# Patient Record
Sex: MALE | Race: WHITE | NOT HISPANIC OR LATINO
[De-identification: names, ages, dates, MRNs, and addresses within clinical notes are randomized per-mention and may not be internally consistent; named-entity substitution may affect disease eponyms.]

---

## 2017-01-05 ENCOUNTER — APPOINTMENT (OUTPATIENT)
Dept: FAMILY MEDICINE | Facility: CLINIC | Age: 62
End: 2017-01-05

## 2017-01-05 VITALS
RESPIRATION RATE: 20 BRPM | SYSTOLIC BLOOD PRESSURE: 115 MMHG | HEIGHT: 73 IN | WEIGHT: 167 LBS | HEART RATE: 68 BPM | BODY MASS INDEX: 22.13 KG/M2 | DIASTOLIC BLOOD PRESSURE: 75 MMHG

## 2017-01-05 DIAGNOSIS — K62.89 OTHER SPECIFIED DISEASES OF ANUS AND RECTUM: ICD-10-CM

## 2017-02-21 ENCOUNTER — OTHER (OUTPATIENT)
Age: 62
End: 2017-02-21

## 2017-03-27 ENCOUNTER — RX RENEWAL (OUTPATIENT)
Age: 62
End: 2017-03-27

## 2017-04-10 ENCOUNTER — APPOINTMENT (OUTPATIENT)
Dept: FAMILY MEDICINE | Facility: CLINIC | Age: 62
End: 2017-04-10

## 2017-04-10 VITALS
BODY MASS INDEX: 22.8 KG/M2 | DIASTOLIC BLOOD PRESSURE: 78 MMHG | SYSTOLIC BLOOD PRESSURE: 128 MMHG | RESPIRATION RATE: 20 BRPM | HEART RATE: 76 BPM | HEIGHT: 73 IN | WEIGHT: 172 LBS

## 2017-04-15 LAB
ALBUMIN SERPL ELPH-MCNC: 4.4 G/DL
ALP BLD-CCNC: 57 U/L
ALT SERPL-CCNC: 12 U/L
ANION GAP SERPL CALC-SCNC: 13 MMOL/L
AST SERPL-CCNC: 17 U/L
BASOPHILS # BLD AUTO: 0.03 K/UL
BASOPHILS NFR BLD AUTO: 0.4 %
BILIRUB SERPL-MCNC: 0.5 MG/DL
BUN SERPL-MCNC: 21 MG/DL
CALCIUM SERPL-MCNC: 9.6 MG/DL
CHLORIDE SERPL-SCNC: 107 MMOL/L
CHOLEST SERPL-MCNC: 178 MG/DL
CHOLEST/HDLC SERPL: 2.5 RATIO
CO2 SERPL-SCNC: 22 MMOL/L
CREAT SERPL-MCNC: 0.98 MG/DL
EOSINOPHIL # BLD AUTO: 0.04 K/UL
EOSINOPHIL NFR BLD AUTO: 0.5 %
GLUCOSE SERPL-MCNC: 97 MG/DL
HBA1C MFR BLD HPLC: 5.9 %
HCT VFR BLD CALC: 41.2 %
HDLC SERPL-MCNC: 70 MG/DL
HGB BLD-MCNC: 13.4 G/DL
IMM GRANULOCYTES NFR BLD AUTO: 0 %
LDLC SERPL CALC-MCNC: 87 MG/DL
LYMPHOCYTES # BLD AUTO: 1.4 K/UL
LYMPHOCYTES NFR BLD AUTO: 19 %
MAN DIFF?: NORMAL
MCHC RBC-ENTMCNC: 30.5 PG
MCHC RBC-ENTMCNC: 32.5 GM/DL
MCV RBC AUTO: 93.8 FL
MONOCYTES # BLD AUTO: 0.72 K/UL
MONOCYTES NFR BLD AUTO: 9.8 %
NEUTROPHILS # BLD AUTO: 5.16 K/UL
NEUTROPHILS NFR BLD AUTO: 70.3 %
PLATELET # BLD AUTO: 254 K/UL
POTASSIUM SERPL-SCNC: 4.3 MMOL/L
PROT SERPL-MCNC: 6.7 G/DL
RBC # BLD: 4.39 M/UL
RBC # FLD: 13.8 %
SODIUM SERPL-SCNC: 142 MMOL/L
TRIGL SERPL-MCNC: 103 MG/DL
WBC # FLD AUTO: 7.35 K/UL

## 2017-06-30 ENCOUNTER — RX RENEWAL (OUTPATIENT)
Age: 62
End: 2017-06-30

## 2017-09-08 ENCOUNTER — RX RENEWAL (OUTPATIENT)
Age: 62
End: 2017-09-08

## 2017-09-11 ENCOUNTER — TRANSCRIPTION ENCOUNTER (OUTPATIENT)
Age: 62
End: 2017-09-11

## 2017-09-22 ENCOUNTER — TRANSCRIPTION ENCOUNTER (OUTPATIENT)
Age: 62
End: 2017-09-22

## 2017-11-13 ENCOUNTER — RX RENEWAL (OUTPATIENT)
Age: 62
End: 2017-11-13

## 2017-12-04 ENCOUNTER — RX RENEWAL (OUTPATIENT)
Age: 62
End: 2017-12-04

## 2017-12-04 ENCOUNTER — APPOINTMENT (OUTPATIENT)
Dept: FAMILY MEDICINE | Facility: CLINIC | Age: 62
End: 2017-12-04
Payer: COMMERCIAL

## 2017-12-04 VITALS
WEIGHT: 164 LBS | HEIGHT: 73 IN | RESPIRATION RATE: 20 BRPM | SYSTOLIC BLOOD PRESSURE: 122 MMHG | BODY MASS INDEX: 21.74 KG/M2 | HEART RATE: 76 BPM | DIASTOLIC BLOOD PRESSURE: 70 MMHG

## 2017-12-04 DIAGNOSIS — Z23 ENCOUNTER FOR IMMUNIZATION: ICD-10-CM

## 2017-12-04 DIAGNOSIS — J98.8 OTHER SPECIFIED RESPIRATORY DISORDERS: ICD-10-CM

## 2017-12-04 PROCEDURE — 99214 OFFICE O/P EST MOD 30 MIN: CPT | Mod: 25

## 2017-12-04 PROCEDURE — 90688 IIV4 VACCINE SPLT 0.5 ML IM: CPT

## 2017-12-04 PROCEDURE — G0008: CPT

## 2017-12-04 PROCEDURE — 36415 COLL VENOUS BLD VENIPUNCTURE: CPT

## 2017-12-11 ENCOUNTER — APPOINTMENT (OUTPATIENT)
Dept: ORTHOPEDIC SURGERY | Facility: CLINIC | Age: 62
End: 2017-12-11
Payer: COMMERCIAL

## 2017-12-11 VITALS
HEART RATE: 50 BPM | WEIGHT: 164 LBS | BODY MASS INDEX: 21.74 KG/M2 | DIASTOLIC BLOOD PRESSURE: 81 MMHG | SYSTOLIC BLOOD PRESSURE: 146 MMHG | HEIGHT: 73 IN

## 2017-12-11 PROCEDURE — 99213 OFFICE O/P EST LOW 20 MIN: CPT

## 2017-12-11 PROCEDURE — 73562 X-RAY EXAM OF KNEE 3: CPT | Mod: RT

## 2017-12-19 LAB
ALBUMIN SERPL ELPH-MCNC: 4.2 G/DL
ALP BLD-CCNC: 56 U/L
ALT SERPL-CCNC: 10 U/L
ANION GAP SERPL CALC-SCNC: 14 MMOL/L
AST SERPL-CCNC: 16 U/L
BASOPHILS # BLD AUTO: 0.03 K/UL
BASOPHILS NFR BLD AUTO: 0.5 %
BILIRUB SERPL-MCNC: 0.4 MG/DL
BUN SERPL-MCNC: 20 MG/DL
CALCIUM SERPL-MCNC: 9.5 MG/DL
CHLORIDE SERPL-SCNC: 107 MMOL/L
CHOLEST SERPL-MCNC: 218 MG/DL
CHOLEST/HDLC SERPL: 2.9 RATIO
CO2 SERPL-SCNC: 24 MMOL/L
CREAT SERPL-MCNC: 0.9 MG/DL
EOSINOPHIL # BLD AUTO: 0.1 K/UL
EOSINOPHIL NFR BLD AUTO: 1.7 %
GLUCOSE SERPL-MCNC: 110 MG/DL
HBA1C MFR BLD HPLC: 5.6 %
HCT VFR BLD CALC: 43.6 %
HCV AB SER QL: NONREACTIVE
HCV S/CO RATIO: 0.09 S/CO
HDLC SERPL-MCNC: 76 MG/DL
HGB BLD-MCNC: 14.1 G/DL
IMM GRANULOCYTES NFR BLD AUTO: 0.2 %
LDLC SERPL CALC-MCNC: 120 MG/DL
LYMPHOCYTES # BLD AUTO: 1.47 K/UL
LYMPHOCYTES NFR BLD AUTO: 24.6 %
MAN DIFF?: NORMAL
MCHC RBC-ENTMCNC: 30.5 PG
MCHC RBC-ENTMCNC: 32.3 GM/DL
MCV RBC AUTO: 94.2 FL
MONOCYTES # BLD AUTO: 0.54 K/UL
MONOCYTES NFR BLD AUTO: 9 %
NEUTROPHILS # BLD AUTO: 3.83 K/UL
NEUTROPHILS NFR BLD AUTO: 64 %
PLATELET # BLD AUTO: 247 K/UL
POTASSIUM SERPL-SCNC: 4.4 MMOL/L
PROT SERPL-MCNC: 6.8 G/DL
PSA SERPL-MCNC: 1.68 NG/ML
RBC # BLD: 4.63 M/UL
RBC # FLD: 14.4 %
SODIUM SERPL-SCNC: 145 MMOL/L
TRIGL SERPL-MCNC: 111 MG/DL
WBC # FLD AUTO: 5.98 K/UL

## 2018-01-04 ENCOUNTER — CLINICAL ADVICE (OUTPATIENT)
Age: 63
End: 2018-01-04

## 2018-02-01 ENCOUNTER — APPOINTMENT (OUTPATIENT)
Dept: FAMILY MEDICINE | Facility: CLINIC | Age: 63
End: 2018-02-01
Payer: COMMERCIAL

## 2018-02-01 VITALS
HEART RATE: 68 BPM | HEIGHT: 73 IN | BODY MASS INDEX: 21.74 KG/M2 | SYSTOLIC BLOOD PRESSURE: 125 MMHG | RESPIRATION RATE: 20 BRPM | DIASTOLIC BLOOD PRESSURE: 76 MMHG | WEIGHT: 164 LBS

## 2018-02-01 DIAGNOSIS — H91.90 UNSPECIFIED HEARING LOSS, UNSPECIFIED EAR: ICD-10-CM

## 2018-02-01 PROCEDURE — 99213 OFFICE O/P EST LOW 20 MIN: CPT

## 2018-03-22 ENCOUNTER — RX RENEWAL (OUTPATIENT)
Age: 63
End: 2018-03-22

## 2018-04-06 ENCOUNTER — TRANSCRIPTION ENCOUNTER (OUTPATIENT)
Age: 63
End: 2018-04-06

## 2018-06-07 ENCOUNTER — RX RENEWAL (OUTPATIENT)
Age: 63
End: 2018-06-07

## 2018-11-05 ENCOUNTER — RX RENEWAL (OUTPATIENT)
Age: 63
End: 2018-11-05

## 2018-11-26 ENCOUNTER — EMERGENCY (EMERGENCY)
Facility: HOSPITAL | Age: 63
LOS: 1 days | Discharge: HOME | End: 2018-11-26
Attending: EMERGENCY MEDICINE | Admitting: EMERGENCY MEDICINE
Payer: MEDICARE

## 2018-11-26 ENCOUNTER — APPOINTMENT (OUTPATIENT)
Dept: FAMILY MEDICINE | Facility: CLINIC | Age: 63
End: 2018-11-26
Payer: MEDICARE

## 2018-11-26 VITALS
TEMPERATURE: 98 F | WEIGHT: 160 LBS | BODY MASS INDEX: 21.2 KG/M2 | SYSTOLIC BLOOD PRESSURE: 100 MMHG | HEART RATE: 51 BPM | HEIGHT: 73 IN | OXYGEN SATURATION: 94 % | DIASTOLIC BLOOD PRESSURE: 62 MMHG

## 2018-11-26 VITALS
TEMPERATURE: 98 F | DIASTOLIC BLOOD PRESSURE: 71 MMHG | HEART RATE: 61 BPM | SYSTOLIC BLOOD PRESSURE: 126 MMHG | OXYGEN SATURATION: 97 % | RESPIRATION RATE: 18 BRPM

## 2018-11-26 VITALS
OXYGEN SATURATION: 98 % | HEIGHT: 72 IN | DIASTOLIC BLOOD PRESSURE: 74 MMHG | HEART RATE: 53 BPM | SYSTOLIC BLOOD PRESSURE: 122 MMHG | RESPIRATION RATE: 18 BRPM | TEMPERATURE: 98 F | WEIGHT: 160.06 LBS

## 2018-11-26 LAB
ALBUMIN SERPL ELPH-MCNC: 3.6 G/DL — SIGNIFICANT CHANGE UP (ref 3.3–5)
ALP SERPL-CCNC: 57 U/L — SIGNIFICANT CHANGE UP (ref 40–120)
ALT FLD-CCNC: 28 U/L DA — SIGNIFICANT CHANGE UP (ref 10–45)
ANION GAP SERPL CALC-SCNC: 6 MMOL/L — SIGNIFICANT CHANGE UP (ref 5–17)
APPEARANCE UR: CLEAR — SIGNIFICANT CHANGE UP
AST SERPL-CCNC: 18 U/L — SIGNIFICANT CHANGE UP (ref 10–40)
BACTERIA # UR AUTO: ABNORMAL /HPF
BASOPHILS # BLD AUTO: 0.1 K/UL — SIGNIFICANT CHANGE UP (ref 0–0.2)
BASOPHILS NFR BLD AUTO: 1.5 % — SIGNIFICANT CHANGE UP (ref 0–2)
BILIRUB SERPL-MCNC: 0.3 MG/DL — SIGNIFICANT CHANGE UP (ref 0.2–1.2)
BILIRUB UR-MCNC: ABNORMAL
BUN SERPL-MCNC: 28 MG/DL — HIGH (ref 7–23)
CALCIUM SERPL-MCNC: 8.9 MG/DL — SIGNIFICANT CHANGE UP (ref 8.4–10.5)
CHLORIDE SERPL-SCNC: 105 MMOL/L — SIGNIFICANT CHANGE UP (ref 96–108)
CO2 SERPL-SCNC: 32 MMOL/L — HIGH (ref 22–31)
COLOR SPEC: YELLOW — SIGNIFICANT CHANGE UP
CREAT SERPL-MCNC: 1.14 MG/DL — SIGNIFICANT CHANGE UP (ref 0.5–1.3)
DIFF PNL FLD: ABNORMAL
EOSINOPHIL # BLD AUTO: 0.2 K/UL — SIGNIFICANT CHANGE UP (ref 0–0.5)
EOSINOPHIL NFR BLD AUTO: 2.5 % — SIGNIFICANT CHANGE UP (ref 0–6)
EPI CELLS # UR: SIGNIFICANT CHANGE UP
GLUCOSE SERPL-MCNC: 102 MG/DL — HIGH (ref 70–99)
GLUCOSE UR QL: NEGATIVE — SIGNIFICANT CHANGE UP
HCT VFR BLD CALC: 42.8 % — SIGNIFICANT CHANGE UP (ref 39–50)
HGB BLD-MCNC: 14 G/DL — SIGNIFICANT CHANGE UP (ref 13–17)
KETONES UR-MCNC: NEGATIVE — SIGNIFICANT CHANGE UP
LEUKOCYTE ESTERASE UR-ACNC: ABNORMAL
LYMPHOCYTES # BLD AUTO: 1.6 K/UL — SIGNIFICANT CHANGE UP (ref 1–3.3)
LYMPHOCYTES # BLD AUTO: 24.7 % — SIGNIFICANT CHANGE UP (ref 13–44)
MCHC RBC-ENTMCNC: 30.9 PG — SIGNIFICANT CHANGE UP (ref 27–34)
MCHC RBC-ENTMCNC: 32.7 GM/DL — SIGNIFICANT CHANGE UP (ref 32–36)
MCV RBC AUTO: 94.6 FL — SIGNIFICANT CHANGE UP (ref 80–100)
MONOCYTES # BLD AUTO: 0.6 K/UL — SIGNIFICANT CHANGE UP (ref 0–0.9)
MONOCYTES NFR BLD AUTO: 9.8 % — HIGH (ref 1–9)
NEUTROPHILS # BLD AUTO: 3.9 K/UL — SIGNIFICANT CHANGE UP (ref 1.8–7.4)
NEUTROPHILS NFR BLD AUTO: 61.6 % — SIGNIFICANT CHANGE UP (ref 43–77)
NITRITE UR-MCNC: NEGATIVE — SIGNIFICANT CHANGE UP
PH UR: 6 — SIGNIFICANT CHANGE UP (ref 5–8)
PLATELET # BLD AUTO: 220 K/UL — SIGNIFICANT CHANGE UP (ref 150–400)
POTASSIUM SERPL-MCNC: 4.2 MMOL/L — SIGNIFICANT CHANGE UP (ref 3.5–5.3)
POTASSIUM SERPL-SCNC: 4.2 MMOL/L — SIGNIFICANT CHANGE UP (ref 3.5–5.3)
PROT SERPL-MCNC: 6.7 G/DL — SIGNIFICANT CHANGE UP (ref 6–8.3)
PROT UR-MCNC: NEGATIVE — SIGNIFICANT CHANGE UP
RBC # BLD: 4.52 M/UL — SIGNIFICANT CHANGE UP (ref 4.2–5.8)
RBC # FLD: 12.6 % — SIGNIFICANT CHANGE UP (ref 10.3–14.5)
RBC CASTS # UR COMP ASSIST: SIGNIFICANT CHANGE UP /HPF (ref 0–4)
SODIUM SERPL-SCNC: 143 MMOL/L — SIGNIFICANT CHANGE UP (ref 135–145)
SP GR SPEC: 1.02 — SIGNIFICANT CHANGE UP (ref 1.01–1.02)
UROBILINOGEN FLD QL: NEGATIVE — SIGNIFICANT CHANGE UP
WBC # BLD: 6.3 K/UL — SIGNIFICANT CHANGE UP (ref 3.8–10.5)
WBC # FLD AUTO: 6.3 K/UL — SIGNIFICANT CHANGE UP (ref 3.8–10.5)
WBC UR QL: SIGNIFICANT CHANGE UP /HPF (ref 0–5)

## 2018-11-26 PROCEDURE — 74177 CT ABD & PELVIS W/CONTRAST: CPT

## 2018-11-26 PROCEDURE — 81001 URINALYSIS AUTO W/SCOPE: CPT

## 2018-11-26 PROCEDURE — 99284 EMERGENCY DEPT VISIT MOD MDM: CPT | Mod: 25

## 2018-11-26 PROCEDURE — 99284 EMERGENCY DEPT VISIT MOD MDM: CPT

## 2018-11-26 PROCEDURE — 99214 OFFICE O/P EST MOD 30 MIN: CPT

## 2018-11-26 PROCEDURE — 74177 CT ABD & PELVIS W/CONTRAST: CPT | Mod: 26

## 2018-11-26 PROCEDURE — 85027 COMPLETE CBC AUTOMATED: CPT

## 2018-11-26 PROCEDURE — 80053 COMPREHEN METABOLIC PANEL: CPT

## 2018-11-26 RX ORDER — SODIUM CHLORIDE 9 MG/ML
1000 INJECTION INTRAMUSCULAR; INTRAVENOUS; SUBCUTANEOUS ONCE
Qty: 0 | Refills: 0 | Status: COMPLETED | OUTPATIENT
Start: 2018-11-26 | End: 2018-11-26

## 2018-11-26 RX ORDER — OXYCODONE HYDROCHLORIDE 5 MG/1
10 TABLET ORAL ONCE
Qty: 0 | Refills: 0 | Status: DISCONTINUED | OUTPATIENT
Start: 2018-11-26 | End: 2018-11-26

## 2018-11-26 RX ADMIN — SODIUM CHLORIDE 1000 MILLILITER(S): 9 INJECTION INTRAMUSCULAR; INTRAVENOUS; SUBCUTANEOUS at 16:24

## 2018-11-26 RX ADMIN — OXYCODONE HYDROCHLORIDE 10 MILLIGRAM(S): 5 TABLET ORAL at 20:58

## 2018-11-26 NOTE — HISTORY OF PRESENT ILLNESS
[FreeTextEntry8] : Patient came c/o left side pain and back for the past 5 days, sharp, 9/10,  no fever, no chills, on and off, recently more often, no N,no V,+ Diarrhea. Patient took pain meds - Diclofenac Oxycodone - no improvement. Patient denies CP, heart palpitation. Marcos injury.

## 2018-11-26 NOTE — ED PROVIDER NOTE - NSFOLLOWUPCLINICS_GEN_ALL_ED_FT
[facility] - [facility]  Ortho/Spine  [address] [place] [address]  Phone: [phone]  Fax:   Follow Up Time:

## 2018-11-26 NOTE — ED PROVIDER NOTE - ATTENDING CONTRIBUTION TO CARE
Eval with MAGUI Esqueda. Patient with Darcy with MAGUI Esqueda. Patient with left flank pain and lower abd pain. He did not complain of lower abd pain until he was examined and exhibited LLQ abd tenderness with diarrhea. R/O colitis. R/O stone. CT abd and labs. I performed a face to face bedside interview with patient regarding history of present illness, review of symptoms and past medical history. I completed an independent physical exam.  I have discussed the patient's plan of care with Physician Assistant (PA). I agree with note as stated above, having amended the EMR as needed to reflect my findings.   This includes History of Present Illness, HIV, Past Medical/Surgical/Family/Social History, Allergies and Home Medications, Review of Systems, Physical Exam, and any Progress Notes during the time I functioned as the attending physician for this patient.

## 2018-11-26 NOTE — ED PROVIDER NOTE - CARE PROVIDER_API CALL
Coy Ibanez), Gastroenterology; Internal Medicine  [address] [place] [address]  Phone: [phone]  Fax: [phone]

## 2018-11-26 NOTE — ED PROVIDER NOTE - OBJECTIVE STATEMENT
pt 64 yo m hx arthrc/o left sided back pain x5 days. no raditaion.  went to UC today and was pt 62 yo m hx arthrc/o left sided back pain x5 days. no radiation  went to UC today and was sent to ED for CT abd because he had LLQ TTP and 5 days of non bloody, non mucous diarrhea   denies fever, chills, sob, CP, abd pain pt 62 yo m hx arthroscopic c/o left sided back pain x5 days. no radiation  went to UC today and was sent to ED for CT abd because he had LLQ TTP and 5 days of non bloody, non mucous diarrhea   denies fever, chills, sob, CP, abd pain

## 2018-11-26 NOTE — ED PROVIDER NOTE - NSFOLLOWUPINSTRUCTIONS_ED_ALL_ED_FT
You needs to follow up with the gastroenterologist and the spine specialist.  Liver cysts and wall thickening of colon. Pt needs to follow up with GI and needs a colonoscopy as soon as possible. Pt follows with Dr. Mcneil who has been telling him he needs a colonoscopy.  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  Any worsening of symptoms or new concerning symptoms return to the ED  Take all of your medications as prescribed

## 2018-11-26 NOTE — ED PROVIDER NOTE - MEDICAL DECISION MAKING DETAILS
ct abd to eval for colitis, diverticulitis   pt took pain meds at home and does not want any medications at this time

## 2018-11-26 NOTE — ED ADULT TRIAGE NOTE - CHIEF COMPLAINT QUOTE
Pt c/o left flank pain since Wednesday that he describes as a spasm. Pt c/o left flank pain since Wednesday that he describes as a spasm. Pt denies previous trauma, injury or urinary s/s.

## 2018-11-26 NOTE — PHYSICAL EXAM
[No Acute Distress] : no acute distress [No Respiratory Distress] : no respiratory distress  [Clear to Auscultation] : lungs were clear to auscultation bilaterally [No Accessory Muscle Use] : no accessory muscle use [Normal Rate] : normal rate  [Normal S1, S2] : normal S1 and S2 [No Edema] : there was no peripheral edema [Soft] : abdomen soft [Non-distended] : non-distended [No HSM] : no HSM [Normal Supraclavicular Nodes] : no supraclavicular lymphadenopathy [Normal Axillary Nodes] : no axillary lymphadenopathy [Normal Posterior Cervical Nodes] : no posterior cervical lymphadenopathy [Normal Anterior Cervical Nodes] : no anterior cervical lymphadenopathy [No Joint Swelling] : no joint swelling [No Rash] : no rash [Alert and Oriented x3] : oriented to person, place, and time [de-identified] : + LLQ tenderness, + guarding

## 2018-11-26 NOTE — PHYSICAL EXAM
[No Acute Distress] : no acute distress [No Respiratory Distress] : no respiratory distress  [Clear to Auscultation] : lungs were clear to auscultation bilaterally [No Accessory Muscle Use] : no accessory muscle use [Normal Rate] : normal rate  [Normal S1, S2] : normal S1 and S2 [No Edema] : there was no peripheral edema [Soft] : abdomen soft [Non-distended] : non-distended [No HSM] : no HSM [Normal Supraclavicular Nodes] : no supraclavicular lymphadenopathy [Normal Axillary Nodes] : no axillary lymphadenopathy [Normal Posterior Cervical Nodes] : no posterior cervical lymphadenopathy [Normal Anterior Cervical Nodes] : no anterior cervical lymphadenopathy [No Joint Swelling] : no joint swelling [No Rash] : no rash [Alert and Oriented x3] : oriented to person, place, and time [de-identified] : + LLQ tenderness, + guarding

## 2018-11-26 NOTE — ED PROVIDER NOTE - PHYSICAL EXAMINATION
General:     NAD, well-nourished, well-appearing  Eyes: PERRL  Head:     NC/AT, EOMI, oral mucosa moist  Neck:     trachea midline  Lungs:     CTA b/l  CVS:     RRR  Abd:     +BS, LLQ TTP, no CVA TTP. unable to elicit pain in left lower back/flank. no spinal TTP or deformity   Ext:   no deformities   Neuro: AAOx3, no sensory/motor deficits

## 2018-11-26 NOTE — ED PROVIDER NOTE - PROGRESS NOTE DETAILS
Nohemy: Discussed at length CT results:  Liver cysts and wall thickening of colon. Pt needs to follow up with GI and needs a colonoscopy as soon as possible. Pt follows with Dr. Mcneil who has been telling him he needs a colonoscopy.  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

## 2018-11-26 NOTE — REVIEW OF SYSTEMS
[Abdominal Pain] : no abdominal pain [Nausea] : no nausea [Constipation] : no constipation [Diarrhea] : diarrhea [Vomiting] : no vomiting [Heartburn] : no heartburn [Back Pain] : back pain [Negative] : Genitourinary

## 2018-11-27 DIAGNOSIS — T14.8XXA OTHER INJURY OF UNSPECIFIED BODY REGION, INITIAL ENCOUNTER: ICD-10-CM

## 2018-11-29 PROBLEM — E78.00 PURE HYPERCHOLESTEROLEMIA, UNSPECIFIED: Chronic | Status: ACTIVE | Noted: 2018-11-26

## 2018-11-30 ENCOUNTER — APPOINTMENT (OUTPATIENT)
Dept: FAMILY MEDICINE | Facility: CLINIC | Age: 63
End: 2018-11-30
Payer: MEDICARE

## 2018-11-30 ENCOUNTER — EMERGENCY (EMERGENCY)
Facility: HOSPITAL | Age: 63
LOS: 1 days | Discharge: HOME | End: 2018-11-30
Attending: EMERGENCY MEDICINE | Admitting: EMERGENCY MEDICINE
Payer: MEDICARE

## 2018-11-30 VITALS — RESPIRATION RATE: 20 BRPM | HEART RATE: 68 BPM | DIASTOLIC BLOOD PRESSURE: 80 MMHG | SYSTOLIC BLOOD PRESSURE: 125 MMHG

## 2018-11-30 VITALS
TEMPERATURE: 98 F | DIASTOLIC BLOOD PRESSURE: 64 MMHG | OXYGEN SATURATION: 98 % | RESPIRATION RATE: 15 BRPM | WEIGHT: 160.06 LBS | HEART RATE: 84 BPM | SYSTOLIC BLOOD PRESSURE: 103 MMHG | HEIGHT: 72 IN

## 2018-11-30 DIAGNOSIS — R10.32 LEFT LOWER QUADRANT PAIN: ICD-10-CM

## 2018-11-30 LAB
ALBUMIN SERPL ELPH-MCNC: 3.5 G/DL — SIGNIFICANT CHANGE UP (ref 3.3–5)
ALP SERPL-CCNC: 52 U/L — SIGNIFICANT CHANGE UP (ref 40–120)
ALT FLD-CCNC: 31 U/L DA — SIGNIFICANT CHANGE UP (ref 10–45)
ANION GAP SERPL CALC-SCNC: 9 MMOL/L — SIGNIFICANT CHANGE UP (ref 5–17)
AST SERPL-CCNC: 15 U/L — SIGNIFICANT CHANGE UP (ref 10–40)
BASOPHILS # BLD AUTO: 0.1 K/UL — SIGNIFICANT CHANGE UP (ref 0–0.2)
BASOPHILS NFR BLD AUTO: 1.4 % — SIGNIFICANT CHANGE UP (ref 0–2)
BILIRUB SERPL-MCNC: 0.4 MG/DL — SIGNIFICANT CHANGE UP (ref 0.2–1.2)
BUN SERPL-MCNC: 24 MG/DL — HIGH (ref 7–23)
CALCIUM SERPL-MCNC: 8.9 MG/DL — SIGNIFICANT CHANGE UP (ref 8.4–10.5)
CHLORIDE SERPL-SCNC: 109 MMOL/L — HIGH (ref 96–108)
CO2 SERPL-SCNC: 27 MMOL/L — SIGNIFICANT CHANGE UP (ref 22–31)
CREAT SERPL-MCNC: 0.98 MG/DL — SIGNIFICANT CHANGE UP (ref 0.5–1.3)
EOSINOPHIL # BLD AUTO: 0.1 K/UL — SIGNIFICANT CHANGE UP (ref 0–0.5)
EOSINOPHIL NFR BLD AUTO: 2.1 % — SIGNIFICANT CHANGE UP (ref 0–6)
GLUCOSE SERPL-MCNC: 102 MG/DL — HIGH (ref 70–99)
HCT VFR BLD CALC: 42.2 % — SIGNIFICANT CHANGE UP (ref 39–50)
HGB BLD-MCNC: 14 G/DL — SIGNIFICANT CHANGE UP (ref 13–17)
LYMPHOCYTES # BLD AUTO: 1.2 K/UL — SIGNIFICANT CHANGE UP (ref 1–3.3)
LYMPHOCYTES # BLD AUTO: 20.1 % — SIGNIFICANT CHANGE UP (ref 13–44)
MCHC RBC-ENTMCNC: 30.9 PG — SIGNIFICANT CHANGE UP (ref 27–34)
MCHC RBC-ENTMCNC: 33.2 GM/DL — SIGNIFICANT CHANGE UP (ref 32–36)
MCV RBC AUTO: 93 FL — SIGNIFICANT CHANGE UP (ref 80–100)
MONOCYTES # BLD AUTO: 0.5 K/UL — SIGNIFICANT CHANGE UP (ref 0–0.9)
MONOCYTES NFR BLD AUTO: 9 % — SIGNIFICANT CHANGE UP (ref 1–9)
NEUTROPHILS # BLD AUTO: 3.9 K/UL — SIGNIFICANT CHANGE UP (ref 1.8–7.4)
NEUTROPHILS NFR BLD AUTO: 67.4 % — SIGNIFICANT CHANGE UP (ref 43–77)
PLATELET # BLD AUTO: 222 K/UL — SIGNIFICANT CHANGE UP (ref 150–400)
POTASSIUM SERPL-MCNC: 4.7 MMOL/L — SIGNIFICANT CHANGE UP (ref 3.5–5.3)
POTASSIUM SERPL-SCNC: 4.7 MMOL/L — SIGNIFICANT CHANGE UP (ref 3.5–5.3)
PROT SERPL-MCNC: 6.7 G/DL — SIGNIFICANT CHANGE UP (ref 6–8.3)
RBC # BLD: 4.54 M/UL — SIGNIFICANT CHANGE UP (ref 4.2–5.8)
RBC # FLD: 12.4 % — SIGNIFICANT CHANGE UP (ref 10.3–14.5)
SODIUM SERPL-SCNC: 145 MMOL/L — SIGNIFICANT CHANGE UP (ref 135–145)
WBC # BLD: 5.8 K/UL — SIGNIFICANT CHANGE UP (ref 3.8–10.5)
WBC # FLD AUTO: 5.8 K/UL — SIGNIFICANT CHANGE UP (ref 3.8–10.5)

## 2018-11-30 PROCEDURE — 74176 CT ABD & PELVIS W/O CONTRAST: CPT

## 2018-11-30 PROCEDURE — 85027 COMPLETE CBC AUTOMATED: CPT

## 2018-11-30 PROCEDURE — 99284 EMERGENCY DEPT VISIT MOD MDM: CPT | Mod: 25

## 2018-11-30 PROCEDURE — 96361 HYDRATE IV INFUSION ADD-ON: CPT

## 2018-11-30 PROCEDURE — 96374 THER/PROPH/DIAG INJ IV PUSH: CPT

## 2018-11-30 PROCEDURE — 74176 CT ABD & PELVIS W/O CONTRAST: CPT | Mod: 26

## 2018-11-30 PROCEDURE — 80053 COMPREHEN METABOLIC PANEL: CPT

## 2018-11-30 PROCEDURE — 96375 TX/PRO/DX INJ NEW DRUG ADDON: CPT

## 2018-11-30 PROCEDURE — 93010 ELECTROCARDIOGRAM REPORT: CPT

## 2018-11-30 PROCEDURE — 99284 EMERGENCY DEPT VISIT MOD MDM: CPT

## 2018-11-30 PROCEDURE — 93005 ELECTROCARDIOGRAM TRACING: CPT

## 2018-11-30 PROCEDURE — 99213 OFFICE O/P EST LOW 20 MIN: CPT

## 2018-11-30 RX ORDER — OXYCODONE HYDROCHLORIDE 5 MG/1
1 TABLET ORAL
Qty: 6 | Refills: 0 | OUTPATIENT
Start: 2018-11-30

## 2018-11-30 RX ORDER — SODIUM CHLORIDE 9 MG/ML
3 INJECTION INTRAMUSCULAR; INTRAVENOUS; SUBCUTANEOUS ONCE
Qty: 0 | Refills: 0 | Status: COMPLETED | OUTPATIENT
Start: 2018-11-30 | End: 2018-11-30

## 2018-11-30 RX ORDER — OXYCODONE HYDROCHLORIDE 5 MG/1
1 TABLET ORAL
Qty: 12 | Refills: 0 | OUTPATIENT
Start: 2018-11-30 | End: 2018-12-02

## 2018-11-30 RX ORDER — HYDROMORPHONE HYDROCHLORIDE 2 MG/ML
1 INJECTION INTRAMUSCULAR; INTRAVENOUS; SUBCUTANEOUS ONCE
Qty: 0 | Refills: 0 | Status: DISCONTINUED | OUTPATIENT
Start: 2018-11-30 | End: 2018-11-30

## 2018-11-30 RX ORDER — ONDANSETRON 8 MG/1
4 TABLET, FILM COATED ORAL ONCE
Qty: 0 | Refills: 0 | Status: COMPLETED | OUTPATIENT
Start: 2018-11-30 | End: 2018-11-30

## 2018-11-30 RX ORDER — MORPHINE SULFATE 50 MG/1
4 CAPSULE, EXTENDED RELEASE ORAL ONCE
Qty: 0 | Refills: 0 | Status: DISCONTINUED | OUTPATIENT
Start: 2018-11-30 | End: 2018-11-30

## 2018-11-30 RX ORDER — DIAZEPAM 5 MG
1 TABLET ORAL
Qty: 12 | Refills: 0 | OUTPATIENT
Start: 2018-11-30 | End: 2018-12-02

## 2018-11-30 RX ORDER — SODIUM CHLORIDE 9 MG/ML
1000 INJECTION INTRAMUSCULAR; INTRAVENOUS; SUBCUTANEOUS ONCE
Qty: 0 | Refills: 0 | Status: COMPLETED | OUTPATIENT
Start: 2018-11-30 | End: 2018-11-30

## 2018-11-30 RX ADMIN — SODIUM CHLORIDE 1000 MILLILITER(S): 9 INJECTION INTRAMUSCULAR; INTRAVENOUS; SUBCUTANEOUS at 18:50

## 2018-11-30 RX ADMIN — SODIUM CHLORIDE 1000 MILLILITER(S): 9 INJECTION INTRAMUSCULAR; INTRAVENOUS; SUBCUTANEOUS at 15:26

## 2018-11-30 RX ADMIN — HYDROMORPHONE HYDROCHLORIDE 1 MILLIGRAM(S): 2 INJECTION INTRAMUSCULAR; INTRAVENOUS; SUBCUTANEOUS at 17:53

## 2018-11-30 RX ADMIN — ONDANSETRON 4 MILLIGRAM(S): 8 TABLET, FILM COATED ORAL at 15:26

## 2018-11-30 RX ADMIN — MORPHINE SULFATE 4 MILLIGRAM(S): 50 CAPSULE, EXTENDED RELEASE ORAL at 15:27

## 2018-11-30 RX ADMIN — SODIUM CHLORIDE 3 MILLILITER(S): 9 INJECTION INTRAMUSCULAR; INTRAVENOUS; SUBCUTANEOUS at 15:38

## 2018-11-30 RX ADMIN — HYDROMORPHONE HYDROCHLORIDE 1 MILLIGRAM(S): 2 INJECTION INTRAMUSCULAR; INTRAVENOUS; SUBCUTANEOUS at 18:23

## 2018-11-30 RX ADMIN — MORPHINE SULFATE 4 MILLIGRAM(S): 50 CAPSULE, EXTENDED RELEASE ORAL at 15:57

## 2018-11-30 NOTE — ED PROVIDER NOTE - OBJECTIVE STATEMENT
64 yo male sent in by Dr. Wharton for left lower qudrant pain with left flank pain. pt was seen for the same llq 3 days ago.

## 2018-11-30 NOTE — ED ADULT NURSE NOTE - NSIMPLEMENTINTERV_GEN_ALL_ED
Implemented All Universal Safety Interventions:  [place] to call system. Call bell, personal items and telephone within reach. Instruct patient to call for assistance. Room bathroom lighting operational. Non-slip footwear when patient is off stretcher. Physically safe environment: no spills, clutter or unnecessary equipment. Stretcher in lowest position, wheels locked, appropriate side rails in place.

## 2018-11-30 NOTE — PHYSICAL EXAM
[Uncomfortable] : uncomfortable [Supple] : supple [No Respiratory Distress] : no respiratory distress  [Clear to Auscultation] : lungs were clear to auscultation bilaterally [No Accessory Muscle Use] : no accessory muscle use [Normal Rate] : normal rate  [Regular Rhythm] : with a regular rhythm [Normal S1, S2] : normal S1 and S2 [No Murmur] : no murmur heard [Soft] : abdomen soft [Non-distended] : non-distended [No HSM] : no HSM [Normal Bowel Sounds] : normal bowel sounds [de-identified] : in marked distress, sitting with hand pressing on L lower abdomen, rocking on the table [de-identified] : very tender LLQ with guarding noted

## 2018-11-30 NOTE — ED PROVIDER NOTE - PROGRESS NOTE DETAILS
pt feeling better will d/c tohome on oxycodone and valium pt has appointment with neurologist on tuesday in 4 days [facility] called: unable to fill oxycodone. pt picked up rx oxycodone 15mg BID for a 1 month supply Nov 5th

## 2018-11-30 NOTE — ED ADULT NURSE NOTE - OBJECTIVE STATEMENT
Pt c/o left flank pain since last Wednesday without urinary complications. Pt states hx of back pain but states that this feels like a pocket of air. Pt denies vomiting, nausea, or diarrhea.

## 2018-11-30 NOTE — HISTORY OF PRESENT ILLNESS
[FreeTextEntry8] : Presents on acute basis as follow-up to last visit and ER - all ER documentation reviewed - states has persistent and increasing L lower abd pain; states "I had to take a pain pill to make it here."

## 2018-12-04 ENCOUNTER — APPOINTMENT (OUTPATIENT)
Dept: MRI IMAGING | Facility: HOSPITAL | Age: 63
End: 2018-12-04

## 2018-12-04 ENCOUNTER — OUTPATIENT (OUTPATIENT)
Dept: OUTPATIENT SERVICES | Facility: HOSPITAL | Age: 63
LOS: 1 days | End: 2018-12-04
Payer: MEDICARE

## 2018-12-04 ENCOUNTER — APPOINTMENT (OUTPATIENT)
Dept: SPINE | Facility: CLINIC | Age: 63
End: 2018-12-04
Payer: MEDICARE

## 2018-12-04 VITALS
WEIGHT: 160 LBS | SYSTOLIC BLOOD PRESSURE: 159 MMHG | BODY MASS INDEX: 21.67 KG/M2 | DIASTOLIC BLOOD PRESSURE: 77 MMHG | HEART RATE: 53 BPM | HEIGHT: 72 IN

## 2018-12-04 DIAGNOSIS — G54.3 THORACIC ROOT DISORDERS, NOT ELSEWHERE CLASSIFIED: ICD-10-CM

## 2018-12-04 DIAGNOSIS — Z00.8 ENCOUNTER FOR OTHER GENERAL EXAMINATION: ICD-10-CM

## 2018-12-04 PROCEDURE — 72157 MRI CHEST SPINE W/O & W/DYE: CPT

## 2018-12-04 PROCEDURE — A9579: CPT

## 2018-12-04 PROCEDURE — 99203 OFFICE O/P NEW LOW 30 MIN: CPT

## 2018-12-04 PROCEDURE — 72157 MRI CHEST SPINE W/O & W/DYE: CPT | Mod: 26

## 2018-12-04 RX ORDER — HYDROCORTISONE ACETATE 25 MG/1
25 SUPPOSITORY RECTAL 3 TIMES DAILY
Qty: 30 | Refills: 1 | Status: COMPLETED | COMMUNITY
Start: 2017-01-05 | End: 2018-12-04

## 2018-12-04 RX ORDER — CHLORZOXAZONE 500 MG/1
500 TABLET ORAL
Qty: 40 | Refills: 3 | Status: COMPLETED | COMMUNITY
Start: 2018-03-22 | End: 2018-12-04

## 2018-12-05 ENCOUNTER — APPOINTMENT (OUTPATIENT)
Dept: SURGICAL ONCOLOGY | Facility: CLINIC | Age: 63
End: 2018-12-05

## 2018-12-05 ENCOUNTER — APPOINTMENT (OUTPATIENT)
Dept: SURGICAL ONCOLOGY | Facility: CLINIC | Age: 63
End: 2018-12-05
Payer: MEDICARE

## 2018-12-05 VITALS
SYSTOLIC BLOOD PRESSURE: 124 MMHG | DIASTOLIC BLOOD PRESSURE: 75 MMHG | HEIGHT: 72 IN | WEIGHT: 160 LBS | BODY MASS INDEX: 21.67 KG/M2 | OXYGEN SATURATION: 96 % | HEART RATE: 56 BPM | TEMPERATURE: 98.1 F

## 2018-12-05 DIAGNOSIS — Z12.11 ENCOUNTER FOR SCREENING FOR MALIGNANT NEOPLASM OF COLON: ICD-10-CM

## 2018-12-05 PROCEDURE — 99205 OFFICE O/P NEW HI 60 MIN: CPT

## 2018-12-07 ENCOUNTER — RX RENEWAL (OUTPATIENT)
Age: 63
End: 2018-12-07

## 2018-12-11 ENCOUNTER — APPOINTMENT (OUTPATIENT)
Dept: SPINE | Facility: CLINIC | Age: 63
End: 2018-12-11

## 2018-12-12 ENCOUNTER — OUTPATIENT (OUTPATIENT)
Dept: OUTPATIENT SERVICES | Facility: HOSPITAL | Age: 63
LOS: 1 days | End: 2018-12-12
Payer: MEDICARE

## 2018-12-12 VITALS
WEIGHT: 158.07 LBS | SYSTOLIC BLOOD PRESSURE: 128 MMHG | TEMPERATURE: 98 F | HEART RATE: 61 BPM | DIASTOLIC BLOOD PRESSURE: 70 MMHG | HEIGHT: 70 IN | RESPIRATION RATE: 16 BRPM

## 2018-12-12 DIAGNOSIS — Z12.9 ENCOUNTER FOR SCREENING FOR MALIGNANT NEOPLASM, SITE UNSPECIFIED: ICD-10-CM

## 2018-12-12 DIAGNOSIS — Z98.890 OTHER SPECIFIED POSTPROCEDURAL STATES: Chronic | ICD-10-CM

## 2018-12-12 DIAGNOSIS — Z12.11 ENCOUNTER FOR SCREENING FOR MALIGNANT NEOPLASM OF COLON: ICD-10-CM

## 2018-12-12 DIAGNOSIS — Z96.651 PRESENCE OF RIGHT ARTIFICIAL KNEE JOINT: Chronic | ICD-10-CM

## 2018-12-12 LAB
BUN SERPL-MCNC: 25 MG/DL — HIGH (ref 7–23)
CALCIUM SERPL-MCNC: 9.6 MG/DL — SIGNIFICANT CHANGE UP (ref 8.4–10.5)
CHLORIDE SERPL-SCNC: 104 MMOL/L — SIGNIFICANT CHANGE UP (ref 98–107)
CO2 SERPL-SCNC: 22 MMOL/L — SIGNIFICANT CHANGE UP (ref 22–31)
CREAT SERPL-MCNC: 1.09 MG/DL — SIGNIFICANT CHANGE UP (ref 0.5–1.3)
GLUCOSE SERPL-MCNC: 90 MG/DL — SIGNIFICANT CHANGE UP (ref 70–99)
HCT VFR BLD CALC: 40.2 % — SIGNIFICANT CHANGE UP (ref 39–50)
HGB BLD-MCNC: 13.3 G/DL — SIGNIFICANT CHANGE UP (ref 13–17)
MCHC RBC-ENTMCNC: 30.1 PG — SIGNIFICANT CHANGE UP (ref 27–34)
MCHC RBC-ENTMCNC: 33.1 % — SIGNIFICANT CHANGE UP (ref 32–36)
MCV RBC AUTO: 91 FL — SIGNIFICANT CHANGE UP (ref 80–100)
NRBC # FLD: 0 — SIGNIFICANT CHANGE UP
PLATELET # BLD AUTO: 267 K/UL — SIGNIFICANT CHANGE UP (ref 150–400)
PMV BLD: 10.6 FL — SIGNIFICANT CHANGE UP (ref 7–13)
POTASSIUM SERPL-MCNC: 4.5 MMOL/L — SIGNIFICANT CHANGE UP (ref 3.5–5.3)
POTASSIUM SERPL-SCNC: 4.5 MMOL/L — SIGNIFICANT CHANGE UP (ref 3.5–5.3)
RBC # BLD: 4.42 M/UL — SIGNIFICANT CHANGE UP (ref 4.2–5.8)
RBC # FLD: 13.3 % — SIGNIFICANT CHANGE UP (ref 10.3–14.5)
SODIUM SERPL-SCNC: 143 MMOL/L — SIGNIFICANT CHANGE UP (ref 135–145)
WBC # BLD: 6.9 K/UL — SIGNIFICANT CHANGE UP (ref 3.8–10.5)
WBC # FLD AUTO: 6.9 K/UL — SIGNIFICANT CHANGE UP (ref 3.8–10.5)

## 2018-12-12 PROCEDURE — 93010 ELECTROCARDIOGRAM REPORT: CPT

## 2018-12-12 NOTE — H&P PST ADULT - NSANTHOSAYNRD_GEN_A_CORE
No. JULIO CESAR screening performed.  STOP BANG Legend: 0-2 = LOW Risk; 3-4 = INTERMEDIATE Risk; 5-8 = HIGH Risk

## 2018-12-12 NOTE — H&P PST ADULT - PSH
H/O hernia repair  b/l inguinal & abdominal  History of arthroplasty of right knee  5yrs  History of arthroscopy of left shoulder  age 15 & right shoulder 3 yrs

## 2018-12-12 NOTE — H&P PST ADULT - HISTORY OF PRESENT ILLNESS
64y/o male presents for preop eval for scheduled colonoscopy on 12/18/18.  Pt with c/o left flank pain for past 2-3 weeks resulting in two ER visits and [facility].  Per pt  Ct- scan and MRI done 64y/o male presents for preop eval for scheduled colonoscopy on 12/18/18.  Pt with c/o left flank pain for past 2-3 weeks resulting in two ER visits and [facility].  Per pt  Ct- scan and MRI done.  preop dx screening for malignant neoplasm of colon.

## 2018-12-14 ENCOUNTER — RX RENEWAL (OUTPATIENT)
Age: 63
End: 2018-12-14

## 2018-12-17 PROBLEM — G47.00 INSOMNIA, UNSPECIFIED: Chronic | Status: ACTIVE | Noted: 2018-12-12

## 2018-12-18 ENCOUNTER — APPOINTMENT (OUTPATIENT)
Dept: SURGICAL ONCOLOGY | Facility: HOSPITAL | Age: 63
End: 2018-12-18

## 2018-12-18 ENCOUNTER — OUTPATIENT (OUTPATIENT)
Dept: OUTPATIENT SERVICES | Facility: HOSPITAL | Age: 63
LOS: 1 days | Discharge: HOME | End: 2018-12-18

## 2018-12-18 DIAGNOSIS — Z12.11 ENCOUNTER FOR SCREENING FOR MALIGNANT NEOPLASM OF COLON: ICD-10-CM

## 2018-12-18 DIAGNOSIS — Z98.890 OTHER SPECIFIED POSTPROCEDURAL STATES: Chronic | ICD-10-CM

## 2018-12-18 DIAGNOSIS — Z96.651 PRESENCE OF RIGHT ARTIFICIAL KNEE JOINT: Chronic | ICD-10-CM

## 2019-01-09 ENCOUNTER — OUTPATIENT (OUTPATIENT)
Dept: OUTPATIENT SERVICES | Facility: HOSPITAL | Age: 64
LOS: 1 days | End: 2019-01-09

## 2019-01-09 VITALS
TEMPERATURE: 98 F | HEIGHT: 70.25 IN | OXYGEN SATURATION: 98 % | WEIGHT: 156.09 LBS | RESPIRATION RATE: 14 BRPM | DIASTOLIC BLOOD PRESSURE: 70 MMHG | SYSTOLIC BLOOD PRESSURE: 112 MMHG | HEART RATE: 70 BPM

## 2019-01-09 DIAGNOSIS — Z12.11 ENCOUNTER FOR SCREENING FOR MALIGNANT NEOPLASM OF COLON: ICD-10-CM

## 2019-01-09 DIAGNOSIS — Z98.890 OTHER SPECIFIED POSTPROCEDURAL STATES: Chronic | ICD-10-CM

## 2019-01-09 DIAGNOSIS — Z96.651 PRESENCE OF RIGHT ARTIFICIAL KNEE JOINT: Chronic | ICD-10-CM

## 2019-01-09 DIAGNOSIS — G47.33 OBSTRUCTIVE SLEEP APNEA (ADULT) (PEDIATRIC): ICD-10-CM

## 2019-01-09 DIAGNOSIS — T78.40XS ALLERGY, UNSPECIFIED, SEQUELA: ICD-10-CM

## 2019-01-09 LAB
ANION GAP SERPL CALC-SCNC: 12 MEQ/L — SIGNIFICANT CHANGE UP (ref 7–14)
BUN SERPL-MCNC: 24 MG/DL — HIGH (ref 7–23)
CALCIUM SERPL-MCNC: 9.7 MG/DL — SIGNIFICANT CHANGE UP (ref 8.4–10.5)
CHLORIDE SERPL-SCNC: 107 MMOL/L — SIGNIFICANT CHANGE UP (ref 98–107)
CO2 SERPL-SCNC: 25 MMOL/L — SIGNIFICANT CHANGE UP (ref 22–31)
CREAT SERPL-MCNC: 0.92 MG/DL — SIGNIFICANT CHANGE UP (ref 0.5–1.3)
GLUCOSE SERPL-MCNC: 76 MG/DL — SIGNIFICANT CHANGE UP (ref 70–99)
HCT VFR BLD CALC: 45.8 % — SIGNIFICANT CHANGE UP (ref 39–50)
HGB BLD-MCNC: 14.6 G/DL — SIGNIFICANT CHANGE UP (ref 13–17)
MCHC RBC-ENTMCNC: 30.1 PG — SIGNIFICANT CHANGE UP (ref 27–34)
MCHC RBC-ENTMCNC: 31.9 % — LOW (ref 32–36)
MCV RBC AUTO: 94.4 FL — SIGNIFICANT CHANGE UP (ref 80–100)
NRBC # FLD: 0 K/UL — LOW (ref 25–125)
PLATELET # BLD AUTO: 265 K/UL — SIGNIFICANT CHANGE UP (ref 150–400)
PMV BLD: 10.4 FL — SIGNIFICANT CHANGE UP (ref 7–13)
POTASSIUM SERPL-MCNC: 4.5 MMOL/L — SIGNIFICANT CHANGE UP (ref 3.5–5.3)
POTASSIUM SERPL-SCNC: 4.5 MMOL/L — SIGNIFICANT CHANGE UP (ref 3.5–5.3)
RBC # BLD: 4.85 M/UL — SIGNIFICANT CHANGE UP (ref 4.2–5.8)
RBC # FLD: 13.3 % — SIGNIFICANT CHANGE UP (ref 10.3–14.5)
SODIUM SERPL-SCNC: 144 MMOL/L — SIGNIFICANT CHANGE UP (ref 135–145)
WBC # BLD: 6.01 K/UL — SIGNIFICANT CHANGE UP (ref 3.8–10.5)
WBC # FLD AUTO: 6.01 K/UL — SIGNIFICANT CHANGE UP (ref 3.8–10.5)

## 2019-01-09 RX ORDER — CHLORZOXAZONE 250 MG
0 TABLET ORAL
Qty: 0 | Refills: 0 | COMMUNITY

## 2019-01-09 RX ORDER — OXYCODONE HYDROCHLORIDE 5 MG/1
1 TABLET ORAL
Qty: 0 | Refills: 0 | COMMUNITY

## 2019-01-09 RX ORDER — SIMVASTATIN 20 MG/1
0 TABLET, FILM COATED ORAL
Qty: 0 | Refills: 0 | COMMUNITY

## 2019-01-09 RX ORDER — OXYCODONE HYDROCHLORIDE 5 MG/1
0 TABLET ORAL
Qty: 0 | Refills: 0 | COMMUNITY

## 2019-01-09 RX ORDER — CLOTRIMAZOLE AND BETAMETHASONE DIPROPIONATE 10; .5 MG/G; MG/G
1 CREAM TOPICAL
Qty: 0 | Refills: 0 | COMMUNITY

## 2019-01-09 RX ORDER — DIAZEPAM 5 MG
0 TABLET ORAL
Qty: 0 | Refills: 0 | COMMUNITY

## 2019-01-09 NOTE — H&P PST ADULT - GASTROINTESTINAL
night shift - pt. continues to report 7/10 HA, medicated further as ordered.  Pt. awaiting CT results and dispo. details… detailed exam

## 2019-01-09 NOTE — H&P PST ADULT - HISTORY OF PRESENT ILLNESS
This is a 63 y.o. male for encounter for screening for malignant neoplasm of colon . Pt evaluated by Dr Mccoy. Pt offers no complaints in pst .

## 2019-01-09 NOTE — H&P PST ADULT - TEACHING/LEARNING LEARNING PREFERENCES
video/written material/pictorial/verbal instruction/individual instruction/group instruction/skill demonstration/audio/computer/internet

## 2019-01-09 NOTE — H&P PST ADULT - LYMPHATIC
anterior cervical L/posterior cervical R/supraclavicular L/anterior cervical R/supraclavicular R/posterior cervical L

## 2019-01-09 NOTE — H&P PST ADULT - RS GEN PE MLT RESP DETAILS PC
respirations non-labored/good air movement/clear to auscultation bilaterally/breath sounds equal/no rales/no wheezes/no rhonchi

## 2019-01-11 RX ORDER — POTASSIUM CHLORIDE 1500 MG/1
20 TABLET, FILM COATED, EXTENDED RELEASE ORAL AS DIRECTED
Qty: 4 | Refills: 0 | Status: DISCONTINUED | COMMUNITY
Start: 2019-01-11 | End: 2019-01-11

## 2019-01-15 ENCOUNTER — APPOINTMENT (OUTPATIENT)
Dept: SURGICAL ONCOLOGY | Facility: HOSPITAL | Age: 64
End: 2019-01-15

## 2019-01-15 ENCOUNTER — OUTPATIENT (OUTPATIENT)
Dept: OUTPATIENT SERVICES | Facility: HOSPITAL | Age: 64
LOS: 1 days | Discharge: HOME | End: 2019-01-15
Payer: MEDICARE

## 2019-01-15 DIAGNOSIS — Z98.890 OTHER SPECIFIED POSTPROCEDURAL STATES: Chronic | ICD-10-CM

## 2019-01-15 DIAGNOSIS — Z96.651 PRESENCE OF RIGHT ARTIFICIAL KNEE JOINT: Chronic | ICD-10-CM

## 2019-01-15 DIAGNOSIS — Z12.11 ENCOUNTER FOR SCREENING FOR MALIGNANT NEOPLASM OF COLON: ICD-10-CM

## 2019-01-15 PROCEDURE — 45385 COLONOSCOPY W/LESION REMOVAL: CPT

## 2019-01-16 ENCOUNTER — RESULT REVIEW (OUTPATIENT)
Age: 64
End: 2019-01-16

## 2019-01-16 PROCEDURE — 88305 TISSUE EXAM BY PATHOLOGIST: CPT | Mod: 26

## 2019-01-17 ENCOUNTER — RX RENEWAL (OUTPATIENT)
Age: 64
End: 2019-01-17

## 2019-01-17 LAB — SURGICAL PATHOLOGY STUDY: SIGNIFICANT CHANGE UP

## 2019-01-24 ENCOUNTER — APPOINTMENT (OUTPATIENT)
Dept: FAMILY MEDICINE | Facility: CLINIC | Age: 64
End: 2019-01-24
Payer: MEDICARE

## 2019-01-24 PROCEDURE — 90674 CCIIV4 VAC NO PRSV 0.5 ML IM: CPT

## 2019-01-24 PROCEDURE — 90670 PCV13 VACCINE IM: CPT

## 2019-01-24 PROCEDURE — G0009: CPT

## 2019-01-24 PROCEDURE — G0008: CPT

## 2019-01-24 RX ORDER — DIAZEPAM 5 MG/1
TABLET ORAL
Refills: 0 | Status: DISCONTINUED | COMMUNITY
End: 2019-01-24

## 2019-01-24 NOTE — ASSESSMENT
[FreeTextEntry1] : 126/70\par Flu vaccine given L deltoid\par Prevnar 13 given R deltoid\par \par Also note sent Valium 10mg at bedtime as needed as muscle relaxant for ongoing issues.

## 2019-01-29 ENCOUNTER — OUTPATIENT (OUTPATIENT)
Dept: OUTPATIENT SERVICES | Facility: HOSPITAL | Age: 64
LOS: 1 days | End: 2019-01-29
Payer: MEDICARE

## 2019-01-29 ENCOUNTER — APPOINTMENT (OUTPATIENT)
Dept: SPINE | Facility: CLINIC | Age: 64
End: 2019-01-29
Payer: MEDICARE

## 2019-01-29 ENCOUNTER — APPOINTMENT (OUTPATIENT)
Dept: CT IMAGING | Facility: HOSPITAL | Age: 64
End: 2019-01-29
Payer: MEDICARE

## 2019-01-29 VITALS
SYSTOLIC BLOOD PRESSURE: 143 MMHG | RESPIRATION RATE: 17 BRPM | BODY MASS INDEX: 21.47 KG/M2 | OXYGEN SATURATION: 97 % | HEART RATE: 58 BPM | HEIGHT: 70.25 IN | WEIGHT: 150 LBS | DIASTOLIC BLOOD PRESSURE: 86 MMHG

## 2019-01-29 DIAGNOSIS — Z96.651 PRESENCE OF RIGHT ARTIFICIAL KNEE JOINT: Chronic | ICD-10-CM

## 2019-01-29 DIAGNOSIS — Z98.890 OTHER SPECIFIED POSTPROCEDURAL STATES: Chronic | ICD-10-CM

## 2019-01-29 DIAGNOSIS — G54.3 THORACIC ROOT DISORDERS, NOT ELSEWHERE CLASSIFIED: ICD-10-CM

## 2019-01-29 LAB
BUN SERPL-MCNC: 19 MG/DL
CREAT SERPL-MCNC: 0.83 MG/DL

## 2019-01-29 PROCEDURE — 71260 CT THORAX DX C+: CPT | Mod: 26

## 2019-01-29 PROCEDURE — 71260 CT THORAX DX C+: CPT

## 2019-01-29 PROCEDURE — 99213 OFFICE O/P EST LOW 20 MIN: CPT

## 2019-01-29 RX ORDER — POTASSIUM CHLORIDE 1500 MG/1
20 TABLET, FILM COATED, EXTENDED RELEASE ORAL
Qty: 4 | Refills: 0 | Status: DISCONTINUED | COMMUNITY
Start: 2018-12-14 | End: 2019-01-29

## 2019-01-29 RX ORDER — POTASSIUM CHLORIDE 1500 MG/1
20 TABLET, FILM COATED, EXTENDED RELEASE ORAL AS DIRECTED
Qty: 4 | Refills: 0 | Status: DISCONTINUED | COMMUNITY
Start: 2019-01-11 | End: 2019-01-29

## 2019-01-30 NOTE — ASSESSMENT
[FreeTextEntry1] : Assess:\par T 8 thoracic lesion\par Colonoscopy shows no tumor or malignancy\par Patient  was a former smoker\par \par PLAN:\par Chest CT to rule out lesion\par Return after chest CT for review\par Plan for surgical intervention to remove T 8 lesion \par Discussed surgery and plan of care for thoracic fusion

## 2019-01-30 NOTE — REASON FOR VISIT
[Follow-Up: _____] : a [unfilled] follow-up visit [Spouse] : spouse [FreeTextEntry1] : 63 year old white male who presents with a history of back pain which is severe and sharp in nature .  He had abnormality referencing abdominal disease and a colonoscopy was completed over the last few weeks.  he is here for follow up and Dr Mccoy who performed a colonoscopy was spoken to and his findings were not indicative of any malignancy and there was rectum wall thickening.  He is here to discuss the T 8 lesion and neurosurgical treatment options.

## 2019-01-30 NOTE — SOCIAL HISTORY
[Yes - full time] : Yes - full time [FreeTextEntry1] : , smokes marijuana, works as   , former nicotine smoker

## 2019-02-04 ENCOUNTER — APPOINTMENT (OUTPATIENT)
Dept: SPINE | Facility: CLINIC | Age: 64
End: 2019-02-04

## 2019-02-05 ENCOUNTER — APPOINTMENT (OUTPATIENT)
Dept: SPINE | Facility: CLINIC | Age: 64
End: 2019-02-05

## 2019-03-12 ENCOUNTER — CLINICAL ADVICE (OUTPATIENT)
Age: 64
End: 2019-03-12

## 2019-03-14 ENCOUNTER — APPOINTMENT (OUTPATIENT)
Dept: ORTHOPEDIC SURGERY | Facility: CLINIC | Age: 64
End: 2019-03-14
Payer: MEDICARE

## 2019-03-14 VITALS — DIASTOLIC BLOOD PRESSURE: 80 MMHG | SYSTOLIC BLOOD PRESSURE: 150 MMHG | HEART RATE: 62 BPM

## 2019-03-14 VITALS — WEIGHT: 165 LBS | BODY MASS INDEX: 23.51 KG/M2

## 2019-03-14 PROCEDURE — 99214 OFFICE O/P EST MOD 30 MIN: CPT

## 2019-03-14 NOTE — PHYSICAL EXAM
[Crutches] : ambulates with crutches [Poor Appearance] : well-appearing [Acute Distress] : not in acute distress [Obese] : not obese [Antalgic] : not antalgic [FreeTextEntry2] : Examination of the lumbar spine reveals no midline tenderness palpation, step-offs, or skin lesions. Decreased range of motion with respect to flexion, extension, lateral bending, and rotation. No tenderness to palpation of the sciatic notch. No tenderness palpation of the bilateral greater trochanters. No pain with passive internal/external rotation of the hips. Negative RENATO. Negative straight leg raise bilaterally. No bowstring. Negative femoral stretch. 5 out of 5 iliopsoas, hip abductors, hips adductors, quadriceps, hamstrings, gastrocsoleus, tibialis anterior, extensor hallucis longus, peroneals. Grossly intact sensation to light touch bilateral lower extremities. 1+ patellar and Achilles reflexes. Downgoing Babinski. No clonus. Intact proprioception. Palpable pulses. No skin lesion and no edema on the right and left lower extremities. [de-identified] : Review of his pelvic MRI demonstrates disc degeneration at L4-5 and L5-S1 levels with trace spinal listhesis. No significant stenosis. Nondisplaced right superior and inferior pubic ramus fractures.

## 2019-03-14 NOTE — DISCUSSION/SUMMARY
[de-identified] : Given his worsening symptoms the lumbar spine MRI will be obtained. He is also under the care of [facility] for repeat lesion which is been biopsied and is being followed there. He will followup with me after his MRI.

## 2019-03-14 NOTE — HISTORY OF PRESENT ILLNESS
[de-identified] : Mr. SHARI VALLEJO  is a 64 year old male who presents with ls chronic history of back pain.. At times it can down either leg.   Normal bowel and bladder control.   Denies any recent fevers, chills, sweats, weight loss, or infection. He had a biopsy done of his midthoracic spine which was benign.\par

## 2019-04-12 ENCOUNTER — RX RENEWAL (OUTPATIENT)
Age: 64
End: 2019-04-12

## 2019-06-27 ENCOUNTER — RX RENEWAL (OUTPATIENT)
Age: 64
End: 2019-06-27

## 2019-07-02 ENCOUNTER — RX CHANGE (OUTPATIENT)
Age: 64
End: 2019-07-02

## 2019-08-02 ENCOUNTER — RX RENEWAL (OUTPATIENT)
Age: 64
End: 2019-08-02

## 2019-08-05 PROBLEM — R10.32 LEFT LOWER QUADRANT PAIN: Status: ACTIVE | Noted: 2018-11-26

## 2019-09-19 ENCOUNTER — FORM ENCOUNTER (OUTPATIENT)
Age: 64
End: 2019-09-19

## 2019-09-20 ENCOUNTER — OUTPATIENT (OUTPATIENT)
Dept: OUTPATIENT SERVICES | Facility: HOSPITAL | Age: 64
LOS: 1 days | End: 2019-09-20
Payer: MEDICARE

## 2019-09-20 ENCOUNTER — APPOINTMENT (OUTPATIENT)
Dept: RADIOLOGY | Facility: HOSPITAL | Age: 64
End: 2019-09-20
Payer: MEDICARE

## 2019-09-20 ENCOUNTER — OTHER (OUTPATIENT)
Age: 64
End: 2019-09-20

## 2019-09-20 DIAGNOSIS — Z98.890 OTHER SPECIFIED POSTPROCEDURAL STATES: Chronic | ICD-10-CM

## 2019-09-20 DIAGNOSIS — M79.674 PAIN IN RIGHT TOE(S): ICD-10-CM

## 2019-09-20 DIAGNOSIS — Z96.651 PRESENCE OF RIGHT ARTIFICIAL KNEE JOINT: Chronic | ICD-10-CM

## 2019-09-20 PROCEDURE — 73660 X-RAY EXAM OF TOE(S): CPT | Mod: 26,RT

## 2019-09-20 PROCEDURE — 73660 X-RAY EXAM OF TOE(S): CPT

## 2019-09-27 ENCOUNTER — RX RENEWAL (OUTPATIENT)
Age: 64
End: 2019-09-27

## 2019-10-08 ENCOUNTER — LABORATORY RESULT (OUTPATIENT)
Age: 64
End: 2019-10-08

## 2019-10-08 ENCOUNTER — APPOINTMENT (OUTPATIENT)
Dept: FAMILY MEDICINE | Facility: CLINIC | Age: 64
End: 2019-10-08
Payer: MEDICARE

## 2019-10-08 VITALS
BODY MASS INDEX: 22.33 KG/M2 | HEIGHT: 70 IN | WEIGHT: 156 LBS | HEART RATE: 64 BPM | DIASTOLIC BLOOD PRESSURE: 70 MMHG | SYSTOLIC BLOOD PRESSURE: 124 MMHG | RESPIRATION RATE: 20 BRPM

## 2019-10-08 PROCEDURE — G0008: CPT

## 2019-10-08 PROCEDURE — 93000 ELECTROCARDIOGRAM COMPLETE: CPT

## 2019-10-08 PROCEDURE — G0439: CPT

## 2019-10-08 PROCEDURE — 36415 COLL VENOUS BLD VENIPUNCTURE: CPT

## 2019-10-08 PROCEDURE — 90674 CCIIV4 VAC NO PRSV 0.5 ML IM: CPT

## 2019-10-08 NOTE — COUNSELING
[de-identified] : healthy eating and activities [None] : None [Good understanding] : Patient has a good understanding of lifestyle changes and steps needed to achieve self management goal

## 2019-10-08 NOTE — ASSESSMENT
[FreeTextEntry1] : Comprehensive exam reveals patient to be hemodynamically stable with acceptable BP\par Findings consistent with history - continue proper use of all medication\par Lab profiles sent\par Flu vaccine given L deltoid

## 2019-10-08 NOTE — HISTORY OF PRESENT ILLNESS
[FreeTextEntry1] : Requests comprehensive exam [de-identified] : Presents for comprehensive exam.  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  Does have ongoing arthritic issues - using pain mgt very judiciously to maintain activities with no obvious adverse effects.  Due for flu vaccine - pt is in agreement.

## 2019-10-08 NOTE — HEALTH RISK ASSESSMENT
[Yes] : Yes [2 - 3 times a week (3 pts)] : 2 - 3  times a week (3 points) [1 or 2 (0 pts)] : 1 or 2 (0 points) [Never (0 pts)] : Never (0 points) [No] : In the past 12 months have you used drugs other than those required for medical reasons? No [No falls in past year] : Patient reported no falls in the past year [Fully functional (bathing, dressing, toileting, transferring, walking, feeding)] : Fully functional (bathing, dressing, toileting, transferring, walking, feeding) [Fully functional (using the telephone, shopping, preparing meals, housekeeping, doing laundry, using] : Fully functional and needs no help or supervision to perform IADLs (using the telephone, shopping, preparing meals, housekeeping, doing laundry, using transportation, managing medications and managing finances) [Audit-CScore] : 3 [] : No

## 2019-10-08 NOTE — PHYSICAL EXAM
[Normal Posterior Cervical Nodes] : no posterior cervical lymphadenopathy [Normal Anterior Cervical Nodes] : no anterior cervical lymphadenopathy [Normal Femoral Nodes] : no femoral lymphadenopathy [Normal Inguinal Nodes] : no inguinal lymphadenopathy [Grossly Normal Strength/Tone] : grossly normal strength/tone [Coordination Grossly Intact] : coordination grossly intact [Normal] : no rash [No Focal Deficits] : no focal deficits [Normal Gait] : normal gait [Speech Grossly Normal] : speech grossly normal [Normal Affect] : the affect was normal [Memory Grossly Normal] : memory grossly normal [Alert and Oriented x3] : oriented to person, place, and time [Normal Mood] : the mood was normal [Normal Insight/Judgement] : insight and judgment were intact [de-identified] : multiple joint deformities consistent with DJD

## 2019-10-09 LAB
ALBUMIN SERPL ELPH-MCNC: 4.4 G/DL
ALP BLD-CCNC: 58 U/L
ALT SERPL-CCNC: 13 U/L
ANION GAP SERPL CALC-SCNC: 10 MMOL/L
AST SERPL-CCNC: 22 U/L
B BURGDOR IGG+IGM SER QL IB: NORMAL
BASOPHILS # BLD AUTO: 0.05 K/UL
BASOPHILS NFR BLD AUTO: 0.7 %
BILIRUB SERPL-MCNC: 0.5 MG/DL
BUN SERPL-MCNC: 23 MG/DL
CALCIUM SERPL-MCNC: 9.1 MG/DL
CHLORIDE SERPL-SCNC: 105 MMOL/L
CHOLEST SERPL-MCNC: 175 MG/DL
CHOLEST/HDLC SERPL: 2.6 RATIO
CO2 SERPL-SCNC: 26 MMOL/L
CREAT SERPL-MCNC: 0.94 MG/DL
EOSINOPHIL # BLD AUTO: 0.1 K/UL
EOSINOPHIL NFR BLD AUTO: 1.4 %
ESTIMATED AVERAGE GLUCOSE: 114 MG/DL
FOLATE SERPL-MCNC: 17.3 NG/ML
GLUCOSE SERPL-MCNC: 147 MG/DL
HBA1C MFR BLD HPLC: 5.6 %
HCT VFR BLD CALC: 40.4 %
HDLC SERPL-MCNC: 68 MG/DL
HGB BLD-MCNC: 12.9 G/DL
IMM GRANULOCYTES NFR BLD AUTO: 0.3 %
LDLC SERPL CALC-MCNC: 91 MG/DL
LYMPHOCYTES # BLD AUTO: 1.24 K/UL
LYMPHOCYTES NFR BLD AUTO: 17.1 %
MAN DIFF?: NORMAL
MCHC RBC-ENTMCNC: 30.5 PG
MCHC RBC-ENTMCNC: 31.9 GM/DL
MCV RBC AUTO: 95.5 FL
MONOCYTES # BLD AUTO: 0.61 K/UL
MONOCYTES NFR BLD AUTO: 8.4 %
NEUTROPHILS # BLD AUTO: 5.24 K/UL
NEUTROPHILS NFR BLD AUTO: 72.1 %
PLATELET # BLD AUTO: 242 K/UL
POTASSIUM SERPL-SCNC: 4.1 MMOL/L
PROT SERPL-MCNC: 6.3 G/DL
PSA SERPL-MCNC: 1.84 NG/ML
RBC # BLD: 4.23 M/UL
RBC # FLD: 13.9 %
SODIUM SERPL-SCNC: 141 MMOL/L
T4 FREE SERPL-MCNC: 1.3 NG/DL
TRIGL SERPL-MCNC: 80 MG/DL
TSH SERPL-ACNC: 1.58 UIU/ML
VIT B12 SERPL-MCNC: >2000 PG/ML
WBC # FLD AUTO: 7.26 K/UL

## 2019-12-06 ENCOUNTER — RX RENEWAL (OUTPATIENT)
Age: 64
End: 2019-12-06

## 2019-12-13 ENCOUNTER — APPOINTMENT (OUTPATIENT)
Dept: ORTHOPEDIC SURGERY | Facility: CLINIC | Age: 64
End: 2019-12-13
Payer: MEDICARE

## 2019-12-13 DIAGNOSIS — M43.16 SPONDYLOLISTHESIS, LUMBAR REGION: ICD-10-CM

## 2019-12-13 DIAGNOSIS — M48.07 SPINAL STENOSIS, LUMBOSACRAL REGION: ICD-10-CM

## 2019-12-13 PROCEDURE — 99214 OFFICE O/P EST MOD 30 MIN: CPT

## 2019-12-13 NOTE — PHYSICAL EXAM
[Crutches] : ambulates with crutches [Acute Distress] : not in acute distress [Poor Appearance] : well-appearing [FreeTextEntry2] : Examination of the lumbar spine reveals no midline tenderness palpation, step-offs, or skin lesions. Decreased range of motion with respect to flexion, extension, lateral bending, and rotation. No tenderness to palpation of the sciatic notch. No tenderness palpation of the bilateral greater trochanters. No pain with passive internal/external rotation of the hips. Negative RENATO. Negative straight leg raise bilaterally. No bowstring. Negative femoral stretch. 5 out of 5 iliopsoas, hip abductors, hips adductors, quadriceps, hamstrings, gastrocsoleus, tibialis anterior, extensor hallucis longus, peroneals. Grossly intact sensation to light touch bilateral lower extremities. 1+ patellar and Achilles reflexes. Downgoing Babinski. No clonus. Intact proprioception. Palpable pulses. No skin lesion and no edema on the right and left lower extremities. [Antalgic] : not antalgic [Obese] : not obese [de-identified] : Lumbar spine MRI reveals lumbar stenosis with L4-5 spondylolisthesis

## 2019-12-13 NOTE — HISTORY OF PRESENT ILLNESS
[de-identified] : Mr. SHARI VALLEJO  is a 64 year old male who presents with ls chronic history of back pain.. At times it can down either leg.   Normal bowel and bladder control.   Denies any recent fevers, chills, sweats, weight loss, or infection.

## 2019-12-13 NOTE — DISCUSSION/SUMMARY
[de-identified] : For his T8 lesion he will continue with followup at [facility]. With the lumbar spine we will try some physical therapy. He will also contemplate epidural injections. He is not interested in surgical intervention for his lumbar disorder at this time

## 2019-12-23 ENCOUNTER — EMERGENCY (EMERGENCY)
Facility: HOSPITAL | Age: 64
LOS: 1 days | Discharge: HOME | End: 2019-12-23
Attending: EMERGENCY MEDICINE | Admitting: EMERGENCY MEDICINE
Payer: MEDICARE

## 2019-12-23 VITALS
RESPIRATION RATE: 18 BRPM | SYSTOLIC BLOOD PRESSURE: 136 MMHG | DIASTOLIC BLOOD PRESSURE: 87 MMHG | OXYGEN SATURATION: 95 % | HEART RATE: 65 BPM

## 2019-12-23 VITALS
OXYGEN SATURATION: 92 % | WEIGHT: 160.06 LBS | HEIGHT: 71 IN | TEMPERATURE: 98 F | HEART RATE: 65 BPM | SYSTOLIC BLOOD PRESSURE: 144 MMHG | DIASTOLIC BLOOD PRESSURE: 86 MMHG | RESPIRATION RATE: 18 BRPM

## 2019-12-23 DIAGNOSIS — Z96.651 PRESENCE OF RIGHT ARTIFICIAL KNEE JOINT: Chronic | ICD-10-CM

## 2019-12-23 DIAGNOSIS — Z98.890 OTHER SPECIFIED POSTPROCEDURAL STATES: Chronic | ICD-10-CM

## 2019-12-23 PROCEDURE — 73562 X-RAY EXAM OF KNEE 3: CPT | Mod: 26,RT

## 2019-12-23 PROCEDURE — 99283 EMERGENCY DEPT VISIT LOW MDM: CPT

## 2019-12-23 PROCEDURE — 73562 X-RAY EXAM OF KNEE 3: CPT

## 2019-12-23 NOTE — ED PROVIDER NOTE - PATIENT PORTAL LINK FT
You can access the FollowMyHealth Patient Portal offered by [facility] by registering at the following website: [email or web address]. By joining [facility]’s FollowMyHealth portal, you will also be able to view your health information using other applications (apps) compatible with our system.

## 2019-12-23 NOTE — ED PROCEDURE NOTE - NS ED PERI NEURO NEG
Post-application: Motor, sensory, and vascular responses intact in the injured extremity./Pre-application: Motor, sensory, and vascular responses intact in the injured extremity. Pre-application: Motor, sensory, and vascular responses intact in the injured extremity./Post-application: Motor, sensory, and vascular responses intact in the injured extremity./The patient/caregiver verbalized understanding of how to care for the injured extremity with splint

## 2019-12-23 NOTE — ED ADULT TRIAGE NOTE - CHIEF COMPLAINT QUOTE
Pt states 1 week ago "my knee gave out". His knee buckled several times since then.  Pt had TKR in the past. In addition, pt states he has hip and back pain that he is treated with oxycodone.

## 2019-12-23 NOTE — ED PROVIDER NOTE - CLINICAL SUMMARY MEDICAL DECISION MAKING FREE TEXT BOX
65 yo male, hx high cholesterol , chronic back pain, TKR right knee >5 years ago comes to the ED co right knee discomfort. States over the past 2 days his right knee has been giving out more than usual. Denies any recent trauma.   Denies any pain currently, only when the knee gives out at times.  Right knee non tender, no warmth or erythema, old scar from tkr,  Will obtain xray make sure hardware ok and no fx. Declines any pain meds, and re-eval    xray no fx, hardware in place, will dc home with fu with dr espinoza and knee immobilizer as thompson Davalos

## 2019-12-23 NOTE — ED PROVIDER NOTE - ATTENDING CONTRIBUTION TO CARE
Darcy with MAGUI Marquez. 65 yo male, hx high cholesterol , chronic back pain, TKR right knee >5 years ago comes to the ED co right knee discomfort. States over the past 2 days his right knee has been giving out more than usual. Denies any recent trauma.   Denies any pain currently, only when the knee gives out at times.  Right knee non tender, no warmth or erythema, old scar from tkr,  Will obtain xray make sure hardware ok and no fx. Declines any pain meds, and re-eval    xray no fx, hardware in place, will dc home with fu with dr espinoza and knee immobilizer    I performed a face to face bedside interview with patient regarding history of present illness, review of symptoms and past medical history. I completed an independent physical exam.  I have discussed the patient's plan of care with Physician Assistant (PA). I agree with note as stated above, having amended the EMR as needed to reflect my findings.   This includes History of Present Illness, HIV, Past Medical/Surgical/Family/Social History, Allergies and Home Medications, Review of Systems, Physical Exam, and any Progress Notes during the time I functioned as the attending physician for this patient.

## 2019-12-23 NOTE — ED PROVIDER NOTE - MUSCULOSKELETAL, MLM
Spine appears normal, range of motion is not limited, no muscle or joint tenderness Spine appears normal, range of motion is not limited, no muscle or joint tenderness, old scar from right tkr

## 2019-12-23 NOTE — ED PROVIDER NOTE - OBJECTIVE STATEMENT
63 yo male, hx high cholesterol , chronic back pain, TKR right knee >5 years ago comes to the ED co right knee discomfort. States over the past 2 days his right knee has been giving out more than usual. Denies any recent trauma.   Denies any pain currently, only when the knee gives out at times.

## 2019-12-23 NOTE — ED ADULT NURSE NOTE - OBJECTIVE STATEMENT
Patient reports left knee has been "giving out" x 1 week, has worsen today. Reports hx of left knee surgery. Denies any injuries, numbness or tingle.

## 2019-12-23 NOTE — ED PROVIDER NOTE - NSFOLLOWUPINSTRUCTIONS_ED_ALL_ED_FT
Follow up with your primary physician as well as Dr Elias as planned, taking all results from the ER to be reviewed.   Keep the knee immobilizer on while ambulating as discs sued. If any worsening, concerning or new signs or symptoms return to the ER. Follow up with your primary physician as well as Dr Elias as planned, taking all results from the ER to be reviewed.   Keep the knee immobilizer on while ambulating as discussed.   If any worsening, concerning or new signs or symptoms return to the ER.

## 2019-12-23 NOTE — ED PROCEDURE NOTE - CPROC ED POST PROC CARE GUIDE1
Verbal/written post procedure instructions were given to patient/caregiver. Verbal/written post procedure instructions were given to patient/caregiver./Instructed patient/caregiver to follow-up with primary care physician./Elevate the injured extremity as instructed.

## 2019-12-28 DIAGNOSIS — M25.569 PAIN IN UNSPECIFIED KNEE: ICD-10-CM

## 2019-12-30 ENCOUNTER — APPOINTMENT (OUTPATIENT)
Dept: ORTHOPEDIC SURGERY | Facility: CLINIC | Age: 64
End: 2019-12-30
Payer: MEDICARE

## 2019-12-30 VITALS
BODY MASS INDEX: 22.9 KG/M2 | SYSTOLIC BLOOD PRESSURE: 115 MMHG | HEIGHT: 70 IN | HEART RATE: 60 BPM | WEIGHT: 160 LBS | DIASTOLIC BLOOD PRESSURE: 66 MMHG

## 2019-12-30 DIAGNOSIS — M25.561 PAIN IN RIGHT KNEE: ICD-10-CM

## 2019-12-30 PROCEDURE — 73502 X-RAY EXAM HIP UNI 2-3 VIEWS: CPT | Mod: LT

## 2019-12-30 PROCEDURE — 99213 OFFICE O/P EST LOW 20 MIN: CPT

## 2019-12-30 PROCEDURE — 73562 X-RAY EXAM OF KNEE 3: CPT | Mod: 50

## 2019-12-30 RX ORDER — METHOCARBAMOL 750 MG/1
750 TABLET, FILM COATED ORAL AT BEDTIME
Qty: 30 | Refills: 3 | Status: DISCONTINUED | COMMUNITY
Start: 2017-04-10 | End: 2019-12-30

## 2019-12-30 RX ORDER — SODIUM PICOSULFATE, MAGNESIUM OXIDE, AND ANHYDROUS CITRIC ACID 10; 3.5; 12 MG/160ML; G/160ML; G/160ML
10-3.5-12 MG-GM LIQUID ORAL
Qty: 1 | Refills: 0 | Status: DISCONTINUED | COMMUNITY
Start: 2018-12-14 | End: 2019-12-30

## 2019-12-30 RX ORDER — DIAZEPAM 10 MG/1
10 TABLET ORAL
Qty: 30 | Refills: 0 | Status: DISCONTINUED | COMMUNITY
Start: 2019-01-24 | End: 2019-12-30

## 2019-12-30 RX ORDER — POLYETHYLENE GLYCOL 3350 AND ELECTROLYTES WITH LEMON FLAVOR 236; 22.74; 6.74; 5.86; 2.97 G/4L; G/4L; G/4L; G/4L; G/4L
236 POWDER, FOR SOLUTION ORAL
Qty: 1 | Refills: 0 | Status: DISCONTINUED | COMMUNITY
Start: 2018-12-14 | End: 2019-12-30

## 2019-12-30 RX ORDER — POLYETHYLENE GLYCOL 3350 AND ELECTROLYTES WITH LEMON FLAVOR 236; 22.74; 6.74; 5.86; 2.97 G/4L; G/4L; G/4L; G/4L; G/4L
236 POWDER, FOR SOLUTION ORAL
Qty: 1 | Refills: 0 | Status: DISCONTINUED | COMMUNITY
Start: 2019-01-11 | End: 2019-12-30

## 2020-01-11 ENCOUNTER — APPOINTMENT (OUTPATIENT)
Dept: ORTHOPEDIC SURGERY | Facility: CLINIC | Age: 65
End: 2020-01-11
Payer: MEDICARE

## 2020-01-11 VITALS
HEIGHT: 70 IN | SYSTOLIC BLOOD PRESSURE: 144 MMHG | WEIGHT: 160 LBS | DIASTOLIC BLOOD PRESSURE: 78 MMHG | HEART RATE: 54 BPM | BODY MASS INDEX: 22.9 KG/M2

## 2020-01-11 DIAGNOSIS — Z96.651 PRESENCE OF RIGHT ARTIFICIAL KNEE JOINT: ICD-10-CM

## 2020-01-11 DIAGNOSIS — S89.91XS UNSPECIFIED INJURY OF RIGHT LOWER LEG, SEQUELA: ICD-10-CM

## 2020-01-11 PROCEDURE — 99214 OFFICE O/P EST MOD 30 MIN: CPT

## 2020-01-13 ENCOUNTER — APPOINTMENT (OUTPATIENT)
Dept: MRI IMAGING | Facility: CLINIC | Age: 65
End: 2020-01-13

## 2020-01-15 ENCOUNTER — APPOINTMENT (OUTPATIENT)
Dept: MRI IMAGING | Facility: CLINIC | Age: 65
End: 2020-01-15

## 2020-01-17 ENCOUNTER — APPOINTMENT (OUTPATIENT)
Dept: MRI IMAGING | Facility: CLINIC | Age: 65
End: 2020-01-17

## 2020-01-20 ENCOUNTER — FORM ENCOUNTER (OUTPATIENT)
Age: 65
End: 2020-01-20

## 2020-01-21 ENCOUNTER — OUTPATIENT (OUTPATIENT)
Dept: OUTPATIENT SERVICES | Facility: HOSPITAL | Age: 65
LOS: 1 days | End: 2020-01-21
Payer: MEDICARE

## 2020-01-21 ENCOUNTER — APPOINTMENT (OUTPATIENT)
Dept: MRI IMAGING | Facility: CLINIC | Age: 65
End: 2020-01-21
Payer: MEDICARE

## 2020-01-21 DIAGNOSIS — Z96.651 PRESENCE OF RIGHT ARTIFICIAL KNEE JOINT: ICD-10-CM

## 2020-01-21 DIAGNOSIS — S89.91XS UNSPECIFIED INJURY OF RIGHT LOWER LEG, SEQUELA: ICD-10-CM

## 2020-01-21 DIAGNOSIS — Z98.890 OTHER SPECIFIED POSTPROCEDURAL STATES: Chronic | ICD-10-CM

## 2020-01-21 DIAGNOSIS — Z96.651 PRESENCE OF RIGHT ARTIFICIAL KNEE JOINT: Chronic | ICD-10-CM

## 2020-01-21 PROCEDURE — 73721 MRI JNT OF LWR EXTRE W/O DYE: CPT | Mod: 26,RT

## 2020-01-21 PROCEDURE — 73721 MRI JNT OF LWR EXTRE W/O DYE: CPT

## 2020-07-13 ENCOUNTER — APPOINTMENT (OUTPATIENT)
Dept: FAMILY MEDICINE | Facility: CLINIC | Age: 65
End: 2020-07-13

## 2020-07-24 ENCOUNTER — APPOINTMENT (OUTPATIENT)
Dept: FAMILY MEDICINE | Facility: CLINIC | Age: 65
End: 2020-07-24

## 2020-07-28 ENCOUNTER — APPOINTMENT (OUTPATIENT)
Dept: FAMILY MEDICINE | Facility: CLINIC | Age: 65
End: 2020-07-28
Payer: MEDICARE

## 2020-07-28 ENCOUNTER — RX RENEWAL (OUTPATIENT)
Age: 65
End: 2020-07-28

## 2020-07-28 ENCOUNTER — APPOINTMENT (OUTPATIENT)
Age: 65
End: 2020-07-28
Payer: MEDICARE

## 2020-07-28 ENCOUNTER — OUTPATIENT (OUTPATIENT)
Dept: OUTPATIENT SERVICES | Facility: HOSPITAL | Age: 65
LOS: 1 days | End: 2020-07-28
Payer: MEDICARE

## 2020-07-28 VITALS
SYSTOLIC BLOOD PRESSURE: 130 MMHG | HEART RATE: 61 BPM | TEMPERATURE: 97.8 F | WEIGHT: 153 LBS | BODY MASS INDEX: 21.9 KG/M2 | OXYGEN SATURATION: 97 % | DIASTOLIC BLOOD PRESSURE: 72 MMHG | HEIGHT: 70 IN

## 2020-07-28 DIAGNOSIS — Z98.890 OTHER SPECIFIED POSTPROCEDURAL STATES: Chronic | ICD-10-CM

## 2020-07-28 DIAGNOSIS — B35.6 TINEA CRURIS: ICD-10-CM

## 2020-07-28 DIAGNOSIS — Z96.651 PRESENCE OF RIGHT ARTIFICIAL KNEE JOINT: Chronic | ICD-10-CM

## 2020-07-28 DIAGNOSIS — Z00.8 ENCOUNTER FOR OTHER GENERAL EXAMINATION: ICD-10-CM

## 2020-07-28 PROCEDURE — G0009: CPT

## 2020-07-28 PROCEDURE — 73080 X-RAY EXAM OF ELBOW: CPT | Mod: 26,RT

## 2020-07-28 PROCEDURE — 73060 X-RAY EXAM OF HUMERUS: CPT | Mod: 26,RT

## 2020-07-28 PROCEDURE — 73060 X-RAY EXAM OF HUMERUS: CPT

## 2020-07-28 PROCEDURE — 90732 PPSV23 VACC 2 YRS+ SUBQ/IM: CPT

## 2020-07-28 PROCEDURE — 99214 OFFICE O/P EST MOD 30 MIN: CPT | Mod: 25

## 2020-07-28 PROCEDURE — 73080 X-RAY EXAM OF ELBOW: CPT

## 2020-07-28 NOTE — PLAN
[FreeTextEntry1] : Pneumovax given at office. Patient needs to fu with pcp for labs and lung cancer screening.

## 2020-07-28 NOTE — PHYSICAL EXAM
[Well Nourished] : well nourished [No Acute Distress] : no acute distress [Well-Appearing] : well-appearing [Well Developed] : well developed [PERRL] : pupils equal round and reactive to light [Normal Sclera/Conjunctiva] : normal sclera/conjunctiva [EOMI] : extraocular movements intact [Normal Outer Ear/Nose] : the outer ears and nose were normal in appearance [Normal Oropharynx] : the oropharynx was normal [No JVD] : no jugular venous distention [No Lymphadenopathy] : no lymphadenopathy [Supple] : supple [No Respiratory Distress] : no respiratory distress  [Thyroid Normal, No Nodules] : the thyroid was normal and there were no nodules present [No Accessory Muscle Use] : no accessory muscle use [Regular Rhythm] : with a regular rhythm [Normal Rate] : normal rate  [Normal S1, S2] : normal S1 and S2 [No Murmur] : no murmur heard [No Carotid Bruits] : no carotid bruits [No Abdominal Bruit] : a ~M bruit was not heard ~T in the abdomen [Pedal Pulses Present] : the pedal pulses are present [No Varicosities] : no varicosities [No Edema] : there was no peripheral edema [No Extremity Clubbing/Cyanosis] : no extremity clubbing/cyanosis [No Palpable Aorta] : no palpable aorta [Soft] : abdomen soft [Non-distended] : non-distended [Non Tender] : non-tender [No Masses] : no abdominal mass palpated [Normal Bowel Sounds] : normal bowel sounds [No HSM] : no HSM [Normal Anterior Cervical Nodes] : no anterior cervical lymphadenopathy [Normal Posterior Cervical Nodes] : no posterior cervical lymphadenopathy [No CVA Tenderness] : no CVA  tenderness [No Spinal Tenderness] : no spinal tenderness [No Joint Swelling] : no joint swelling [Grossly Normal Strength/Tone] : grossly normal strength/tone [Coordination Grossly Intact] : coordination grossly intact [No Rash] : no rash [Normal Gait] : normal gait [No Focal Deficits] : no focal deficits [Deep Tendon Reflexes (DTR)] : deep tendon reflexes were 2+ and symmetric [Normal Affect] : the affect was normal [Normal Insight/Judgement] : insight and judgment were intact [de-identified] : fine dry crackles at bases [de-identified] : Noted edema , and hematoma located on medial aspect of right elbow. Minimal tenderness noted as well. Full range of motion and strength noted of right arm

## 2020-07-28 NOTE — HEALTH RISK ASSESSMENT
[] : Yes [Yes] : Yes [1 or 2 (0 pts)] : 1 or 2 (0 points) [2 - 3 times a week (3 pts)] : 2 - 3  times a week (3 points) [No falls in past year] : Patient reported no falls in the past year [Never (0 pts)] : Never (0 points) [0] : 2) Feeling down, depressed, or hopeless: Not at all (0) [[id number]] : 0

## 2020-07-28 NOTE — HISTORY OF PRESENT ILLNESS
[FreeTextEntry8] : Patient reports "banging" his right elbow while working yesterday. He noted mild discoloration and swelling at the medial aspect of his right elbow. He notes minimal pain with movement. He has full range of motion of right elbow. Last night he did take nsaid and iced elbow.\par \par He reports chronic tinea cruris. He works a labor intensive job outside and notices extreme itchiness and burning in his perineal area. He continues to apply antifungal cream with mild relief of symptom, but notes that rash always comes back. He has been evaluated by dermatology.

## 2020-07-31 ENCOUNTER — APPOINTMENT (OUTPATIENT)
Dept: ORTHOPEDIC SURGERY | Facility: CLINIC | Age: 65
End: 2020-07-31
Payer: MEDICARE

## 2020-07-31 DIAGNOSIS — S50.01XA CONTUSION OF RIGHT ELBOW, INITIAL ENCOUNTER: ICD-10-CM

## 2020-07-31 PROCEDURE — 99214 OFFICE O/P EST MOD 30 MIN: CPT

## 2020-07-31 NOTE — DISCUSSION/SUMMARY
[de-identified] : The underlying pathophysiology was reviewed in great detail with the patient as well as the various treatment options, including ice, analgesics, NSAIDs, Physical therapy, steroid injections.\par \par Discussed utilizing warm compresses to help break up and soften hematoma. \par \par Activity modifications and restrictions were discussed. I recommend that he avoid heavy gripping/squeezing. \par \par FU 6 weeks or prn \par \par All questions were answered, all alternatives discussed and the patient is in complete agreement with that plan. Follow-up appointment as instructed. Any issues and the patient will call or come in sooner.

## 2020-07-31 NOTE — PHYSICAL EXAM
[de-identified] : Right Upper Extremity\par o Elbow :\par ¦ Inspection/Palpation : diffuse tenderness to palpation, marked tenderness medial condyle  moderate swelling and ecchymosis medial arm and forearm, no deformities\par ¦ Range of Motion :  ACTIVE EXTENSION: Measured at 20 degrees, ACTIVE FLEXION: Measured at 130 degrees,  ACTIVE SUPINATION: Measured at 90 degrees, ACTIVE PRONATION Measured at 90 degrees, no crepitus all pain free \par ¦ Strength : flexion and extension 5/5, no pain on resisted supination or pronation. \par ¦ Stability : no joint instability on provocative testing\par o Muscle Bulk : no atrophy\par o Sensation : sensation intact to light touch\par o Skin : no skin lesions, no discoloration\par o Vascular Exam : no edema, no cyanosis, radial and ulnar pulses normal \par \par Left Upper Extremity\par o Elbow :\par ¦ Inspection/Palpation : no tenderness, no swelling, no deformities\par ¦ Range of Motion : full and painless in all planes, no crepitus\par ¦ Strength : flexion and extension 5/5\par ¦ Stability : no joint instability on provocative testing\par o Muscle Bulk : no atrophy\par o Sensation : sensation intact to light touch\par o Skin : no skin lesions, no discoloration\par o Vascular Exam : no edema, no cyanosis, radial and ulnar pulses normal \par \par \par \par   [de-identified] : o Right elbow and humerus Xray taken at [facility] on 07/28/2020: Impression\par ¦  3 mm well- corticated calcific density is seen overlying the right elbow joint region seen on the lateral projection which may represent an old avulsion fracture versus a loose body. \par ¦ If clinically indicated, further evaluation with either CT scan imaging or MRI examination of the right elbow can be obtained. \par ¦ Upon further review: mild ulnohumeral joint osteoarthritis. \par \par \par \par \par

## 2020-07-31 NOTE — CONSULT LETTER
[Dear  ___] : Dear ~MANE, [Courtesy Letter:] : I had the pleasure of seeing your patient, [unfilled], in my office today.

## 2020-07-31 NOTE — HISTORY OF PRESENT ILLNESS
[de-identified] : SHARI VALLEJO  is a 65 year male  presenting to the office complaining of right elbow pain. Patient reports pain began on 07/27/2020 after banding his elbow on a steel pipe at work.  He took Ibuprofen 600mg right after the injury. He noticed mild discoloration and swelling.   The patient describes the pain as a dull aching, and occasionally sharp pain localized to the  medial aspect of his right elbow that is intermittent in nature. His  symptoms are exacerbated with any movement of the elbow, and gripping of the hand. Patient reports the pain is waking him  up at night.  Patient reports associated weakness. Denies numbness and tingling in the upper extremity. Patient is taking Oxycodone for pain relief with moderate relief in symptoms. Of note patient is on chronic Oxycodone due to bilateral shoulder pain and back pain prescribed by his PCP.  Patient denies any other complaints at this time.\par \par Patient reports "banging" his right elbow while working yesterday. He noted mild discoloration and swelling at the medial aspect of his right elbow. He notes minimal pain with movement. He has full range of motion of right elbow. Last night he did take nsaid and iced elbow.\par

## 2020-08-11 ENCOUNTER — APPOINTMENT (OUTPATIENT)
Dept: FAMILY MEDICINE | Facility: CLINIC | Age: 65
End: 2020-08-11
Payer: MEDICARE

## 2020-08-11 VITALS
RESPIRATION RATE: 20 BRPM | WEIGHT: 147 LBS | HEIGHT: 70 IN | BODY MASS INDEX: 21.05 KG/M2 | SYSTOLIC BLOOD PRESSURE: 124 MMHG | HEART RATE: 76 BPM | DIASTOLIC BLOOD PRESSURE: 70 MMHG

## 2020-08-11 PROCEDURE — 99214 OFFICE O/P EST MOD 30 MIN: CPT | Mod: 25

## 2020-08-11 PROCEDURE — 36415 COLL VENOUS BLD VENIPUNCTURE: CPT

## 2020-08-11 NOTE — HISTORY OF PRESENT ILLNESS
[de-identified] : Presents for BP check, labs, and general follow-up.  States has been feeling increasingly fatigued - able to perform at work (note does extremely physical work), however is exhausted when at home.  Also has multiple joint pain - shoulders, low back, legs with cramping at night.  States trying to watch diet and maintain hydration.

## 2020-08-11 NOTE — REVIEW OF SYSTEMS
[Fatigue] : fatigue [Joint Pain] : joint pain [Muscle Pain] : muscle pain [Back Pain] : back pain [Negative] : Neurological

## 2020-08-11 NOTE — PHYSICAL EXAM
[No Acute Distress] : no acute distress [No Edema] : there was no peripheral edema [Normal] : soft, non-tender, non-distended, no masses palpated, no HSM and normal bowel sounds [Normal Posterior Cervical Nodes] : no posterior cervical lymphadenopathy [Normal Anterior Cervical Nodes] : no anterior cervical lymphadenopathy [Grossly Normal Strength/Tone] : grossly normal strength/tone [Coordination Grossly Intact] : coordination grossly intact [No Focal Deficits] : no focal deficits [Alert and Oriented x3] : oriented to person, place, and time [Normal Gait] : normal gait [de-identified] : multiple joint deformities consistent with DJD

## 2020-08-11 NOTE — ASSESSMENT
[FreeTextEntry1] : Hemodynamically stable with acceptable BP\par Arthropathy with findings consistent with DJD\par Lab profiles drawn in office and sent

## 2020-08-13 LAB
ALBUMIN SERPL ELPH-MCNC: 4.7 G/DL
ALP BLD-CCNC: 82 U/L
ALT SERPL-CCNC: 21 U/L
ANION GAP SERPL CALC-SCNC: 12 MMOL/L
AST SERPL-CCNC: 22 U/L
BASOPHILS # BLD AUTO: 0.09 K/UL
BASOPHILS NFR BLD AUTO: 1.3 %
BILIRUB SERPL-MCNC: 0.4 MG/DL
BUN SERPL-MCNC: 23 MG/DL
CALCIUM SERPL-MCNC: 10 MG/DL
CHLORIDE SERPL-SCNC: 103 MMOL/L
CHOLEST SERPL-MCNC: 202 MG/DL
CHOLEST/HDLC SERPL: 3 RATIO
CO2 SERPL-SCNC: 25 MMOL/L
CREAT SERPL-MCNC: 0.99 MG/DL
CRP SERPL-MCNC: 0.18 MG/DL
EOSINOPHIL # BLD AUTO: 0.09 K/UL
EOSINOPHIL NFR BLD AUTO: 1.3 %
ERYTHROCYTE [SEDIMENTATION RATE] IN BLOOD BY WESTERGREN METHOD: 12 MM/HR
FOLATE SERPL-MCNC: 10.2 NG/ML
GLUCOSE SERPL-MCNC: 104 MG/DL
HCT VFR BLD CALC: 44.6 %
HDLC SERPL-MCNC: 68 MG/DL
HGB BLD-MCNC: 13.8 G/DL
IMM GRANULOCYTES NFR BLD AUTO: 0.3 %
LDLC SERPL CALC-MCNC: 105 MG/DL
LYMPHOCYTES # BLD AUTO: 1.92 K/UL
LYMPHOCYTES NFR BLD AUTO: 27.2 %
MAN DIFF?: NORMAL
MCHC RBC-ENTMCNC: 30 PG
MCHC RBC-ENTMCNC: 30.9 GM/DL
MCV RBC AUTO: 97 FL
MONOCYTES # BLD AUTO: 0.68 K/UL
MONOCYTES NFR BLD AUTO: 9.6 %
NEUTROPHILS # BLD AUTO: 4.25 K/UL
NEUTROPHILS NFR BLD AUTO: 60.3 %
PLATELET # BLD AUTO: 288 K/UL
POTASSIUM SERPL-SCNC: 4.5 MMOL/L
PROT SERPL-MCNC: 6.8 G/DL
RBC # BLD: 4.6 M/UL
RBC # FLD: 14.2 %
SODIUM SERPL-SCNC: 140 MMOL/L
T4 FREE SERPL-MCNC: 1.4 NG/DL
TRIGL SERPL-MCNC: 143 MG/DL
TSH SERPL-ACNC: 1.5 UIU/ML
VIT B12 SERPL-MCNC: 723 PG/ML
WBC # FLD AUTO: 7.05 K/UL

## 2020-09-01 ENCOUNTER — APPOINTMENT (OUTPATIENT)
Dept: ORTHOPEDIC SURGERY | Facility: CLINIC | Age: 65
End: 2020-09-01
Payer: MEDICARE

## 2020-09-01 DIAGNOSIS — M19.212 SECONDARY OSTEOARTHRITIS, LEFT SHOULDER: ICD-10-CM

## 2020-09-01 DIAGNOSIS — M19.019 PRIMARY OSTEOARTHRITIS, UNSPECIFIED SHOULDER: ICD-10-CM

## 2020-09-01 DIAGNOSIS — Z98.890 OTHER SPECIFIED POSTPROCEDURAL STATES: ICD-10-CM

## 2020-09-01 DIAGNOSIS — M75.121 COMPLETE ROTATOR CUFF TEAR OR RUPTURE OF RIGHT SHOULDER, NOT SPECIFIED AS TRAUMATIC: ICD-10-CM

## 2020-09-01 PROCEDURE — 99214 OFFICE O/P EST MOD 30 MIN: CPT | Mod: 25

## 2020-09-01 PROCEDURE — 20610 DRAIN/INJ JOINT/BURSA W/O US: CPT | Mod: RT

## 2020-09-01 NOTE — DISCUSSION/SUMMARY
[de-identified] : The underlying pathophysiology was reviewed in great detail with the patient as well as the various treatment options, including ice, analgesics, NSAIDs, Physical therapy, steroid injections, hyaluronic gel injections, surgical intervention. right superior capsule reconstruction versus reverse total shoulder replacement, \par \par Patient received a corticosteroid injection of the right shoulder today.\par \par Activity modifications and restrictions were discussed. I advised avoiding overhead lifting. I advised the patient to work on good posture.\par \par Surgical intervention would involve a reverse total shoulder replacement versus a superior capsule stabilization procedure. Nonoperative management would likely progress to osteoarthritis with increased dysfunction over time. The degree to which he will decompensate is unknown. At this time, he has good compensation for loss of rotator cuff function.\par \par As for the left shoulder he has good function without instability despite the x-ray findings. I have advised conservative management and activities as tolerated.\par \par A prescription for Physical Therapy was provided.\par \par A home exercise sheet was given and discussed with the patient to follow.\par \par FU prn \par \par All questions were answered, all alternatives discussed and the patient is in complete agreement with that plan. Follow-up appointment as instructed. Any issues and the patient will call or come in sooner.

## 2020-09-01 NOTE — PHYSICAL EXAM
[de-identified] : Right Upper Extremity\par o Shoulder :\par ¦ Inspection/Palpation : tenderness to palpation greater tuberosity,  no swelling, no deformities\par ¦ Range of Motion : ACTIVE FORWARD ELEVATION: Measured at 155 degrees, ACTIVE EXTERNAL ROTATION: Measured at 50 degrees, ACTIVE INTERNAL ROTATION: Measured at T10, crepitus throughout ROM \par ¦ Strength : external rotation 3/5, internal rotation 5/5, supraspinatus 3/5\par ¦ Stability : no joint instability on provocative testing\par ¦ Tests/Signs : Neer (-), Pickens (-)\par o Upper Arm : no tenderness, no swelling, no deformities\par o Muscle Bulk : no atrophy\par o Sensation : sensation intact to light touch\par o Skin : no skin rash or discoloration\par o Vascular Exam : no edema, no cyanosis, radial and ulnar pulses normal\par \par Left Upper Extremity\par o Shoulder :\par ¦ Inspection/Palpation : no tenderness, no swelling, no deformities\par ¦ Range of Motion : ACTIVE FORWARD ELEVATION: Measured at 155 degrees, ACTIVE EXTERNAL ROTATION: Measured at 45 degrees, ACTIVE INTERNAL ROTATION: Measured at T10, with crepitus \par ¦ Strength : external rotation 5/5, internal rotation 5/5, supraspinatus 5/5\par ¦ Stability : no joint instability on provocative testing\par ¦ Tests/Signs : Neer (-), Pickens (+) mild subacromial crepitus \par o Upper Arm : no tenderness, no swelling, no deformities\par o Muscle Bulk : no atrophy\par o Sensation : sensation intact to light touch\par o Skin : no skin rash or discoloration\par o Vascular Exam : no edema, no cyanosis, radial and ulnar pulses normal [de-identified] : o Right Shoulder : Grashey, Axillary and Outlet views were obtained, there are no soft tissue abnormalities, no fractures, alignment is normal, normal appearing joint spaces, normal bone density, no bony lesions, superior humeral migration, degenerative changes greater tuberosity, moderate AC joint osteoarthritis, \par \par o Left Shoulder : Grashey, Axillary and Outlet views were obtained, there are no soft tissue abnormalities, no fractures, alignment is normal, mild glenohumeral joint osteoarthritis, normal bone density, no bony lesions, status post Alcon procedure with anterior displacement of stabilization screw, erosion of anterior inferior aspect of glenoid fossa by tip of screw.\par \par  \par

## 2020-09-01 NOTE — PROCEDURE
[de-identified] : At this point I recommended a therapeutic injection and under sterile precautions an injection of 4 cc 1% lidocaine with 0.5 cc of Kenalog and 0.5 cc of Dexamethasone- was placed into the subacromial space of the right shoulder without complication, and after several minutes, the patient felt significant relief.\par \par \par

## 2020-09-01 NOTE — HISTORY OF PRESENT ILLNESS
[de-identified] : SHARI VALLEJO  is a 65 year male  presenting to the office complaining of bilateral shoulder pain. Patient reports having a failed rotator cuff surgery on the right shoulder in 2010 and a Bristo procedure in 1970. He saw Dr. 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 They did not go forward surgical intervention at this time. The left shoulder revealed a failed stabilization procedure. He recommended conservative treatment at this time due to lack of instability type symptoms.  Patient reports intermittent pain for over 15 years, but reports worsening pain over the past 6 years. Patient denies new injury or trauma to the area. Currently the The patient describes the pain as a dull aching, and occasionally sharp pain localized to the anterior aspect of his bilateral shoulder that is intermittent in nature. His  symptoms are exacerbated  with any movement of the shoulder. Patient reports the pain is waking him up at night.  Patient reports associated weakness. Denies numbness and tingling in the upper extremity. Of note patient is on chronic Oxycodone due to bilateral shoulder pain and back pain prescribed by his PCP.  Patient denies any other complaints at this time.

## 2020-09-22 ENCOUNTER — APPOINTMENT (OUTPATIENT)
Dept: MRI IMAGING | Facility: HOSPITAL | Age: 65
End: 2020-09-22
Payer: MEDICARE

## 2020-09-22 ENCOUNTER — OUTPATIENT (OUTPATIENT)
Dept: OUTPATIENT SERVICES | Facility: HOSPITAL | Age: 65
LOS: 1 days | End: 2020-09-22
Payer: MEDICARE

## 2020-09-22 DIAGNOSIS — Z00.8 ENCOUNTER FOR OTHER GENERAL EXAMINATION: ICD-10-CM

## 2020-09-22 DIAGNOSIS — Z96.651 PRESENCE OF RIGHT ARTIFICIAL KNEE JOINT: Chronic | ICD-10-CM

## 2020-09-22 DIAGNOSIS — Z98.890 OTHER SPECIFIED POSTPROCEDURAL STATES: Chronic | ICD-10-CM

## 2020-09-22 PROCEDURE — 72148 MRI LUMBAR SPINE W/O DYE: CPT | Mod: 26

## 2020-09-22 PROCEDURE — 72157 MRI CHEST SPINE W/O & W/DYE: CPT | Mod: 26

## 2020-09-22 PROCEDURE — A9579: CPT

## 2020-09-22 PROCEDURE — 72157 MRI CHEST SPINE W/O & W/DYE: CPT

## 2020-09-22 PROCEDURE — 72148 MRI LUMBAR SPINE W/O DYE: CPT

## 2020-10-17 ENCOUNTER — OUTPATIENT (OUTPATIENT)
Dept: OUTPATIENT SERVICES | Facility: HOSPITAL | Age: 65
LOS: 1 days | End: 2020-10-17
Payer: MEDICARE

## 2020-10-17 DIAGNOSIS — Z96.651 PRESENCE OF RIGHT ARTIFICIAL KNEE JOINT: Chronic | ICD-10-CM

## 2020-10-17 DIAGNOSIS — Z98.890 OTHER SPECIFIED POSTPROCEDURAL STATES: Chronic | ICD-10-CM

## 2020-10-17 DIAGNOSIS — Z11.59 ENCOUNTER FOR SCREENING FOR OTHER VIRAL DISEASES: ICD-10-CM

## 2020-10-17 LAB — SARS-COV-2 RNA SPEC QL NAA+PROBE: SIGNIFICANT CHANGE UP

## 2020-10-17 PROCEDURE — U0003: CPT

## 2020-10-19 ENCOUNTER — OUTPATIENT (OUTPATIENT)
Dept: OUTPATIENT SERVICES | Facility: HOSPITAL | Age: 65
LOS: 1 days | Discharge: HOME | End: 2020-10-19
Payer: MEDICARE

## 2020-10-19 DIAGNOSIS — Z98.890 OTHER SPECIFIED POSTPROCEDURAL STATES: Chronic | ICD-10-CM

## 2020-10-19 DIAGNOSIS — M47.817 SPONDYLOSIS WITHOUT MYELOPATHY OR RADICULOPATHY, LUMBOSACRAL REGION: ICD-10-CM

## 2020-10-19 DIAGNOSIS — Z96.651 PRESENCE OF RIGHT ARTIFICIAL KNEE JOINT: Chronic | ICD-10-CM

## 2020-10-19 PROCEDURE — 77003 FLUOROGUIDE FOR SPINE INJECT: CPT

## 2020-10-19 PROCEDURE — 64495 INJ PARAVERT F JNT L/S 3 LEV: CPT | Mod: LT

## 2020-10-19 PROCEDURE — 64493 INJ PARAVERT F JNT L/S 1 LEV: CPT | Mod: LT

## 2020-10-19 PROCEDURE — 64494 INJ PARAVERT F JNT L/S 2 LEV: CPT | Mod: LT

## 2020-12-03 ENCOUNTER — APPOINTMENT (OUTPATIENT)
Dept: FAMILY MEDICINE | Facility: CLINIC | Age: 65
End: 2020-12-03

## 2020-12-10 DIAGNOSIS — Z20.828 CONTACT WITH AND (SUSPECTED) EXPOSURE TO OTHER VIRAL COMMUNICABLE DISEASES: ICD-10-CM

## 2020-12-12 LAB — SARS-COV-2 N GENE NPH QL NAA+PROBE: NOT DETECTED

## 2020-12-16 PROBLEM — Z12.11 ENCOUNTER FOR SCREENING COLONOSCOPY: Status: RESOLVED | Noted: 2018-12-05 | Resolved: 2020-12-16

## 2020-12-30 ENCOUNTER — APPOINTMENT (OUTPATIENT)
Dept: FAMILY MEDICINE | Facility: CLINIC | Age: 65
End: 2020-12-30
Payer: MEDICARE

## 2020-12-30 VITALS — HEART RATE: 76 BPM | SYSTOLIC BLOOD PRESSURE: 125 MMHG | RESPIRATION RATE: 20 BRPM | DIASTOLIC BLOOD PRESSURE: 70 MMHG

## 2020-12-30 DIAGNOSIS — Z87.39 PERSONAL HISTORY OF OTHER DISEASES OF THE MUSCULOSKELETAL SYSTEM AND CONNECTIVE TISSUE: ICD-10-CM

## 2020-12-30 PROCEDURE — 36415 COLL VENOUS BLD VENIPUNCTURE: CPT

## 2020-12-30 PROCEDURE — 90686 IIV4 VACC NO PRSV 0.5 ML IM: CPT

## 2020-12-30 PROCEDURE — G0008: CPT

## 2020-12-30 PROCEDURE — 99214 OFFICE O/P EST MOD 30 MIN: CPT | Mod: 25

## 2020-12-30 NOTE — PHYSICAL EXAM
[No Acute Distress] : no acute distress [Normal] : normal rate, regular rhythm, normal S1 and S2 and no murmur heard [No Edema] : there was no peripheral edema [Soft] : abdomen soft [Non Tender] : non-tender [Muscle Spasms, Bilateral] : bilateral muscle spasms [Motor Strength Upper Extremities Bilaterally] : there was weakness in both upper extremities [Coordination Grossly Intact] : coordination grossly intact [No Focal Deficits] : no focal deficits [Normal Gait] : normal gait [Alert and Oriented x3] : oriented to person, place, and time [de-identified] : decreased ROM both shoulders

## 2020-12-30 NOTE — ASSESSMENT
[FreeTextEntry1] : Hemodynamically stable with acceptable BP\par Findings otherwise consistent with history\par Lab profiles drawn in office and sent\par Flu vaccine given L deltoid

## 2020-12-30 NOTE — HISTORY OF PRESENT ILLNESS
[de-identified] : Presents for BP check, labs, and general follow-up.  Reviewed medication - using pain mgt very appropriately to maintain functioning - still working - also note had MONY per Dr. Johnson - states did help.  Due for current flu vaccine - pt in agreement.  Trying to watch diet.

## 2020-12-31 LAB
ALBUMIN SERPL ELPH-MCNC: 4.6 G/DL
ALP BLD-CCNC: 65 U/L
ALT SERPL-CCNC: 19 U/L
ANION GAP SERPL CALC-SCNC: 12 MMOL/L
AST SERPL-CCNC: 21 U/L
BASOPHILS # BLD AUTO: 0.08 K/UL
BASOPHILS NFR BLD AUTO: 1.1 %
BILIRUB SERPL-MCNC: 0.6 MG/DL
BUN SERPL-MCNC: 27 MG/DL
CALCIUM SERPL-MCNC: 9.6 MG/DL
CHLORIDE SERPL-SCNC: 102 MMOL/L
CHOLEST SERPL-MCNC: 191 MG/DL
CO2 SERPL-SCNC: 24 MMOL/L
CREAT SERPL-MCNC: 1.09 MG/DL
EOSINOPHIL # BLD AUTO: 0.07 K/UL
EOSINOPHIL NFR BLD AUTO: 1 %
FOLATE SERPL-MCNC: 13.6 NG/ML
GLUCOSE SERPL-MCNC: 113 MG/DL
HCT VFR BLD CALC: 42.7 %
HDLC SERPL-MCNC: 77 MG/DL
HGB BLD-MCNC: 13.8 G/DL
IMM GRANULOCYTES NFR BLD AUTO: 0.4 %
LDLC SERPL CALC-MCNC: 102 MG/DL
LYMPHOCYTES # BLD AUTO: 1.31 K/UL
LYMPHOCYTES NFR BLD AUTO: 18.7 %
MAN DIFF?: NORMAL
MCHC RBC-ENTMCNC: 30.6 PG
MCHC RBC-ENTMCNC: 32.3 GM/DL
MCV RBC AUTO: 94.7 FL
MONOCYTES # BLD AUTO: 0.71 K/UL
MONOCYTES NFR BLD AUTO: 10.1 %
NEUTROPHILS # BLD AUTO: 4.8 K/UL
NEUTROPHILS NFR BLD AUTO: 68.7 %
NONHDLC SERPL-MCNC: 114 MG/DL
PLATELET # BLD AUTO: 277 K/UL
POTASSIUM SERPL-SCNC: 4.3 MMOL/L
PROT SERPL-MCNC: 6.7 G/DL
PSA SERPL-MCNC: 1.94 NG/ML
RBC # BLD: 4.51 M/UL
RBC # FLD: 14 %
SODIUM SERPL-SCNC: 138 MMOL/L
TRIGL SERPL-MCNC: 58 MG/DL
VIT B12 SERPL-MCNC: 607 PG/ML
WBC # FLD AUTO: 7 K/UL

## 2021-01-04 NOTE — H&P PST ADULT - BP NONINVASIVE MEAN (MM HG)
Final Anesthesia Post-op Assessment    Patient: Melanie Stack  Procedure(s) Performed: TONSILLECTOMY AND ADENOIDECTOMY  Anesthesia type: General    Vitals Value Taken Time   Temp 36.5 °C (97.7 °F) 01/04/21 1230   Pulse 131 01/04/21 1230   Resp 18 01/04/21 1230   SpO2 99 % 01/04/21 1230   BP 89/51 01/04/21 1230         Patient Location: Phase II  Post-op Vital Signs:stable  Level of Consciousness: participates in exam, awake, alert and oriented  Respiratory Status: spontaneous ventilation  Cardiovascular blood pressure returned to baseline  Hydration: euvolemic  Pain Management: well controlled  Vomiting: none   Nausea: None  Airway Patency:patent  Post-op Assessment: awake, alert, appropriately conversant, or baseline, no complications, patient tolerated procedure well with no complications, evidence of recall, dentition within defined limits, moving all extremities and No Corneal Abrasion  Comments: Discussed with mother regarding discharge instructions. She verbalized understanding and comfortable with heading home with daughter. Reassesed HR prior to discharge, back to 110s.      No complications documented.   
84

## 2021-02-27 ENCOUNTER — OUTPATIENT (OUTPATIENT)
Dept: OUTPATIENT SERVICES | Facility: HOSPITAL | Age: 66
LOS: 1 days | End: 2021-02-27
Payer: MEDICARE

## 2021-02-27 DIAGNOSIS — Z98.890 OTHER SPECIFIED POSTPROCEDURAL STATES: Chronic | ICD-10-CM

## 2021-02-27 DIAGNOSIS — Z96.651 PRESENCE OF RIGHT ARTIFICIAL KNEE JOINT: Chronic | ICD-10-CM

## 2021-02-27 DIAGNOSIS — Z20.828 CONTACT WITH AND (SUSPECTED) EXPOSURE TO OTHER VIRAL COMMUNICABLE DISEASES: ICD-10-CM

## 2021-02-27 LAB — SARS-COV-2 RNA SPEC QL NAA+PROBE: SIGNIFICANT CHANGE UP

## 2021-02-27 PROCEDURE — U0003: CPT

## 2021-02-27 PROCEDURE — U0005: CPT

## 2021-03-01 ENCOUNTER — OUTPATIENT (OUTPATIENT)
Dept: OUTPATIENT SERVICES | Facility: HOSPITAL | Age: 66
LOS: 1 days | Discharge: HOME | End: 2021-03-01
Payer: MEDICARE

## 2021-03-01 DIAGNOSIS — M54.16 RADICULOPATHY, LUMBAR REGION: ICD-10-CM

## 2021-03-01 DIAGNOSIS — Z98.890 OTHER SPECIFIED POSTPROCEDURAL STATES: Chronic | ICD-10-CM

## 2021-03-01 DIAGNOSIS — Z96.651 PRESENCE OF RIGHT ARTIFICIAL KNEE JOINT: Chronic | ICD-10-CM

## 2021-03-01 PROCEDURE — 62323 NJX INTERLAMINAR LMBR/SAC: CPT

## 2021-03-16 ENCOUNTER — FORM ENCOUNTER (OUTPATIENT)
Age: 66
End: 2021-03-16

## 2021-03-18 ENCOUNTER — APPOINTMENT (OUTPATIENT)
Dept: SURGERY | Facility: CLINIC | Age: 66
End: 2021-03-18
Payer: MEDICARE

## 2021-03-18 VITALS
HEIGHT: 71 IN | SYSTOLIC BLOOD PRESSURE: 123 MMHG | TEMPERATURE: 95.8 F | HEART RATE: 63 BPM | WEIGHT: 150 LBS | DIASTOLIC BLOOD PRESSURE: 67 MMHG | RESPIRATION RATE: 15 BRPM | BODY MASS INDEX: 21 KG/M2 | OXYGEN SATURATION: 97 %

## 2021-03-18 DIAGNOSIS — K64.2 THIRD DEGREE HEMORRHOIDS: ICD-10-CM

## 2021-03-18 PROCEDURE — 45300 PROCTOSIGMOIDOSCOPY DX: CPT

## 2021-03-18 PROCEDURE — 99204 OFFICE O/P NEW MOD 45 MIN: CPT

## 2021-03-18 RX ORDER — CLOTRIMAZOLE AND BETAMETHASONE DIPROPIONATE 10; .5 MG/G; MG/G
1-0.05 CREAM TOPICAL TWICE DAILY
Qty: 3 | Refills: 3 | Status: DISCONTINUED | COMMUNITY
Start: 2020-07-28 | End: 2021-03-18

## 2021-03-18 RX ORDER — FLUCONAZOLE 150 MG/1
150 TABLET ORAL
Qty: 2 | Refills: 0 | Status: DISCONTINUED | COMMUNITY
Start: 2020-07-28 | End: 2021-03-18

## 2021-03-18 NOTE — PHYSICAL EXAM
[Normal Heart Sounds] : normal heart sounds [Normal Rate and Rhythm] : normal rate and rhythm [No Rash or Lesion] : No rash or lesion [Alert] : alert [Oriented to Person] : oriented to person [Oriented to Place] : oriented to place [Oriented to Time] : oriented to time [Calm] : calm [JVD] : no jugular venous distention  [de-identified] : Well nourished male, in no apparent distress [de-identified] : WNL [de-identified] : Full ROM [FreeTextEntry1] : Perianal inspection during Valsalva on the commode demonstrated internal hemorrhoid prolapse with circumferential mucosal prolapse.  The patient reduced the prolapsing tissue and was reexamined on the examining table.  External tags noted.  Digital exam and anoscopy confirmed large internal hemorrhoids.  Sphincter tone was normal.  There was no evidence of rectal prolapse during Valsalva with a rigid sigmoidoscope in place.

## 2021-03-18 NOTE — HISTORY OF PRESENT ILLNESS
[FreeTextEntry1] : Bolivar is a 67 y/o male here for consultation visit. Colonoscopy from 1/15/19 demonstrated one 8 mm, non-bleeding polyp in the ascending colon. Resected and retrieved. Pathology: tubular adenoma. \par \par Patient reports history of prolapsing tissue with every BM and when passing gas. Has inconsistent BMs. Typically has 3-4 small BM in the morning. Rarely has rectal bleeding.  Patient was seen by me in 2011 with hemorrhoid prolapse and circumferential rectal mucosal prolapse.  Patient reports incontinence of flatus and occasional incontinence of stool.

## 2021-03-18 NOTE — ASSESSMENT
[FreeTextEntry1] : I have seen and evaluated patient and I have corroborated all nursing input into this note.  Patient with hemorrhoid prolapse and circumferential rectal mucosal prolapse.  There was no evidence of full-thickness rectal prolapse on today's exam.  There was no evidence of full-thickness rectal prolapse when I saw the patient in 2011 either.  The patient stated today that sometimes the prolapsing tissue is larger than he was able to produce today.  I suggested that he take a picture of the prolapsing tissue and forward it to my office.  Assuming there is no evidence of full-thickness rectal prolapse on the photograph, then a hemorrhoidectomy with mucosal proctoplasty (possible Delorme procedure) can be used to treat the patient's problem.  Indications, risk, benefits, alternatives reviewed including but not limited to bleeding, infection, recurrence, pain, tags, stenosis, incontinence, fissure, and fistula.  All questions were answered.  The patient will make his decision about surgery and he will notify my office.

## 2021-03-18 NOTE — CONSULT LETTER
[Dear  ___] : Dear ~MANE, [Courtesy Letter:] : I had the pleasure of seeing your patient, [unfilled], in my office today. [Please see my note below.] : Please see my note below. [Consult Closing:] : Thank you very much for allowing me to participate in the care of this patient.  If you have any questions, please do not hesitate to contact me. [Sincerely,] : Sincerely, [FreeTextEntry2] : Dr. Jaylon Jang [DrPaige  ___] : Dr. DOOLEY

## 2021-03-23 ENCOUNTER — OUTPATIENT (OUTPATIENT)
Dept: OUTPATIENT SERVICES | Facility: HOSPITAL | Age: 66
LOS: 1 days | End: 2021-03-23
Payer: MEDICARE

## 2021-03-23 DIAGNOSIS — Z20.828 CONTACT WITH AND (SUSPECTED) EXPOSURE TO OTHER VIRAL COMMUNICABLE DISEASES: ICD-10-CM

## 2021-03-23 DIAGNOSIS — Z96.651 PRESENCE OF RIGHT ARTIFICIAL KNEE JOINT: Chronic | ICD-10-CM

## 2021-03-23 DIAGNOSIS — Z98.890 OTHER SPECIFIED POSTPROCEDURAL STATES: Chronic | ICD-10-CM

## 2021-03-23 LAB — SARS-COV-2 RNA SPEC QL NAA+PROBE: SIGNIFICANT CHANGE UP

## 2021-03-23 PROCEDURE — U0005: CPT

## 2021-03-23 PROCEDURE — U0003: CPT

## 2021-03-25 ENCOUNTER — OUTPATIENT (OUTPATIENT)
Dept: OUTPATIENT SERVICES | Facility: HOSPITAL | Age: 66
LOS: 1 days | End: 2021-03-25
Payer: MEDICARE

## 2021-03-25 DIAGNOSIS — Z98.890 OTHER SPECIFIED POSTPROCEDURAL STATES: Chronic | ICD-10-CM

## 2021-03-25 DIAGNOSIS — Z96.651 PRESENCE OF RIGHT ARTIFICIAL KNEE JOINT: Chronic | ICD-10-CM

## 2021-03-25 DIAGNOSIS — M54.16 RADICULOPATHY, LUMBAR REGION: ICD-10-CM

## 2021-03-25 PROCEDURE — 62323 NJX INTERLAMINAR LMBR/SAC: CPT

## 2021-05-24 ENCOUNTER — APPOINTMENT (OUTPATIENT)
Dept: FAMILY MEDICINE | Facility: CLINIC | Age: 66
End: 2021-05-24
Payer: MEDICARE

## 2021-05-24 VITALS — SYSTOLIC BLOOD PRESSURE: 125 MMHG | HEART RATE: 76 BPM | DIASTOLIC BLOOD PRESSURE: 70 MMHG | RESPIRATION RATE: 20 BRPM

## 2021-05-24 DIAGNOSIS — L30.9 DERMATITIS, UNSPECIFIED: ICD-10-CM

## 2021-05-24 PROCEDURE — 99213 OFFICE O/P EST LOW 20 MIN: CPT

## 2021-05-24 NOTE — PHYSICAL EXAM
[No Acute Distress] : no acute distress [Supple] : supple [Normal] : normal rate, regular rhythm, normal S1 and S2 and no murmur heard [Soft] : abdomen soft [Non Tender] : non-tender [Skin Lesions 1] : Skin lesion: [No Focal Deficits] : no focal deficits [Alert and Oriented x3] : oriented to person, place, and time [de-identified] : circular mildly erythematous, slight scaly area R wrist

## 2021-05-24 NOTE — ASSESSMENT
[FreeTextEntry1] : Probably fungal, but feel prudent to cover for bacterial infection - advised patient to begin using the antifungal cream and will also order Bactroban; observe

## 2021-05-24 NOTE — HISTORY OF PRESENT ILLNESS
[FreeTextEntry8] : Presents on acute basis - has noted a red, itchy area R wrist; note  has fungal areas over other parts of his body - using antifungal.

## 2021-06-28 ENCOUNTER — APPOINTMENT (OUTPATIENT)
Dept: FAMILY MEDICINE | Facility: CLINIC | Age: 66
End: 2021-06-28
Payer: MEDICARE

## 2021-06-28 VITALS
RESPIRATION RATE: 20 BRPM | HEART RATE: 76 BPM | BODY MASS INDEX: 7.28 KG/M2 | WEIGHT: 52 LBS | SYSTOLIC BLOOD PRESSURE: 118 MMHG | HEIGHT: 71 IN | DIASTOLIC BLOOD PRESSURE: 70 MMHG

## 2021-06-28 DIAGNOSIS — R53.83 OTHER FATIGUE: ICD-10-CM

## 2021-06-28 PROCEDURE — 99214 OFFICE O/P EST MOD 30 MIN: CPT | Mod: 25

## 2021-06-28 PROCEDURE — 36415 COLL VENOUS BLD VENIPUNCTURE: CPT

## 2021-06-28 NOTE — REVIEW OF SYSTEMS
[Fatigue] : fatigue [Joint Pain] : joint pain [Back Pain] : back pain [Negative] : Genitourinary [FreeTextEntry9] : consistent with history

## 2021-06-28 NOTE — HISTORY OF PRESENT ILLNESS
[de-identified] : Presents for BP check, labs, and general follow-up.  Does complain of intermittent fatigue and "weakness" - but did do very heavy physical work the whole day today.  States trying to maintain hydration.  Reviewed medication - using pain mgt very appropriately to maintain daily functioning at work with no adverse effects.

## 2021-06-28 NOTE — PHYSICAL EXAM
[No Acute Distress] : no acute distress [Normal] : normal rate, regular rhythm, normal S1 and S2 and no murmur heard [No Edema] : there was no peripheral edema [Soft] : abdomen soft [Non Tender] : non-tender [Normal Posterior Cervical Nodes] : no posterior cervical lymphadenopathy [Normal Anterior Cervical Nodes] : no anterior cervical lymphadenopathy [Coordination Grossly Intact] : coordination grossly intact [No Focal Deficits] : no focal deficits [Normal Gait] : normal gait [Speech Grossly Normal] : speech grossly normal [Memory Grossly Normal] : memory grossly normal [Normal Affect] : the affect was normal [Alert and Oriented x3] : oriented to person, place, and time [Normal Mood] : the mood was normal [Normal Insight/Judgement] : insight and judgment were intact

## 2021-06-29 LAB
ALBUMIN SERPL ELPH-MCNC: 4.4 G/DL
ALP BLD-CCNC: 74 U/L
ALT SERPL-CCNC: 17 U/L
ANION GAP SERPL CALC-SCNC: 17 MMOL/L
AST SERPL-CCNC: 18 U/L
BASOPHILS # BLD AUTO: 0.05 K/UL
BASOPHILS NFR BLD AUTO: 0.6 %
BILIRUB SERPL-MCNC: 0.3 MG/DL
BUN SERPL-MCNC: 27 MG/DL
CALCIUM SERPL-MCNC: 9.6 MG/DL
CHLORIDE SERPL-SCNC: 105 MMOL/L
CHOLEST SERPL-MCNC: 188 MG/DL
CO2 SERPL-SCNC: 21 MMOL/L
CREAT SERPL-MCNC: 1.52 MG/DL
EOSINOPHIL # BLD AUTO: 0.03 K/UL
EOSINOPHIL NFR BLD AUTO: 0.4 %
ESTIMATED AVERAGE GLUCOSE: 117 MG/DL
FOLATE SERPL-MCNC: 11.5 NG/ML
GLUCOSE SERPL-MCNC: 129 MG/DL
HBA1C MFR BLD HPLC: 5.7 %
HCT VFR BLD CALC: 42.4 %
HDLC SERPL-MCNC: 67 MG/DL
HGB BLD-MCNC: 13.5 G/DL
IMM GRANULOCYTES NFR BLD AUTO: 0.3 %
LDLC SERPL CALC-MCNC: 103 MG/DL
LYMPHOCYTES # BLD AUTO: 1 K/UL
LYMPHOCYTES NFR BLD AUTO: 12.7 %
MAN DIFF?: NORMAL
MCHC RBC-ENTMCNC: 30.4 PG
MCHC RBC-ENTMCNC: 31.8 GM/DL
MCV RBC AUTO: 95.5 FL
MONOCYTES # BLD AUTO: 0.74 K/UL
MONOCYTES NFR BLD AUTO: 9.4 %
NEUTROPHILS # BLD AUTO: 6.01 K/UL
NEUTROPHILS NFR BLD AUTO: 76.6 %
NONHDLC SERPL-MCNC: 121 MG/DL
PLATELET # BLD AUTO: 283 K/UL
POTASSIUM SERPL-SCNC: 3.9 MMOL/L
PROT SERPL-MCNC: 6.5 G/DL
RBC # BLD: 4.44 M/UL
RBC # FLD: 14.2 %
SODIUM SERPL-SCNC: 143 MMOL/L
T4 FREE SERPL-MCNC: 1.2 NG/DL
TRIGL SERPL-MCNC: 89 MG/DL
TSH SERPL-ACNC: 1.48 UIU/ML
VIT B12 SERPL-MCNC: 510 PG/ML
WBC # FLD AUTO: 7.85 K/UL

## 2021-06-30 ENCOUNTER — NON-APPOINTMENT (OUTPATIENT)
Age: 66
End: 2021-06-30

## 2021-07-23 ENCOUNTER — APPOINTMENT (OUTPATIENT)
Dept: FAMILY MEDICINE | Facility: CLINIC | Age: 66
End: 2021-07-23
Payer: MEDICARE

## 2021-07-23 VITALS — DIASTOLIC BLOOD PRESSURE: 70 MMHG | SYSTOLIC BLOOD PRESSURE: 125 MMHG | HEART RATE: 76 BPM | RESPIRATION RATE: 20 BRPM

## 2021-07-23 DIAGNOSIS — R79.89 OTHER SPECIFIED ABNORMAL FINDINGS OF BLOOD CHEMISTRY: ICD-10-CM

## 2021-07-23 PROCEDURE — 99214 OFFICE O/P EST MOD 30 MIN: CPT | Mod: 25

## 2021-07-23 PROCEDURE — 36415 COLL VENOUS BLD VENIPUNCTURE: CPT

## 2021-07-23 NOTE — ASSESSMENT
[FreeTextEntry1] : Rib contusion without clinical evidence of fracture - conservative mgt at this time\par BMP drawn in office and sent

## 2021-07-23 NOTE — HISTORY OF PRESENT ILLNESS
[de-identified] : Presents for BP check and labs with attention to renal.  Discussed the importance of hydration (pt still does not appear to be drinking enough fluids).  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

## 2021-07-24 LAB
ANION GAP SERPL CALC-SCNC: 11 MMOL/L
BUN SERPL-MCNC: 21 MG/DL
CALCIUM SERPL-MCNC: 9.5 MG/DL
CHLORIDE SERPL-SCNC: 104 MMOL/L
CO2 SERPL-SCNC: 25 MMOL/L
CREAT SERPL-MCNC: 0.88 MG/DL
GLUCOSE SERPL-MCNC: 119 MG/DL
POTASSIUM SERPL-SCNC: 4.4 MMOL/L
SODIUM SERPL-SCNC: 140 MMOL/L

## 2021-07-26 ENCOUNTER — NON-APPOINTMENT (OUTPATIENT)
Age: 66
End: 2021-07-26

## 2021-08-17 ENCOUNTER — OUTPATIENT (OUTPATIENT)
Dept: OUTPATIENT SERVICES | Facility: HOSPITAL | Age: 66
LOS: 1 days | End: 2021-08-17
Payer: MEDICARE

## 2021-08-17 DIAGNOSIS — Z98.890 OTHER SPECIFIED POSTPROCEDURAL STATES: Chronic | ICD-10-CM

## 2021-08-17 DIAGNOSIS — Z96.651 PRESENCE OF RIGHT ARTIFICIAL KNEE JOINT: Chronic | ICD-10-CM

## 2021-08-17 DIAGNOSIS — Z20.828 CONTACT WITH AND (SUSPECTED) EXPOSURE TO OTHER VIRAL COMMUNICABLE DISEASES: ICD-10-CM

## 2021-08-17 LAB — SARS-COV-2 RNA SPEC QL NAA+PROBE: SIGNIFICANT CHANGE UP

## 2021-08-17 PROCEDURE — U0003: CPT

## 2021-08-17 PROCEDURE — U0005: CPT

## 2021-08-19 ENCOUNTER — OUTPATIENT (OUTPATIENT)
Dept: OUTPATIENT SERVICES | Facility: HOSPITAL | Age: 66
LOS: 1 days | End: 2021-08-19
Payer: MEDICARE

## 2021-08-19 DIAGNOSIS — Z98.890 OTHER SPECIFIED POSTPROCEDURAL STATES: Chronic | ICD-10-CM

## 2021-08-19 DIAGNOSIS — Z96.651 PRESENCE OF RIGHT ARTIFICIAL KNEE JOINT: Chronic | ICD-10-CM

## 2021-08-19 DIAGNOSIS — M54.16 RADICULOPATHY, LUMBAR REGION: ICD-10-CM

## 2021-08-19 PROCEDURE — 62323 NJX INTERLAMINAR LMBR/SAC: CPT

## 2021-09-14 ENCOUNTER — APPOINTMENT (OUTPATIENT)
Dept: ALLERGY | Facility: CLINIC | Age: 66
End: 2021-09-14
Payer: MEDICARE

## 2021-09-14 ENCOUNTER — NON-APPOINTMENT (OUTPATIENT)
Age: 66
End: 2021-09-14

## 2021-09-14 VITALS
BODY MASS INDEX: 20.3 KG/M2 | SYSTOLIC BLOOD PRESSURE: 160 MMHG | DIASTOLIC BLOOD PRESSURE: 80 MMHG | HEIGHT: 71 IN | HEART RATE: 72 BPM | WEIGHT: 145 LBS

## 2021-09-14 PROCEDURE — 99204 OFFICE O/P NEW MOD 45 MIN: CPT | Mod: 25

## 2021-09-14 PROCEDURE — 95004 PERQ TESTS W/ALRGNC XTRCS: CPT

## 2021-09-14 PROCEDURE — 95018 ALL TSTG PERQ&IQ DRUGS/BIOL: CPT

## 2021-09-14 NOTE — SOCIAL HISTORY
[Spouse/Partner] : spouse/partner [House] : [unfilled] lives in a house  [Radiator/Baseboard] : heating provided by radiator(s)/baseboard(s) [Bedroom] :  in bedroom [Dog] : dog [] :  [de-identified] : daughter  [FreeTextEntry2] :   [Living Area] : not in the living area [Smokers in Household] : there are no smokers in the home [de-identified] : slit system

## 2021-09-14 NOTE — PHYSICAL EXAM
[Alert] : alert [Well Nourished] : well nourished [Healthy Appearance] : healthy appearance [No Acute Distress] : no acute distress [Well Developed] : well developed [Normal Voice/Communication] : normal voice communication [Normal Nasal Mucosa] : the nasal mucosa was normal [Normal Lips/Tongue] : the lips and tongue were normal [No Nasal Discharge] : no nasal discharge [Normal Tonsils] : normal tonsils [No Neck Mass] : no neck mass was observed [No LAD] : no lymphadenopathy [No Thyroid Mass] : no thyroid mass [Supple] : the neck was supple [Normal Rate and Effort] : normal respiratory rhythm and effort [No Crackles] : no crackles [No Retractions] : no retractions [Wheezing] : no wheezing was heard [Normal Rate] : heart rate was normal  [Normal S1, S2] : normal S1 and S2 [No murmur] : no murmur [Regular Rhythm] : with a regular rhythm [Normal Cervical Lymph Nodes] : cervical [Skin Intact] : skin intact  [No Rash] : no rash [No Skin Lesions] : no skin lesions [No clubbing] : no clubbing [No Cyanosis] : no cyanosis [Normal Mood] : mood was normal [Normal Affect] : affect was normal [Judgment and Insight Age Appropriate] : judgement and insight is age appropriate [Alert, Awake, Oriented as Age-Appropriate] : alert, awake, oriented as age appropriate

## 2021-09-14 NOTE — ASSESSMENT
[FreeTextEntry1] : Perennial allergic rhinitis:\par \par Flonase 2 puffs each nostril QD\par Hypertonic saline irrigation TID \par \par Possible GERD:\par \par I have advised that he consult with Dr. Patel for endoscopy \par \par RV prn symptoms \par \par He was advised not to avoid any foods based upon his clinical history

## 2021-09-14 NOTE — HISTORY OF PRESENT ILLNESS
[Asthma] : asthma [Eczematous rashes] : eczematous rashes [Food Allergies] : food allergies [de-identified] : Chronic throat clearing with nasal congestion - worse in the AM and PM - he was treated with nasal spray with slight improvement in his symptoms.   He saw ENT MD - told he might have GERD - no treatment given to patient - he says he has heartburn every few weeks - he is unsure why he was not treated.   His PCP - ordered CAP RAST for food allergies - he had multiple positive food testing.   He has not seen a GI MD. \par \par He eats whatever foods he desires except for fried foods.   \par \par Landscape  - he has had seasonal allergies in the past.

## 2021-09-22 ENCOUNTER — RX RENEWAL (OUTPATIENT)
Age: 66
End: 2021-09-22

## 2021-10-06 NOTE — H&P PST ADULT - OPHTHALMOLOGIC
[FreeTextEntry1] : \par - PCR\par - Strep and flu negative in office  \par - C/w Tylenol up to 4,000 mg a day, c/w nasal spray, and take Vitamin C. \par 
details…

## 2021-11-08 ENCOUNTER — APPOINTMENT (OUTPATIENT)
Dept: RADIOLOGY | Facility: HOSPITAL | Age: 66
End: 2021-11-08
Payer: MEDICARE

## 2021-11-08 ENCOUNTER — OUTPATIENT (OUTPATIENT)
Dept: OUTPATIENT SERVICES | Facility: HOSPITAL | Age: 66
LOS: 1 days | End: 2021-11-08
Payer: MEDICARE

## 2021-11-08 ENCOUNTER — RESULT REVIEW (OUTPATIENT)
Age: 66
End: 2021-11-08

## 2021-11-08 ENCOUNTER — APPOINTMENT (OUTPATIENT)
Dept: FAMILY MEDICINE | Facility: CLINIC | Age: 66
End: 2021-11-08
Payer: MEDICARE

## 2021-11-08 VITALS
HEIGHT: 71 IN | WEIGHT: 154 LBS | OXYGEN SATURATION: 96 % | SYSTOLIC BLOOD PRESSURE: 148 MMHG | BODY MASS INDEX: 21.56 KG/M2 | HEART RATE: 68 BPM | TEMPERATURE: 97.8 F | DIASTOLIC BLOOD PRESSURE: 80 MMHG | RESPIRATION RATE: 15 BRPM

## 2021-11-08 DIAGNOSIS — Z98.890 OTHER SPECIFIED POSTPROCEDURAL STATES: Chronic | ICD-10-CM

## 2021-11-08 DIAGNOSIS — M25.512 PAIN IN LEFT SHOULDER: ICD-10-CM

## 2021-11-08 DIAGNOSIS — Z96.651 PRESENCE OF RIGHT ARTIFICIAL KNEE JOINT: Chronic | ICD-10-CM

## 2021-11-08 PROCEDURE — 73030 X-RAY EXAM OF SHOULDER: CPT

## 2021-11-08 PROCEDURE — 73030 X-RAY EXAM OF SHOULDER: CPT | Mod: 26,LT

## 2021-11-08 PROCEDURE — 99214 OFFICE O/P EST MOD 30 MIN: CPT

## 2021-11-09 NOTE — HISTORY OF PRESENT ILLNESS
[FreeTextEntry8] : Acute left shoulder pain and weakness that started yesterday when he woke up. Sharp pain is mostly localized to his left acromioclavicular joint . Pain is worse with any type of movement. Denies any trauma to shoulder, but patient performs physical labor daily as a . Previous history left shoulder replacement and rotator cuff surgery.  He has taken hydromorphone with mild relief of pain. \par

## 2021-11-09 NOTE — PHYSICAL EXAM
[Normal] : normal rate, regular rhythm, normal S1 and S2 and no murmur heard [de-identified] : MOderate tenderness over left acromioclavicular joint. Unable to left arm to 90 degrees. MOderate weakness of left ue - can not resist any type of pressure - Positive empty can.

## 2021-11-09 NOTE — PLAN
[FreeTextEntry1] : Requesting MRI. Will perform xrays first and have patient to fu with ortho ASAP. \par Discussed rest, head and to continue current pain regimen. \par

## 2021-11-22 ENCOUNTER — APPOINTMENT (OUTPATIENT)
Dept: FAMILY MEDICINE | Facility: CLINIC | Age: 66
End: 2021-11-22
Payer: MEDICARE

## 2021-11-22 VITALS
WEIGHT: 155 LBS | HEART RATE: 76 BPM | HEIGHT: 71 IN | DIASTOLIC BLOOD PRESSURE: 70 MMHG | SYSTOLIC BLOOD PRESSURE: 125 MMHG | RESPIRATION RATE: 20 BRPM | BODY MASS INDEX: 21.7 KG/M2

## 2021-11-22 DIAGNOSIS — Z86.39 PERSONAL HISTORY OF OTHER ENDOCRINE, NUTRITIONAL AND METABOLIC DISEASE: ICD-10-CM

## 2021-11-22 DIAGNOSIS — M51.36 OTHER INTERVERTEBRAL DISC DEGENERATION, LUMBAR REGION: ICD-10-CM

## 2021-11-22 DIAGNOSIS — K90.49 MALABSORPTION DUE TO INTOLERANCE, NOT ELSEWHERE CLASSIFIED: ICD-10-CM

## 2021-11-22 PROCEDURE — 36415 COLL VENOUS BLD VENIPUNCTURE: CPT

## 2021-11-22 PROCEDURE — 90686 IIV4 VACC NO PRSV 0.5 ML IM: CPT

## 2021-11-22 PROCEDURE — G0008: CPT

## 2021-11-22 PROCEDURE — 99214 OFFICE O/P EST MOD 30 MIN: CPT | Mod: 25

## 2021-11-22 NOTE — ASSESSMENT
[FreeTextEntry1] : Hemodynamically stable with acceptable BP\par Musculoskeletal findings consistent with history\par Lab profiles drawn in office and sent\par Flu vaccine given L deltoid

## 2021-11-22 NOTE — HISTORY OF PRESENT ILLNESS
[de-identified] : Presents for BP check, labs, and general follow-up; also requests flu vaccine.  States feeling generally well; trying to watch diet.  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

## 2021-11-22 NOTE — PHYSICAL EXAM
[No Acute Distress] : no acute distress [Normal] : normal rate, regular rhythm, normal S1 and S2 and no murmur heard [No Edema] : there was no peripheral edema [Soft] : abdomen soft [Non Tender] : non-tender [Normal Posterior Cervical Nodes] : no posterior cervical lymphadenopathy [Normal Anterior Cervical Nodes] : no anterior cervical lymphadenopathy [Muscle Spasms, Bilateral] : bilateral muscle spasms [Coordination Grossly Intact] : coordination grossly intact [No Focal Deficits] : no focal deficits [Normal Gait] : normal gait [Alert and Oriented x3] : oriented to person, place, and time

## 2021-11-23 LAB
ALBUMIN SERPL ELPH-MCNC: 4.6 G/DL
ALP BLD-CCNC: 68 U/L
ALT SERPL-CCNC: 20 U/L
ANION GAP SERPL CALC-SCNC: 13 MMOL/L
AST SERPL-CCNC: 20 U/L
BASOPHILS # BLD AUTO: 0.06 K/UL
BASOPHILS NFR BLD AUTO: 1.2 %
BILIRUB SERPL-MCNC: 0.4 MG/DL
BUN SERPL-MCNC: 18 MG/DL
CALCIUM SERPL-MCNC: 9.6 MG/DL
CHLORIDE SERPL-SCNC: 105 MMOL/L
CHOLEST SERPL-MCNC: 206 MG/DL
CO2 SERPL-SCNC: 21 MMOL/L
CREAT SERPL-MCNC: 0.8 MG/DL
EOSINOPHIL # BLD AUTO: 0.09 K/UL
EOSINOPHIL NFR BLD AUTO: 1.7 %
ESTIMATED AVERAGE GLUCOSE: 120 MG/DL
GLUCOSE SERPL-MCNC: 116 MG/DL
HBA1C MFR BLD HPLC: 5.8 %
HCT VFR BLD CALC: 45.2 %
HDLC SERPL-MCNC: 77 MG/DL
HGB BLD-MCNC: 14.7 G/DL
IMM GRANULOCYTES NFR BLD AUTO: 0.2 %
LDLC SERPL CALC-MCNC: 114 MG/DL
LYMPHOCYTES # BLD AUTO: 1.04 K/UL
LYMPHOCYTES NFR BLD AUTO: 20.1 %
MAN DIFF?: NORMAL
MCHC RBC-ENTMCNC: 30.8 PG
MCHC RBC-ENTMCNC: 32.5 GM/DL
MCV RBC AUTO: 94.6 FL
MONOCYTES # BLD AUTO: 0.47 K/UL
MONOCYTES NFR BLD AUTO: 9.1 %
NEUTROPHILS # BLD AUTO: 3.51 K/UL
NEUTROPHILS NFR BLD AUTO: 67.7 %
NONHDLC SERPL-MCNC: 129 MG/DL
PLATELET # BLD AUTO: 294 K/UL
POTASSIUM SERPL-SCNC: 4.4 MMOL/L
PROT SERPL-MCNC: 6.8 G/DL
RBC # BLD: 4.78 M/UL
RBC # FLD: 13.4 %
SODIUM SERPL-SCNC: 139 MMOL/L
TRIGL SERPL-MCNC: 77 MG/DL
WBC # FLD AUTO: 5.18 K/UL

## 2021-12-13 ENCOUNTER — APPOINTMENT (OUTPATIENT)
Dept: FAMILY MEDICINE | Facility: CLINIC | Age: 66
End: 2021-12-13
Payer: MEDICARE

## 2021-12-13 VITALS
BODY MASS INDEX: 22.26 KG/M2 | HEART RATE: 77 BPM | OXYGEN SATURATION: 96 % | TEMPERATURE: 97.7 F | WEIGHT: 159 LBS | RESPIRATION RATE: 15 BRPM | SYSTOLIC BLOOD PRESSURE: 122 MMHG | HEIGHT: 71 IN | DIASTOLIC BLOOD PRESSURE: 70 MMHG

## 2021-12-13 DIAGNOSIS — M25.531 PAIN IN RIGHT WRIST: ICD-10-CM

## 2021-12-13 DIAGNOSIS — S70.01XA CONTUSION OF RIGHT HIP, INITIAL ENCOUNTER: ICD-10-CM

## 2021-12-13 PROCEDURE — 99214 OFFICE O/P EST MOD 30 MIN: CPT

## 2021-12-13 NOTE — PHYSICAL EXAM
[No Acute Distress] : no acute distress [Well Nourished] : well nourished [Well Developed] : well developed [Well-Appearing] : well-appearing [Normal Sclera/Conjunctiva] : normal sclera/conjunctiva [PERRL] : pupils equal round and reactive to light [EOMI] : extraocular movements intact [Normal Outer Ear/Nose] : the outer ears and nose were normal in appearance [Normal Oropharynx] : the oropharynx was normal [No JVD] : no jugular venous distention [No Lymphadenopathy] : no lymphadenopathy [Supple] : supple [Thyroid Normal, No Nodules] : the thyroid was normal and there were no nodules present [No Respiratory Distress] : no respiratory distress  [No Accessory Muscle Use] : no accessory muscle use [Clear to Auscultation] : lungs were clear to auscultation bilaterally [Normal Rate] : normal rate  [Regular Rhythm] : with a regular rhythm [Normal S1, S2] : normal S1 and S2 [No Murmur] : no murmur heard [No Carotid Bruits] : no carotid bruits [No Abdominal Bruit] : a ~M bruit was not heard ~T in the abdomen [No Varicosities] : no varicosities [Pedal Pulses Present] : the pedal pulses are present [No Edema] : there was no peripheral edema [No Palpable Aorta] : no palpable aorta [No Extremity Clubbing/Cyanosis] : no extremity clubbing/cyanosis [Soft] : abdomen soft [Non Tender] : non-tender [Non-distended] : non-distended [No Masses] : no abdominal mass palpated [No HSM] : no HSM [Normal Bowel Sounds] : normal bowel sounds [Normal Posterior Cervical Nodes] : no posterior cervical lymphadenopathy [Normal Anterior Cervical Nodes] : no anterior cervical lymphadenopathy [No CVA Tenderness] : no CVA  tenderness [No Spinal Tenderness] : no spinal tenderness [No Joint Swelling] : no joint swelling [Grossly Normal Strength/Tone] : grossly normal strength/tone [No Rash] : no rash [Coordination Grossly Intact] : coordination grossly intact [No Focal Deficits] : no focal deficits [Normal Gait] : normal gait [Deep Tendon Reflexes (DTR)] : deep tendon reflexes were 2+ and symmetric [Normal Affect] : the affect was normal [Normal Insight/Judgement] : insight and judgment were intact [de-identified] : NOted hematoma over lateral aspect of right hip - no tenderness or limitation in rom of right hip. MIld generalized  edema over right wrist. Fulll strength in right hand. NO point tenderness or ecchymosis noted over wrist or elbow.

## 2021-12-13 NOTE — PLAN
[FreeTextEntry1] : Sustained contusions of right wrist and hip\par Continue to apply ice, rest and elevate\par NSaids prn for pain.\par Xrays of wrist and hip \par

## 2021-12-13 NOTE — HISTORY OF PRESENT ILLNESS
[FreeTextEntry8] : Fell from height  yesterday morning while cleaning his gutters. Fell on right side and braced fall with right arm, and right hip. NO head strike, or alteration in mental status. NOticed pain and swelling in hip and wrist a couple hours after he fell. Overall pain, swelling and pain are improving from yesterday. NO limitation in ROM of hip or wrist. Has applied ice and taken Motrin with moderate relief of symptoms. \par \par

## 2021-12-14 ENCOUNTER — RESULT REVIEW (OUTPATIENT)
Age: 66
End: 2021-12-14

## 2021-12-14 ENCOUNTER — APPOINTMENT (OUTPATIENT)
Dept: RADIOLOGY | Facility: HOSPITAL | Age: 66
End: 2021-12-14
Payer: MEDICARE

## 2021-12-14 ENCOUNTER — OUTPATIENT (OUTPATIENT)
Dept: OUTPATIENT SERVICES | Facility: HOSPITAL | Age: 66
LOS: 1 days | End: 2021-12-14
Payer: MEDICARE

## 2021-12-14 DIAGNOSIS — Z96.651 PRESENCE OF RIGHT ARTIFICIAL KNEE JOINT: Chronic | ICD-10-CM

## 2021-12-14 DIAGNOSIS — Z98.890 OTHER SPECIFIED POSTPROCEDURAL STATES: Chronic | ICD-10-CM

## 2021-12-14 DIAGNOSIS — S70.01XA CONTUSION OF RIGHT HIP, INITIAL ENCOUNTER: ICD-10-CM

## 2021-12-14 DIAGNOSIS — M25.531 PAIN IN RIGHT WRIST: ICD-10-CM

## 2021-12-14 PROCEDURE — 73090 X-RAY EXAM OF FOREARM: CPT | Mod: 26,RT

## 2021-12-14 PROCEDURE — 73502 X-RAY EXAM HIP UNI 2-3 VIEWS: CPT | Mod: 26,RT

## 2021-12-14 PROCEDURE — 73110 X-RAY EXAM OF WRIST: CPT | Mod: 26,RT

## 2021-12-14 PROCEDURE — 73502 X-RAY EXAM HIP UNI 2-3 VIEWS: CPT

## 2021-12-14 PROCEDURE — 73090 X-RAY EXAM OF FOREARM: CPT

## 2021-12-14 PROCEDURE — 73110 X-RAY EXAM OF WRIST: CPT

## 2021-12-16 ENCOUNTER — APPOINTMENT (OUTPATIENT)
Dept: GASTROENTEROLOGY | Facility: CLINIC | Age: 66
End: 2021-12-16
Payer: MEDICARE

## 2021-12-16 VITALS
HEART RATE: 60 BPM | DIASTOLIC BLOOD PRESSURE: 83 MMHG | BODY MASS INDEX: 21.56 KG/M2 | HEIGHT: 71 IN | SYSTOLIC BLOOD PRESSURE: 140 MMHG | WEIGHT: 154 LBS

## 2021-12-16 DIAGNOSIS — K21.9 GASTRO-ESOPHAGEAL REFLUX DISEASE W/OUT ESOPHAGITIS: ICD-10-CM

## 2021-12-16 DIAGNOSIS — R73.03 PREDIABETES.: ICD-10-CM

## 2021-12-16 PROCEDURE — 99204 OFFICE O/P NEW MOD 45 MIN: CPT

## 2021-12-16 PROCEDURE — 82274 ASSAY TEST FOR BLOOD FECAL: CPT | Mod: QW

## 2021-12-16 RX ORDER — TRIAMCINOLONE ACETONIDE 55 UG/1
55 SOLUTION/ DROPS OPHTHALMIC
Qty: 1 | Refills: 5 | Status: DISCONTINUED | COMMUNITY
Start: 2021-09-23 | End: 2021-12-16

## 2021-12-16 RX ORDER — FLUTICASONE PROPIONATE 50 UG/1
50 SPRAY, METERED NASAL DAILY
Qty: 48 | Refills: 0 | Status: DISCONTINUED | COMMUNITY
Start: 2021-09-22 | End: 2021-12-16

## 2021-12-16 NOTE — PHYSICAL EXAM
[General Appearance - Alert] : alert [General Appearance - In No Acute Distress] : in no acute distress [General Appearance - Well Developed] : well developed [Sclera] : the sclera and conjunctiva were normal [Neck Appearance] : the appearance of the neck was normal [Neck Cervical Mass (___cm)] : no neck mass was observed [Jugular Venous Distention Increased] : there was no jugular-venous distention [Thyroid Diffuse Enlargement] : the thyroid was not enlarged [Thyroid Nodule] : there were no palpable thyroid nodules [Auscultation Breath Sounds / Voice Sounds] : lungs were clear to auscultation bilaterally [Heart Rate And Rhythm] : heart rate was normal and rhythm regular [Heart Sounds] : normal S1 and S2 [Heart Sounds Gallop] : no gallops [Murmurs] : no murmurs [Heart Sounds Pericardial Friction Rub] : no pericardial rub [Full Pulse] : the pedal pulses are present [Edema] : there was no peripheral edema [Bowel Sounds] : normal bowel sounds [Abdomen Soft] : soft [Abdomen Tenderness] : non-tender [Abdomen Mass (___ Cm)] : no abdominal mass palpated [Normal Sphincter Tone] : normal sphincter tone [No Rectal Mass] : no rectal mass [Internal Hemorrhoid] : internal hemorrhoids [External Hemorrhoid] : external hemorrhoids [Prostate Size___ (Scale 0-4)] : prostate size was [unfilled] on a scale of 0-4 [Cervical Lymph Nodes Enlarged Posterior Bilaterally] : posterior cervical [Cervical Lymph Nodes Enlarged Anterior Bilaterally] : anterior cervical [Supraclavicular Lymph Nodes Enlarged Bilaterally] : supraclavicular [Inguinal Lymph Nodes Enlarged Bilaterally] : inguinal [Nail Clubbing] : no clubbing  or cyanosis of the fingernails [Musculoskeletal - Swelling] : no joint swelling seen [Skin Color & Pigmentation] : normal skin color and pigmentation [Skin Turgor] : normal skin turgor [] : no rash [Oriented To Time, Place, And Person] : oriented to person, place, and time [Impaired Insight] : insight and judgment were intact [Affect] : the affect was normal [Occult Blood Positive] : stool was negative for occult blood [Prostate Tenderness] : was not tender [FreeTextEntry1] : right KR

## 2021-12-16 NOTE — CONSULT LETTER
[Dear  ___] : Dear  [unfilled], [Consult Letter:] : I had the pleasure of evaluating your patient, [unfilled]. [Please see my note below.] : Please see my note below. [Consult Closing:] : Thank you very much for allowing me to participate in the care of this patient.  If you have any questions, please do not hesitate to contact me. [Sincerely,] : Sincerely, [DrPaige  ___] : Dr. DOOLEY [FreeTextEntry3] : Renzo Patel M.D.\par

## 2021-12-16 NOTE — HISTORY OF PRESENT ILLNESS
[FreeTextEntry1] : For at least the past 3 years, Bolivar has noted frequent throat clearing, with mucus in the throat, typically on awakening, either lasting several hours or even the entire day.  He has tried nasal sprays without improvement.  He was evaluated by ENT (Dr. Clark) in August 2020, with FEESST and transnasal esophagoscopy revealing mild LPRD; he has not yet tried acid suppressive therapy.  He reports heartburn perhaps weekly, especially after ingesting tomato sauce, for which he would just drink sips of soda, with relief.  He has also had episodic choking on food.  Colonoscopy 1/15/2019 (Dr. Eron Mccoy)  revealed an 8-mm ascending colon tubular adenoma.  More recently, he was evaluated by [facility] for hemorrhoids and prolapse.

## 2021-12-16 NOTE — REVIEW OF SYSTEMS
[Leg Claudication] : intermittent leg claudication [Heartburn] : heartburn [Hesitancy] : urinary hesitancy [Erectile Dysfunction] : erectile dysfunction [Negative] : Heme/Lymph [As Noted in HPI] : as noted in HPI [FreeTextEntry9] : Joint pain

## 2021-12-16 NOTE — ASSESSMENT
[FreeTextEntry1] : 1.  Frequent throat clearing, excessive phlegm, with probable LPRD, not responding to various treatments--rule out erosive and/or eosinophilic esophagitis, James's.\par 2.  Tubular adenoma at colonoscopy January 2019.  Stool guaiac negative with negative fecal immunochemical test on today's exam.  Hemorrhoids/rectal prolapse.\par 3.  Ex-smoker.\par 4.  Prediabetes.\par 5.  Hyperlipidemia.\par 6.  Osteoarthritis, DJD of the spine; status post right knee replacement and shoulder/knee arthroscopies.\par 7.  Status post ventral/inguinal hernia repairs. \par 8.  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  Extensive medical records reviewed.\par 2.  Schedule EGD--Procedure, rationale, alternatives, material risks, and anesthesia plan were reviewed and brochure given.\par 3.  Regardless of EGD findings, however, around-the-clock acid suppressive therapy (such as PPI on awakening and famotidine at bedtime) will be advised for at least 2-3 months.\par 4.  Repeat colonoscopy in 2 years.\par 5.  Other recommendations to follow.

## 2021-12-20 DIAGNOSIS — Z01.818 ENCOUNTER FOR OTHER PREPROCEDURAL EXAMINATION: ICD-10-CM

## 2021-12-23 LAB — SARS-COV-2 N GENE NPH QL NAA+PROBE: NOT DETECTED

## 2021-12-27 ENCOUNTER — LABORATORY RESULT (OUTPATIENT)
Age: 66
End: 2021-12-27

## 2021-12-27 ENCOUNTER — APPOINTMENT (OUTPATIENT)
Dept: GASTROENTEROLOGY | Facility: CLINIC | Age: 66
End: 2021-12-27
Payer: MEDICARE

## 2021-12-27 PROCEDURE — 43239 EGD BIOPSY SINGLE/MULTIPLE: CPT

## 2022-01-11 ENCOUNTER — NON-APPOINTMENT (OUTPATIENT)
Age: 67
End: 2022-01-11

## 2022-01-11 ENCOUNTER — APPOINTMENT (OUTPATIENT)
Dept: FAMILY MEDICINE | Facility: CLINIC | Age: 67
End: 2022-01-11
Payer: MEDICARE

## 2022-01-11 VITALS — SYSTOLIC BLOOD PRESSURE: 125 MMHG | HEART RATE: 76 BPM | RESPIRATION RATE: 20 BRPM | DIASTOLIC BLOOD PRESSURE: 70 MMHG

## 2022-01-11 DIAGNOSIS — T14.8XXA OTHER INJURY OF UNSPECIFIED BODY REGION, INITIAL ENCOUNTER: ICD-10-CM

## 2022-01-11 PROCEDURE — 99213 OFFICE O/P EST LOW 20 MIN: CPT

## 2022-01-11 NOTE — PHYSICAL EXAM
[No Acute Distress] : no acute distress [Supple] : supple [No Edema] : there was no peripheral edema [Soft] : abdomen soft [Non Tender] : non-tender [Muscle Spasms, Right] : right-sided muscle spasms [Normal] : no joint swelling and grossly normal strength and tone [No Focal Deficits] : no focal deficits [Alert and Oriented x3] : oriented to person, place, and time [de-identified] : no bruising; tender R lower back

## 2022-01-11 NOTE — PLAN
[FreeTextEntry1] : Warm compresses to area\par Also discussed reducing heaving lifting/bending/pushing with potential injury based on age

## 2022-01-11 NOTE — HISTORY OF PRESENT ILLNESS
[FreeTextEntry8] : Presents on acute basis - had lifted a heavy object several days ago and immediately noted pain R lower back - "I couldn't stand up" - states somewhat better but still has pain depending on position.

## 2022-01-25 ENCOUNTER — APPOINTMENT (OUTPATIENT)
Dept: FAMILY MEDICINE | Facility: CLINIC | Age: 67
End: 2022-01-25
Payer: MEDICARE

## 2022-01-25 VITALS
DIASTOLIC BLOOD PRESSURE: 80 MMHG | OXYGEN SATURATION: 97 % | SYSTOLIC BLOOD PRESSURE: 130 MMHG | TEMPERATURE: 97.6 F | HEART RATE: 64 BPM | HEIGHT: 71 IN | RESPIRATION RATE: 15 BRPM | BODY MASS INDEX: 22.54 KG/M2 | WEIGHT: 161 LBS

## 2022-01-25 DIAGNOSIS — M54.2 CERVICALGIA: ICD-10-CM

## 2022-01-25 DIAGNOSIS — M79.672 PAIN IN LEFT FOOT: ICD-10-CM

## 2022-01-25 PROCEDURE — 99213 OFFICE O/P EST LOW 20 MIN: CPT

## 2022-01-26 ENCOUNTER — APPOINTMENT (OUTPATIENT)
Dept: RADIOLOGY | Facility: HOSPITAL | Age: 67
End: 2022-01-26
Payer: MEDICARE

## 2022-01-26 ENCOUNTER — OUTPATIENT (OUTPATIENT)
Dept: OUTPATIENT SERVICES | Facility: HOSPITAL | Age: 67
LOS: 1 days | End: 2022-01-26
Payer: MEDICARE

## 2022-01-26 ENCOUNTER — RESULT REVIEW (OUTPATIENT)
Age: 67
End: 2022-01-26

## 2022-01-26 DIAGNOSIS — Z98.890 OTHER SPECIFIED POSTPROCEDURAL STATES: Chronic | ICD-10-CM

## 2022-01-26 DIAGNOSIS — Z96.651 PRESENCE OF RIGHT ARTIFICIAL KNEE JOINT: Chronic | ICD-10-CM

## 2022-01-26 DIAGNOSIS — M79.672 PAIN IN LEFT FOOT: ICD-10-CM

## 2022-01-26 PROBLEM — M54.2 NECK PAIN ON LEFT SIDE: Status: ACTIVE | Noted: 2022-01-25

## 2022-01-26 PROCEDURE — 73630 X-RAY EXAM OF FOOT: CPT | Mod: 26,LT

## 2022-01-26 PROCEDURE — 73630 X-RAY EXAM OF FOOT: CPT

## 2022-01-26 NOTE — HISTORY OF PRESENT ILLNESS
[FreeTextEntry8] : \par Dropped piece of  heavy wood on left foot this past Saturday.\par Pain has been improving and has been able to ambulate. Walked several miles yesterday. Reports swelling and bruising of left foot. \par \par Continues to have localized right upper back pain that has been unrelieved with his hydrocodone. Has been persistent for 5-6 weeks. Described as moderate and worse with movement. \par \par \par \par

## 2022-01-26 NOTE — PHYSICAL EXAM
[No Acute Distress] : no acute distress [Well Nourished] : well nourished [Well Developed] : well developed [Well-Appearing] : well-appearing [Normal Sclera/Conjunctiva] : normal sclera/conjunctiva [PERRL] : pupils equal round and reactive to light [EOMI] : extraocular movements intact [Normal Outer Ear/Nose] : the outer ears and nose were normal in appearance [Normal Oropharynx] : the oropharynx was normal [No JVD] : no jugular venous distention [No Lymphadenopathy] : no lymphadenopathy [Supple] : supple [Thyroid Normal, No Nodules] : the thyroid was normal and there were no nodules present [No Respiratory Distress] : no respiratory distress  [No Accessory Muscle Use] : no accessory muscle use [Clear to Auscultation] : lungs were clear to auscultation bilaterally [Normal Rate] : normal rate  [Regular Rhythm] : with a regular rhythm [Normal S1, S2] : normal S1 and S2 [No Murmur] : no murmur heard [No Carotid Bruits] : no carotid bruits [No Abdominal Bruit] : a ~M bruit was not heard ~T in the abdomen [No Varicosities] : no varicosities [Pedal Pulses Present] : the pedal pulses are present [No Edema] : there was no peripheral edema [No Palpable Aorta] : no palpable aorta [No Extremity Clubbing/Cyanosis] : no extremity clubbing/cyanosis [Soft] : abdomen soft [Non Tender] : non-tender [Non-distended] : non-distended [No Masses] : no abdominal mass palpated [No HSM] : no HSM [Normal Bowel Sounds] : normal bowel sounds [Normal Posterior Cervical Nodes] : no posterior cervical lymphadenopathy [Normal Anterior Cervical Nodes] : no anterior cervical lymphadenopathy [No CVA Tenderness] : no CVA  tenderness [No Spinal Tenderness] : no spinal tenderness [No Joint Swelling] : no joint swelling [Grossly Normal Strength/Tone] : grossly normal strength/tone [No Rash] : no rash [Coordination Grossly Intact] : coordination grossly intact [No Focal Deficits] : no focal deficits [Normal Gait] : normal gait [Deep Tendon Reflexes (DTR)] : deep tendon reflexes were 2+ and symmetric [Normal Affect] : the affect was normal [Normal Insight/Judgement] : insight and judgment were intact [de-identified] : noted echymosis and mild edema over distal dorsal aspect of left foot. MOderate tenderness noted

## 2022-01-31 ENCOUNTER — NON-APPOINTMENT (OUTPATIENT)
Age: 67
End: 2022-01-31

## 2022-02-08 ENCOUNTER — APPOINTMENT (OUTPATIENT)
Dept: MRI IMAGING | Facility: HOSPITAL | Age: 67
End: 2022-02-08
Payer: MEDICARE

## 2022-02-08 ENCOUNTER — OUTPATIENT (OUTPATIENT)
Dept: OUTPATIENT SERVICES | Facility: HOSPITAL | Age: 67
LOS: 1 days | End: 2022-02-08
Payer: MEDICARE

## 2022-02-08 DIAGNOSIS — T14.8XXA OTHER INJURY OF UNSPECIFIED BODY REGION, INITIAL ENCOUNTER: ICD-10-CM

## 2022-02-08 DIAGNOSIS — Z98.890 OTHER SPECIFIED POSTPROCEDURAL STATES: Chronic | ICD-10-CM

## 2022-02-08 DIAGNOSIS — Z96.651 PRESENCE OF RIGHT ARTIFICIAL KNEE JOINT: Chronic | ICD-10-CM

## 2022-02-08 PROCEDURE — 73718 MRI LOWER EXTREMITY W/O DYE: CPT | Mod: 26,LT,ME

## 2022-02-08 PROCEDURE — G1004: CPT

## 2022-02-08 PROCEDURE — 73718 MRI LOWER EXTREMITY W/O DYE: CPT | Mod: ME

## 2022-02-11 ENCOUNTER — OUTPATIENT (OUTPATIENT)
Dept: OUTPATIENT SERVICES | Facility: HOSPITAL | Age: 67
LOS: 1 days | End: 2022-02-11
Payer: MEDICARE

## 2022-02-11 DIAGNOSIS — Z96.651 PRESENCE OF RIGHT ARTIFICIAL KNEE JOINT: Chronic | ICD-10-CM

## 2022-02-11 DIAGNOSIS — Z20.828 CONTACT WITH AND (SUSPECTED) EXPOSURE TO OTHER VIRAL COMMUNICABLE DISEASES: ICD-10-CM

## 2022-02-11 DIAGNOSIS — Z98.890 OTHER SPECIFIED POSTPROCEDURAL STATES: Chronic | ICD-10-CM

## 2022-02-11 LAB — SARS-COV-2 RNA SPEC QL NAA+PROBE: SIGNIFICANT CHANGE UP

## 2022-02-11 PROCEDURE — U0005: CPT

## 2022-02-11 PROCEDURE — U0003: CPT

## 2022-02-14 ENCOUNTER — OUTPATIENT (OUTPATIENT)
Dept: OUTPATIENT SERVICES | Facility: HOSPITAL | Age: 67
LOS: 1 days | End: 2022-02-14
Payer: MEDICARE

## 2022-02-14 DIAGNOSIS — Z96.651 PRESENCE OF RIGHT ARTIFICIAL KNEE JOINT: Chronic | ICD-10-CM

## 2022-02-14 DIAGNOSIS — Z98.890 OTHER SPECIFIED POSTPROCEDURAL STATES: Chronic | ICD-10-CM

## 2022-02-14 DIAGNOSIS — M54.16 RADICULOPATHY, LUMBAR REGION: ICD-10-CM

## 2022-02-14 PROCEDURE — 62323 NJX INTERLAMINAR LMBR/SAC: CPT

## 2022-03-03 ENCOUNTER — OUTPATIENT (OUTPATIENT)
Dept: OUTPATIENT SERVICES | Facility: HOSPITAL | Age: 67
LOS: 1 days | End: 2022-03-03
Payer: MEDICARE

## 2022-03-03 ENCOUNTER — APPOINTMENT (OUTPATIENT)
Dept: RADIOLOGY | Facility: HOSPITAL | Age: 67
End: 2022-03-03
Payer: MEDICARE

## 2022-03-03 DIAGNOSIS — Z98.890 OTHER SPECIFIED POSTPROCEDURAL STATES: Chronic | ICD-10-CM

## 2022-03-03 DIAGNOSIS — Z00.8 ENCOUNTER FOR OTHER GENERAL EXAMINATION: ICD-10-CM

## 2022-03-03 DIAGNOSIS — Z96.651 PRESENCE OF RIGHT ARTIFICIAL KNEE JOINT: Chronic | ICD-10-CM

## 2022-03-03 PROCEDURE — 73630 X-RAY EXAM OF FOOT: CPT

## 2022-03-03 PROCEDURE — 73630 X-RAY EXAM OF FOOT: CPT | Mod: 26,LT

## 2022-03-10 ENCOUNTER — OUTPATIENT (OUTPATIENT)
Dept: OUTPATIENT SERVICES | Facility: HOSPITAL | Age: 67
LOS: 1 days | End: 2022-03-10
Payer: MEDICARE

## 2022-03-10 ENCOUNTER — RESULT REVIEW (OUTPATIENT)
Age: 67
End: 2022-03-10

## 2022-03-10 ENCOUNTER — APPOINTMENT (OUTPATIENT)
Dept: RADIOLOGY | Facility: HOSPITAL | Age: 67
End: 2022-03-10
Payer: MEDICARE

## 2022-03-10 DIAGNOSIS — Z00.8 ENCOUNTER FOR OTHER GENERAL EXAMINATION: ICD-10-CM

## 2022-03-10 DIAGNOSIS — Z98.890 OTHER SPECIFIED POSTPROCEDURAL STATES: Chronic | ICD-10-CM

## 2022-03-10 DIAGNOSIS — Z96.651 PRESENCE OF RIGHT ARTIFICIAL KNEE JOINT: Chronic | ICD-10-CM

## 2022-03-10 PROCEDURE — 73630 X-RAY EXAM OF FOOT: CPT | Mod: 26,LT

## 2022-03-10 PROCEDURE — 73630 X-RAY EXAM OF FOOT: CPT

## 2022-03-23 ENCOUNTER — APPOINTMENT (OUTPATIENT)
Dept: GASTROENTEROLOGY | Facility: CLINIC | Age: 67
End: 2022-03-23
Payer: MEDICARE

## 2022-03-23 VITALS
HEART RATE: 66 BPM | SYSTOLIC BLOOD PRESSURE: 115 MMHG | DIASTOLIC BLOOD PRESSURE: 79 MMHG | WEIGHT: 156 LBS | HEIGHT: 71 IN | BODY MASS INDEX: 21.84 KG/M2

## 2022-03-23 DIAGNOSIS — R68.89 OTHER GENERAL SYMPTOMS AND SIGNS: ICD-10-CM

## 2022-03-23 DIAGNOSIS — K21.00 GASTRO-ESOPHAGEAL REFLUX DISEASE WITH ESOPHAGITIS, WITHOUT BLEEDING: ICD-10-CM

## 2022-03-23 DIAGNOSIS — Z86.010 PERSONAL HISTORY OF COLONIC POLYPS: ICD-10-CM

## 2022-03-23 DIAGNOSIS — K62.3 RECTAL PROLAPSE: ICD-10-CM

## 2022-03-23 DIAGNOSIS — K25.9 GASTRIC ULCER, UNSPECIFIED AS ACUTE OR CHRONIC, W/OUT HEMORRHAGE OR PERFORATION: ICD-10-CM

## 2022-03-23 PROCEDURE — 99214 OFFICE O/P EST MOD 30 MIN: CPT

## 2022-03-23 RX ORDER — IBUPROFEN 600 MG/1
600 TABLET, FILM COATED ORAL 3 TIMES DAILY
Qty: 21 | Refills: 0 | Status: DISCONTINUED | COMMUNITY
Start: 2021-12-13 | End: 2022-03-23

## 2022-03-23 NOTE — REVIEW OF SYSTEMS
[Constipation] : constipation [Negative] : Heme/Lymph [As Noted in HPI] : as noted in HPI [FreeTextEntry7] : Occasional constipation, anal prolapse

## 2022-03-23 NOTE — REASON FOR VISIT
[Follow-Up: _____] : a [unfilled] follow-up visit [Spouse] : spouse [FreeTextEntry1] : Follow up for endoscopy 12/2021

## 2022-03-23 NOTE — CONSULT LETTER
[Dear  ___] : Dear  [unfilled], [Courtesy Letter:] : I had the pleasure of seeing your patient, [unfilled], in my office today. [Please see my note below.] : Please see my note below. [Consult Closing:] : Thank you very much for allowing me to participate in the care of this patient.  If you have any questions, please do not hesitate to contact me. [Sincerely,] : Sincerely, [DrPaige  ___] : Dr. DOOLEY [FreeTextEntry3] : Renzo Patel M.D.\par

## 2022-03-23 NOTE — ASSESSMENT
[FreeTextEntry1] : 1.  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  Tubular adenoma at colonoscopy January 2019.  Stool guaiac negative with negative fecal immunochemical test on today's exam.  Hemorrhoids/rectal prolapse.\par 3.  Ex-smoker.  Marijuana use.\par 4.  Prediabetes.\par 5.  Hyperlipidemia.\par 6.  Osteoarthritis, DJD of the spine; status post right knee replacement and shoulder/knee arthroscopies.\par 7.  Status post ventral/inguinal hernia repairs. \par 8.  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  Interim records reviewed.\par 2.  Add nonsedating antihistamine every morning upon awakening.\par 3.  Dietary lifestyle modifications reviewed.  [facility] brochure on "GERD" has been given and discussed.\par 4.  Continue omeprazole on awakening and famotidine at bedtime for now.\par 5.  If symptoms remain problematic despite above measures, he will contact me in 2-3 months.  May ultimately refer for further testing, such as motility and Bravo.\par 6.  Repeat colonoscopy in approximately 2 years.

## 2022-03-23 NOTE — HISTORY OF PRESENT ILLNESS
[FreeTextEntry1] : EGD 12/27/2021 revealed multiple gastric erosions, nonerosive reflux, and glycogenic acanthosis of the esophagus.  Despite taking omeprazole 40 mg on awakening and famotidine 40 mg at bedtime, he continues to report frequent throat clearing (which he has been experiencing for the past few years).  Reflux has been occurring only monthly.  He has been off of ASA/NSAIDs for quite some time, now taking hydrocodone for pain issues.  He reports previously using nasal sprays, without improvement, but does not recall taking antihistamine for any length of time.  He works as a .  He admits to marijuana use.  He recently fractured left foot, wearing a boot.

## 2022-04-20 ENCOUNTER — OUTPATIENT (OUTPATIENT)
Dept: OUTPATIENT SERVICES | Facility: HOSPITAL | Age: 67
LOS: 1 days | End: 2022-04-20
Payer: MEDICARE

## 2022-04-20 ENCOUNTER — APPOINTMENT (OUTPATIENT)
Dept: RADIOLOGY | Facility: HOSPITAL | Age: 67
End: 2022-04-20
Payer: MEDICARE

## 2022-04-20 DIAGNOSIS — Z96.651 PRESENCE OF RIGHT ARTIFICIAL KNEE JOINT: Chronic | ICD-10-CM

## 2022-04-20 DIAGNOSIS — Z98.890 OTHER SPECIFIED POSTPROCEDURAL STATES: Chronic | ICD-10-CM

## 2022-04-20 DIAGNOSIS — T14.8XXA OTHER INJURY OF UNSPECIFIED BODY REGION, INITIAL ENCOUNTER: ICD-10-CM

## 2022-04-20 PROCEDURE — 71101 X-RAY EXAM UNILAT RIBS/CHEST: CPT

## 2022-04-20 PROCEDURE — 71101 X-RAY EXAM UNILAT RIBS/CHEST: CPT | Mod: 26,LT

## 2022-04-22 ENCOUNTER — APPOINTMENT (OUTPATIENT)
Dept: FAMILY MEDICINE | Facility: CLINIC | Age: 67
End: 2022-04-22
Payer: MEDICARE

## 2022-04-22 VITALS — RESPIRATION RATE: 20 BRPM | HEART RATE: 76 BPM | SYSTOLIC BLOOD PRESSURE: 128 MMHG | DIASTOLIC BLOOD PRESSURE: 75 MMHG

## 2022-04-22 DIAGNOSIS — S20.219A CONTUSION OF UNSPECIFIED FRONT WALL OF THORAX, INITIAL ENCOUNTER: ICD-10-CM

## 2022-04-22 PROCEDURE — 99213 OFFICE O/P EST LOW 20 MIN: CPT

## 2022-04-22 NOTE — PLAN
[FreeTextEntry1] : Symptomatic treatment of pain\par Encourage deep breathing and cough and explained rationale\par Long discussion with pt with wife present about injury avoidance and age-appropriate physical activity

## 2022-04-22 NOTE — HISTORY OF PRESENT ILLNESS
[de-identified] : Presents with wife for F/U of incident of several days ago - foot fell through rotted wood of deck of trailer causing bruising of L rib area; reviewed x-ray with pt and wife - no acute fracture; no apparent lung injury.  Pt does have significant pain.

## 2022-04-22 NOTE — PHYSICAL EXAM
[Uncomfortable] : uncomfortable [Supple] : supple [No Respiratory Distress] : no respiratory distress  [No Accessory Muscle Use] : no accessory muscle use [No Edema] : there was no peripheral edema [Normal] : soft, non-tender, non-distended, no masses palpated, no HSM and normal bowel sounds [No Focal Deficits] : no focal deficits [Alert and Oriented x3] : oriented to person, place, and time [de-identified] : few creps noted L lower lung field [de-identified] : exquisitely tender over L lateral rib area; no crepitance appreciated

## 2022-04-27 ENCOUNTER — OUTPATIENT (OUTPATIENT)
Dept: OUTPATIENT SERVICES | Facility: HOSPITAL | Age: 67
LOS: 1 days | End: 2022-04-27
Payer: MEDICARE

## 2022-04-27 ENCOUNTER — APPOINTMENT (OUTPATIENT)
Dept: MRI IMAGING | Facility: HOSPITAL | Age: 67
End: 2022-04-27
Payer: MEDICARE

## 2022-04-27 DIAGNOSIS — Z98.890 OTHER SPECIFIED POSTPROCEDURAL STATES: Chronic | ICD-10-CM

## 2022-04-27 DIAGNOSIS — Z96.651 PRESENCE OF RIGHT ARTIFICIAL KNEE JOINT: Chronic | ICD-10-CM

## 2022-04-27 DIAGNOSIS — Z00.8 ENCOUNTER FOR OTHER GENERAL EXAMINATION: ICD-10-CM

## 2022-04-27 PROCEDURE — 72148 MRI LUMBAR SPINE W/O DYE: CPT | Mod: 26,MH

## 2022-04-27 PROCEDURE — 72148 MRI LUMBAR SPINE W/O DYE: CPT | Mod: MH

## 2022-04-27 PROCEDURE — 73221 MRI JOINT UPR EXTREM W/O DYE: CPT | Mod: 26,RT,MH

## 2022-04-27 PROCEDURE — 73221 MRI JOINT UPR EXTREM W/O DYE: CPT | Mod: MH

## 2022-06-23 ENCOUNTER — APPOINTMENT (OUTPATIENT)
Dept: GASTROENTEROLOGY | Facility: CLINIC | Age: 67
End: 2022-06-23

## 2022-08-29 ENCOUNTER — APPOINTMENT (OUTPATIENT)
Dept: RADIOLOGY | Facility: HOSPITAL | Age: 67
End: 2022-08-29

## 2022-08-29 ENCOUNTER — APPOINTMENT (OUTPATIENT)
Dept: FAMILY MEDICINE | Facility: CLINIC | Age: 67
End: 2022-08-29

## 2022-08-29 ENCOUNTER — OUTPATIENT (OUTPATIENT)
Dept: OUTPATIENT SERVICES | Facility: HOSPITAL | Age: 67
LOS: 1 days | End: 2022-08-29
Payer: MEDICARE

## 2022-08-29 VITALS
RESPIRATION RATE: 20 BRPM | HEART RATE: 76 BPM | OXYGEN SATURATION: 98 % | DIASTOLIC BLOOD PRESSURE: 70 MMHG | SYSTOLIC BLOOD PRESSURE: 122 MMHG

## 2022-08-29 DIAGNOSIS — Z96.651 PRESENCE OF RIGHT ARTIFICIAL KNEE JOINT: Chronic | ICD-10-CM

## 2022-08-29 DIAGNOSIS — R06.00 DYSPNEA, UNSPECIFIED: ICD-10-CM

## 2022-08-29 DIAGNOSIS — Z98.890 OTHER SPECIFIED POSTPROCEDURAL STATES: Chronic | ICD-10-CM

## 2022-08-29 PROCEDURE — 71046 X-RAY EXAM CHEST 2 VIEWS: CPT | Mod: 26

## 2022-08-29 PROCEDURE — 71046 X-RAY EXAM CHEST 2 VIEWS: CPT

## 2022-08-29 PROCEDURE — 99213 OFFICE O/P EST LOW 20 MIN: CPT

## 2022-08-29 NOTE — HISTORY OF PRESENT ILLNESS
[FreeTextEntry8] : Presents on acute basis - has been experiencing a feeling of being "winded" intermittently recently; noted by wife; denies actual CP.  Pt does have a smoking history in the past.

## 2022-08-29 NOTE — ASSESSMENT
[FreeTextEntry1] : No evidence of respiratory compromise at this encounter, however feel prudent to obtain CXR and have pt see Pulmonology - gave Dr. Yip's information

## 2022-10-18 ENCOUNTER — APPOINTMENT (OUTPATIENT)
Dept: FAMILY MEDICINE | Facility: CLINIC | Age: 67
End: 2022-10-18

## 2022-10-18 VITALS
BODY MASS INDEX: 20.86 KG/M2 | DIASTOLIC BLOOD PRESSURE: 70 MMHG | SYSTOLIC BLOOD PRESSURE: 124 MMHG | HEIGHT: 71 IN | RESPIRATION RATE: 20 BRPM | WEIGHT: 149 LBS | HEART RATE: 68 BPM

## 2022-10-18 DIAGNOSIS — Z23 ENCOUNTER FOR IMMUNIZATION: ICD-10-CM

## 2022-10-18 DIAGNOSIS — Z87.891 PERSONAL HISTORY OF NICOTINE DEPENDENCE: ICD-10-CM

## 2022-10-18 DIAGNOSIS — Z00.00 ENCOUNTER FOR GENERAL ADULT MEDICAL EXAMINATION W/OUT ABNORMAL FINDINGS: ICD-10-CM

## 2022-10-18 PROCEDURE — G0438: CPT

## 2022-10-18 PROCEDURE — G0008: CPT

## 2022-10-18 PROCEDURE — 90662 IIV NO PRSV INCREASED AG IM: CPT

## 2022-10-18 PROCEDURE — 36415 COLL VENOUS BLD VENIPUNCTURE: CPT

## 2022-10-18 PROCEDURE — 93000 ELECTROCARDIOGRAM COMPLETE: CPT | Mod: 59

## 2022-10-18 RX ORDER — IBUPROFEN 800 MG/1
800 TABLET, FILM COATED ORAL
Qty: 90 | Refills: 0 | Status: COMPLETED | COMMUNITY
Start: 2022-04-26

## 2022-10-18 NOTE — HISTORY OF PRESENT ILLNESS
[FreeTextEntry1] : Requests comprehensive exam [de-identified] : Presents for comprehensive exam.  States feeling generally well; trying to eat healthy; remains very active - working.  Following with Urology (Dr. Garner).  Reviewed screening and immunizations - due for current flu vaccine - pt in agreement; also note pt is a former smoker; had recent chest CT per Cardiology and lung cancer screening is therefore unnecessary.

## 2022-10-18 NOTE — PHYSICAL EXAM
[Normal Posterior Cervical Nodes] : no posterior cervical lymphadenopathy [Normal Anterior Cervical Nodes] : no anterior cervical lymphadenopathy [Normal] : no CVA or spinal tenderness [Coordination Grossly Intact] : coordination grossly intact [No Focal Deficits] : no focal deficits [Normal Gait] : normal gait [Speech Grossly Normal] : speech grossly normal [Memory Grossly Normal] : memory grossly normal [Normal Affect] : the affect was normal [Alert and Oriented x3] : oriented to person, place, and time [Normal Mood] : the mood was normal [Normal Insight/Judgement] : insight and judgment were intact [de-identified] : joint deformities consistent with DJD [de-identified] : dryness with no lesions

## 2022-10-18 NOTE — HEALTH RISK ASSESSMENT
[Former] : Former [Yes] : Yes [2 - 3 times a week (3 pts)] : 2 - 3  times a week (3 points) [1 or 2 (0 pts)] : 1 or 2 (0 points) [Never (0 pts)] : Never (0 points) [No] : In the past 12 months have you used drugs other than those required for medical reasons? No [No falls in past year] : Patient reported no falls in the past year [0] : 2) Feeling down, depressed, or hopeless: Not at all (0) [Patient declined Low Dose CT Scan] : Patient declined Low Dose CT Scan [Fully functional (bathing, dressing, toileting, transferring, walking, feeding)] : Fully functional (bathing, dressing, toileting, transferring, walking, feeding) [Fully functional (using the telephone, shopping, preparing meals, housekeeping, doing laundry, using] : Fully functional and needs no help or supervision to perform IADLs (using the telephone, shopping, preparing meals, housekeeping, doing laundry, using transportation, managing medications and managing finances) [With Patient/Caregiver] : , with patient/caregiver [Reviewed no changes] : Reviewed, no changes [Audit-CScore] : 3 [AdvancecareDate] : 10/22

## 2022-10-19 LAB
ALBUMIN SERPL ELPH-MCNC: 4.7 G/DL
ALP BLD-CCNC: 63 U/L
ALT SERPL-CCNC: 18 U/L
ANION GAP SERPL CALC-SCNC: 12 MMOL/L
AST SERPL-CCNC: 16 U/L
BASOPHILS # BLD AUTO: 0.06 K/UL
BASOPHILS NFR BLD AUTO: 1.2 %
BILIRUB SERPL-MCNC: 0.4 MG/DL
BUN SERPL-MCNC: 21 MG/DL
CALCIUM SERPL-MCNC: 9.7 MG/DL
CHLORIDE SERPL-SCNC: 104 MMOL/L
CHOLEST SERPL-MCNC: 197 MG/DL
CO2 SERPL-SCNC: 23 MMOL/L
CREAT SERPL-MCNC: 0.9 MG/DL
EGFR: 92 ML/MIN/1.73M2
EOSINOPHIL # BLD AUTO: 0.09 K/UL
EOSINOPHIL NFR BLD AUTO: 1.7 %
ESTIMATED AVERAGE GLUCOSE: 123 MG/DL
FOLATE SERPL-MCNC: 12.1 NG/ML
GLUCOSE SERPL-MCNC: 116 MG/DL
HBA1C MFR BLD HPLC: 5.9 %
HCT VFR BLD CALC: 42.9 %
HDLC SERPL-MCNC: 76 MG/DL
HGB BLD-MCNC: 14.8 G/DL
IMM GRANULOCYTES NFR BLD AUTO: 0.2 %
LDLC SERPL CALC-MCNC: 110 MG/DL
LYMPHOCYTES # BLD AUTO: 0.99 K/UL
LYMPHOCYTES NFR BLD AUTO: 19.1 %
MAN DIFF?: NORMAL
MCHC RBC-ENTMCNC: 31.4 PG
MCHC RBC-ENTMCNC: 34.5 GM/DL
MCV RBC AUTO: 90.9 FL
MONOCYTES # BLD AUTO: 0.52 K/UL
MONOCYTES NFR BLD AUTO: 10 %
NEUTROPHILS # BLD AUTO: 3.51 K/UL
NEUTROPHILS NFR BLD AUTO: 67.8 %
NONHDLC SERPL-MCNC: 121 MG/DL
PLATELET # BLD AUTO: 230 K/UL
POTASSIUM SERPL-SCNC: 4.4 MMOL/L
PROT SERPL-MCNC: 6.8 G/DL
RBC # BLD: 4.72 M/UL
RBC # FLD: 13.8 %
SODIUM SERPL-SCNC: 139 MMOL/L
T4 FREE SERPL-MCNC: 1.2 NG/DL
TRIGL SERPL-MCNC: 57 MG/DL
TSH SERPL-ACNC: 1.4 UIU/ML
VIT B12 SERPL-MCNC: 532 PG/ML
WBC # FLD AUTO: 5.18 K/UL

## 2023-02-01 ENCOUNTER — OUTPATIENT (OUTPATIENT)
Dept: OUTPATIENT SERVICES | Facility: HOSPITAL | Age: 68
LOS: 1 days | End: 2023-02-01
Payer: MEDICARE

## 2023-02-01 ENCOUNTER — RESULT REVIEW (OUTPATIENT)
Age: 68
End: 2023-02-01

## 2023-02-01 ENCOUNTER — APPOINTMENT (OUTPATIENT)
Dept: RADIOLOGY | Facility: HOSPITAL | Age: 68
End: 2023-02-01
Payer: MEDICARE

## 2023-02-01 DIAGNOSIS — Z96.651 PRESENCE OF RIGHT ARTIFICIAL KNEE JOINT: Chronic | ICD-10-CM

## 2023-02-01 DIAGNOSIS — Z98.890 OTHER SPECIFIED POSTPROCEDURAL STATES: Chronic | ICD-10-CM

## 2023-02-01 DIAGNOSIS — T14.8XXA OTHER INJURY OF UNSPECIFIED BODY REGION, INITIAL ENCOUNTER: ICD-10-CM

## 2023-02-01 PROCEDURE — 73630 X-RAY EXAM OF FOOT: CPT | Mod: 26,RT

## 2023-02-01 PROCEDURE — 73630 X-RAY EXAM OF FOOT: CPT

## 2023-02-16 ENCOUNTER — INPATIENT (INPATIENT)
Facility: HOSPITAL | Age: 68
LOS: 0 days | Discharge: HOME | DRG: 310 | End: 2023-02-17
Attending: INTERNAL MEDICINE | Admitting: INTERNAL MEDICINE
Payer: COMMERCIAL

## 2023-02-16 ENCOUNTER — APPOINTMENT (OUTPATIENT)
Dept: FAMILY MEDICINE | Facility: CLINIC | Age: 68
End: 2023-02-16
Payer: MEDICARE

## 2023-02-16 VITALS
TEMPERATURE: 97.2 F | OXYGEN SATURATION: 96 % | HEIGHT: 71 IN | HEART RATE: 102 BPM | DIASTOLIC BLOOD PRESSURE: 87 MMHG | WEIGHT: 157 LBS | SYSTOLIC BLOOD PRESSURE: 134 MMHG | BODY MASS INDEX: 21.98 KG/M2 | RESPIRATION RATE: 16 BRPM

## 2023-02-16 VITALS
WEIGHT: 149.91 LBS | SYSTOLIC BLOOD PRESSURE: 122 MMHG | HEART RATE: 100 BPM | RESPIRATION RATE: 19 BRPM | DIASTOLIC BLOOD PRESSURE: 90 MMHG | HEIGHT: 72 IN | OXYGEN SATURATION: 96 % | TEMPERATURE: 99 F

## 2023-02-16 DIAGNOSIS — R06.02 SHORTNESS OF BREATH: ICD-10-CM

## 2023-02-16 DIAGNOSIS — F12.90 CANNABIS USE, UNSPECIFIED, UNCOMPLICATED: ICD-10-CM

## 2023-02-16 DIAGNOSIS — Z98.890 OTHER SPECIFIED POSTPROCEDURAL STATES: Chronic | ICD-10-CM

## 2023-02-16 DIAGNOSIS — R00.0 TACHYCARDIA, UNSPECIFIED: ICD-10-CM

## 2023-02-16 DIAGNOSIS — Z29.9 ENCOUNTER FOR PROPHYLACTIC MEASURES, UNSPECIFIED: ICD-10-CM

## 2023-02-16 DIAGNOSIS — M12.9 ARTHROPATHY, UNSPECIFIED: ICD-10-CM

## 2023-02-16 DIAGNOSIS — Z96.651 PRESENCE OF RIGHT ARTIFICIAL KNEE JOINT: Chronic | ICD-10-CM

## 2023-02-16 DIAGNOSIS — R12 HEARTBURN: ICD-10-CM

## 2023-02-16 DIAGNOSIS — J44.9 CHRONIC OBSTRUCTIVE PULMONARY DISEASE, UNSPECIFIED: ICD-10-CM

## 2023-02-16 DIAGNOSIS — E78.5 HYPERLIPIDEMIA, UNSPECIFIED: ICD-10-CM

## 2023-02-16 DIAGNOSIS — R03.0 ELEVATED BLOOD-PRESSURE READING, W/OUT DIAGNOSIS OF HYPERTENSION: ICD-10-CM

## 2023-02-16 LAB
ALBUMIN SERPL ELPH-MCNC: 4.4 G/DL — SIGNIFICANT CHANGE UP (ref 3.3–5)
ALP SERPL-CCNC: 68 U/L — SIGNIFICANT CHANGE UP (ref 40–120)
ALT FLD-CCNC: 19 U/L — SIGNIFICANT CHANGE UP (ref 10–45)
ANION GAP SERPL CALC-SCNC: 11 MMOL/L — SIGNIFICANT CHANGE UP (ref 5–17)
AST SERPL-CCNC: 21 U/L — SIGNIFICANT CHANGE UP (ref 10–40)
BASE EXCESS BLDV CALC-SCNC: 2.6 MMOL/L — SIGNIFICANT CHANGE UP (ref -2–3)
BASOPHILS # BLD AUTO: 0.05 K/UL — SIGNIFICANT CHANGE UP (ref 0–0.2)
BASOPHILS NFR BLD AUTO: 0.7 % — SIGNIFICANT CHANGE UP (ref 0–2)
BILIRUB SERPL-MCNC: 0.4 MG/DL — SIGNIFICANT CHANGE UP (ref 0.2–1.2)
BUN SERPL-MCNC: 20 MG/DL — SIGNIFICANT CHANGE UP (ref 7–23)
CA-I SERPL-SCNC: 1.25 MMOL/L — SIGNIFICANT CHANGE UP (ref 1.15–1.33)
CALCIUM SERPL-MCNC: 9.6 MG/DL — SIGNIFICANT CHANGE UP (ref 8.4–10.5)
CHLORIDE BLDV-SCNC: 105 MMOL/L — SIGNIFICANT CHANGE UP (ref 96–108)
CHLORIDE SERPL-SCNC: 107 MMOL/L — SIGNIFICANT CHANGE UP (ref 96–108)
CO2 BLDV-SCNC: 32 MMOL/L — HIGH (ref 22–26)
CO2 SERPL-SCNC: 26 MMOL/L — SIGNIFICANT CHANGE UP (ref 22–31)
CREAT SERPL-MCNC: 0.92 MG/DL — SIGNIFICANT CHANGE UP (ref 0.5–1.3)
EGFR: 90 ML/MIN/1.73M2 — SIGNIFICANT CHANGE UP
EOSINOPHIL # BLD AUTO: 0.03 K/UL — SIGNIFICANT CHANGE UP (ref 0–0.5)
EOSINOPHIL NFR BLD AUTO: 0.4 % — SIGNIFICANT CHANGE UP (ref 0–6)
FLUAV AG NPH QL: SIGNIFICANT CHANGE UP
FLUBV AG NPH QL: SIGNIFICANT CHANGE UP
GAS PNL BLDV: 138 MMOL/L — SIGNIFICANT CHANGE UP (ref 136–145)
GAS PNL BLDV: SIGNIFICANT CHANGE UP
GAS PNL BLDV: SIGNIFICANT CHANGE UP
GLUCOSE BLDV-MCNC: 105 MG/DL — HIGH (ref 70–99)
GLUCOSE SERPL-MCNC: 114 MG/DL — HIGH (ref 70–99)
HCO3 BLDV-SCNC: 30 MMOL/L — HIGH (ref 22–29)
HCT VFR BLD CALC: 44.5 % — SIGNIFICANT CHANGE UP (ref 39–50)
HCT VFR BLDA CALC: 45 % — SIGNIFICANT CHANGE UP (ref 39–51)
HGB BLD CALC-MCNC: 14.9 G/DL — SIGNIFICANT CHANGE UP (ref 12.6–17.4)
HGB BLD-MCNC: 14.5 G/DL — SIGNIFICANT CHANGE UP (ref 13–17)
IMM GRANULOCYTES NFR BLD AUTO: 0.3 % — SIGNIFICANT CHANGE UP (ref 0–0.9)
LACTATE BLDV-MCNC: 1.3 MMOL/L — SIGNIFICANT CHANGE UP (ref 0.5–2)
LYMPHOCYTES # BLD AUTO: 1.13 K/UL — SIGNIFICANT CHANGE UP (ref 1–3.3)
LYMPHOCYTES # BLD AUTO: 15.3 % — SIGNIFICANT CHANGE UP (ref 13–44)
MCHC RBC-ENTMCNC: 30.1 PG — SIGNIFICANT CHANGE UP (ref 27–34)
MCHC RBC-ENTMCNC: 32.6 GM/DL — SIGNIFICANT CHANGE UP (ref 32–36)
MCV RBC AUTO: 92.3 FL — SIGNIFICANT CHANGE UP (ref 80–100)
MONOCYTES # BLD AUTO: 0.65 K/UL — SIGNIFICANT CHANGE UP (ref 0–0.9)
MONOCYTES NFR BLD AUTO: 8.8 % — SIGNIFICANT CHANGE UP (ref 2–14)
NEUTROPHILS # BLD AUTO: 5.51 K/UL — SIGNIFICANT CHANGE UP (ref 1.8–7.4)
NEUTROPHILS NFR BLD AUTO: 74.5 % — SIGNIFICANT CHANGE UP (ref 43–77)
NRBC # BLD: 0 /100 WBCS — SIGNIFICANT CHANGE UP (ref 0–0)
NT-PROBNP SERPL-SCNC: 74 PG/ML — SIGNIFICANT CHANGE UP (ref 0–300)
PCO2 BLDV: 57 MMHG — HIGH (ref 42–55)
PH BLDV: 7.33 — SIGNIFICANT CHANGE UP (ref 7.32–7.43)
PLATELET # BLD AUTO: 255 K/UL — SIGNIFICANT CHANGE UP (ref 150–400)
PO2 BLDV: 25 MMHG — SIGNIFICANT CHANGE UP (ref 25–45)
POTASSIUM BLDV-SCNC: 4 MMOL/L — SIGNIFICANT CHANGE UP (ref 3.5–5.1)
POTASSIUM SERPL-MCNC: 4.3 MMOL/L — SIGNIFICANT CHANGE UP (ref 3.5–5.3)
POTASSIUM SERPL-SCNC: 4.3 MMOL/L — SIGNIFICANT CHANGE UP (ref 3.5–5.3)
PROT SERPL-MCNC: 7.4 G/DL — SIGNIFICANT CHANGE UP (ref 6–8.3)
RBC # BLD: 4.82 M/UL — SIGNIFICANT CHANGE UP (ref 4.2–5.8)
RBC # FLD: 13.7 % — SIGNIFICANT CHANGE UP (ref 10.3–14.5)
RSV RNA NPH QL NAA+NON-PROBE: SIGNIFICANT CHANGE UP
SAO2 % BLDV: 35.1 % — LOW (ref 67–88)
SARS-COV-2 RNA SPEC QL NAA+PROBE: SIGNIFICANT CHANGE UP
SODIUM SERPL-SCNC: 144 MMOL/L — SIGNIFICANT CHANGE UP (ref 135–145)
TROPONIN T, HIGH SENSITIVITY RESULT: 12 NG/L — SIGNIFICANT CHANGE UP (ref 0–51)
TROPONIN T, HIGH SENSITIVITY RESULT: 17 NG/L — SIGNIFICANT CHANGE UP (ref 0–51)
WBC # BLD: 7.39 K/UL — SIGNIFICANT CHANGE UP (ref 3.8–10.5)
WBC # FLD AUTO: 7.39 K/UL — SIGNIFICANT CHANGE UP (ref 3.8–10.5)

## 2023-02-16 PROCEDURE — 99222 1ST HOSP IP/OBS MODERATE 55: CPT

## 2023-02-16 PROCEDURE — 71275 CT ANGIOGRAPHY CHEST: CPT | Mod: 26,MA

## 2023-02-16 PROCEDURE — 99214 OFFICE O/P EST MOD 30 MIN: CPT

## 2023-02-16 PROCEDURE — 99285 EMERGENCY DEPT VISIT HI MDM: CPT | Mod: CS,GC

## 2023-02-16 RX ORDER — OXYCODONE HYDROCHLORIDE 5 MG/1
1 TABLET ORAL
Qty: 0 | Refills: 0 | DISCHARGE

## 2023-02-16 RX ORDER — HYDROMORPHONE HYDROCHLORIDE 2 MG/ML
4 INJECTION INTRAMUSCULAR; INTRAVENOUS; SUBCUTANEOUS THREE TIMES A DAY
Refills: 0 | Status: DISCONTINUED | OUTPATIENT
Start: 2023-02-16 | End: 2023-02-17

## 2023-02-16 RX ORDER — SIMVASTATIN 20 MG/1
40 TABLET, FILM COATED ORAL AT BEDTIME
Refills: 0 | Status: DISCONTINUED | OUTPATIENT
Start: 2023-02-16 | End: 2023-02-17

## 2023-02-16 RX ORDER — LEVALBUTEROL 1.25 MG/.5ML
0.63 SOLUTION, CONCENTRATE RESPIRATORY (INHALATION) EVERY 6 HOURS
Refills: 0 | Status: DISCONTINUED | OUTPATIENT
Start: 2023-02-16 | End: 2023-02-17

## 2023-02-16 RX ORDER — ACETAMINOPHEN 500 MG
650 TABLET ORAL EVERY 6 HOURS
Refills: 0 | Status: DISCONTINUED | OUTPATIENT
Start: 2023-02-16 | End: 2023-02-17

## 2023-02-16 RX ORDER — ENOXAPARIN SODIUM 100 MG/ML
40 INJECTION SUBCUTANEOUS EVERY 24 HOURS
Refills: 0 | Status: DISCONTINUED | OUTPATIENT
Start: 2023-02-16 | End: 2023-02-17

## 2023-02-16 RX ORDER — BUDESONIDE AND FORMOTEROL FUMARATE DIHYDRATE 160; 4.5 UG/1; UG/1
2 AEROSOL RESPIRATORY (INHALATION)
Refills: 0 | Status: DISCONTINUED | OUTPATIENT
Start: 2023-02-16 | End: 2023-02-17

## 2023-02-16 NOTE — H&P ADULT - NSHPREVIEWOFSYSTEMS_GEN_ALL_CORE
NO HA, no focal weakness.  No chest pain/pressure.  NO palpitations.  NO cough, no wheezing.  NO abdominal pain, no red blood per rectum or melena.  No back pain, no tearing back pain.    NO SI/HI.  NO dysuria, no hematuria.  NO N/V.  No thyroid symptoms.  NO weight loss or anorexia.    Patient reports COVID-19 vaccine x 2 (refused booster jabs to date).

## 2023-02-16 NOTE — H&P ADULT - NSHPADDITIONALINFOADULT_GEN_ALL_CORE
NIGHT HOSPITALIST:   Patient/spouse aware of course and agree with plan/care as above.   Emotional support provided to patient/ family.   Care reviewed with covering NP/PA for endorsement to Dr. Zeng.    Chuy Jolly MD  Available on [employer] Teams. NIGHT HOSPITALIST:   Patient/spouse aware of course and agree with plan/care as above.   Emotional support provided to patient/ family.   Care reviewed with covering NPSudarshan for endorsement to Dr. Zeng.    Chuy Jolly MD  Available on [employer] Teams.

## 2023-02-16 NOTE — CONSULT NOTE ADULT - ASSESSMENT
A/P  67 M with hx of tobacco use, COPD, recently started on breo for copd sent today for increased dyspnea, WOB       #dyspnea, COPD  -recent increased dyspnea with normal lung exam, no evidence of volume overload/HF  -pulmonary doc dr antoine recommended he come to ER for PE r/o with CTA  -recent CTA cors with no obs CAD, no obv primary lung cancer  -sx correlate with new evidence of arrhythmia  -if CTA negative will focus on jxnl tachy/pat as etiology for new onset dyspnea  -recent echo with nl lv fxn, valve fxn    #paroxysmal jxnl tachycardia  -high burden noted on holter/mct rates 's  -patient presented in office symptomatic in atrial arrhythmia   -cont tele .  -if cta neg recommend cdu/tele for 24 hours vs admit for further w/u of likely symptomatic arrhythmia   -eps called and will see pt likely mainor am     d/w pt  d/w er       dvt ppx    60 minutes spent on total encounter; more than 50% of the visit was spent counseling and/or coordinating care by the attending physician.      Jaylon Zeng M.D. [facility]  Cardiology/Interventional Cardiology      office [phone]  cell [phone]

## 2023-02-16 NOTE — ED ADULT NURSE REASSESSMENT NOTE - NS ED NURSE REASSESS COMMENT FT1
Received patient from Phyllis RN, patient at [facility] mental status, able to make needs known, NAD, VSS, patient agreeable to plan of care, pending CTr. Pt currently denying SOB, stated relief of symptoms at this time. Pt resting in stretcher.

## 2023-02-16 NOTE — ED PROVIDER NOTE - OBJECTIVE STATEMENT
67M with worsening sob for apprx 1 month, especially worse in the last 5 days. Pt with pmh emphysema/copd (former smoker, 5 yrs ago). Per patient was seen by cardiologist (Karri) 1 month ago and was put on a holter monitor, advised to temporarily stop his LABA/Steroid inhaler, restarted 1 week ago. Per Karri, holter revealed a junctional tachyarrhythmia. Today- was experiencing worsening sob, called his PCP and cardiologist who recommended he come in to rule out pulmonary embolism. Pt denies LE swelling/pain, hemoptysis.

## 2023-02-16 NOTE — ED PROVIDER NOTE - CLINICAL SUMMARY MEDICAL DECISION MAKING FREE TEXT BOX
67M sent in by cardiologist for sob worsening for apprx 1 month, cardiologist wants to r/o PE. Star Zeng notes pt being found to have junctional tachyarrhythmia on holter monitor one week prior. PMH only of asthma. PEx with clear lungs, unremarkable otherwise. Plan to cbc cmp ekg trop gas ctpe. Likely will require admission even in absense of pe for EP w/u

## 2023-02-16 NOTE — ED PROVIDER NOTE - ATTENDING CONTRIBUTION TO CARE
This is a 67-year-old male who is coming in with shortness of breath.  He was seen by his pulmonologist and his cardiologist.  His pulmonologist discussed the case with me and the patient does not have any signs or symptoms of COPD/emphysema given no wheezing and good air movement.  His cardiologist noted abnormality on his cardiac monitor and wants him to be admitted to the hospital for EP evaluation.

## 2023-02-16 NOTE — ASSESSMENT
[FreeTextEntry1] : Pt informed to go to ER for CTA r/o PE/blood clot\par spoke with cardiologist\par advised pt to go [facility]\par Called and informed ER of pt's arrival. \par F/u with cardiologist/pulmonary accordingly\par f/u with PMD accordingly.

## 2023-02-16 NOTE — H&P ADULT - HISTORY OF PRESENT ILLNESS
NIGHT HOSPITALIST:   Patient UNKNOWN to me previously, assigned to me at this point via the ER and by Dr. Zeng to admit this 68 y/o M--patient seen with patient's spouse and adult daughter in attendance--patient with a history of COPD with still marijuana smoking use (quit 1 pack /day cigarettes 5 years ago onset smoking at age 13),  hyperlipidemia maintained on Zocor, with patient with a Holter (with junctional tachyarrhythmia with patient advised to stop her Breo Ellipta temporarily but was restarted one week ago (patient admits noncompliance with use), with patient noting worsening dyspnoea for the past 3 days with dyspnoea with changing his clothes.  Patient was directed to the ER by his physicians.  Dyspnoea has been since 3 months.   NO chest pain/pressure.  NO cough.   NO fever, no chills, no rigors.  No N/V.  No abdominal pain, no red blood per rectum or melena.  No diaphoresis.

## 2023-02-16 NOTE — HISTORY OF PRESENT ILLNESS
[FreeTextEntry8] : Mr Bolivar Trinidad is a 68 yo male presents today for concerns of elevated bp, fast heart rate, feeling unusually more short of breath and dyspnea on exertion. Pt does report hx of COPD, Emphysema, a few days ago, mild URI symptoms. Pt reports has made a call out ot his cardiologist and pulmonary regarding his symptoms as well. \par NOTE: Pt's cardiologist join via telephone, reports noting change in ECG, with frequent junctional rhythm in Holter monitoring over last day. Pt advised to go to ER for prompt evaluation, and CTA to r/o PE, blood clots. Pt was advised to go to [facility]. Pt's wife wishes to drive pt immediately to ER. Called and informed [facility] ER triage, about pt arrival.

## 2023-02-16 NOTE — H&P ADULT - NSHPLABSRESULTS_GEN_ALL_CORE
EKG tracing interpreted by me with NSR at 70.    WBC 7.3  normal differential    Hgb 14.5    Platelets of 255K    Random glucose of 114    Cr 0.9    K+ 4.3    HS troponin 12    BNP 74    CTT angiogram with NO PE>    RVP and COVID-19 PCR>>negative.

## 2023-02-16 NOTE — PHYSICAL EXAM
[No Acute Distress] : no acute distress [EOMI] : extraocular movements intact [Supple] : supple [Clear to Auscultation] : lungs were clear to auscultation bilaterally [Normal S1, S2] : normal S1 and S2 [No Edema] : there was no peripheral edema [Non Tender] : non-tender [No Rash] : no rash [Normal Gait] : normal gait [Normal Affect] : the affect was normal [de-identified] : concerned male [de-identified] : tachycardic

## 2023-02-16 NOTE — ED PROVIDER NOTE - NSICDXPASTSURGICALHX_GEN_ALL_CORE_FT
PAST SURGICAL HISTORY:  H/O hernia repair b/l inguinal & abdominal    History of arthroplasty of right knee 5yrs    History of arthroscopy of left shoulder age 15 & right shoulder 1 yrs

## 2023-02-16 NOTE — H&P ADULT - PROBLEM SELECTOR PLAN 1
See above.   Will provide patient Symbicort for now.    Would consider Xopenex for symptoms.    Would consider formal ID evaluation in the AM. See above.   Will provide patient Symbicort for now.    Would consider Xopenex for symptoms.    Would consider formal pulmonary evaluation in the AM.

## 2023-02-16 NOTE — H&P ADULT - NSHPOUTPATIENTPROVIDERS_GEN_ALL_CORE
Mark Christine MD (PCP) [phone]  Jaylon Brennan MD (pulmonary) [phone]  Jaylon Zeng MD (cardiology)

## 2023-02-16 NOTE — CONSULT NOTE ADULT - SUBJECTIVE AND OBJECTIVE BOX
CARDIOLOGY CONSULT NOTE - DR. BEYER    HPI:    67M with hx of tobacco use, recently started on breo for copd sent today for increased dyspnea, WOB with recent dx of pasroxysmal jxnl tachycardia.  REcent mct/holter revealed high burden of sinus rhythm competing with jxnl tachycardia/atrial tachy 's. Arrhythmia persistent off off breo for a few days     today at pcp ekg jxnl tachy 110, here in [place] sr 70's  feels better  no active cp, sob   no fever cough, recent travel, calf pain      PAST MEDICAL & SURGICAL HISTORY:  High cholesterol      Insomnia      History of arthroscopy of left shoulder  age 15 &amp; right shoulder 3 yrs      H/O hernia repair  b/l inguinal &amp; abdominal      History of arthroplasty of right knee  5yrs            PREVIOUS DIAGNOSTIC TESTING:    [ ] Echocardiogram:  [ ]  Catheterization:  [ ] Stress Test:  	    MEDICATIONS:    Home Medications:  oxyCODONE 15 mg oral tablet: 1 tab(s) orally 2 times a day, As Needed (09 Jan 2019 10:38)  simvastatin 40 mg oral tablet: 1 tab(s) orally once a day (at bedtime) (12 Dec 2018 13:44)      MEDICATIONS  (STANDING):      FAMILY HISTORY:      SOCIAL HISTORY:    [ ] Non-smoker  [x ] Smoker  [ ] Alcohol    Allergies    Nuts (Unknown)  Originally Entered as [Unknown] reaction to [PUMPKIN SEEDS] (Unknown)  Vioxx (Unknown)    Intolerances    	    REVIEW OF SYSTEMS:  CONSTITUTIONAL: No fever, weight loss, or fatigue  EYES: No eye pain, visual disturbances, or discharge  ENMT:  No difficulty hearing, tinnitus, vertigo; No sinus or throat pain  NECK: No pain or stiffness  RESPIRATORY: No cough, wheezing, chills or hemoptysis; No Shortness of Breath  CARDIOVASCULAR: as HPI  GASTROINTESTINAL: No abdominal or epigastric pain. No nausea, vomiting, or hematemesis; No diarrhea or constipation. No melena or hematochezia.  GENITOURINARY: No dysuria, frequency, hematuria, or incontinence  NEUROLOGICAL: No headaches, memory loss, loss of strength, numbness, or tremors  SKIN: No itching, burning, rashes, or lesions   	  [ ] All others negative	  [ ] Unable to obtain    PHYSICAL EXAM:    T(C): 36.7 (02-16-23 @ 19:11), Max: 37 (02-16-23 @ 14:37)  HR: 89 (02-16-23 @ 19:11) (89 - 100)  BP: 134/88 (02-16-23 @ 19:11) (122/90 - 134/88)  RR: 17 (02-16-23 @ 19:11) (17 - 19)  SpO2: 98% (02-16-23 @ 19:11) (96% - 98%)  Wt(kg): --  I&O's Summary    Daily Height in cm: 182.88 (16 Feb 2023 14:37)    Daily     Appearance: Normal	  Psychiatry: A & O x 3, Mood & affect appropriate  HEENT:   Normal oral mucosa, PERRL, EOMI	  Lymphatic: No lymphadenopathy  Cardiovascular: Normal S1 S2,RRR, No JVD, No murmurs  Respiratory: Lungs clear to auscultation	  Gastrointestinal:  Soft, Non-tender, + BS	  Skin: No rashes, No ecchymoses, No cyanosis	  Neurologic: Non-focal  Extremities: Normal range of motion, No clubbing, cyanosis or edema  Vascular: Peripheral pulses palpable 2+ bilaterally    TELEMETRY: 	    ECG:  	sr, no acute ischemic abnl   RADIOLOGY:  OTHER: 	  	  LABS:	 	    CARDIAC MARKERS:        proBNP: Serum Pro-Brain Natriuretic Peptide: 74 pg/mL (02-16 @ 17:05)      Lipid Profile:   HgA1c:   TSH:                           14.5   7.39  )-----------( 255      ( 16 Feb 2023 17:05 )             44.5     02-16    144  |  107  |  20  ----------------------------<  114<H>  4.3   |  26  |  0.92    Ca    9.6      16 Feb 2023 17:05    TPro  7.4  /  Alb  4.4  /  TBili  0.4  /  DBili  x   /  AST  21  /  ALT  19  /  AlkPhos  68  02-16        Creatinine, Serum: 0.92 mg/dL (02-16-23 @ 17:05)        ASSESSMENT/PLAN:

## 2023-02-16 NOTE — REVIEW OF SYSTEMS
[Chest Pain] : no chest pain [Palpitations] : palpitations [Claudication] : no  leg claudication [Lower Ext Edema] : no lower extremity edema [Orthopena] : orthopnea [Shortness Of Breath] : shortness of breath [Wheezing] : no wheezing [Cough] : no cough [Dyspnea on Exertion] : dyspnea on exertion [Negative] : Heme/Lymph

## 2023-02-16 NOTE — ED ADULT TRIAGE NOTE - CHIEF COMPLAINT QUOTE
sent in by Dr. Oconnor to r/o PE  sob x days  had halter monitor that showed junctional rhythm 1 week ago

## 2023-02-16 NOTE — H&P ADULT - NSHPSOURCEINFOTX_GEN_ALL_CORE
Reviewed Medex with patient with office HIE note from today, spouse and adult daughter in attendance with patient's permission. Reviewed Medex with patient with office HIE note from today, spouse and adult daughter in attendance with patient's permission.   [facility] I STOP # [id number] in the chart.

## 2023-02-16 NOTE — H&P ADULT - ASSESSMENT
NIGHT HOSPITALIST:   Admitted for persistent dyspnoea as above for the past 3 days but apparently since 3 months in the setting of patient with active marijuana smoking use and past heavy tobacco use.   CTT angiogram of lung with NO PE.     Sent in by his cardiologist as above.   Echo ordered.    Patient advised to stop marijuana use, with one joint is equivalent to a pack of cigarettes.   Suspect related to patient's COPD but no active wheezing at present to warrant systemic steroids.   Will provide patient's Breo Ellipta equivalent as Symbicort.     Will HOLD off on albuterol with patient's concerns for tachycardia, although may consider Xopenex for symptoms.    Would consider formal pulmonary evaluation in the AM.    Will continue with statin.    Patient agrees to pharmacologic DVT prophylaxis.

## 2023-02-16 NOTE — ED ADULT NURSE NOTE - OBJECTIVE STATEMENT
1736 pt 67ym aox4 sent by dr zuniga to eval/ct, c/o sob for months, saw pmd today pt vss no distress noted pending eval

## 2023-02-16 NOTE — ED PROVIDER NOTE - RAPID ASSESSMENT
Dr. Drummond ED Attending- pmhx emphysema shortness of breath for past few days, sent in by Dr. Zeng to r/o PE, had halter monitor that showed junctional rhythm 1 week ago, denies leg swelling, denies pe or dvt hx, former smoker 5 years ago. Denies recent trauma, fevers, chills, headache, dizziness, nausea, vomiting, dysuria, freq, hematuria, diarrhea, constipation, chest pain, cough.    Patient was rapidly assessed in the waiting room; a limited history and physical exam was performed. The patient will be seen and further worked up in the main emergency department and their care will be completed by the main emergency department team. Receiving team will follow up on labs, analgesia, any clinical imaging, and perform reassessment and disposition of the patient as clinically indicated.  All decisions regarding the progression of care will be made at their discretion.

## 2023-02-17 ENCOUNTER — TRANSCRIPTION ENCOUNTER (OUTPATIENT)
Age: 68
End: 2023-02-17

## 2023-02-17 VITALS
RESPIRATION RATE: 18 BRPM | OXYGEN SATURATION: 97 % | TEMPERATURE: 98 F | SYSTOLIC BLOOD PRESSURE: 144 MMHG | DIASTOLIC BLOOD PRESSURE: 92 MMHG | HEART RATE: 60 BPM

## 2023-02-17 LAB
ANION GAP SERPL CALC-SCNC: 15 MMOL/L — SIGNIFICANT CHANGE UP (ref 5–17)
BASOPHILS # BLD AUTO: 0.05 K/UL — SIGNIFICANT CHANGE UP (ref 0–0.2)
BASOPHILS NFR BLD AUTO: 0.8 % — SIGNIFICANT CHANGE UP (ref 0–2)
BUN SERPL-MCNC: 20 MG/DL — SIGNIFICANT CHANGE UP (ref 7–23)
CALCIUM SERPL-MCNC: 9.2 MG/DL — SIGNIFICANT CHANGE UP (ref 8.4–10.5)
CHLORIDE SERPL-SCNC: 105 MMOL/L — SIGNIFICANT CHANGE UP (ref 96–108)
CO2 SERPL-SCNC: 21 MMOL/L — LOW (ref 22–31)
CREAT SERPL-MCNC: 0.86 MG/DL — SIGNIFICANT CHANGE UP (ref 0.5–1.3)
EGFR: 94 ML/MIN/1.73M2 — SIGNIFICANT CHANGE UP
EOSINOPHIL # BLD AUTO: 0.2 K/UL — SIGNIFICANT CHANGE UP (ref 0–0.5)
EOSINOPHIL NFR BLD AUTO: 3.2 % — SIGNIFICANT CHANGE UP (ref 0–6)
GLUCOSE SERPL-MCNC: 115 MG/DL — HIGH (ref 70–99)
HCT VFR BLD CALC: 42.4 % — SIGNIFICANT CHANGE UP (ref 39–50)
HCV AB S/CO SERPL IA: 0.06 S/CO — SIGNIFICANT CHANGE UP (ref 0–0.99)
HCV AB SERPL-IMP: SIGNIFICANT CHANGE UP
HGB BLD-MCNC: 13.9 G/DL — SIGNIFICANT CHANGE UP (ref 13–17)
IMM GRANULOCYTES NFR BLD AUTO: 0.2 % — SIGNIFICANT CHANGE UP (ref 0–0.9)
LYMPHOCYTES # BLD AUTO: 1.54 K/UL — SIGNIFICANT CHANGE UP (ref 1–3.3)
LYMPHOCYTES # BLD AUTO: 24.4 % — SIGNIFICANT CHANGE UP (ref 13–44)
MCHC RBC-ENTMCNC: 30 PG — SIGNIFICANT CHANGE UP (ref 27–34)
MCHC RBC-ENTMCNC: 32.8 GM/DL — SIGNIFICANT CHANGE UP (ref 32–36)
MCV RBC AUTO: 91.6 FL — SIGNIFICANT CHANGE UP (ref 80–100)
MONOCYTES # BLD AUTO: 0.61 K/UL — SIGNIFICANT CHANGE UP (ref 0–0.9)
MONOCYTES NFR BLD AUTO: 9.7 % — SIGNIFICANT CHANGE UP (ref 2–14)
NEUTROPHILS # BLD AUTO: 3.91 K/UL — SIGNIFICANT CHANGE UP (ref 1.8–7.4)
NEUTROPHILS NFR BLD AUTO: 61.7 % — SIGNIFICANT CHANGE UP (ref 43–77)
NRBC # BLD: 0 /100 WBCS — SIGNIFICANT CHANGE UP (ref 0–0)
PLATELET # BLD AUTO: 254 K/UL — SIGNIFICANT CHANGE UP (ref 150–400)
POTASSIUM SERPL-MCNC: 3.7 MMOL/L — SIGNIFICANT CHANGE UP (ref 3.5–5.3)
POTASSIUM SERPL-SCNC: 3.7 MMOL/L — SIGNIFICANT CHANGE UP (ref 3.5–5.3)
RBC # BLD: 4.63 M/UL — SIGNIFICANT CHANGE UP (ref 4.2–5.8)
RBC # FLD: 13.8 % — SIGNIFICANT CHANGE UP (ref 10.3–14.5)
SODIUM SERPL-SCNC: 141 MMOL/L — SIGNIFICANT CHANGE UP (ref 135–145)
WBC # BLD: 6.32 K/UL — SIGNIFICANT CHANGE UP (ref 3.8–10.5)
WBC # FLD AUTO: 6.32 K/UL — SIGNIFICANT CHANGE UP (ref 3.8–10.5)

## 2023-02-17 PROCEDURE — 99223 1ST HOSP IP/OBS HIGH 75: CPT | Mod: GC

## 2023-02-17 PROCEDURE — 99285 EMERGENCY DEPT VISIT HI MDM: CPT

## 2023-02-17 PROCEDURE — 99222 1ST HOSP IP/OBS MODERATE 55: CPT | Mod: FS

## 2023-02-17 PROCEDURE — 83605 ASSAY OF LACTIC ACID: CPT

## 2023-02-17 PROCEDURE — 94640 AIRWAY INHALATION TREATMENT: CPT

## 2023-02-17 PROCEDURE — 36415 COLL VENOUS BLD VENIPUNCTURE: CPT

## 2023-02-17 PROCEDURE — 82330 ASSAY OF CALCIUM: CPT

## 2023-02-17 PROCEDURE — 82435 ASSAY OF BLOOD CHLORIDE: CPT

## 2023-02-17 PROCEDURE — 87637 SARSCOV2&INF A&B&RSV AMP PRB: CPT

## 2023-02-17 PROCEDURE — 93306 TTE W/DOPPLER COMPLETE: CPT | Mod: 26

## 2023-02-17 PROCEDURE — 85014 HEMATOCRIT: CPT

## 2023-02-17 PROCEDURE — 83880 ASSAY OF NATRIURETIC PEPTIDE: CPT

## 2023-02-17 PROCEDURE — 80053 COMPREHEN METABOLIC PANEL: CPT

## 2023-02-17 PROCEDURE — 71275 CT ANGIOGRAPHY CHEST: CPT | Mod: MA

## 2023-02-17 PROCEDURE — 85025 COMPLETE CBC W/AUTO DIFF WBC: CPT

## 2023-02-17 PROCEDURE — 82947 ASSAY GLUCOSE BLOOD QUANT: CPT

## 2023-02-17 PROCEDURE — 85018 HEMOGLOBIN: CPT

## 2023-02-17 PROCEDURE — 80048 BASIC METABOLIC PNL TOTAL CA: CPT

## 2023-02-17 PROCEDURE — 84295 ASSAY OF SERUM SODIUM: CPT

## 2023-02-17 PROCEDURE — 82803 BLOOD GASES ANY COMBINATION: CPT

## 2023-02-17 PROCEDURE — 93306 TTE W/DOPPLER COMPLETE: CPT

## 2023-02-17 PROCEDURE — 84132 ASSAY OF SERUM POTASSIUM: CPT

## 2023-02-17 PROCEDURE — 84484 ASSAY OF TROPONIN QUANT: CPT

## 2023-02-17 PROCEDURE — 86803 HEPATITIS C AB TEST: CPT

## 2023-02-17 RX ORDER — METOPROLOL TARTRATE 50 MG
1 TABLET ORAL
Qty: 30 | Refills: 0
Start: 2023-02-17 | End: 2023-03-18

## 2023-02-17 RX ORDER — FLUTICASONE FUROATE AND VILANTEROL TRIFENATATE 100; 25 UG/1; UG/1
1 POWDER RESPIRATORY (INHALATION)
Qty: 0 | Refills: 0 | DISCHARGE

## 2023-02-17 RX ORDER — POTASSIUM CHLORIDE 20 MEQ
40 PACKET (EA) ORAL ONCE
Refills: 0 | Status: COMPLETED | OUTPATIENT
Start: 2023-02-17 | End: 2023-02-17

## 2023-02-17 RX ORDER — METOPROLOL TARTRATE 50 MG
12.5 TABLET ORAL
Refills: 0 | Status: DISCONTINUED | OUTPATIENT
Start: 2023-02-17 | End: 2023-02-17

## 2023-02-17 RX ORDER — BUDESONIDE AND FORMOTEROL FUMARATE DIHYDRATE 160; 4.5 UG/1; UG/1
1 AEROSOL RESPIRATORY (INHALATION)
Qty: 1 | Refills: 0
Start: 2023-02-17 | End: 2023-03-18

## 2023-02-17 RX ADMIN — Medication 40 MILLIEQUIVALENT(S): at 15:52

## 2023-02-17 RX ADMIN — HYDROMORPHONE HYDROCHLORIDE 4 MILLIGRAM(S): 2 INJECTION INTRAMUSCULAR; INTRAVENOUS; SUBCUTANEOUS at 10:00

## 2023-02-17 RX ADMIN — HYDROMORPHONE HYDROCHLORIDE 4 MILLIGRAM(S): 2 INJECTION INTRAMUSCULAR; INTRAVENOUS; SUBCUTANEOUS at 08:15

## 2023-02-17 RX ADMIN — BUDESONIDE AND FORMOTEROL FUMARATE DIHYDRATE 2 PUFF(S): 160; 4.5 AEROSOL RESPIRATORY (INHALATION) at 06:46

## 2023-02-17 RX ADMIN — HYDROMORPHONE HYDROCHLORIDE 4 MILLIGRAM(S): 2 INJECTION INTRAMUSCULAR; INTRAVENOUS; SUBCUTANEOUS at 18:27

## 2023-02-17 RX ADMIN — Medication 12.5 MILLIGRAM(S): at 18:24

## 2023-02-17 NOTE — CONSULT NOTE ADULT - SUBJECTIVE AND OBJECTIVE BOX
HISTORY OF PRESENT ILLNESS: 66 y/o man with a history of COPD with still marijuana smoking use (quit 1 pack /day cigarettes 5 years ago onset smoking at age 13), hyperlipidemia maintained on Zocor who presents with worsening MELENDREZ. He reports ongoing SOB/MELENDREZ for the past few months that has worsened over the past few days. He recently wore a Holter for ?dizziness/SOB that revealed  ?junctional tachyarrhythmia/AT that patient advised to stop his Breo Ellipta temporarily but was restarted one week ago (patient admits noncompliance with use), He reports daily marijuana use. He reports occasional palpitations. He denies any CP, LH, presyncope or syncope.      Allergies  Nuts (Unknown)  Originally Entered as [Unknown] reaction to [PUMPKIN SEEDS] (Unknown)  Vioxx (Unknown)      	    MEDICATIONS:  enoxaparin Injectable 40 milliGRAM(s) SubCutaneous every 24 hours  budesonide  80 MICROgram(s)/formoterol 4.5 MICROgram(s) Inhaler 2 Puff(s) Inhalation two times a day  levalbuterol Inhalation 0.63 milliGRAM(s) Inhalation every 6 hours PRN  acetaminophen     Tablet .. 650 milliGRAM(s) Oral every 6 hours PRN  HYDROmorphone   Tablet 4 milliGRAM(s) Oral three times a day PRN  simvastatin 40 milliGRAM(s) Oral at bedtime        PAST MEDICAL & SURGICAL HISTORY:  High cholesterol      Insomnia      History of arthroscopy of left shoulder  age 15 &amp; right shoulder 3 yrs      H/O hernia repair  b/l inguinal &amp; abdominal      History of arthroplasty of right knee  5yrs          FAMILY HISTORY:  Father - MI        SOCIAL HISTORY:    Former cigarette smoker - quit 5 yrs ago, 50 pk/yr history  Current daily marijuana smoker  Rare ETOH use      REVIEW OF SYSTEMS:  See HPI. Otherwise, 12 point ROS done and otherwise negative.    PHYSICAL EXAM:  T(C): 36.7 (02-17-23 @ 04:40), Max: 37 (02-16-23 @ 14:37)  HR: 62 (02-17-23 @ 04:40) (62 - 100)  BP: 122/74 (02-17-23 @ 04:40) (122/74 - 149/93)  RR: 18 (02-17-23 @ 04:40) (17 - 19)  SpO2: 97% (02-17-23 @ 04:40) (96% - 98%)  Wt(kg): --  I&O's Summary      Appearance: Normal	  HEENT:  PERRL, EOMI	  Cardiovascular: Normal S1 S2, No JVD, No murmurs, No edema  Respiratory: Lungs clear to auscultation	  Psychiatry: A & O x 3, Mood & affect appropriate  Gastrointestinal:  Soft, Non-tender, + BS	  Skin: No rashes, No ecchymoses, No cyanosis	  Neurologic: Non-focal  Extremities: No clubbing, cyanosis or edema  Vascular: Peripheral pulses palpable 2+ bilaterally        LABS:	 	    CBC Full  -  ( 17 Feb 2023 06:15 )  WBC Count : 6.32 K/uL  Hemoglobin : 13.9 g/dL  Hematocrit : 42.4 %  Platelet Count - Automated : 254 K/uL  Mean Cell Volume : 91.6 fl  Mean Cell Hemoglobin : 30.0 pg  Mean Cell Hemoglobin Concentration : 32.8 gm/dL  Auto Neutrophil # : 3.91 K/uL  Auto Lymphocyte # : 1.54 K/uL  Auto Monocyte # : 0.61 K/uL  Auto Eosinophil # : 0.20 K/uL  Auto Basophil # : 0.05 K/uL  Auto Neutrophil % : 61.7 %  Auto Lymphocyte % : 24.4 %  Auto Monocyte % : 9.7 %  Auto Eosinophil % : 3.2 %  Auto Basophil % : 0.8 %    02-17    141  |  105  |  20  ----------------------------<  115<H>  3.7   |  21<L>  |  0.86  02-16    144  |  107  |  20  ----------------------------<  114<H>  4.3   |  26  |  0.92    Ca    9.2      17 Feb 2023 06:15  Ca    9.6      16 Feb 2023 17:05    TPro  7.4  /  Alb  4.4  /  TBili  0.4  /  DBili  x   /  AST  21  /  ALT  19  /  AlkPhos  68  02-16      proBNP: Serum Pro-Brain Natriuretic Peptide: 74 pg/mL (02-16 @ 17:05)    TELEMETRY: SR 70-80s, pAT up to 110s	      < from: Transthoracic Echocardiogram (02.17.23 @ 08:08) >  PROCEDURE: Transthoracic echocardiogram with 2-D, M-Mode  and complete spectral and color flow Doppler.  INDICATION: Dyspnea, unspecified (R06.00)  ------------------------------------------------------------------------  Dimensions:    Normal Values:  LA:     3.6    2.0 - 4.0 cm  Ao:     4.3    2.0 - 3.8 cm  SEPTUM: 1.0    0.6 - 1.2 cm  PWT:    0.9    0.6 -1.1 cm  LVIDd:  4.4    3.0 - 5.6 cm  LVIDs:  3.0    1.8 - 4.0 cm  Derived variables:  LVMI: 73 g/m2  RWT: 0.40  Fractional short: 32 %  EF (Roblero Rule): 66 %Doppler Peak Velocity (m/sec):  AoV=1.0  ------------------------------------------------------------------------  Observations:  Mitral Valve: Normal mitral valve. Mild mitral  regurgitation.  Aortic Valve/Aorta: Mildly calcified trileaflet aortic  valve with normal opening. Peak transaortic valve gradient  equals 4 mm Hg. No aortic valve regurgitation seen. Peak  left ventricular outflow tract gradient equals 3 mm Hg,  mean gradient is equal to 2 mm Hg, LVOT velocity time  integral equals 18 cm.  Mild aortic root and ascending aorta dilatation  (Ao: 4.3  cm at the sinuses of Valsalva). Ascending aorta diameter: 4  cm.  Left Atrium: Normal left atrium.  LA volume index = 19  cc/m2. Atrial septal aneurysm seen.  Left Ventricle: Normal left ventricular systolic function.  No segmental wall motion abnormalities. Normal left  ventricular internal dimensions and wall thicknesses.  Normal diastolic function.  Right Heart: Normal right atrium. Normal right ventricular  size and function. Normal tricuspid valve. Minimal  tricuspid regurgitation. Pulmonic valve not well  visualized, probably normal. No pulmonic regurgitation.  Pericardium/Pleura: No pericardial effusion.  Hemodynamic: Estimated right atrial pressure equals 3 mmHg.  Estimated right ventricular systolic pressure equals 13 mm  Hg, assuming right atrial pressure equals 3 mm Hg,  consistent with normal pulmonary pressures. Incomplete  tricuspid regurgitation Doppler envelopes in this study may  underestimate RVSP.  ------------------------------------------------------------------------  Conclusions:  1. Normal mitral valve. Mild mitral regurgitation.  2. Mildly calcified trileaflet aortic valve with normal  opening. No aortic valve regurgitation seen.  3. Mild aortic root and ascending aorta dilatation  (Ao:  4.3 cm at the sinuses of Valsalva). Ascending aorta  diameter: 4 cm.  4. Normal left ventricular systolic function. No segmental  wall motion abnormalities.  5. Normal right ventricular size and function.  *** No previous Echo exam.    < end of copied text >  	     HISTORY OF PRESENT ILLNESS: 68 y/o man with a history of COPD with still marijuana smoking use (quit 1 pack /day cigarettes 5 years ago onset smoking at age 13), hyperlipidemia maintained on Zocor who presents with worsening MELENDREZ. He reports ongoing SOB/MELENDREZ for the past few months that has worsened over the past few days. He recently wore a Holter for ?dizziness/SOB that revealed  ?junctional tachyarrhythmia/AT that patient advised to stop his Breo Ellipta temporarily but was restarted one week ago (patient admits noncompliance with use), He reports daily marijuana use. He reports occasional palpitations. He denies any CP, LH, presyncope or syncope.    Allergies  Nuts (Unknown)  Originally Entered as [Unknown] reaction to [PUMPKIN SEEDS] (Unknown)  Vioxx (Unknown)    MEDICATIONS:  enoxaparin Injectable 40 milliGRAM(s) SubCutaneous every 24 hours  budesonide  80 MICROgram(s)/formoterol 4.5 MICROgram(s) Inhaler 2 Puff(s) Inhalation two times a day  levalbuterol Inhalation 0.63 milliGRAM(s) Inhalation every 6 hours PRN  acetaminophen     Tablet .. 650 milliGRAM(s) Oral every 6 hours PRN  HYDROmorphone   Tablet 4 milliGRAM(s) Oral three times a day PRN  simvastatin 40 milliGRAM(s) Oral at bedtime    PAST MEDICAL & SURGICAL HISTORY:  High cholesterol  Insomnia  History of arthroscopy of left shoulder  age 15 &amp; right shoulder 3 yrs  H/O hernia repair  b/l inguinal &amp; abdominal  History of arthroplasty of right knee  5yrs    FAMILY HISTORY:  Father - MI    SOCIAL HISTORY:    Former cigarette smoker - quit 5 yrs ago, 50 pk/yr history  Current daily marijuana smoker  Rare ETOH use    REVIEW OF SYSTEMS:  See HPI. Otherwise, 12 point ROS done and otherwise negative.    PHYSICAL EXAM:  T(C): 36.7 (02-17-23 @ 04:40), Max: 37 (02-16-23 @ 14:37)  HR: 62 (02-17-23 @ 04:40) (62 - 100)  BP: 122/74 (02-17-23 @ 04:40) (122/74 - 149/93)  RR: 18 (02-17-23 @ 04:40) (17 - 19)  SpO2: 97% (02-17-23 @ 04:40) (96% - 98%)    Appearance: Normal	  HEENT:  PERRL, EOMI	  Cardiovascular: Normal S1 S2, No JVD, No murmurs, No edema  Respiratory: Lungs clear to auscultation	  Psychiatry: A & O x 3, Mood & affect appropriate  Gastrointestinal:  Soft, Non-tender, + BS	  Skin: No rashes, No ecchymoses, No cyanosis	  Neurologic: Non-focal  Extremities: No clubbing, cyanosis or edema  Vascular: Peripheral pulses palpable 2+ bilaterally    LABS:	 	    CBC Full  -  ( 17 Feb 2023 06:15 )  WBC Count : 6.32 K/uL  Hemoglobin : 13.9 g/dL  Hematocrit : 42.4 %  Platelet Count - Automated : 254 K/uL  Mean Cell Volume : 91.6 fl  Mean Cell Hemoglobin : 30.0 pg  Mean Cell Hemoglobin Concentration : 32.8 gm/dL  Auto Neutrophil # : 3.91 K/uL  Auto Lymphocyte # : 1.54 K/uL  Auto Monocyte # : 0.61 K/uL  Auto Eosinophil # : 0.20 K/uL  Auto Basophil # : 0.05 K/uL  Auto Neutrophil % : 61.7 %  Auto Lymphocyte % : 24.4 %  Auto Monocyte % : 9.7 %  Auto Eosinophil % : 3.2 %  Auto Basophil % : 0.8 %    02-17    141  |  105  |  20  ----------------------------<  115<H>  3.7   |  21<L>  |  0.86  02-16    144  |  107  |  20  ----------------------------<  114<H>  4.3   |  26  |  0.92    Ca    9.2      17 Feb 2023 06:15  Ca    9.6      16 Feb 2023 17:05    TPro  7.4  /  Alb  4.4  /  TBili  0.4  /  DBili  x   /  AST  21  /  ALT  19  /  AlkPhos  68  02-16    proBNP: Serum Pro-Brain Natriuretic Peptide: 74 pg/mL (02-16 @ 17:05)    TELEMETRY: SR 70-80s, pAT up to 110s	      < from: Transthoracic Echocardiogram (02.17.23 @ 08:08) >  PROCEDURE: Transthoracic echocardiogram with 2-D, M-Mode  and complete spectral and color flow Doppler.  INDICATION: Dyspnea, unspecified (R06.00)  ------------------------------------------------------------------------  Dimensions:    Normal Values:  LA:     3.6    2.0 - 4.0 cm  Ao:     4.3    2.0 - 3.8 cm  SEPTUM: 1.0    0.6 - 1.2 cm  PWT:    0.9    0.6 -1.1 cm  LVIDd:  4.4    3.0 - 5.6 cm  LVIDs:  3.0    1.8 - 4.0 cm  Derived variables:  LVMI: 73 g/m2  RWT: 0.40  Fractional short: 32 %  EF (Roblero Rule): 66 %Doppler Peak Velocity (m/sec):  AoV=1.0  ------------------------------------------------------------------------  Observations:  Mitral Valve: Normal mitral valve. Mild mitral  regurgitation.  Aortic Valve/Aorta: Mildly calcified trileaflet aortic  valve with normal opening. Peak transaortic valve gradient  equals 4 mm Hg. No aortic valve regurgitation seen. Peak  left ventricular outflow tract gradient equals 3 mm Hg,  mean gradient is equal to 2 mm Hg, LVOT velocity time  integral equals 18 cm.  Mild aortic root and ascending aorta dilatation  (Ao: 4.3  cm at the sinuses of Valsalva). Ascending aorta diameter: 4  cm.  Left Atrium: Normal left atrium.  LA volume index = 19  cc/m2. Atrial septal aneurysm seen.  Left Ventricle: Normal left ventricular systolic function.  No segmental wall motion abnormalities. Normal left  ventricular internal dimensions and wall thicknesses.  Normal diastolic function.  Right Heart: Normal right atrium. Normal right ventricular  size and function. Normal tricuspid valve. Minimal  tricuspid regurgitation. Pulmonic valve not well  visualized, probably normal. No pulmonic regurgitation.  Pericardium/Pleura: No pericardial effusion.  Hemodynamic: Estimated right atrial pressure equals 3 mmHg.  Estimated right ventricular systolic pressure equals 13 mm  Hg, assuming right atrial pressure equals 3 mm Hg,  consistent with normal pulmonary pressures. Incomplete  tricuspid regurgitation Doppler envelopes in this study may  underestimate RVSP.  ------------------------------------------------------------------------  Conclusions:  1. Normal mitral valve. Mild mitral regurgitation.  2. Mildly calcified trileaflet aortic valve with normal  opening. No aortic valve regurgitation seen.  3. Mild aortic root and ascending aorta dilatation  (Ao:  4.3 cm at the sinuses of Valsalva). Ascending aorta  diameter: 4 cm.  4. Normal left ventricular systolic function. No segmental  wall motion abnormalities.  5. Normal right ventricular size and function.  *** No previous Echo exam.    < end of copied text >

## 2023-02-17 NOTE — CONSULT NOTE ADULT - ATTENDING COMMENTS
Pt is a 67M with PMHx COPD, active marijuana use, and HLD initially presenting to [facility] on 2/16/23 with acute on subacute dyspnea in the setting of pAT with normal cardiac fxn. Pulmonary consulted to evaluate for possible pulmonary contribution to pt's dyspnea. Pt is clinically well appearing and in no respiratory distress, is breathing comfortably on RA without wheezing. O/P records from pt's primary pulmonologist reviewed, pt with moderate obstructive airway disease without known bronchodilator response with decreased DLCO and normal lung volumes. CT imaging unremarkable. Pt does not appear to currently be in uncontrolled COPD exacerbation and would not benefit from any steroids or changes to current COPD management. Would c/w current inhaler therapy as above in fellow's note. No pulmonary c/i to hospital discharge, pt will f/u with his O/P pulmonologist (Dr. Sandhu) in 1-2 weeks. Discussed with pt and primary team.

## 2023-02-17 NOTE — CONSULT NOTE ADULT - SUBJECTIVE AND OBJECTIVE BOX
HPI:  67 y patient with a history of COPD with still marijuana smoking use (quit 1 pack /day cigarettes 5 years ago onset smoking at age 13),  hyperlipidemia maintained on Zocor, with patient with a Holter (with junctional tachyarrhythmia with patient advised to stop her Breo Ellipta temporarily but was restarted one week ago (patient admits noncompliance with use), with patient noting worsening dyspnoea for the past 3 days with dyspnoea with changing his clothes.  Patient was directed to the ER by his physicians.  Dyspnoea has been since 3 months.   NO chest pain/pressure.  NO cough.   NO fever, no chills, no rigors.  No N/V.  No abdominal pain, no red blood per rectum or melena.  No diaphoresis.       PAST MEDICAL & SURGICAL HISTORY:   High cholesterol      Insomnia      History of arthroscopy of left shoulder  age 15 &amp; right shoulder 3 yrs      H/O hernia repair  b/l inguinal &amp; abdominal      History of arthroplasty of right knee  5yrs          Review of Systems:   CONSTITUTIONAL: No fever, weight loss, or fatigue  EYES: No eye pain, visual disturbances, or discharge  ENMT:  No difficulty hearing, tinnitus, vertigo; No sinus or throat pain  NECK: No pain or stiffness  BREASTS: No pain, masses, or nipple discharge  RESPIRATORY: No cough, wheezing, chills or hemoptysis; + shortness of breath  CARDIOVASCULAR: No chest pain, palpitations, dizziness, or leg swelling  GASTROINTESTINAL: No abdominal or epigastric pain. No nausea, vomiting, or hematemesis; No diarrhea or constipation. No melena or hematochezia.  GENITOURINARY: No dysuria, frequency, hematuria, or incontinence  NEUROLOGICAL: No headaches, memory loss, loss of strength, numbness, or tremors  SKIN: No itching, burning, rashes, or lesions   LYMPH NODES: No enlarged glands  ENDOCRINE: No heat or cold intolerance; No hair loss  MUSCULOSKELETAL: No joint pain or swelling; No muscle, back, or extremity pain  PSYCHIATRIC: No depression, anxiety, mood swings, or difficulty sleeping  HEME/LYMPH: No easy bruising, or bleeding gums  ALLERY AND IMMUNOLOGIC: No hives or eczema    Allergies    Nuts (Unknown)  Originally Entered as [Unknown] reaction to [PUMPKIN SEEDS] (Unknown)  Vioxx (Unknown)    Intolerances        Social History:     FAMILY HISTORY:  No pertinent family history in first degree relatives        MEDICATIONS  (STANDING):  budesonide  80 MICROgram(s)/formoterol 4.5 MICROgram(s) Inhaler 2 Puff(s) Inhalation two times a day  enoxaparin Injectable 40 milliGRAM(s) SubCutaneous every 24 hours  metoprolol tartrate 12.5 milliGRAM(s) Oral two times a day  potassium chloride    Tablet ER 40 milliEquivalent(s) Oral once  simvastatin 40 milliGRAM(s) Oral at bedtime    MEDICATIONS  (PRN):  acetaminophen     Tablet .. 650 milliGRAM(s) Oral every 6 hours PRN Temp greater or equal to 38C (100.4F), Mild Pain (1 - 3)  HYDROmorphone   Tablet 4 milliGRAM(s) Oral three times a day PRN Moderate Pain (4 - 6)  levalbuterol Inhalation 0.63 milliGRAM(s) Inhalation every 6 hours PRN SOB/Wheezing        CAPILLARY BLOOD GLUCOSE        I&O's Summary      PHYSICAL EXAM:  GENERAL: NAD  HEAD:  Atraumatic, Normocephalic  EYES: EOMI, PERRLA, conjunctiva and sclera clear  NECK: Supple, No JVD  CHEST/LUNG: Clear to auscultation bilaterally; No wheeze  HEART: Regular rate and rhythm; No murmurs, rubs, or gallops  ABDOMEN: Soft, Nontender, Nondistended; Bowel sounds present  EXTREMITIES:  2+ Peripheral Pulses, No clubbing, cyanosis, or edema  PSYCH: AAOx3  NEUROLOGY: non-focal  SKIN: No rashes or lesions    LABS:                        13.9   6.32  )-----------( 254      ( 17 Feb 2023 06:15 )             42.4     02-17    141  |  105  |  20  ----------------------------<  115<H>  3.7   |  21<L>  |  0.86    Ca    9.2      17 Feb 2023 06:15    TPro  7.4  /  Alb  4.4  /  TBili  0.4  /  DBili  x   /  AST  21  /  ALT  19  /  AlkPhos  68  02-16        EKG SR NSST/T      RADIOLOGY & ADDITIONAL TESTS:    Imaging Personally Reviewed:  < from: CT Angio Chest PE Protocol w/ IV Cont (02.16.23 @ 19:49) >  IMPRESSION:    No pulmonary thromboembolism with caveat that motion artifact in the left   lower lobe precludes evaluation of some segmental and subsegmental   vessels.    < end of copied text >    Consultant(s) Notes Reviewed:      Care Discussed with Consultants/Other Providers:

## 2023-02-17 NOTE — DISCHARGE NOTE PROVIDER - HOSPITAL COURSE
67 M with hx of tobacco use, COPD, recently started on breo for copd sent today for increased dyspnea, WOB       #dyspnea, COPD  -recent increased dyspnea with normal lung exam, no evidence of volume overload/HF  -pulmonary doc dr antoine recommended he come to ER for PE r/o with CTA  -recent CTA cors with no obs CAD, no obv primary lung cancer  -sx correlate with new evidence of arrhythmia  -recent echo with nl lv fxn, valve fxn    #paroxysmal jxnl tachycardia  -high burden noted on holter/mct rates 's  -patient presented in office symptomatic in atrial arrhythmia   -Has episodes of PATs to 120s  -CTA w/o evidence of PE  -EP following  -Started on metoprolol 12.5mg bid  -Per EP, consider AAD vs ablation if symptoms/pAT persists on Metoprolol as outpatient    cleared for dc home by Dr Zeng and f/u with him in 1 week

## 2023-02-17 NOTE — CONSULT NOTE ADULT - REASON FOR ADMISSION
Three days of persistent dyspnoea worsening but from the last 3 months.

## 2023-02-17 NOTE — DISCHARGE NOTE NURSING/CASE MANAGEMENT/SOCIAL WORK - NSDCPEFALRISK_GEN_ALL_CORE
For information on Fall & Injury Prevention, visit: [email or web address] OR  [email or web address] OR  https://www.cdc.gov/steadi/patient.html

## 2023-02-17 NOTE — CONSULT NOTE ADULT - SUBJECTIVE AND OBJECTIVE BOX
CHIEF COMPLAINT:    HPI:  HPI:  NIGHT HOSPITALIST:   Patient UNKNOWN to me previously, assigned to me at this point via the ER and by Dr. Zeng to admit this 68 y/o M--patient seen with patient's spouse and adult daughter in attendance--patient with a history of COPD with still marijuana smoking use (quit 1 pack /day cigarettes 5 years ago onset smoking at age 13),  hyperlipidemia maintained on Zocor, with patient with a Holter (with junctional tachyarrhythmia with patient advised to stop her Breo Ellipta temporarily but was restarted one week ago (patient admits noncompliance with use), with patient noting worsening dyspnoea for the past 3 days with dyspnoea with changing his clothes.  Patient was directed to the ER by his physicians.  Dyspnoea has been since 3 months.   NO chest pain/pressure.  NO cough.   NO fever, no chills, no rigors.  No N/V.  No abdominal pain, no red blood per rectum or melena.  No diaphoresis. (16 Feb 2023 22:17)    former smoker, now only smokes joints of marijuana no vaping, has empysema/COPD on breo as an outpatient although intermittently not taking coming in for exertional shortness of breath that is chronic although acutely worsened due to possible arrthymia. Patient denies leg swelling, orthopnea, PND, denies cough, fevers, sick contacts, recent travel, productive mucus, hemopytsis. Denies wheezing has not been using rescue inhaler or nebulization. has nasal congestion,   has heart burn    PAST MEDICAL & SURGICAL HISTORY:  High cholesterol      Insomnia      History of arthroscopy of left shoulder  age 15 &amp; right shoulder 3 yrs      H/O hernia repair  b/l inguinal &amp; abdominal      History of arthroplasty of right knee  5yrs          FAMILY HISTORY:  No pertinent family history in first degree relatives        SOCIAL HISTORY:  former smoker now only marijuana, works outside in [place], reports minimal allergies     Allergies    Nuts (Unknown)  Originally Entered as [Unknown] reaction to [PUMPKIN SEEDS] (Unknown)  Vioxx (Unknown)    Intolerances        HOME MEDICATIONS:  Home Medications:  Breo Ellipta 100 mcg-25 mcg/inh inhalation powder: 1 puff(s) inhaled once a day (16 Feb 2023 22:20)  HYDROmorphone 4 mg oral tablet: orally 3 times a day, As Needed (16 Feb 2023 22:20)  simvastatin 40 mg oral tablet: 1 tab(s) orally once a day (at bedtime) (16 Feb 2023 22:20)      REVIEW OF SYSTEMS:  Patient denies fevers, chills, chest pain,  nausea, abdominal pain, diarrhea, constipation, dysuria, leg swelling, headache, light headedness    OBJECTIVE:  ICU Vital Signs Last 24 Hrs  T(C): 37.1 (17 Feb 2023 12:59), Max: 37.1 (17 Feb 2023 12:59)  T(F): 98.8 (17 Feb 2023 12:59), Max: 98.8 (17 Feb 2023 12:59)  HR: 65 (17 Feb 2023 12:59) (62 - 92)  BP: 132/69 (17 Feb 2023 12:59) (122/74 - 149/93)  BP(mean): --  ABP: --  ABP(mean): --  RR: 19 (17 Feb 2023 12:59) (17 - 19)  SpO2: 97% (17 Feb 2023 12:59) (96% - 98%)    O2 Parameters below as of 17 Feb 2023 12:59  Patient On (Oxygen Delivery Method): room air              CAPILLARY BLOOD GLUCOSE          PHYSICAL EXAM:  General: awake and alert, nontoxic appearing lying in bed  HEENT: NC/AT, EOMI b/l, conjunctiva normal, MMM  Lymph Nodes: no cervical LAD  Neck: supple. full range of motion  Respiratory: CTA b/l, no w/r/c, appears comfortable on RA, no conversational dyspnea or accessory muscle use  Cardiovascular: S1 S2 present, RRR, no m/r/g  Abdomen: soft, NT/ND, +BS  Extremities: no c/c/e  Skin: no rashes or lesions noted  Neurological: AAOx3, no focal deficits  Psychiatry: calm, cooperative    LINES:     HOSPITAL MEDICATIONS:  Standing Meds:  budesonide  80 MICROgram(s)/formoterol 4.5 MICROgram(s) Inhaler 2 Puff(s) Inhalation two times a day  enoxaparin Injectable 40 milliGRAM(s) SubCutaneous every 24 hours  metoprolol tartrate 12.5 milliGRAM(s) Oral two times a day  simvastatin 40 milliGRAM(s) Oral at bedtime      PRN Meds:  acetaminophen     Tablet .. 650 milliGRAM(s) Oral every 6 hours PRN  HYDROmorphone   Tablet 4 milliGRAM(s) Oral three times a day PRN  levalbuterol Inhalation 0.63 milliGRAM(s) Inhalation every 6 hours PRN      LABS:                        13.9   6.32  )-----------( 254      ( 17 Feb 2023 06:15 )             42.4     Hgb Trend: 13.9<--, 14.5<--  02-17    141  |  105  |  20  ----------------------------<  115<H>  3.7   |  21<L>  |  0.86    Ca    9.2      17 Feb 2023 06:15    TPro  7.4  /  Alb  4.4  /  TBili  0.4  /  DBili  x   /  AST  21  /  ALT  19  /  AlkPhos  68  02-16    Creatinine Trend: 0.86<--, 0.92<--        Venous Blood Gas:  02-16 @ 16:48  7.33/57/25/30/35.1  VBG Lactate: 1.3      MICROBIOLOGY:       RADIOLOGY:  [ ] Reviewed and interpreted by me    < from: CT Angio Chest PE Protocol w/ IV Cont (02.16.23 @ 19:49) >  Pulmonary angiogram: The contrast bolus is satisfactory however motion   artifact precludes evaluation of some segmental and subsegmental vessels   in the left lower lobe. There is otherwise no filling defect in the   pulmonary artery or its branches.    Lymph nodes: No lymphadenopathy.    Heart/vasculature: Heart is normal in size. There is mild coronary artery   calcification. No pericardial effusion. The mid ascending aorta measures   4 cm.    Airways/lungs/pleura: Mild retained secretions in the trachea. Expiratory   acquisition. No pleural effusion.    Upper abdomen: Numerous subcentimeter hepatic hypodensities gerardo right   hepatic lobe cyst, unchanged since 2019. Punctate nonobstructing left   renal stone.    Bones/soft tissues: There is a lucent T8 osseous lesion that is stable   since 1/29/2019 comparison. There are posttraumatic changes in the left   scapula with screw in the left coracoid process.    IMPRESSION:    No pulmonary thromboembolism with caveat that motion artifact in the left   lower lobe precludes evaluation of some segmental and subsegmental   vessels.      < end of copied text >      PULMONARY FUNCTION TESTS:    EKG:

## 2023-02-17 NOTE — PROGRESS NOTE ADULT - SUBJECTIVE AND OBJECTIVE BOX
CARDIOLOGY FOLLOW UP - Dr. Zeng  DATE OF SERVICE: 2/17/23    CC  Endorses occasional palpitations. Denies CP    REVIEW OF SYSTEMS:  CONSTITUTIONAL: No fever, weight loss, or fatigue  RESPIRATORY: No cough, wheezing, chills or hemoptysis; No Shortness of Breath  CARDIOVASCULAR: No chest pain, palpitations, passing out, dizziness, or leg swelling  GASTROINTESTINAL: No abdominal or epigastric pain. No nausea, vomiting, or hematemesis; No diarrhea or constipation. No melena or hematochezia.  VASCULAR: No edema     PHYSICAL EXAM:  T(C): 37.1 (02-17-23 @ 12:59), Max: 37.1 (02-17-23 @ 12:59)  HR: 65 (02-17-23 @ 12:59) (62 - 92)  BP: 132/69 (02-17-23 @ 12:59) (122/74 - 149/93)  RR: 19 (02-17-23 @ 12:59) (17 - 19)  SpO2: 97% (02-17-23 @ 12:59) (96% - 98%)  Wt(kg): --  I&O's Summary      Appearance: Normal	  Cardiovascular: Normal S1 S2,RRR, No JVD, No murmurs  Respiratory: Lungs clear to auscultation b/l  Gastrointestinal:  Soft, Non-tender, + BS	  Extremities: Normal range of motion, No clubbing, cyanosis or edema      Home Medications:  Breo Ellipta 100 mcg-25 mcg/inh inhalation powder: 1 puff(s) inhaled once a day (16 Feb 2023 22:20)  HYDROmorphone 4 mg oral tablet: orally 3 times a day, As Needed (16 Feb 2023 22:20)  simvastatin 40 mg oral tablet: 1 tab(s) orally once a day (at bedtime) (16 Feb 2023 22:20)      MEDICATIONS  (STANDING):  budesonide  80 MICROgram(s)/formoterol 4.5 MICROgram(s) Inhaler 2 Puff(s) Inhalation two times a day  enoxaparin Injectable 40 milliGRAM(s) SubCutaneous every 24 hours  metoprolol tartrate 12.5 milliGRAM(s) Oral two times a day  potassium chloride    Tablet ER 40 milliEquivalent(s) Oral once  simvastatin 40 milliGRAM(s) Oral at bedtime      TELEMETRY: 	    ECG:  	  RADIOLOGY:   < from: CT Angio Chest PE Protocol w/ IV Cont (02.16.23 @ 19:49) >  IMPRESSION:    No pulmonary thromboembolism with caveat that motion artifact in the left   lower lobe precludes evaluation of some segmental and subsegmental   vessels.    --- End of Report ---    < end of copied text >    DIAGNOSTIC TESTING:  [ ] Echocardiogram:  [ ]  Catheterization:  [ ] Stress Test:    OTHER: 	    LABS:	 	    Troponin T, High Sensitivity Result: 17 ng/L [0 - 51] (02-16 @ 23:05)  Troponin T, High Sensitivity Result: 12 ng/L [0 - 51] (02-16 @ 17:05)                          13.9   6.32  )-----------( 254      ( 17 Feb 2023 06:15 )             42.4     02-17    141  |  105  |  20  ----------------------------<  115<H>  3.7   |  21<L>  |  0.86    Ca    9.2      17 Feb 2023 06:15    TPro  7.4  /  Alb  4.4  /  TBili  0.4  /  DBili  x   /  AST  21  /  ALT  19  /  AlkPhos  68  02-16

## 2023-02-17 NOTE — CONSULT NOTE ADULT - NS ATTEND AMEND GEN_ALL_CORE FT
Patient seen at bedside in the ED. I discussed this case with Dr. Zeng who provided me with outpatient records. Non-sustained pAT. Preseved LVSF. I spoke with the patient about the various treatment options including B-blockers, AAD and catheter ablation. His preference was a trial of a B-blocker. Side effects outlined. I suggested that he follow-up with me in the office if his symptoms persist despite BB.    JONATHAN Meza

## 2023-02-17 NOTE — CONSULT NOTE ADULT - ASSESSMENT
66 yo former cigarette smoker, with history of COPD not on home O2, GERD, chronic back pain, post nasal drip, HLD, allergic rhinitis sent in to the ED for further workup after holter monitor picked up junctional arrthymia with increased MELENDREZ    #COPD  #MELENDREZ  #GERD  #PND  #Allergic rhinitis    PFTs 9/2/2022 read as moderate obstructive airways disease without bronchodilator response with normal lung volumes and mildly decreased DLCO concerning for empysema/COPD    CTA: no PE, mild retained secretions in the trachea, otherwise parenchyma normal    ECHO: LV normal sys and diast, RV normal, no PHTN    recommendations  patient in no respiratory distress on RA with good air movement and no wheezing  has minimal cough likely secondary to allergies/post nasal drip  not in COPD exacerbation  obtain ambulatory saturation on RA  give flonase for post nasal drip and allergic rhinitis  PPI for GERD  continue symbicort and PRN albuterol  can be discharged back on breo and PRN albuterol  should follow up with his outpatient Pulmonologist    no further pulmonary interventions or recommendations planned

## 2023-02-17 NOTE — DISCHARGE NOTE PROVIDER - NSDCMRMEDTOKEN_GEN_ALL_CORE_FT
WDL HYDROmorphone 4 mg oral tablet: orally 3 times a day, As Needed  Metoprolol Succinate ER 25 mg oral tablet, extended release: 1 tab(s) orally once a day   simvastatin 40 mg oral tablet: 1 tab(s) orally once a day (at bedtime)  Symbicort 80 mcg-4.5 mcg/inh inhalation aerosol: 1 puff(s) inhaled 2 times a day

## 2023-02-17 NOTE — DISCHARGE NOTE NURSING/CASE MANAGEMENT/SOCIAL WORK - PATIENT PORTAL LINK FT
You can access the FollowMyHealth Patient Portal offered by [facility] by registering at the following website: [email or web address]. By joining [facility] [employer] [facility]’s FollowMyHealth portal, you will also be able to view your health information using other applications (apps) compatible with our system.

## 2023-02-17 NOTE — PROGRESS NOTE ADULT - TIME BILLING
Patient seen and examined.  Agree with above PA note.  cv stable  likely symptomatic PAT  cta negative for pe  eps eval appreciated  start toprol xl 25 qd  d/c home now  outpt f/u  d/w acp

## 2023-02-17 NOTE — DISCHARGE NOTE PROVIDER - CARE PROVIDER_API CALL
Jaylon Zeng (MD)  Cardiovascular Disease; Interventional Cardiology; Nuclear Cardiology  [address], [address]  [place] [address]  Phone: [phone]  Fax: [phone]  Follow Up Time: 1 week

## 2023-02-17 NOTE — DISCHARGE NOTE PROVIDER - NSDCCPCAREPLAN_GEN_ALL_CORE_FT
PRINCIPAL DISCHARGE DIAGNOSIS  Diagnosis: Shortness of breath  Assessment and Plan of Treatment: resolved. Meds changed to Symbicort, dc Breo  follow up with pcp in a few days return if worsen      SECONDARY DISCHARGE DIAGNOSES  Diagnosis: Chronic obstructive pulmonary disease  Assessment and Plan of Treatment: plan as above    Diagnosis: Junctional tachycardia  Assessment and Plan of Treatment: Take meds as prescribed, plan as above

## 2023-02-17 NOTE — PROGRESS NOTE ADULT - ASSESSMENT
A/P  67 M with hx of tobacco use, COPD, recently started on breo for copd sent today for increased dyspnea, WOB       #dyspnea, COPD  -recent increased dyspnea with normal lung exam, no evidence of volume overload/HF  -pulmonary doc dr antoine recommended he come to ER for PE r/o with CTA  -recent CTA cors with no obs CAD, no obv primary lung cancer  -sx correlate with new evidence of arrhythmia  -recent echo with nl lv fxn, valve fxn    #paroxysmal jxnl tachycardia  -high burden noted on holter/mct rates 's  -patient presented in office symptomatic in atrial arrhythmia   -Has episodes of PATs to 120s  -CTA w/o evidence of PE  -EP following  -Started on metoprolol 12.5mg bid  -Per EP, consider AAD vs ablation if symptoms/pAT persists on Metoprolol as outpatient      Please call/text me with any questions/concerns between 8am-4pm  [phone]

## 2023-02-17 NOTE — CONSULT NOTE ADULT - ASSESSMENT
66 y/o man with a history of COPD with still marijuana smoking use (quit 1 pack /day cigarettes 5 years ago onset smoking at age 13), hyperlipidemia maintained on Zocor who presents with worsening MELENDREZ. He recently wore a Holter for ?dizziness/SOB that revealed ?junctional tachyarrhythmia/AT as well as pAT on telemetry.    1. pAT  2. COPD    - SR 70-80s with periods of pAT 110-120s  - Unclear if brief pAT is contributing to his MELENDREZ  - Echocardiogram with preserved LV function (EF 66%), mild MR  - Would start low dose Metoprolol  - Close telemetry monitoring  - Maintain K>4.0 and Mg>2.0   66 y/o man with a history of COPD with still marijuana smoking use (quit 1 pack /day cigarettes 5 years ago onset smoking at age 13), hyperlipidemia maintained on Zocor who presents with worsening MELENDREZ. He recently wore a Holter for ?dizziness/SOB that revealed ?junctional tachyarrhythmia/AT as well as pAT on telemetry.    1. pAT  2. COPD    - SR 70-80s with periods of pAT 110-120s  - Echocardiogram with preserved LV function (EF 66%), mild MR  - Would start Metoprolol Succinate 25 mg daily  - Consider AAD vs ablation if symptoms/pAT persists on Metoprolol as outpatient  - Close telemetry monitoring  - Maintain K>4.0 and Mg>2.0   66 y/o man with a history of COPD with still marijuana smoking use (quit 1 pack /day cigarettes 5 years ago onset smoking at age 13), hyperlipidemia maintained on Zocor who presents with worsening MELENDREZ. He recently wore a Holter for ?dizziness/SOB that revealed ?junctional tachyarrhythmia/AT as well as pAT on telemetry.    1. pAT  2. COPD    - SR 70-80s with periods of pAT 110-120s  - Echocardiogram with preserved LV function (EF 66%), mild MR  - Would start Metoprolol  - Consider AAD vs ablation if symptoms/pAT persists on Metoprolol as outpatient  - Close telemetry monitoring  - Maintain K>4.0 and Mg>2.0   68 y/o man with a history of COPD with still marijuana smoking use (quit 1 pack /day cigarettes 5 years ago onset smoking at age 13), hyperlipidemia maintained on Zocor who presents with worsening MELENDREZ. He recently wore a Holter for ?dizziness/SOB that revealed pAT.    1. pAT  2. COPD    - SR 70-80s with periods of pAT 110-120s  - Echocardiogram with preserved LV function (EF 66%), mild MR  - Would start Metoprolol  - Consider AAD vs ablation if symptoms/pAT persists on Metoprolol as outpatient  - Close telemetry monitoring  - Maintain K>4.0 and Mg>2.0

## 2023-02-17 NOTE — CONSULT NOTE ADULT - ASSESSMENT
67 m with dyspnoea as above for the past 3 days but apparently since 3 months in the setting of patient with active marijuana smoking use and past heavy tobacco use.   CTT angiogram of lung with NO PE.     Sent in by his cardiologist as above.   Echo ordered.    Chronic obstructive pulmonary disease.   - Symbicort   - Xopenex   - pulmonary evaluation  PAT  - telemetry  - BB    Marijuana use.   - advised to discontinue marijuana smoking.    Hyperlipidemia.   - statin.    Pharmacologic DVT prophylaxis.    Further action as per clinical course     Daniele Gutiérrez MD phone [phone]

## 2023-02-21 ENCOUNTER — NON-APPOINTMENT (OUTPATIENT)
Age: 68
End: 2023-02-21

## 2023-02-21 DIAGNOSIS — N52.9 MALE ERECTILE DYSFUNCTION, UNSPECIFIED: ICD-10-CM

## 2023-02-21 DIAGNOSIS — N40.0 BENIGN PROSTATIC HYPERPLASIA WITHOUT LOWER URINARY TRACT SYMPMS: ICD-10-CM

## 2023-02-21 RX ORDER — METHOCARBAMOL 750 MG/1
750 TABLET, FILM COATED ORAL EVERY 8 HOURS
Qty: 30 | Refills: 1 | Status: COMPLETED | COMMUNITY
Start: 2022-01-25 | End: 2023-02-21

## 2023-02-21 RX ORDER — DIAZEPAM 5 MG/1
5 TABLET ORAL TWICE DAILY
Qty: 10 | Refills: 0 | Status: COMPLETED | COMMUNITY
Start: 2022-09-01 | End: 2023-02-21

## 2023-02-21 RX ORDER — OMEPRAZOLE 40 MG/1
40 CAPSULE, DELAYED RELEASE ORAL
Qty: 90 | Refills: 3 | Status: COMPLETED | COMMUNITY
Start: 2021-12-27 | End: 2023-02-21

## 2023-02-21 RX ORDER — FLUTICASONE FUROATE AND VILANTEROL TRIFENATATE 100; 25 UG/1; UG/1
100-25 POWDER RESPIRATORY (INHALATION)
Qty: 60 | Refills: 0 | Status: COMPLETED | COMMUNITY
Start: 2022-10-03 | End: 2023-02-21

## 2023-02-21 RX ORDER — MUPIROCIN 2 G/100G
2 CREAM TOPICAL TWICE DAILY
Qty: 1 | Refills: 0 | Status: COMPLETED | COMMUNITY
Start: 2021-05-24 | End: 2023-02-21

## 2023-02-21 RX ORDER — FAMOTIDINE 40 MG/1
40 TABLET, FILM COATED ORAL
Qty: 90 | Refills: 3 | Status: COMPLETED | COMMUNITY
Start: 2021-12-27 | End: 2023-02-21

## 2023-02-24 ENCOUNTER — OUTPATIENT (OUTPATIENT)
Dept: OUTPATIENT SERVICES | Facility: HOSPITAL | Age: 68
LOS: 1 days | Discharge: HOME | End: 2023-02-24
Payer: MEDICARE

## 2023-02-24 DIAGNOSIS — R07.9 CHEST PAIN, UNSPECIFIED: ICD-10-CM

## 2023-02-24 DIAGNOSIS — Z98.890 OTHER SPECIFIED POSTPROCEDURAL STATES: Chronic | ICD-10-CM

## 2023-02-24 DIAGNOSIS — Z96.651 PRESENCE OF RIGHT ARTIFICIAL KNEE JOINT: Chronic | ICD-10-CM

## 2023-02-24 LAB
ANION GAP SERPL CALC-SCNC: 9 MMOL/L — SIGNIFICANT CHANGE UP (ref 7–14)
BUN SERPL-MCNC: 25 MG/DL — HIGH (ref 7–23)
CALCIUM SERPL-MCNC: 9.3 MG/DL — SIGNIFICANT CHANGE UP (ref 8.4–10.5)
CHLORIDE SERPL-SCNC: 104 MMOL/L — SIGNIFICANT CHANGE UP (ref 98–107)
CO2 SERPL-SCNC: 25 MMOL/L — SIGNIFICANT CHANGE UP (ref 22–31)
CREAT SERPL-MCNC: 0.92 MG/DL — SIGNIFICANT CHANGE UP (ref 0.5–1.3)
EGFR: 90 ML/MIN/1.73M2 — SIGNIFICANT CHANGE UP
GLUCOSE SERPL-MCNC: 103 MG/DL — HIGH (ref 70–99)
HCT VFR BLD CALC: 42.8 % — SIGNIFICANT CHANGE UP (ref 39–50)
HGB BLD-MCNC: 14 G/DL — SIGNIFICANT CHANGE UP (ref 13–17)
MAGNESIUM SERPL-MCNC: 2 MG/DL — SIGNIFICANT CHANGE UP (ref 1.6–2.6)
MCHC RBC-ENTMCNC: 29.3 PG — SIGNIFICANT CHANGE UP (ref 27–34)
MCHC RBC-ENTMCNC: 32.7 GM/DL — SIGNIFICANT CHANGE UP (ref 32–36)
MCV RBC AUTO: 89.5 FL — SIGNIFICANT CHANGE UP (ref 80–100)
NRBC # BLD: 0 /100 WBCS — SIGNIFICANT CHANGE UP (ref 0–0)
NRBC # FLD: 0 K/UL — SIGNIFICANT CHANGE UP (ref 0–0)
PLATELET # BLD AUTO: 266 K/UL — SIGNIFICANT CHANGE UP (ref 150–400)
POTASSIUM SERPL-MCNC: 4.2 MMOL/L — SIGNIFICANT CHANGE UP (ref 3.5–5.3)
POTASSIUM SERPL-SCNC: 4.2 MMOL/L — SIGNIFICANT CHANGE UP (ref 3.5–5.3)
RBC # BLD: 4.78 M/UL — SIGNIFICANT CHANGE UP (ref 4.2–5.8)
RBC # FLD: 13.3 % — SIGNIFICANT CHANGE UP (ref 10.3–14.5)
SODIUM SERPL-SCNC: 138 MMOL/L — SIGNIFICANT CHANGE UP (ref 135–145)
WBC # BLD: 5.26 K/UL — SIGNIFICANT CHANGE UP (ref 3.8–10.5)
WBC # FLD AUTO: 5.26 K/UL — SIGNIFICANT CHANGE UP (ref 3.8–10.5)

## 2023-02-24 PROCEDURE — 93010 ELECTROCARDIOGRAM REPORT: CPT

## 2023-02-24 RX ORDER — SODIUM CHLORIDE 9 MG/ML
250 INJECTION INTRAMUSCULAR; INTRAVENOUS; SUBCUTANEOUS ONCE
Refills: 0 | Status: COMPLETED | OUTPATIENT
Start: 2023-02-24 | End: 2023-02-24

## 2023-02-24 RX ORDER — SODIUM CHLORIDE 9 MG/ML
3 INJECTION INTRAMUSCULAR; INTRAVENOUS; SUBCUTANEOUS EVERY 8 HOURS
Refills: 0 | Status: DISCONTINUED | OUTPATIENT
Start: 2023-02-24 | End: 2023-03-10

## 2023-02-24 RX ORDER — SODIUM CHLORIDE 9 MG/ML
1000 INJECTION INTRAMUSCULAR; INTRAVENOUS; SUBCUTANEOUS
Refills: 0 | Status: DISCONTINUED | OUTPATIENT
Start: 2023-02-24 | End: 2023-03-10

## 2023-02-24 RX ADMIN — SODIUM CHLORIDE 3 MILLILITER(S): 9 INJECTION INTRAMUSCULAR; INTRAVENOUS; SUBCUTANEOUS at 15:02

## 2023-02-24 RX ADMIN — SODIUM CHLORIDE 500 MILLILITER(S): 9 INJECTION INTRAMUSCULAR; INTRAVENOUS; SUBCUTANEOUS at 12:19

## 2023-02-24 RX ADMIN — SODIUM CHLORIDE 75 MILLILITER(S): 9 INJECTION INTRAMUSCULAR; INTRAVENOUS; SUBCUTANEOUS at 12:19

## 2023-02-24 NOTE — H&P CARDIOLOGY - NSICDXFAMILYHX_GEN_ALL_CORE_FT
FAMILY HISTORY:  Sibling  Still living? Yes, Estimated age: Age Unknown  Family history of early CAD, Age at diagnosis: Age Unknown

## 2023-02-24 NOTE — H&P CARDIOLOGY - HISTORY OF PRESENT ILLNESS
68 y/o M with PMH of HLD, chronic back pain, Asthma? presented to [facility] for LHC for chronic SOB even if TTE and CTA negative for any acute pathology. Patient stated that he develops MELENDREZ when he is climbing uphill and when he is walking many blocks. Patient stated that these symptoms occurred few months ago and endorsed it to his cardiologist. Patient stated that after he got discharged from the hospital he developed 2 episodes of midsternal chest pain when he was walking uphill. Patient stated that the first episode last about 4-5 hours and then self resolved. Patient then again developed similar midsternal chest pain the next day but that lasted 1 hour. Patient otherwise denied any radiation of the chest pain and the pain self resolved. Patient otherwise denied any fevers, chills, N/V/D/C, abdominal pain, dysuria, melena, hematochezia, recent travel, sick contact, cough, body aches, pleuritic or positional chest pain.     COVID PCR not detected on 02/22/23

## 2023-02-27 ENCOUNTER — APPOINTMENT (OUTPATIENT)
Dept: FAMILY MEDICINE | Facility: CLINIC | Age: 68
End: 2023-02-27
Payer: MEDICARE

## 2023-02-27 VITALS
WEIGHT: 157 LBS | OXYGEN SATURATION: 97 % | RESPIRATION RATE: 20 BRPM | HEIGHT: 71 IN | SYSTOLIC BLOOD PRESSURE: 114 MMHG | DIASTOLIC BLOOD PRESSURE: 75 MMHG | HEART RATE: 64 BPM | BODY MASS INDEX: 21.98 KG/M2

## 2023-02-27 DIAGNOSIS — R06.00 DYSPNEA, UNSPECIFIED: ICD-10-CM

## 2023-02-27 DIAGNOSIS — I47.1 SUPRAVENTRICULAR TACHYCARDIA: ICD-10-CM

## 2023-02-27 PROCEDURE — 99495 TRANSJ CARE MGMT MOD F2F 14D: CPT

## 2023-02-27 NOTE — PHYSICAL EXAM
[No Acute Distress] : no acute distress [No Respiratory Distress] : no respiratory distress  [No Accessory Muscle Use] : no accessory muscle use [Normal] : normal rate, regular rhythm, normal S1 and S2 and no murmur heard [No Edema] : there was no peripheral edema [Soft] : abdomen soft [Non Tender] : non-tender [Normal Posterior Cervical Nodes] : no posterior cervical lymphadenopathy [Normal Anterior Cervical Nodes] : no anterior cervical lymphadenopathy [No Focal Deficits] : no focal deficits [Alert and Oriented x3] : oriented to person, place, and time [de-identified] : few rare dry creps noted

## 2023-02-27 NOTE — HISTORY OF PRESENT ILLNESS
[Post-hospitalization from ___ Hospital] : Post-hospitalization from [unfilled] Hospital [Admitted on: ___] : The patient was admitted on [unfilled] [Discharged on ___] : discharged on [unfilled] [Discharge Summary] : discharge summary [Pertinent Labs] : pertinent labs [Radiology Findings] : radiology findings [Discharge Med List] : discharge medication list [Other: ____] : [unfilled] [Med Reconciliation] : medication reconciliation has been completed [Patient Contacted By: ____] : and contacted by [unfilled] [FreeTextEntry2] : Sent to the ER due to persistent SOB.  Workup revealed evidence of COPD but no acute pulmonary process; PE ruled out; noted to have episodes of PAT; controlled with B-blocker with thought of ablation in the future if recurrent issue.  Pt currently does have some "windedness" but denies distress.

## 2023-03-01 ENCOUNTER — EMERGENCY (EMERGENCY)
Facility: HOSPITAL | Age: 68
LOS: 1 days | Discharge: HOME | End: 2023-03-01
Attending: STUDENT IN AN ORGANIZED HEALTH CARE EDUCATION/TRAINING PROGRAM | Admitting: STUDENT IN AN ORGANIZED HEALTH CARE EDUCATION/TRAINING PROGRAM
Payer: MEDICARE

## 2023-03-01 VITALS
HEIGHT: 72 IN | DIASTOLIC BLOOD PRESSURE: 64 MMHG | TEMPERATURE: 97 F | OXYGEN SATURATION: 99 % | RESPIRATION RATE: 19 BRPM | WEIGHT: 160.06 LBS | HEART RATE: 70 BPM | SYSTOLIC BLOOD PRESSURE: 151 MMHG

## 2023-03-01 DIAGNOSIS — Z96.651 PRESENCE OF RIGHT ARTIFICIAL KNEE JOINT: Chronic | ICD-10-CM

## 2023-03-01 DIAGNOSIS — Z98.890 OTHER SPECIFIED POSTPROCEDURAL STATES: Chronic | ICD-10-CM

## 2023-03-01 PROBLEM — Z87.39 PERSONAL HISTORY OF OTHER DISEASES OF THE MUSCULOSKELETAL SYSTEM AND CONNECTIVE TISSUE: Chronic | Status: ACTIVE | Noted: 2023-02-24

## 2023-03-01 PROCEDURE — 99284 EMERGENCY DEPT VISIT MOD MDM: CPT

## 2023-03-01 PROCEDURE — 99283 EMERGENCY DEPT VISIT LOW MDM: CPT

## 2023-03-01 RX ADMIN — Medication 1 DROP(S): at 01:42

## 2023-03-01 NOTE — ED PROVIDER NOTE - CLINICAL SUMMARY MEDICAL DECISION MAKING FREE TEXT BOX
Left eye irritation. No FB seen. No corneal abrasion. Possible chalazion. DC with ophtho fu Pt present with FB sensation L eye x1 day. No hx of FB entering eye. No change in vision, eye redness. Pt does not wear corrective lenses. Exam as described.  No FB seen. No corneal abrasion. Possible chalazion. Doubt vision threatening pathology. Instructed to use warm compresses.  DC with ophtho fu

## 2023-03-01 NOTE — ED PROVIDER NOTE - PATIENT PORTAL LINK FT
You can access the FollowMyHealth Patient Portal offered by [facility] by registering at the following website: [email or web address]. By joining [employer]’s FollowMyHealth portal, you will also be able to view your health information using other applications (apps) compatible with our system.

## 2023-03-01 NOTE — ED PROVIDER NOTE - NSFOLLOWUPCLINICS_GEN_ALL_ED_FT
[facility]  Ophthalmology  [address] [place] [address] [address]  [place] [address]  Phone: [phone]  Fax:   Follow Up Time: 4-6 Days

## 2023-03-01 NOTE — ED PROVIDER NOTE - NSFOLLOWUPINSTRUCTIONS_ED_ALL_ED_FT
Your symptoms are possibly due to atrial Aceon.  Please apply warm compresses throughout the day.  Follow-up with ophthalmology.  Return to the ED for ocular swelling, change in vision, fever, any other concerning symptoms.    Chalazion    A normal eye next to an eye that has a chalazion on the lower eyelid.   A chalazion is a swelling or lump on the eyelid. It can affect the upper eyelid or the lower eyelid.      What are the causes?    This condition may be caused by:  •Long-lasting (chronic) inflammation of the eyelid glands.      •A blocked oil gland in the eyelid.        What are the signs or symptoms?    Symptoms of this condition include:•Swelling of the eyelid that:  •May spread to areas around the eye.      •May be painful.        •A hard lump on the eyelid.      •Blurry vision. The lump may make it hard to see out of the eye.        How is this diagnosed?    This condition is diagnosed with an examination of the eye.      How is this treated?    This condition is treated by applying a warm, moist cloth (warm compress) to the eyelid. If the condition does not improve, it may be treated with:  •Medicine that is applied to the eye.      •Oral medicines.      •Medicine that is injected into the chalazion.      •Surgery.        Follow these instructions at home:    Managing pain and swelling     •Apply a warm compress to the eyelid for 10–15 minutes, 4 to 6 times a day. This will help to open any blocked glands and to reduce redness and swelling.      •Take and apply over-the-counter and prescription medicines only as told by your health care provider.      General instructions     • Do not touch the chalazion.      • Do not try to remove the pus. Do not squeeze the chalazion or stick it with a pin or needle.      • Do not rub your eyes.      •Wash your hands often with soap and water for at least 20 seconds. Dry your hands with a clean towel.      •Keep your face, scalp, and eyebrows clean.      •Avoid wearing eye makeup.      •Keep all follow-up visits. This is important.        Contact a health care provider if:    •Your eyelid is getting worse.      •You have a fever.      •The chalazion does not break open (rupture) or go away on its own and your eyelid has not improved for 4 weeks.        Get help right away if:    •You have pain in your eye.      •Your vision worsens.      •The chalazion becomes painful or red.      •The chalazion gets bigger.        Summary    •A chalazion is a swelling or lump on the upper or lower eyelid.      •It may be caused by chronic inflammation or a blocked oil gland.      •Apply a warm compress to the eyelid for 10–15 minutes, 4 to 6 times a day.      •Keep your face, scalp, and eyebrows clean.      This information is not intended to replace advice given to you by your health care provider. Make sure you discuss any questions you have with your health care provider.      Document Revised: 02/23/2022 Document Reviewed: 02/23/2022    Elsevier Patient Education © 2023 [employer]

## 2023-03-01 NOTE — ED ADULT TRIAGE NOTE - CHIEF COMPLAINT QUOTE
Patient states "I feel like there is something in my eye". Patient denies visual changes, dizziness and blurry vision. Patient endorses washing out the eye prior to ED arrival, with some relief.

## 2023-03-01 NOTE — ED ADULT TRIAGE NOTE - HEIGHT IN FEET
Detail Level: Simple
Has The Patient Been Infected With Covid-19 In The Past?: No
Previous Infection Text: The patient has recovered from a previous COVID-19 infection.
6

## 2023-03-01 NOTE — ED ADULT NURSE NOTE - CINV DISCH TEACH PARTICIP
December 22, 2021      Donna Larose  [address] [facility]   [place] [address]        Dear Employer    Donna Larose is a patient of mine here at [facility].  She has severe persistent asthma and severe allergies, which can be exacerbated by going in and out of the cold air.  She has been taking her medications and using her inhaler, which are helping, but limiting exposure to cold air is important.  Please provide appropriate accommodations for her, which may include work from home options, limited to no time spent outside, and breaks as needed when she is feeling short of breath.  Our goal is to keep her breathing under good control and keep her out of the hospital.  Please call the clinic at [phone] if you have any questions.        Sincerely,    Cammie Bond MD  
RETURN TO WORK/SCHOOL FORM    12/22/2021    Re: Donna Larose  1994      To Whom It May Concern:     Donna Larose was seen in clinic today..  She may return to work with restrictions on 12/23/21          Restrictions:  See additional letter for recommended work restrictions.    Cammie Bond MD  12/22/2021 9:19 AM   
Patient/Spouse

## 2023-03-01 NOTE — ED PROVIDER NOTE - EYES, MLM
Clear bilaterally, pupils equal, round and reactive to light. EOMI. No orbital swelling. Lids everted, no FB seen. SMall area of swelling near L lateral canthus, which may represent chalazion. Conjunctiva wnl. Flourescein stain with no corneal abrasion. VA 20/40 L, 20/70R

## 2023-03-01 NOTE — ED ADULT NURSE NOTE - OBJECTIVE STATEMENT
Patient reports "I feel like there's something in my eye, I tried to wash it out". Patient denies chest pain, SOB, headache, dizziness and blurry vision.

## 2023-03-04 NOTE — CHART NOTE - NSCHARTNOTEFT_GEN_A_CORE
SW called pt to discuss and assist with follow up care.  Pt is a 68 y/o male presented to ED for eye foreign body.  Pt did not respond to the phone call, left message with call back # if further assistance is needed.

## 2023-04-15 NOTE — COUNSELING
[de-identified] : Healthy eating and activities [None] : None [Good understanding] : Patient has a good understanding of lifestyle changes and steps needed to achieve self management goal Coagulation

## 2023-05-06 ENCOUNTER — TRANSCRIPTION ENCOUNTER (OUTPATIENT)
Age: 68
End: 2023-05-06

## 2023-08-24 ENCOUNTER — NON-APPOINTMENT (OUTPATIENT)
Age: 68
End: 2023-08-24

## 2023-10-12 ENCOUNTER — NON-APPOINTMENT (OUTPATIENT)
Age: 68
End: 2023-10-12

## 2023-10-13 ENCOUNTER — INPATIENT (INPATIENT)
Facility: HOSPITAL | Age: 68
LOS: 6 days | Discharge: NURSING, REHAB OR LONG-TERM CARE | DRG: 957 | End: 2023-10-20
Attending: NEUROLOGICAL SURGERY | Admitting: SURGERY
Payer: MEDICARE

## 2023-10-13 ENCOUNTER — TRANSCRIPTION ENCOUNTER (OUTPATIENT)
Age: 68
End: 2023-10-13

## 2023-10-13 VITALS
DIASTOLIC BLOOD PRESSURE: 96 MMHG | SYSTOLIC BLOOD PRESSURE: 172 MMHG | RESPIRATION RATE: 26 BRPM | HEART RATE: 107 BPM | TEMPERATURE: 99 F | OXYGEN SATURATION: 95 %

## 2023-10-13 DIAGNOSIS — Z96.651 PRESENCE OF RIGHT ARTIFICIAL KNEE JOINT: Chronic | ICD-10-CM

## 2023-10-13 DIAGNOSIS — Z98.890 OTHER SPECIFIED POSTPROCEDURAL STATES: Chronic | ICD-10-CM

## 2023-10-13 DIAGNOSIS — S22.41XA MULTIPLE FRACTURES OF RIBS, RIGHT SIDE, INITIAL ENCOUNTER FOR CLOSED FRACTURE: ICD-10-CM

## 2023-10-13 LAB
ALBUMIN SERPL ELPH-MCNC: 4.2 G/DL — SIGNIFICANT CHANGE UP (ref 3.3–5)
ALP SERPL-CCNC: 69 U/L — SIGNIFICANT CHANGE UP (ref 40–120)
ALT FLD-CCNC: 50 U/L — HIGH (ref 10–45)
ANION GAP SERPL CALC-SCNC: 13 MMOL/L — SIGNIFICANT CHANGE UP (ref 5–17)
AST SERPL-CCNC: 62 U/L — HIGH (ref 10–40)
BASE EXCESS BLDV CALC-SCNC: 3.4 MMOL/L — HIGH (ref -2–3)
BASOPHILS # BLD AUTO: 0.04 K/UL — SIGNIFICANT CHANGE UP (ref 0–0.2)
BASOPHILS NFR BLD AUTO: 0.4 % — SIGNIFICANT CHANGE UP (ref 0–2)
BILIRUB SERPL-MCNC: 0.5 MG/DL — SIGNIFICANT CHANGE UP (ref 0.2–1.2)
BUN SERPL-MCNC: 22 MG/DL — SIGNIFICANT CHANGE UP (ref 7–23)
CA-I SERPL-SCNC: 1.22 MMOL/L — SIGNIFICANT CHANGE UP (ref 1.15–1.33)
CALCIUM SERPL-MCNC: 9.6 MG/DL — SIGNIFICANT CHANGE UP (ref 8.4–10.5)
CHLORIDE BLDV-SCNC: 104 MMOL/L — SIGNIFICANT CHANGE UP (ref 96–108)
CHLORIDE SERPL-SCNC: 104 MMOL/L — SIGNIFICANT CHANGE UP (ref 96–108)
CO2 BLDV-SCNC: 31 MMOL/L — HIGH (ref 22–26)
CO2 SERPL-SCNC: 25 MMOL/L — SIGNIFICANT CHANGE UP (ref 22–31)
CREAT SERPL-MCNC: 1.01 MG/DL — SIGNIFICANT CHANGE UP (ref 0.5–1.3)
EGFR: 80 ML/MIN/1.73M2 — SIGNIFICANT CHANGE UP
EOSINOPHIL # BLD AUTO: 0.05 K/UL — SIGNIFICANT CHANGE UP (ref 0–0.5)
EOSINOPHIL NFR BLD AUTO: 0.5 % — SIGNIFICANT CHANGE UP (ref 0–6)
ETHANOL SERPL-MCNC: <10 MG/DL — SIGNIFICANT CHANGE UP (ref 0–10)
GAS PNL BLDV: 139 MMOL/L — SIGNIFICANT CHANGE UP (ref 136–145)
GAS PNL BLDV: SIGNIFICANT CHANGE UP
GAS PNL BLDV: SIGNIFICANT CHANGE UP
GLUCOSE BLDV-MCNC: 214 MG/DL — HIGH (ref 70–99)
GLUCOSE SERPL-MCNC: 198 MG/DL — HIGH (ref 70–99)
HCO3 BLDV-SCNC: 30 MMOL/L — HIGH (ref 22–29)
HCT VFR BLD CALC: 41.2 % — SIGNIFICANT CHANGE UP (ref 39–50)
HCT VFR BLDA CALC: 42 % — SIGNIFICANT CHANGE UP (ref 39–51)
HGB BLD CALC-MCNC: 14 G/DL — SIGNIFICANT CHANGE UP (ref 12.6–17.4)
HGB BLD-MCNC: 13.7 G/DL — SIGNIFICANT CHANGE UP (ref 13–17)
IMM GRANULOCYTES NFR BLD AUTO: 1.1 % — HIGH (ref 0–0.9)
LACTATE BLDV-MCNC: 1.7 MMOL/L — SIGNIFICANT CHANGE UP (ref 0.5–2)
LIDOCAIN IGE QN: 28 U/L — SIGNIFICANT CHANGE UP (ref 7–60)
LYMPHOCYTES # BLD AUTO: 1.81 K/UL — SIGNIFICANT CHANGE UP (ref 1–3.3)
LYMPHOCYTES # BLD AUTO: 18 % — SIGNIFICANT CHANGE UP (ref 13–44)
MCHC RBC-ENTMCNC: 30.5 PG — SIGNIFICANT CHANGE UP (ref 27–34)
MCHC RBC-ENTMCNC: 33.3 GM/DL — SIGNIFICANT CHANGE UP (ref 32–36)
MCV RBC AUTO: 91.8 FL — SIGNIFICANT CHANGE UP (ref 80–100)
MONOCYTES # BLD AUTO: 0.62 K/UL — SIGNIFICANT CHANGE UP (ref 0–0.9)
MONOCYTES NFR BLD AUTO: 6.2 % — SIGNIFICANT CHANGE UP (ref 2–14)
NEUTROPHILS # BLD AUTO: 7.45 K/UL — HIGH (ref 1.8–7.4)
NEUTROPHILS NFR BLD AUTO: 73.8 % — SIGNIFICANT CHANGE UP (ref 43–77)
NRBC # BLD: 0 /100 WBCS — SIGNIFICANT CHANGE UP (ref 0–0)
PCO2 BLDV: 50 MMHG — SIGNIFICANT CHANGE UP (ref 42–55)
PH BLDV: 7.38 — SIGNIFICANT CHANGE UP (ref 7.32–7.43)
PLATELET # BLD AUTO: 281 K/UL — SIGNIFICANT CHANGE UP (ref 150–400)
PO2 BLDV: 30 MMHG — SIGNIFICANT CHANGE UP (ref 25–45)
POTASSIUM BLDV-SCNC: 4.2 MMOL/L — SIGNIFICANT CHANGE UP (ref 3.5–5.1)
POTASSIUM SERPL-MCNC: 4.2 MMOL/L — SIGNIFICANT CHANGE UP (ref 3.5–5.3)
POTASSIUM SERPL-SCNC: 4.2 MMOL/L — SIGNIFICANT CHANGE UP (ref 3.5–5.3)
PROT SERPL-MCNC: 6.9 G/DL — SIGNIFICANT CHANGE UP (ref 6–8.3)
RBC # BLD: 4.49 M/UL — SIGNIFICANT CHANGE UP (ref 4.2–5.8)
RBC # FLD: 13.4 % — SIGNIFICANT CHANGE UP (ref 10.3–14.5)
SAO2 % BLDV: 48.4 % — LOW (ref 67–88)
SODIUM SERPL-SCNC: 142 MMOL/L — SIGNIFICANT CHANGE UP (ref 135–145)
WBC # BLD: 10.08 K/UL — SIGNIFICANT CHANGE UP (ref 3.8–10.5)
WBC # FLD AUTO: 10.08 K/UL — SIGNIFICANT CHANGE UP (ref 3.8–10.5)

## 2023-10-13 PROCEDURE — 74177 CT ABD & PELVIS W/CONTRAST: CPT | Mod: 26,MA

## 2023-10-13 PROCEDURE — 71260 CT THORAX DX C+: CPT | Mod: 26,MA

## 2023-10-13 PROCEDURE — 70450 CT HEAD/BRAIN W/O DYE: CPT | Mod: 26,XU,MA

## 2023-10-13 PROCEDURE — 70498 CT ANGIOGRAPHY NECK: CPT | Mod: 26,MA

## 2023-10-13 PROCEDURE — 72129 CT CHEST SPINE W/DYE: CPT | Mod: 26,MA

## 2023-10-13 PROCEDURE — 99222 1ST HOSP IP/OBS MODERATE 55: CPT

## 2023-10-13 PROCEDURE — 70496 CT ANGIOGRAPHY HEAD: CPT | Mod: 26,MA

## 2023-10-13 PROCEDURE — 72132 CT LUMBAR SPINE W/DYE: CPT | Mod: 26,MA

## 2023-10-13 PROCEDURE — 99291 CRITICAL CARE FIRST HOUR: CPT | Mod: GC

## 2023-10-13 PROCEDURE — 72141 MRI NECK SPINE W/O DYE: CPT | Mod: 26

## 2023-10-13 PROCEDURE — 99291 CRITICAL CARE FIRST HOUR: CPT

## 2023-10-13 PROCEDURE — 72125 CT NECK SPINE W/O DYE: CPT | Mod: 26,MA

## 2023-10-13 PROCEDURE — 71045 X-RAY EXAM CHEST 1 VIEW: CPT | Mod: 26

## 2023-10-13 RX ORDER — FENTANYL CITRATE 50 UG/ML
50 INJECTION INTRAVENOUS ONCE
Refills: 0 | Status: DISCONTINUED | OUTPATIENT
Start: 2023-10-13 | End: 2023-10-13

## 2023-10-13 RX ORDER — LIDOCAINE 4 G/100G
1 CREAM TOPICAL EVERY 24 HOURS
Refills: 0 | Status: DISCONTINUED | OUTPATIENT
Start: 2023-10-13 | End: 2023-10-14

## 2023-10-13 RX ORDER — ACETAMINOPHEN 500 MG
1000 TABLET ORAL EVERY 6 HOURS
Refills: 0 | Status: DISCONTINUED | OUTPATIENT
Start: 2023-10-13 | End: 2023-10-14

## 2023-10-13 RX ORDER — HYDROMORPHONE HYDROCHLORIDE 2 MG/ML
0.5 INJECTION INTRAMUSCULAR; INTRAVENOUS; SUBCUTANEOUS
Refills: 0 | Status: DISCONTINUED | OUTPATIENT
Start: 2023-10-13 | End: 2023-10-14

## 2023-10-13 RX ORDER — HYDROMORPHONE HYDROCHLORIDE 2 MG/ML
1 INJECTION INTRAMUSCULAR; INTRAVENOUS; SUBCUTANEOUS ONCE
Refills: 0 | Status: DISCONTINUED | OUTPATIENT
Start: 2023-10-13 | End: 2023-10-13

## 2023-10-13 RX ORDER — METHOCARBAMOL 500 MG/1
500 TABLET, FILM COATED ORAL EVERY 8 HOURS
Refills: 0 | Status: DISCONTINUED | OUTPATIENT
Start: 2023-10-13 | End: 2023-10-14

## 2023-10-13 RX ORDER — CHLORHEXIDINE GLUCONATE 213 G/1000ML
1 SOLUTION TOPICAL
Refills: 0 | Status: DISCONTINUED | OUTPATIENT
Start: 2023-10-13 | End: 2023-10-14

## 2023-10-13 RX ORDER — HYDROMORPHONE HYDROCHLORIDE 2 MG/ML
0.5 INJECTION INTRAMUSCULAR; INTRAVENOUS; SUBCUTANEOUS ONCE
Refills: 0 | Status: DISCONTINUED | OUTPATIENT
Start: 2023-10-13 | End: 2023-10-13

## 2023-10-13 RX ORDER — ONDANSETRON 8 MG/1
4 TABLET, FILM COATED ORAL ONCE
Refills: 0 | Status: DISCONTINUED | OUTPATIENT
Start: 2023-10-13 | End: 2023-10-14

## 2023-10-13 RX ORDER — ACETAMINOPHEN 500 MG
1000 TABLET ORAL ONCE
Refills: 0 | Status: COMPLETED | OUTPATIENT
Start: 2023-10-13 | End: 2023-10-13

## 2023-10-13 RX ORDER — SODIUM CHLORIDE 9 MG/ML
250 INJECTION INTRAMUSCULAR; INTRAVENOUS; SUBCUTANEOUS ONCE
Refills: 0 | Status: COMPLETED | OUTPATIENT
Start: 2023-10-13 | End: 2023-10-13

## 2023-10-13 RX ORDER — SIMVASTATIN 20 MG/1
1 TABLET, FILM COATED ORAL
Qty: 0 | Refills: 0 | DISCHARGE

## 2023-10-13 RX ADMIN — Medication 1000 MILLIGRAM(S): at 23:04

## 2023-10-13 RX ADMIN — METHOCARBAMOL 500 MILLIGRAM(S): 500 TABLET, FILM COATED ORAL at 20:57

## 2023-10-13 RX ADMIN — HYDROMORPHONE HYDROCHLORIDE 0.5 MILLIGRAM(S): 2 INJECTION INTRAMUSCULAR; INTRAVENOUS; SUBCUTANEOUS at 23:47

## 2023-10-13 RX ADMIN — HYDROMORPHONE HYDROCHLORIDE 0.5 MILLIGRAM(S): 2 INJECTION INTRAMUSCULAR; INTRAVENOUS; SUBCUTANEOUS at 23:17

## 2023-10-13 RX ADMIN — FENTANYL CITRATE 50 MICROGRAM(S): 50 INJECTION INTRAVENOUS at 18:43

## 2023-10-13 RX ADMIN — HYDROMORPHONE HYDROCHLORIDE 0.5 MILLIGRAM(S): 2 INJECTION INTRAMUSCULAR; INTRAVENOUS; SUBCUTANEOUS at 23:13

## 2023-10-13 RX ADMIN — HYDROMORPHONE HYDROCHLORIDE 0.5 MILLIGRAM(S): 2 INJECTION INTRAMUSCULAR; INTRAVENOUS; SUBCUTANEOUS at 22:42

## 2023-10-13 RX ADMIN — HYDROMORPHONE HYDROCHLORIDE 0.5 MILLIGRAM(S): 2 INJECTION INTRAMUSCULAR; INTRAVENOUS; SUBCUTANEOUS at 20:54

## 2023-10-13 RX ADMIN — FENTANYL CITRATE 50 MICROGRAM(S): 50 INJECTION INTRAVENOUS at 19:37

## 2023-10-13 RX ADMIN — FENTANYL CITRATE 50 MICROGRAM(S): 50 INJECTION INTRAVENOUS at 19:39

## 2023-10-13 RX ADMIN — SODIUM CHLORIDE 250 MILLILITER(S): 9 INJECTION INTRAMUSCULAR; INTRAVENOUS; SUBCUTANEOUS at 18:37

## 2023-10-13 RX ADMIN — FENTANYL CITRATE 50 MICROGRAM(S): 50 INJECTION INTRAVENOUS at 18:26

## 2023-10-13 RX ADMIN — HYDROMORPHONE HYDROCHLORIDE 0.5 MILLIGRAM(S): 2 INJECTION INTRAMUSCULAR; INTRAVENOUS; SUBCUTANEOUS at 20:24

## 2023-10-13 RX ADMIN — HYDROMORPHONE HYDROCHLORIDE 1 MILLIGRAM(S): 2 INJECTION INTRAMUSCULAR; INTRAVENOUS; SUBCUTANEOUS at 19:43

## 2023-10-13 RX ADMIN — Medication 400 MILLIGRAM(S): at 22:34

## 2023-10-13 RX ADMIN — Medication 400 MILLIGRAM(S): at 18:27

## 2023-10-13 RX ADMIN — Medication 1000 MILLIGRAM(S): at 18:27

## 2023-10-13 RX ADMIN — FENTANYL CITRATE 50 MICROGRAM(S): 50 INJECTION INTRAVENOUS at 18:27

## 2023-10-13 RX ADMIN — Medication 1000 MILLIGRAM(S): at 19:37

## 2023-10-13 NOTE — H&P ADULT - NSHPPHYSICALEXAM_GEN_ALL_CORE
Vital Signs Last 24 Hrs  T(C): 36.5 (13 Oct 2023 20:20), Max: 37 (13 Oct 2023 16:39)  T(F): 97.7 (13 Oct 2023 20:20), Max: 98.6 (13 Oct 2023 16:39)  HR: 111 (13 Oct 2023 20:20) (107 - 111)  BP: 173/102 (13 Oct 2023 20:20) (141/80 - 173/102)  BP(mean): 131 (13 Oct 2023 20:20) (98 - 131)  RR: 35 (13 Oct 2023 20:20) (22 - 35)  SpO2: 95% (13 Oct 2023 20:20) (95% - 96%)    Parameters below as of 13 Oct 2023 20:20  Patient On (Oxygen Delivery Method): nasal cannula  O2 Flow (L/min): 2    Daily     Daily     GENERAL: NAD, well-groomed, well-developed  HEAD:  Atraumatic, Normocephalic,  EYES: EOMI, conjunctiva and sclera clear  ENMT: No tonsillar erythema, exudates, or enlargement; Moist mucous membranes, in C-collar  NECK: Supple, trachea midline  HEART: Palpable radial pulse, Regular rate  RESPIRATORY: no increased work of breathing, on room air  ABDOMEN: Soft, nondistended, no TTP, no rebound, no guarding  NEUROLOGY: A&Ox3, nonfocal, moving all extremities  EXTREMITIES:  2+ Peripheral Pulses, No clubbing, cyanosis, or edema  SKIN: warm, dry, normal color, no rash or abnormal lesions

## 2023-10-13 NOTE — ED PROVIDER NOTE - PHYSICAL EXAMINATION
GENERAL: Awake, alert, appears in pain   HEENT: NC/AT, PERRL, EOMI no facial tenderness  no blood in the nares no blood in the oropharynx   LUNGS: CTAB, no wheezes or crackles loose rib along the right lower side   CARDIAC: RRR   ABDOMEN: Soft, non tender, non distended, no rebound, no guarding  BACK: No midline spinal tenderness,   EXT: No edema, no calf tenderness, 2+ DP pulses bilaterally, no deformities. no signs of a trauma to the lower legs   NEURO: A&Ox3. Moving all extremities. a  SKIN: Warm and dry. No rash.  PSYCH: Normal affect.

## 2023-10-13 NOTE — PATIENT PROFILE ADULT - NSPROGENOTHERPROVIDER_GEN_A_NUR
This note was copied from the mother's chart.  Breastfeeding discharge education done via breastfeeding guide. Pt discharged on breastfeeding plan due to the baby being 36 weeks. Pt aware to breastfeed the baby first, offer supplement and then pump both breast bilaterally for 20-30 min. Pt given breastfeeding resources to contact after discharge education.    06/27/22 0915   Maternal Assessment   Breast Shape Bilateral:;round   Breast Density Bilateral:;filling   Areola Bilateral:;elastic   Nipples Bilateral:;everted   Maternal Infant Feeding   Maternal Emotional State assist needed   Infant Positioning cross-cradle   Latch Assistance yes  (minimal)   Equipment Type   Breast Pump Type double electric, hospital grade   Breast Pump Flange Type hard   Breast Pump Flange Size 24 mm   Breast Pumping   Breast Pumping Interventions post-feed pumping encouraged   Breast Pumping double electric breast pump utilized   Lactation Referrals   Lactation Referrals outpatient lactation program;support group      none

## 2023-10-13 NOTE — ED PROVIDER NOTE - CLINICAL SUMMARY MEDICAL DECISION MAKING FREE TEXT BOX
Given history and physical patient meets no criteria for level at this time given mechanism of injury and parameters regarding a level.  We will work-up for rib fracture patient had clear lungs bilaterally no signs of of tension pneumo at this time pending CT scan results will need surgical consult Given history and physical patient meets no criteria for level at this time given mechanism of injury and parameters regarding a level.  We will work-up for rib fractures, cervical spine injury. patient had clear lungs bilaterally no signs of tension pneumo or ptx on ultrasound at this time pending CT scan results will need surgical consult, possible spine consult, neurologically intact. GCS 15. Required 150 mcg of fentanyl and IV tylenol for pain control in ED, received 100 mcg fentanyl from EMS.        Dr. Madison (Attending Physician)

## 2023-10-13 NOTE — CONSULT NOTE ADULT - ASSESSMENT
68M no AC/AP s/p fall down ladder 10ft. CT C spine w/ C1 ring fx w/ lat displacement of lat masses, C2-5 SP fx, C5-6 perched facet, C2-3 hyperdensity c/f spinal EDH. CTA w/ poss L vert dissection vs. occlusion vs artifact at C1. He c/o moderate neck pain but no radicular sx. Exam: Intact, KABA 5/5, SILT, no Hollingsworth, no clonus.   - Strict spine precautions, C collar at all times   - MR C spine pending   - Admitted SICU polytrauma  - NO ASA81 at this time for vert dissection  - Preop for OR tomorrow, posterior cervical decompression and fusion   - Can reduce perched facets in OR tomorrow with neuromonitoring/anesthesia

## 2023-10-13 NOTE — ED ADULT NURSE NOTE - NSFALLRISKINTERV_ED_ALL_ED

## 2023-10-13 NOTE — H&P ADULT - ATTENDING COMMENTS
Pt is a 68 year old male with a medical history significant for chronic back pain (on opiates) who presents to [facility]  s/p fall from ladder. On presentation, pt is A&O x3, reporting severe back pain.     Primary survey: Intact. GCS 15  Secondary survey: multi-level pack pain. right chest wall tenderness    WBC = 10    Imaging is positive for the following injuries:.    -C1 anterior and posterior arch fractures with lateral displacement of the lateral masses. Focal traumatic occlusion/dissection of the left vertebral artery at C1 level.  -C2-C3 hyperdensity within the posterior spinal canal, suggesting epidural hematoma.  -C2-C5 Spinous process fractures  -C5 on C6 Anterior subluxation with bilateral perched facets and likely posterior ligamentous injury.  -C6 Left transverse process/transverse foramen fractures.  -T4 compression fracture, subacute  -T8 vertebral body lytic lesion, age-indeterminate  -Right lateral 8th to 10th rib mildly displaced rib fractures.   -Trace right pneumothorax and pneumomediastinum. Small right pleural effusion.    A/p  S/p fall from ladder  Multiple orthopedic injuries  Traumatic pain control  Continue C-collar  Continue log roll precautions  Spine consult appreciated  MRI spine to r/o epidural hematoma  Hold DVT proph  Case d/w Dr. Ventura

## 2023-10-13 NOTE — ED PROVIDER NOTE - SECONDARY DIAGNOSIS.
Traumatic epidural hematoma Hemopneumothorax, right Vertebral artery dissection Multiple fractures of cervical spine

## 2023-10-13 NOTE — ED ADULT NURSE NOTE - OBJECTIVE STATEMENT
Patient is a 67 yo male A&OX4 PMH HLD presenting to the ED by EMS from a friends house for a witnessed fall. Patient states he was approx. 10-14 feet above ground level on a ladder, denies LOC, denies head strike. Patient states he lost his balance prior to the fall, the witness to the fall reported to EMS the patient fell and landed on his right side on concrete. Patient denies chest pain or dizziness prior to the fall. Patient complains of right sided mid back pain, crepitus to the right side of the chest. Patient given 100mcg of fentanyl by EMS en route to [facility]. Patient denies fever/chills, SOB, chest pain, abd pain, n/v/diarrhea, urinary symptoms.

## 2023-10-13 NOTE — H&P ADULT - ASSESSMENT
68M with history of chronic back pain on opiates presents 10/13/23 s/p 12 foot fall from ladder with multiple traumatic injuries listed below. Patient otherwise stable. GCS 15.    INJURIES  -C1 anterior and posterior arch fractures with lateral displacement of the lateral masses. Focal traumatic occlusion/dissection of the left vertebral artery at C1 level.  -C2-C3 hyperdensity within the posterior spinal canal, likely representing epidural hematoma.  -C2-C5 Spinous process fractures  -C5 on C6 Anterior subluxation with bilateral perched facets and likely posterior ligamentous injury.  -C6 Left transverse process/transverse foramen fractures.  -T4 compression fracture, mild  -T8 vertebral body lytic lesion, age-indeterminate  -Right lateral 8th to 10th rib mildly displaced rib fractures.   -Trace right pneumothorax and pneumomediastinum. Small right pleural effusion.      PLAN:  - Admit to SICU for polytrauma pending Neurosurgery recs  - Pain Control, consider pain consult. Patient reports he is prescribed 4mg hydromorphone TID and he takes "more or less that much" daily.   - Follow-up neurosurgery recs  - Home metoprolol per SICU   - DVTp per trauma protocol     Patient seen and discussed with Attending Surgeon Dr. Cowan    ACS/Trauma Surgery  [id number]

## 2023-10-13 NOTE — H&P ADULT - HISTORY OF PRESENT ILLNESS
SURGERY CONSULT NOTE    Patient is a 68y old  Male who presents with a chief complaint of neck/chest pain s/p 12 foot fall    HPI:  68 year old male with history of chronic back pain on hydromorphone 4mg TID presents s/p 12 foot while on ladder.  LOC+, likely HS+ as he does not recall the event.  Patient hemodynamically stable on arrival to ED; no trauma activation. Trauma Surgery team consulted due to concern for Acute displaced right lateral 8-10th rib fractures. CT findings listed below. Neurosurgery consulted.       CXR: Right lateral 8th to 10th rib mildly displaced rib fractures.   CT head: No acute intracranial findings.    Abd/Pelvis:   Acute displaced right lateral 8-10th rib fractures. Trace right pneumothorax and pneumomediastinum. Small right pleural effusion.    CT angiogram neck:   Focal traumatic occlusion/dissection of the left vertebral artery at C1 level. Reconstitution of flow is noted in the left intracranial vertebral artery, likely retrograde.    CT cervical spine:  -C1 anterior and posterior arch fractures with lateral displacement of the lateral masses.  -Spinous process fractures from C2 through C5.  -Anterior subluxation of C5 on C6 with bilateral perched facets and likely posterior ligamentous injury.  -Left C6 transverse process/transverse foramen fractures.  -Hyperdensity within the posterior spinal canal at C2-C3 levels, likely representing epidural hematoma.    Thoracic spine:   Age-indeterminate mild T4 compression fracture, stable benign-appearing lytic lesion T8 vertebral body.    Lumbar spine:  No acute fracture or traumatic malalignment. Degenerative changes.            PAST MEDICAL & SURGICAL HISTORY:  High cholesterol      Insomnia      History of chronic back pain      History of arthroscopy of left shoulder  age 15 & right shoulder 3 yrs      H/O hernia repair  b/l inguinal & abdominal      History of arthroplasty of right knee  5yrs        [  ] No significant past history as reviewed with the patient and family    FAMILY HISTORY:  Family history of early CAD (Sibling)      [  ] Family history not pertinent as reviewed with the patient and family    SOCIAL HISTORY:    MEDICATIONS  (STANDING):  acetaminophen   IVPB .. 1000 milliGRAM(s) IV Intermittent every 6 hours  chlorhexidine 2% Cloths 1 Application(s) Topical <User Schedule>  lidocaine   4% Patch 1 Patch Transdermal every 24 hours    MEDICATIONS  (PRN):  HYDROmorphone  Injectable 0.5 milliGRAM(s) IV Push every 3 hours PRN Breakthrough  methocarbamol 500 milliGRAM(s) Oral every 8 hours PRN Muscle Spasm    Allergies    Nuts (Unknown)  Originally Entered as [Unknown] reaction to [PUMPKIN SEEDS] (Unknown)  Vioxx (Unknown)

## 2023-10-13 NOTE — ED PROVIDER NOTE - CARE PLAN
1 Principal Discharge DX:	Multiple fractures of ribs of right side  Secondary Diagnosis:	Hemopneumothorax, right  Secondary Diagnosis:	Multiple fractures of cervical spine  Secondary Diagnosis:	Traumatic epidural hematoma  Secondary Diagnosis:	Vertebral artery dissection

## 2023-10-13 NOTE — PATIENT PROFILE ADULT - FALL HARM RISK - FALL HARM RISK
"For the past two days there has been a feeling of a \"lump, pebble in my throat\". Able to eat and drink without difficulty and talking in complete sentences.  " Bone Condition/Other

## 2023-10-13 NOTE — ED PROVIDER NOTE - PROGRESS NOTE DETAILS
multiple unstable cervical spine fractures, multiple right sided displaced rib fractures with hemoptx. Will admit to ICU. Neurosurg consulted.

## 2023-10-13 NOTE — H&P ADULT - NSHPLABSRESULTS_GEN_ALL_CORE
13.7   10.08 )-----------( 281      ( 13 Oct 2023 16:53 )             41.2     10-13    142  |  104  |  22  ----------------------------<  198<H>  4.2   |  25  |  1.01    Ca    9.6      13 Oct 2023 16:53    TPro  6.9  /  Alb  4.2  /  TBili  0.5  /  DBili  x   /  AST  62<H>  /  ALT  50<H>  /  AlkPhos  69  10-13      Urinalysis Basic - ( 13 Oct 2023 16:53 )    Color: x / Appearance: x / SG: x / pH: x  Gluc: 198 mg/dL / Ketone: x  / Bili: x / Urobili: x   Blood: x / Protein: x / Nitrite: x   Leuk Esterase: x / RBC: x / WBC x   Sq Epi: x / Non Sq Epi: x / Bacteria: x        IMAGING STUDIES:  < from: Xray Chest 1 View AP/PA (10.13.23 @ 18:13) >    INTERPRETATION:  Trace right pleural effusion and bibasilar atelectasis.   Right lateral 8th to 10th rib mildly displaced rib fractures. Trace   subcutaneous emphysema. No pneumothorax.    < end of copied text >    < from: CT Angio Neck w/ IV Cont (10.13.23 @ 17:36) >      IMPRESSION:  CT head:  -No acute intracranial findings.    CT cervical spine:  -C1 anterior and posterior arch fractures with lateral displacement of   the lateral masses.  -Spinous process fractures from C2 through C5.  -Anterior subluxation of C5 on C6 with bilateral perched facets and   likely posterior ligamentous injury.  -Left C6 transverse process/transverse foramen fractures.  -Hyperdensity within theposterior spinal canal at C2-C3 levels, likely   representing epidural hematoma.    Recommend MRI cervical spine for further evaluation.    CT angiogram head:  -No vaso-occlusive disease.    CT angiogram neck:  -Focal traumatic occlusion/dissection of the left vertebral artery at C1   level. Reconstitution of flow is noted in the left intracranial vertebral   artery, likely retrograde.    < end of copied text >    < from: CT Lumbar Spine Reform w/ IV Cont (10.13.23 @ 17:36) >    IMPRESSION:  Thoracic spine: Age-indeterminate mild T4 compression fracture. No   significant retropulsion of bone into the epidural space.  Stable benign-appearing lytic lesion T8 vertebral body.    Lumbar spine: Stable exam. No acute fracture or traumatic malalignment.   Degenerative changes.    < end of copied text >    < from: CT Abdomen and Pelvis w/ IV Cont (10.13.23 @ 17:35) >    IMPRESSION:  Acute displaced right lateral 8-10th rib fractures.    Trace right pneumothorax and pneumomediastinum. Small right pleural   effusion.    < end of copied text >

## 2023-10-13 NOTE — ED PROVIDER NOTE - OBJECTIVE STATEMENT
68-year-old male on morphine for chronic back pain status post fall from approximately 10 feet.  Patient presenting with neck pain in conjunction with right sided chest wall pain.  Patient is not on any blood thinners patient arrived via EMS after receiving 100 of fentanyl.

## 2023-10-14 ENCOUNTER — RESULT REVIEW (OUTPATIENT)
Age: 68
End: 2023-10-14

## 2023-10-14 ENCOUNTER — TRANSCRIPTION ENCOUNTER (OUTPATIENT)
Age: 68
End: 2023-10-14

## 2023-10-14 ENCOUNTER — APPOINTMENT (OUTPATIENT)
Dept: SPINE | Facility: HOSPITAL | Age: 68
End: 2023-10-14
Payer: COMMERCIAL

## 2023-10-14 LAB
ANION GAP SERPL CALC-SCNC: 10 MMOL/L — SIGNIFICANT CHANGE UP (ref 5–17)
ANION GAP SERPL CALC-SCNC: 13 MMOL/L — SIGNIFICANT CHANGE UP (ref 5–17)
ANION GAP SERPL CALC-SCNC: 9 MMOL/L — SIGNIFICANT CHANGE UP (ref 5–17)
APTT BLD: 25.6 SEC — SIGNIFICANT CHANGE UP (ref 24.5–35.6)
APTT BLD: 26.6 SEC — SIGNIFICANT CHANGE UP (ref 24.5–35.6)
APTT BLD: 27.1 SEC — SIGNIFICANT CHANGE UP (ref 24.5–35.6)
BUN SERPL-MCNC: 14 MG/DL — SIGNIFICANT CHANGE UP (ref 7–23)
BUN SERPL-MCNC: 16 MG/DL — SIGNIFICANT CHANGE UP (ref 7–23)
BUN SERPL-MCNC: 19 MG/DL — SIGNIFICANT CHANGE UP (ref 7–23)
CALCIUM SERPL-MCNC: 9.1 MG/DL — SIGNIFICANT CHANGE UP (ref 8.4–10.5)
CALCIUM SERPL-MCNC: 9.4 MG/DL — SIGNIFICANT CHANGE UP (ref 8.4–10.5)
CALCIUM SERPL-MCNC: 9.7 MG/DL — SIGNIFICANT CHANGE UP (ref 8.4–10.5)
CHLORIDE SERPL-SCNC: 102 MMOL/L — SIGNIFICANT CHANGE UP (ref 96–108)
CHLORIDE SERPL-SCNC: 102 MMOL/L — SIGNIFICANT CHANGE UP (ref 96–108)
CHLORIDE SERPL-SCNC: 104 MMOL/L — SIGNIFICANT CHANGE UP (ref 96–108)
CO2 SERPL-SCNC: 23 MMOL/L — SIGNIFICANT CHANGE UP (ref 22–31)
CO2 SERPL-SCNC: 25 MMOL/L — SIGNIFICANT CHANGE UP (ref 22–31)
CO2 SERPL-SCNC: 26 MMOL/L — SIGNIFICANT CHANGE UP (ref 22–31)
CREAT SERPL-MCNC: 0.79 MG/DL — SIGNIFICANT CHANGE UP (ref 0.5–1.3)
CREAT SERPL-MCNC: 0.85 MG/DL — SIGNIFICANT CHANGE UP (ref 0.5–1.3)
CREAT SERPL-MCNC: 0.86 MG/DL — SIGNIFICANT CHANGE UP (ref 0.5–1.3)
EGFR: 93 ML/MIN/1.73M2 — SIGNIFICANT CHANGE UP
EGFR: 93 ML/MIN/1.73M2 — SIGNIFICANT CHANGE UP
EGFR: 96 ML/MIN/1.73M2 — SIGNIFICANT CHANGE UP
GAS PNL BLDA: SIGNIFICANT CHANGE UP
GAS PNL BLDA: SIGNIFICANT CHANGE UP
GLUCOSE SERPL-MCNC: 134 MG/DL — HIGH (ref 70–99)
GLUCOSE SERPL-MCNC: 138 MG/DL — HIGH (ref 70–99)
GLUCOSE SERPL-MCNC: 149 MG/DL — HIGH (ref 70–99)
HCT VFR BLD CALC: 34.5 % — LOW (ref 39–50)
HCT VFR BLD CALC: 39.9 % — SIGNIFICANT CHANGE UP (ref 39–50)
HGB BLD-MCNC: 11.5 G/DL — LOW (ref 13–17)
HGB BLD-MCNC: 13.1 G/DL — SIGNIFICANT CHANGE UP (ref 13–17)
INR BLD: 1.02 RATIO — SIGNIFICANT CHANGE UP (ref 0.85–1.18)
INR BLD: 1.1 RATIO — SIGNIFICANT CHANGE UP (ref 0.85–1.18)
INR BLD: 1.14 RATIO — SIGNIFICANT CHANGE UP (ref 0.85–1.18)
MAGNESIUM SERPL-MCNC: 1.8 MG/DL — SIGNIFICANT CHANGE UP (ref 1.6–2.6)
MAGNESIUM SERPL-MCNC: 2 MG/DL — SIGNIFICANT CHANGE UP (ref 1.6–2.6)
MAGNESIUM SERPL-MCNC: 2 MG/DL — SIGNIFICANT CHANGE UP (ref 1.6–2.6)
MCHC RBC-ENTMCNC: 30.2 PG — SIGNIFICANT CHANGE UP (ref 27–34)
MCHC RBC-ENTMCNC: 30.6 PG — SIGNIFICANT CHANGE UP (ref 27–34)
MCHC RBC-ENTMCNC: 32.8 GM/DL — SIGNIFICANT CHANGE UP (ref 32–36)
MCHC RBC-ENTMCNC: 33.3 GM/DL — SIGNIFICANT CHANGE UP (ref 32–36)
MCV RBC AUTO: 91.8 FL — SIGNIFICANT CHANGE UP (ref 80–100)
MCV RBC AUTO: 91.9 FL — SIGNIFICANT CHANGE UP (ref 80–100)
NRBC # BLD: 0 /100 WBCS — SIGNIFICANT CHANGE UP (ref 0–0)
NRBC # BLD: 0 /100 WBCS — SIGNIFICANT CHANGE UP (ref 0–0)
PHOSPHATE SERPL-MCNC: 4 MG/DL — SIGNIFICANT CHANGE UP (ref 2.5–4.5)
PLATELET # BLD AUTO: 207 K/UL — SIGNIFICANT CHANGE UP (ref 150–400)
PLATELET # BLD AUTO: 227 K/UL — SIGNIFICANT CHANGE UP (ref 150–400)
POTASSIUM SERPL-MCNC: 4.2 MMOL/L — SIGNIFICANT CHANGE UP (ref 3.5–5.3)
POTASSIUM SERPL-MCNC: 4.3 MMOL/L — SIGNIFICANT CHANGE UP (ref 3.5–5.3)
POTASSIUM SERPL-MCNC: 4.4 MMOL/L — SIGNIFICANT CHANGE UP (ref 3.5–5.3)
POTASSIUM SERPL-SCNC: 4.2 MMOL/L — SIGNIFICANT CHANGE UP (ref 3.5–5.3)
POTASSIUM SERPL-SCNC: 4.3 MMOL/L — SIGNIFICANT CHANGE UP (ref 3.5–5.3)
POTASSIUM SERPL-SCNC: 4.4 MMOL/L — SIGNIFICANT CHANGE UP (ref 3.5–5.3)
PROTHROM AB SERPL-ACNC: 11.2 SEC — SIGNIFICANT CHANGE UP (ref 9.5–13)
PROTHROM AB SERPL-ACNC: 11.5 SEC — SIGNIFICANT CHANGE UP (ref 9.5–13)
PROTHROM AB SERPL-ACNC: 11.9 SEC — SIGNIFICANT CHANGE UP (ref 9.5–13)
RBC # BLD: 3.76 M/UL — LOW (ref 4.2–5.8)
RBC # BLD: 4.34 M/UL — SIGNIFICANT CHANGE UP (ref 4.2–5.8)
RBC # FLD: 13.4 % — SIGNIFICANT CHANGE UP (ref 10.3–14.5)
RBC # FLD: 13.6 % — SIGNIFICANT CHANGE UP (ref 10.3–14.5)
SODIUM SERPL-SCNC: 137 MMOL/L — SIGNIFICANT CHANGE UP (ref 135–145)
SODIUM SERPL-SCNC: 137 MMOL/L — SIGNIFICANT CHANGE UP (ref 135–145)
SODIUM SERPL-SCNC: 140 MMOL/L — SIGNIFICANT CHANGE UP (ref 135–145)
WBC # BLD: 10.47 K/UL — SIGNIFICANT CHANGE UP (ref 3.8–10.5)
WBC # BLD: 9.64 K/UL — SIGNIFICANT CHANGE UP (ref 3.8–10.5)
WBC # FLD AUTO: 10.47 K/UL — SIGNIFICANT CHANGE UP (ref 3.8–10.5)
WBC # FLD AUTO: 9.64 K/UL — SIGNIFICANT CHANGE UP (ref 3.8–10.5)

## 2023-10-14 PROCEDURE — 63265 EXCISE INTRASPINL LESION CRV: CPT

## 2023-10-14 PROCEDURE — 22843 INSERT SPINE FIXATION DEVICE: CPT

## 2023-10-14 PROCEDURE — 88304 TISSUE EXAM BY PATHOLOGIST: CPT | Mod: 26

## 2023-10-14 PROCEDURE — 63045 LAM FACETEC & FORAMOT CRV: CPT | Mod: 59

## 2023-10-14 PROCEDURE — 63048 LAM FACETEC &FORAMOT EA ADDL: CPT

## 2023-10-14 PROCEDURE — 22326 TREAT NECK SPINE FRACTURE: CPT

## 2023-10-14 PROCEDURE — 22590 ARTHRD PST TQ CRANIOCERVICAL: CPT

## 2023-10-14 PROCEDURE — 22614 ARTHRD PST TQ 1NTRSPC EA ADD: CPT

## 2023-10-14 PROCEDURE — 22600 ARTHRD PST TQ 1NTRSPC CRV: CPT

## 2023-10-14 PROCEDURE — 61783 SCAN PROC SPINAL: CPT | Mod: 59

## 2023-10-14 DEVICE — SCREW POLYAXIAL 3.5X16MM: Type: IMPLANTABLE DEVICE | Status: FUNCTIONAL

## 2023-10-14 DEVICE — SCREW POLYAXIAL 3.5X12MM: Type: IMPLANTABLE DEVICE | Status: FUNCTIONAL

## 2023-10-14 DEVICE — IMPLANTABLE DEVICE: Type: IMPLANTABLE DEVICE | Status: FUNCTIONAL

## 2023-10-14 DEVICE — SURGICEL FIBRILLAR 2 X 4": Type: IMPLANTABLE DEVICE | Status: FUNCTIONAL

## 2023-10-14 DEVICE — SCREW SET YUKON: Type: IMPLANTABLE DEVICE | Status: FUNCTIONAL

## 2023-10-14 DEVICE — SURGIFOAM PAD 8CM X 12.5CM X 10MM (100): Type: IMPLANTABLE DEVICE | Status: FUNCTIONAL

## 2023-10-14 DEVICE — SURGICEL NU-KNIT 6 X 9": Type: IMPLANTABLE DEVICE | Status: FUNCTIONAL

## 2023-10-14 DEVICE — SURGIFLO MATRIX WITH THROMBIN KIT: Type: IMPLANTABLE DEVICE | Status: FUNCTIONAL

## 2023-10-14 RX ORDER — MAGNESIUM SULFATE 500 MG/ML
2 VIAL (ML) INJECTION ONCE
Refills: 0 | Status: COMPLETED | OUTPATIENT
Start: 2023-10-14 | End: 2023-10-15

## 2023-10-14 RX ORDER — POLYETHYLENE GLYCOL 3350 17 G/17G
17 POWDER, FOR SOLUTION ORAL DAILY
Refills: 0 | Status: DISCONTINUED | OUTPATIENT
Start: 2023-10-14 | End: 2023-10-18

## 2023-10-14 RX ORDER — HYDROMORPHONE HYDROCHLORIDE 2 MG/ML
0.5 INJECTION INTRAMUSCULAR; INTRAVENOUS; SUBCUTANEOUS ONCE
Refills: 0 | Status: DISCONTINUED | OUTPATIENT
Start: 2023-10-14 | End: 2023-10-13

## 2023-10-14 RX ORDER — CALCIUM GLUCONATE 100 MG/ML
2 VIAL (ML) INTRAVENOUS ONCE
Refills: 0 | Status: COMPLETED | OUTPATIENT
Start: 2023-10-14 | End: 2023-10-14

## 2023-10-14 RX ORDER — LIDOCAINE 4 G/100G
1 CREAM TOPICAL EVERY 24 HOURS
Refills: 0 | Status: DISCONTINUED | OUTPATIENT
Start: 2023-10-14 | End: 2023-10-20

## 2023-10-14 RX ORDER — HYDROMORPHONE HYDROCHLORIDE 2 MG/ML
1 INJECTION INTRAMUSCULAR; INTRAVENOUS; SUBCUTANEOUS ONCE
Refills: 0 | Status: DISCONTINUED | OUTPATIENT
Start: 2023-10-14 | End: 2023-10-14

## 2023-10-14 RX ORDER — CHLORHEXIDINE GLUCONATE 213 G/1000ML
1 SOLUTION TOPICAL
Refills: 0 | Status: DISCONTINUED | OUTPATIENT
Start: 2023-10-14 | End: 2023-10-20

## 2023-10-14 RX ORDER — HYDROMORPHONE HYDROCHLORIDE 2 MG/ML
0.5 INJECTION INTRAMUSCULAR; INTRAVENOUS; SUBCUTANEOUS ONCE
Refills: 0 | Status: DISCONTINUED | OUTPATIENT
Start: 2023-10-14 | End: 2023-10-14

## 2023-10-14 RX ORDER — SENNA PLUS 8.6 MG/1
2 TABLET ORAL AT BEDTIME
Refills: 0 | Status: DISCONTINUED | OUTPATIENT
Start: 2023-10-14 | End: 2023-10-20

## 2023-10-14 RX ORDER — METOPROLOL TARTRATE 50 MG
25 TABLET ORAL EVERY 12 HOURS
Refills: 0 | Status: DISCONTINUED | OUTPATIENT
Start: 2023-10-14 | End: 2023-10-17

## 2023-10-14 RX ORDER — ACETAMINOPHEN 500 MG
1000 TABLET ORAL EVERY 6 HOURS
Refills: 0 | Status: COMPLETED | OUTPATIENT
Start: 2023-10-15 | End: 2023-10-15

## 2023-10-14 RX ORDER — METHOCARBAMOL 500 MG/1
1000 TABLET, FILM COATED ORAL EVERY 4 HOURS
Refills: 0 | Status: DISCONTINUED | OUTPATIENT
Start: 2023-10-14 | End: 2023-10-14

## 2023-10-14 RX ORDER — SENNA PLUS 8.6 MG/1
2 TABLET ORAL AT BEDTIME
Refills: 0 | Status: DISCONTINUED | OUTPATIENT
Start: 2023-10-14 | End: 2023-10-14

## 2023-10-14 RX ORDER — OXYCODONE HYDROCHLORIDE 5 MG/1
5 TABLET ORAL EVERY 4 HOURS
Refills: 0 | Status: DISCONTINUED | OUTPATIENT
Start: 2023-10-14 | End: 2023-10-15

## 2023-10-14 RX ORDER — OXYCODONE HYDROCHLORIDE 5 MG/1
10 TABLET ORAL EVERY 6 HOURS
Refills: 0 | Status: DISCONTINUED | OUTPATIENT
Start: 2023-10-14 | End: 2023-10-15

## 2023-10-14 RX ORDER — ACETAMINOPHEN 500 MG
1000 TABLET ORAL ONCE
Refills: 0 | Status: COMPLETED | OUTPATIENT
Start: 2023-10-14 | End: 2023-10-14

## 2023-10-14 RX ORDER — HYDROMORPHONE HYDROCHLORIDE 2 MG/ML
0.5 INJECTION INTRAMUSCULAR; INTRAVENOUS; SUBCUTANEOUS
Refills: 0 | Status: DISCONTINUED | OUTPATIENT
Start: 2023-10-14 | End: 2023-10-18

## 2023-10-14 RX ORDER — POLYETHYLENE GLYCOL 3350 17 G/17G
17 POWDER, FOR SOLUTION ORAL EVERY 12 HOURS
Refills: 0 | Status: DISCONTINUED | OUTPATIENT
Start: 2023-10-14 | End: 2023-10-14

## 2023-10-14 RX ORDER — CEFAZOLIN SODIUM 1 G
2000 VIAL (EA) INJECTION EVERY 8 HOURS
Refills: 0 | Status: COMPLETED | OUTPATIENT
Start: 2023-10-14 | End: 2023-10-15

## 2023-10-14 RX ADMIN — CHLORHEXIDINE GLUCONATE 1 APPLICATION(S): 213 SOLUTION TOPICAL at 05:10

## 2023-10-14 RX ADMIN — HYDROMORPHONE HYDROCHLORIDE 0.5 MILLIGRAM(S): 2 INJECTION INTRAMUSCULAR; INTRAVENOUS; SUBCUTANEOUS at 07:35

## 2023-10-14 RX ADMIN — SENNA PLUS 2 TABLET(S): 8.6 TABLET ORAL at 21:20

## 2023-10-14 RX ADMIN — HYDROMORPHONE HYDROCHLORIDE 0.5 MILLIGRAM(S): 2 INJECTION INTRAMUSCULAR; INTRAVENOUS; SUBCUTANEOUS at 05:52

## 2023-10-14 RX ADMIN — OXYCODONE HYDROCHLORIDE 10 MILLIGRAM(S): 5 TABLET ORAL at 21:49

## 2023-10-14 RX ADMIN — Medication 1000 MILLIGRAM(S): at 04:48

## 2023-10-14 RX ADMIN — HYDROMORPHONE HYDROCHLORIDE 0.5 MILLIGRAM(S): 2 INJECTION INTRAMUSCULAR; INTRAVENOUS; SUBCUTANEOUS at 03:30

## 2023-10-14 RX ADMIN — Medication 100 MILLIGRAM(S): at 21:20

## 2023-10-14 RX ADMIN — Medication 25 MILLIGRAM(S): at 17:40

## 2023-10-14 RX ADMIN — HYDROMORPHONE HYDROCHLORIDE 0.5 MILLIGRAM(S): 2 INJECTION INTRAMUSCULAR; INTRAVENOUS; SUBCUTANEOUS at 22:33

## 2023-10-14 RX ADMIN — HYDROMORPHONE HYDROCHLORIDE 0.5 MILLIGRAM(S): 2 INJECTION INTRAMUSCULAR; INTRAVENOUS; SUBCUTANEOUS at 22:18

## 2023-10-14 RX ADMIN — Medication 400 MILLIGRAM(S): at 04:18

## 2023-10-14 RX ADMIN — HYDROMORPHONE HYDROCHLORIDE 0.5 MILLIGRAM(S): 2 INJECTION INTRAMUSCULAR; INTRAVENOUS; SUBCUTANEOUS at 03:00

## 2023-10-14 RX ADMIN — Medication 400 MILLIGRAM(S): at 23:33

## 2023-10-14 RX ADMIN — METHOCARBAMOL 1000 MILLIGRAM(S): 500 TABLET, FILM COATED ORAL at 07:31

## 2023-10-14 RX ADMIN — HYDROMORPHONE HYDROCHLORIDE 1 MILLIGRAM(S): 2 INJECTION INTRAMUSCULAR; INTRAVENOUS; SUBCUTANEOUS at 19:04

## 2023-10-14 RX ADMIN — Medication 1000 MILLIGRAM(S): at 23:48

## 2023-10-14 RX ADMIN — HYDROMORPHONE HYDROCHLORIDE 1 MILLIGRAM(S): 2 INJECTION INTRAMUSCULAR; INTRAVENOUS; SUBCUTANEOUS at 20:00

## 2023-10-14 RX ADMIN — Medication 200 GRAM(S): at 23:33

## 2023-10-14 RX ADMIN — LIDOCAINE 1 PATCH: 4 CREAM TOPICAL at 23:33

## 2023-10-14 RX ADMIN — LIDOCAINE 1 PATCH: 4 CREAM TOPICAL at 07:35

## 2023-10-14 RX ADMIN — HYDROMORPHONE HYDROCHLORIDE 0.5 MILLIGRAM(S): 2 INJECTION INTRAMUSCULAR; INTRAVENOUS; SUBCUTANEOUS at 01:52

## 2023-10-14 RX ADMIN — OXYCODONE HYDROCHLORIDE 10 MILLIGRAM(S): 5 TABLET ORAL at 21:19

## 2023-10-14 RX ADMIN — Medication 1000 MILLIGRAM(S): at 19:23

## 2023-10-14 RX ADMIN — HYDROMORPHONE HYDROCHLORIDE 0.5 MILLIGRAM(S): 2 INJECTION INTRAMUSCULAR; INTRAVENOUS; SUBCUTANEOUS at 01:22

## 2023-10-14 RX ADMIN — Medication 200 GRAM(S): at 21:21

## 2023-10-14 RX ADMIN — Medication 400 MILLIGRAM(S): at 18:23

## 2023-10-14 RX ADMIN — HYDROMORPHONE HYDROCHLORIDE 0.5 MILLIGRAM(S): 2 INJECTION INTRAMUSCULAR; INTRAVENOUS; SUBCUTANEOUS at 06:12

## 2023-10-14 RX ADMIN — METHOCARBAMOL 1000 MILLIGRAM(S): 500 TABLET, FILM COATED ORAL at 03:20

## 2023-10-14 NOTE — CONSULT NOTE ADULT - ASSESSMENT
ASSESSMENT: 68 year old male with history of chronic back pain on hydromorphone 4mg TID presents s/p mechanical fall from 12 feet while on ladder. +Head strike, LOC. Imaging significant for multiple c-spine fractures and R 8-10 rib fracture. SICU consulted for frequent neurochecks.     PLAN:   Neurologic:  - AOx4, strength and sensation intact x all four extremities  - History of chronic back pain on Dilaudid, multimodal pain control with PRN Dilaudid, Tylenol, lidocaine patch, Robaxin  - Low threshold to start PCA  - C spine w/ C1 ring fx w/ lat displacement of lat masses, C2-5 SP fx, C5-6 perched facet, C2-3 hyperdensity c/f spinal EDH. CTA w/ poss L vert dissection vs. occlusion vs artifact at C1  - Plan for OR today 10/14    Respiratory:  - Saturation stable on RA  - Incentive spirometry  - AM CXR    Cardiovascular:  - Hemodynamically stable  - Vertebral artery dissection on CTA - ASA post-operatively, holding for now in anticipation of OR as discussed with neurosurgery     Gastrointestinal/Nutrition:  - NPO except medications in anticipation of the OR  - Bowel regimen with Senna, miralax    Genitourinary/Renal:  - Monitor UO, supplement electrolytes PRN    Hematologic:  - Chemical VTE prophylaxis post-operatively  - Mechanical VTE prophylaxis with SCDs    Infectious Disease:  - Not on antibiotics    Endocrine:  - Euglycemic, no history of DM    Disposition: SICU    No overt SICU contraindication to planned neurosurgical procedure.

## 2023-10-14 NOTE — BRIEF OPERATIVE NOTE - OPERATION/FINDINGS
Posterior C1-C7 Decompression Fusion for unstable spine fx, evacuation of epidural hematoma  Decompression of L C5 Nerve Root

## 2023-10-14 NOTE — BRIEF OPERATIVE NOTE - NSICDXBRIEFPROCEDURE_GEN_ALL_CORE_FT
PROCEDURES:  Instrumentation, spine, cervical, 3-6 levels, posterior approach 14-Oct-2023 15:56:51  Volodymyr Bañuelos

## 2023-10-14 NOTE — PRE-ANESTHESIA EVALUATION ADULT - MALLAMPATI CLASS
limited mouth opening due to collar/Class III - visualization of the soft palate and the base of the uvula

## 2023-10-14 NOTE — PRE-ANESTHESIA EVALUATION ADULT - NSRADCARDRESULTSFT_GEN_ALL_CORE
< from: Transthoracic Echocardiogram (02.17.23 @ 08:08) >    Conclusions:  1. Normal mitral valve. Mild mitral regurgitation.  2. Mildly calcified trileaflet aortic valve with normal  opening. No aortic valve regurgitation seen.  3. Mild aortic root and ascending aorta dilatation  (Ao:  4.3 cm at the sinuses of Valsalva). Ascending aorta  diameter: 4 cm.  4. Normal left ventricular systolic function. No segmental  wall motion abnormalities.  5. Normal right ventricular size and function.  *** No previous Echo exam.  ------------------------------------    < end of copied text >

## 2023-10-14 NOTE — CONSULT NOTE ADULT - ASSESSMENT
68 year old male with history of chronic back pain on hydromorphone 4mg TID presents s/p mechanical fall from 12 feet while on ladder. +Head strike, LOC. Imaging significant for multiple c-spine fractures and R 8-10 rib fracture. Admitted to SICU under trauma for polytrauma w multiple C spine and rib Fxs. Awaiting NSX    A/P  - AOx4, strength and sensation intact x all four extremities  - History of chronic back pain on Dilaudid, multimodal pain control with PRN Dilaudid, Tylenol, lidocaine patch, Robaxin  - C spine w/ C1 ring fx w/ lat displacement of lat masses, C2-5 SP fx, C5-6 perched facet, C2-3 hyperdensity c/f spinal EDH. CTA w/ poss L vert dissection vs. occlusion vs artifact at C1  - Plan for OR today today  - Saturation stable on RA  - Incentive spirometry  - AM CXR  - Hemodynamically stable  - Vertebral artery dissection on CTA - ASA post-operatively, holding for now in anticipation of OR as discussed with neurosurgery   - NPO except medications in anticipation of the OR  - Monitor UO, supplement electrolytes PRN  - consider starting GI ppx w PPI  - Mechanical VTE prophylaxis with SCDs pre-op  - pain control, low threshold for PCA

## 2023-10-14 NOTE — CONSULT NOTE ADULT - CRITICAL CARE ATTENDING COMMENT
s/p fall with rib fx, T and C spine fx and concern for epidural hematoma admitted for close monitoring    aaox4, neuro intact from motor sens exam  reports taking hydrocodon at home for chronic back pain. will do multi modal pain and supplement with PRN opioids  plan for OR tomorrow for spine stabilization  c collar in place, spinal precautions  hold off betablockers  slightly hypertensive  NPO for procedure except meds  bowel regimen  voiding freely  VTE PPX on hold given concern for epidural hematoma. same with asa for vert art dissection. discussed with NSG and agreed to hold for now given this concern.

## 2023-10-14 NOTE — PROGRESS NOTE ADULT - ASSESSMENT
POC: doing well. Pt is in good spirits, family very appreciative. Exam: Drowsy. Ox3, in c-collar. RUE 3/5 in Deltoid and bicep, 4-/5 in triceps, 5/5 in HG. LUE 5/5, BLE 5/5. SILT. No hoffmans/ no clonus. Incision c/d/i, HMV draining well, bloody output.    -Matamoros in, ok to TOV.    -MAP >65    -Pt somewhat tachy to 120s, hypertensive to 160s/100s. Improved s/p home metoprolol to 140s/90s, but still tachy. Will rpt CBC now and recc transfusing 1-2u pRBCs if >2.0 Hgb drop, and rpt CBC in AM.    -If stable tmrw, overnight/AM labs unconcerning, ok to xfer floor under our service. POC: doing well. Pt is in good spirits, family very appreciative. Exam: Drowsy. Ox3, in c-collar. RUE 3/5 in Deltoid and bicep, 4-/5 in triceps, 5/5 in HG. LUE 5/5, BLE 5/5. SILT. No hoffmans/ no clonus. Incision c/d/i, HMV draining well, bloody output.    -Matamoros in, ok to TOV.    -MAP >65    -cont to hold ASA    -Pt somewhat tachy to 120s, hypertensive to 160s/100s. Improved s/p home metoprolol to 140s/90s, but still tachy. Will rpt CBC now and recc transfusing 1-2u pRBCs if >2.0 Hgb drop, and rpt CBC in AM.    -If stable tmrw, overnight/AM labs unconcerning, ok to xfer floor under our service. POC: doing well. Pt is in good spirits, family very appreciative. Exam: Drowsy. Ox3, in c-collar. RUE 3/5 in Deltoid and bicep, 4-/5 in triceps, 5/5 in HG. LUE 5/5, BLE 5/5. SILT. No hoffmans/ no clonus. Incision c/d/i, HMV draining well, bloody output.    -Matamoros in, ok to TOV.    -MAP >65    -cont to hold ASA until at least POD5    -Pt somewhat tachy to 120s, hypertensive to 160s/100s. Improved s/p home metoprolol to 140s/90s, but still tachy. Will rpt CBC now and recc transfusing 1-2u pRBCs if >2.0 Hgb drop, and rpt CBC in AM.    -If stable tmrw, overnight/AM labs unconcerning, ok to xfer floor under our service.

## 2023-10-14 NOTE — PROGRESS NOTE ADULT - ASSESSMENT
68M with history of chronic back pain on opiates presents 10/13/23 s/p 12 foot fall from ladder with multiple traumatic injuries listed below. Patient otherwise stable. GCS 15.    INJURIES:   -C1 anterior and posterior arch fractures with lateral displacement of the lateral masses. Focal traumatic occlusion/dissection of the left vertebral artery at C1 level.  -C2-C3 hyperdensity within the posterior spinal canal, likely representing epidural hematoma.  -C2-C5 Spinous process fractures  -C5 on C6 Anterior subluxation with bilateral perched facets and likely posterior ligamentous injury.  -C6 Left transverse process/transverse foramen fractures.  -T4 compression fracture, mild  -T8 vertebral body lytic lesion, age-indeterminate  -Right lateral 8th to 10th rib mildly displaced rib fractures.   -Trace right pneumothorax and pneumomediastinum. Small right pleural effusion.    PLAN:  - Admit to SICU for polytrauma pending Neurosurgery recs  - Pain Control, consider pain consult. Patient reports he is prescribed 4mg hydromorphone TID and he takes "more or less that much" daily.   - Follow-up neurosurgery recs  - Home metoprolol per SICU   - DVTp per trauma protocol     ACS/Trauma Surgery  [id number]

## 2023-10-15 PROCEDURE — 72040 X-RAY EXAM NECK SPINE 2-3 VW: CPT | Mod: 26

## 2023-10-15 PROCEDURE — 72125 CT NECK SPINE W/O DYE: CPT | Mod: 26

## 2023-10-15 PROCEDURE — 71045 X-RAY EXAM CHEST 1 VIEW: CPT | Mod: 26

## 2023-10-15 RX ORDER — NALBUPHINE HYDROCHLORIDE 10 MG/ML
2.5 INJECTION, SOLUTION INTRAMUSCULAR; INTRAVENOUS; SUBCUTANEOUS EVERY 6 HOURS
Refills: 0 | Status: DISCONTINUED | OUTPATIENT
Start: 2023-10-15 | End: 2023-10-18

## 2023-10-15 RX ORDER — HYDRALAZINE HCL 50 MG
10 TABLET ORAL ONCE
Refills: 0 | Status: COMPLETED | OUTPATIENT
Start: 2023-10-15 | End: 2023-10-15

## 2023-10-15 RX ORDER — SODIUM CHLORIDE 9 MG/ML
1000 INJECTION, SOLUTION INTRAVENOUS
Refills: 0 | Status: DISCONTINUED | OUTPATIENT
Start: 2023-10-15 | End: 2023-10-18

## 2023-10-15 RX ORDER — PHYTONADIONE (VIT K1) 5 MG
10 TABLET ORAL DAILY
Refills: 0 | Status: COMPLETED | OUTPATIENT
Start: 2023-10-16 | End: 2023-10-17

## 2023-10-15 RX ORDER — METHOCARBAMOL 500 MG/1
750 TABLET, FILM COATED ORAL THREE TIMES A DAY
Refills: 0 | Status: DISCONTINUED | OUTPATIENT
Start: 2023-10-15 | End: 2023-10-20

## 2023-10-15 RX ORDER — ONDANSETRON 8 MG/1
4 TABLET, FILM COATED ORAL EVERY 6 HOURS
Refills: 0 | Status: DISCONTINUED | OUTPATIENT
Start: 2023-10-15 | End: 2023-10-18

## 2023-10-15 RX ORDER — HYDROMORPHONE HYDROCHLORIDE 2 MG/ML
0.5 INJECTION INTRAMUSCULAR; INTRAVENOUS; SUBCUTANEOUS ONCE
Refills: 0 | Status: DISCONTINUED | OUTPATIENT
Start: 2023-10-15 | End: 2023-10-15

## 2023-10-15 RX ORDER — HYDROMORPHONE HYDROCHLORIDE 2 MG/ML
0.5 INJECTION INTRAMUSCULAR; INTRAVENOUS; SUBCUTANEOUS
Refills: 0 | Status: DISCONTINUED | OUTPATIENT
Start: 2023-10-15 | End: 2023-10-18

## 2023-10-15 RX ORDER — NALOXONE HYDROCHLORIDE 4 MG/.1ML
0.1 SPRAY NASAL
Refills: 0 | Status: DISCONTINUED | OUTPATIENT
Start: 2023-10-15 | End: 2023-10-18

## 2023-10-15 RX ORDER — HYDROMORPHONE HYDROCHLORIDE 2 MG/ML
30 INJECTION INTRAMUSCULAR; INTRAVENOUS; SUBCUTANEOUS
Refills: 0 | Status: DISCONTINUED | OUTPATIENT
Start: 2023-10-15 | End: 2023-10-18

## 2023-10-15 RX ADMIN — HYDROMORPHONE HYDROCHLORIDE 0.5 MILLIGRAM(S): 2 INJECTION INTRAMUSCULAR; INTRAVENOUS; SUBCUTANEOUS at 02:00

## 2023-10-15 RX ADMIN — HYDROMORPHONE HYDROCHLORIDE 0.5 MILLIGRAM(S): 2 INJECTION INTRAMUSCULAR; INTRAVENOUS; SUBCUTANEOUS at 03:47

## 2023-10-15 RX ADMIN — Medication 10 MILLIGRAM(S): at 01:01

## 2023-10-15 RX ADMIN — HYDROMORPHONE HYDROCHLORIDE 0.5 MILLIGRAM(S): 2 INJECTION INTRAMUSCULAR; INTRAVENOUS; SUBCUTANEOUS at 05:07

## 2023-10-15 RX ADMIN — HYDROMORPHONE HYDROCHLORIDE 0.5 MILLIGRAM(S): 2 INJECTION INTRAMUSCULAR; INTRAVENOUS; SUBCUTANEOUS at 01:45

## 2023-10-15 RX ADMIN — LIDOCAINE 1 PATCH: 4 CREAM TOPICAL at 08:40

## 2023-10-15 RX ADMIN — HYDROMORPHONE HYDROCHLORIDE 30 MILLILITER(S): 2 INJECTION INTRAMUSCULAR; INTRAVENOUS; SUBCUTANEOUS at 10:25

## 2023-10-15 RX ADMIN — Medication 25 GRAM(S): at 00:14

## 2023-10-15 RX ADMIN — HYDROMORPHONE HYDROCHLORIDE 0.5 MILLIGRAM(S): 2 INJECTION INTRAMUSCULAR; INTRAVENOUS; SUBCUTANEOUS at 04:02

## 2023-10-15 RX ADMIN — Medication 400 MILLIGRAM(S): at 05:07

## 2023-10-15 RX ADMIN — Medication 1000 MILLIGRAM(S): at 17:41

## 2023-10-15 RX ADMIN — OXYCODONE HYDROCHLORIDE 10 MILLIGRAM(S): 5 TABLET ORAL at 04:43

## 2023-10-15 RX ADMIN — SENNA PLUS 2 TABLET(S): 8.6 TABLET ORAL at 22:45

## 2023-10-15 RX ADMIN — OXYCODONE HYDROCHLORIDE 10 MILLIGRAM(S): 5 TABLET ORAL at 05:13

## 2023-10-15 RX ADMIN — Medication 25 MILLIGRAM(S): at 17:25

## 2023-10-15 RX ADMIN — Medication 1000 MILLIGRAM(S): at 05:22

## 2023-10-15 RX ADMIN — Medication 400 MILLIGRAM(S): at 11:43

## 2023-10-15 RX ADMIN — CHLORHEXIDINE GLUCONATE 1 APPLICATION(S): 213 SOLUTION TOPICAL at 07:32

## 2023-10-15 RX ADMIN — ONDANSETRON 4 MILLIGRAM(S): 8 TABLET, FILM COATED ORAL at 11:42

## 2023-10-15 RX ADMIN — Medication 1000 MILLIGRAM(S): at 11:58

## 2023-10-15 RX ADMIN — Medication 400 MILLIGRAM(S): at 17:26

## 2023-10-15 RX ADMIN — HYDROMORPHONE HYDROCHLORIDE 30 MILLILITER(S): 2 INJECTION INTRAMUSCULAR; INTRAVENOUS; SUBCUTANEOUS at 19:10

## 2023-10-15 RX ADMIN — LIDOCAINE 1 PATCH: 4 CREAM TOPICAL at 12:03

## 2023-10-15 RX ADMIN — Medication 25 MILLIGRAM(S): at 05:06

## 2023-10-15 RX ADMIN — METHOCARBAMOL 750 MILLIGRAM(S): 500 TABLET, FILM COATED ORAL at 13:16

## 2023-10-15 RX ADMIN — HYDROMORPHONE HYDROCHLORIDE 0.5 MILLIGRAM(S): 2 INJECTION INTRAMUSCULAR; INTRAVENOUS; SUBCUTANEOUS at 19:40

## 2023-10-15 RX ADMIN — HYDROMORPHONE HYDROCHLORIDE 0.5 MILLIGRAM(S): 2 INJECTION INTRAMUSCULAR; INTRAVENOUS; SUBCUTANEOUS at 05:22

## 2023-10-15 RX ADMIN — Medication 100 MILLIGRAM(S): at 05:07

## 2023-10-15 RX ADMIN — POLYETHYLENE GLYCOL 3350 17 GRAM(S): 17 POWDER, FOR SOLUTION ORAL at 11:43

## 2023-10-15 NOTE — CONSULT NOTE ADULT - ASSESSMENT
outpt  ECHO: 09/05/202 : Grade I diastolic dysfunction. mild mitral regurgitation. The aortic root is mildly dilated.  The ascending aorta is mildly dilated.  Aortic Root diameter is (2D-mode) 4.02 cm. Ascending Aortic Diameter: 3.98 cm.    a/p    68 year old male with hx of  Hyperlipidemia, Emphysema, Arthritis, Anemia, Dilated Aortic Root, COPD  presenting sp Fall s/p  12 foot  fall while on ladder    #sp fall   -pt unclear of events leading to fall   -pt is now POSTOP for Posterior C1-C6 decompression fusion, evacuation of epidural hematoma  -mangement per SICU, neuro sx   -post op - tele sinus tach noted - elevated BP noted  -ecg w no ischemic changes   -no chf on exam   -currently on lopressor 25 mg BID **off note pt has complain of INCREASE FATIGUE with higher BB dosing-- was placed on toprol 25 mg PO daily as outpt     # hx  Aortic Root Dilatation  -stable on recent outpt echo from 9/5/23:  The ascending aorta is mildly dilated.  Aortic Root diameter is (2D-mode) 4.02 cm. Ascending Aortic Diameter: 3.98 cm.  -echo q 12 months    # Atrial arrhythmia  -ectopic atrial rhythm/PAT noted on mct and tele during previous  admit  -currently on lopressor 25 mg BID   -if more sx, will consider eps/ablation    dvt ppx

## 2023-10-15 NOTE — OCCUPATIONAL THERAPY INITIAL EVALUATION ADULT - LEVEL OF INDEPENDENCE: STAND/SIT, REHAB EVAL
Detail Level: Generalized minimum assist (75% patients effort) H/O hyperlipidemia    H/O: hypertension    History of right breast cancer    Tooth infection  treated with amoxicillin x10 day

## 2023-10-15 NOTE — CHART NOTE - NSCHARTNOTEFT_GEN_A_CORE
Patient was fit and delivered a cervical collar occipital mandibular support with additional soft sleeve protection. The collar will stabilize and control the cervical spine, reduce ROM and safely protect the patient. Care use and function were explained to patient. Contact info was given. All went without incident.   [facility]  [phone]

## 2023-10-15 NOTE — PROGRESS NOTE ADULT - ASSESSMENT
PLAN:   Plan:  Neurologic:  - AOx4, strength and sensation intact x all four extremities  - History of chronic back pain on Dilaudid, multimodal pain control with PRN Dilaudid, Tylenol, lidocaine patch, Robaxin  - Low threshold to start PCA  - C spine w/ C1 ring fx w/ lat displacement of lat masses, C2-5 SP fx, C5-6 perched facet, C2-3 hyperdensity c/f spinal EDH. CTA w/ poss L vert dissection vs. occlusion vs artifact at C1  - 10/14 Posterior C1-C6 decompression fusion, evacuation of epidural hematoma    Respiratory:  - Saturation stable on RA  - Incentive spirometry  - AM CXR    Cardiovascular:  - Hemodynamically stable  - Vertebral artery dissection on CTA - continue to hold ASA per NSx    Gastrointestinal/Nutrition:  - Diet; reg  - Bowel regimen with Senna, miralax    Genitourinary/Renal:  - Monitor UO, supplement electrolytes PRN    Hematologic:  - Chemical VTE prophylaxis post-operatively  - Mechanical VTE prophylaxis with SCDs    Infectious Disease:  - Continue perioperative ancef    Endocrine:  - Euglycemic, no history of DM

## 2023-10-15 NOTE — OCCUPATIONAL THERAPY INITIAL EVALUATION ADULT - DIAGNOSIS, OT EVAL
Pt p/w impaired balance, strength, endurance, AROM, sensation impacting independence with ADLs and functional mobility.

## 2023-10-15 NOTE — PROGRESS NOTE ADULT - ASSESSMENT
68 year old male with history of chronic back pain on hydromorphone 4mg TID presents s/p mechanical fall from 12 feet while on ladder. +Head strike, LOC. Imaging significant for multiple c-spine fractures and R 8-10 rib fracture. Admitted to SICU under trauma for polytrauma w multiple C spine and rib Fxs. Awaiting NSX    A/P  - AOx4, strength and sensation intact x all four extremities  - History of chronic back pain on Dilaudid, multimodal pain control with PRN Dilaudid, Tylenol, lidocaine patch, Robaxin  - in the ED on imaging: C spine w/ C1 ring fx w/ lat displacement of lat masses, C2-5 SP fx, C5-6 perched facet, C2-3 hyperdensity c/f spinal EDH. CTA w/ poss L vert dissection vs. occlusion vs artifact at C1    -POD1 post C1-C6 post decompression and epidural hematoma evacuation, off ASA and chemical DVT ppx 2/2 Vertebral artery dissection, cleared by NSX to transfer to the floor. ptn is in chair, pain free, on PCA pump, collar on, wife at bedside. ptn is tachy, BP slightly high. denies palpitations, on Metoprolol 25 mg q12H since 10/14, could use a higher dose. consider raising to 50 mg q12H.    - Incentive spirometry    - consider starting GI ppx w PPI  - Mechanical VTE prophylaxis with SCDs pre-op

## 2023-10-15 NOTE — PHYSICAL THERAPY INITIAL EVALUATION ADULT - NSPTDISCHREC_GEN_A_CORE
If home, pt would require assistance w/ all ADL's & mobility. Recommend home PT if d/c'd home./Acute Inpatient Rehab

## 2023-10-15 NOTE — OCCUPATIONAL THERAPY INITIAL EVALUATION ADULT - NSOTDISCHREC_GEN_A_CORE
TBD at rehab. should pt d/c home, recommend HOT and assistance with all ADLs and functional mobility/Acute Inpatient Rehab

## 2023-10-15 NOTE — OCCUPATIONAL THERAPY INITIAL EVALUATION ADULT - PHYSICAL ASSIST/NONPHYSICAL ASSIST: BED TO CHAIR, REHAB EVAL
- LDL goal <70, noted to be 84  - Continue with atorvastatin 20 mg qhs  - Continue with dietary changes   - Monitor lipid profile routinely verbal cues/nonverbal cues (demo/gestures)/1 person assist -f/u final cx results

## 2023-10-15 NOTE — OCCUPATIONAL THERAPY INITIAL EVALUATION ADULT - PERTINENT HX OF CURRENT PROBLEM, REHAB EVAL
68M with history of chronic back pain on hydromorphone 4mg TID presents s/p 12 foot while on ladder.  LOC+, likely HS+ as he does not recall the event.  Patient hemodynamically stable on arrival to ED; no trauma activation. Trauma Surgery team consulted due to concern for Acute displaced right lateral 8-10th rib fractures. IMAGING: CXR: Right lateral 8th to 10th rib mildly displaced rib fractures. CT head: No acute intracranial findings. Abd/Pelvis: Acute displaced right lateral 8-10th rib fractures. Trace right pneumothorax and pneumomediastinum. Small right pleural effusion. CT angiogram neck: Focal traumatic occlusion/dissection of the left vertebral artery at C1 level. Reconstitution of flow is noted in the left intracranial vertebral artery, likely retrograde. CT cervical spine: C1 anterior and posterior arch fractures with lateral displacement of the lateral masses. Spinous process fractures from C2 through C5. Anterior subluxation of C5 on C6 with bilateral perched facets and likely posterior ligamentous injury. Left C6 transverse process/transverse foramen fractures. Hyperdensity within the posterior spinal canal at C2-C3 levels, likely representing epidural hematoma. Thoracic spine: Age-indeterminate mild T4 compression fracture, stable benign-appearing lytic lesion T8 vertebral body. Lumbar spine: No acute fracture or traumatic malalignment. Degenerative changes. Neurosurgery consulted. s/p Posterior C1-C7 Decompression Fusion for unstable spine fx, evacuation of epidural hematoma, decompression of L C5 Nerve Root on 10/14.

## 2023-10-15 NOTE — CONSULT NOTE ADULT - TIME BILLING
agree with the above assessment and plan by VIANEY Mesa.  68 year old male with hx of  Hyperlipidemia, Emphysema, Arthritis, Anemia, Dilated Aortic Root, COPD  presenting sp Fall s/p  12 foot  fall while on ladder    #sp fall   -pt unclear of events leading to fall   -pt is now POSTOP for Posterior C1-C6 decompression fusion, evacuation of epidural hematoma  -mangement per SICU, neuro sx   -post op - tele sinus tach noted - elevated BP noted  -ecg w no ischemic changes   -no chf on exam   -currently on lopressor 25 mg BID **off note pt has complain of INCREASE FATIGUE with higher BB dosing-- was placed on toprol 25 mg PO daily as outpt     # hx  Aortic Root Dilatation  -stable on recent outpt echo from 9/5/23:  The ascending aorta is mildly dilated.  Aortic Root diameter is (2D-mode) 4.02 cm. Ascending Aortic Diameter: 3.98 cm.  -echo q 12 months    # Atrial arrhythmia  -ectopic atrial rhythm/PAT noted on mct and tele during previous  admit  -currently on lopressor 25 mg BID   -if more sx, will consider eps/ablation    dvt ppx

## 2023-10-15 NOTE — OCCUPATIONAL THERAPY INITIAL EVALUATION ADULT - LIVES WITH, PROFILE
Pt lives in  [address] [facility] and 1 flight to bedroom. Pt reports independence with all aspects of self care and functional mobility without AD PTA.

## 2023-10-15 NOTE — PROGRESS NOTE ADULT - ASSESSMENT
- ADM SICU, q1h neuro checks. Xfer to floor 7T under Dr. Dawkins   - Obtain CT C spine   - [place] dced, voiding  - pain ctrl, added muscle relaxants, adding PCA   - pain mgmt consult   - Hold ASA for at least 5 days for non dominant vert dissection   - HMVx1  - Collar when OOB. Please obtain better fitting collar, can call orthotics for fitted collar ([phone])  - Xfer to floor 7T under GERMAN Vasquez for q4h neuro checks   - Obtain CT C spine   - [place] dced, voiding  - pain ctrl, added muscle relaxants, adding PCA   - chronic pain mgmt consult   - Hold ASA for at least 5 days for non dominant vert dissection   - HMVx1  - Collar when OOB. Please obtain better fitting collar, can call orthotics for fitted collar ([phone])  - Xfer to floor 7T under Dr. Dawkins, [place] for q4h neuro checks   - DVT PPX ok 48h post op   - Obtain CT C spine   - [place] dced, voiding  - pain ctrl, added muscle relaxants, adding PCA   - chronic pain mgmt consult   - Hold ASA for at least 5 days for non dominant vert dissection   - HMVx1  - Collar when OOB. Please obtain better fitting collar, can call orthotics for fitted collar ([phone])

## 2023-10-15 NOTE — PHYSICAL THERAPY INITIAL EVALUATION ADULT - ADDITIONAL COMMENTS
Pt resides in a pvt home w/ spouse, 1 step to enter, 1 flight to negotiate inside. PTA pt was independent w/ all functional mobility & ADL's. Did not use an AD for ambulation.

## 2023-10-15 NOTE — PHYSICAL THERAPY INITIAL EVALUATION ADULT - IMPAIRMENTS FOUND, PT EVAL
aerobic capacity/endurance/gait, locomotion, and balance/muscle strength
no fever/no diarrhea/no chills

## 2023-10-16 LAB
ALBUMIN SERPL ELPH-MCNC: 3.7 G/DL — SIGNIFICANT CHANGE UP (ref 3.3–5)
ALP SERPL-CCNC: 66 U/L — SIGNIFICANT CHANGE UP (ref 40–120)
ALT FLD-CCNC: 35 U/L — SIGNIFICANT CHANGE UP (ref 10–45)
ANION GAP SERPL CALC-SCNC: 11 MMOL/L — SIGNIFICANT CHANGE UP (ref 5–17)
AST SERPL-CCNC: 44 U/L — HIGH (ref 10–40)
BILIRUB SERPL-MCNC: 0.6 MG/DL — SIGNIFICANT CHANGE UP (ref 0.2–1.2)
BUN SERPL-MCNC: 14 MG/DL — SIGNIFICANT CHANGE UP (ref 7–23)
CALCIUM SERPL-MCNC: 9 MG/DL — SIGNIFICANT CHANGE UP (ref 8.4–10.5)
CHLORIDE SERPL-SCNC: 95 MMOL/L — LOW (ref 96–108)
CO2 SERPL-SCNC: 26 MMOL/L — SIGNIFICANT CHANGE UP (ref 22–31)
CREAT SERPL-MCNC: 0.75 MG/DL — SIGNIFICANT CHANGE UP (ref 0.5–1.3)
EGFR: 97 ML/MIN/1.73M2 — SIGNIFICANT CHANGE UP
GLUCOSE SERPL-MCNC: 136 MG/DL — HIGH (ref 70–99)
HCT VFR BLD CALC: 36.5 % — LOW (ref 39–50)
HGB BLD-MCNC: 12 G/DL — LOW (ref 13–17)
MCHC RBC-ENTMCNC: 29.9 PG — SIGNIFICANT CHANGE UP (ref 27–34)
MCHC RBC-ENTMCNC: 32.9 GM/DL — SIGNIFICANT CHANGE UP (ref 32–36)
MCV RBC AUTO: 90.8 FL — SIGNIFICANT CHANGE UP (ref 80–100)
NRBC # BLD: 0 /100 WBCS — SIGNIFICANT CHANGE UP (ref 0–0)
PLATELET # BLD AUTO: 211 K/UL — SIGNIFICANT CHANGE UP (ref 150–400)
POTASSIUM SERPL-MCNC: 4.1 MMOL/L — SIGNIFICANT CHANGE UP (ref 3.5–5.3)
POTASSIUM SERPL-SCNC: 4.1 MMOL/L — SIGNIFICANT CHANGE UP (ref 3.5–5.3)
PROT SERPL-MCNC: 6.5 G/DL — SIGNIFICANT CHANGE UP (ref 6–8.3)
RBC # BLD: 4.02 M/UL — LOW (ref 4.2–5.8)
RBC # FLD: 13.3 % — SIGNIFICANT CHANGE UP (ref 10.3–14.5)
SODIUM SERPL-SCNC: 132 MMOL/L — LOW (ref 135–145)
WBC # BLD: 11.29 K/UL — HIGH (ref 3.8–10.5)
WBC # FLD AUTO: 11.29 K/UL — HIGH (ref 3.8–10.5)

## 2023-10-16 PROCEDURE — 99233 SBSQ HOSP IP/OBS HIGH 50: CPT

## 2023-10-16 PROCEDURE — 99223 1ST HOSP IP/OBS HIGH 75: CPT

## 2023-10-16 RX ADMIN — HYDROMORPHONE HYDROCHLORIDE 30 MILLILITER(S): 2 INJECTION INTRAMUSCULAR; INTRAVENOUS; SUBCUTANEOUS at 07:15

## 2023-10-16 RX ADMIN — HYDROMORPHONE HYDROCHLORIDE 30 MILLILITER(S): 2 INJECTION INTRAMUSCULAR; INTRAVENOUS; SUBCUTANEOUS at 13:50

## 2023-10-16 RX ADMIN — SODIUM CHLORIDE 20 MILLILITER(S): 9 INJECTION, SOLUTION INTRAVENOUS at 07:17

## 2023-10-16 RX ADMIN — Medication 25 MILLIGRAM(S): at 06:13

## 2023-10-16 RX ADMIN — HYDROMORPHONE HYDROCHLORIDE 30 MILLILITER(S): 2 INJECTION INTRAMUSCULAR; INTRAVENOUS; SUBCUTANEOUS at 18:52

## 2023-10-16 RX ADMIN — CHLORHEXIDINE GLUCONATE 1 APPLICATION(S): 213 SOLUTION TOPICAL at 06:16

## 2023-10-16 RX ADMIN — SENNA PLUS 2 TABLET(S): 8.6 TABLET ORAL at 21:36

## 2023-10-16 RX ADMIN — METHOCARBAMOL 750 MILLIGRAM(S): 500 TABLET, FILM COATED ORAL at 16:49

## 2023-10-16 RX ADMIN — Medication 102 MILLIGRAM(S): at 12:46

## 2023-10-16 RX ADMIN — METHOCARBAMOL 750 MILLIGRAM(S): 500 TABLET, FILM COATED ORAL at 07:46

## 2023-10-16 RX ADMIN — LIDOCAINE 1 PATCH: 4 CREAM TOPICAL at 21:36

## 2023-10-16 RX ADMIN — Medication 25 MILLIGRAM(S): at 16:49

## 2023-10-16 RX ADMIN — METHOCARBAMOL 750 MILLIGRAM(S): 500 TABLET, FILM COATED ORAL at 21:41

## 2023-10-16 NOTE — CHART NOTE - NSCHARTNOTEFT_GEN_A_CORE
Tertiary Trauma Survey (TTS)    Date of TTS:                              Time:   Admit Date:                              Trauma Activation:  Admit GCS: E-     V-     M-     HPI:  SURGERY CONSULT NOTE    Patient is a 68y old  Male who presents with a chief complaint of neck/chest pain s/p 12 foot fall    HPI:  68 year old male with history of chronic back pain on hydromorphone 4mg TID presents s/p 12 foot while on ladder.  LOC+, likely HS+ as he does not recall the event.  Patient hemodynamically stable on arrival to ED; no trauma activation. Trauma Surgery team consulted due to concern for Acute displaced right lateral 8-10th rib fractures. CT findings listed below. Neurosurgery consulted.       CXR: Right lateral 8th to 10th rib mildly displaced rib fractures.   CT head: No acute intracranial findings.    Abd/Pelvis:   Acute displaced right lateral 8-10th rib fractures. Trace right pneumothorax and pneumomediastinum. Small right pleural effusion.    CT angiogram neck:   Focal traumatic occlusion/dissection of the left vertebral artery at C1 level. Reconstitution of flow is noted in the left intracranial vertebral artery, likely retrograde.    CT cervical spine:  -C1 anterior and posterior arch fractures with lateral displacement of the lateral masses.  -Spinous process fractures from C2 through C5.  -Anterior subluxation of C5 on C6 with bilateral perched facets and likely posterior ligamentous injury.  -Left C6 transverse process/transverse foramen fractures.  -Hyperdensity within the posterior spinal canal at C2-C3 levels, likely representing epidural hematoma.    Thoracic spine:   Age-indeterminate mild T4 compression fracture, stable benign-appearing lytic lesion T8 vertebral body.    Lumbar spine:  No acute fracture or traumatic malalignment. Degenerative changes.            PAST MEDICAL & SURGICAL HISTORY:  High cholesterol      Insomnia      History of chronic back pain      History of arthroscopy of left shoulder  age 15 & right shoulder 3 yrs      H/O hernia repair  b/l inguinal & abdominal      History of arthroplasty of right knee  5yrs        [  ] No significant past history as reviewed with the patient and family    FAMILY HISTORY:  Family history of early CAD (Sibling)      [  ] Family history not pertinent as reviewed with the patient and family    SOCIAL HISTORY:    MEDICATIONS  (STANDING):  acetaminophen   IVPB .. 1000 milliGRAM(s) IV Intermittent every 6 hours  chlorhexidine 2% Cloths 1 Application(s) Topical <User Schedule>  lidocaine   4% Patch 1 Patch Transdermal every 24 hours    MEDICATIONS  (PRN):  HYDROmorphone  Injectable 0.5 milliGRAM(s) IV Push every 3 hours PRN Breakthrough  methocarbamol 500 milliGRAM(s) Oral every 8 hours PRN Muscle Spasm    Allergies    Nuts (Unknown)  Originally Entered as [Unknown] reaction to [PUMPKIN SEEDS] (Unknown)  Vioxx (Unknown)     (13 Oct 2023 18:52)      PAST MEDICAL & SURGICAL HISTORY:  High cholesterol      Insomnia      History of chronic back pain      History of arthroscopy of left shoulder  age 15 & right shoulder 3 yrs      H/O hernia repair  b/l inguinal & abdominal      History of arthroplasty of right knee  5yrs        [  ] No significant past history as reviewed with the patient and family    FAMILY HISTORY:  Family history of early CAD (Sibling)      [  ] Family history not pertinent as reviewed with the patient and family    SOCIAL HISTORY:    Medications (inpatient): chlorhexidine 2% Cloths 1 Application(s) Topical <User Schedule>  HYDROmorphone PCA (1 mG/mL) 30 milliLiter(s) PCA Continuous PCA Continuous  lactated ringers. 1000 milliLiter(s) IV Continuous <Continuous>  lidocaine   4% Patch 1 Patch Transdermal every 24 hours  methocarbamol 750 milliGRAM(s) Oral three times a day  metoprolol tartrate 25 milliGRAM(s) Oral every 12 hours  phytonadione  IVPB 10 milliGRAM(s) IV Intermittent daily  polyethylene glycol 3350 17 Gram(s) Oral daily  senna 2 Tablet(s) Oral at bedtime    Medications (PRN):HYDROmorphone  Injectable 0.5 milliGRAM(s) IV Push every 3 hours PRN  HYDROmorphone PCA (1 mG/mL) Rescue Clinician Bolus 0.5 milliGRAM(s) IV Push every 15 minutes PRN  nalbuphine Injectable 2.5 milliGRAM(s) IV Push every 6 hours PRN  naloxone Injectable 0.1 milliGRAM(s) IV Push every 3 minutes PRN  ondansetron Injectable 4 milliGRAM(s) IV Push every 6 hours PRN    Allergies: Nuts (Unknown)  Originally Entered as [Unknown] reaction to [PUMPKIN SEEDS] (Unknown)  Vioxx (Unknown)  (Intolerances: )    Vital Signs Last 24 Hrs  T(C): 36.7 (16 Oct 2023 16:38), Max: 36.9 (16 Oct 2023 03:00)  T(F): 98 (16 Oct 2023 16:38), Max: 98.4 (16 Oct 2023 03:00)  HR: 119 (16 Oct 2023 16:38) (104 - 125)  BP: 153/93 (16 Oct 2023 16:38) (144/86 - 175/93)  BP(mean): 108 (16 Oct 2023 12:00) (108 - 131)  RR: 18 (16 Oct 2023 16:38) (10 - 37)  SpO2: 95% (16 Oct 2023 16:38) (89% - 100%)    Parameters below as of 16 Oct 2023 16:38  Patient On (Oxygen Delivery Method): nasal cannula  O2 Flow (L/min): 2    Drug Dosing Weight  Height (cm): 182.9 (01 Mar 2023 00:31)  Weight (kg): 67.9 (14 Oct 2023 05:46)  BMI (kg/m2): 20.3 (14 Oct 2023 05:46)  BSA (m2): 1.88 (14 Oct 2023 05:46)    PHYSICAL EXAM:  GEN: resting comfortably in bed, in NAD   HEAD: normocephalic, nontender to palpation   NECK: no JVD, nontender to palpation   CHEST: nontender to palpation across clavicles and b/l anterior ribs   BACK: nontender to palpation along midline and b/l posterior ribs   ABD: soft, nontender, nondistended   EXTREM: b/l UE non-tender to palpation                   b/l LE non-tender to palpation                    Moving all extremities spontaneously; warm, well-perfused, palpable distal pulses   NEURO: AOx3, no focal neuro deficits                           12.0   11.29 )-----------( 211      ( 16 Oct 2023 05:36 )             36.5     10-16    132<L>  |  95<L>  |  14  ----------------------------<  136<H>  4.1   |  26  |  0.75    Ca    9.0      16 Oct 2023 05:36  Phos  4.0     10-14  Mg     1.8     10-14    TPro  6.5  /  Alb  3.7  /  TBili  0.6  /  DBili  x   /  AST  44<H>  /  ALT  35  /  AlkPhos  66  10-16    PT/INR - ( 14 Oct 2023 22:43 )   PT: 11.9 sec;   INR: 1.14 ratio         PTT - ( 14 Oct 2023 22:43 )  PTT:25.6 sec  Urinalysis Basic - ( 16 Oct 2023 05:36 )    Color: x / Appearance: x / SG: x / pH: x  Gluc: 136 mg/dL / Ketone: x  / Bili: x / Urobili: x   Blood: x / Protein: x / Nitrite: x   Leuk Esterase: x / RBC: x / WBC x   Sq Epi: x / Non Sq Epi: x / Bacteria: x        List Injuries Identified to Date:    List Operative and Interventional Radiological Procedures:     Consults (Date):  [  ] Neurosurgery   [  ] Orthopedics  [  ] Plastics  [  ] Urology  [  ] PM&R  [  ] Social Work    RADIOLOGICAL FINDINGS REVIEW:  CXR:    Pelvis Films:     C-Spine Films:    T/L/S Spine Films:    Extremity Films:    Head CT:    C-Spine CT:    Neck CT:    Chest CT:    ABD/Pelvis CT:    Other:    INTERPRETATION:     PLAN: Tertiary Trauma Survey (TTS)    Date of TTS: 10/16/23                             Time: 1800  Admit Date:  10/13/23                            Trauma Activation: 10/16/23    HPI:  SURGERY CONSULT NOTE    Patient is a 68y old  Male who presents with a chief complaint of neck/chest pain s/p 12 foot fall    HPI:  68 year old male with history of chronic back pain on hydromorphone 4mg TID presents s/p 12 foot while on ladder.  LOC+, likely HS+ as he does not recall the event.  Patient hemodynamically stable on arrival to ED; no trauma activation. Trauma Surgery team consulted due to concern for Acute displaced right lateral 8-10th rib fractures. CT findings listed below. Neurosurgery consulted.       PAST MEDICAL & SURGICAL HISTORY:  High cholesterol      Insomnia      History of chronic back pain      History of arthroscopy of left shoulder  age 15 & right shoulder 3 yrs      H/O hernia repair  b/l inguinal & abdominal    History of arthroplasty of right knee  5yrs    [  ] No significant past history as reviewed with the patient and family    FAMILY HISTORY:  Family history of early CAD (Sibling)    [  ] Family history not pertinent as reviewed with the patient and family    SOCIAL HISTORY:    MEDICATIONS  (STANDING):  acetaminophen   IVPB .. 1000 milliGRAM(s) IV Intermittent every 6 hours  chlorhexidine 2% Cloths 1 Application(s) Topical <User Schedule>  lidocaine   4% Patch 1 Patch Transdermal every 24 hours    MEDICATIONS  (PRN):  HYDROmorphone  Injectable 0.5 milliGRAM(s) IV Push every 3 hours PRN Breakthrough  methocarbamol 500 milliGRAM(s) Oral every 8 hours PRN Muscle Spasm    Allergies    Nuts (Unknown)  Originally Entered as [Unknown] reaction to [PUMPKIN SEEDS] (Unknown)  Vioxx (Unknown)     (13 Oct 2023 18:52)      PAST MEDICAL & SURGICAL HISTORY:  High cholesterol      Insomnia      History of chronic back pain      History of arthroscopy of left shoulder  age 15 & right shoulder 3 yrs      H/O hernia repair  b/l inguinal & abdominal      History of arthroplasty of right knee  5yrs        [  ] No significant past history as reviewed with the patient and family    FAMILY HISTORY:  Family history of early CAD (Sibling)      [  ] Family history not pertinent as reviewed with the patient and family    SOCIAL HISTORY:    Medications (inpatient): chlorhexidine 2% Cloths 1 Application(s) Topical <User Schedule>  HYDROmorphone PCA (1 mG/mL) 30 milliLiter(s) PCA Continuous PCA Continuous  lactated ringers. 1000 milliLiter(s) IV Continuous <Continuous>  lidocaine   4% Patch 1 Patch Transdermal every 24 hours  methocarbamol 750 milliGRAM(s) Oral three times a day  metoprolol tartrate 25 milliGRAM(s) Oral every 12 hours  phytonadione  IVPB 10 milliGRAM(s) IV Intermittent daily  polyethylene glycol 3350 17 Gram(s) Oral daily  senna 2 Tablet(s) Oral at bedtime    Medications (PRN):HYDROmorphone  Injectable 0.5 milliGRAM(s) IV Push every 3 hours PRN  HYDROmorphone PCA (1 mG/mL) Rescue Clinician Bolus 0.5 milliGRAM(s) IV Push every 15 minutes PRN  nalbuphine Injectable 2.5 milliGRAM(s) IV Push every 6 hours PRN  naloxone Injectable 0.1 milliGRAM(s) IV Push every 3 minutes PRN  ondansetron Injectable 4 milliGRAM(s) IV Push every 6 hours PRN    Allergies: Nuts (Unknown)  Originally Entered as [Unknown] reaction to [PUMPKIN SEEDS] (Unknown)  Vioxx (Unknown)  (Intolerances: )    Vital Signs Last 24 Hrs  T(C): 36.7 (16 Oct 2023 16:38), Max: 36.9 (16 Oct 2023 03:00)  T(F): 98 (16 Oct 2023 16:38), Max: 98.4 (16 Oct 2023 03:00)  HR: 119 (16 Oct 2023 16:38) (104 - 125)  BP: 153/93 (16 Oct 2023 16:38) (144/86 - 175/93)  BP(mean): 108 (16 Oct 2023 12:00) (108 - 131)  RR: 18 (16 Oct 2023 16:38) (10 - 37)  SpO2: 95% (16 Oct 2023 16:38) (89% - 100%)    Parameters below as of 16 Oct 2023 16:38  Patient On (Oxygen Delivery Method): nasal cannula  O2 Flow (L/min): 2    Drug Dosing Weight  Height (cm): 182.9 (01 Mar 2023 00:31)  Weight (kg): 67.9 (14 Oct 2023 05:46)  BMI (kg/m2): 20.3 (14 Oct 2023 05:46)  BSA (m2): 1.88 (14 Oct 2023 05:46)    PHYSICAL EXAM:  GEN: resting comfortably in bed, in NAD   HEAD: normocephalic, nontender to palpation   NECK: no JVD, nontender to palpation   CHEST: nontender to palpation across clavicles and b/l anterior ribs   BACK: nontender to palpation along midline and b/l posterior ribs   ABD: soft, nontender, nondistended   EXTREM: b/l UE non-tender to palpation                   b/l LE non-tender to palpation                    Moving all extremities spontaneously; warm, well-perfused, palpable distal pulses   NEURO: AOx3, no focal neuro deficits                           12.0   11.29 )-----------( 211      ( 16 Oct 2023 05:36 )             36.5     10-16    132<L>  |  95<L>  |  14  ----------------------------<  136<H>  4.1   |  26  |  0.75    Ca    9.0      16 Oct 2023 05:36  Phos  4.0     10-14  Mg     1.8     10-14    TPro  6.5  /  Alb  3.7  /  TBili  0.6  /  DBili  x   /  AST  44<H>  /  ALT  35  /  AlkPhos  66  10-16    PT/INR - ( 14 Oct 2023 22:43 )   PT: 11.9 sec;   INR: 1.14 ratio         PTT - ( 14 Oct 2023 22:43 )  PTT:25.6 sec  Urinalysis Basic - ( 16 Oct 2023 05:36 )    Color: x / Appearance: x / SG: x / pH: x  Gluc: 136 mg/dL / Ketone: x  / Bili: x / Urobili: x   Blood: x / Protein: x / Nitrite: x   Leuk Esterase: x / RBC: x / WBC x   Sq Epi: x / Non Sq Epi: x / Bacteria: x        List Injuries Identified to Date:  -C1 anterior and posterior arch fractures with lateral displacement of the lateral masses. Focal traumatic occlusion/dissection of the left vertebral artery at C1 level.  -C2-C3 hyperdensity within the posterior spinal canal, likely representing epidural hematoma.  -C2-C5 Spinous process fractures  -C5 on C6 Anterior subluxation with bilateral perched facets and likely posterior ligamentous injury.  -C6 Left transverse process/transverse foramen fractures.  -T4 compression fracture, mild  -T8 vertebral body lytic lesion, age-indeterminate  -Right lateral 8th to 10th rib mildly displaced rib fractures.   -Trace right pneumothorax and pneumomediastinum. Small right pleural effusion.    List Operative and Interventional Radiological Procedures: Posterior C1-C7 Decompression Fusion for unstable spine fx, evacuation of epidural hematoma  Decompression of L C5 Nerve Root    Consults (Date):  [ x ] Neurosurgery   [  ] Orthopedics  [  ] Plastics  [  ] Urology  [  ] PM&R  [  ] Social Work    RADIOLOGICAL FINDINGS REVIEW:    Thoracic spine:  There is maintenance of the thoracic kyphosis. Alignment is similar to   the prior exam. There is new very mild vertebral body height loss at T4   with wedging of the superior endplate and subtle sclerosis in the upper   vertebral body with slight step off of the posterior cortex superiorly.   The remaining thoracic vertebral body heights are maintained. No acute   fracture or traumatic malalignment. Stable benign-appearing lytic lesion   in the T8 vertebral body with thin zone of transition and lobular   contour. No suspicious osteoblastic or lytic lesion.    There is no significant paraspinal soft tissue swelling or evidence for   epidural hematoma.    There are mild multilevel degenerative changes similar to the prior exam   which do not contribute to significant canal or foraminal stenosis.    Small right pleural effusion. Please see separate dictation of the CT   chest for findings regarding the intrathoracic compartment.    The paraspinal musculature is unremarkable.    Lumbar spine:  Mildly exaggerated lumbar lordosis with grade 1 anterolisthesis of L4 and   L5, unchanged. The vertebral body heights are maintained. There are no   acutely displaced fractures or evidence for traumatic malalignment. Facet   alignment is preserved. The visualized sacrum and SI joints appear   unremarkable. There is no suspicious osteoblastic or lytic lesion.    There is no evidence of paraspinal soft tissue swelling or for epidural   hematoma.    Multilevel degenerative changes including intervertebral disc space   narrowing, disc herniations with osteophytic ridging and facet   arthropathy similar to the recent MRI and again contribute to multilevel   canal and foraminal stenosis, at least severe at L2/L3 and L3/L4.   Suboptimal assessment of the intraspinal canal contents on this modality.    Please see separate dictation of the CT abdomen/pelvis for findings   regarding the abdomen and pelvis.    The paraspinal musculature is of unremarkable bulk and morphology for   patient's age.    IMPRESSION:  Thoracic spine: Age-indeterminate mild T4 compression fracture. No   significant retropulsion of bone into the epidural space.  Stable benign-appearing lytic lesion T8 vertebral body.    Lumbar spine: Stable exam. No acute fracture or traumatic malalignment.   Degenerative changes.      Status post C2-C6 posterior decompression and C1-C7 posterior spinal   fusion. Spinal fusion hardware appears intact.    Interval reduction of the anterior subluxation seen at C5-C6 and perched   facets at this level. There is nonspecific stranding of the cervical   lordosis.    Redemonstrated fractures of the anterior and posterior arches of the   atlas. Redemonstrated fracture of the left C6 transverse process   extending into the transverse foramen.    Mild height loss is noted at the anterior aspect of C6 vertebral body   which may represent an additional nondisplaced fracture.    Evaluation of the spinal canal is limited due to streak artifact.    Surgical staples and drain overlying the laminectomy site project in the   posterior neck. Extensive soft tissue swelling is noted in the surgical   bed. Mild prevertebral soft tissue swelling is also noted.    Trace right apical pneumothorax is present. A trace right pneumothorax   was also present on 10/13/2023    IMPRESSION:    Redemonstrated cervical spine fractures with interval spinal   decompression and fusion as above.    CHEST:  LUNGS AND LARGE AIRWAYS: Patent central airways. Mild dependent   atelectasis.  PLEURA: Small right pleural effusion. Trace right pneumothorax.  VESSELS: Atherosclerotic changes of the aorta and coronary arteries.  HEART: Heart size is normal. No pericardial effusion. Small focus of air   in the anterior mediastinum.  MEDIASTINUM AND ALYCIA: No lymphadenopathy.  CHEST WALL AND LOWER NECK: Acute displaced right 8th lateral rib fracture   with overriding fragments and displaced 9th and 10th lateral rib   fractures. There is associated right chest wall subcutaneous emphysema   and soft tissue swelling.    ABDOMEN AND PELVIS:  LIVER: Numerous cysts and subcentimeter hypodense foci too small to   characterize.  BILE DUCTS: Normal caliber.  GALLBLADDER: Within normal limits.  SPLEEN: Within normal limits.  PANCREAS: Within normal limits.  ADRENALS: Within normal limits.  KIDNEYS/URETERS: No hydronephrosis. Right renal cysts and additional   subcentimeter hypodense foci too small to characterize.    BLADDER: Within normal limits.  REPRODUCTIVE ORGANS: Prostate within normal limits.    BOWEL: No bowel obstruction.  PERITONEUM: No ascites or free air.  VESSELS: Atherosclerotic changes.  RETROPERITONEUM/LYMPH NODES: No lymphadenopathy.  ABDOMINAL WALL: Within normal limits.  BONES: Right-sided rib fractures as above. Chronic appearing fracture   deformity of the right inferior pubic ramus. Degenerative changes of the   spine. Stable nonaggressive appearing lytic lesion in the T8 vertebral   body.    IMPRESSION:  Acute displaced right lateral 8-10th rib fractures.    Trace right pneumothorax and pneumomediastinum. Small right pleural   effusion.    CT HEAD:  No acute transcortical infarction or acute intracranial hemorrhage.  White matter hypoattenuating foci are noted, compatible with small vessel   changes.  No hydrocephalus. No extra-axial fluid collections.  The visualized intraorbital contents are normal. The imaged portions of   the paranasal sinuses are clear. The mastoid air cells are clear.  Right parietal scalp swelling. No fracture identified.    CT CERVICAL SPINE:  Acute fractures of the anterior and posterior C1 arches. Lateral   displacement of the bilateral lateral masses, measuring up to 5 mm on the   right and 2 mm on the left (see key image).    Acute fractures of the C2, C3, C4, and C5 spinous processes with   displacement most prominent at C5 level. There is anterior subluxation of   C5 on C6 with associated perching of the bilateral C5-C6 facet joints.   There is widening at the C5-C6 interspinous space, concerning for   ligamentous injury (see image #1).    There are fractures of the left C6 transverse process violating the   transverse foramen.    Subtle hyperdensity along the posterior spinal canal most prominent at   C2/C3 levels is suspicious for epidural hematoma (605-25).    CTA NECK:    The visualized portion of the aortic arch is unremarkable in appearance.   The origins of the great vessels and the vertebral arteries are normal   without evidence of stenosis.    The common carotid arteries are patent bilaterally without evidence of   stenosis. Atherosclerotic plaque at the bilateral carotid bifurcations,   without hemodynamically significant stenosis. There is no narrowing of   the cervical internal carotid arteries bilaterally.    Left C6 transverse process fracture with mild mass effect upon the left   vertebral artery without significant narrowing. Left vertebral artery is   not visualized at the left C1 level likely secondary to traumatic   occlusion/dissection. See below regarding intracranial left vertebral   artery.    Right vertebral artery is patent throughout its course.    CTA HEAD:    Atherosclerotic plaque of the bilateral intracranial ICAs, without   stenosis.    The proximal anterior, middle and posterior cerebral arteries are patent   bilaterally.    Flow-related enhancement in the left vertebral artery is reconstituted   once it pierces the dura, likely related to retrograde flow. The right   vertebral artery and basilar artery are patent.    No evidence of vascular stenosis, occlusion, aneurysm or vascular   malformation.    OTHER:  Partially visualized right pleural effusion.    IMPRESSION:  CT head:  -No acute intracranial findings.    CT cervical spine:  -C1 anterior and posterior arch fractures with lateral displacement of   the lateral masses.  -Spinous process fractures from C2 through C5.  -Anterior subluxation of C5 on C6 with bilateral perched facets and   likely posterior ligamentous injury.  -Left C6 transverse process/transverse foramen fractures.  -Hyperdensity within the posterior spinal canal at C2-C3 levels, likely   representing epidural hematoma.    Recommend MRI cervical spine for further evaluation.    CT angiogram head:  -No vaso-occlusive disease.    CT angiogram neck:  -Focal traumatic occlusion/dissection of the left vertebral artery at C1   level. Reconstitution of flow is noted in the left intracranial vertebral   artery, likely retrograde.      Other:    INTERPRETATION:     PLAN: Tertiary Trauma Survey (TTS)    Date of TTS: 10/16/23                             Time: 1800  Admit Date:  10/13/23                            Trauma Activation: 10/16/23    HPI:  SURGERY CONSULT NOTE    Patient is a 68y old  Male who presents with a chief complaint of neck/chest pain s/p 12 foot fall    HPI:  68 year old male with history of chronic back pain on hydromorphone 4mg TID presents s/p 12 foot while on ladder.  LOC+, likely HS+ as he does not recall the event.  Patient hemodynamically stable on arrival to ED; no trauma activation. Trauma Surgery team consulted due to concern for Acute displaced right lateral 8-10th rib fractures. CT findings listed below. Neurosurgery consulted.       PAST MEDICAL & SURGICAL HISTORY:  High cholesterol      Insomnia      History of chronic back pain      History of arthroscopy of left shoulder  age 15 & right shoulder 3 yrs      H/O hernia repair  b/l inguinal & abdominal    History of arthroplasty of right knee  5yrs    [  ] No significant past history as reviewed with the patient and family    FAMILY HISTORY:  Family history of early CAD (Sibling)    [  ] Family history not pertinent as reviewed with the patient and family    SOCIAL HISTORY:    MEDICATIONS  (STANDING):  acetaminophen   IVPB .. 1000 milliGRAM(s) IV Intermittent every 6 hours  chlorhexidine 2% Cloths 1 Application(s) Topical <User Schedule>  lidocaine   4% Patch 1 Patch Transdermal every 24 hours    MEDICATIONS  (PRN):  HYDROmorphone  Injectable 0.5 milliGRAM(s) IV Push every 3 hours PRN Breakthrough  methocarbamol 500 milliGRAM(s) Oral every 8 hours PRN Muscle Spasm    Allergies    Nuts (Unknown)  Originally Entered as [Unknown] reaction to [PUMPKIN SEEDS] (Unknown)  Vioxx (Unknown)     (13 Oct 2023 18:52)      PAST MEDICAL & SURGICAL HISTORY:  High cholesterol      Insomnia      History of chronic back pain      History of arthroscopy of left shoulder  age 15 & right shoulder 3 yrs      H/O hernia repair  b/l inguinal & abdominal      History of arthroplasty of right knee  5yrs        [  ] No significant past history as reviewed with the patient and family    FAMILY HISTORY:  Family history of early CAD (Sibling)      [  ] Family history not pertinent as reviewed with the patient and family    SOCIAL HISTORY:    Medications (inpatient): chlorhexidine 2% Cloths 1 Application(s) Topical <User Schedule>  HYDROmorphone PCA (1 mG/mL) 30 milliLiter(s) PCA Continuous PCA Continuous  lactated ringers. 1000 milliLiter(s) IV Continuous <Continuous>  lidocaine   4% Patch 1 Patch Transdermal every 24 hours  methocarbamol 750 milliGRAM(s) Oral three times a day  metoprolol tartrate 25 milliGRAM(s) Oral every 12 hours  phytonadione  IVPB 10 milliGRAM(s) IV Intermittent daily  polyethylene glycol 3350 17 Gram(s) Oral daily  senna 2 Tablet(s) Oral at bedtime    Medications (PRN):HYDROmorphone  Injectable 0.5 milliGRAM(s) IV Push every 3 hours PRN  HYDROmorphone PCA (1 mG/mL) Rescue Clinician Bolus 0.5 milliGRAM(s) IV Push every 15 minutes PRN  nalbuphine Injectable 2.5 milliGRAM(s) IV Push every 6 hours PRN  naloxone Injectable 0.1 milliGRAM(s) IV Push every 3 minutes PRN  ondansetron Injectable 4 milliGRAM(s) IV Push every 6 hours PRN    Allergies: Nuts (Unknown)  Originally Entered as [Unknown] reaction to [PUMPKIN SEEDS] (Unknown)  Vioxx (Unknown)  (Intolerances: )    Vital Signs Last 24 Hrs  T(C): 36.7 (16 Oct 2023 16:38), Max: 36.9 (16 Oct 2023 03:00)  T(F): 98 (16 Oct 2023 16:38), Max: 98.4 (16 Oct 2023 03:00)  HR: 119 (16 Oct 2023 16:38) (104 - 125)  BP: 153/93 (16 Oct 2023 16:38) (144/86 - 175/93)  BP(mean): 108 (16 Oct 2023 12:00) (108 - 131)  RR: 18 (16 Oct 2023 16:38) (10 - 37)  SpO2: 95% (16 Oct 2023 16:38) (89% - 100%)    Parameters below as of 16 Oct 2023 16:38  Patient On (Oxygen Delivery Method): nasal cannula  O2 Flow (L/min): 2    Drug Dosing Weight  Height (cm): 182.9 (01 Mar 2023 00:31)  Weight (kg): 67.9 (14 Oct 2023 05:46)  BMI (kg/m2): 20.3 (14 Oct 2023 05:46)  BSA (m2): 1.88 (14 Oct 2023 05:46)    PHYSICAL EXAM:  GEN: resting comfortably in bed, in NAD   HEAD: normocephalic, nontender to palpation   NECK: no JVD, nontender to palpation   CHEST: nontender to palpation across clavicles, TTP across R ribs  BACK: nontender to palpation along midline and b/l posterior ribs   ABD: soft, nontender, nondistended   EXTREM: b/l UE non-tender to palpation                   b/l LE non-tender to palpation                    Moving all extremities spontaneously; warm, well-perfused, palpable distal pulses   NEURO: AOx3, strength 3/5 UE, 4/5 LE                          12.0   11.29 )-----------( 211      ( 16 Oct 2023 05:36 )             36.5     10-16    132<L>  |  95<L>  |  14  ----------------------------<  136<H>  4.1   |  26  |  0.75    Ca    9.0      16 Oct 2023 05:36  Phos  4.0     10-14  Mg     1.8     10-14    TPro  6.5  /  Alb  3.7  /  TBili  0.6  /  DBili  x   /  AST  44<H>  /  ALT  35  /  AlkPhos  66  10-16    PT/INR - ( 14 Oct 2023 22:43 )   PT: 11.9 sec;   INR: 1.14 ratio         PTT - ( 14 Oct 2023 22:43 )  PTT:25.6 sec  Urinalysis Basic - ( 16 Oct 2023 05:36 )    Color: x / Appearance: x / SG: x / pH: x  Gluc: 136 mg/dL / Ketone: x  / Bili: x / Urobili: x   Blood: x / Protein: x / Nitrite: x   Leuk Esterase: x / RBC: x / WBC x   Sq Epi: x / Non Sq Epi: x / Bacteria: x        List Injuries Identified to Date:  -C1 anterior and posterior arch fractures with lateral displacement of the lateral masses. Focal traumatic occlusion/dissection of the left vertebral artery at C1 level.  -C2-C3 hyperdensity within the posterior spinal canal, likely representing epidural hematoma.  -C2-C5 Spinous process fractures  -C5 on C6 Anterior subluxation with bilateral perched facets and likely posterior ligamentous injury.  -C6 Left transverse process/transverse foramen fractures.  -T4 compression fracture, mild  -T8 vertebral body lytic lesion, age-indeterminate  -Right lateral 8th to 10th rib mildly displaced rib fractures.   -Trace right pneumothorax and pneumomediastinum. Small right pleural effusion.    List Operative and Interventional Radiological Procedures: Posterior C1-C7 Decompression Fusion for unstable spine fx, evacuation of epidural hematoma  Decompression of L C5 Nerve Root    Consults (Date):  [ x ] Neurosurgery   [  ] Orthopedics  [  ] Plastics  [  ] Urology  [  ] PM&R  [  ] Social Work    RADIOLOGICAL FINDINGS REVIEW:    Thoracic spine:  There is maintenance of the thoracic kyphosis. Alignment is similar to   the prior exam. There is new very mild vertebral body height loss at T4   with wedging of the superior endplate and subtle sclerosis in the upper   vertebral body with slight step off of the posterior cortex superiorly.   The remaining thoracic vertebral body heights are maintained. No acute   fracture or traumatic malalignment. Stable benign-appearing lytic lesion   in the T8 vertebral body with thin zone of transition and lobular   contour. No suspicious osteoblastic or lytic lesion.    There is no significant paraspinal soft tissue swelling or evidence for   epidural hematoma.    There are mild multilevel degenerative changes similar to the prior exam   which do not contribute to significant canal or foraminal stenosis.    Small right pleural effusion. Please see separate dictation of the CT   chest for findings regarding the intrathoracic compartment.    The paraspinal musculature is unremarkable.    Lumbar spine:  Mildly exaggerated lumbar lordosis with grade 1 anterolisthesis of L4 and   L5, unchanged. The vertebral body heights are maintained. There are no   acutely displaced fractures or evidence for traumatic malalignment. Facet   alignment is preserved. The visualized sacrum and SI joints appear   unremarkable. There is no suspicious osteoblastic or lytic lesion.    There is no evidence of paraspinal soft tissue swelling or for epidural   hematoma.    Multilevel degenerative changes including intervertebral disc space   narrowing, disc herniations with osteophytic ridging and facet   arthropathy similar to the recent MRI and again contribute to multilevel   canal and foraminal stenosis, at least severe at L2/L3 and L3/L4.   Suboptimal assessment of the intraspinal canal contents on this modality.    Please see separate dictation of the CT abdomen/pelvis for findings   regarding the abdomen and pelvis.    The paraspinal musculature is of unremarkable bulk and morphology for   patient's age.    IMPRESSION:  Thoracic spine: Age-indeterminate mild T4 compression fracture. No   significant retropulsion of bone into the epidural space.  Stable benign-appearing lytic lesion T8 vertebral body.    Lumbar spine: Stable exam. No acute fracture or traumatic malalignment.   Degenerative changes.      Status post C2-C6 posterior decompression and C1-C7 posterior spinal   fusion. Spinal fusion hardware appears intact.    Interval reduction of the anterior subluxation seen at C5-C6 and perched   facets at this level. There is nonspecific stranding of the cervical   lordosis.    Redemonstrated fractures of the anterior and posterior arches of the   atlas. Redemonstrated fracture of the left C6 transverse process   extending into the transverse foramen.    Mild height loss is noted at the anterior aspect of C6 vertebral body   which may represent an additional nondisplaced fracture.    Evaluation of the spinal canal is limited due to streak artifact.    Surgical staples and drain overlying the laminectomy site project in the   posterior neck. Extensive soft tissue swelling is noted in the surgical   bed. Mild prevertebral soft tissue swelling is also noted.    Trace right apical pneumothorax is present. A trace right pneumothorax   was also present on 10/13/2023    IMPRESSION:    Redemonstrated cervical spine fractures with interval spinal   decompression and fusion as above.    CHEST:  LUNGS AND LARGE AIRWAYS: Patent central airways. Mild dependent   atelectasis.  PLEURA: Small right pleural effusion. Trace right pneumothorax.  VESSELS: Atherosclerotic changes of the aorta and coronary arteries.  HEART: Heart size is normal. No pericardial effusion. Small focus of air   in the anterior mediastinum.  MEDIASTINUM AND ALYCIA: No lymphadenopathy.  CHEST WALL AND LOWER NECK: Acute displaced right 8th lateral rib fracture   with overriding fragments and displaced 9th and 10th lateral rib   fractures. There is associated right chest wall subcutaneous emphysema   and soft tissue swelling.    ABDOMEN AND PELVIS:  LIVER: Numerous cysts and subcentimeter hypodense foci too small to   characterize.  BILE DUCTS: Normal caliber.  GALLBLADDER: Within normal limits.  SPLEEN: Within normal limits.  PANCREAS: Within normal limits.  ADRENALS: Within normal limits.  KIDNEYS/URETERS: No hydronephrosis. Right renal cysts and additional   subcentimeter hypodense foci too small to characterize.    BLADDER: Within normal limits.  REPRODUCTIVE ORGANS: Prostate within normal limits.    BOWEL: No bowel obstruction.  PERITONEUM: No ascites or free air.  VESSELS: Atherosclerotic changes.  RETROPERITONEUM/LYMPH NODES: No lymphadenopathy.  ABDOMINAL WALL: Within normal limits.  BONES: Right-sided rib fractures as above. Chronic appearing fracture   deformity of the right inferior pubic ramus. Degenerative changes of the   spine. Stable nonaggressive appearing lytic lesion in the T8 vertebral   body.    IMPRESSION:  Acute displaced right lateral 8-10th rib fractures.    Trace right pneumothorax and pneumomediastinum. Small right pleural   effusion.    CT HEAD:  No acute transcortical infarction or acute intracranial hemorrhage.  White matter hypoattenuating foci are noted, compatible with small vessel   changes.  No hydrocephalus. No extra-axial fluid collections.  The visualized intraorbital contents are normal. The imaged portions of   the paranasal sinuses are clear. The mastoid air cells are clear.  Right parietal scalp swelling. No fracture identified.    CT CERVICAL SPINE:  Acute fractures of the anterior and posterior C1 arches. Lateral   displacement of the bilateral lateral masses, measuring up to 5 mm on the   right and 2 mm on the left (see key image).    Acute fractures of the C2, C3, C4, and C5 spinous processes with   displacement most prominent at C5 level. There is anterior subluxation of   C5 on C6 with associated perching of the bilateral C5-C6 facet joints.   There is widening at the C5-C6 interspinous space, concerning for   ligamentous injury (see image #1).    There are fractures of the left C6 transverse process violating the   transverse foramen.    Subtle hyperdensity along the posterior spinal canal most prominent at   C2/C3 levels is suspicious for epidural hematoma (605-25).    CTA NECK:    The visualized portion of the aortic arch is unremarkable in appearance.   The origins of the great vessels and the vertebral arteries are normal   without evidence of stenosis.    The common carotid arteries are patent bilaterally without evidence of   stenosis. Atherosclerotic plaque at the bilateral carotid bifurcations,   without hemodynamically significant stenosis. There is no narrowing of   the cervical internal carotid arteries bilaterally.    Left C6 transverse process fracture with mild mass effect upon the left   vertebral artery without significant narrowing. Left vertebral artery is   not visualized at the left C1 level likely secondary to traumatic   occlusion/dissection. See below regarding intracranial left vertebral   artery.    Right vertebral artery is patent throughout its course.    CTA HEAD:    Atherosclerotic plaque of the bilateral intracranial ICAs, without   stenosis.    The proximal anterior, middle and posterior cerebral arteries are patent   bilaterally.    Flow-related enhancement in the left vertebral artery is reconstituted   once it pierces the dura, likely related to retrograde flow. The right   vertebral artery and basilar artery are patent.    No evidence of vascular stenosis, occlusion, aneurysm or vascular   malformation.    OTHER:  Partially visualized right pleural effusion.    IMPRESSION:  CT head:  -No acute intracranial findings.    CT cervical spine:  -C1 anterior and posterior arch fractures with lateral displacement of   the lateral masses.  -Spinous process fractures from C2 through C5.  -Anterior subluxation of C5 on C6 with bilateral perched facets and   likely posterior ligamentous injury.  -Left C6 transverse process/transverse foramen fractures.  -Hyperdensity within the posterior spinal canal at C2-C3 levels, likely   representing epidural hematoma.    Recommend MRI cervical spine for further evaluation.    CT angiogram head:  -No vaso-occlusive disease.    CT angiogram neck:  -Focal traumatic occlusion/dissection of the left vertebral artery at C1   level. Reconstitution of flow is noted in the left intracranial vertebral   artery, likely retrograde.      Other:    INTERPRETATION:     PLAN: Tertiary Trauma Survey (TTS)    Date of TTS: 10/16/23                             Time: 1800  Admit Date:  10/13/23                            Trauma Activation: 10/16/23    HPI:  SURGERY CONSULT NOTE    Patient is a 68y old  Male who presents with a chief complaint of neck/chest pain s/p 12 foot fall    HPI:  68 year old male with history of chronic back pain on hydromorphone 4mg TID presents s/p 12 foot while on ladder.  LOC+, likely HS+ as he does not recall the event.  Patient hemodynamically stable on arrival to ED; no trauma activation. Trauma Surgery team consulted due to concern for Acute displaced right lateral 8-10th rib fractures. CT findings listed below. Neurosurgery consulted.       PAST MEDICAL & SURGICAL HISTORY:  High cholesterol      Insomnia      History of chronic back pain      History of arthroscopy of left shoulder  age 15 & right shoulder 3 yrs      H/O hernia repair  b/l inguinal & abdominal    History of arthroplasty of right knee  5yrs    [  ] No significant past history as reviewed with the patient and family    FAMILY HISTORY:  Family history of early CAD (Sibling)    [  ] Family history not pertinent as reviewed with the patient and family    SOCIAL HISTORY:    MEDICATIONS  (STANDING):  acetaminophen   IVPB .. 1000 milliGRAM(s) IV Intermittent every 6 hours  chlorhexidine 2% Cloths 1 Application(s) Topical <User Schedule>  lidocaine   4% Patch 1 Patch Transdermal every 24 hours    MEDICATIONS  (PRN):  HYDROmorphone  Injectable 0.5 milliGRAM(s) IV Push every 3 hours PRN Breakthrough  methocarbamol 500 milliGRAM(s) Oral every 8 hours PRN Muscle Spasm    Allergies    Nuts (Unknown)  Originally Entered as [Unknown] reaction to [PUMPKIN SEEDS] (Unknown)  Vioxx (Unknown)     (13 Oct 2023 18:52)      PAST MEDICAL & SURGICAL HISTORY:  High cholesterol      Insomnia      History of chronic back pain      History of arthroscopy of left shoulder  age 15 & right shoulder 3 yrs      H/O hernia repair  b/l inguinal & abdominal      History of arthroplasty of right knee  5yrs        [  ] No significant past history as reviewed with the patient and family    FAMILY HISTORY:  Family history of early CAD (Sibling)      [  ] Family history not pertinent as reviewed with the patient and family    SOCIAL HISTORY:    Medications (inpatient): chlorhexidine 2% Cloths 1 Application(s) Topical <User Schedule>  HYDROmorphone PCA (1 mG/mL) 30 milliLiter(s) PCA Continuous PCA Continuous  lactated ringers. 1000 milliLiter(s) IV Continuous <Continuous>  lidocaine   4% Patch 1 Patch Transdermal every 24 hours  methocarbamol 750 milliGRAM(s) Oral three times a day  metoprolol tartrate 25 milliGRAM(s) Oral every 12 hours  phytonadione  IVPB 10 milliGRAM(s) IV Intermittent daily  polyethylene glycol 3350 17 Gram(s) Oral daily  senna 2 Tablet(s) Oral at bedtime    Medications (PRN):HYDROmorphone  Injectable 0.5 milliGRAM(s) IV Push every 3 hours PRN  HYDROmorphone PCA (1 mG/mL) Rescue Clinician Bolus 0.5 milliGRAM(s) IV Push every 15 minutes PRN  nalbuphine Injectable 2.5 milliGRAM(s) IV Push every 6 hours PRN  naloxone Injectable 0.1 milliGRAM(s) IV Push every 3 minutes PRN  ondansetron Injectable 4 milliGRAM(s) IV Push every 6 hours PRN    Allergies: Nuts (Unknown)  Originally Entered as [Unknown] reaction to [PUMPKIN SEEDS] (Unknown)  Vioxx (Unknown)  (Intolerances: )    Vital Signs Last 24 Hrs  T(C): 36.7 (16 Oct 2023 16:38), Max: 36.9 (16 Oct 2023 03:00)  T(F): 98 (16 Oct 2023 16:38), Max: 98.4 (16 Oct 2023 03:00)  HR: 119 (16 Oct 2023 16:38) (104 - 125)  BP: 153/93 (16 Oct 2023 16:38) (144/86 - 175/93)  BP(mean): 108 (16 Oct 2023 12:00) (108 - 131)  RR: 18 (16 Oct 2023 16:38) (10 - 37)  SpO2: 95% (16 Oct 2023 16:38) (89% - 100%)    Parameters below as of 16 Oct 2023 16:38  Patient On (Oxygen Delivery Method): nasal cannula  O2 Flow (L/min): 2    Drug Dosing Weight  Height (cm): 182.9 (01 Mar 2023 00:31)  Weight (kg): 67.9 (14 Oct 2023 05:46)  BMI (kg/m2): 20.3 (14 Oct 2023 05:46)  BSA (m2): 1.88 (14 Oct 2023 05:46)    PHYSICAL EXAM:  GEN: resting comfortably in bed, in NAD   HEAD: normocephalic, nontender to palpation   NECK: no JVD, C-collar in place  CHEST: nontender to palpation across clavicles, TTP across R ribs  BACK: nontender to palpation along midline and b/l posterior ribs   ABD: soft, nontender, nondistended   EXTREM: b/l UE non-tender to palpation                   b/l LE non-tender to palpation                    Moving all extremities spontaneously; warm, well-perfused, palpable distal pulses   NEURO: AOx3, strength 3/5 UE, 4/5 LE                          12.0   11.29 )-----------( 211      ( 16 Oct 2023 05:36 )             36.5     10-16    132<L>  |  95<L>  |  14  ----------------------------<  136<H>  4.1   |  26  |  0.75    Ca    9.0      16 Oct 2023 05:36  Phos  4.0     10-14  Mg     1.8     10-14    TPro  6.5  /  Alb  3.7  /  TBili  0.6  /  DBili  x   /  AST  44<H>  /  ALT  35  /  AlkPhos  66  10-16    PT/INR - ( 14 Oct 2023 22:43 )   PT: 11.9 sec;   INR: 1.14 ratio         PTT - ( 14 Oct 2023 22:43 )  PTT:25.6 sec  Urinalysis Basic - ( 16 Oct 2023 05:36 )    Color: x / Appearance: x / SG: x / pH: x  Gluc: 136 mg/dL / Ketone: x  / Bili: x / Urobili: x   Blood: x / Protein: x / Nitrite: x   Leuk Esterase: x / RBC: x / WBC x   Sq Epi: x / Non Sq Epi: x / Bacteria: x        List Injuries Identified to Date:  -C1 anterior and posterior arch fractures with lateral displacement of the lateral masses. Focal traumatic occlusion/dissection of the left vertebral artery at C1 level.  -C2-C3 hyperdensity within the posterior spinal canal, likely representing epidural hematoma.  -C2-C5 Spinous process fractures  -C5 on C6 Anterior subluxation with bilateral perched facets and likely posterior ligamentous injury.  -C6 Left transverse process/transverse foramen fractures.  -T4 compression fracture, mild  -T8 vertebral body lytic lesion, age-indeterminate  -Right lateral 8th to 10th rib mildly displaced rib fractures.   -Trace right pneumothorax and pneumomediastinum. Small right pleural effusion.    List Operative and Interventional Radiological Procedures: Posterior C1-C7 Decompression Fusion for unstable spine fx, evacuation of epidural hematoma  Decompression of L C5 Nerve Root    Consults (Date):  [ x ] Neurosurgery   [  ] Orthopedics  [  ] Plastics  [  ] Urology  [  ] PM&R  [  ] Social Work    RADIOLOGICAL FINDINGS REVIEW:    Thoracic spine:  There is maintenance of the thoracic kyphosis. Alignment is similar to   the prior exam. There is new very mild vertebral body height loss at T4   with wedging of the superior endplate and subtle sclerosis in the upper   vertebral body with slight step off of the posterior cortex superiorly.   The remaining thoracic vertebral body heights are maintained. No acute   fracture or traumatic malalignment. Stable benign-appearing lytic lesion   in the T8 vertebral body with thin zone of transition and lobular   contour. No suspicious osteoblastic or lytic lesion.    There is no significant paraspinal soft tissue swelling or evidence for   epidural hematoma.    There are mild multilevel degenerative changes similar to the prior exam   which do not contribute to significant canal or foraminal stenosis.    Small right pleural effusion. Please see separate dictation of the CT   chest for findings regarding the intrathoracic compartment.    The paraspinal musculature is unremarkable.    Lumbar spine:  Mildly exaggerated lumbar lordosis with grade 1 anterolisthesis of L4 and   L5, unchanged. The vertebral body heights are maintained. There are no   acutely displaced fractures or evidence for traumatic malalignment. Facet   alignment is preserved. The visualized sacrum and SI joints appear   unremarkable. There is no suspicious osteoblastic or lytic lesion.    There is no evidence of paraspinal soft tissue swelling or for epidural   hematoma.    Multilevel degenerative changes including intervertebral disc space   narrowing, disc herniations with osteophytic ridging and facet   arthropathy similar to the recent MRI and again contribute to multilevel   canal and foraminal stenosis, at least severe at L2/L3 and L3/L4.   Suboptimal assessment of the intraspinal canal contents on this modality.    Please see separate dictation of the CT abdomen/pelvis for findings   regarding the abdomen and pelvis.    The paraspinal musculature is of unremarkable bulk and morphology for   patient's age.    IMPRESSION:  Thoracic spine: Age-indeterminate mild T4 compression fracture. No   significant retropulsion of bone into the epidural space.  Stable benign-appearing lytic lesion T8 vertebral body.    Lumbar spine: Stable exam. No acute fracture or traumatic malalignment.   Degenerative changes.      Status post C2-C6 posterior decompression and C1-C7 posterior spinal   fusion. Spinal fusion hardware appears intact.    Interval reduction of the anterior subluxation seen at C5-C6 and perched   facets at this level. There is nonspecific stranding of the cervical   lordosis.    Redemonstrated fractures of the anterior and posterior arches of the   atlas. Redemonstrated fracture of the left C6 transverse process   extending into the transverse foramen.    Mild height loss is noted at the anterior aspect of C6 vertebral body   which may represent an additional nondisplaced fracture.    Evaluation of the spinal canal is limited due to streak artifact.    Surgical staples and drain overlying the laminectomy site project in the   posterior neck. Extensive soft tissue swelling is noted in the surgical   bed. Mild prevertebral soft tissue swelling is also noted.    Trace right apical pneumothorax is present. A trace right pneumothorax   was also present on 10/13/2023    IMPRESSION:    Redemonstrated cervical spine fractures with interval spinal   decompression and fusion as above.    CHEST:  LUNGS AND LARGE AIRWAYS: Patent central airways. Mild dependent   atelectasis.  PLEURA: Small right pleural effusion. Trace right pneumothorax.  VESSELS: Atherosclerotic changes of the aorta and coronary arteries.  HEART: Heart size is normal. No pericardial effusion. Small focus of air   in the anterior mediastinum.  MEDIASTINUM AND ALYCIA: No lymphadenopathy.  CHEST WALL AND LOWER NECK: Acute displaced right 8th lateral rib fracture   with overriding fragments and displaced 9th and 10th lateral rib   fractures. There is associated right chest wall subcutaneous emphysema   and soft tissue swelling.    ABDOMEN AND PELVIS:  LIVER: Numerous cysts and subcentimeter hypodense foci too small to   characterize.  BILE DUCTS: Normal caliber.  GALLBLADDER: Within normal limits.  SPLEEN: Within normal limits.  PANCREAS: Within normal limits.  ADRENALS: Within normal limits.  KIDNEYS/URETERS: No hydronephrosis. Right renal cysts and additional   subcentimeter hypodense foci too small to characterize.    BLADDER: Within normal limits.  REPRODUCTIVE ORGANS: Prostate within normal limits.    BOWEL: No bowel obstruction.  PERITONEUM: No ascites or free air.  VESSELS: Atherosclerotic changes.  RETROPERITONEUM/LYMPH NODES: No lymphadenopathy.  ABDOMINAL WALL: Within normal limits.  BONES: Right-sided rib fractures as above. Chronic appearing fracture   deformity of the right inferior pubic ramus. Degenerative changes of the   spine. Stable nonaggressive appearing lytic lesion in the T8 vertebral   body.    IMPRESSION:  Acute displaced right lateral 8-10th rib fractures.    Trace right pneumothorax and pneumomediastinum. Small right pleural   effusion.    CT HEAD:  No acute transcortical infarction or acute intracranial hemorrhage.  White matter hypoattenuating foci are noted, compatible with small vessel   changes.  No hydrocephalus. No extra-axial fluid collections.  The visualized intraorbital contents are normal. The imaged portions of   the paranasal sinuses are clear. The mastoid air cells are clear.  Right parietal scalp swelling. No fracture identified.    CT CERVICAL SPINE:  Acute fractures of the anterior and posterior C1 arches. Lateral   displacement of the bilateral lateral masses, measuring up to 5 mm on the   right and 2 mm on the left (see key image).    Acute fractures of the C2, C3, C4, and C5 spinous processes with   displacement most prominent at C5 level. There is anterior subluxation of   C5 on C6 with associated perching of the bilateral C5-C6 facet joints.   There is widening at the C5-C6 interspinous space, concerning for   ligamentous injury (see image #1).    There are fractures of the left C6 transverse process violating the   transverse foramen.    Subtle hyperdensity along the posterior spinal canal most prominent at   C2/C3 levels is suspicious for epidural hematoma (605-25).    CTA NECK:    The visualized portion of the aortic arch is unremarkable in appearance.   The origins of the great vessels and the vertebral arteries are normal   without evidence of stenosis.    The common carotid arteries are patent bilaterally without evidence of   stenosis. Atherosclerotic plaque at the bilateral carotid bifurcations,   without hemodynamically significant stenosis. There is no narrowing of   the cervical internal carotid arteries bilaterally.    Left C6 transverse process fracture with mild mass effect upon the left   vertebral artery without significant narrowing. Left vertebral artery is   not visualized at the left C1 level likely secondary to traumatic   occlusion/dissection. See below regarding intracranial left vertebral   artery.    Right vertebral artery is patent throughout its course.    CTA HEAD:    Atherosclerotic plaque of the bilateral intracranial ICAs, without   stenosis.    The proximal anterior, middle and posterior cerebral arteries are patent   bilaterally.    Flow-related enhancement in the left vertebral artery is reconstituted   once it pierces the dura, likely related to retrograde flow. The right   vertebral artery and basilar artery are patent.    No evidence of vascular stenosis, occlusion, aneurysm or vascular   malformation.    OTHER:  Partially visualized right pleural effusion.    IMPRESSION:  CT head:  -No acute intracranial findings.    CT cervical spine:  -C1 anterior and posterior arch fractures with lateral displacement of   the lateral masses.  -Spinous process fractures from C2 through C5.  -Anterior subluxation of C5 on C6 with bilateral perched facets and   likely posterior ligamentous injury.  -Left C6 transverse process/transverse foramen fractures.  -Hyperdensity within the posterior spinal canal at C2-C3 levels, likely   representing epidural hematoma.    Recommend MRI cervical spine for further evaluation.    CT angiogram head:  -No vaso-occlusive disease.    CT angiogram neck:  -Focal traumatic occlusion/dissection of the left vertebral artery at C1   level. Reconstitution of flow is noted in the left intracranial vertebral   artery, likely retrograde.      Other:    INTERPRETATION:     PLAN:

## 2023-10-16 NOTE — PROGRESS NOTE ADULT - ASSESSMENT
68M with history of chronic back pain on opiates presents 10/13/23 s/p 12 foot fall from ladder with multiple traumatic injuries listed below. Patient otherwise stable. GCS 15.    INJURIES:   -C1 anterior and posterior arch fractures with lateral displacement of the lateral masses. Focal traumatic occlusion/dissection of the left vertebral artery at C1 level.  -C2-C3 hyperdensity within the posterior spinal canal, likely representing epidural hematoma.  -C2-C5 Spinous process fractures  -C5 on C6 Anterior subluxation with bilateral perched facets and likely posterior ligamentous injury.  -C6 Left transverse process/transverse foramen fractures.  -T4 compression fracture, mild  -T8 vertebral body lytic lesion, age-indeterminate  -Right lateral 8th to 10th rib mildly displaced rib fractures.   -Trace right pneumothorax and pneumomediastinum. Small right pleural effusion.    PLAN:  - No acute surgical intervention at this time.  - Received custom fitted c-collar.   - ASA held per neurosurgery 2/2 vertebral artery dissection.  - Care per neurosurgery.    ACS/Trauma Surgery  [id number]

## 2023-10-16 NOTE — PROGRESS NOTE ADULT - ASSESSMENT
outpt  ECHO: 09/05/202 : Grade I diastolic dysfunction. mild mitral regurgitation. The aortic root is mildly dilated.  The ascending aorta is mildly dilated.  Aortic Root diameter is (2D-mode) 4.02 cm. Ascending Aortic Diameter: 3.98 cm.    a/p    68 year old male with hx of  Hyperlipidemia, Emphysema, Arthritis, Anemia, Dilated Aortic Root, COPD  presenting sp Fall s/p  12 foot  fall while on ladder    #sp fall   -pt unclear of events leading to fall   -pt is now POSTOP for Posterior C1-C6 decompression fusion, evacuation of epidural hematoma  -S/p SICU course   -post op - tele sinus tach noted - elevated BP noted  -ecg w no ischemic changes   -no chf on exam   -BP improved but remains somewhat tachy  -Continue lopressor 25 mg BID for now - will consider increasing if rates remain elevated **off note pt has complain of INCREASE FATIGUE with higher BB dosing-- was placed on toprol 25 mg PO daily as outpt     # hx  Aortic Root Dilatation  -stable on recent outpt echo from 9/5/23:  The ascending aorta is mildly dilated.  Aortic Root diameter is (2D-mode) 4.02 cm. Ascending Aortic Diameter: 3.98 cm.  -echo q 12 months    # Atrial arrhythmia  -ectopic atrial rhythm/PAT noted on mct and tele during previous admit  -currently on lopressor 25 mg BID   -if more sx, will consider eps/ablation    dvt ppx

## 2023-10-16 NOTE — PROGRESS NOTE ADULT - ATTENDING COMMENTS
68y M hx of chronic back pain s/p fall with rib fx and c spine injury s/p fixation    ON: no major events    this AM developed shoulder weakness which is bilateral, neuro exam is changed, NSG aware and at bedside, likely further imaging  c spine CT 10/15 no acute finding  on RA, is working with IS during rounds  no AM CXR  stable hemodynamics  on diet, on bowel regimen, passed gas  voiding freely, neg balance 1 liter  slight hyponatremia, hypochloremia will start salt tabs  hold VTE PPX for now  stable hb  off antibiotics, afebrile   good glycemic control  c collar when out of bed, PT OT working with him    PIV

## 2023-10-16 NOTE — PROGRESS NOTE ADULT - ASSESSMENT
68 year old male with history of chronic back pain on hydromorphone 4mg TID presents s/p mechanical fall from 12 feet while on ladder. +Head strike, LOC. Imaging significant for multiple c-spine fractures and R 8-10 rib fracture. Admitted to SICU under trauma for polytrauma w multiple C spine and rib Fxs. Awaiting NSX    A/P  - AOx4, strength and sensation intact x all four extremities  - History of chronic back pain on Dilaudid, multimodal pain control with PRN Dilaudid, Tylenol, lidocaine patch, Robaxin  - in the ED on imaging: C spine w/ C1 ring fx w/ lat displacement of lat masses, C2-5 SP fx, C5-6 perched facet, C2-3 hyperdensity c/f spinal EDH. CTA w/ poss L vert dissection vs. occlusion vs artifact at C1    -POD2 post C1-C6 post decompression and epidural hematoma evacuation, off ASA and chemical DVT ppx 2/2 Vertebral artery dissection, cleared by NSX to transfer to the floor.  - ptn is comfortable, collar on, pain controlled, on Dilaudid PCA   - BP slightly high. denies palpitations, on Metoprolol 25 mg q12H since 10/14, could use a higher dose. consider raising to 50 mg q12H.    - Incentive spirometry    - consider starting GI ppx w PPI  - Mechanical VTE prophylaxis with SCDs pre-op

## 2023-10-16 NOTE — CHART NOTE - NSCHARTNOTEFT_GEN_A_CORE
Incidental findings are findings on history, physical examination, lab work, and imaging that are not directly related to the patient's chief complaint and cause for hospital admission.     1. The incidental findings for this patient are (please list):  - Chronic appearing fracture   deformity of the right inferior pubic ramus. Degenerative changes of the   spine. Stable nonaggressive appearing lytic lesion in the T8 vertebral   body.  - Right renal cysts and additional   subcentimeter hypodense foci too small to characterize.  - Numerous cysts and subcentimeter hypodense foci too small to   characterize.    2. Were these findings explained to the patient and/or their family (in the event that either the patient requests communication with their family or the patient is unable to understand or remember the findings and plan)?  YES    3. Was a copy of the lab work/ imaging report given to the patient and/or their family?  YES    4. Was the patient asked whether they can follow up with their PMD regarding this finding? If the patient says no, or does not have a PMD, you must identify a provider (i.e. Medicine Clinic or specialist) with whom the patient will follow up.  YES    5. Was the finding documented on the discharge summary?  NO

## 2023-10-17 LAB
ANION GAP SERPL CALC-SCNC: 10 MMOL/L — SIGNIFICANT CHANGE UP (ref 5–17)
ANION GAP SERPL CALC-SCNC: 10 MMOL/L — SIGNIFICANT CHANGE UP (ref 5–17)
ANION GAP SERPL CALC-SCNC: 11 MMOL/L — SIGNIFICANT CHANGE UP (ref 5–17)
ANION GAP SERPL CALC-SCNC: 11 MMOL/L — SIGNIFICANT CHANGE UP (ref 5–17)
BUN SERPL-MCNC: 16 MG/DL — SIGNIFICANT CHANGE UP (ref 7–23)
BUN SERPL-MCNC: 16 MG/DL — SIGNIFICANT CHANGE UP (ref 7–23)
BUN SERPL-MCNC: 19 MG/DL — SIGNIFICANT CHANGE UP (ref 7–23)
BUN SERPL-MCNC: 19 MG/DL — SIGNIFICANT CHANGE UP (ref 7–23)
CALCIUM SERPL-MCNC: 8.8 MG/DL — SIGNIFICANT CHANGE UP (ref 8.4–10.5)
CALCIUM SERPL-MCNC: 8.8 MG/DL — SIGNIFICANT CHANGE UP (ref 8.4–10.5)
CALCIUM SERPL-MCNC: 8.9 MG/DL — SIGNIFICANT CHANGE UP (ref 8.4–10.5)
CALCIUM SERPL-MCNC: 8.9 MG/DL — SIGNIFICANT CHANGE UP (ref 8.4–10.5)
CHLORIDE SERPL-SCNC: 97 MMOL/L — SIGNIFICANT CHANGE UP (ref 96–108)
CO2 SERPL-SCNC: 24 MMOL/L — SIGNIFICANT CHANGE UP (ref 22–31)
CO2 SERPL-SCNC: 24 MMOL/L — SIGNIFICANT CHANGE UP (ref 22–31)
CO2 SERPL-SCNC: 26 MMOL/L — SIGNIFICANT CHANGE UP (ref 22–31)
CO2 SERPL-SCNC: 26 MMOL/L — SIGNIFICANT CHANGE UP (ref 22–31)
CREAT SERPL-MCNC: 0.62 MG/DL — SIGNIFICANT CHANGE UP (ref 0.5–1.3)
CREAT SERPL-MCNC: 0.62 MG/DL — SIGNIFICANT CHANGE UP (ref 0.5–1.3)
CREAT SERPL-MCNC: 0.76 MG/DL — SIGNIFICANT CHANGE UP (ref 0.5–1.3)
CREAT SERPL-MCNC: 0.76 MG/DL — SIGNIFICANT CHANGE UP (ref 0.5–1.3)
EGFR: 103 ML/MIN/1.73M2 — SIGNIFICANT CHANGE UP
EGFR: 103 ML/MIN/1.73M2 — SIGNIFICANT CHANGE UP
EGFR: 97 ML/MIN/1.73M2 — SIGNIFICANT CHANGE UP
EGFR: 97 ML/MIN/1.73M2 — SIGNIFICANT CHANGE UP
GLUCOSE SERPL-MCNC: 134 MG/DL — HIGH (ref 70–99)
HCT VFR BLD CALC: 33.8 % — LOW (ref 39–50)
HCT VFR BLD CALC: 33.8 % — LOW (ref 39–50)
HGB BLD-MCNC: 11.4 G/DL — LOW (ref 13–17)
HGB BLD-MCNC: 11.4 G/DL — LOW (ref 13–17)
MCHC RBC-ENTMCNC: 30.6 PG — SIGNIFICANT CHANGE UP (ref 27–34)
MCHC RBC-ENTMCNC: 30.6 PG — SIGNIFICANT CHANGE UP (ref 27–34)
MCHC RBC-ENTMCNC: 33.7 GM/DL — SIGNIFICANT CHANGE UP (ref 32–36)
MCHC RBC-ENTMCNC: 33.7 GM/DL — SIGNIFICANT CHANGE UP (ref 32–36)
MCV RBC AUTO: 90.6 FL — SIGNIFICANT CHANGE UP (ref 80–100)
MCV RBC AUTO: 90.6 FL — SIGNIFICANT CHANGE UP (ref 80–100)
NRBC # BLD: 0 /100 WBCS — SIGNIFICANT CHANGE UP (ref 0–0)
NRBC # BLD: 0 /100 WBCS — SIGNIFICANT CHANGE UP (ref 0–0)
PLATELET # BLD AUTO: 222 K/UL — SIGNIFICANT CHANGE UP (ref 150–400)
PLATELET # BLD AUTO: 222 K/UL — SIGNIFICANT CHANGE UP (ref 150–400)
POTASSIUM SERPL-MCNC: 4.2 MMOL/L — SIGNIFICANT CHANGE UP (ref 3.5–5.3)
POTASSIUM SERPL-MCNC: 4.2 MMOL/L — SIGNIFICANT CHANGE UP (ref 3.5–5.3)
POTASSIUM SERPL-MCNC: 4.4 MMOL/L — SIGNIFICANT CHANGE UP (ref 3.5–5.3)
POTASSIUM SERPL-MCNC: 4.4 MMOL/L — SIGNIFICANT CHANGE UP (ref 3.5–5.3)
POTASSIUM SERPL-SCNC: 4.2 MMOL/L — SIGNIFICANT CHANGE UP (ref 3.5–5.3)
POTASSIUM SERPL-SCNC: 4.2 MMOL/L — SIGNIFICANT CHANGE UP (ref 3.5–5.3)
POTASSIUM SERPL-SCNC: 4.4 MMOL/L — SIGNIFICANT CHANGE UP (ref 3.5–5.3)
POTASSIUM SERPL-SCNC: 4.4 MMOL/L — SIGNIFICANT CHANGE UP (ref 3.5–5.3)
RBC # BLD: 3.73 M/UL — LOW (ref 4.2–5.8)
RBC # BLD: 3.73 M/UL — LOW (ref 4.2–5.8)
RBC # FLD: 13.2 % — SIGNIFICANT CHANGE UP (ref 10.3–14.5)
RBC # FLD: 13.2 % — SIGNIFICANT CHANGE UP (ref 10.3–14.5)
SODIUM SERPL-SCNC: 131 MMOL/L — LOW (ref 135–145)
SODIUM SERPL-SCNC: 131 MMOL/L — LOW (ref 135–145)
SODIUM SERPL-SCNC: 134 MMOL/L — LOW (ref 135–145)
SODIUM SERPL-SCNC: 134 MMOL/L — LOW (ref 135–145)
WBC # BLD: 10.31 K/UL — SIGNIFICANT CHANGE UP (ref 3.8–10.5)
WBC # BLD: 10.31 K/UL — SIGNIFICANT CHANGE UP (ref 3.8–10.5)
WBC # FLD AUTO: 10.31 K/UL — SIGNIFICANT CHANGE UP (ref 3.8–10.5)
WBC # FLD AUTO: 10.31 K/UL — SIGNIFICANT CHANGE UP (ref 3.8–10.5)

## 2023-10-17 PROCEDURE — 99232 SBSQ HOSP IP/OBS MODERATE 35: CPT

## 2023-10-17 PROCEDURE — 72141 MRI NECK SPINE W/O DYE: CPT | Mod: 26

## 2023-10-17 PROCEDURE — 71045 X-RAY EXAM CHEST 1 VIEW: CPT | Mod: 26

## 2023-10-17 RX ORDER — SODIUM CHLORIDE 9 MG/ML
1000 INJECTION INTRAMUSCULAR; INTRAVENOUS; SUBCUTANEOUS
Refills: 0 | Status: DISCONTINUED | OUTPATIENT
Start: 2023-10-17 | End: 2023-10-18

## 2023-10-17 RX ORDER — SODIUM CHLORIDE 9 MG/ML
2 INJECTION INTRAMUSCULAR; INTRAVENOUS; SUBCUTANEOUS EVERY 8 HOURS
Refills: 0 | Status: DISCONTINUED | OUTPATIENT
Start: 2023-10-17 | End: 2023-10-17

## 2023-10-17 RX ORDER — METOPROLOL TARTRATE 50 MG
25 TABLET ORAL
Refills: 0 | Status: DISCONTINUED | OUTPATIENT
Start: 2023-10-17 | End: 2023-10-20

## 2023-10-17 RX ORDER — ACETAMINOPHEN 500 MG
650 TABLET ORAL EVERY 6 HOURS
Refills: 0 | Status: DISCONTINUED | OUTPATIENT
Start: 2023-10-17 | End: 2023-10-20

## 2023-10-17 RX ORDER — METOPROLOL TARTRATE 50 MG
50 TABLET ORAL ONCE
Refills: 0 | Status: COMPLETED | OUTPATIENT
Start: 2023-10-17 | End: 2023-10-17

## 2023-10-17 RX ORDER — SODIUM CHLORIDE 9 MG/ML
2 INJECTION INTRAMUSCULAR; INTRAVENOUS; SUBCUTANEOUS EVERY 8 HOURS
Refills: 0 | Status: DISCONTINUED | OUTPATIENT
Start: 2023-10-17 | End: 2023-10-20

## 2023-10-17 RX ADMIN — Medication 650 MILLIGRAM(S): at 16:45

## 2023-10-17 RX ADMIN — LIDOCAINE 1 PATCH: 4 CREAM TOPICAL at 07:38

## 2023-10-17 RX ADMIN — METHOCARBAMOL 750 MILLIGRAM(S): 500 TABLET, FILM COATED ORAL at 04:50

## 2023-10-17 RX ADMIN — HYDROMORPHONE HYDROCHLORIDE 0.5 MILLIGRAM(S): 2 INJECTION INTRAMUSCULAR; INTRAVENOUS; SUBCUTANEOUS at 04:12

## 2023-10-17 RX ADMIN — SODIUM CHLORIDE 80 MILLILITER(S): 9 INJECTION INTRAMUSCULAR; INTRAVENOUS; SUBCUTANEOUS at 13:00

## 2023-10-17 RX ADMIN — HYDROMORPHONE HYDROCHLORIDE 30 MILLILITER(S): 2 INJECTION INTRAMUSCULAR; INTRAVENOUS; SUBCUTANEOUS at 19:13

## 2023-10-17 RX ADMIN — METHOCARBAMOL 750 MILLIGRAM(S): 500 TABLET, FILM COATED ORAL at 22:57

## 2023-10-17 RX ADMIN — HYDROMORPHONE HYDROCHLORIDE 0.5 MILLIGRAM(S): 2 INJECTION INTRAMUSCULAR; INTRAVENOUS; SUBCUTANEOUS at 05:05

## 2023-10-17 RX ADMIN — POLYETHYLENE GLYCOL 3350 17 GRAM(S): 17 POWDER, FOR SOLUTION ORAL at 11:17

## 2023-10-17 RX ADMIN — SODIUM CHLORIDE 2 GRAM(S): 9 INJECTION INTRAMUSCULAR; INTRAVENOUS; SUBCUTANEOUS at 22:57

## 2023-10-17 RX ADMIN — HYDROMORPHONE HYDROCHLORIDE 0.5 MILLIGRAM(S): 2 INJECTION INTRAMUSCULAR; INTRAVENOUS; SUBCUTANEOUS at 18:24

## 2023-10-17 RX ADMIN — Medication 50 MILLIGRAM(S): at 11:16

## 2023-10-17 RX ADMIN — SENNA PLUS 2 TABLET(S): 8.6 TABLET ORAL at 22:57

## 2023-10-17 RX ADMIN — Medication 25 MILLIGRAM(S): at 04:49

## 2023-10-17 RX ADMIN — LIDOCAINE 1 PATCH: 4 CREAM TOPICAL at 09:14

## 2023-10-17 RX ADMIN — CHLORHEXIDINE GLUCONATE 1 APPLICATION(S): 213 SOLUTION TOPICAL at 04:47

## 2023-10-17 RX ADMIN — METHOCARBAMOL 750 MILLIGRAM(S): 500 TABLET, FILM COATED ORAL at 13:33

## 2023-10-17 RX ADMIN — Medication 650 MILLIGRAM(S): at 15:45

## 2023-10-17 RX ADMIN — LIDOCAINE 1 PATCH: 4 CREAM TOPICAL at 23:00

## 2023-10-17 RX ADMIN — SODIUM CHLORIDE 2 GRAM(S): 9 INJECTION INTRAMUSCULAR; INTRAVENOUS; SUBCUTANEOUS at 13:33

## 2023-10-17 RX ADMIN — HYDROMORPHONE HYDROCHLORIDE 30 MILLILITER(S): 2 INJECTION INTRAMUSCULAR; INTRAVENOUS; SUBCUTANEOUS at 07:07

## 2023-10-17 RX ADMIN — Medication 102 MILLIGRAM(S): at 12:12

## 2023-10-17 RX ADMIN — HYDROMORPHONE HYDROCHLORIDE 0.5 MILLIGRAM(S): 2 INJECTION INTRAMUSCULAR; INTRAVENOUS; SUBCUTANEOUS at 19:10

## 2023-10-17 NOTE — PROGRESS NOTE ADULT - ASSESSMENT
outpt  ECHO: 09/05/202 : Grade I diastolic dysfunction. mild mitral regurgitation. The aortic root is mildly dilated.  The ascending aorta is mildly dilated.  Aortic Root diameter is (2D-mode) 4.02 cm. Ascending Aortic Diameter: 3.98 cm.    a/p    68 year old male with hx of  Hyperlipidemia, Emphysema, Arthritis, Anemia, Dilated Aortic Root, COPD  presenting sp Fall s/p  12 foot  fall while on ladder    #sp fall   -pt unclear of events leading to fall   -pt is now POSTOP for Posterior C1-C6 decompression fusion, evacuation of epidural hematoma  -S/p SICU course   -post op - tele sinus tach noted - elevated BP noted  -ecg w no ischemic changes   -no chf on exam   -BP improved but remains tachycardic on tele   -Increase lopressor to 50mg bid   -Continue lopressor 25 mg BID for now - **of note pt has complain of INCREASE FATIGUE with higher BB dosing-- was placed on toprol 25 mg PO daily as outpt but given higher HR, will increase bb for now.**    # hx  Aortic Root Dilatation  -stable on recent outpt echo from 9/5/23:  The ascending aorta is mildly dilated.  Aortic Root diameter is (2D-mode) 4.02 cm. Ascending Aortic Diameter: 3.98 cm.  -echo q 12 months    # Atrial arrhythmia  -ectopic atrial rhythm/PAT noted on mct and tele during previous admit  -No ectopy noted thus far on tele   -Increase bb as above   -if more sx, will consider eps/ablation      dvt ppx

## 2023-10-17 NOTE — PROGRESS NOTE ADULT - ASSESSMENT
HPI:  68M with history of chronic back pain on opiates presents 10/13/23 s/p 12 foot fall from ladder with multiple traumatic injuries listed below. Patient otherwise stable. GCS 15.    INJURIES:   -C1 anterior and posterior arch fractures with lateral displacement of the lateral masses. Focal traumatic occlusion/dissection of the left vertebral artery at C1 level.  -C2-C3 hyperdensity within the posterior spinal canal, likely representing epidural hematoma.  -C2-C5 Spinous process fractures  -C5 on C6 Anterior subluxation with bilateral perched facets and likely posterior ligamentous injury.  -C6 Left transverse process/transverse foramen fractures.  -T4 compression fracture, mild  -T8 vertebral body lytic lesion, age-indeterminate  -Right lateral 8th to 10th rib mildly displaced rib fractures.   -Trace right pneumothorax and pneumomediastinum. Small right pleural effusion.      PROCEDURE: s/p C1-C7 posterior cervical spine decompression and fusion for fracture on 10/14        POD#  3   PLAN:  1 MR c spine :p   2 dc hemovac today   3 continue pca   4 chronic pain to see   5 continue on robaxin   6 on 2l nc   7 incentive spirometry   8 tachy - will monitor-likely pain related   9 continue on metoprolol per cardio   10 regular diet   11 senna and miralax -last bm 10/13   12 voiding   13 hyponatremia - likely siadh - on free water restriction and salt tab 2g tid , bmp at 6 pm   14 afebrile   15 dvt ppx scds, chemical ppx if mri stable , doppler :p   16 doppler : p  17 c collar at all times except when sleeping     -will discuss with Dr. Dawkins   -[place] [id number]

## 2023-10-17 NOTE — PROGRESS NOTE ADULT - ASSESSMENT
68 year old male with history of chronic back pain on hydromorphone 4mg TID presents s/p mechanical fall from 12 feet while on ladder. +Head strike, LOC. Imaging significant for multiple c-spine fractures and R 8-10 rib fracture. Admitted to SICU under trauma for polytrauma w multiple C spine and rib Fxs. Awaiting NSX    A/P  - AOx4, strength and sensation intact x all four extremities  - History of chronic back pain on Dilaudid, multimodal pain control with PRN Dilaudid, Tylenol, lidocaine patch, Robaxin  - in the ED on imaging: C spine w/ C1 ring fx w/ lat displacement of lat masses, C2-5 SP fx, C5-6 perched facet, C2-3 hyperdensity c/f spinal EDH. CTA w/ poss L vert dissection vs. occlusion vs artifact at C1    - post C1-C6 post decompression and epidural hematoma evacuation, off ASA and chemical DVT ppx 2/2 Vertebral artery dissection, cleared by NSX to transfer to the floor.  - ptn is comfortable, collar on, pain controlled, on Dilaudid PCA   - BP slightly high. denies palpitations, on Metoprolol 25 mg q12H since 10/14, could use a higher dose. consider raising to 50 mg q12H.    - Incentive spirometry    - consider starting GI ppx w PPI  - Mechanical VTE prophylaxis with SCDs pre-op

## 2023-10-18 LAB
ANION GAP SERPL CALC-SCNC: 10 MMOL/L — SIGNIFICANT CHANGE UP (ref 5–17)
ANION GAP SERPL CALC-SCNC: 10 MMOL/L — SIGNIFICANT CHANGE UP (ref 5–17)
BLD GP AB SCN SERPL QL: NEGATIVE — SIGNIFICANT CHANGE UP
BLD GP AB SCN SERPL QL: NEGATIVE — SIGNIFICANT CHANGE UP
BUN SERPL-MCNC: 18 MG/DL — SIGNIFICANT CHANGE UP (ref 7–23)
BUN SERPL-MCNC: 18 MG/DL — SIGNIFICANT CHANGE UP (ref 7–23)
CALCIUM SERPL-MCNC: 9.1 MG/DL — SIGNIFICANT CHANGE UP (ref 8.4–10.5)
CALCIUM SERPL-MCNC: 9.1 MG/DL — SIGNIFICANT CHANGE UP (ref 8.4–10.5)
CHLORIDE SERPL-SCNC: 98 MMOL/L — SIGNIFICANT CHANGE UP (ref 96–108)
CHLORIDE SERPL-SCNC: 98 MMOL/L — SIGNIFICANT CHANGE UP (ref 96–108)
CO2 SERPL-SCNC: 25 MMOL/L — SIGNIFICANT CHANGE UP (ref 22–31)
CO2 SERPL-SCNC: 25 MMOL/L — SIGNIFICANT CHANGE UP (ref 22–31)
CREAT SERPL-MCNC: 0.69 MG/DL — SIGNIFICANT CHANGE UP (ref 0.5–1.3)
CREAT SERPL-MCNC: 0.69 MG/DL — SIGNIFICANT CHANGE UP (ref 0.5–1.3)
EGFR: 100 ML/MIN/1.73M2 — SIGNIFICANT CHANGE UP
EGFR: 100 ML/MIN/1.73M2 — SIGNIFICANT CHANGE UP
GLUCOSE SERPL-MCNC: 130 MG/DL — HIGH (ref 70–99)
GLUCOSE SERPL-MCNC: 130 MG/DL — HIGH (ref 70–99)
HCT VFR BLD CALC: 33.8 % — LOW (ref 39–50)
HCT VFR BLD CALC: 33.8 % — LOW (ref 39–50)
HGB BLD-MCNC: 11.4 G/DL — LOW (ref 13–17)
HGB BLD-MCNC: 11.4 G/DL — LOW (ref 13–17)
INR BLD: 0.99 RATIO — SIGNIFICANT CHANGE UP (ref 0.85–1.18)
INR BLD: 0.99 RATIO — SIGNIFICANT CHANGE UP (ref 0.85–1.18)
MAGNESIUM SERPL-MCNC: 2.1 MG/DL — SIGNIFICANT CHANGE UP (ref 1.6–2.6)
MAGNESIUM SERPL-MCNC: 2.1 MG/DL — SIGNIFICANT CHANGE UP (ref 1.6–2.6)
MCHC RBC-ENTMCNC: 30.7 PG — SIGNIFICANT CHANGE UP (ref 27–34)
MCHC RBC-ENTMCNC: 30.7 PG — SIGNIFICANT CHANGE UP (ref 27–34)
MCHC RBC-ENTMCNC: 33.7 GM/DL — SIGNIFICANT CHANGE UP (ref 32–36)
MCHC RBC-ENTMCNC: 33.7 GM/DL — SIGNIFICANT CHANGE UP (ref 32–36)
MCV RBC AUTO: 91.1 FL — SIGNIFICANT CHANGE UP (ref 80–100)
MCV RBC AUTO: 91.1 FL — SIGNIFICANT CHANGE UP (ref 80–100)
NRBC # BLD: 0 /100 WBCS — SIGNIFICANT CHANGE UP (ref 0–0)
NRBC # BLD: 0 /100 WBCS — SIGNIFICANT CHANGE UP (ref 0–0)
PHOSPHATE SERPL-MCNC: 2.3 MG/DL — LOW (ref 2.5–4.5)
PHOSPHATE SERPL-MCNC: 2.3 MG/DL — LOW (ref 2.5–4.5)
PLATELET # BLD AUTO: 262 K/UL — SIGNIFICANT CHANGE UP (ref 150–400)
PLATELET # BLD AUTO: 262 K/UL — SIGNIFICANT CHANGE UP (ref 150–400)
POTASSIUM SERPL-MCNC: 4.2 MMOL/L — SIGNIFICANT CHANGE UP (ref 3.5–5.3)
POTASSIUM SERPL-MCNC: 4.2 MMOL/L — SIGNIFICANT CHANGE UP (ref 3.5–5.3)
POTASSIUM SERPL-SCNC: 4.2 MMOL/L — SIGNIFICANT CHANGE UP (ref 3.5–5.3)
POTASSIUM SERPL-SCNC: 4.2 MMOL/L — SIGNIFICANT CHANGE UP (ref 3.5–5.3)
PROTHROM AB SERPL-ACNC: 10.9 SEC — SIGNIFICANT CHANGE UP (ref 9.5–13)
PROTHROM AB SERPL-ACNC: 10.9 SEC — SIGNIFICANT CHANGE UP (ref 9.5–13)
RBC # BLD: 3.71 M/UL — LOW (ref 4.2–5.8)
RBC # BLD: 3.71 M/UL — LOW (ref 4.2–5.8)
RBC # FLD: 13.2 % — SIGNIFICANT CHANGE UP (ref 10.3–14.5)
RBC # FLD: 13.2 % — SIGNIFICANT CHANGE UP (ref 10.3–14.5)
RH IG SCN BLD-IMP: POSITIVE — SIGNIFICANT CHANGE UP
RH IG SCN BLD-IMP: POSITIVE — SIGNIFICANT CHANGE UP
SODIUM SERPL-SCNC: 133 MMOL/L — LOW (ref 135–145)
SODIUM SERPL-SCNC: 133 MMOL/L — LOW (ref 135–145)
WBC # BLD: 10.04 K/UL — SIGNIFICANT CHANGE UP (ref 3.8–10.5)
WBC # BLD: 10.04 K/UL — SIGNIFICANT CHANGE UP (ref 3.8–10.5)
WBC # FLD AUTO: 10.04 K/UL — SIGNIFICANT CHANGE UP (ref 3.8–10.5)
WBC # FLD AUTO: 10.04 K/UL — SIGNIFICANT CHANGE UP (ref 3.8–10.5)

## 2023-10-18 PROCEDURE — 99221 1ST HOSP IP/OBS SF/LOW 40: CPT

## 2023-10-18 PROCEDURE — 93970 EXTREMITY STUDY: CPT | Mod: 26

## 2023-10-18 RX ORDER — HYDROMORPHONE HYDROCHLORIDE 2 MG/ML
4 INJECTION INTRAMUSCULAR; INTRAVENOUS; SUBCUTANEOUS EVERY 4 HOURS
Refills: 0 | Status: DISCONTINUED | OUTPATIENT
Start: 2023-10-18 | End: 2023-10-18

## 2023-10-18 RX ORDER — SODIUM,POTASSIUM PHOSPHATES 278-250MG
1 POWDER IN PACKET (EA) ORAL
Refills: 0 | Status: COMPLETED | OUTPATIENT
Start: 2023-10-18 | End: 2023-10-18

## 2023-10-18 RX ORDER — POLYETHYLENE GLYCOL 3350 17 G/17G
17 POWDER, FOR SOLUTION ORAL
Refills: 0 | Status: DISCONTINUED | OUTPATIENT
Start: 2023-10-18 | End: 2023-10-20

## 2023-10-18 RX ORDER — HYDROMORPHONE HYDROCHLORIDE 2 MG/ML
4 INJECTION INTRAMUSCULAR; INTRAVENOUS; SUBCUTANEOUS EVERY 4 HOURS
Refills: 0 | Status: DISCONTINUED | OUTPATIENT
Start: 2023-10-18 | End: 2023-10-20

## 2023-10-18 RX ORDER — HYDROMORPHONE HYDROCHLORIDE 2 MG/ML
6 INJECTION INTRAMUSCULAR; INTRAVENOUS; SUBCUTANEOUS EVERY 6 HOURS
Refills: 0 | Status: DISCONTINUED | OUTPATIENT
Start: 2023-10-18 | End: 2023-10-20

## 2023-10-18 RX ORDER — MULTIVIT WITH MIN/MFOLATE/K2 340-15/3 G
296 POWDER (GRAM) ORAL ONCE
Refills: 0 | Status: COMPLETED | OUTPATIENT
Start: 2023-10-18 | End: 2023-10-18

## 2023-10-18 RX ORDER — TAMSULOSIN HYDROCHLORIDE 0.4 MG/1
0.4 CAPSULE ORAL DAILY
Refills: 0 | Status: DISCONTINUED | OUTPATIENT
Start: 2023-10-18 | End: 2023-10-20

## 2023-10-18 RX ORDER — HYDROMORPHONE HYDROCHLORIDE 2 MG/ML
2 INJECTION INTRAMUSCULAR; INTRAVENOUS; SUBCUTANEOUS EVERY 4 HOURS
Refills: 0 | Status: DISCONTINUED | OUTPATIENT
Start: 2023-10-18 | End: 2023-10-18

## 2023-10-18 RX ORDER — ENOXAPARIN SODIUM 100 MG/ML
40 INJECTION SUBCUTANEOUS
Refills: 0 | Status: DISCONTINUED | OUTPATIENT
Start: 2023-10-18 | End: 2023-10-20

## 2023-10-18 RX ADMIN — Medication 1 PACKET(S): at 09:45

## 2023-10-18 RX ADMIN — ENOXAPARIN SODIUM 40 MILLIGRAM(S): 100 INJECTION SUBCUTANEOUS at 09:47

## 2023-10-18 RX ADMIN — SENNA PLUS 2 TABLET(S): 8.6 TABLET ORAL at 22:09

## 2023-10-18 RX ADMIN — LIDOCAINE 1 PATCH: 4 CREAM TOPICAL at 22:19

## 2023-10-18 RX ADMIN — HYDROMORPHONE HYDROCHLORIDE 30 MILLILITER(S): 2 INJECTION INTRAMUSCULAR; INTRAVENOUS; SUBCUTANEOUS at 07:11

## 2023-10-18 RX ADMIN — HYDROMORPHONE HYDROCHLORIDE 6 MILLIGRAM(S): 2 INJECTION INTRAMUSCULAR; INTRAVENOUS; SUBCUTANEOUS at 18:00

## 2023-10-18 RX ADMIN — METHOCARBAMOL 750 MILLIGRAM(S): 500 TABLET, FILM COATED ORAL at 13:34

## 2023-10-18 RX ADMIN — METHOCARBAMOL 750 MILLIGRAM(S): 500 TABLET, FILM COATED ORAL at 05:30

## 2023-10-18 RX ADMIN — SODIUM CHLORIDE 2 GRAM(S): 9 INJECTION INTRAMUSCULAR; INTRAVENOUS; SUBCUTANEOUS at 05:30

## 2023-10-18 RX ADMIN — HYDROMORPHONE HYDROCHLORIDE 4 MILLIGRAM(S): 2 INJECTION INTRAMUSCULAR; INTRAVENOUS; SUBCUTANEOUS at 12:32

## 2023-10-18 RX ADMIN — Medication 296 MILLILITER(S): at 13:42

## 2023-10-18 RX ADMIN — METHOCARBAMOL 750 MILLIGRAM(S): 500 TABLET, FILM COATED ORAL at 22:08

## 2023-10-18 RX ADMIN — HYDROMORPHONE HYDROCHLORIDE 4 MILLIGRAM(S): 2 INJECTION INTRAMUSCULAR; INTRAVENOUS; SUBCUTANEOUS at 12:02

## 2023-10-18 RX ADMIN — POLYETHYLENE GLYCOL 3350 17 GRAM(S): 17 POWDER, FOR SOLUTION ORAL at 09:51

## 2023-10-18 RX ADMIN — Medication 1 PACKET(S): at 17:35

## 2023-10-18 RX ADMIN — SODIUM CHLORIDE 2 GRAM(S): 9 INJECTION INTRAMUSCULAR; INTRAVENOUS; SUBCUTANEOUS at 13:33

## 2023-10-18 RX ADMIN — HYDROMORPHONE HYDROCHLORIDE 4 MILLIGRAM(S): 2 INJECTION INTRAMUSCULAR; INTRAVENOUS; SUBCUTANEOUS at 22:38

## 2023-10-18 RX ADMIN — Medication 25 MILLIGRAM(S): at 05:31

## 2023-10-18 RX ADMIN — HYDROMORPHONE HYDROCHLORIDE 4 MILLIGRAM(S): 2 INJECTION INTRAMUSCULAR; INTRAVENOUS; SUBCUTANEOUS at 23:08

## 2023-10-18 RX ADMIN — HYDROMORPHONE HYDROCHLORIDE 2 MILLIGRAM(S): 2 INJECTION INTRAMUSCULAR; INTRAVENOUS; SUBCUTANEOUS at 15:30

## 2023-10-18 RX ADMIN — SODIUM CHLORIDE 2 GRAM(S): 9 INJECTION INTRAMUSCULAR; INTRAVENOUS; SUBCUTANEOUS at 22:09

## 2023-10-18 RX ADMIN — HYDROMORPHONE HYDROCHLORIDE 2 MILLIGRAM(S): 2 INJECTION INTRAMUSCULAR; INTRAVENOUS; SUBCUTANEOUS at 16:00

## 2023-10-18 RX ADMIN — TAMSULOSIN HYDROCHLORIDE 0.4 MILLIGRAM(S): 0.4 CAPSULE ORAL at 13:33

## 2023-10-18 RX ADMIN — Medication 25 MILLIGRAM(S): at 17:35

## 2023-10-18 NOTE — CHART NOTE - NSCHARTNOTEFT_GEN_A_CORE
Current collar was not fitting properly and Bolivar's chin kept falling into orthosis. I replaced the current collar with an alternative [place] J collar which fits the patient better and felt more comfortable. Care use and function were explained again to patient. All went without incident.   [facility]  [phone]

## 2023-10-18 NOTE — CONSULT NOTE ADULT - ASSESSMENT
68 year old male with history of chronic back pain on hydromorphone 4mg TID presents s/p fall, 12 foot, while on ladder.  LOC+, likely HS+ as he does not recall the event.  Patient hemodynamically stable on arrival to ED; no trauma activation. Trauma Surgery team consulted due to concern for Acute displaced right lateral 8-10th rib fractures. CT findings listed below. Neurosurgery consulted.   CXR: Right lateral 8th to 10th rib mildly displaced rib fractures.   CT head: No acute intracranial findings.  Abd/Pelvis: Acute displaced right lateral 8-10th rib fractures. Trace right pneumothorax and pneumomediastinum. Small right pleural effusion.  CT angiogram neck: Focal traumatic occlusion/dissection of the left vertebral artery at C1 level. Reconstitution of flow is noted in the left intracranial vertebral artery, likely retrograde.  CT cervical spine:   -C1 anterior and posterior arch fractures with lateral displacement of the lateral masses.  -Spinous process fractures from C2 through C5.  -Anterior subluxation of C5 on C6 with bilateral perched facets and likely posterior ligamentous injury.  -Left C6 transverse process/transverse foramen fractures.  -Hyperdensity within the posterior spinal canal at C2-C3 levels, likely representing epidural hematoma.  Thoracic spine:  Age-indeterminate mild T4 compression fracture, stable benign-appearing lytic lesion T8 vertebral body.  Lumbar spine: No acute fracture or traumatic malalignment. Degenerative changes.    Pt admitted 5 days ago for above, now s/p posterior C1-C7 decompression fusion, evacuation of epidural hematoma, decompression of left C5 nerve root    Current out- patient pain regimen: Dilaudid 4 mg TID, Robaxin  Out Patient Pain Management provider: None, Rxs from PCP Dr. Wharton    Pt somnolent, PCA Dilaudid being discontinued. Pt reports post-op neck pain, pain is controlled, including spasm.     Plan discussed with primary team:  Dilaudid 2 mg every 4 hrs PRN moderate pain and 4 mg every 4 hrs PRN severe pain  Robaxin 750 mg every 8 hrs  Consider Tylenol around the clock for fractured rib pain  Continue Lidoderm to Fx ribs daily    Warm/cool packs for comfort  Bowel Regimen  Incentive Spirometer  PT per primary team  Monitor for sedation, respiratory depression  Out-patient pain practice list can provided for pain management after discharge  Narcan Rescue Kit on discharge (Naloxone 4 mg/0.1 ml nasal spray - 1 spray q 2-3 minutes alternating between nostrils)    Time spent on encounter:   45     Minutes    Chronic Pain Service  [phone] 68 year old male with history of chronic back pain on hydromorphone 4mg TID presents s/p fall, 12 foot, while on ladder.  LOC+, likely HS+ as he does not recall the event.  Patient hemodynamically stable on arrival to ED; no trauma activation. Trauma Surgery team consulted due to concern for Acute displaced right lateral 8-10th rib fractures. CT findings listed below. Neurosurgery consulted.   CXR: Right lateral 8th to 10th rib mildly displaced rib fractures.   CT head: No acute intracranial findings.  Abd/Pelvis: Acute displaced right lateral 8-10th rib fractures. Trace right pneumothorax and pneumomediastinum. Small right pleural effusion.  CT angiogram neck: Focal traumatic occlusion/dissection of the left vertebral artery at C1 level. Reconstitution of flow is noted in the left intracranial vertebral artery, likely retrograde.  CT cervical spine:   -C1 anterior and posterior arch fractures with lateral displacement of the lateral masses.  -Spinous process fractures from C2 through C5.  -Anterior subluxation of C5 on C6 with bilateral perched facets and likely posterior ligamentous injury.  -Left C6 transverse process/transverse foramen fractures.  -Hyperdensity within the posterior spinal canal at C2-C3 levels, likely representing epidural hematoma.  Thoracic spine:  Age-indeterminate mild T4 compression fracture, stable benign-appearing lytic lesion T8 vertebral body.  Lumbar spine: No acute fracture or traumatic malalignment. Degenerative changes.    Pt admitted 5 days ago for above, now s/p posterior C1-C7 decompression fusion, evacuation of epidural hematoma, decompression of left C5 nerve root    Current out- patient pain regimen: Dilaudid 4 mg TID, Robaxin  Out Patient Pain Management provider: None, Rxs from PCP Dr. Wharton    Pt somnolent, PCA Dilaudid being discontinued. Pt reports post-op neck pain, pain is controlled, including spasm.     Plan discussed with primary team:  Robaxin 750 mg every 8 hrs  Consider Tylenol around the clock for fractured rib pain  Continue Lidoderm to Fx ribs daily    **Addendum - 10/18/2023 @ 1925h - Received call from primary team, patient reports little to moderate effect w/ Dilaudid PO as ordered - 2 mg every 4 hrs PRN moderate pain and 4 mg every 4 hrs PRN severe pain. PO Dilaudid changed to 4 mg every 4 hrs PRN moderate pain and 6 mg every 6 hrs PRN severe pain.    Warm/cool packs for comfort  Bowel Regimen  Incentive Spirometer  PT per primary team  Monitor for sedation, respiratory depression  Out-patient pain practice list can provided for pain management after discharge  Narcan Rescue Kit on discharge (Naloxone 4 mg/0.1 ml nasal spray - 1 spray q 2-3 minutes alternating between nostrils)    Time spent on encounter:   45     Minutes    Chronic Pain Service  [phone]

## 2023-10-18 NOTE — SWALLOW BEDSIDE ASSESSMENT ADULT - SWALLOW EVAL: DIAGNOSIS
68 year old male with history of chronic back pain on hydromorphone 4mg TID presents s/p mechanical fall from 12 feet while on ladder. +Head strike, LOC. This service attempted to see patient for bedside swallow evaluation. As per discussion with SUDHA Colmenares, patient off the floor at this time. This service to re-attempt again later this week.

## 2023-10-18 NOTE — PROGRESS NOTE ADULT - ASSESSMENT
HPI:  68M with history of chronic back pain on opiates presents 10/13/23 s/p 12 foot fall from ladder with multiple traumatic injuries listed below. Patient otherwise stable. GCS 15.    INJURIES:   -C1 anterior and posterior arch fractures with lateral displacement of the lateral masses. Focal traumatic occlusion/dissection of the left vertebral artery at C1 level.  -C2-C3 hyperdensity within the posterior spinal canal, likely representing epidural hematoma.  -C2-C5 Spinous process fractures  -C5 on C6 Anterior subluxation with bilateral perched facets and likely posterior ligamentous injury.  -C6 Left transverse process/transverse foramen fractures.  -T4 compression fracture, mild  -T8 vertebral body lytic lesion, age-indeterminate  -Right lateral 8th to 10th rib mildly displaced rib fractures.   -Trace right pneumothorax and pneumomediastinum. Small right pleural effusion.      PROCEDURE: s/p C1-C7 posterior cervical spine decompression and fusion for fracture on 10/14        POD#  4  PLAN:  1 MR c spine : done -stable on 10/17   2 hemovac removed on 10/17  3 pca weaned off   4 chronic pain saw   5 continue on robaxin   6 on 2l nc   7 incentive spirometry   8 tachy - will monitor-likely pain related -resolved   9 continue on metoprolol per cardio decreased dose   10 regular diet   11 senna and miralax -last bm 10/13 added magcitrate and dulcolax   12 voiding   13 hyponatremia - likely siadh - on free water restriction and salt tab 2g tid ,sodium improved   14 afebrile   15 dvt ppx scds, chemical ppx if mri stable , doppler :p   16 c collar at all times except when sleeping   17 call orthotics for better  fitting collar   18 needs acute rehab   -will discuss with Dr. Dawkins   -Yekra [phone]

## 2023-10-18 NOTE — PROGRESS NOTE ADULT - ASSESSMENT
outpt  ECHO: 09/05/202 : Grade I diastolic dysfunction. mild mitral regurgitation. The aortic root is mildly dilated.  The ascending aorta is mildly dilated.  Aortic Root diameter is (2D-mode) 4.02 cm. Ascending Aortic Diameter: 3.98 cm.    a/p    68 year old male with hx of  Hyperlipidemia, Emphysema, Arthritis, Anemia, Dilated Aortic Root, COPD  presenting sp Fall s/p  12 foot  fall while on ladder    #sp fall   -pt unclear of events leading to fall   -pt is now POSTOP for Posterior C1-C6 decompression fusion, evacuation of epidural hematoma  -S/p SICU course   -post op - tele sinus tach noted - elevated BP noted  -ecg w no ischemic changes   -no chf on exam   -Rates on tele with improvement   -Continue lopressor 50 mg BID for now - **of note pt has complain of INCREASE FATIGUE with higher BB dosing-- was placed on toprol 25 mg PO daily as outpt but given higher HR, will increase bb for now.**    # hx  Aortic Root Dilatation  -stable on recent outpt echo from 9/5/23:  The ascending aorta is mildly dilated.  Aortic Root diameter is (2D-mode) 4.02 cm. Ascending Aortic Diameter: 3.98 cm.  -echo q 12 months    # Atrial arrhythmia  -ectopic atrial rhythm/PAT noted on mct and tele during previous admit  -No ectopy noted thus far on tele   -bb as above   -if more sx, will consider eps/ablation      dvt ppx

## 2023-10-19 LAB
ANION GAP SERPL CALC-SCNC: 10 MMOL/L — SIGNIFICANT CHANGE UP (ref 5–17)
ANION GAP SERPL CALC-SCNC: 10 MMOL/L — SIGNIFICANT CHANGE UP (ref 5–17)
BUN SERPL-MCNC: 17 MG/DL — SIGNIFICANT CHANGE UP (ref 7–23)
BUN SERPL-MCNC: 17 MG/DL — SIGNIFICANT CHANGE UP (ref 7–23)
CALCIUM SERPL-MCNC: 8.9 MG/DL — SIGNIFICANT CHANGE UP (ref 8.4–10.5)
CALCIUM SERPL-MCNC: 8.9 MG/DL — SIGNIFICANT CHANGE UP (ref 8.4–10.5)
CHLORIDE SERPL-SCNC: 97 MMOL/L — SIGNIFICANT CHANGE UP (ref 96–108)
CHLORIDE SERPL-SCNC: 97 MMOL/L — SIGNIFICANT CHANGE UP (ref 96–108)
CO2 SERPL-SCNC: 28 MMOL/L — SIGNIFICANT CHANGE UP (ref 22–31)
CO2 SERPL-SCNC: 28 MMOL/L — SIGNIFICANT CHANGE UP (ref 22–31)
CREAT SERPL-MCNC: 0.69 MG/DL — SIGNIFICANT CHANGE UP (ref 0.5–1.3)
CREAT SERPL-MCNC: 0.69 MG/DL — SIGNIFICANT CHANGE UP (ref 0.5–1.3)
EGFR: 100 ML/MIN/1.73M2 — SIGNIFICANT CHANGE UP
EGFR: 100 ML/MIN/1.73M2 — SIGNIFICANT CHANGE UP
GLUCOSE SERPL-MCNC: 125 MG/DL — HIGH (ref 70–99)
GLUCOSE SERPL-MCNC: 125 MG/DL — HIGH (ref 70–99)
POTASSIUM SERPL-MCNC: 4.2 MMOL/L — SIGNIFICANT CHANGE UP (ref 3.5–5.3)
POTASSIUM SERPL-MCNC: 4.2 MMOL/L — SIGNIFICANT CHANGE UP (ref 3.5–5.3)
POTASSIUM SERPL-SCNC: 4.2 MMOL/L — SIGNIFICANT CHANGE UP (ref 3.5–5.3)
POTASSIUM SERPL-SCNC: 4.2 MMOL/L — SIGNIFICANT CHANGE UP (ref 3.5–5.3)
SODIUM SERPL-SCNC: 135 MMOL/L — SIGNIFICANT CHANGE UP (ref 135–145)
SODIUM SERPL-SCNC: 135 MMOL/L — SIGNIFICANT CHANGE UP (ref 135–145)

## 2023-10-19 PROCEDURE — 99232 SBSQ HOSP IP/OBS MODERATE 35: CPT

## 2023-10-19 RX ORDER — HYDROMORPHONE HYDROCHLORIDE 2 MG/ML
4 INJECTION INTRAMUSCULAR; INTRAVENOUS; SUBCUTANEOUS ONCE
Refills: 0 | Status: COMPLETED | OUTPATIENT
Start: 2023-10-19 | End: 2023-10-19

## 2023-10-19 RX ORDER — DILTIAZEM HCL 120 MG
120 CAPSULE, EXT RELEASE 24 HR ORAL DAILY
Refills: 0 | Status: DISCONTINUED | OUTPATIENT
Start: 2023-10-19 | End: 2023-10-20

## 2023-10-19 RX ADMIN — HYDROMORPHONE HYDROCHLORIDE 6 MILLIGRAM(S): 2 INJECTION INTRAMUSCULAR; INTRAVENOUS; SUBCUTANEOUS at 18:20

## 2023-10-19 RX ADMIN — METHOCARBAMOL 750 MILLIGRAM(S): 500 TABLET, FILM COATED ORAL at 22:13

## 2023-10-19 RX ADMIN — TAMSULOSIN HYDROCHLORIDE 0.4 MILLIGRAM(S): 0.4 CAPSULE ORAL at 11:03

## 2023-10-19 RX ADMIN — ENOXAPARIN SODIUM 40 MILLIGRAM(S): 100 INJECTION SUBCUTANEOUS at 08:59

## 2023-10-19 RX ADMIN — METHOCARBAMOL 750 MILLIGRAM(S): 500 TABLET, FILM COATED ORAL at 13:16

## 2023-10-19 RX ADMIN — LIDOCAINE 1 PATCH: 4 CREAM TOPICAL at 23:13

## 2023-10-19 RX ADMIN — POLYETHYLENE GLYCOL 3350 17 GRAM(S): 17 POWDER, FOR SOLUTION ORAL at 05:46

## 2023-10-19 RX ADMIN — HYDROMORPHONE HYDROCHLORIDE 4 MILLIGRAM(S): 2 INJECTION INTRAMUSCULAR; INTRAVENOUS; SUBCUTANEOUS at 23:14

## 2023-10-19 RX ADMIN — SODIUM CHLORIDE 2 GRAM(S): 9 INJECTION INTRAMUSCULAR; INTRAVENOUS; SUBCUTANEOUS at 22:22

## 2023-10-19 RX ADMIN — METHOCARBAMOL 750 MILLIGRAM(S): 500 TABLET, FILM COATED ORAL at 05:46

## 2023-10-19 RX ADMIN — HYDROMORPHONE HYDROCHLORIDE 4 MILLIGRAM(S): 2 INJECTION INTRAMUSCULAR; INTRAVENOUS; SUBCUTANEOUS at 23:44

## 2023-10-19 RX ADMIN — SODIUM CHLORIDE 2 GRAM(S): 9 INJECTION INTRAMUSCULAR; INTRAVENOUS; SUBCUTANEOUS at 13:16

## 2023-10-19 RX ADMIN — Medication 10 MILLIGRAM(S): at 13:15

## 2023-10-19 RX ADMIN — HYDROMORPHONE HYDROCHLORIDE 6 MILLIGRAM(S): 2 INJECTION INTRAMUSCULAR; INTRAVENOUS; SUBCUTANEOUS at 02:37

## 2023-10-19 RX ADMIN — HYDROMORPHONE HYDROCHLORIDE 6 MILLIGRAM(S): 2 INJECTION INTRAMUSCULAR; INTRAVENOUS; SUBCUTANEOUS at 02:07

## 2023-10-19 RX ADMIN — Medication 120 MILLIGRAM(S): at 13:16

## 2023-10-19 RX ADMIN — Medication 25 MILLIGRAM(S): at 05:46

## 2023-10-19 RX ADMIN — HYDROMORPHONE HYDROCHLORIDE 4 MILLIGRAM(S): 2 INJECTION INTRAMUSCULAR; INTRAVENOUS; SUBCUTANEOUS at 06:32

## 2023-10-19 RX ADMIN — CHLORHEXIDINE GLUCONATE 1 APPLICATION(S): 213 SOLUTION TOPICAL at 06:29

## 2023-10-19 RX ADMIN — SODIUM CHLORIDE 2 GRAM(S): 9 INJECTION INTRAMUSCULAR; INTRAVENOUS; SUBCUTANEOUS at 05:46

## 2023-10-19 RX ADMIN — Medication 25 MILLIGRAM(S): at 18:00

## 2023-10-19 RX ADMIN — HYDROMORPHONE HYDROCHLORIDE 6 MILLIGRAM(S): 2 INJECTION INTRAMUSCULAR; INTRAVENOUS; SUBCUTANEOUS at 12:00

## 2023-10-19 RX ADMIN — HYDROMORPHONE HYDROCHLORIDE 6 MILLIGRAM(S): 2 INJECTION INTRAMUSCULAR; INTRAVENOUS; SUBCUTANEOUS at 11:04

## 2023-10-19 NOTE — PROVIDER CONTACT NOTE (OTHER) - RECOMMENDATIONS
have provider come to bedside and assess. have GI come to bedside?
RN recommended a dose of labetalol.

## 2023-10-19 NOTE — SWALLOW BEDSIDE ASSESSMENT ADULT - CONSISTENCIES ADMINISTERED
thin liquid/moderately thick/mildly thick/pureed/minced & moist/soft & bite-sized/easy to chew/regular solid

## 2023-10-19 NOTE — CONSULT NOTE ADULT - PROVIDER SPECIALTY LIST ADULT
Cardiology
Colorectal Surgery
Neurosurgery
Pain Medicine
Rehab Medicine
SICU
Cardiology
Internal Medicine

## 2023-10-19 NOTE — CONSULT NOTE ADULT - CONSULT REASON
Trauma and c spine fx
prolapsed hemorrhoids
cardiac management
fall
Evaluate Rehabilitation Needs
Pain Management

## 2023-10-19 NOTE — PROGRESS NOTE ADULT - ASSESSMENT
outpt  ECHO: 09/05/202 : Grade I diastolic dysfunction. mild mitral regurgitation. The aortic root is mildly dilated.  The ascending aorta is mildly dilated.  Aortic Root diameter is (2D-mode) 4.02 cm. Ascending Aortic Diameter: 3.98 cm.    a/p    68 year old male with hx of  Hyperlipidemia, Emphysema, Arthritis, Anemia, Dilated Aortic Root, COPD  presenting sp Fall s/p  12 foot  fall while on ladder    #sp fall   -pt unclear of events leading to fall   -pt is now POSTOP for Posterior C1-C6 decompression fusion, evacuation of epidural hematoma  -S/p SICU course   -post op - tele sinus tach noted - elevated BP noted  -ecg w no ischemic changes   -no chf on exam   -PAT noted on tele up to 120s  -Continue lopressor 50 mg BID for now - **of note pt has complain of INCREASE FATIGUE with higher BB dosing-- was placed on toprol 25 mg PO daily as outpt but given higher HR, will increase bb for now.**  -Add cardizem 120mg cd     # hx  Aortic Root Dilatation  -stable on recent outpt echo from 9/5/23:  The ascending aorta is mildly dilated.  Aortic Root diameter is (2D-mode) 4.02 cm. Ascending Aortic Diameter: 3.98 cm.  -echo q 12 months    # Atrial arrhythmia  -ectopic atrial rhythm/PAT noted on tele  -bb as above   -Add cardizem 120mg cd  -Will defer ep eval as pt not likely a candidate for ablation at this time        dvt ppx

## 2023-10-19 NOTE — SWALLOW BEDSIDE ASSESSMENT ADULT - SWALLOW EVAL: DIAGNOSIS
Dr Rondon will review CT scan prior to surgery. Spoke with [facility]. Specimen is to be put in formalin even if for lymphoma check as it has to be sent to [place] for processing.   Nadege Mcqueen PA-C     68 year old male with history of chronic back pain on hydromorphone 4mg TID presents s/p mechanical fall from 12 feet while on ladder. +Head strike, LOC. 68Y M with PMHx of chronic back pain on hydromorphone 4mg TID presents s/p mechanical fall from 12 feet while on ladder. +Head strike, LOC. Bedside swallow evaluation completed revealing overtly functional lars-pharyngeal swallow mechanism. Oral phase marked by adequate mastication and timely oral transit time. Pharyngeal phase marked by timely initiation of the pharyngeal swallow and adequate hyo-laryngeal elevation. Palpation limited due to presence of c-collar. No overt s/s of laryngeal penetration/aspiration or changes in vocal quality observed across all consistencies provided.

## 2023-10-19 NOTE — SWALLOW BEDSIDE ASSESSMENT ADULT - COMMENTS
SICU consulted- patient s/p 12ft fall from ladder; s/p C1-C7 posterior cervical spine decompression and fusion for fracture on 10/14   Cardiology follow for hx of Aortic root dilation and atrial arrhythmia  Anesthesia following for pain management; patient changed to PRN Analgesics on 10/18  10/15- cleared by NSX to transfer to the floor; collar on when OOB.  CXR (10/13)-Right lateral 8th through 10th rib mildly displaced rib fractures with   adjacent trace subcutaneous emphysema. Trace right pleural effusion.   CXR (10/17)-New small left pleural effusion.  CT Chest-Acute displaced right lateral 8-10th rib fractures. Trace right pneumothorax and pneumomediastinum. Small right pleural effusion.  CT Head- No acute intracranial findings.    *Patient not known to this service. Currently on a diet of regular solids and thin liquids*
SICU consulted- patient s/p 12ft fall from ladder; s/p C1-C7 posterior cervical spine decompression and fusion for fracture on 10/14   Cardiology follow for hx of Aortic root dilation and atrial arrhythmia  Anesthesia following for pain management; patient changed to PRN Analgesics on 10/18  10/15- cleared by NSX to transfer to the floor; collar on when OOB.  CXR (10/13)-Right lateral 8th through 10th rib mildly displaced rib fractures with   adjacent trace subcutaneous emphysema. Trace right pleural effusion.   CXR (10/17)-New small left pleural effusion.  CT Chest-Acute displaced right lateral 8-10th rib fractures. Trace right pneumothorax and pneumomediastinum. Small right pleural effusion.  CT Head- No acute intracranial findings.    *Patient not known to this service. Currently on a diet of regular solids and thin liquids*

## 2023-10-19 NOTE — CONSULT NOTE ADULT - CONSULT REQUESTED DATE/TIME
15-Oct-2023 09:04
16-Oct-2023 14:24
14-Oct-2023 06:00
13-Oct-2023 19:46
14-Oct-2023 10:51
19-Oct-2023 19:35
18-Oct-2023 11:33
14-Oct-2023 00:27

## 2023-10-19 NOTE — PROVIDER CONTACT NOTE (OTHER) - SITUATION
Pt in pain 9/10 and hypertensive 181/101 (129).
Pt is S/P Posterior C1-C6 Decompression fusion, evacuation of epidural hematoma. Also S/P mechanical fall from 12 feet while on ladder. Pt is non compliant with cervical collar.
patient having BM in bathroom and stating " my insides are out and I need to push them back in"

## 2023-10-19 NOTE — SWALLOW BEDSIDE ASSESSMENT ADULT - SLP PERTINENT HISTORY OF CURRENT PROBLEM
68 year old male with history of chronic back pain on hydromorphone 4mg TID presents s/p mechanical fall from 12 feet while on ladder. +Head strike, LOC. Imaging significant for multiple c-spine fractures and R 8-10 rib fracture. Admitted to SICU under trauma for polytrauma w multiple C spine and rib Fxs.
68 year old male with history of chronic back pain on hydromorphone 4mg TID presents s/p mechanical fall from 12 feet while on ladder. +Head strike, LOC. Imaging significant for multiple c-spine fractures and R 8-10 rib fracture. Admitted to SICU under trauma for polytrauma w multiple C spine and rib Fxs.

## 2023-10-19 NOTE — CONSULT NOTE ADULT - SUBJECTIVE AND OBJECTIVE BOX
CC: admitted to trauma for mult rib and mult C spine Fxs to SICU  68 year old male with history of chronic back pain on hydromorphone 4mg TID presents s/p 12 foot while on ladder.  LOC+, likely HS+ as he does not recall the event.  Patient hemodynamically stable on arrival to ED; no trauma activation. Trauma Surgery team consulted due to concern for Acute displaced right lateral 8-10th rib fractures. CT findings listed below. Neurosurgery consulted 2/2 C spine Fxs at multiple levels  Ptn admitted to SICU    PAST MEDICAL HISTORY:  Hyperlipidemia, Emphysema, Arthritis, Anemia, Dilated Aortic Root, COPD    PAST SURGICAL HISTORY:  Right total knee replacement, Left Shoulder Surgery, Hernia Repair    SOCIAL HISTORY:  Tobacco: former smoker, quit 2017; Alcohol: socially; Caffeine: 2-3 cups daily    FAMILY HISTORY:  Father: Myocardial Infarction, Hypertension, High cholesterol, Diabetes Mellitus; Brother: MI, PCI    ALLERGIES: No Known Allergies    HOME  MEDICATIONS:   METOPROLOL SUCCINATE ER 25 MG..Si tablet, extended release daily for 90 Days Qty: 90  BREO ELLIPTA  OXYCODONE 15 MG...............Si tablet QD for 30 Days Qty: 30  IRON  SIMVASTATIN 40 MG.............Si tablet QD for 90 Days Qty: 90      MEDICATIONS  (STANDING):  acetaminophen   IVPB .. 1000 milliGRAM(s) IV Intermittent every 6 hours  chlorhexidine 2% Cloths 1 Application(s) Topical <User Schedule>  lidocaine   4% Patch 1 Patch Transdermal every 24 hours      REVIEW OF SYSTEMS:  CONSTITUTIONAL: No fever, weight loss, or fatigue  EYES: No eye pain, visual disturbances, or discharge  ENMT:  No difficulty hearing, tinnitus, vertigo; No sinus or throat pain  NECK: No pain or stiffness  RESPIRATORY: No cough, wheezing, chills or hemoptysis; No Shortness of Breath  CARDIOVASCULAR: No chest pain, palpitations, passing out, dizziness, or leg swelling  GASTROINTESTINAL: No abdominal or epigastric pain. No nausea, vomiting, or hematemesis; No diarrhea or constipation. No melena or hematochezia.  GENITOURINARY: No dysuria, frequency, hematuria, or incontinence  NEUROLOGICAL: No headaches, memory loss, loss of strength, numbness, or tremors  SKIN: No itching, burning, rashes, or lesions   	        PHYSICAL EXAM:  T(C): 36.4 (10-14-23 @ 07:00), Max: 37 (10-13-23 @ 16:39)  HR: 62 (10-14-23 @ 08:00) (60 - 111)  BP: 157/79 (10-14-23 @ 08:00) (131/74 - 217/89)  RR: 16 (10-14-23 @ 08:00) (10 - 35)  SpO2: 95% (10-14-23 @ 08:00) (94% - 97%)  Wt(kg): --  I&O's Summary    13 Oct 2023 07:01  -  14 Oct 2023 07:00  --------------------------------------------------------  IN: 360 mL / OUT: 625 mL / NET: -265 mL        Appearance: Normal	  Psychiatry: A & O x 3, Mood & affect appropriate  HEENT:   Normal oral mucosa, PERRL, EOMI	  Lymphatic: No lymphadenopathy  Cardiovascular: Normal S1 S2,RRR, No JVD, No murmurs  Respiratory: Lungs clear to auscultation	  Gastrointestinal:  Soft, Non-tender, + BS	  Skin: No rashes, No ecchymoses, No cyanosis	  Neurologic: Non-focal  Extremities: Normal range of motion, No clubbing, cyanosis or edema  Vascular: Peripheral pulses palpable 2+ bilaterally                       13.1   10.47 )-----------( 227      ( 14 Oct 2023 00:40 )             39.9     10-14    140  |  104  |  19  ----------------------------<  149<H>  4.2   |  23  |  0.85    Ca    9.4      14 Oct 2023 00:40  Phos  4.0     10-  Mg     2.0     10-    TPro  6.9  /  Alb  4.2  /  TBili  0.5  /  DBili  x   /  AST  62<H>  /  ALT  50<H>  /  AlkPhos  69  10-13    PT/INR - ( 14 Oct 2023 00:40 )   PT: 11.2 sec;   INR: 1.02 ratio         PTT - ( 14 Oct 2023 00:40 )  PTT:27.1 sec        
Patient is a 68y old  Male who presents with a chief complaint of 12 foot fall with ladder (16 Oct 2023 14:08)    Admission HPI:  68 year old male with history of chronic back pain on hydromorphone 4mg TID presents s/p 12 foot while on ladder.  LOC+, likely HS+ as he does not recall the event.  Patient hemodynamically stable on arrival to ED; no trauma activation. Trauma Surgery team consulted due to concern for Acute displaced right lateral 8-10th rib fractures. CT findings listed below. Neurosurgery consulted.       CXR: Right lateral 8th to 10th rib mildly displaced rib fractures.   CT head: No acute intracranial findings.    Abd/Pelvis:   Acute displaced right lateral 8-10th rib fractures. Trace right pneumothorax and pneumomediastinum. Small right pleural effusion.    CT angiogram neck:   Focal traumatic occlusion/dissection of the left vertebral artery at C1 level. Reconstitution of flow is noted in the left intracranial vertebral artery, likely retrograde.    CT cervical spine:  -C1 anterior and posterior arch fractures with lateral displacement of the lateral masses.  -Spinous process fractures from C2 through C5.  -Anterior subluxation of C5 on C6 with bilateral perched facets and likely posterior ligamentous injury.  -Left C6 transverse process/transverse foramen fractures.  -Hyperdensity within the posterior spinal canal at C2-C3 levels, likely representing epidural hematoma.    Thoracic spine:   Age-indeterminate mild T4 compression fracture, stable benign-appearing lytic lesion T8 vertebral body.    Lumbar spine:  No acute fracture or traumatic malalignment. Degenerative changes.    Interval History:  Patient went to OR 10/14 for Posterior C1-C7 Decompression Fusion for unstable spine fx, evacuation of epidural hematoma  Decompression of L C5 Nerve Root    Post-op w weakness - worse of the arms then legs.    REVIEW OF SYSTEMS: + weakness (UE>LE), + neck pain - controlled on meds, + chronic pain, No chest pain, shortness of breath, nausea, vomiting or diarhea; other ROS neg     PAST MEDICAL & SURGICAL HISTORY  High cholesterol    Hypertension    Insomnia    History of chronic back pain    History of arthroscopy of left shoulder    H/O hernia repair    History of arthroplasty of right knee    FUNCTIONAL HISTORY:   Lives w wife in house w stairs  PTA Independent    CURRENT FUNCTIONAL STATUS:  Min A transfers and gait    FAMILY HISTORY   Family history of early CAD (Sibling)    MEDICATIONS   chlorhexidine 2% Cloths 1 Application(s) Topical <User Schedule>  HYDROmorphone  Injectable 0.5 milliGRAM(s) IV Push every 3 hours PRN  HYDROmorphone PCA (1 mG/mL) 30 milliLiter(s) PCA Continuous PCA Continuous  HYDROmorphone PCA (1 mG/mL) Rescue Clinician Bolus 0.5 milliGRAM(s) IV Push every 15 minutes PRN  lactated ringers. 1000 milliLiter(s) IV Continuous <Continuous>  lidocaine   4% Patch 1 Patch Transdermal every 24 hours  methocarbamol 750 milliGRAM(s) Oral three times a day  metoprolol tartrate 25 milliGRAM(s) Oral every 12 hours  nalbuphine Injectable 2.5 milliGRAM(s) IV Push every 6 hours PRN  naloxone Injectable 0.1 milliGRAM(s) IV Push every 3 minutes PRN  ondansetron Injectable 4 milliGRAM(s) IV Push every 6 hours PRN  phytonadione  IVPB 10 milliGRAM(s) IV Intermittent daily  polyethylene glycol 3350 17 Gram(s) Oral daily  senna 2 Tablet(s) Oral at bedtime    ALLERGIES  Nuts (Unknown)  Originally Entered as [Unknown] reaction to [PUMPKIN SEEDS] (Unknown)  Vioxx (Unknown)    VITALS  T(C): 36.9 (10-16-23 @ 03:00), Max: 36.9 (10-16-23 @ 03:00)  HR: 120 (10-16-23 @ 14:09) (104 - 125)  BP: 144/86 (10-16-23 @ 14:09) (144/86 - 175/93)  RR: 16 (10-16-23 @ 14:09) (10 - 37)  SpO2: 94% (10-16-23 @ 14:09) (89% - 100%)  Wt(kg): --    PHYSICAL EXAM  Constitutional - NAD, Comfortable  HEENT - NCAT, EOMI  Neck - Supple, Cervical collar in place  Chest - No distress, no use of accessory muscles for respiration  Cardiovascular -Tachy, Well perfused  Abdomen - BS+, Soft, NTND  Extremities - No C/C/E, No calf tenderness   Neurologic Exam -                 AAO x 3  Motor- bl UE 3+/5, bl LEs 4+/5  Sensation intact  No clonus  Psychiatric - Mood stable, Affect WNL    RECENT LABS/IMAGING  CBC Full  -  ( 16 Oct 2023 05:36 )  WBC Count : 11.29 K/uL  RBC Count : 4.02 M/uL  Hemoglobin : 12.0 g/dL  Hematocrit : 36.5 %  Platelet Count - Automated : 211 K/uL  Mean Cell Volume : 90.8 fl  Mean Cell Hemoglobin : 29.9 pg  Mean Cell Hemoglobin Concentration : 32.9 gm/dL  Auto Neutrophil # : x  Auto Lymphocyte # : x  Auto Monocyte # : x  Auto Eosinophil # : x  Auto Basophil # : x  Auto Neutrophil % : x  Auto Lymphocyte % : x  Auto Monocyte % : x  Auto Eosinophil % : x  Auto Basophil % : x    10-16    132<L>  |  95<L>  |  14  ----------------------------<  136<H>  4.1   |  26  |  0.75    Ca    9.0      16 Oct 2023 05:36  Phos  4.0     10-14  Mg     1.8     10-14    TPro  6.5  /  Alb  3.7  /  TBili  0.6  /  DBili  x   /  AST  44<H>  /  ALT  35  /  AlkPhos  66  10-16    Urinalysis Basic - ( 16 Oct 2023 05:36 )    Color: x / Appearance: x / SG: x / pH: x  Gluc: 136 mg/dL / Ketone: x  / Bili: x / Urobili: x   Blood: x / Protein: x / Nitrite: x   Leuk Esterase: x / RBC: x / WBC x   Sq Epi: x / Non Sq Epi: x / Bacteria: x    Impression:  69 yo with functional deficits secondary to diagnosis of Centrral Cord Syndrome    Plan:  PT- ROM, Bed Mob, Transfers, Amb w AD and bracing as needed  OT- ADLs, bracing  Prec- Falls, Cardiac  DVT Prophylaxis- SCDs, chemopx when cleared by surgery  Pain- Acute on chronic pain; continue current medications  Skin- Turn q2 h  Dispo- Acute Rehab- patient requires active and ongoing therapeutic interventions of multiple disciplines and can tolerate 3 hours of therapies x 4wks. Can actively participate and benefit from  an intensive rehabilitation program. Requires supervision by a rehabilitation physician and a coordinated interdisciplinary approach to providing rehabilitation.   
p (1480)     HPI: 68M no AC/AP s/p fall down ladder 10ft. CT C spine w/ C1 ring fx w/ lat displacement of lat masses, C2-5 SP fx, C5-6 perched facet, C2-3 hyperdensity c/f spinal EDH. CTA w/ poss L vert dissection vs. occlusion vs artifact at C1. He c/o moderate neck pain but no radicular sx.     Exam: Intact, KABA 5/5, SILT, no Hollingsworth, no clonus.     --Anticoagulation:    =====================  PAST MEDICAL HISTORY   High cholesterol    Insomnia    History of chronic back pain      PAST SURGICAL HISTORY   History of arthroscopy of left shoulder    H/O hernia repair    History of arthroplasty of right knee      Nuts (Unknown)  Originally Entered as [Unknown] reaction to [PUMPKIN SEEDS] (Unknown)  Vioxx (Unknown)      MEDICATIONS:  Antibiotics:    Neuro:    Other:      SOCIAL HISTORY:   Occupation:   Marital Status:     FAMILY HISTORY:  No pertinent family history in first degree relatives    No pertinent family history in first degree relatives    Family history of early CAD (Sibling)        ROS: Negative except per HPI    LABS:                          13.7   10.08 )-----------( 281      ( 13 Oct 2023 16:53 )             41.2     10-13    142  |  104  |  22  ----------------------------<  198<H>  4.2   |  25  |  1.01    Ca    9.6      13 Oct 2023 16:53    TPro  6.9  /  Alb  4.2  /  TBili  0.5  /  DBili  x   /  AST  62<H>  /  ALT  50<H>  /  AlkPhos  69  10-13      
CARDIOLOGY CONSULT - Dr. Zeng         HPI:      68 year old male with history of chronic back pain on hydromorphone 4mg TID presents s/p 12 foot while on ladder.  LOC+, likely HS+ as he does not recall the event.  Patient hemodynamically stable on arrival to ED; no trauma activation. Trauma Surgery team consulted due to concern for Acute displaced right lateral 8-10th rib fractures. CT findings listed below. Neurosurgery consulted.         PAST MEDICAL HISTORY:  Hyperlipidemia, Emphysema, Arthritis, Anemia, Dilated Aortic Root, COPD    PAST SURGICAL HISTORY:  Right total knee replacement, Left Shoulder Surgery, Hernia Repair    SOCIAL HISTORY:  Tobacco: former smoker, quit 2017; Alcohol: socially; Caffeine: 2-3 cups daily    FAMILY HISTORY:  Father: Myocardial Infarction, Hypertension, High cholesterol, Diabetes Mellitus; Brother: MI, PCI    ALLERGIES: No Known Allergies    HOME  MEDICATIONS:   METOPROLOL SUCCINATE ER 25 MG..Si tablet, extended release daily for 90 Days Qty: 90  BREO ELLIPTA  OXYCODONE 15 MG...............Si tablet QD for 30 Days Qty: 30  IRON  SIMVASTATIN 40 MG.............Si tablet QD for 90 Days Qty: 90      MEDICATIONS  (STANDING):  acetaminophen   IVPB .. 1000 milliGRAM(s) IV Intermittent every 6 hours  chlorhexidine 2% Cloths 1 Application(s) Topical <User Schedule>  lidocaine   4% Patch 1 Patch Transdermal every 24 hours      REVIEW OF SYSTEMS:  CONSTITUTIONAL: No fever, weight loss, or fatigue  EYES: No eye pain, visual disturbances, or discharge  ENMT:  No difficulty hearing, tinnitus, vertigo; No sinus or throat pain  NECK: No pain or stiffness  RESPIRATORY: No cough, wheezing, chills or hemoptysis; No Shortness of Breath  CARDIOVASCULAR: No chest pain, palpitations, passing out, dizziness, or leg swelling  GASTROINTESTINAL: No abdominal or epigastric pain. No nausea, vomiting, or hematemesis; No diarrhea or constipation. No melena or hematochezia.  GENITOURINARY: No dysuria, frequency, hematuria, or incontinence  NEUROLOGICAL: No headaches, memory loss, loss of strength, numbness, or tremors  SKIN: No itching, burning, rashes, or lesions   	    [ ] All others negative	  [ ] Unable to obtain    PHYSICAL EXAM:  T(C): 36.4 (10-14-23 @ 07:00), Max: 37 (10-13-23 @ 16:39)  HR: 62 (10-14-23 @ 08:00) (60 - 111)  BP: 157/79 (10-14-23 @ 08:00) (131/74 - 217/89)  RR: 16 (10-14-23 @ 08:00) (10 - 35)  SpO2: 95% (10-14-23 @ 08:00) (94% - 97%)  Wt(kg): --  I&O's Summary    13 Oct 2023 07:01  -  14 Oct 2023 07:00  --------------------------------------------------------  IN: 360 mL / OUT: 625 mL / NET: -265 mL        Appearance: Normal	  Psychiatry: A & O x 3, Mood & affect appropriate  HEENT:   Normal oral mucosa, PERRL, EOMI	  Lymphatic: No lymphadenopathy  Cardiovascular: Normal S1 S2,RRR, No JVD, No murmurs  Respiratory: Lungs clear to auscultation	  Gastrointestinal:  Soft, Non-tender, + BS	  Skin: No rashes, No ecchymoses, No cyanosis	  Neurologic: Non-focal  Extremities: Normal range of motion, No clubbing, cyanosis or edema  Vascular: Peripheral pulses palpable 2+ bilaterally    TELEMETRY: 	    ECG:  	  RADIOLOGY:  OTHER: 	  	  LABS:	 	    CARDIAC MARKERS:                                  13.1   10.47 )-----------( 227      ( 14 Oct 2023 00:40 )             39.9     10-14    140  |  104  |  19  ----------------------------<  149<H>  4.2   |  23  |  0.85    Ca    9.4      14 Oct 2023 00:40  Phos  4.0     10-  Mg     2.0     10-14    TPro  6.9  /  Alb  4.2  /  TBili  0.5  /  DBili  x   /  AST  62<H>  /  ALT  50<H>  /  AlkPhos  69  10-13    PT/INR - ( 14 Oct 2023 00:40 )   PT: 11.2 sec;   INR: 1.02 ratio         PTT - ( 14 Oct 2023 00:40 )  PTT:27.1 sec  proBNP:   Lipid Profile:   HgA1c:   TSH:       
CARDIOLOGY CONSULT - Dr. Zeng         HPI:  68 year old male with history of chronic back pain on hydromorphone 4mg TID presents s/p  12 foot  fall while on ladder.  LOC+, likely HS+ as he does not recall the event.  Patient hemodynamically stable on arrival to ED; no trauma activation. Trauma Surgery team consulted due to concern for Acute displaced right lateral 8-10th rib fractures. CT findings listed below. Neurosurgery consulted. pt is  known to our practice. he does not recall events leading to his fall .       PAST MEDICAL HISTORY:  Hyperlipidemia, Emphysema, Arthritis, Anemia, Dilated Aortic Root, COPD    PAST SURGICAL HISTORY:  Right total knee replacement, Left Shoulder Surgery, Hernia Repair    SOCIAL HISTORY:  Tobacco: former smoker, quit ; Alcohol: socially; Caffeine: 2-3 cups daily    FAMILY HISTORY:  Father: Myocardial Infarction, Hypertension, High cholesterol, Diabetes Mellitus; Brother: MI, PCI    ALLERGIES: No Known Allergies    CARDIAC TESTING:   outpt  ECHO:  : Grade I diastolic dysfunction. mild mitral regurgitation. The aortic root is mildly dilated.  The ascending aorta is mildly dilated.  Aortic Root diameter is (2D-mode) 4.02 cm. Ascending Aortic Diameter: 3.98 cm.    HOME  MEDICATIONS:   METOPROLOL SUCCINATE ER 25 MG..Si tablet, extended release daily for 90 Days Qty: 90  BREO ELLIPTA  OXYCODONE 15 MG...............Si tablet QD for 30 Days Qty: 30  IRON  SIMVASTATIN 40 MG.............Si tablet QD for 90 Days Qty: 90      MEDICATIONS  (STANDING):  acetaminophen   IVPB .. 1000 milliGRAM(s) IV Intermittent every 6 hours  chlorhexidine 2% Cloths 1 Application(s) Topical <User Schedule>  HYDROmorphone PCA (1 mG/mL) 30 milliLiter(s) PCA Continuous PCA Continuous  lidocaine   4% Patch 1 Patch Transdermal every 24 hours  methocarbamol 750 milliGRAM(s) Oral three times a day  metoprolol tartrate 25 milliGRAM(s) Oral every 12 hours  polyethylene glycol 3350 17 Gram(s) Oral daily  senna 2 Tablet(s) Oral at bedtime      REVIEW OF SYSTEMS:  CONSTITUTIONAL: No fever, weight loss, or fatigue  EYES: No eye pain, visual disturbances, or discharge  ENMT:  No difficulty hearing, tinnitus, vertigo; No sinus or throat pain  NECK: No pain or stiffness  RESPIRATORY: No cough, wheezing, chills or hemoptysis; No Shortness of Breath  CARDIOVASCULAR: No chest pain, palpitations, passing out, dizziness, or leg swelling  GASTROINTESTINAL: No abdominal or epigastric pain. No nausea, vomiting, or hematemesis; No diarrhea or constipation. No melena or hematochezia.  GENITOURINARY: No dysuria, frequency, hematuria, or incontinence  NEUROLOGICAL: No headaches, memory loss, loss of strength, numbness, or tremors  SKIN: No itching, burning, rashes, or lesions   	    [ x] All others negative	  [ ] Unable to obtain    PHYSICAL EXAM:  T(C): 36.8 (10-15-23 @ 07:00), Max: 37.1 (10-14-23 @ 23:00)  HR: 104 (10-15-23 @ 09:00) (97 - 109)  BP: 156/95 (10-15-23 @ 09:00) (143/97 - 187/105)  RR: 20 (10-15-23 @ 09:00) (9 - 50)  SpO2: 97% (10-15-23 @ 09:00) (91% - 100%)  Wt(kg): --  I&O's Summary    14 Oct 2023 07:01  -  15 Oct 2023 07:00  --------------------------------------------------------  IN: 650 mL / OUT: 885 mL / NET: -235 mL      Appearance: Normal	  Psychiatry: A & O x 3, Mood & affect appropriate  HEENT:   Normal oral mucosa, PERRL, EOMI	  Lymphatic: No lymphadenopathy  Cardiovascular: Normal S1 S2,RRR, No JVD, No murmurs  Respiratory: Lungs clear to auscultation	  Gastrointestinal:  Soft, Non-tender, + BS	  Skin: No rashes, No ecchymoses, No cyanosis	  Neurologic: Non-focal  Extremities: Normal range of motion, No clubbing, cyanosis or edema  Vascular: Peripheral pulses palpable 2+ bilaterally    TELEMETRY: 	sinus tachy    ECG:  	sinus tach 101 , 1st degree avb   RADIOLOGY:  < from: CT Angio Neck w/ IV Cont (10.13.23 @ 17:36) >    ACC: [id number] EXAM:  CT ANGIO BRAIN (W)AW IC   ORDERED BY: HEATH BISWAS     ACC: [id number] EXAM:  CT ANGIO NECK (W)AW IC   ORDERED BY: HEATH BISWAS     ACC: [id number] EXAM:  CT BRAIN   ORDERED BY: MAYELA NGUYỄN     ACC: [id number] EXAM:  CT CERVICAL SPINE   ORDERED BY: MAYELA NGUYỄN     PROCEDURE DATE:  10/13/2023          INTERPRETATION:  CLINICAL INDICATION: Evaluate for dissection. Trauma   code.    TECHNIQUE: CT of the head and cervical spine was performed with   multiplanar reformats, without IV contrast. CTA of the head and neck was   performed after the intravenous administration Omnipaque 350 nonionic IV   contrast. Contrast dose available in concurrent CT chest report. MIP   reconstructions were performed on a separate workstation and reviewed.    COMPARISON: None available.    FINDINGS:  CT HEAD:  No acute transcortical infarction or acute intracranial hemorrhage.  White matter hypoattenuating foci are noted, compatible with small vessel   changes.  No hydrocephalus. No extra-axial fluid collections.  The visualized intraorbital contents are normal. The imaged portions of   the paranasal sinuses are clear. The mastoid air cells are clear.  Right parietal scalp swelling. No fracture identified.    CT CERVICAL SPINE:  Acute fractures of the anterior and posterior C1 arches. Lateral   displacement of the bilateral lateral masses, measuring up to 5 mm on the   right and 2 mm on the left (see key image).    Acute fractures of the C2, C3, C4, and C5 spinous processes with   displacement most prominent at C5 level. There is anterior subluxation of   C5 on C6 with associated perching of the bilateral C5-C6 facet joints.   There is widening at the C5-C6 interspinous space, concerning for   ligamentous injury (see image #1).    There are fractures of the left C6 transverse process violating the   transverse foramen.    Subtle hyperdensity along the posterior spinal canal most prominent at   C2/C3 levels is suspicious for epidural hematoma (605-25).    CTA NECK:    The visualized portion of the aortic arch is unremarkable in appearance.   The origins of the great vessels and the vertebral arteries are normal   without evidence of stenosis.    The common carotid arteries are patent bilaterally without evidence of   stenosis. Atherosclerotic plaque at the bilateral carotid bifurcations,   without hemodynamically significant stenosis. There is no narrowing of   the cervical internal carotid arteries bilaterally.    Left C6 transverse process fracture withmild mass effect upon the left   vertebral artery without significant narrowing. Left vertebral artery is   not visualized at the left C1 level likely secondary to traumatic   occlusion/dissection. See below regarding intracranial left vertebral   artery.    Right vertebral artery is patent throughout its course.    CTA HEAD:    Atherosclerotic plaque of the bilateral intracranial ICAs, without   stenosis.    The proximal anterior, middle and posterior cerebral arteries are patent   bilaterally.    Flow-related enhancement in the left vertebral artery is reconstituted   once it pierces the dura, likely related to retrograde flow. The right   vertebral artery and basilar artery are patent.    No evidence of vascular stenosis, occlusion, aneurysm or vascular   malformation.    OTHER:  Partially visualized right pleural effusion.    IMPRESSION:  CT head:  -No acute intracranial findings.    CT cervical spine:  -C1 anterior and posterior arch fractures with lateral displacement of   the lateral masses.  -Spinous process fractures from C2 through C5.  -Anterior subluxation of C5 on C6 with bilateral perched facets and   likely posterior ligamentous injury.  -Left C6 transverse process/transverse foramen fractures.  -Hyperdensity within theposterior spinal canal at C2-C3 levels, likely   representing epidural hematoma.    Recommend MRI cervical spine for further evaluation.    CT angiogram head:  -No vaso-occlusive disease.    CT angiogram neck:  -Focal traumatic occlusion/dissection of the left vertebral artery at C1   level. Reconstitution of flow is noted in the left intracranial vertebral   artery, likely retrograde.    Findings discussed with Dr. Nguyễn of the ED at 6:00 PM 10/13/2023.      < end of copied text >    OTHER: 	  	  LABS:	 	    CARDIAC MARKERS:                                  11.5   9.64  )-----------( 207      ( 14 Oct 2023 22:43 )             34.5     10-    137  |  102  |  14  ----------------------------<  134<H>  4.3   |  26  |  0.79    Ca    9.7      14 Oct 2023 22:43  Phos  4.0     10-14  Mg     1.8     10-14    TPro  6.9  /  Alb  4.2  /  TBili  0.5  /  DBili  x   /  AST  62<H>  /  ALT  50<H>  /  AlkPhos  69  10-13    PT/INR - ( 14 Oct 2023 22:43 )   PT: 11.9 sec;   INR: 1.14 ratio         PTT - ( 14 Oct 2023 22:43 )  PTT:25.6 sec  proBNP:   Lipid Profile:   HgA1c:   TSH:       
Chief Complaint:  Patient is a 68y old  Male who presents with a chief complaint of 12 foot fall with ladder (18 Oct 2023 10:32)    HPI:  68 year old male with history of chronic back pain on hydromorphone 4mg TID presents s/p fall, 12 foot, while on ladder.  LOC+, likely HS+ as he does not recall the event.  Patient hemodynamically stable on arrival to ED; no trauma activation. Trauma Surgery team consulted due to concern for Acute displaced right lateral 8-10th rib fractures. CT findings listed below. Neurosurgery consulted.   CXR: Right lateral 8th to 10th rib mildly displaced rib fractures.   CT head: No acute intracranial findings.  Abd/Pelvis: Acute displaced right lateral 8-10th rib fractures. Trace right pneumothorax and pneumomediastinum. Small right pleural effusion.  CT angiogram neck: Focal traumatic occlusion/dissection of the left vertebral artery at C1 level. Reconstitution of flow is noted in the left intracranial vertebral artery, likely retrograde.  CT cervical spine:   -C1 anterior and posterior arch fractures with lateral displacement of the lateral masses.  -Spinous process fractures from C2 through C5.  -Anterior subluxation of C5 on C6 with bilateral perched facets and likely posterior ligamentous injury.  -Left C6 transverse process/transverse foramen fractures.  -Hyperdensity within the posterior spinal canal at C2-C3 levels, likely representing epidural hematoma.  Thoracic spine:  Age-indeterminate mild T4 compression fracture, stable benign-appearing lytic lesion T8 vertebral body.  Lumbar spine: No acute fracture or traumatic malalignment. Degenerative changes.    Pt admitted 5 days ago for above, now s/p posterior C1-C7 decompression fusion, evacuation of epidural hematoma, decompression of left C5 nerve root    Current out- patient pain regimen: Dilaudid 4 mg TID, Robaxin    Out Patient Pain Management provider: None, Rxs from PCP Dr. Wharton    [facility] Prescription Monitoring Program: Reference #[id number]    Opioid Risk Tool (ORT-OUD) Score: Moderate  Daily Marijuana use  Distant history Cocaine (last over 7 yrs ago)  (+)Tobacco (stopped 7 yrs ago)    Pain Score: 5/10    Pt seen lying in bed, mildly somnolent but easy to rouse. PCA Dilaudid being discontinued. Pt reports post-op neck pain, pain is controlled, including spasm.     REVIEW OF SYSTEMS:  CONSTITUTIONAL: No fever, weight loss, fatigue, (+)fall  NEURO: No headaches, memory loss, loss of strength, tremors, dizziness or blurred vision  RESP: No shortness of breath, cough  CV: No chest pain, palpitations  GI: No abdominal pain, nausea, vomiting, diarrhea, constipation, incontinence  : No urinary incontinence/retention (chronic hesitancy)  MSK: No upper or lower motor strength weakness  SKIN: No itching, burning, rashes, or lesions   PSYCHIATRIC: No depression, anxiety, mood swings, or difficulty sleeping      PHYSICAL EXAM  GENERAL: Seen at bedside, NAD, disheveled appearance, well-developed, appears stated age  NEURO: Somnolent, Oriented X3, Good to fair concentration; Follows commands   HEENT: speech clear and fluent  GI: Appetite fair;  last BM 6 days ago, states he has not been eating  : Voiding with hesitancy (chronic)  EXTREMITIES:  moving all extremities  MSK: Motor Strength 5/5 B/L upper and lower extremities  SKIN: No rashes or lesions  PSYCH: affect normal; fair eye contact    PAST MEDICAL & SURGICAL HISTORY:  High cholesterol      Insomnia      History of chronic back pain      History of arthroscopy of left shoulder  age 15 & right shoulder 3 yrs      H/O hernia repair  b/l inguinal & abdominal      History of arthroplasty of right knee  5yrs          FAMILY HISTORY:  Family history of early CAD (Sibling)        Allergies    Nuts (Unknown)  Originally Entered as [Unknown] reaction to [PUMPKIN SEEDS] (Unknown)  Vioxx (Unknown)    Intolerances        MEDICATIONS  (STANDING):  chlorhexidine 2% Cloths 1 Application(s) Topical <User Schedule>  enoxaparin Injectable 40 milliGRAM(s) SubCutaneous <User Schedule>  lactated ringers. 1000 milliLiter(s) (20 mL/Hr) IV Continuous <Continuous>  lidocaine   4% Patch 1 Patch Transdermal every 24 hours  methocarbamol 750 milliGRAM(s) Oral three times a day  metoprolol tartrate 25 milliGRAM(s) Oral two times a day  polyethylene glycol 3350 17 Gram(s) Oral daily  potassium phosphate / sodium phosphate Powder (PHOS-NaK) 1 Packet(s) Oral two times a day  senna 2 Tablet(s) Oral at bedtime  sodium chloride 2 Gram(s) Oral every 8 hours  sodium chloride 0.9%. 1000 milliLiter(s) (80 mL/Hr) IV Continuous <Continuous>    MEDICATIONS  (PRN):  acetaminophen     Tablet .. 650 milliGRAM(s) Oral every 6 hours PRN Temp greater or equal to 38C (100.4F), Mild Pain (1 - 3)  HYDROmorphone   Tablet 4 milliGRAM(s) Oral every 4 hours PRN Moderate Pain (4 - 6)  HYDROmorphone  Injectable 0.5 milliGRAM(s) IV Push every 3 hours PRN Breakthrough Pain      Vital Signs:  T(C): 36.8 (10-18-23 @ 09:00)  HR: 90 (10-18-23 @ 09:00)  BP: 144/90 (10-18-23 @ 08:00)  RR: 17 (10-18-23 @ 09:00)  SpO2: 98% (10-18-23 @ 09:00)    Pertinent labs/radiology:  Reviewed                          11.4   10.04 )-----------( 262      ( 18 Oct 2023 06:36 )             33.8       10-18    133<L>  |  98  |  18  ----------------------------<  130<H>  4.2   |  25  |  0.69    Ca    9.1      18 Oct 2023 06:36  Phos  2.3     10-18  Mg     2.1     10-18    
Surgery Consult Note  Attending: Eleni  Service:colorectal  [id number]      HPI: 68y Male with PMH HLD, arthritis, emphysema, COPD present as a trauma fall from ladder 6 days ago, had multiple spinal fractures and epidural hematoma along with ribs fractures. Admitted to NSGY for cervical fusion and evacuation of epidural hematoma. Patient has history of "prolapse" that he has been able to self reduce after bowel movements. Denies straining, constipation, or blood per rectum.      PAST MEDICAL HISTORY:  PAST MEDICAL & SURGICAL HISTORY:  High cholesterol      Insomnia      History of chronic back pain      History of arthroscopy of left shoulder  age 15 & right shoulder 3 yrs      H/O hernia repair  b/l inguinal & abdominal      History of arthroplasty of right knee  5yrs          ALLERGIES:  Allergies    Nuts (Unknown)  Originally Entered as [Unknown] reaction to [PUMPKIN SEEDS] (Unknown)  Vioxx (Unknown)    Intolerances        SOCIAL HISTORY: Negative for tobacco, etoh, or drug use    FAMILY HISTORY:  FAMILY HISTORY:  Family history of early CAD (Sibling)        PHYSICAL EXAM:  General: NAD, resting comfortably  HEENT: NC/AT, EOMI, normal hearing, no oral lesions, no LAD, neck supple  Pulmonary: normal resp effort, CTA-B  Cardiovascular: NSR, no murmurs  Abdominal: soft, ND/NT, no organomegaly, no guarding or rebound tenderness  Extremities: WWP, normal strength, no clubbing/cyanosis/edema  Neuro: A/O x 3, CNs II-XII grossly intact, normal sensation, no focal deficits    rectal Exam: 3 large prolapsed hemorrhoids, reducible      VITAL SIGNS:  Vital Signs Last 24 Hrs  T(C): 37.1 (19 Oct 2023 17:30), Max: 37.6 (19 Oct 2023 04:23)  T(F): 98.7 (19 Oct 2023 17:30), Max: 99.7 (19 Oct 2023 04:23)  HR: 99 (19 Oct 2023 17:30) (99 - 117)  BP: 138/91 (19 Oct 2023 17:30) (131/84 - 151/97)  BP(mean): --  RR: 18 (19 Oct 2023 17:30) (17 - 18)  SpO2: 92% (19 Oct 2023 17:30) (92% - 94%)    Parameters below as of 19 Oct 2023 11:01  Patient On (Oxygen Delivery Method): room air        I&O's Summary    18 Oct 2023 07:01  -  19 Oct 2023 07:00  --------------------------------------------------------  IN: 0 mL / OUT: 250 mL / NET: -250 mL        LABS:                        11.4   10.04 )-----------( 262      ( 18 Oct 2023 06:36 )             33.8     10-19    135  |  97  |  17  ----------------------------<  125<H>  4.2   |  28  |  0.69    Ca    8.9      19 Oct 2023 05:59  Phos  2.3     10-18  Mg     2.1     10-18      PT/INR - ( 18 Oct 2023 06:36 )   PT: 10.9 sec;   INR: 0.99 ratio           Urinalysis Basic - ( 19 Oct 2023 05:59 )    Color: x / Appearance: x / SG: x / pH: x  Gluc: 125 mg/dL / Ketone: x  / Bili: x / Urobili: x   Blood: x / Protein: x / Nitrite: x   Leuk Esterase: x / RBC: x / WBC x   Sq Epi: x / Non Sq Epi: x / Bacteria: x      CAPILLARY BLOOD GLUCOSE            CULTURES:      RADIOLOGY & ADDITIONAL STUDIES:                
HISTORY OF PRESENT ILLNESS:  SHARI VALLEJO is a 68 year old male with history of chronic back pain on hydromorphone 4mg TID presents s/p mechanical fall from 12 feet while on ladder. +Head strike, LOC. Imaging significant for multiple c-spine fractures and R 8-10 rib fracture. SICU consulted for frequent neurochecks.     PAST MEDICAL HISTORY: High cholesterol    Hypertension    Insomnia    History of chronic back pain        PAST SURGICAL HISTORY: History of arthroscopy of left shoulder    H/O hernia repair    History of arthroplasty of right knee        FAMILY HISTORY: No pertinent family history in first degree relatives    No pertinent family history in first degree relatives    Family history of early CAD (Sibling)        SOCIAL HISTORY:  · Substance use	No  · Social History (marital status, living situation, occupation, and sexual history)	Chronic opiate user for back pain    CODE STATUS: Full    HOME MEDICATIONS:  * Patient Currently Takes Medications as of 13-Oct-2023 20:32 documented in Structured Notes  · 	Metoprolol Succinate ER 25 mg oral tablet, extended release: Last Dose Taken:  , 1 tab(s) orally once a day   · 	HYDROmorphone 4 mg oral tablet: Last Dose Taken:  , orally 3 times a day, As Needed    ALLERGIES: Nuts (Unknown)  Originally Entered as [Unknown] reaction to [PUMPKIN SEEDS] (Unknown)  Vioxx (Unknown)      VITAL SIGNS:  ICU Vital Signs Last 24 Hrs  T(C): 36.1 (14 Oct 2023 05:46), Max: 37 (13 Oct 2023 16:39)  T(F): 97 (14 Oct 2023 03:00), Max: 98.6 (13 Oct 2023 16:39)  HR: 62 (14 Oct 2023 06:00) (62 - 111)  BP: 136/73 (14 Oct 2023 06:00) (131/74 - 217/89)  BP(mean): 98 (14 Oct 2023 06:00) (96 - 131)  ABP: --  ABP(mean): --  RR: 20 (14 Oct 2023 06:00) (10 - 35)  SpO2: 95% (14 Oct 2023 06:00) (94% - 97%)    O2 Parameters below as of 14 Oct 2023 05:46    O2 Flow (L/min): 1          NEURO  Exam: AOx3. NAD. Follows commands. Moves all extremities. Strength and sensation intact.   Meds:acetaminophen   IVPB .. 1000 milliGRAM(s) IV Intermittent every 6 hours  HYDROmorphone  Injectable 0.5 milliGRAM(s) IV Push every 3 hours PRN Breakthrough  HYDROmorphone  Injectable 0.5 milliGRAM(s) IV Push once  methocarbamol 1000 milliGRAM(s) Oral every 4 hours PRN Muscle Spasm  ondansetron Injectable 4 milliGRAM(s) IV Push once      RESPIRATORY  Mechanical Ventilation:     Exam: CTA b/l. No wheezing, rales, rhonchi appreciated.   Meds:    CARDIOVASCULAR  VBG - ( 13 Oct 2023 16:45 )  pH: 7.38  /  pCO2: 50    /  pO2: 30    / HCO3: 30    / Base Excess: 3.4   /  SaO2: 48.4   Lactate: 1.7              Exam: S1S2. No murmurs, rubs, gallops appreciated.   Cardiac Rhythm: NSR 61  Meds:    GI/NUTRITION  Exam: Soft, non distended, non-tender.  Diet: NPO except meds  Meds:polyethylene glycol 3350 17 Gram(s) Oral every 12 hours  senna 2 Tablet(s) Oral at bedtime      GENITOURINARY/RENAL  Meds:    10-13 @ 07:01  -  10-14 @ 07:00  --------------------------------------------------------  IN:    IV PiggyBack: 200 mL    Oral Fluid: 160 mL  Total IN: 360 mL    OUT:    Voided (mL): 625 mL  Total OUT: 625 mL    Total NET: -265 mL        Weight (kg): 67.9 (10-14 @ 05:46)  10-14    140  |  104  |  19  ----------------------------<  149<H>  4.2   |  23  |  0.85    Ca    9.4      14 Oct 2023 00:40  Phos  4.0     10-14  Mg     2.0     10-14    TPro  6.9  /  Alb  4.2  /  TBili  0.5  /  DBili  x   /  AST  62<H>  /  ALT  50<H>  /  AlkPhos  69  10-13    [ ] [place] catheter, indication: urine output monitoring in critically ill patient    HEMATOLOGIC  [ ] VTE Prophylaxis:                          13.1   10.47 )-----------( 227      ( 14 Oct 2023 00:40 )             39.9     PT/INR - ( 14 Oct 2023 00:40 )   PT: 11.2 sec;   INR: 1.02 ratio         PTT - ( 14 Oct 2023 00:40 )  PTT:27.1 sec  Transfusion: [ ] PRBC	[ ] Platelets	[ ] FFP	[ ] Cryoprecipitate      INFECTIOUS DISEASES  Meds:  RECENT CULTURES:      ENDOCRINE  Meds:  CAPILLARY BLOOD GLUCOSE          PATIENT CARE ACCESS DEVICES:  [ X ] Peripheral IV  [ ] Central Venous Line	[ ] R	[ ] L	[ ] IJ	[ ] Fem	[ ] SC	Placed:   [ ] Arterial Line		[ ] R	[ ] L	[ ] Fem	[ ] Rad	[ ] Ax	Placed:   [ ] PICC:					[ ] Mediport  [ ] Urinary Catheter, Date Placed:   [x] Necessity of urinary, arterial, and venous catheters discussed    OTHER MEDICATIONS: chlorhexidine 2% Cloths 1 Application(s) Topical <User Schedule>  lidocaine   4% Patch 1 Patch Transdermal every 24 hours      IMAGING STUDIES:

## 2023-10-19 NOTE — PROVIDER CONTACT NOTE (OTHER) - ACTION/TREATMENT ORDERED:
1mg Dilaudid IVP ordered. 25mg metoprolol BID ordered as per patients home order.
Pt educated on wearing cervical collar as per neuro surgical team. Pt stated c collar is uncomfortable. refuses for it to be repositioned for comfort.
okay for patient to go back to bed until surgery team comes to bedside. give PRN dilauded pain medications until team comes. will continue to monitor patient.

## 2023-10-19 NOTE — PROGRESS NOTE ADULT - ASSESSMENT
HPI:  68M with history of chronic back pain on opiates presents 10/13/23 s/p 12 foot fall from ladder with multiple traumatic injuries listed below. Patient otherwise stable. GCS 15.    INJURIES:   -C1 anterior and posterior arch fractures with lateral displacement of the lateral masses. Focal traumatic occlusion/dissection of the left vertebral artery at C1 level.  -C2-C3 hyperdensity within the posterior spinal canal, likely representing epidural hematoma.  -C2-C5 Spinous process fractures  -C5 on C6 Anterior subluxation with bilateral perched facets and likely posterior ligamentous injury.  -C6 Left transverse process/transverse foramen fractures.  -T4 compression fracture, mild  -T8 vertebral body lytic lesion, age-indeterminate  -Right lateral 8th to 10th rib mildly displaced rib fractures.   -Trace right pneumothorax and pneumomediastinum. Small right pleural effusion.      PROCEDURE: 10/14/23 s/p Posterior C1-C7 Decompression Fusion for unstable spine fx, evacuation of epidural hematoma  Decompression of L C5 Nerve Root.    PLAN:  1 MR c spine : done -stable on 10/17   2 hemovac removed on 10/17  3 pca weaned off   4 chronic pain saw   5 continue on robaxin   6 on 2l nc   7 incentive spirometry   8 tachy - intermittent, cardiology following   9 continue on metoprolol per cardio decreased dose, cardizem DCD 120mg daily added today- will monitor   10 regular diet upgraded today from minced/moist   11 cont bowel regimen-last bm 10/19   12 voiding   13 hyponatremia imrpoving - likely siadh - on free water restriction and salt tab 2g tid   14 afebrile   15 dvt ppx scds, chemical ppx with SQL; LEDs negative 10/18 as above  16 c collar at all times except when sleeping   17 PT/OT/PMR- acute rehab upon d/c once medically cleared, likely in next 24-48hrs    -will discuss with Dr. Dawkins   -[place] [id number]

## 2023-10-19 NOTE — CONSULT NOTE ADULT - REASON FOR ADMISSION
12 foot fall with ladder
from from 12 foot  ladder, LOC
12 foot fall with ladder
12 foot fall with ladder

## 2023-10-19 NOTE — PROVIDER CONTACT NOTE (OTHER) - ASSESSMENT
Pt is A&Ox4, able to make his needs known.
patient neurologically stable at this time. patient in bathroom and bearing down. RN with patient and asking patient to not apply pressure and not to push. patient able to have BM but stating that he has Hemorrhoids? or prolapsed rectum? patient stating to RN  "push it back in". RN called charge RN and Pa notified. patient wanted to push it back into place himself and was unsuccessful
Pt in pain 9/10 and hypertensive 181/101 (129).

## 2023-10-19 NOTE — SWALLOW BEDSIDE ASSESSMENT ADULT - PHARYNGEAL PHASE
Timely initiation of the pharyngeal swallow, adequate hyo-laryngeal elevation. Palpation limited by presence of c-collar. No overt s/s of laryngeal penetration/aspiration or changes in vocal quality across PO trials.

## 2023-10-19 NOTE — CONSULT NOTE ADULT - ASSESSMENT
68 year old male with history of prolapsed hemorrhoids presented as trauma activation for fall from ladder. Suffered multiple rib fractures right 8-10 and spinal fractures at multiple levels requiring NSGY intervention cervical fusion as well as epidural hematoma evacuation. Patient now with difficult to reduce hemorrhoids after bowel movement.     Plan:  - hemorrhoids reduced at bedside  - rec bowel regiment, abstain from straining during bowel movements  - Can follow up with Dr. Eduard Knowles outpatient    Discussed with Dr. Eleni Orozco Surgery [id number]

## 2023-10-19 NOTE — SWALLOW BEDSIDE ASSESSMENT ADULT - SLP GENERAL OBSERVATIONS
Patient received seated OOB in chair, on room air, A&Ox4, + c-collar. Patient reported pain upon arrival; RN made aware. Patient reluctant to participate in PO trials benefiting for max verbal encouragement.

## 2023-10-19 NOTE — PROVIDER CONTACT NOTE (OTHER) - REASON
Non compliance of Cervical collar
Hypertension
patient having BM in bathroom and stating " my insides are out and I need to push them back in"

## 2023-10-20 ENCOUNTER — TRANSCRIPTION ENCOUNTER (OUTPATIENT)
Age: 68
End: 2023-10-20

## 2023-10-20 ENCOUNTER — INPATIENT (INPATIENT)
Facility: HOSPITAL | Age: 68
LOS: 13 days | Discharge: NURSING, REHAB OR LONG-TERM CARE | DRG: 949 | End: 2023-11-03
Attending: PHYSICAL MEDICINE & REHABILITATION | Admitting: PHYSICAL MEDICINE & REHABILITATION
Payer: MEDICARE

## 2023-10-20 VITALS
TEMPERATURE: 98 F | DIASTOLIC BLOOD PRESSURE: 76 MMHG | OXYGEN SATURATION: 92 % | RESPIRATION RATE: 17 BRPM | HEART RATE: 68 BPM | SYSTOLIC BLOOD PRESSURE: 131 MMHG

## 2023-10-20 VITALS
HEART RATE: 66 BPM | SYSTOLIC BLOOD PRESSURE: 130 MMHG | HEIGHT: 72 IN | DIASTOLIC BLOOD PRESSURE: 74 MMHG | TEMPERATURE: 98 F | RESPIRATION RATE: 16 BRPM | WEIGHT: 147.71 LBS | OXYGEN SATURATION: 92 %

## 2023-10-20 DIAGNOSIS — Z96.651 PRESENCE OF RIGHT ARTIFICIAL KNEE JOINT: Chronic | ICD-10-CM

## 2023-10-20 DIAGNOSIS — Z98.1 ARTHRODESIS STATUS: ICD-10-CM

## 2023-10-20 DIAGNOSIS — Z98.890 OTHER SPECIFIED POSTPROCEDURAL STATES: Chronic | ICD-10-CM

## 2023-10-20 LAB
SURGICAL PATHOLOGY STUDY: SIGNIFICANT CHANGE UP
SURGICAL PATHOLOGY STUDY: SIGNIFICANT CHANGE UP

## 2023-10-20 PROCEDURE — 99222 1ST HOSP IP/OBS MODERATE 55: CPT | Mod: GC

## 2023-10-20 RX ORDER — DIAZEPAM 5 MG
5 TABLET ORAL ONCE
Refills: 0 | Status: DISCONTINUED | OUTPATIENT
Start: 2023-10-20 | End: 2023-10-20

## 2023-10-20 RX ORDER — METHOCARBAMOL 500 MG/1
750 TABLET, FILM COATED ORAL THREE TIMES A DAY
Refills: 0 | Status: DISCONTINUED | OUTPATIENT
Start: 2023-10-20 | End: 2023-11-03

## 2023-10-20 RX ORDER — HYDROMORPHONE HYDROCHLORIDE 2 MG/ML
0 INJECTION INTRAMUSCULAR; INTRAVENOUS; SUBCUTANEOUS
Qty: 0 | Refills: 0 | DISCHARGE

## 2023-10-20 RX ORDER — ACETAMINOPHEN 500 MG
2 TABLET ORAL
Qty: 0 | Refills: 0 | DISCHARGE
Start: 2023-10-20

## 2023-10-20 RX ORDER — SENNA PLUS 8.6 MG/1
2 TABLET ORAL
Qty: 0 | Refills: 0 | DISCHARGE
Start: 2023-10-20

## 2023-10-20 RX ORDER — POLYETHYLENE GLYCOL 3350 17 G/17G
17 POWDER, FOR SOLUTION ORAL
Refills: 0 | Status: DISCONTINUED | OUTPATIENT
Start: 2023-10-20 | End: 2023-11-03

## 2023-10-20 RX ORDER — TAMSULOSIN HYDROCHLORIDE 0.4 MG/1
1 CAPSULE ORAL
Qty: 0 | Refills: 0 | DISCHARGE
Start: 2023-10-20

## 2023-10-20 RX ORDER — TAMSULOSIN HYDROCHLORIDE 0.4 MG/1
0.4 CAPSULE ORAL AT BEDTIME
Refills: 0 | Status: DISCONTINUED | OUTPATIENT
Start: 2023-10-20 | End: 2023-11-03

## 2023-10-20 RX ORDER — ENOXAPARIN SODIUM 100 MG/ML
40 INJECTION SUBCUTANEOUS
Qty: 0 | Refills: 0 | DISCHARGE
Start: 2023-10-20

## 2023-10-20 RX ORDER — HYDROMORPHONE HYDROCHLORIDE 2 MG/ML
4 INJECTION INTRAMUSCULAR; INTRAVENOUS; SUBCUTANEOUS EVERY 4 HOURS
Refills: 0 | Status: DISCONTINUED | OUTPATIENT
Start: 2023-10-20 | End: 2023-10-26

## 2023-10-20 RX ORDER — LIDOCAINE 4 G/100G
1 CREAM TOPICAL EVERY 24 HOURS
Refills: 0 | Status: DISCONTINUED | OUTPATIENT
Start: 2023-10-20 | End: 2023-10-23

## 2023-10-20 RX ORDER — ACETAMINOPHEN 500 MG
650 TABLET ORAL EVERY 6 HOURS
Refills: 0 | Status: DISCONTINUED | OUTPATIENT
Start: 2023-10-20 | End: 2023-11-03

## 2023-10-20 RX ORDER — HYDROMORPHONE HYDROCHLORIDE 2 MG/ML
6 INJECTION INTRAMUSCULAR; INTRAVENOUS; SUBCUTANEOUS EVERY 6 HOURS
Refills: 0 | Status: DISCONTINUED | OUTPATIENT
Start: 2023-10-20 | End: 2023-10-26

## 2023-10-20 RX ORDER — SENNA PLUS 8.6 MG/1
2 TABLET ORAL AT BEDTIME
Refills: 0 | Status: DISCONTINUED | OUTPATIENT
Start: 2023-10-20 | End: 2023-11-03

## 2023-10-20 RX ORDER — METOPROLOL TARTRATE 50 MG
1 TABLET ORAL
Qty: 0 | Refills: 0 | DISCHARGE
Start: 2023-10-20

## 2023-10-20 RX ORDER — METOPROLOL TARTRATE 50 MG
25 TABLET ORAL
Refills: 0 | Status: DISCONTINUED | OUTPATIENT
Start: 2023-10-20 | End: 2023-11-03

## 2023-10-20 RX ORDER — METHOCARBAMOL 500 MG/1
1 TABLET, FILM COATED ORAL
Qty: 0 | Refills: 0 | DISCHARGE
Start: 2023-10-20

## 2023-10-20 RX ORDER — DILTIAZEM HCL 120 MG
1 CAPSULE, EXT RELEASE 24 HR ORAL
Qty: 0 | Refills: 0 | DISCHARGE
Start: 2023-10-20

## 2023-10-20 RX ORDER — SODIUM CHLORIDE 9 MG/ML
2 INJECTION INTRAMUSCULAR; INTRAVENOUS; SUBCUTANEOUS
Qty: 0 | Refills: 0 | DISCHARGE
Start: 2023-10-20

## 2023-10-20 RX ORDER — LIDOCAINE 4 G/100G
1 CREAM TOPICAL
Qty: 0 | Refills: 0 | DISCHARGE
Start: 2023-10-20

## 2023-10-20 RX ORDER — ENOXAPARIN SODIUM 100 MG/ML
40 INJECTION SUBCUTANEOUS EVERY 24 HOURS
Refills: 0 | Status: DISCONTINUED | OUTPATIENT
Start: 2023-10-20 | End: 2023-11-03

## 2023-10-20 RX ORDER — HYDROMORPHONE HYDROCHLORIDE 2 MG/ML
1 INJECTION INTRAMUSCULAR; INTRAVENOUS; SUBCUTANEOUS
Qty: 0 | Refills: 0 | DISCHARGE
Start: 2023-10-20

## 2023-10-20 RX ORDER — SODIUM CHLORIDE 9 MG/ML
2 INJECTION INTRAMUSCULAR; INTRAVENOUS; SUBCUTANEOUS EVERY 8 HOURS
Refills: 0 | Status: DISCONTINUED | OUTPATIENT
Start: 2023-10-20 | End: 2023-11-03

## 2023-10-20 RX ORDER — POLYETHYLENE GLYCOL 3350 17 G/17G
17 POWDER, FOR SOLUTION ORAL
Qty: 0 | Refills: 0 | DISCHARGE
Start: 2023-10-20

## 2023-10-20 RX ORDER — DILTIAZEM HCL 120 MG
120 CAPSULE, EXT RELEASE 24 HR ORAL DAILY
Refills: 0 | Status: DISCONTINUED | OUTPATIENT
Start: 2023-10-21 | End: 2023-11-03

## 2023-10-20 RX ORDER — HYDROMORPHONE HYDROCHLORIDE 2 MG/ML
3 INJECTION INTRAMUSCULAR; INTRAVENOUS; SUBCUTANEOUS
Qty: 0 | Refills: 0 | DISCHARGE
Start: 2023-10-20

## 2023-10-20 RX ORDER — ASPIRIN/CALCIUM CARB/MAGNESIUM 324 MG
81 TABLET ORAL DAILY
Refills: 0 | Status: DISCONTINUED | OUTPATIENT
Start: 2023-10-20 | End: 2023-11-03

## 2023-10-20 RX ADMIN — HYDROMORPHONE HYDROCHLORIDE 4 MILLIGRAM(S): 2 INJECTION INTRAMUSCULAR; INTRAVENOUS; SUBCUTANEOUS at 10:40

## 2023-10-20 RX ADMIN — CHLORHEXIDINE GLUCONATE 1 APPLICATION(S): 213 SOLUTION TOPICAL at 06:10

## 2023-10-20 RX ADMIN — METHOCARBAMOL 750 MILLIGRAM(S): 500 TABLET, FILM COATED ORAL at 22:23

## 2023-10-20 RX ADMIN — HYDROMORPHONE HYDROCHLORIDE 6 MILLIGRAM(S): 2 INJECTION INTRAMUSCULAR; INTRAVENOUS; SUBCUTANEOUS at 08:07

## 2023-10-20 RX ADMIN — HYDROMORPHONE HYDROCHLORIDE 4 MILLIGRAM(S): 2 INJECTION INTRAMUSCULAR; INTRAVENOUS; SUBCUTANEOUS at 06:22

## 2023-10-20 RX ADMIN — Medication 25 MILLIGRAM(S): at 18:24

## 2023-10-20 RX ADMIN — HYDROMORPHONE HYDROCHLORIDE 6 MILLIGRAM(S): 2 INJECTION INTRAMUSCULAR; INTRAVENOUS; SUBCUTANEOUS at 01:53

## 2023-10-20 RX ADMIN — Medication 5 MILLIGRAM(S): at 20:54

## 2023-10-20 RX ADMIN — SENNA PLUS 2 TABLET(S): 8.6 TABLET ORAL at 22:23

## 2023-10-20 RX ADMIN — HYDROMORPHONE HYDROCHLORIDE 4 MILLIGRAM(S): 2 INJECTION INTRAMUSCULAR; INTRAVENOUS; SUBCUTANEOUS at 05:43

## 2023-10-20 RX ADMIN — HYDROMORPHONE HYDROCHLORIDE 4 MILLIGRAM(S): 2 INJECTION INTRAMUSCULAR; INTRAVENOUS; SUBCUTANEOUS at 05:13

## 2023-10-20 RX ADMIN — LIDOCAINE 1 PATCH: 4 CREAM TOPICAL at 07:30

## 2023-10-20 RX ADMIN — METHOCARBAMOL 750 MILLIGRAM(S): 500 TABLET, FILM COATED ORAL at 05:12

## 2023-10-20 RX ADMIN — SODIUM CHLORIDE 2 GRAM(S): 9 INJECTION INTRAMUSCULAR; INTRAVENOUS; SUBCUTANEOUS at 05:12

## 2023-10-20 RX ADMIN — Medication 120 MILLIGRAM(S): at 05:12

## 2023-10-20 RX ADMIN — HYDROMORPHONE HYDROCHLORIDE 6 MILLIGRAM(S): 2 INJECTION INTRAMUSCULAR; INTRAVENOUS; SUBCUTANEOUS at 18:24

## 2023-10-20 RX ADMIN — HYDROMORPHONE HYDROCHLORIDE 6 MILLIGRAM(S): 2 INJECTION INTRAMUSCULAR; INTRAVENOUS; SUBCUTANEOUS at 08:40

## 2023-10-20 RX ADMIN — HYDROMORPHONE HYDROCHLORIDE 6 MILLIGRAM(S): 2 INJECTION INTRAMUSCULAR; INTRAVENOUS; SUBCUTANEOUS at 01:23

## 2023-10-20 RX ADMIN — SODIUM CHLORIDE 2 GRAM(S): 9 INJECTION INTRAMUSCULAR; INTRAVENOUS; SUBCUTANEOUS at 22:25

## 2023-10-20 RX ADMIN — ENOXAPARIN SODIUM 40 MILLIGRAM(S): 100 INJECTION SUBCUTANEOUS at 08:13

## 2023-10-20 RX ADMIN — Medication 25 MILLIGRAM(S): at 05:12

## 2023-10-20 RX ADMIN — TAMSULOSIN HYDROCHLORIDE 0.4 MILLIGRAM(S): 0.4 CAPSULE ORAL at 22:24

## 2023-10-20 NOTE — DISCHARGE NOTE PROVIDER - HOSPITAL COURSE
HPI:  68 year old male with history of chronic back pain on hydromorphone 4mg TID presents s/p 12 foot while on ladder.  LOC+, likely HS+ as he does not recall the event.  Patient hemodynamically stable on arrival to ED; no trauma activation. Trauma Surgery team consulted due to concern for Acute displaced right lateral 8-10th rib fractures. CT findings listed below. Neurosurgery consulted.   CXR: Right lateral 8th to 10th rib mildly displaced rib fractures.   CT head: No acute intracranial findings.  Abd/Pelvis:   Acute displaced right lateral 8-10th rib fractures. Trace right pneumothorax and pneumomediastinum. Small right pleural effusion.  CT angiogram neck:   Focal traumatic occlusion/dissection of the left vertebral artery at C1 level. Reconstitution of flow is noted in the left intracranial vertebral artery, likely retrograde.  CT cervical spine:  -C1 anterior and posterior arch fractures with lateral displacement of the lateral masses.  -Spinous process fractures from C2 through C5.  -Anterior subluxation of C5 on C6 with bilateral perched facets and likely posterior ligamentous injury.  -Left C6 transverse process/transverse foramen fractures.  -Hyperdensity within the posterior spinal canal at C2-C3 levels, likely representing epidural hematoma.  Thoracic spine:   Age-indeterminate mild T4 compression fracture, stable benign-appearing lytic lesion T8 vertebral body.  Lumbar spine:  No acute fracture or traumatic malalignment. Degenerative changes.    Patient admitted and underwent on 10/14/23 Posterior C1-C7 Decompression Fusion for unstable spine fx, evacuation of epidural hematoma; Decompression of L C5 Nerve Root.   He had routine post operative care in NSCU and then transferred to the floor, Surgical drain removed prior to discharge. Cardiology followed in consultation. Medications adjusted for tachycardia with. PT/OT/PMR recommended acute rehab. Seen by chronic pain and medications adjusted with good results. He is to wear cervical collar at all times except during sleep. 10/19/23 he had episode of prolapsed hemorrhoids reduced at bedside by surgery team. On the day of discharge he is medically and neurologically stable for discharge to rehab.

## 2023-10-20 NOTE — DISCHARGE NOTE PROVIDER - REASON FOR ADMISSION
12 foot fall with ladder; 10/14/23 Posterior C1-C7 Decompression Fusion for unstable spine fx, evacuation of epidural hematoma; Decompression of L C5 Nerve Root.    12 foot fall with ladder

## 2023-10-20 NOTE — H&P ADULT - ASSESSMENT
Patient is 67yo M with history of chronic back pain (on chronic Dilaudid) who presented to [facility] 10/13 following a fall from a 12 foot ladder resulting in head strike and LOC. He was found to have acute displaced right lateral 8-10 rib fractures, and focal trauma occlusion/dissection of the left vertebral artery at C1 level with C1 anterior and posterior arch fractures with lateral displacement. Also noted to have spinous process fractures C2-C5, C5/C6 anterior subluxation with likely PLL injury, Left C6 transverse process fracture, T4 compression fracture, and C2-C3 epidural hematoma. Patient was admitted and underwent Posterior C1-C7 Decompression/Fusion for unstable spine fracture and evacuation of epidural hematoma. Hospital course complicated by tachycardia and pain.   Admitted to [place] acute inpatient rehab on 10/20/23 for ADL, gait, and functional impairments.     # Unstable cervical spine fracture s/p C1-C7 Decompression/Fusion and evacuation of EDH  - Comprehensive Multidisciplinary Rehab Program: 3 hours a day, 5 days a week with PT/OT  - Seen by Pain Management; pain control with dilaudid as below  - Cervical collar at all times other than sleep  - Fall precautions    # Vertebral Artery Dissection  - ASA 81mg daily    #    #    # Mood/Cognition:    # Sleep:  - Melatonin qHS    # Pain Management:  - Tylenol PRN    # GI/Bowel:  - Senna QHS, Miralax daily PRN  - GI ppx:    # /Bladder:   - Bladder scan x1 on admission, SC PRN  - Encourage timed voids every 4 hours while awake       # Skin/Pressure Injury:  - Skin assessment on admission: ***  - Monitor Incisions: ***  - Turn every 2 hours while in bed    # Diet:   - Diet Consistency/Modifications: ***  - Aspiration Precautions  - SLP consult for swallow function evaluation and treatment    # DVT ppx:  - ***    # Restrictions/Precautions:  - Weight bearing status: ****  - ROM restrictions: ***  - Precautions: Fall, aspiration    ---------------  Outpatient Follow-up:      --------------    Goals: Safe discharge to home  Estimated Length of Stay: 10-14 days  Rehab Potential: Good  Medical Prognosis: Good  Estimated Disposition: Home with Home Care  ---------------    PRESCREEN COMPARISON:  I have reviewed the prescreen information and I have found no relevant changes between the preadmission screening and my post admission evaluation.    RATIONALE FOR INPATIENT ADMISSION: Patient demonstrates the following:  [X] Medically appropriate for rehabilitation admission  [X] Has attainable rehab goals with an appropriate initial discharge plan  [X]Has rehabilitation potential (expected to make a significant improvement within a reasonable period of time)  [X] Requires close medical management by a rehab physician, rehab nursing care, Hospitalist and comprehensive interdisciplinary team (including PT, OT and/or SLP, Prosthetics and Orthotics)  Patient is 69yo M with history of chronic back pain (on chronic Dilaudid) who presented to [facility] 10/13 following a fall from a 12 foot ladder resulting in head strike and LOC. He was found to have acute displaced right lateral 8-10 rib fractures, and focal trauma occlusion/dissection of the left vertebral artery at C1 level with C1 anterior and posterior arch fractures with lateral displacement. Also noted to have spinous process fractures C2-C5, C5/C6 anterior subluxation with likely PLL injury, Left C6 transverse process fracture, T4 compression fracture, and C2-C3 epidural hematoma. Patient was admitted and underwent Posterior C1-C7 Decompression/Fusion for unstable spine fracture and evacuation of epidural hematoma. Hospital course complicated by tachycardia and pain.   Admitted to [place] acute inpatient rehab on 10/20/23 for ADL, gait, and functional impairments.     # Unstable cervical spine fracture s/p C1-C7 Decompression/Fusion and evacuation of EDH  - Comprehensive Multidisciplinary Rehab Program: 3 hours a day, 5 days a week with PT/OT  - Seen by Pain Management; pain control with dilaudid as below  - Cervical collar at all times other than sleep  - Fall precautions  - Plan for removal of staples on POD # 14- 10/28/23     # Vertebral Artery Dissection  - ASA 81mg daily - designated to start 10/20 - confirm no allergy    #Tachycardia/PAT  - Ectopic atrial rhythm/PAT noted on tele 10/19  - Cont metoprolol   - Continue cardizem 120mg cd  - EP eval was deferred as pt not likely a candidate for ablation at this time  - Follow up with Dr Zeng as outpatient    #Aortic Root Dilatation  - Stable on recent outpt echo from 9/5/23  - Follow up for repeat echo q 12 months    #Hyponatremia  - Monitor BMP  - NaCl tabs 2g q8 hours  - Hospitalist consult appreciated    # Sleep:  - Melatonin qHS    # Pain Management:  - Recent Pain Medicine recs noted  - Tylenol ATC  - Lidocaine patch to ribs  - Robaxin 750mg TID  - Dilaudid 4mg/6mg q4 hours/q6 hours PRN Mod/Severe per Pain Medicine recs    # GI/Bowel:  - Senna QHS, Miralax daily BID  - Dulcolax suppository PRN    # /Bladder:  - Flomax 0.4mg QHS  - Bladder scan x1 on admission, SC PRN  - Encourage timed voids every 4 hours while awake     # Skin/Pressure Injury:  - Skin assessment on admission: ***  - Monitor Incisions: ***cervical***  - Turn every 2 hours while in bed    # Diet:   - Diet Consistency/Modifications: Reg/thin    # DVT ppx:  - Lovenox    # Restrictions/Precautions:  - ROM restrictions: Cervical collar at all times except sleep  - Precautions: Fall, aspiration    ---------------  Outpatient Follow-up:    Spike Dawkins  Neurosurgery  [address]  [place] [address]  Phone: [phone]  Fax: [phone]  Follow Up Time:     Jaylon Zeng  Cardiovascular Disease  [address]  [place] [address]  Phone: [phone]  Fax: [phone]  Follow Up Time:     Eduard Knowles  Colon/Rectal Surgery  [address]  [place] [address]  Phone: [phone]  Fax: [phone]  Follow Up Time:  --------------    Goals: Safe discharge to home  Estimated Length of Stay: 10-14 days  Rehab Potential: Good  Medical Prognosis: Good  Estimated Disposition: Home with Home Care  ---------------    PRESCREEN COMPARISON:  I have reviewed the prescreen information and I have found no relevant changes between the preadmission screening and my post admission evaluation.    RATIONALE FOR INPATIENT ADMISSION: Patient demonstrates the following:  [X] Medically appropriate for rehabilitation admission  [X] Has attainable rehab goals with an appropriate initial discharge plan  [X]Has rehabilitation potential (expected to make a significant improvement within a reasonable period of time)  [X] Requires close medical management by a rehab physician, rehab nursing care, Hospitalist and comprehensive interdisciplinary team (including PT, OT and/or SLP, Prosthetics and Orthotics)  Patient is 69yo M with history of chronic back pain (on chronic Dilaudid) who presented to [facility] 10/13 following a fall from a 12 foot ladder resulting in head strike and LOC. He was found to have acute displaced right lateral 8-10 rib fractures, and focal trauma occlusion/dissection of the left vertebral artery at C1 level with C1 anterior and posterior arch fractures with lateral displacement. Also noted to have spinous process fractures C2-C5, C5/C6 anterior subluxation with likely PLL injury, Left C6 transverse process fracture, T4 compression fracture, and C2-C3 epidural hematoma. Patient was admitted and underwent Posterior C1-C7 Decompression/Fusion for unstable spine fracture and evacuation of epidural hematoma. Hospital course complicated by tachycardia and pain.   Admitted to [place] acute inpatient rehab on 10/20/23 for ADL, gait, and functional impairments.     # Unstable cervical spine fracture s/p C1-C7 Decompression/Fusion and evacuation of EDH  - Comprehensive Multidisciplinary Rehab Program: 3 hours a day, 5 days a week with PT/OT  - Seen by Pain Management; pain control with dilaudid as below  - Cervical collar at all times other than sleep  - Fall precautions  - Plan for removal of staples on POD # 14- 10/28/23     # Vertebral Artery Dissection  - ASA 81mg daily - designated to start 10/20 - confirm no allergy - needs to be ordered    #Tachycardia/PAT  - Ectopic atrial rhythm/PAT noted on tele 10/19  - Cont metoprolol   - Continue cardizem 120mg cd  - EP eval was deferred as pt not likely a candidate for ablation at this time  - Follow up with Dr Zeng as outpatient    #Aortic Root Dilatation  - Stable on recent outpt echo from 9/5/23  - Follow up for repeat echo q 12 months    #Hyponatremia  - Monitor BMP  - NaCl tabs 2g q8 hours  - Hospitalist consult appreciated    # Sleep:  - Melatonin qHS    # Pain Management:  - Recent Pain Medicine recs noted  - Tylenol ATC  - Lidocaine patch to ribs  - Robaxin 750mg TID  - Dilaudid 4mg/6mg q4 hours/q6 hours PRN Mod/Severe per Pain Medicine recs    # GI/Bowel:  - Senna QHS, Miralax daily BID  - Dulcolax suppository PRN    # /Bladder:  - Flomax 0.4mg QHS  - Bladder scan x1 on admission, SC PRN  - Encourage timed voids every 4 hours while awake     # Skin/Pressure Injury:  - Skin assessment on admission: ***  - Monitor Incisions: ***cervical***  - Turn every 2 hours while in bed    # Diet:   - Diet Consistency/Modifications: Reg/thin    # DVT ppx:  - Lovenox    # Restrictions/Precautions:  - ROM restrictions: Cervical collar at all times except sleep  - Precautions: Fall, aspiration    ---------------  Outpatient Follow-up:    Spike Dawkins  Neurosurgery  [address]  [place] [address]  Phone: [phone]  Fax: [phone]  Follow Up Time:     Jaylon Zeng  Cardiovascular Disease  [address], [address]  [place] [address]  Phone: [phone]  Fax: [phone]  Follow Up Time:     Eduard Knowles  Colon/Rectal Surgery  [address], [address]  [place] [address]  Phone: [phone]  Fax: [phone]  Follow Up Time:  --------------    Goals: Safe discharge to home  Estimated Length of Stay: 10-14 days  Rehab Potential: Good  Medical Prognosis: Good  Estimated Disposition: Home with Home Care  ---------------    PRESCREEN COMPARISON:  I have reviewed the prescreen information and I have found no relevant changes between the preadmission screening and my post admission evaluation.    RATIONALE FOR INPATIENT ADMISSION: Patient demonstrates the following:  [X] Medically appropriate for rehabilitation admission  [X] Has attainable rehab goals with an appropriate initial discharge plan  [X]Has rehabilitation potential (expected to make a significant improvement within a reasonable period of time)  [X] Requires close medical management by a rehab physician, rehab nursing care, Hospitalist and comprehensive interdisciplinary team (including PT, OT and/or SLP, Prosthetics and Orthotics)  Patient is 67yo M with history of chronic back pain (on chronic Dilaudid) who presented to [facility] 10/13 following a fall from a 12 foot ladder resulting in head strike and LOC. He was found to have acute displaced right lateral 8-10 rib fractures, and focal trauma occlusion/dissection of the left vertebral artery at C1 level with C1 anterior and posterior arch fractures with lateral displacement. Also noted to have spinous process fractures C2-C5, C5/C6 anterior subluxation with likely PLL injury, Left C6 transverse process fracture, T4 compression fracture, and C2-C3 epidural hematoma. Patient was admitted and underwent Posterior C1-C7 Decompression/Fusion for unstable spine fracture and evacuation of epidural hematoma. Hospital course complicated by tachycardia and pain.   Admitted to [place] acute inpatient rehab on 10/20/23 for ADL, gait, and functional impairments.     # S/P Fall with TBI, Unstable cervical spine fracture s/p C1-C7 Decompression/Fusion and evacuation of EDH with cord compression   - Comprehensive Multidisciplinary Rehab Program: 3 hours a day, 5 days a week with PT/OT  - Seen by Pain Management; pain control with dilaudid as below  - Cervical collar at all times other than sleep  - Fall precautions  - Plan for removal of staples on POD # 14- 10/28/23     # Vertebral Artery Dissection  - ASA 81mg daily - designated to start 10/20 - confirm no allergy - needs to be ordered    # COPD  - Not on home meds  - 2 L oxygen supplement PRN  - Incentive spirometry     #Tachycardia/PAT  - Ectopic atrial rhythm/PAT noted on tele 10/19  - Cont metoprolol   - Continue cardizem 120mg cd  - EP eval was deferred as pt not likely a candidate for ablation at this time  - Follow up with Dr Zeng as outpatient    #Aortic Root Dilatation  - Stable on recent outpt echo from 9/5/23  - Follow up for repeat echo q 12 months    #Hyponatremia  - Monitor BMP  - NaCl tabs 2g q8 hours  - Hospitalist consult appreciated    # Sleep:  - Melatonin qHS    # Pain Management:  - Recent Pain Medicine recs noted  - Tylenol ATC  - Lidocaine patch to ribs  - Robaxin 750mg TID  - Dilaudid 4mg/6mg q4 hours/q6 hours PRN Mod/Severe per Pain Medicine recs    # GI/Bowel:  - Senna QHS, Miralax daily BID  - Dulcolax suppository PRN    # /Bladder:  - Flomax 0.4mg QHS  - Bladder scan x1 on admission, SC PRN  - Encourage timed voids every 4 hours while awake     # Skin/Pressure Injury:  - Skin assessment on admission:  Intact  - Monitor Incisions: Posterior cervical incision well approximated, (+) mild erythema, stables are in place   - Turn every 2 hours while in bed    # Diet:   - Diet Consistency/Modifications: Reg/thin    # DVT ppx:  - Lovenox    # Restrictions/Precautions:  - ROM restrictions: Cervical collar at all times except sleep  - Precautions: Fall, aspiration    ---------------  Outpatient Follow-up:    Spike Dawkins  Neurosurgery  [address]  [place] [address]  Phone: [phone]  Fax: [phone]  Follow Up Time:     Jaylon Zeng  Cardiovascular Disease  [address]  [place] [address]  Phone: [phone]  Fax: [phone]  Follow Up Time:     Eduard Knowles  Colon/Rectal Surgery  [address]  [place] [address]  Phone: [phone]  Fax: [phone]  Follow Up Time:  --------------    Goals: Safe discharge to home  Estimated Length of Stay: 10-14 days  Rehab Potential: Good  Medical Prognosis: Good  Estimated Disposition: Home with Home Care  ---------------    PRESCREEN COMPARISON:  I have reviewed the prescreen information and I have found no relevant changes between the preadmission screening and my post admission evaluation.    RATIONALE FOR INPATIENT ADMISSION: Patient demonstrates the following:  [X] Medically appropriate for rehabilitation admission  [X] Has attainable rehab goals with an appropriate initial discharge plan  [X]Has rehabilitation potential (expected to make a significant improvement within a reasonable period of time)  [X] Requires close medical management by a rehab physician, rehab nursing care, Hospitalist and comprehensive interdisciplinary team (including PT, OT and/or SLP, Prosthetics and Orthotics)  Patient is 69yo M with history of chronic back pain (on chronic Dilaudid) who presented to [facility] 10/13 following a fall from a 12 foot ladder resulting in head strike and LOC. He was found to have acute displaced right lateral 8-10 rib fractures, and focal trauma occlusion/dissection of the left vertebral artery at C1 level with C1 anterior and posterior arch fractures with lateral displacement. Also noted to have spinous process fractures C2-C5, C5/C6 anterior subluxation with likely PLL injury, Left C6 transverse process fracture, T4 compression fracture, and C2-C3 epidural hematoma. Patient was admitted and underwent Posterior C1-C7 Decompression/Fusion for unstable spine fracture and evacuation of epidural hematoma. Hospital course complicated by tachycardia and pain.   Admitted to [place] acute inpatient rehab on 10/20/23 for ADL, gait, and functional impairments.     # S/P Fall with TBI, Unstable cervical spine fracture s/p C1-C7 Decompression/Fusion and evacuation of EDH with cord compression   - Comprehensive Multidisciplinary Rehab Program: 3 hours a day, 5 days a week with PT/OT  - Seen by Pain Management; pain control with dilaudid as below  - Cervical collar at all times other than sleep  - Fall precautions  - Plan for removal of staples on POD # 14- 10/28/23     # Vertebral Artery Dissection  - ASA 81mg daily - no allergy per patient, has tolerated in the past    # COPD  - Not on home meds  - 2 L oxygen supplement PRN  - Incentive spirometry     #Tachycardia/PAT  - Ectopic atrial rhythm/PAT noted on tele 10/19  - Cont metoprolol   - Continue cardizem 120mg cd  - EP eval was deferred as pt not likely a candidate for ablation at this time  - Follow up with Dr Zeng as outpatient    #Aortic Root Dilatation  - Stable on recent outpt echo from 9/5/23  - Follow up for repeat echo q 12 months    #Hyponatremia  - Monitor BMP  - NaCl tabs 2g q8 hours  - Hospitalist consult appreciated    # Sleep:  - Melatonin qHS    # Pain Management:  - Recent Pain Medicine recs noted  - Tylenol ATC  - Lidocaine patch to ribs  - Robaxin 750mg TID  - Dilaudid 4mg/6mg q4 hours/q6 hours PRN Mod/Severe per Pain Medicine recs    # GI/Bowel:  - Senna QHS, Miralax daily BID  - Dulcolax suppository PRN    # /Bladder:  - Flomax 0.4mg QHS  - Bladder scan x1 on admission, SC PRN  - Encourage timed voids every 4 hours while awake     # Skin/Pressure Injury:  - Skin assessment on admission:  Intact  - Monitor Incisions: Posterior cervical incision well approximated, (+) mild erythema, stables are in place   - Turn every 2 hours while in bed    # Diet:   - Diet Consistency/Modifications: Reg/thin    # DVT ppx:  - Lovenox    # Restrictions/Precautions:  - ROM restrictions: Cervical collar at all times except sleep  - Precautions: Fall, aspiration    ---------------  Outpatient Follow-up:    Spike Dawkins  Neurosurgery  [address]  [place] [address]  Phone: [phone]  Fax: [phone]  Follow Up Time:     Jaylon Zeng  Cardiovascular Disease  [address], [address]  [place] [address]  Phone: [phone]  Fax: [phone]  Follow Up Time:     Eduard Knowles  Colon/Rectal Surgery  [address]  [place] [address]  Phone: [phone]  Fax: [phone]  Follow Up Time:  --------------    Goals: Safe discharge to home  Estimated Length of Stay: 10-14 days  Rehab Potential: Good  Medical Prognosis: Good  Estimated Disposition: Home with Home Care  ---------------    PRESCREEN COMPARISON:  I have reviewed the prescreen information and I have found no relevant changes between the preadmission screening and my post admission evaluation.    RATIONALE FOR INPATIENT ADMISSION: Patient demonstrates the following:  [X] Medically appropriate for rehabilitation admission  [X] Has attainable rehab goals with an appropriate initial discharge plan  [X]Has rehabilitation potential (expected to make a significant improvement within a reasonable period of time)  [X] Requires close medical management by a rehab physician, rehab nursing care, Hospitalist and comprehensive interdisciplinary team (including PT, OT and/or SLP, Prosthetics and Orthotics)

## 2023-10-20 NOTE — H&P ADULT - HISTORY OF PRESENT ILLNESS
Patient is 67yo M with history of chronic back pain (on chronic Dilaudid) who presented to [facility] 10/13 following a fall from a 12 foot ladder resulting in head strike and LOC. He was found to have acute displaced right lateral 8-10 rib fractures, and focal trauma occlusion/dissection of the left vertebral artery at C1 level with C1 anterior and posterior arch fractures with lateral displacement. Also noted to have spinous process fractures C2-C5, C5/C6 anterior subluxation with likely PLL injury, Left C6 transverse process fracture, T4 compression fracture, and C2-C3 epidural hematoma. Patient was admitted and underwent Posterior C1-C7 Decompression/Fusion for unstable spine fracture and evacuation of epidural hematoma, decompression of left C5 nerve root. He had routine post operative care in NSCU and then transferred to the floor, Surgical drain removed prior to discharge. Cardiology was consulted and medications were adjusted for tachycardia. Patient was seen by chronic pain and medications were adjusted with good results. He is to wear cervical collar at all times except during sleep. 10/19/23 he had episode of prolapsed hemorrhoids reduced at bedside by surgery team. Patient was evaluated by PM&R and therapy for functional deficits and gait/ ADL impairments and recommended acute rehabilitation. Patient was medically optimized for discharge to [facility] on 10/20.

## 2023-10-20 NOTE — DISCHARGE NOTE PROVIDER - NSDCFUADDAPPT_GEN_ALL_CORE_FT
Please remove staples on POD # 14- 10/28/23   Please wear hard cervical collar at all times except for sleeping in bed.     Please follow up with the following physicians upon discharge from rehab:  1- Dr Perez  2- PCP  3- Dr Zeng cardiology  4- Dr Knowles - colorectal surgery- maintain good bowel regimen Please remove staples on POD # 14- 10/28/23   Please wear hard cervical collar at all times except for sleeping in bed.     Please follow up with the following physicians upon discharge from rehab:  1- Dr Perez  2- PCP  3- Dr Zeng cardiology  4- Dr Knowles - colorectal surgery- maintain good bowel regimen    Aortic Root Dilatation  -stable on recent outpt echo from 9/5/23:  The ascending aorta is mildly dilated.  Aortic Root diameter is (2D-mode) 4.02 cm. Ascending Aortic Diameter: 3.98 cm.  -echo q 12 months    # Atrial arrhythmia  -ectopic atrial rhythm/PAT noted on tele 10/19  -bb as above   -Continue cardizem 120mg cd  -Will defer ep eval as pt not likely a candidate for ablation at this time

## 2023-10-20 NOTE — PROGRESS NOTE ADULT - ASSESSMENT
HPI:  68M with history of chronic back pain on opiates presents 10/13/23 s/p 12 foot fall from ladder with multiple traumatic injuries listed below. Patient otherwise stable. GCS 15.    INJURIES:   -C1 anterior and posterior arch fractures with lateral displacement of the lateral masses. Focal traumatic occlusion/dissection of the left vertebral artery at C1 level.  -C2-C3 hyperdensity within the posterior spinal canal, likely representing epidural hematoma.  -C2-C5 Spinous process fractures  -C5 on C6 Anterior subluxation with bilateral perched facets and likely posterior ligamentous injury.  -C6 Left transverse process/transverse foramen fractures.  -T4 compression fracture, mild  -T8 vertebral body lytic lesion, age-indeterminate  -Right lateral 8th to 10th rib mildly displaced rib fractures.   -Trace right pneumothorax and pneumomediastinum. Small right pleural effusion.      PROCEDURE: 10/14/23 s/p Posterior C1-C7 Decompression Fusion for unstable spine fx, evacuation of epidural hematoma  Decompression of L C5 Nerve Root.    PLAN:  1 MR c spine : done -stable on 10/17   2 hemovac removed on 10/17  3 pca weaned off   4 chronic pain saw   5 continue on robaxin   6 on 2l nc   7 incentive spirometry   8 tachy - intermittent, cardiology following- improved with cardizem  9 continue on metoprolol per cardio decreased dose, cardizem DCD 120mg daily added today- will monitor   10 regular diet upgraded today from minced/moist   11 cont bowel regimen-last bm 10/19   12 voiding   13 hyponatremia improving - likely siadh - on free water restriction and salt tab 2g tid   14 afebrile   15 dvt ppx scds, chemical ppx with SQL; LEDs negative 10/18 as above  16 c collar at all times except when sleeping   17 PT/OT/PMR- acute rehab upon d/c once medically cleared, likely in next 24-48hrs  18 starting aspirin for vertebral dissection per Dr Perez  d/c to acute rehab today, collar in place at all times except when sleeping    -will discuss with Dr. Dawkins   -[employer] [id number]

## 2023-10-20 NOTE — DISCHARGE NOTE PROVIDER - CARE PROVIDERS DIRECT ADDRESSES
,DirectAddress_Unknown,DirectAddress_Unknown,[email or web address]

## 2023-10-20 NOTE — PROGRESS NOTE ADULT - ASSESSMENT
outpt  ECHO: 09/05/202 : Grade I diastolic dysfunction. mild mitral regurgitation. The aortic root is mildly dilated.  The ascending aorta is mildly dilated.  Aortic Root diameter is (2D-mode) 4.02 cm. Ascending Aortic Diameter: 3.98 cm.    a/p  68 year old male with hx of  Hyperlipidemia, Emphysema, Arthritis, Anemia, Dilated Aortic Root, COPD  presenting sp Fall s/p  12 foot  fall while on ladder    #sp fall   -pt unclear of events leading to fall   -pt is now POSTOP for Posterior C1-C6 decompression fusion, evacuation of epidural hematoma  -S/p SICU course   -post op - tele sinus tach noted - elevated BP noted  -ecg w no ischemic changes   -no chf on exam   -No further PAT on tele  -Continue lopressor 25mg BID for now - **of note pt has complain of INCREASE FATIGUE with higher BB dosing-- was placed on toprol 25 mg PO daily as outpt**  -Continue cardizem 120mg cd     # hx  Aortic Root Dilatation  -stable on recent outpt echo from 9/5/23:  The ascending aorta is mildly dilated.  Aortic Root diameter is (2D-mode) 4.02 cm. Ascending Aortic Diameter: 3.98 cm.  -echo q 12 months    # Atrial arrhythmia  -ectopic atrial rhythm/PAT noted on tele 10/19  -bb as above   -Continue cardizem 120mg cd  -Will defer ep eval as pt not likely a candidate for ablation at this time        dvt ppx   no cv ci to d/c

## 2023-10-20 NOTE — H&P ADULT - REASON FOR ADMISSION
Multiple fractures, unstable cervical spine fracture s/p C1-C7 Decompression/Fusion S/P Fall with TBI and Multiple fractures, unstable cervical spine fracture s/p C1-C7 Decompression/Fusion

## 2023-10-20 NOTE — DISCHARGE NOTE NURSING/CASE MANAGEMENT/SOCIAL WORK - PATIENT PORTAL LINK FT
You can access the FollowMyHealth Patient Portal offered by [facility] by registering at the following website: [email or web address]. By joining [facility]’s FollowMyHealth portal, you will also be able to view your health information using other applications (apps) compatible with our system.

## 2023-10-20 NOTE — PROGRESS NOTE ADULT - SUBJECTIVE AND OBJECTIVE BOX
CARDIOLOGY FOLLOW UP - Dr. Zeng  DATE OF SERVICE: 10/18/23    CC  No CV complaints    REVIEW OF SYSTEMS:  CONSTITUTIONAL: No fever, weight loss, or fatigue  RESPIRATORY: No cough, wheezing, chills or hemoptysis; No Shortness of Breath  CARDIOVASCULAR: No chest pain, palpitations, passing out, dizziness, or leg swelling  GASTROINTESTINAL: No abdominal or epigastric pain. No nausea, vomiting, or hematemesis; No diarrhea or constipation. No melena or hematochezia.  VASCULAR: No edema     PHYSICAL EXAM:  T(C): 36.8 (10-18-23 @ 09:00), Max: 36.9 (10-17-23 @ 21:58)  HR: 90 (10-18-23 @ 09:00) (68 - 112)  BP: 144/90 (10-18-23 @ 08:00) (129/90 - 145/93)  RR: 17 (10-18-23 @ 09:00) (17 - 18)  SpO2: 98% (10-18-23 @ 09:00) (95% - 98%)  Wt(kg): --  I&O's Summary    17 Oct 2023 07:01  -  18 Oct 2023 07:00  --------------------------------------------------------  IN: 650 mL / OUT: 725 mL / NET: -75 mL    18 Oct 2023 07:01  -  18 Oct 2023 09:33  --------------------------------------------------------  IN: 0 mL / OUT: 100 mL / NET: -100 mL        Appearance: Normal	  Cardiovascular: Normal S1 S2,Tachycardia, No JVD, No murmurs  Respiratory: Lungs clear to auscultation b/l  Gastrointestinal:  Soft, Non-tender, + BS	  Extremities: Normal range of motion, No clubbing, cyanosis or edema      Home Medications:  HYDROmorphone 4 mg oral tablet: orally 3 times a day, As Needed (13 Oct 2023 20:32)      MEDICATIONS  (STANDING):  chlorhexidine 2% Cloths 1 Application(s) Topical <User Schedule>  enoxaparin Injectable 40 milliGRAM(s) SubCutaneous <User Schedule>  HYDROmorphone PCA (1 mG/mL) 30 milliLiter(s) PCA Continuous PCA Continuous  lactated ringers. 1000 milliLiter(s) (20 mL/Hr) IV Continuous <Continuous>  lidocaine   4% Patch 1 Patch Transdermal every 24 hours  methocarbamol 750 milliGRAM(s) Oral three times a day  metoprolol tartrate 25 milliGRAM(s) Oral two times a day  polyethylene glycol 3350 17 Gram(s) Oral daily  potassium phosphate / sodium phosphate Powder (PHOS-NaK) 1 Packet(s) Oral two times a day  senna 2 Tablet(s) Oral at bedtime  sodium chloride 2 Gram(s) Oral every 8 hours  sodium chloride 0.9%. 1000 milliLiter(s) (80 mL/Hr) IV Continuous <Continuous>      TELEMETRY: -110	    ECG:  	  RADIOLOGY:   DIAGNOSTIC TESTING:  [ ] Echocardiogram:  [ ]  Catheterization:  [ ] Stress Test:    OTHER: 	    LABS:	 	                            11.4   10.04 )-----------( 262      ( 18 Oct 2023 06:36 )             33.8     10-18    133<L>  |  98  |  18  ----------------------------<  130<H>  4.2   |  25  |  0.69    Ca    9.1      18 Oct 2023 06:36  Phos  2.3     10-18  Mg     2.1     10-18      PT/INR - ( 18 Oct 2023 06:36 )   PT: 10.9 sec;   INR: 0.99 ratio                 
Day 3\2 of Anesthesia Pain Management Service    SUBJECTIVE: I'm doing ok    Pain Scale Score:	[X] Refer to charted pain scores    THERAPY:    [ ] IV PCA Morphine		[ ] 5 mg/mL	[ ] 1 mg/mL  [X] IV PCA Hydromorphone	[ ] 5 mg/mL	[X] 1 mg/mL  [ ] IV PCA Fentanyl		[ ] 50 micrograms/mL    Demand dose: 0.2 mg     Lockout: 6 minutes   Continuous Rate: 0 mg/hr  4 Hour Limit: 4 mg    MEDICATIONS  (STANDING):  chlorhexidine 2% Cloths 1 Application(s) Topical <User Schedule>  HYDROmorphone PCA (1 mG/mL) 30 milliLiter(s) PCA Continuous PCA Continuous  lactated ringers. 1000 milliLiter(s) (20 mL/Hr) IV Continuous <Continuous>  lidocaine   4% Patch 1 Patch Transdermal every 24 hours  methocarbamol 750 milliGRAM(s) Oral three times a day  metoprolol tartrate 50 milliGRAM(s) Oral once  phytonadione  IVPB 10 milliGRAM(s) IV Intermittent daily  polyethylene glycol 3350 17 Gram(s) Oral daily  senna 2 Tablet(s) Oral at bedtime    MEDICATIONS  (PRN):  HYDROmorphone  Injectable 0.5 milliGRAM(s) IV Push every 3 hours PRN Breakthrough Pain  HYDROmorphone PCA (1 mG/mL) Rescue Clinician Bolus 0.5 milliGRAM(s) IV Push every 15 minutes PRN for Pain Scale GREATER THAN 6  nalbuphine Injectable 2.5 milliGRAM(s) IV Push every 6 hours PRN Pruritus  naloxone Injectable 0.1 milliGRAM(s) IV Push every 3 minutes PRN For ANY of the following changes in patient status:  A. RR LESS THAN 10 breaths per minute, B. Oxygen saturation LESS THAN 90%, C. Sedation score of 6  ondansetron Injectable 4 milliGRAM(s) IV Push every 6 hours PRN Nausea      OBJECTIVE:    Sedation Score:	[ ] Alert 	[X ] Drowsy 	[ ] Arousable	[ ] Asleep	[ ] Unresponsive    Side Effects:	[X ] None	[ ] Nausea	[ ] Vomiting	[ ] Pruritus  		[ ] Other:    Vital Signs Last 24 Hrs  T(C): 36.6 (17 Oct 2023 05:41), Max: 36.7 (16 Oct 2023 11:00)  T(F): 97.9 (17 Oct 2023 05:41), Max: 98.1 (16 Oct 2023 21:32)  HR: 118 (17 Oct 2023 05:41) (88 - 120)  BP: 148/77 (17 Oct 2023 05:41) (136/94 - 168/87)  BP(mean): 108 (16 Oct 2023 12:00) (108 - 120)  RR: 18 (17 Oct 2023 05:41) (12 - 18)  SpO2: 92% (17 Oct 2023 05:41) (91% - 96%)    Parameters below as of 17 Oct 2023 05:41  Patient On (Oxygen Delivery Method): room air        ASSESSMENT/ PLAN    Therapy to  be:               [X] Continued   [ ] Discontinued   [ ] Changed to PRN Analgesics    Documentation and Verification of current medications:   [X] Done	[ ] Not done, not eligible    Comments: Reeducated to PCA use. Total use 9.2mg / 24 hours. Using 1-3x/hr. 
Patient is a 68y old  Male who presents with a chief complaint of 12 foot fall with ladder (16 Oct 2023 14:08)    Patient resting in bed and comfortable.  Pain controlled - tolerating therapies.   No CP, no SOB  Still w UE weakness.    MEDICATIONS  (STANDING):  chlorhexidine 2% Cloths 1 Application(s) Topical <User Schedule>  diltiazem    milliGRAM(s) Oral daily  enoxaparin Injectable 40 milliGRAM(s) SubCutaneous <User Schedule>  lidocaine   4% Patch 1 Patch Transdermal every 24 hours  methocarbamol 750 milliGRAM(s) Oral three times a day  metoprolol tartrate 25 milliGRAM(s) Oral two times a day  polyethylene glycol 3350 17 Gram(s) Oral two times a day  senna 2 Tablet(s) Oral at bedtime  sodium chloride 2 Gram(s) Oral every 8 hours  tamsulosin 0.4 milliGRAM(s) Oral daily    MEDICATIONS  (PRN):  acetaminophen     Tablet .. 650 milliGRAM(s) Oral every 6 hours PRN Temp greater or equal to 38C (100.4F), Mild Pain (1 - 3)  bisacodyl Suppository 10 milliGRAM(s) Rectal daily PRN Constipation  HYDROmorphone   Tablet 6 milliGRAM(s) Oral every 6 hours PRN Severe Pain (7 - 10)  HYDROmorphone   Tablet 4 milliGRAM(s) Oral every 4 hours PRN Moderate Pain (4 - 6)    Vital Signs Last 24 Hrs  T(C): 36.6 (19 Oct 2023 09:00), Max: 37.6 (19 Oct 2023 04:23)  T(F): 97.9 (19 Oct 2023 09:00), Max: 99.7 (19 Oct 2023 04:23)  HR: 113 (19 Oct 2023 09:00) (92 - 117)  BP: 151/91 (19 Oct 2023 09:00) (131/84 - 158/96)  BP(mean): --  RR: 17 (19 Oct 2023 09:00) (17 - 18)  SpO2: 93% (19 Oct 2023 09:00) (92% - 94%)    PHYSICAL EXAM  Constitutional - NAD, Comfortable  HEENT - NCAT, EOMI  Neck - Supple, Cervical collar in place  Chest - No distress, no use of accessory muscles for respiration  Cardiovascular -RRR, Well perfused  Abdomen - BS+, Soft, NTND  Extremities - No C/C/E, No calf tenderness   Neurologic Exam -                 AAO x 3  Motor- bl UE 4/5 EF/WE/EE, 3/5 FF/F Abd, bl LEs 4+/5  Sensation intact  No clonus  Psychiatric - Mood stable, Affect WNL                            11.4   10.04 )-----------( 262      ( 18 Oct 2023 06:36 )             33.8     10-19    135  |  97  |  17  ----------------------------<  125<H>  4.2   |  28  |  0.69    Ca    8.9      19 Oct 2023 05:59  Phos  2.3     10-18  Mg     2.1     10-18    Impression:  69 yo with functional deficits secondary to diagnosis of Central Cord Syndrome    Plan:  PT- ROM, Bed Mob, Transfers, Amb w AD and bracing as needed  OT- ADLs, bracing  Prec- Falls, Cardiac  DVT Prophylaxis- SCDs, Lovenox  Pain- Acute on chronic pain; continue current medications  Skin- Turn q2 h  Dispo- Acute Rehab- patient requires active and ongoing therapeutic interventions of multiple disciplines and can tolerate 3 hours of therapies x 4wks. Can actively participate and benefit from  an intensive rehabilitation program. Requires supervision by a rehabilitation physician and a coordinated interdisciplinary approach to providing rehabilitation.   
SUBJECTIVE:   Patient was seen and evaluated at bedside. Patient is resting in bed and is in no new acute distress.   OVERNIGHT EVENTS:   none   Vital Signs Last 24 Hrs  T(C): 36.8 (18 Oct 2023 09:00), Max: 36.9 (17 Oct 2023 21:58)  T(F): 98.2 (18 Oct 2023 09:00), Max: 98.5 (17 Oct 2023 21:58)  HR: 90 (18 Oct 2023 09:00) (68 - 112)  BP: 144/90 (18 Oct 2023 08:00) (129/90 - 145/93)  BP(mean): --  RR: 17 (18 Oct 2023 09:00) (17 - 18)  SpO2: 98% (18 Oct 2023 09:00) (95% - 98%)    Parameters below as of 18 Oct 2023 09:00  Patient On (Oxygen Delivery Method): room air        PHYSICAL EXAM:    General: No Acute Distress     Neurological: Awake, alert oriented to person, place and time, Following Commands, PERRL, EOMI, Face Symmetrical, Speech Fluent, Moving all extremities, Muscle Strength   uppers   biceps-2/5 , deltoids 1/5 , able to shrug shoulders, triceps 3/5 , HG 4/5 ,  numbness on fingers improved   b/l legs - 4+/5 , numbness in foor persist     Pulmonary: Clear to Auscultation, No Rales, No Rhonchi, No Wheezes     Cardiovascular: S1, S2, Regular Rate and Rhythm     Gastrointestinal: Soft, Nontender, Nondistended     Incision: clean and dry     LABS:                        11.4   10.04 )-----------( 262      ( 18 Oct 2023 06:36 )             33.8    10-18    133<L>  |  98  |  18  ----------------------------<  130<H>  4.2   |  25  |  0.69    Ca    9.1      18 Oct 2023 06:36  Phos  2.3     10-18  Mg     2.1     10-18    PT/INR - ( 18 Oct 2023 06:36 )   PT: 10.9 sec;   INR: 0.99 ratio               10-17 @ 07:01  -  10-18 @ 07:00  --------------------------------------------------------  IN: 650 mL / OUT: 725 mL / NET: -75 mL    10-18 @ 07:01  -  10-18 @ 12:51  --------------------------------------------------------  IN: 0 mL / OUT: 100 mL / NET: -100 mL      DRAINS:     MEDICATIONS:  Antibiotics:    Neuro:  acetaminophen     Tablet .. 650 milliGRAM(s) Oral every 6 hours PRN Temp greater or equal to 38C (100.4F), Mild Pain (1 - 3)  HYDROmorphone   Tablet 4 milliGRAM(s) Oral every 4 hours PRN Moderate Pain (4 - 6)  HYDROmorphone  Injectable 0.5 milliGRAM(s) IV Push every 3 hours PRN Breakthrough Pain  methocarbamol 750 milliGRAM(s) Oral three times a day    Cardiac:  metoprolol tartrate 25 milliGRAM(s) Oral two times a day    Pulm:    GI/:  bisacodyl Suppository 10 milliGRAM(s) Rectal once  magnesium citrate Oral Solution 296 milliLiter(s) Oral once  polyethylene glycol 3350 17 Gram(s) Oral two times a day  senna 2 Tablet(s) Oral at bedtime    Other:   chlorhexidine 2% Cloths 1 Application(s) Topical <User Schedule>  enoxaparin Injectable 40 milliGRAM(s) SubCutaneous <User Schedule>  lidocaine   4% Patch 1 Patch Transdermal every 24 hours  potassium phosphate / sodium phosphate Powder (PHOS-NaK) 1 Packet(s) Oral two times a day  sodium chloride 2 Gram(s) Oral every 8 hours    DIET: [] Regular [] CCD [] Renal [] Puree [] Dysphagia [] Tube Feeds:   regular diet   IMAGING:   ACC: [id number] EXAM:  CT CERVICAL SPINE   ORDERED BY: DIANE ESCOBEDO     PROCEDURE DATE:  10/15/2023          INTERPRETATION:  INDICATIONS:  Spinal fusion.    TECHNIQUE:  Axial images were obtained using multislice helical   technique.  Reformatted coronal and sagittal images were performed.    COMPARISON EXAMINATION:  Cervical spine CT dated 10/13/2023 and cervical   spine MRI dated 10/13/2023.    FINDINGS:  Status post C2-C6 posterior decompression and C1-C7 posterior spinal   fusion. Spinal fusion hardware appears intact.    Interval reduction of the anterior subluxation seen at C5-C6 and perched   facets at this level. There is nonspecific stranding of the cervical   lordosis.    Redemonstrated fractures of the anterior and posterior arches of the   atlas. Redemonstrated fracture of the left C6 transverse process   extending into the transverse foramen.    Mild height loss is noted at the anterior aspect of C6 vertebral body   which may represent an additional nondisplaced fracture.    Evaluation of the spinal canal is limited due to streak artifact.    Surgical staples and drain overlying the laminectomy site project in the   posterior neck. Extensive soft tissue swelling is noted in the surgical   bed. Mild prevertebral soft tissueswelling is also noted.    Trace right apical pneumothorax is present. A trace right pneumothorax   was also present on 10/13/2023    IMPRESSION:    Redemonstrated cervical spine fractures with interval spinal   decompression and fusion as above.    --- End of Report ---           VIV CHO MD; Resident Radiologist  This document has been electronically signed.  EMILY AUSTIN MD; Attending Radiologist  This document has been electronically signed. Oct 15 2023  4:57PM    ACC: [id number] EXAM:  MR SPINE CERVICAL   ORDERED BY:  CARLOS MCKEON     PROCEDURE DATE:  10/17/2023          INTERPRETATION:  MR cervical spine without gadolinium contrast    CLINICAL INFORMATION:    f/u OR, L and R C5 palsy clinically  MMR    Admitting Dxs: S22.41XA FALL    TECHNIQUE: Sagittal T2-weighted, T1-weighted and STIR images were   obtained. Axial T2-weighted, T1-weighted and T2-weighted gradient echo   images were obtained.    COMPARISON:   CT cervical 10/15/2023 also MR cervical 10/13/2023    FINDINGS:    The cervical spine is significant for posterior stabilization. Posterior   element screws are present at C1-C7 at each level on each side. These   posterior element screws are secured by vertical rods. Allowing for the   MR technique, this hardware appears grossly intact and appropriate in   position. The placement is hardware has resulted in improved vertebral   alignment in the mid cervical spine. Superior endplate deformities of C6   T1 and T2 are associated with mild marrow edema and consistent with   compression fractures. These appear unchanged from 10/13/2023.   Prevertebral fluid is again noted extending from C2 through C6. This may   be mildly diminished in its cephalad aspect compared to 10/13/2023,   otherwise unchanged. Fragmentation at C1 is evident and associated with   mild marrow edema.    Broad laminectomy was performed C2-C6. Within the posterior paraspinal   soft tissues series infiltration and postsurgical scarring without   well-defined fluid collection. No epidural space collection is   recognized. The central canal remains patulous.    Cervical intervertebral disc spaces again demonstrate degeneration with   variable signal intensity loss on the long TR images. There appears to be   fluid in the C5-C6 disc space adjacent to the superior endplate fracture   of C6.   No interval disc pathology is recognized. See previous MR   10/13/2023 for discussion of degenerative disc pathology at cervical   intervertebral disc level.    Cervical cord morphology is unchanged.   The cervical cord demonstrates   no interval focal lesion.   No focal intrinsic cord lesion is identified.    No cord expansion or cord volume loss is recognized.      IMPRESSION:    1. Posterior stabilization C1-C7 and C2-C6 laminectomy with routine   postoperative appearance    2. C6 T1 and T2 superior endplate fractures, unchanged 10/13/2023    3. Prevertebral fluid, likely posttraumatic, somewhat improved 10/13/2023    4. C1 fractures, better demonstrated by the CT technique, unchanged   10/13/2023    --- End of Report ---            TINO LOPEZ MD; Attending Radiologist  This document has been electronically signed. Oct 18 2023 10:06AM    
SUBJECTIVE: patient reports pain controlled, +BM yesterday, resulting protruding hemorrhoids reduced at bedside by surgery team. awaiting rehab, tolerating PO well.    Vital Signs Last 24 Hrs  T(C): 36.7 (20 Oct 2023 09:00), Max: 37.1 (19 Oct 2023 17:30)  T(F): 98 (20 Oct 2023 09:00), Max: 98.7 (19 Oct 2023 17:30)  HR: 68 (20 Oct 2023 09:00) (68 - 100)  BP: 131/76 (20 Oct 2023 09:00) (112/75 - 138/91)  BP(mean): --  RR: 17 (20 Oct 2023 09:00) (17 - 18)  SpO2: 92% (20 Oct 2023 09:00) (92% - 94%)    Parameters below as of 20 Oct 2023 09:00  Patient On (Oxygen Delivery Method): room air    PHYSICAL EXAM:    General: No Acute Distress     Neurological: Awake, alert oriented to person, place and time, Following Commands, PERRL, EOMI, Face Symmetrical, Speech Fluent, Moving all extremities, Muscle Strength   uppers biceps-2/5 , deltoids 1/5 , able to shrug shoulders, triceps 3/5 , HG 4/5 ,  numbness on fingers improved   b/l legs - 4+/5 , numbness in foot persist     Pulmonary: Clear to Auscultation, No Rales, No Rhonchi, No Wheezes     Cardiovascular: S1, S2, Regular Rate and Rhythm     Gastrointestinal: Soft, Nontender, Nondistended     Incision: clean and dry, +aquacel AG changed, + staples c/d/i    LABS:                        11.4   10.04 )-----------( 262      ( 18 Oct 2023 06:36 )             33.8    10-19    135  |  97  |  17  ----------------------------<  125<H>  4.2   |  28  |  0.69    Ca    8.9      19 Oct 2023 05:59  Phos  2.3     10-18  Mg     2.1     10-18    MEDICATIONS  (STANDING):  chlorhexidine 2% Cloths 1 Application(s) Topical <User Schedule>  diltiazem    milliGRAM(s) Oral daily  enoxaparin Injectable 40 milliGRAM(s) SubCutaneous <User Schedule>  lidocaine   4% Patch 1 Patch Transdermal every 24 hours  methocarbamol 750 milliGRAM(s) Oral three times a day  metoprolol tartrate 25 milliGRAM(s) Oral two times a day  polyethylene glycol 3350 17 Gram(s) Oral two times a day  senna 2 Tablet(s) Oral at bedtime  sodium chloride 2 Gram(s) Oral every 8 hours  tamsulosin 0.4 milliGRAM(s) Oral daily    MEDICATIONS  (PRN):  acetaminophen     Tablet .. 650 milliGRAM(s) Oral every 6 hours PRN Temp greater or equal to 38C (100.4F), Mild Pain (1 - 3)  bisacodyl Suppository 10 milliGRAM(s) Rectal daily PRN Constipation  HYDROmorphone   Tablet 4 milliGRAM(s) Oral every 4 hours PRN Moderate Pain (4 - 6)  HYDROmorphone   Tablet 6 milliGRAM(s) Oral every 6 hours PRN Severe Pain (7 - 10)     DIET: [x] Regular [] CCD [] Renal [] Puree [] Dysphagia [] Tube Feeds:       IMAGING:   < from: [facility] Duplex Lower Ext Vein Scan, Bilat (10.18.23 @ 16:43) >  IMPRESSION:  No evidence of deep venous thrombosis in either lower extremity.    < end of copied text >            ACC: [id number] EXAM:  CT CERVICAL SPINE   ORDERED BY: DIANE ESCOBEDO     PROCEDURE DATE:  10/15/2023          INTERPRETATION:  INDICATIONS:  Spinal fusion.    TECHNIQUE:  Axial images were obtained using multislice helical   technique.  Reformatted coronal and sagittal images were performed.    COMPARISON EXAMINATION:  Cervical spine CT dated 10/13/2023 and cervical   spine MRI dated 10/13/2023.    FINDINGS:  Status post C2-C6 posterior decompression and C1-C7 posterior spinal   fusion. Spinal fusion hardware appears intact.    Interval reduction of the anterior subluxation seen at C5-C6 and perched   facets at this level. There is nonspecific stranding of the cervical   lordosis.    Redemonstrated fractures of the anterior and posterior arches of the   atlas. Redemonstrated fracture of the left C6 transverse process   extending into the transverse foramen.    Mild height loss is noted at the anterior aspect of C6 vertebral body   which may represent an additional nondisplaced fracture.    Evaluation of the spinal canal is limited due to streak artifact.    Surgical staples and drain overlying the laminectomy site project in the   posterior neck. Extensive soft tissue swelling is noted in the surgical   bed. Mild prevertebral soft tissueswelling is also noted.    Trace right apical pneumothorax is present. A trace right pneumothorax   was also present on 10/13/2023    IMPRESSION:    Redemonstrated cervical spine fractures with interval spinal   decompression and fusion as above.    --- End of Report ---           VIV CHO MD; Resident Radiologist  This document has been electronically signed.  EMILY AUSTIN MD; Attending Radiologist  This document has been electronically signed. Oct 15 2023  4:57PM    ACC: [id number] EXAM:  MR SPINE CERVICAL   ORDERED BY:  CARLOS MCKEON     PROCEDURE DATE:  10/17/2023          INTERPRETATION:  MR cervical spine without gadolinium contrast    CLINICAL INFORMATION:    f/u OR, L and R C5 palsy clinically  MMR    Admitting Dxs: S22.41XA FALL    TECHNIQUE: Sagittal T2-weighted, T1-weighted and STIR images were   obtained. Axial T2-weighted, T1-weighted and T2-weighted gradient echo   images were obtained.    COMPARISON:   CT cervical 10/15/2023 also MR cervical 10/13/2023    FINDINGS:    The cervical spine is significant for posterior stabilization. Posterior   element screws are present at C1-C7 at each level on each side. These   posterior element screws are secured by vertical rods. Allowing for the   MR technique, this hardware appears grossly intact and appropriate in   position. The placement is hardware has resulted in improved vertebral   alignment in the mid cervical spine. Superior endplate deformities of C6   T1 and T2 are associated with mild marrow edema and consistent with   compression fractures. These appear unchanged from 10/13/2023.   Prevertebral fluid is again noted extending from C2 through C6. This may   be mildly diminished in its cephalad aspect compared to 10/13/2023,   otherwise unchanged. Fragmentation at C1 is evident and associated with   mild marrow edema.    Broad laminectomy was performed C2-C6. Within the posterior paraspinal   soft tissues series infiltration and postsurgical scarring without   well-defined fluid collection. No epidural space collection is   recognized. The central canal remains patulous.    Cervical intervertebral disc spaces again demonstrate degeneration with   variable signal intensity loss on the long TR images. There appears to be   fluid in the C5-C6 disc space adjacent to the superior endplate fracture   of C6.   No interval disc pathology is recognized. See previous MR   10/13/2023 for discussion of degenerative disc pathology at cervical   intervertebral disc level.    Cervical cord morphology is unchanged.   The cervical cord demonstrates   no interval focal lesion.   No focal intrinsic cord lesion is identified.    No cord expansion or cord volume loss is recognized.      IMPRESSION:    1. Posterior stabilization C1-C7 and C2-C6 laminectomy with routine   postoperative appearance    2. C6 T1 and T2 superior endplate fractures, unchanged 10/13/2023    3. Prevertebral fluid, likely posttraumatic, somewhat improved 10/13/2023    4. C1 fractures, better demonstrated by the CT technique, unchanged   10/13/2023    --- End of Report ---            TINO LOPEZ MD; Attending Radiologist  This document has been electronically signed. Oct 18 2023 10:06AM    
Day 4\3 of Anesthesia Pain Management Service    SUBJECTIVE: The collar is bothering me    Pain Scale Score:	[X] Refer to charted pain scores    THERAPY:    [ ] IV PCA Morphine		        [ ] 5 mg/mL	[ ] 1 mg/mL  [X] IV PCA Hydromorphone	[ ] 5 mg/mL	[X] 1 mg/mL  [ ] IV PCA Fentanyl		        [ ] 50 micrograms/mL    Demand dose: 0.2 mg     Lockout: 6 minutes   Continuous Rate: 0 mg/hr  4 Hour Limit: 4 mg    MEDICATIONS  (STANDING):  chlorhexidine 2% Cloths 1 Application(s) Topical <User Schedule>  enoxaparin Injectable 40 milliGRAM(s) SubCutaneous <User Schedule>  HYDROmorphone PCA (1 mG/mL) 30 milliLiter(s) PCA Continuous PCA Continuous  lactated ringers. 1000 milliLiter(s) (20 mL/Hr) IV Continuous <Continuous>  lidocaine   4% Patch 1 Patch Transdermal every 24 hours  methocarbamol 750 milliGRAM(s) Oral three times a day  metoprolol tartrate 25 milliGRAM(s) Oral two times a day  polyethylene glycol 3350 17 Gram(s) Oral daily  potassium phosphate / sodium phosphate Powder (PHOS-NaK) 1 Packet(s) Oral two times a day  senna 2 Tablet(s) Oral at bedtime  sodium chloride 2 Gram(s) Oral every 8 hours  sodium chloride 0.9%. 1000 milliLiter(s) (80 mL/Hr) IV Continuous <Continuous>    MEDICATIONS  (PRN):  acetaminophen     Tablet .. 650 milliGRAM(s) Oral every 6 hours PRN Temp greater or equal to 38C (100.4F), Mild Pain (1 - 3)  HYDROmorphone  Injectable 0.5 milliGRAM(s) IV Push every 3 hours PRN Breakthrough Pain  HYDROmorphone PCA (1 mG/mL) Rescue Clinician Bolus 0.5 milliGRAM(s) IV Push every 15 minutes PRN for Pain Scale GREATER THAN 6  nalbuphine Injectable 2.5 milliGRAM(s) IV Push every 6 hours PRN Pruritus  naloxone Injectable 0.1 milliGRAM(s) IV Push every 3 minutes PRN For ANY of the following changes in patient status:  A. RR LESS THAN 10 breaths per minute, B. Oxygen saturation LESS THAN 90%, C. Sedation score of 6  ondansetron Injectable 4 milliGRAM(s) IV Push every 6 hours PRN Nausea      OBJECTIVE:    Sedation Score:	[ X] Alert	 [ ] Drowsy 	[ ] Arousable	[ ] Asleep	[ ] Unresponsive    Side Effects:	[X ] None	[ ] Nausea	[ ] Vomiting	[ ] Pruritus  		[ ] Other:    Vital Signs Last 24 Hrs  T(C): 36.8 (18 Oct 2023 09:00), Max: 36.9 (17 Oct 2023 21:58)  T(F): 98.2 (18 Oct 2023 09:00), Max: 98.5 (17 Oct 2023 21:58)  HR: 90 (18 Oct 2023 09:00) (68 - 112)  BP: 144/90 (18 Oct 2023 08:00) (129/90 - 145/93)  BP(mean): --  RR: 17 (18 Oct 2023 09:00) (17 - 18)  SpO2: 98% (18 Oct 2023 09:00) (95% - 98%)    Parameters below as of 18 Oct 2023 09:00  Patient On (Oxygen Delivery Method): room air        ASSESSMENT/ PLAN    Therapy to  be:               [  ] Continued   [X ] Discontinued   [ X] Changed to PRN Analgesics    Documentation and Verification of current medications:   [X] Done	[ ] Not done, not eligible    Comments: Restless this am. Reporting discomfort from the collar.  PCA use 0-1x/hr, total 5.8mg / 24 hours. D\C PCA and transition to prn analgesics 
GENERAL SURGERY PROGRESS NOTE    SUBJECTIVE  No acute issues overnight. Seen and examined on morning rounds.    10-point review of systems completed and negative except as noted above.      OBJECTIVE    MEDICATIONS  chlorhexidine 2% Cloths 1 Application(s) Topical <User Schedule>  HYDROmorphone  Injectable 0.5 milliGRAM(s) IV Push every 3 hours PRN  HYDROmorphone PCA (1 mG/mL) 30 milliLiter(s) PCA Continuous PCA Continuous  HYDROmorphone PCA (1 mG/mL) Rescue Clinician Bolus 0.5 milliGRAM(s) IV Push every 15 minutes PRN  lactated ringers. 1000 milliLiter(s) IV Continuous <Continuous>  lidocaine   4% Patch 1 Patch Transdermal every 24 hours  methocarbamol 750 milliGRAM(s) Oral three times a day  metoprolol tartrate 25 milliGRAM(s) Oral every 12 hours  nalbuphine Injectable 2.5 milliGRAM(s) IV Push every 6 hours PRN  naloxone Injectable 0.1 milliGRAM(s) IV Push every 3 minutes PRN  ondansetron Injectable 4 milliGRAM(s) IV Push every 6 hours PRN  phytonadione  IVPB 10 milliGRAM(s) IV Intermittent daily  polyethylene glycol 3350 17 Gram(s) Oral daily  senna 2 Tablet(s) Oral at bedtime      PHYSICAL EXAM  T(C): 36.9 (10-16-23 @ 03:00), Max: 36.9 (10-15-23 @ 11:00)  HR: 114 (10-16-23 @ 07:00) (103 - 125)  BP: 174/102 (10-16-23 @ 07:00) (136/91 - 174/102)  RR: 19 (10-16-23 @ 07:00) (10 - 37)  SpO2: 92% (10-16-23 @ 07:00) (89% - 100%)    10-15-23 @ 07:01  -  10-16-23 @ 07:00  --------------------------------------------------------  IN: 400 mL / OUT: 1340 mL / NET: -940 mL        General: Not acutely distressed  Neuro: AO x4  Respiratory: nonlabored respirations  CV: pulse present   Abdomen: soft, nontender, nondistended, no rebound or guarding, no palpable mass  Extremities: warm    LABS                        12.0   11.29 )-----------( 211      ( 16 Oct 2023 05:36 )             36.5     10-16    132<L>  |  95<L>  |  14  ----------------------------<  136<H>  4.1   |  26  |  0.75    Ca    9.0      16 Oct 2023 05:36  Phos  4.0     10-14  Mg     1.8     10-14    TPro  6.5  /  Alb  3.7  /  TBili  0.6  /  DBili  x   /  AST  44<H>  /  ALT  35  /  AlkPhos  66  10-16    PT/INR - ( 14 Oct 2023 22:43 )   PT: 11.9 sec;   INR: 1.14 ratio         PTT - ( 14 Oct 2023 22:43 )  PTT:25.6 sec  Urinalysis Basic - ( 16 Oct 2023 05:36 )    Color: x / Appearance: x / SG: x / pH: x  Gluc: 136 mg/dL / Ketone: x  / Bili: x / Urobili: x   Blood: x / Protein: x / Nitrite: x   Leuk Esterase: x / RBC: x / WBC x   Sq Epi: x / Non Sq Epi: x / Bacteria: x        RADIOLOGY & ADDITIONAL STUDIES  
CARDIOLOGY FOLLOW UP - Dr. Zeng  DATE OF SERVICE: 10/14/23    CC  Feeling "winded"  No other cv complaints    REVIEW OF SYSTEMS:  CONSTITUTIONAL: No fever, weight loss, or fatigue  RESPIRATORY: No cough, wheezing, chills or hemoptysis; +SOB  CARDIOVASCULAR: No chest pain, palpitations, passing out, dizziness, or leg swelling  GASTROINTESTINAL: No abdominal or epigastric pain. No nausea, vomiting, or hematemesis; No diarrhea or constipation. No melena or hematochezia.  VASCULAR: No edema     PHYSICAL EXAM:  T(C): 37.6 (10-19-23 @ 04:23), Max: 37.6 (10-19-23 @ 04:23)  HR: 102 (10-19-23 @ 09:00) (92 - 117)  BP: 110/70 (10-19-23 @ 09:00) (110/70 - 158/96)  RR: 17 (10-19-23 @ 09:00) (17 - 18)  SpO2: 93% (10-19-23 @ 09:00) (92% - 94%)  Wt(kg): --  I&O's Summary    18 Oct 2023 07:01  -  19 Oct 2023 07:00  --------------------------------------------------------  IN: 0 mL / OUT: 250 mL / NET: -250 mL        Appearance: Normal	  Cardiovascular: Normal S1 S2,RRR, No JVD, No murmurs  Respiratory: Lungs clear to auscultation	  Gastrointestinal:  Soft, Non-tender, + BS	  Extremities: Normal range of motion, No clubbing, cyanosis or edema      Home Medications:  HYDROmorphone 4 mg oral tablet: orally 3 times a day, As Needed (13 Oct 2023 20:32)      MEDICATIONS  (STANDING):  chlorhexidine 2% Cloths 1 Application(s) Topical <User Schedule>  enoxaparin Injectable 40 milliGRAM(s) SubCutaneous <User Schedule>  lidocaine   4% Patch 1 Patch Transdermal every 24 hours  methocarbamol 750 milliGRAM(s) Oral three times a day  metoprolol tartrate 25 milliGRAM(s) Oral two times a day  polyethylene glycol 3350 17 Gram(s) Oral two times a day  senna 2 Tablet(s) Oral at bedtime  sodium chloride 2 Gram(s) Oral every 8 hours  tamsulosin 0.4 milliGRAM(s) Oral daily      TELEMETRY: -120s, episodes of SR 80s	    ECG:  	  RADIOLOGY:   DIAGNOSTIC TESTING:  [ ] Echocardiogram:  [ ]  Catheterization:  [ ] Stress Test:    OTHER: 	    LABS:	 	                            11.4   10.04 )-----------( 262      ( 18 Oct 2023 06:36 )             33.8     10-19    135  |  97  |  17  ----------------------------<  125<H>  4.2   |  28  |  0.69    Ca    8.9      19 Oct 2023 05:59  Phos  2.3     10-18  Mg     2.1     10-18      PT/INR - ( 18 Oct 2023 06:36 )   PT: 10.9 sec;   INR: 0.99 ratio                 
CARDIOLOGY FOLLOW UP - Dr. Zeng  DATE OF SERVICE: 10/16/23    CC  Pt seen while being prepared for transport to [facility]  No CV complaints    REVIEW OF SYSTEMS:  CONSTITUTIONAL: No fever, weight loss, or fatigue  RESPIRATORY: No cough, wheezing, chills or hemoptysis; No Shortness of Breath  CARDIOVASCULAR: No chest pain, palpitations, passing out, dizziness, or leg swelling  GASTROINTESTINAL: No abdominal or epigastric pain. No nausea, vomiting, or hematemesis; No diarrhea or constipation. No melena or hematochezia.  VASCULAR: No edema     PHYSICAL EXAM:  T(C): 36.9 (10-16-23 @ 03:00), Max: 36.9 (10-16-23 @ 03:00)  HR: 113 (10-16-23 @ 12:00) (104 - 125)  BP: 148/88 (10-16-23 @ 12:00) (148/88 - 175/93)  RR: 12 (10-16-23 @ 12:00) (10 - 37)  SpO2: 95% (10-16-23 @ 12:00) (89% - 100%)  Wt(kg): --  I&O's Summary    15 Oct 2023 07:01  -  16 Oct 2023 07:00  --------------------------------------------------------  IN: 400 mL / OUT: 1340 mL / NET: -940 mL    16 Oct 2023 07:01  -  16 Oct 2023 14:09  --------------------------------------------------------  IN: 130 mL / OUT: 500 mL / NET: -370 mL        Appearance: Normal - +neck brace  Cardiovascular: Normal S1 S2,Tachycardia, No JVD, No murmurs  Respiratory: Lungs clear to auscultation b/l  Gastrointestinal:  Soft, Non-tender, + BS	  Extremities: Normal range of motion, No clubbing, cyanosis or edema      Home Medications:  HYDROmorphone 4 mg oral tablet: orally 3 times a day, As Needed (13 Oct 2023 20:32)      MEDICATIONS  (STANDING):  chlorhexidine 2% Cloths 1 Application(s) Topical <User Schedule>  HYDROmorphone PCA (1 mG/mL) 30 milliLiter(s) PCA Continuous PCA Continuous  lactated ringers. 1000 milliLiter(s) (20 mL/Hr) IV Continuous <Continuous>  lidocaine   4% Patch 1 Patch Transdermal every 24 hours  methocarbamol 750 milliGRAM(s) Oral three times a day  metoprolol tartrate 25 milliGRAM(s) Oral every 12 hours  phytonadione  IVPB 10 milliGRAM(s) IV Intermittent daily  polyethylene glycol 3350 17 Gram(s) Oral daily  senna 2 Tablet(s) Oral at bedtime      TELEMETRY: Sinus tach 110-120s	    ECG:  	  RADIOLOGY:   DIAGNOSTIC TESTING:  [ ] Echocardiogram:  [ ]  Catheterization:  [ ] Stress Test:    OTHER: 	    LABS:	 	                            12.0   11.29 )-----------( 211      ( 16 Oct 2023 05:36 )             36.5     10-16    132<L>  |  95<L>  |  14  ----------------------------<  136<H>  4.1   |  26  |  0.75    Ca    9.0      16 Oct 2023 05:36  Phos  4.0     10-14  Mg     1.8     10-14    TPro  6.5  /  Alb  3.7  /  TBili  0.6  /  DBili  x   /  AST  44<H>  /  ALT  35  /  AlkPhos  66  10-16    PT/INR - ( 14 Oct 2023 22:43 )   PT: 11.9 sec;   INR: 1.14 ratio         PTT - ( 14 Oct 2023 22:43 )  PTT:25.6 sec        
CARDIOLOGY FOLLOW UP - Dr. Zeng  DATE OF SERVICE: 10/17/23    CC  No CV complaints    REVIEW OF SYSTEMS:  CONSTITUTIONAL: No fever, weight loss, or fatigue  RESPIRATORY: No cough, wheezing, chills or hemoptysis; No Shortness of Breath  CARDIOVASCULAR: No chest pain, palpitations, passing out, dizziness, or leg swelling  GASTROINTESTINAL: No abdominal or epigastric pain. No nausea, vomiting, or hematemesis; No diarrhea or constipation. No melena or hematochezia.  VASCULAR: No edema     PHYSICAL EXAM:  T(C): 36.6 (10-17-23 @ 05:41), Max: 36.7 (10-16-23 @ 11:00)  HR: 118 (10-17-23 @ 05:41) (88 - 120)  BP: 148/77 (10-17-23 @ 05:41) (136/94 - 168/87)  RR: 18 (10-17-23 @ 05:41) (12 - 18)  SpO2: 92% (10-17-23 @ 05:41) (91% - 96%)  Wt(kg): --  I&O's Summary    16 Oct 2023 07:01  -  17 Oct 2023 07:00  --------------------------------------------------------  IN: 330 mL / OUT: 930 mL / NET: -600 mL        Appearance: Normal	  Cardiovascular: Normal S1 S2,Tachycardia, No JVD, No murmurs  Respiratory: Lungs clear to auscultation b/l  Gastrointestinal:  Soft, Non-tender, + BS	  Extremities: Normal range of motion, No clubbing, cyanosis or edema      Home Medications:  HYDROmorphone 4 mg oral tablet: orally 3 times a day, As Needed (13 Oct 2023 20:32)      MEDICATIONS  (STANDING):  chlorhexidine 2% Cloths 1 Application(s) Topical <User Schedule>  HYDROmorphone PCA (1 mG/mL) 30 milliLiter(s) PCA Continuous PCA Continuous  lactated ringers. 1000 milliLiter(s) (20 mL/Hr) IV Continuous <Continuous>  lidocaine   4% Patch 1 Patch Transdermal every 24 hours  methocarbamol 750 milliGRAM(s) Oral three times a day  metoprolol tartrate 25 milliGRAM(s) Oral every 12 hours  phytonadione  IVPB 10 milliGRAM(s) IV Intermittent daily  polyethylene glycol 3350 17 Gram(s) Oral daily  senna 2 Tablet(s) Oral at bedtime      TELEMETRY: SR 60-120s, 110bpm at time of exam	    ECG:  	  RADIOLOGY:   DIAGNOSTIC TESTING:  [ ] Echocardiogram:  [ ]  Catheterization:  [ ] Stress Test:    OTHER: 	    LABS:	 	                            12.0   11.29 )-----------( 211      ( 16 Oct 2023 05:36 )             36.5     10-16    132<L>  |  95<L>  |  14  ----------------------------<  136<H>  4.1   |  26  |  0.75    Ca    9.0      16 Oct 2023 05:36    TPro  6.5  /  Alb  3.7  /  TBili  0.6  /  DBili  x   /  AST  44<H>  /  ALT  35  /  AlkPhos  66  10-16            
Day __ of Anesthesia Pain Management Service    SUBJECTIVE: I'm uncomfortable    Pain Scale Score:	[X] Refer to charted pain scores    THERAPY:    [ ] IV PCA Morphine		[ ] 5 mg/mL	[ ] 1 mg/mL  [X] IV PCA Hydromorphone	[ ] 5 mg/mL	[X] 1 mg/mL  [ ] IV PCA Fentanyl		[ ] 50 micrograms/mL    Demand dose: 0.2 mg     Lockout: 6 minutes   Continuous Rate: 0 mg/hr  4 Hour Limit: 4 mg    MEDICATIONS  (STANDING):  chlorhexidine 2% Cloths 1 Application(s) Topical <User Schedule>  HYDROmorphone PCA (1 mG/mL) 30 milliLiter(s) PCA Continuous PCA Continuous  lactated ringers. 1000 milliLiter(s) (20 mL/Hr) IV Continuous <Continuous>  lidocaine   4% Patch 1 Patch Transdermal every 24 hours  methocarbamol 750 milliGRAM(s) Oral three times a day  metoprolol tartrate 25 milliGRAM(s) Oral every 12 hours  phytonadione  IVPB 10 milliGRAM(s) IV Intermittent daily  polyethylene glycol 3350 17 Gram(s) Oral daily  senna 2 Tablet(s) Oral at bedtime    MEDICATIONS  (PRN):  HYDROmorphone  Injectable 0.5 milliGRAM(s) IV Push every 3 hours PRN Breakthrough Pain  HYDROmorphone PCA (1 mG/mL) Rescue Clinician Bolus 0.5 milliGRAM(s) IV Push every 15 minutes PRN for Pain Scale GREATER THAN 6  nalbuphine Injectable 2.5 milliGRAM(s) IV Push every 6 hours PRN Pruritus  naloxone Injectable 0.1 milliGRAM(s) IV Push every 3 minutes PRN For ANY of the following changes in patient status:  A. RR LESS THAN 10 breaths per minute, B. Oxygen saturation LESS THAN 90%, C. Sedation score of 6  ondansetron Injectable 4 milliGRAM(s) IV Push every 6 hours PRN Nausea      OBJECTIVE:    Sedation Score:	[ X] Alert 	[ ] Drowsy 	[ ] Arousable	[ ] Asleep	[ ] Unresponsive    Side Effects:	[X ] None	[ ] Nausea	[ ] Vomiting	[ ] Pruritus  		[ ] Other:    Vital Signs Last 24 Hrs  T(C): 36.9 (16 Oct 2023 03:00), Max: 36.9 (15 Oct 2023 11:00)  T(F): 98.4 (16 Oct 2023 03:00), Max: 98.4 (15 Oct 2023 11:00)  HR: 114 (16 Oct 2023 07:00) (104 - 125)  BP: 174/102 (16 Oct 2023 07:00) (136/91 - 174/102)  BP(mean): 131 (16 Oct 2023 07:00) (109 - 131)  RR: 19 (16 Oct 2023 07:00) (10 - 37)  SpO2: 92% (16 Oct 2023 07:00) (89% - 100%)    Parameters below as of 15 Oct 2023 19:00  Patient On (Oxygen Delivery Method): room air        ASSESSMENT/ PLAN    Therapy to  be:               [X] Continued   [ ] Discontinued   [ ] Changed to PRN Analgesics    Documentation and Verification of current medications:   [X] Done	[ ] Not done, not eligible    Comments: Endorsing generalized discomfort including shoulder / neck tightness. Total PCA use 7.4mg / 22.5 hours.  Hx Chronic opioid use for back pain, takes Dilaudid 4mg po TID. Bolused with 0.5mg via PCA pump.  Continue antispasmodic ATC, Continue Lidoderm and Robaxin. Consider another 4 doses of acetaminophen 
Pain Management Attending Addendum    SUBJECTIVE:    Therapy:	  PCA	x   Epidural           s/p Spinal Opoid              Postpartum infusion	  Patient controlled regional anesthesia (PCRA)    prn Analgesics    OBJECTIVE: No new signs  x    Other:    Side Effects:    None	x		 Other:    Assessment of Catheter Site:		 Intact		 Other:    ASSESSMENT/PLAN  Continue current therapy    Therapy changed to:     IV PCA        Epidural      prn Analgesics   x  post partum infusion    Comments:
Pain Management Attending Addendum    SUBJECTIVE: Patient doing well with IV PCA    Therapy:    [X] IV PCA         [ ] PRN Analgesics    OBJECTIVE:   [X] Pain appropriately controlled    [ ] Other:    Side Effects:  [X] None	             [ ] Nausea              [ ] Pruritis                	[ ] Other:    ASSESSMENT/PLAN: Continue current therapy    Comments:
Pain Management Attending Addendum    SUBJECTIVE: Patient doing well with IV PCA    Therapy:    [X] IV PCA         [ ] PRN Analgesics    OBJECTIVE:   [X] Pain appropriately controlled    [ ] Other:    Side Effects:  [X] None	             [ ] Nausea              [ ] Pruritis                	[ ] Other:    ASSESSMENT/PLAN: Continue current therapy    Comments:
Patient is a 68y old  Male who presents with a chief complaint of 12 foot fall with ladder (16 Oct 2023 14:08)    Patient resting in bed and comfortable.  Pain controlled.   No CP, no SOB  Still w UE weakness.  Tolerating therapies- min A transfers and gait    MEDICATIONS  (STANDING):  chlorhexidine 2% Cloths 1 Application(s) Topical <User Schedule>  HYDROmorphone PCA (1 mG/mL) 30 milliLiter(s) PCA Continuous PCA Continuous  lactated ringers. 1000 milliLiter(s) (20 mL/Hr) IV Continuous <Continuous>  lidocaine   4% Patch 1 Patch Transdermal every 24 hours  methocarbamol 750 milliGRAM(s) Oral three times a day  metoprolol tartrate 50 milliGRAM(s) Oral once  phytonadione  IVPB 10 milliGRAM(s) IV Intermittent daily  polyethylene glycol 3350 17 Gram(s) Oral daily  senna 2 Tablet(s) Oral at bedtime    MEDICATIONS  (PRN):  HYDROmorphone  Injectable 0.5 milliGRAM(s) IV Push every 3 hours PRN Breakthrough Pain  HYDROmorphone PCA (1 mG/mL) Rescue Clinician Bolus 0.5 milliGRAM(s) IV Push every 15 minutes PRN for Pain Scale GREATER THAN 6  nalbuphine Injectable 2.5 milliGRAM(s) IV Push every 6 hours PRN Pruritus  naloxone Injectable 0.1 milliGRAM(s) IV Push every 3 minutes PRN For ANY of the following changes in patient status:  A. RR LESS THAN 10 breaths per minute, B. Oxygen saturation LESS THAN 90%, C. Sedation score of 6  ondansetron Injectable 4 milliGRAM(s) IV Push every 6 hours PRN Nausea    Vital Signs Last 24 Hrs  T(C): 36.9 (17 Oct 2023 09:00), Max: 36.9 (17 Oct 2023 09:00)  T(F): 98.5 (17 Oct 2023 09:00), Max: 98.5 (17 Oct 2023 09:00)  HR: 68 (17 Oct 2023 09:00) (68 - 120)  BP: 127/79 (17 Oct 2023 09:00) (127/79 - 153/93)  BP(mean): 108 (16 Oct 2023 12:00) (108 - 108)  RR: 19 (17 Oct 2023 09:00) (12 - 19)  SpO2: 92% (17 Oct 2023 05:41) (92% - 96%)    PHYSICAL EXAM  Constitutional - NAD, Comfortable  HEENT - NCAT, EOMI  Neck - Supple, Cervical collar in place  Chest - No distress, no use of accessory muscles for respiration  Cardiovascular -RRR, Well perfused  Abdomen - BS+, Soft, NTND  Extremities - No C/C/E, No calf tenderness   Neurologic Exam -                 AAO x 3  Motor- bl UE 3/5, bl LEs 4+/5  Sensation intact  No clonus  Psychiatric - Mood stable, Affect WNL                          11.4   10.31 )-----------( 222      ( 17 Oct 2023 10:30 )             33.8     10-17    131<L>  |  97  |  16  ----------------------------<  134<H>  4.4   |  24  |  0.62    Ca    8.8      17 Oct 2023 10:30    TPro  6.5  /  Alb  3.7  /  TBili  0.6  /  DBili  x   /  AST  44<H>  /  ALT  35  /  AlkPhos  66  10-16    Impression:  67 yo with functional deficits secondary to diagnosis of Centrral Cord Syndrome    Plan:  PT- ROM, Bed Mob, Transfers, Amb w AD and bracing as needed  OT- ADLs, bracing  Prec- Falls, Cardiac  Monitor Na+  DVT Prophylaxis- SCDs, chemopx when cleared by surgery  Pain- Acute on chronic pain; continue current medications  Skin- Turn q2 h  Dispo- Acute Rehab- patient requires active and ongoing therapeutic interventions of multiple disciplines and can tolerate 3 hours of therapies x 4wks. Can actively participate and benefit from  an intensive rehabilitation program. Requires supervision by a rehabilitation physician and a coordinated interdisciplinary approach to providing rehabilitation.   
Patient is a 68y old  Male who presents with a chief complaint of 12 foot fall with ladder (16 Oct 2023 14:22)      SUBJECTIVE / OVERNIGHT EVENTS: ptn is comfortable, collar on, pain controlled, on Dilaudid PCA    MEDICATIONS  (STANDING):  chlorhexidine 2% Cloths 1 Application(s) Topical <User Schedule>  HYDROmorphone PCA (1 mG/mL) 30 milliLiter(s) PCA Continuous PCA Continuous  lactated ringers. 1000 milliLiter(s) (20 mL/Hr) IV Continuous <Continuous>  lidocaine   4% Patch 1 Patch Transdermal every 24 hours  methocarbamol 750 milliGRAM(s) Oral three times a day  metoprolol tartrate 25 milliGRAM(s) Oral every 12 hours  phytonadione  IVPB 10 milliGRAM(s) IV Intermittent daily  polyethylene glycol 3350 17 Gram(s) Oral daily  senna 2 Tablet(s) Oral at bedtime    MEDICATIONS  (PRN):  HYDROmorphone  Injectable 0.5 milliGRAM(s) IV Push every 3 hours PRN Breakthrough Pain  HYDROmorphone PCA (1 mG/mL) Rescue Clinician Bolus 0.5 milliGRAM(s) IV Push every 15 minutes PRN for Pain Scale GREATER THAN 6  nalbuphine Injectable 2.5 milliGRAM(s) IV Push every 6 hours PRN Pruritus  naloxone Injectable 0.1 milliGRAM(s) IV Push every 3 minutes PRN For ANY of the following changes in patient status:  A. RR LESS THAN 10 breaths per minute, B. Oxygen saturation LESS THAN 90%, C. Sedation score of 6  ondansetron Injectable 4 milliGRAM(s) IV Push every 6 hours PRN Nausea      Vital Signs Last 24 Hrs  T(F): 98 (10-16-23 @ 16:38), Max: 98.4 (10-16-23 @ 03:00)  HR: 119 (10-16-23 @ 16:38) (104 - 125)  BP: 153/93 (10-16-23 @ 16:38) (144/86 - 175/93)  RR: 18 (10-16-23 @ 16:38) (10 - 37)  SpO2: 95% (10-16-23 @ 16:38) (89% - 100%)  Telemetry:   CAPILLARY BLOOD GLUCOSE        I&O's Summary    15 Oct 2023 07:01  -  16 Oct 2023 07:00  --------------------------------------------------------  IN: 400 mL / OUT: 1340 mL / NET: -940 mL    16 Oct 2023 07:01  -  16 Oct 2023 18:20  --------------------------------------------------------  IN: 130 mL / OUT: 500 mL / NET: -370 mL        PHYSICAL EXAM:  GENERAL: NAD, well-developed  HEAD:  Atraumatic, Normocephalic  EYES: EOMI, PERRLA, conjunctiva and sclera clear  NECK: Supple, No JVD  CHEST/LUNG: Clear to auscultation bilaterally; No wheeze  HEART: Regular rate and rhythm; No murmurs, rubs, or gallops  ABDOMEN: Soft, Nontender, Nondistended; Bowel sounds present  EXTREMITIES:  2+ Peripheral Pulses, No clubbing, cyanosis, or edema  PSYCH: AAOx3  NEUROLOGY: non-focal  SKIN: No rashes or lesions    LABS:                        12.0   11.29 )-----------( 211      ( 16 Oct 2023 05:36 )             36.5     10-16    132<L>  |  95<L>  |  14  ----------------------------<  136<H>  4.1   |  26  |  0.75    Ca    9.0      16 Oct 2023 05:36  Phos  4.0     10-14  Mg     1.8     10-14    TPro  6.5  /  Alb  3.7  /  TBili  0.6  /  DBili  x   /  AST  44<H>  /  ALT  35  /  AlkPhos  66  10-16    PT/INR - ( 14 Oct 2023 22:43 )   PT: 11.9 sec;   INR: 1.14 ratio         PTT - ( 14 Oct 2023 22:43 )  PTT:25.6 sec      Urinalysis Basic - ( 16 Oct 2023 05:36 )    Color: x / Appearance: x / SG: x / pH: x  Gluc: 136 mg/dL / Ketone: x  / Bili: x / Urobili: x   Blood: x / Protein: x / Nitrite: x   Leuk Esterase: x / RBC: x / WBC x   Sq Epi: x / Non Sq Epi: x / Bacteria: x        RADIOLOGY & ADDITIONAL TESTS:    Imaging Personally Reviewed:    Consultant(s) Notes Reviewed:      Care Discussed with Consultants/Other Providers:  
Patient is a 68y old  Male who presents with a chief complaint of 12 foot fall with ladder (17 Oct 2023 09:16)      SUBJECTIVE / OVERNIGHT EVENTS: no new events, pain controlled, remains on dilaudid PCA    MEDICATIONS  (STANDING):  chlorhexidine 2% Cloths 1 Application(s) Topical <User Schedule>  HYDROmorphone PCA (1 mG/mL) 30 milliLiter(s) PCA Continuous PCA Continuous  lactated ringers. 1000 milliLiter(s) (20 mL/Hr) IV Continuous <Continuous>  lidocaine   4% Patch 1 Patch Transdermal every 24 hours  methocarbamol 750 milliGRAM(s) Oral three times a day  metoprolol tartrate 50 milliGRAM(s) Oral once  phytonadione  IVPB 10 milliGRAM(s) IV Intermittent daily  polyethylene glycol 3350 17 Gram(s) Oral daily  senna 2 Tablet(s) Oral at bedtime    MEDICATIONS  (PRN):  HYDROmorphone  Injectable 0.5 milliGRAM(s) IV Push every 3 hours PRN Breakthrough Pain  HYDROmorphone PCA (1 mG/mL) Rescue Clinician Bolus 0.5 milliGRAM(s) IV Push every 15 minutes PRN for Pain Scale GREATER THAN 6  nalbuphine Injectable 2.5 milliGRAM(s) IV Push every 6 hours PRN Pruritus  naloxone Injectable 0.1 milliGRAM(s) IV Push every 3 minutes PRN For ANY of the following changes in patient status:  A. RR LESS THAN 10 breaths per minute, B. Oxygen saturation LESS THAN 90%, C. Sedation score of 6  ondansetron Injectable 4 milliGRAM(s) IV Push every 6 hours PRN Nausea      Vital Signs Last 24 Hrs  T(F): 97.9 (10-17-23 @ 05:41), Max: 98.1 (10-16-23 @ 21:32)  HR: 118 (10-17-23 @ 05:41) (88 - 120)  BP: 148/77 (10-17-23 @ 05:41) (136/94 - 168/87)  RR: 18 (10-17-23 @ 05:41) (12 - 18)  SpO2: 92% (10-17-23 @ 05:41) (91% - 96%)  Telemetry:   CAPILLARY BLOOD GLUCOSE        I&O's Summary    16 Oct 2023 07:01  -  17 Oct 2023 07:00  --------------------------------------------------------  IN: 330 mL / OUT: 930 mL / NET: -600 mL        PHYSICAL EXAM:  GENERAL: NAD, well-developed  HEAD:  Atraumatic, Normocephalic  EYES: EOMI, PERRLA, conjunctiva and sclera clear  NECK: Supple, No JVD  CHEST/LUNG: Clear to auscultation bilaterally; No wheeze  HEART: Regular rate and rhythm; No murmurs, rubs, or gallops  ABDOMEN: Soft, Nontender, Nondistended; Bowel sounds present  EXTREMITIES:  2+ Peripheral Pulses, No clubbing, cyanosis, or edema  PSYCH: AAOx3  NEUROLOGY: non-focal  SKIN: No rashes or lesions    LABS:                        12.0   11.29 )-----------( 211      ( 16 Oct 2023 05:36 )             36.5     10-16    132<L>  |  95<L>  |  14  ----------------------------<  136<H>  4.1   |  26  |  0.75    Ca    9.0      16 Oct 2023 05:36    TPro  6.5  /  Alb  3.7  /  TBili  0.6  /  DBili  x   /  AST  44<H>  /  ALT  35  /  AlkPhos  66  10-16          Urinalysis Basic - ( 16 Oct 2023 05:36 )    Color: x / Appearance: x / SG: x / pH: x  Gluc: 136 mg/dL / Ketone: x  / Bili: x / Urobili: x   Blood: x / Protein: x / Nitrite: x   Leuk Esterase: x / RBC: x / WBC x   Sq Epi: x / Non Sq Epi: x / Bacteria: x        RADIOLOGY & ADDITIONAL TESTS:    Imaging Personally Reviewed:    Consultant(s) Notes Reviewed:      Care Discussed with Consultants/Other Providers:  
CARDIOLOGY FOLLOW UP - Dr. Zeng  DATE OF SERVICE: 10/20/23    CC  No CV complaints    REVIEW OF SYSTEMS:  CONSTITUTIONAL: No fever, weight loss, or fatigue  RESPIRATORY: No cough, wheezing, chills or hemoptysis; No Shortness of Breath  CARDIOVASCULAR: No chest pain, palpitations, passing out, dizziness, or leg swelling  GASTROINTESTINAL: No abdominal or epigastric pain. No nausea, vomiting, or hematemesis; No diarrhea or constipation. No melena or hematochezia.  VASCULAR: No edema     PHYSICAL EXAM:  T(C): 36.7 (10-20-23 @ 09:00), Max: 37.1 (10-19-23 @ 17:30)  HR: 68 (10-20-23 @ 09:00) (68 - 117)  BP: 131/76 (10-20-23 @ 09:00) (112/75 - 146/95)  RR: 17 (10-20-23 @ 09:00) (17 - 18)  SpO2: 92% (10-20-23 @ 09:00) (92% - 94%)  Wt(kg): --  I&O's Summary    19 Oct 2023 07:01  -  20 Oct 2023 07:00  --------------------------------------------------------  IN: 480 mL / OUT: 0 mL / NET: 480 mL        Appearance: Normal, +neck brace  Cardiovascular: Normal S1 S2,RRR, No JVD, No murmurs  Respiratory: Lungs clear to auscultation b/l  Gastrointestinal:  Soft, Non-tender, + BS	  Extremities: Normal range of motion, No clubbing, cyanosis or edema      Home Medications:  HYDROmorphone 4 mg oral tablet: orally 3 times a day, As Needed (13 Oct 2023 20:32)      MEDICATIONS  (STANDING):  chlorhexidine 2% Cloths 1 Application(s) Topical <User Schedule>  diltiazem    milliGRAM(s) Oral daily  enoxaparin Injectable 40 milliGRAM(s) SubCutaneous <User Schedule>  lidocaine   4% Patch 1 Patch Transdermal every 24 hours  methocarbamol 750 milliGRAM(s) Oral three times a day  metoprolol tartrate 25 milliGRAM(s) Oral two times a day  polyethylene glycol 3350 17 Gram(s) Oral two times a day  senna 2 Tablet(s) Oral at bedtime  sodium chloride 2 Gram(s) Oral every 8 hours  tamsulosin 0.4 milliGRAM(s) Oral daily      TELEMETRY: SR 65-80	    ECG:  	  RADIOLOGY:   DIAGNOSTIC TESTING:  [ ] Echocardiogram:  [ ]  Catheterization:  [ ] Stress Test:    OTHER: 	    LABS:	 	        10-19    135  |  97  |  17  ----------------------------<  125<H>  4.2   |  28  |  0.69    Ca    8.9      19 Oct 2023 05:59              
INTERVAL EVENTS:  - OR for Posterior C1-C6 decompression fusion, evacuation of epidural hematoma  - added lidocaine patch, robaxin, PCA pump      SUBJECTIVE/ROS:  [ ] A ten-point review of systems was otherwise negative except as noted.  [ ] Due to altered mental status/intubation, subjective information were not able to be obtained from the patient. History was obtained, to the extent possible, from review of the chart and collateral sources of information.      NEURO  RASS:     GCS:     CAM ICU:  Exam: awake, alert, oriented  Meds: acetaminophen   IVPB .. 1000 milliGRAM(s) IV Intermittent every 6 hours  HYDROmorphone  Injectable 0.5 milliGRAM(s) IV Push every 3 hours PRN Breakthrough Pain  HYDROmorphone PCA (1 mG/mL) 30 milliLiter(s) PCA Continuous PCA Continuous  HYDROmorphone PCA (1 mG/mL) Rescue Clinician Bolus 0.5 milliGRAM(s) IV Push every 15 minutes PRN for Pain Scale GREATER THAN 6  methocarbamol 750 milliGRAM(s) Oral three times a day  nalbuphine Injectable 2.5 milliGRAM(s) IV Push every 6 hours PRN Pruritus  ondansetron Injectable 4 milliGRAM(s) IV Push every 6 hours PRN Nausea  oxyCODONE    IR 10 milliGRAM(s) Oral every 6 hours PRN Severe Pain (7 - 10)  oxyCODONE    IR 5 milliGRAM(s) Oral every 4 hours PRN Moderate Pain (4 - 6)    [x] Adequacy of sedation and pain control has been assessed and adjusted    RESPIRATORY  RR: 15 (10-15-23 @ 01:00) (9 - 33)  SpO2: 98% (10-15-23 @ 01:00) (92% - 100%)  Wt(kg): --  Exam: unlabored, clear to auscultation bilaterally  Mechanical Ventilation:   ABG - ( 14 Oct 2023 11:49 )  pH: 7.38  /  pCO2: 41    /  pO2: 179   / HCO3: 24    / Base Excess: -0.8  /  SaO2: 99.4    Lactate: x                [N/A] Extubation Readiness Assessed  Meds:     CARDIOVASCULAR  HR: 101 (10-15-23 @ 01:00) (60 - 109)  BP: 174/105 (10-15-23 @ 01:00) (136/73 - 187/105)  BP(mean): 134 (10-15-23 @ 01:00) (98 - 138)  ABP: 183/95 (10-15-23 @ 01:00) (131/81 - 196/103)  ABP(mean): 129 (10-15-23 @ 01:00) (95 - 139)  Wt(kg): --  CVP(cm H2O): --  VBG - ( 13 Oct 2023 16:45 )  pH: 7.38  /  pCO2: 50    /  pO2: 30    / HCO3: 30    / Base Excess: 3.4   /  SaO2: 48.4   Lactate: 1.7                Exam: regular rate and rhythm  Cardiac Rhythm: sinus  Perfusion     [x]Adequate   [ ]Inadequate  Mentation   [x]Normal       [ ]Reduced  Extremities  [x]Warm         [ ]Cool  Volume Status [ ]Hypervolemic [x]Euvolemic [ ]Hypovolemic  Meds: metoprolol tartrate 25 milliGRAM(s) Oral every 12 hours      GI/NUTRITION  Exam: soft, nontender, nondistended, incision C/D/I  Diet:  Meds: polyethylene glycol 3350 17 Gram(s) Oral daily  senna 2 Tablet(s) Oral at bedtime      GENITOURINARY  I&O's Detail    10-13 @ 07:01  -  10-14 @ 07:00  --------------------------------------------------------  IN:    IV PiggyBack: 200 mL    Oral Fluid: 160 mL  Total IN: 360 mL    OUT:    Voided (mL): 625 mL  Total OUT: 625 mL    Total NET: -265 mL      10-14 @ 07:01  -  10-15 @ 05:51  --------------------------------------------------------  IN:    IV PiggyBack: 150 mL    IV PiggyBack: 350 mL  Total IN: 500 mL    OUT:    Indwelling Catheter - Urethral (mL): 235 mL    Voided (mL): 550 mL  Total OUT: 785 mL    Total NET: -285 mL          10-14    137  |  102  |  14  ----------------------------<  134<H>  4.3   |  26  |  0.79    Ca    9.7      14 Oct 2023 22:43  Phos  4.0     10-14  Mg     1.8     10-14    TPro  6.9  /  Alb  4.2  /  TBili  0.5  /  DBili  x   /  AST  62<H>  /  ALT  50<H>  /  AlkPhos  69  10-13    [ ] [place] catheter, indication: N/A  Meds:     HEMATOLOGIC  Meds:   [x] VTE Prophylaxis                        11.5   9.64  )-----------( 207      ( 14 Oct 2023 22:43 )             34.5     PT/INR - ( 14 Oct 2023 22:43 )   PT: 11.9 sec;   INR: 1.14 ratio         PTT - ( 14 Oct 2023 22:43 )  PTT:25.6 sec  Transfusion     [ ] PRBC   [ ] Platelets   [ ] FFP   [ ] Cryoprecipitate    INFECTIOUS DISEASES  WBC Count: 9.64 K/uL (10-14 @ 22:43)    RECENT CULTURES:    Meds:     ENDOCRINE  CAPILLARY BLOOD GLUCOSE        Meds:     ACCESS DEVICES:  [ ] Peripheral IV  [ ] Central Venous Line	[ ] R	[ ] L	[ ] IJ	[ ] Fem	[ ] SC	Placed:   [ ] Arterial Line		[ ] R	[ ] L	[ ] Fem	[ ] Rad	[ ] Ax	Placed:   [ ] PICC:					[ ] Mediport  [ ] Urinary Catheter, Date Placed:   [x] Necessity of urinary, arterial, and venous catheters discussed    OTHER MEDICATIONS:  chlorhexidine 2% Cloths 1 Application(s) Topical <User Schedule>  lidocaine   4% Patch 1 Patch Transdermal every 24 hours  naloxone Injectable 0.1 milliGRAM(s) IV Push every 3 minutes PRN      CODE STATUS:      IMAGING:
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 ptn is in chair, pain free, on PCA pump, collar on, wife at bedside. ptn is tachy, BP slightly high. denies palpitations, on Metoprolol 25 mg q12H since 10/14, could use a higher dose. consider raising to 50 mg q12H.    MEDICATIONS  (STANDING):  acetaminophen   IVPB .. 1000 milliGRAM(s) IV Intermittent every 6 hours  chlorhexidine 2% Cloths 1 Application(s) Topical <User Schedule>  HYDROmorphone PCA (1 mG/mL) 30 milliLiter(s) PCA Continuous PCA Continuous  lactated ringers. 1000 milliLiter(s) (20 mL/Hr) IV Continuous <Continuous>  lidocaine   4% Patch 1 Patch Transdermal every 24 hours  methocarbamol 750 milliGRAM(s) Oral three times a day  metoprolol tartrate 25 milliGRAM(s) Oral every 12 hours  polyethylene glycol 3350 17 Gram(s) Oral daily  senna 2 Tablet(s) Oral at bedtime    MEDICATIONS  (PRN):  HYDROmorphone  Injectable 0.5 milliGRAM(s) IV Push every 3 hours PRN Breakthrough Pain  HYDROmorphone PCA (1 mG/mL) Rescue Clinician Bolus 0.5 milliGRAM(s) IV Push every 15 minutes PRN for Pain Scale GREATER THAN 6  nalbuphine Injectable 2.5 milliGRAM(s) IV Push every 6 hours PRN Pruritus  naloxone Injectable 0.1 milliGRAM(s) IV Push every 3 minutes PRN For ANY of the following changes in patient status:  A. RR LESS THAN 10 breaths per minute, B. Oxygen saturation LESS THAN 90%, C. Sedation score of 6  ondansetron Injectable 4 milliGRAM(s) IV Push every 6 hours PRN Nausea      Vital Signs Last 24 Hrs  T(F): 98.3 (10-15-23 @ 07:00), Max: 98.8 (10-14-23 @ 23:00)  HR: 107 (10-15-23 @ 09:30) (97 - 109)  BP: 156/95 (10-15-23 @ 09:00) (143/97 - 187/105)  RR: 37 (10-15-23 @ 09:30) (9 - 50)  SpO2: 97% (10-15-23 @ 09:30) (91% - 100%)  Telemetry:   CAPILLARY BLOOD GLUCOSE        I&O's Summary    14 Oct 2023 07:01  -  15 Oct 2023 07:00  --------------------------------------------------------  IN: 650 mL / OUT: 885 mL / NET: -235 mL        PHYSICAL EXAM:  GENERAL: NAD, well-developed  HEAD:  Atraumatic, Normocephalic  EYES: EOMI, PERRLA, conjunctiva and sclera clear  NECK: Supple, No JVD  CHEST/LUNG: Clear to auscultation bilaterally; No wheeze  HEART: Regular rate and rhythm; No murmurs, rubs, or gallops  ABDOMEN: Soft, Nontender, Nondistended; Bowel sounds present  EXTREMITIES:  2+ Peripheral Pulses, No clubbing, cyanosis, or edema  PSYCH: AAOx3  NEUROLOGY: non-focal  SKIN: No rashes or lesions    LABS:                        11.5   9.64  )-----------( 207      ( 14 Oct 2023 22:43 )             34.5     10-14    137  |  102  |  14  ----------------------------<  134<H>  4.3   |  26  |  0.79    Ca    9.7      14 Oct 2023 22:43  Phos  4.0     10-14  Mg     1.8     10-14    TPro  6.9  /  Alb  4.2  /  TBili  0.5  /  DBili  x   /  AST  62<H>  /  ALT  50<H>  /  AlkPhos  69  10-13    PT/INR - ( 14 Oct 2023 22:43 )   PT: 11.9 sec;   INR: 1.14 ratio         PTT - ( 14 Oct 2023 22:43 )  PTT:25.6 sec      Urinalysis Basic - ( 14 Oct 2023 22:43 )    Color: x / Appearance: x / SG: x / pH: x  Gluc: 134 mg/dL / Ketone: x  / Bili: x / Urobili: x   Blood: x / Protein: x / Nitrite: x   Leuk Esterase: x / RBC: x / WBC x   Sq Epi: x / Non Sq Epi: x / Bacteria: x        RADIOLOGY & ADDITIONAL TESTS:    Imaging Personally Reviewed:    Consultant(s) Notes Reviewed:      Care Discussed with Consultants/Other Providers:  
Patient seen and examined at bedside.    --Anticoagulation--    T(C): 36.6 (10-14-23 @ 15:35), Max: 36.9 (10-13-23 @ 19:53)  HR: 109 (10-14-23 @ 18:00) (60 - 111)  BP: 149/93 (10-14-23 @ 17:00) (131/74 - 217/89)  RR: 33 (10-14-23 @ 18:00) (10 - 35)  SpO2: 94% (10-14-23 @ 18:00) (92% - 97%)  Wt(kg): --    Exam:   Pt is in good spirits, family very appreciative. Exam: Drowsy. Ox3, in c-collar. RUE 3/5 in Deltoid and bicep, 4-/5 in triceps, 5/5 in HG. LUE 5/5, BLE 5/5. SILT. No hoffmans/ no clonus. Incision c/d/i, HMV draining well, bloody output.
Surgery Progress Note     24hour Events:   - SICU for frequent neuro checks    Subjective:  Patient seen and examined. Pain well managed with current medications.     Vital Signs:  Vital Signs Last 24 Hrs  T(C): 36.6 (14 Oct 2023 15:35), Max: 36.9 (13 Oct 2023 19:53)  T(F): 97.9 (14 Oct 2023 15:35), Max: 98.4 (13 Oct 2023 19:53)  HR: 109 (14 Oct 2023 16:20) (60 - 111)  BP: 160/79 (14 Oct 2023 16:00) (131/74 - 217/89)  BP(mean): 108 (14 Oct 2023 16:00) (96 - 131)  RR: 18 (14 Oct 2023 15:35) (10 - 35)  SpO2: 93% (14 Oct 2023 16:20) (92% - 97%)    Parameters below as of 14 Oct 2023 15:35  Patient On (Oxygen Delivery Method): nasal cannula  O2 Flow (L/min): 2    Physical Exam:  GENERAL: NAD, well-groomed, well-developed  HEAD:  Atraumatic, Normocephalic,  EYES: EOMI, conjunctiva and sclera clear  ENMT: No tonsillar erythema, exudates, or enlargement; Moist mucous membranes, in C-collar  NECK: Supple, trachea midline  HEART: Palpable radial pulse, Regular rate  RESPIRATORY: no increased work of breathing, on room air  ABDOMEN: Soft, nondistended, no TTP, no rebound, no guarding  NEUROLOGY: A&Ox3, nonfocal, moving all extremities  EXTREMITIES:  2+ Peripheral Pulses, No clubbing, cyanosis, or edema  SKIN: warm, dry, normal color, no rash or abnormal lesions    Labs:    10-14    140  |  104  |  19  ----------------------------<  149<H>  4.2   |  23  |  0.85    Ca    9.4      14 Oct 2023 00:40  Phos  4.0     10-14  Mg     2.0     10-14    TPro  6.9  /  Alb  4.2  /  TBili  0.5  /  DBili  x   /  AST  62<H>  /  ALT  50<H>  /  AlkPhos  69  10-13    LIVER FUNCTIONS - ( 13 Oct 2023 16:53 )  Alb: 4.2 g/dL / Pro: 6.9 g/dL / ALK PHOS: 69 U/L / ALT: 50 U/L / AST: 62 U/L / GGT: x                                 13.1   10.47 )-----------( 227      ( 14 Oct 2023 00:40 )             39.9     PT/INR - ( 14 Oct 2023 00:40 )   PT: 11.2 sec;   INR: 1.02 ratio         PTT - ( 14 Oct 2023 00:40 )  PTT:27.1 sec  
Patient seen and examined at bedside.    --Anticoagulation--    T(C): 36.8 (10-15-23 @ 07:00), Max: 37.1 (10-14-23 @ 23:00)  HR: 103 (10-15-23 @ 08:00) (97 - 109)  BP: 151/92 (10-15-23 @ 08:00) (143/97 - 187/105)  RR: 21 (10-15-23 @ 08:00) (9 - 50)  SpO2: 99% (10-15-23 @ 08:00) (91% - 100%)  Wt(kg): --    Exam: AOx3, RUE Delt 1/5, bicep 2/5, tricep 4/5, hg 5/5, intrinsics 4/5. LUE 5/5, dec sens fingers/thighs, ble 5/5      
SUBJECTIVE:   Patient was seen and evaluated at bedside. Patient is resting in bed and is in no new acute distress.   OVERNIGHT EVENTS:   none   Vital Signs Last 24 Hrs  T(C): 36.9 (17 Oct 2023 09:00), Max: 36.9 (17 Oct 2023 09:00)  T(F): 98.5 (17 Oct 2023 09:00), Max: 98.5 (17 Oct 2023 09:00)  HR: 68 (17 Oct 2023 09:00) (68 - 120)  BP: 127/79 (17 Oct 2023 09:00) (127/79 - 153/93)  BP(mean): 108 (16 Oct 2023 12:00) (108 - 108)  RR: 19 (17 Oct 2023 09:00) (12 - 19)  SpO2: 92% (17 Oct 2023 05:41) (92% - 96%)    Parameters below as of 17 Oct 2023 05:41  Patient On (Oxygen Delivery Method): room air        PHYSICAL EXAM:    General: No Acute Distress     Neurological: Awake, alert oriented to person, place and time, Following Commands, PERRL, EOMI, Face Symmetrical, Speech Fluent, Moving all extremities, Muscle Strength   uppers   biceps-2/5 , deltoids 1/5 , able to shrug shoulders, triceps 3/5 , numbness on fingers improved   b/l legs - 4+/5 , numbness in foor persist   Pulmonary: Clear to Auscultation, No Rales, No Rhonchi, No Wheezes     Cardiovascular: S1, S2, Regular Rate and Rhythm     Gastrointestinal: Soft, Nontender, Nondistended     Incision: clean and dry       LABS:                        11.4   10.31 )-----------( 222      ( 17 Oct 2023 10:30 )             33.8    10-17    131<L>  |  97  |  16  ----------------------------<  134<H>  4.4   |  24  |  0.62    Ca    8.8      17 Oct 2023 10:30    TPro  6.5  /  Alb  3.7  /  TBili  0.6  /  DBili  x   /  AST  44<H>  /  ALT  35  /  AlkPhos  66  10-16        10-16 @ 07:01  -  10-17 @ 07:00  --------------------------------------------------------  IN: 330 mL / OUT: 930 mL / NET: -600 mL    10-17 @ 07:01  -  10-17 @ 11:51  --------------------------------------------------------  IN: 0 mL / OUT: 225 mL / NET: -225 mL      DRAINS:   hmv 30 cc   MEDICATIONS:  Antibiotics:    Neuro:  HYDROmorphone  Injectable 0.5 milliGRAM(s) IV Push every 3 hours PRN Breakthrough Pain  HYDROmorphone PCA (1 mG/mL) 30 milliLiter(s) PCA Continuous PCA Continuous  HYDROmorphone PCA (1 mG/mL) Rescue Clinician Bolus 0.5 milliGRAM(s) IV Push every 15 minutes PRN for Pain Scale GREATER THAN 6  methocarbamol 750 milliGRAM(s) Oral three times a day  nalbuphine Injectable 2.5 milliGRAM(s) IV Push every 6 hours PRN Pruritus  ondansetron Injectable 4 milliGRAM(s) IV Push every 6 hours PRN Nausea    Cardiac:    Pulm:    GI/:  polyethylene glycol 3350 17 Gram(s) Oral daily  senna 2 Tablet(s) Oral at bedtime    Other:   chlorhexidine 2% Cloths 1 Application(s) Topical <User Schedule>  lactated ringers. 1000 milliLiter(s) IV Continuous <Continuous>  lidocaine   4% Patch 1 Patch Transdermal every 24 hours  naloxone Injectable 0.1 milliGRAM(s) IV Push every 3 minutes PRN For ANY of the following changes in patient status:  A. RR LESS THAN 10 breaths per minute, B. Oxygen saturation LESS THAN 90%, C. Sedation score of 6  phytonadione  IVPB 10 milliGRAM(s) IV Intermittent daily  sodium chloride 2 Gram(s) Oral every 8 hours  sodium chloride 0.9%. 1000 milliLiter(s) IV Continuous <Continuous>    DIET: [] Regular [] CCD [] Renal [] Puree [] Dysphagia [] Tube Feeds:   regular   IMAGING:   ACC: [id number] EXAM:  CT CERVICAL SPINE   ORDERED BY: DIANE ESCOBEDO     PROCEDURE DATE:  10/15/2023          INTERPRETATION:  INDICATIONS:  Spinal fusion.    TECHNIQUE:  Axial images were obtained using multislice helical   technique.  Reformatted coronal and sagittal images were performed.    COMPARISON EXAMINATION:  Cervical spine CT dated 10/13/2023 and cervical   spine MRI dated 10/13/2023.    FINDINGS:  Status post C2-C6 posterior decompression and C1-C7 posterior spinal   fusion. Spinal fusion hardware appears intact.    Interval reduction of the anterior subluxation seen at C5-C6 and perched   facets at this level. There is nonspecific stranding of the cervical   lordosis.    Redemonstrated fractures of the anterior and posterior arches of the   atlas. Redemonstrated fracture of the left C6 transverse process   extending into the transverse foramen.    Mild height loss is noted at the anterior aspect of C6 vertebral body   which may represent an additional nondisplaced fracture.    Evaluation of the spinal canal is limited due to streak artifact.    Surgical staples and drain overlying the laminectomy site project in the   posterior neck. Extensive soft tissue swelling is noted in the surgical   bed. Mild prevertebral soft tissueswelling is also noted.    Trace right apical pneumothorax is present. A trace right pneumothorax   was also present on 10/13/2023    IMPRESSION:    Redemonstrated cervical spine fractures with interval spinal   decompression and fusion as above.    --- End of Report ---           VIV CHO MD; Resident Radiologist  This document has been electronically signed.  EMILY AUTSIN MD; Attending Radiologist  This document has been electronically signed. Oct 15 2023  4:57PM  mr [email or web address] veliz   
SUBJECTIVE:   Patient was seen and evaluated at bedside. Patient is resting in bed and is in no new acute distress. Frustrated overall about his situation and reminded him to ask for pain meds. Denies SOB or CPs.     Vital Signs Last 24 Hrs  T(C): 36.6 (19 Oct 2023 13:00), Max: 37.6 (19 Oct 2023 04:23)  T(F): 97.9 (19 Oct 2023 13:00), Max: 99.7 (19 Oct 2023 04:23)  HR: 117 (19 Oct 2023 13:00) (92 - 117)  BP: 146/95 (19 Oct 2023 13:00) (131/84 - 158/96)  BP(mean): --  RR: 17 (19 Oct 2023 13:00) (17 - 18)  SpO2: 93% (19 Oct 2023 13:00) (92% - 94%)    Parameters below as of 19 Oct 2023 11:01  Patient On (Oxygen Delivery Method): room air    PHYSICAL EXAM:    General: No Acute Distress     Neurological: Awake, alert oriented to person, place and time, Following Commands, PERRL, EOMI, Face Symmetrical, Speech Fluent, Moving all extremities, Muscle Strength   uppers biceps-2/5 , deltoids 1/5 , able to shrug shoulders, triceps 3/5 , HG 4/5 ,  numbness on fingers improved   b/l legs - 4+/5 , numbness in foot persist     Pulmonary: Clear to Auscultation, No Rales, No Rhonchi, No Wheezes     Cardiovascular: S1, S2, Regular Rate and Rhythm     Gastrointestinal: Soft, Nontender, Nondistended     Incision: clean and dry, +aquacel AG    LABS:                        11.4   10.04 )-----------( 262      ( 18 Oct 2023 06:36 )             33.8    10-19    135  |  97  |  17  ----------------------------<  125<H>  4.2   |  28  |  0.69    Ca    8.9      19 Oct 2023 05:59  Phos  2.3     10-18  Mg     2.1     10-18    MEDICATIONS  (STANDING):  chlorhexidine 2% Cloths 1 Application(s) Topical <User Schedule>  diltiazem    milliGRAM(s) Oral daily  enoxaparin Injectable 40 milliGRAM(s) SubCutaneous <User Schedule>  lidocaine   4% Patch 1 Patch Transdermal every 24 hours  methocarbamol 750 milliGRAM(s) Oral three times a day  metoprolol tartrate 25 milliGRAM(s) Oral two times a day  polyethylene glycol 3350 17 Gram(s) Oral two times a day  senna 2 Tablet(s) Oral at bedtime  sodium chloride 2 Gram(s) Oral every 8 hours  tamsulosin 0.4 milliGRAM(s) Oral daily    MEDICATIONS  (PRN):  acetaminophen     Tablet .. 650 milliGRAM(s) Oral every 6 hours PRN Temp greater or equal to 38C (100.4F), Mild Pain (1 - 3)  bisacodyl Suppository 10 milliGRAM(s) Rectal daily PRN Constipation  HYDROmorphone   Tablet 4 milliGRAM(s) Oral every 4 hours PRN Moderate Pain (4 - 6)  HYDROmorphone   Tablet 6 milliGRAM(s) Oral every 6 hours PRN Severe Pain (7 - 10)     DIET: [x] Regular [] CCD [] Renal [] Puree [] Dysphagia [] Tube Feeds:       IMAGING:   < from: [facility] Duplex Lower Ext Vein Scan, Bilat (10.18.23 @ 16:43) >  IMPRESSION:  No evidence of deep venous thrombosis in either lower extremity.    < end of copied text >            ACC: [id number] EXAM:  CT CERVICAL SPINE   ORDERED BY: DIANE ESCOBEDO     PROCEDURE DATE:  10/15/2023          INTERPRETATION:  INDICATIONS:  Spinal fusion.    TECHNIQUE:  Axial images were obtained using multislice helical   technique.  Reformatted coronal and sagittal images were performed.    COMPARISON EXAMINATION:  Cervical spine CT dated 10/13/2023 and cervical   spine MRI dated 10/13/2023.    FINDINGS:  Status post C2-C6 posterior decompression and C1-C7 posterior spinal   fusion. Spinal fusion hardware appears intact.    Interval reduction of the anterior subluxation seen at C5-C6 and perched   facets at this level. There is nonspecific stranding of the cervical   lordosis.    Redemonstrated fractures of the anterior and posterior arches of the   atlas. Redemonstrated fracture of the left C6 transverse process   extending into the transverse foramen.    Mild height loss is noted at the anterior aspect of C6 vertebral body   which may represent an additional nondisplaced fracture.    Evaluation of the spinal canal is limited due to streak artifact.    Surgical staples and drain overlying the laminectomy site project in the   posterior neck. Extensive soft tissue swelling is noted in the surgical   bed. Mild prevertebral soft tissueswelling is also noted.    Trace right apical pneumothorax is present. A trace right pneumothorax   was also present on 10/13/2023    IMPRESSION:    Redemonstrated cervical spine fractures with interval spinal   decompression and fusion as above.    --- End of Report ---           VIV CHO MD; Resident Radiologist  This document has been electronically signed.  EMILY AUSTIN MD; Attending Radiologist  This document has been electronically signed. Oct 15 2023  4:57PM    ACC: [id number] EXAM:  MR SPINE CERVICAL   ORDERED BY:  CARLOS MCKEON     PROCEDURE DATE:  10/17/2023          INTERPRETATION:  MR cervical spine without gadolinium contrast    CLINICAL INFORMATION:    f/u OR, L and R C5 palsy clinically  MMR    Admitting Dxs: S22.41XA FALL    TECHNIQUE: Sagittal T2-weighted, T1-weighted and STIR images were   obtained. Axial T2-weighted, T1-weighted and T2-weighted gradient echo   images were obtained.    COMPARISON:   CT cervical 10/15/2023 also MR cervical 10/13/2023    FINDINGS:    The cervical spine is significant for posterior stabilization. Posterior   element screws are present at C1-C7 at each level on each side. These   posterior element screws are secured by vertical rods. Allowing for the   MR technique, this hardware appears grossly intact and appropriate in   position. The placement is hardware has resulted in improved vertebral   alignment in the mid cervical spine. Superior endplate deformities of C6   T1 and T2 are associated with mild marrow edema and consistent with   compression fractures. These appear unchanged from 10/13/2023.   Prevertebral fluid is again noted extending from C2 through C6. This may   be mildly diminished in its cephalad aspect compared to 10/13/2023,   otherwise unchanged. Fragmentation at C1 is evident and associated with   mild marrow edema.    Broad laminectomy was performed C2-C6. Within the posterior paraspinal   soft tissues series infiltration and postsurgical scarring without   well-defined fluid collection. No epidural space collection is   recognized. The central canal remains patulous.    Cervical intervertebral disc spaces again demonstrate degeneration with   variable signal intensity loss on the long TR images. There appears to be   fluid in the C5-C6 disc space adjacent to the superior endplate fracture   of C6.   No interval disc pathology is recognized. See previous MR   10/13/2023 for discussion of degenerative disc pathology at cervical   intervertebral disc level.    Cervical cord morphology is unchanged.   The cervical cord demonstrates   no interval focal lesion.   No focal intrinsic cord lesion is identified.    No cord expansion or cord volume loss is recognized.      IMPRESSION:    1. Posterior stabilization C1-C7 and C2-C6 laminectomy with routine   postoperative appearance    2. C6 T1 and T2 superior endplate fractures, unchanged 10/13/2023    3. Prevertebral fluid, likely posttraumatic, somewhat improved 10/13/2023    4. C1 fractures, better demonstrated by the CT technique, unchanged   10/13/2023    --- End of Report ---            TINO LOPEZ MD; Attending Radiologist  This document has been electronically signed. Oct 18 2023 10:06AM

## 2023-10-20 NOTE — DISCHARGE NOTE PROVIDER - CARE PROVIDER_API CALL
Spike Dawkins  Neurosurgery  [address], [address]  [place] [address]  Phone: [phone]  Fax: [phone]  Follow Up Time:     Jaylon Zeng  Cardiovascular Disease  [address], [address]  [place] [address]  Phone: [phone]  Fax: [phone]  Follow Up Time:     Eduard Knowles  Colon/Rectal Surgery  [address], [address]  [place] [address]  Phone: [phone]  Fax: [phone]  Follow Up Time:

## 2023-10-20 NOTE — PROGRESS NOTE ADULT - REASON FOR ADMISSION
12 foot fall with ladder
12 foot fall with ladder, 10/14/23 s/p Posterior C1-C7 Decompression Fusion for unstable spine fx, evacuation of epidural hematoma  Decompression of L C5 Nerve Root.
12 foot fall with ladder

## 2023-10-20 NOTE — DISCHARGE NOTE PROVIDER - PROVIDER TOKENS
PROVIDER:[TOKEN:[[id number]]],PROVIDER:[TOKEN:[[id number]]],PROVIDER:[TOKEN:[[id number]]]

## 2023-10-20 NOTE — PROGRESS NOTE ADULT - TIME BILLING
Agree with above PA note.  cv stable  cont bb as above
Agree with above PA note.  cv stable  cont care per sicu   cont bb as ordered
Patient seen and examined.  Agree with above PA note.  cv stable  pat with midly elevated rates, not dramatically sx in light of borderline rates  cont bb, did not tolerate higher dosing as outpt  low dose dilt for further rate control
Agree with above PA note.  cv stable  cont care per sicu   cont bb as ordered
Agree with above PA note.  cv stable  cont current tx  dcp  cont bb, dilt as ordered

## 2023-10-20 NOTE — DISCHARGE NOTE PROVIDER - NSDCCPCAREPLAN_GEN_ALL_CORE_FT
PRINCIPAL DISCHARGE DIAGNOSIS  Diagnosis: Multiple fractures of cervical spine  Assessment and Plan of Treatment: 10/14/23 Posterior C1-C7 Decompression Fusion for unstable spine fx, evacuation of epidural hematoma; Decompression of L C5 Nerve Root.      SECONDARY DISCHARGE DIAGNOSES  Diagnosis: Hemopneumothorax, right  Assessment and Plan of Treatment: stable.    Diagnosis: Traumatic epidural hematoma  Assessment and Plan of Treatment: 10/14/23 Posterior C1-C7 Decompression Fusion for unstable spine fx, evacuation of epidural hematoma; Decompression of L C5 Nerve Root.    Diagnosis: Vertebral artery dissection  Assessment and Plan of Treatment: Continue aspirin daily, starting today 10/20.    Diagnosis: Hyponatremia  Assessment and Plan of Treatment: continue salt tabs and taper and trend chemistries    Diagnosis: Hemorrhoids  Assessment and Plan of Treatment: reduced at bedside 10/19, maintain good bowel regimen     PRINCIPAL DISCHARGE DIAGNOSIS  Diagnosis: Multiple fractures of cervical spine  Assessment and Plan of Treatment: 10/14/23 Posterior C1-C7 Decompression Fusion for unstable spine fx, evacuation of epidural hematoma; Decompression of L C5 Nerve Root.      SECONDARY DISCHARGE DIAGNOSES  Diagnosis: Hemopneumothorax, right  Assessment and Plan of Treatment: stable.    Diagnosis: Traumatic epidural hematoma  Assessment and Plan of Treatment: 10/14/23 Posterior C1-C7 Decompression Fusion for unstable spine fx, evacuation of epidural hematoma; Decompression of L C5 Nerve Root.    Diagnosis: Vertebral artery dissection  Assessment and Plan of Treatment: Continue aspirin daily, starting today 10/20.    Diagnosis: Hyponatremia  Assessment and Plan of Treatment: continue salt tabs and taper and trend chemistries    Diagnosis: Hemorrhoids  Assessment and Plan of Treatment: reduced at bedside 10/19, maintain good bowel regimen    Diagnosis: Tachycardia  Assessment and Plan of Treatment: -ectopic atrial rhythm/PAT noted on tele 10/19  -cont metorpolol   -Continue cardizem 120mg cd  -Will defer ep eval as pt not likely a candidate for ablation at this time  - f/u Dr Zeng outpatient

## 2023-10-20 NOTE — H&P ADULT - NSHPREVIEWOFSYSTEMS_GEN_ALL_CORE
REVIEW OF SYSTEMS  Constitutional: No fever, No Chills, No fatigue  HEENT: No eye pain, No visual disturbances, No difficulty hearing, (+) posterior cervical spine incision, staples in place   Pulm: No cough,  No shortness of breath  Cardio: No chest pain, No palpitations  GI:  No abdominal pain, No nausea, No vomiting, No loose watery stools, No bowel incontinence  : No dysuria, No frequency, No hematuria, No urinary retention   Neuro: No headaches, No memory loss, No tremors, (+) loss of strength, No tingling burning pain, No sensory deficits, No spasticity  Skin: No itching, No rashes, incision as above  Endo: No temperature intolerance  MSK: No joint pain, No joint swelling, (+)  muscle pain, (+) neck pain, No back pain  Psych:  No depression, No anxiety

## 2023-10-20 NOTE — H&P ADULT - NSHPSOCIALHISTORY_GEN_ALL_CORE
SOCIAL HISTORY  Smoking - Denies  EtOH - Denies  Illicit Drugs - Denies    FUNCTIONAL HISTORY  Lives w wife in house w stairs  PTA Independent    CURRENT FUNCTIONAL STATUS:  Min A transfers and gait

## 2023-10-20 NOTE — DISCHARGE NOTE NURSING/CASE MANAGEMENT/SOCIAL WORK - NSDCPELOVENOXREACT_GEN_ALL_CORE
PHYSICAL EXAM:  GENERAL: NAD, speaks in full sentences, no signs of respiratory distress  HEAD:  Atraumatic, Normocephalic  EYES: EOMI, PERRLA, conjunctiva and sclera clear  NECK: Supple, No JVD  CHEST/LUNG: Clear to auscultation bilaterally; No wheeze; No crackles; No accessory muscles used  HEART: Regular rate and rhythm; No murmurs;   ABDOMEN: Soft, Nontender, Nondistended; Bowel sounds present; No guarding  EXTREMITIES:  2+ Peripheral Pulses, No cyanosis or edema  PSYCH: AAOx3  NEUROLOGY: non-focal  SKIN: No rashes or lesions Enoxaparin/Lovenox increases your risk for bleeding. Notify your doctor if you experience any of the following side effects: unusual bleeding or bruising, coughing up or vomiting blood, red or black stool, blood in your urine, itching or hives, chest tightness, chest pain, shortness of breath, trouble breathing, swelling in your face or hands, swelling in your mouth or throat, fever, large flat blue or purplish patches on the skin, numbness or weakness in your arm or leg on one side, pain in your lower leg, sudden or severe headache with vision, speech or walking problems. When Enoxaparin/Lovenox is taken with other medicines, they can affect how it works. Taking other medications such as aspirin, blood thinners, and nonsteroidal anti-inflammatories increases your risk of bleeding. It is very important to tell your health care provider about all of the other medicines, including over-the-counter medications, herbs, and vitamins you are taking. DO NOT start, stop, or change the dosage of any medicine, including over-the-counter medicines, vitamins, and herbal products without your doctor’s approval. Any products containing aspirin or are nonsteroidal anti-inflammatories lessen the blood’s ability to form clots and adds to the effect of Enoxaparin/Lovenox. Never take aspirin or medicines that contain aspirin without speaking to your doctor.

## 2023-10-20 NOTE — DISCHARGE NOTE NURSING/CASE MANAGEMENT/SOCIAL WORK - NSDCFUADDAPPT_GEN_ALL_CORE_FT
Please remove staples on POD # 14- 10/28/23   Please wear hard cervical collar at all times except for sleeping in bed.     Please follow up with the following physicians upon discharge from rehab:  1- Dr Perez  2- PCP  3- Dr Zeng cardiology  4- Dr Knowles - colorectal surgery- maintain good bowel regimen    Aortic Root Dilatation  -stable on recent outpt echo from 9/5/23:  The ascending aorta is mildly dilated.  Aortic Root diameter is (2D-mode) 4.02 cm. Ascending Aortic Diameter: 3.98 cm.  -echo q 12 months    # Atrial arrhythmia  -ectopic atrial rhythm/PAT noted on tele 10/19  -bb as above   -Continue cardizem 120mg cd  -Will defer ep eval as pt not likely a candidate for ablation at this time

## 2023-10-20 NOTE — PROGRESS NOTE ADULT - PROVIDER SPECIALTY LIST ADULT
Anesthesia
Anesthesia
Cardiology
Internal Medicine
Anesthesia
Cardiology
Cardiology
Internal Medicine
Neurosurgery
Neurosurgery
Rehab Medicine
Trauma Surgery
Anesthesia
Cardiology
Internal Medicine
Neurosurgery
Rehab Medicine
SICU
Surgery
Cardiology
Neurosurgery

## 2023-10-20 NOTE — DISCHARGE NOTE NURSING/CASE MANAGEMENT/SOCIAL WORK - NSDCPEFALRISK_GEN_ALL_CORE
For information on Fall & Injury Prevention, visit: [email or web address] OR  [email or web address] OR  https://www.cdc.gov/steadi/patient.html

## 2023-10-20 NOTE — H&P ADULT - NSHPLABSRESULTS_GEN_ALL_CORE
< from: MR Cervical Spine No Cont (10.17.23 @ 18:56) >    IMPRESSION:    1. Posterior stabilization C1-C7 and C2-C6 laminectomy with routine   postoperative appearance    2. C6 T1 and T2 superior endplate fractures, unchanged 10/13/2023    3. Prevertebral fluid, likely posttraumatic, somewhat improved 10/13/2023    4. C1 fractures, better demonstrated by the CT technique, unchanged   10/13/2023      < from: [facility] Duplex Lower Ext Vein Scan, Bilat (10.18.23 @ 16:43) >      IMPRESSION:  No evidence of deep venous thrombosis in either lower extremity.    < end of copied text >    < from: CT Chest w/ IV Cont (10.13.23 @ 17:35) >    BONES: Right-sided rib fractures as above. Chronic appearing fracture   deformity of the right inferior pubic ramus. Degenerative changes of the   spine. Stable nonaggressive appearing lytic lesion in the T8 vertebral   body.    IMPRESSION:  Acute displaced right lateral 8-10th rib fractures.    Trace right pneumothorax and pneumomediastinum. Small right pleural   effusion.    < end of copied text >        < from: CT Head No Cont (10.13.23 @ 17:33) >    IMPRESSION:  CT head:  -No acute intracranial findings.    CT cervical spine:  -C1 anterior and posterior arch fractures with lateral displacement of   the lateral masses.  -Spinous process fractures from C2 through C5.  -Anterior subluxation of C5 on C6 with bilateral perched facets and   likely posterior ligamentous injury.  -Left C6 transverse process/transverse foramen fractures.  -Hyperdensity within theposterior spinal canal at C2-C3 levels, likely   representing epidural hematoma.    Recommend MRI cervical spine for further evaluation.    CT angiogram head:  -No vaso-occlusive disease.    CT angiogram neck:  -Focal traumatic occlusion/dissection of the left vertebral artery at C1   level. Reconstitution of flow is noted in the left intracranial vertebral   artery, likely retrograde.    < end of copied text >

## 2023-10-20 NOTE — DISCHARGE NOTE PROVIDER - NSDCMRMEDTOKEN_GEN_ALL_CORE_FT
acetaminophen 325 mg oral tablet: 2 tab(s) orally every 6 hours As needed Temp greater or equal to 38C (100.4F), Mild Pain (1 - 3)  aspirin 81 mg oral tablet: 1 tab(s) orally once a day  bisacodyl 10 mg rectal suppository: 1 suppository(ies) rectal once a day As needed Constipation  dilTIAZem 120 mg/24 hours oral capsule, extended release: 1 cap(s) orally once a day  enoxaparin: 40 milligram(s) subcutaneous once a day (at bedtime)  HYDROmorphone 2 mg oral tablet: 3 tab(s) orally every 6 hours As needed Severe Pain (7 - 10)  HYDROmorphone 4 mg oral tablet: 1 tab(s) orally every 4 hours As needed Moderate Pain (4 - 6)  lidocaine 4% topical film: Apply topically to affected area once a day  methocarbamol 750 mg oral tablet: 1 tab(s) orally 3 times a day  metoprolol tartrate 25 mg oral tablet: 1 tab(s) orally 2 times a day  polyethylene glycol 3350 oral powder for reconstitution: 17 gram(s) orally 2 times a day  senna leaf extract oral tablet: 2 tab(s) orally once a day (at bedtime)  sodium chloride 1 g oral tablet: 2 tab(s) orally every 8 hours  tamsulosin 0.4 mg oral capsule: 1 cap(s) orally once a day

## 2023-10-20 NOTE — DISCHARGE NOTE PROVIDER - NSDCACTIVITY_GEN_ALL_CORE
Bathing allowed/Do not drive or operate machinery/Showering allowed/Do not make important decisions/Stairs allowed/Walking - Indoors allowed/Walking - Outdoors allowed/Follow Instructions Provided by your Surgical Team

## 2023-10-20 NOTE — DISCHARGE NOTE NURSING/CASE MANAGEMENT/SOCIAL WORK - CAREGIVER ADDRESS
Patient is scheduled for 10/30 with Dr Begum for a follow up   [address], [place], [address]

## 2023-10-20 NOTE — H&P ADULT - NSHPPHYSICALEXAM_GEN_ALL_CORE
Vital Signs Last 24 Hrs  T(C): 36.7 (20 Oct 2023 09:00), Max: 37.1 (19 Oct 2023 17:30)  T(F): 98 (20 Oct 2023 09:00), Max: 98.7 (19 Oct 2023 17:30)  HR: 68 (20 Oct 2023 09:00) (68 - 100)  BP: 131/76 (20 Oct 2023 09:00) (112/75 - 138/91)  RR: 17 (20 Oct 2023 09:00) (17 - 18)  SpO2: 92% (20 Oct 2023 09:00) (92% - 94%)    General: NAD, Resting Comfortable, Neck collar is on                                  HEENT: NCAT, EOM I, PERRLA, Normal Conjunctivae  Cardio: RRR, Normal S1-S2, No M/G/R                              Pulm: No Respiratory Distress,  Lungs CTAB                        Abdomen: ND, NT, Soft, BS+                                                MSK: No joint swelling, Full ROM                                         Ext: No C/C/E, Pulses (2+) throughout, No calf tenderness    Skin:  all skin intact                                                                 Neurological Examination    Cognitive: AAO x 3, follows command                                                                        Attention: Easily distracted, long processing time   Judgment: (+) evidence of judgement                               Memory:  Intact  Mood/Affect: wnl                                                                           Communication:  Fluent,  No dysarthria   CN II - XII  intact  Coordination: FTN/HTS intact                                                                              Sensory: Intact to light touch                                                                                            Tone: normal Throughout   Motor    LEFT    UE: SF [1/5], EF [3-/5], EE [3/5], WE [5/5],  [5/5]  RIGHT UE: SF [1/5], EF [3-/5], EE [3/5], WE [5/5],  [5/5]  LEFT    LE:  HF [5/5], KE [5/5], DF [5/5], PF [5/5]  RIGHT LE:  HF [5/5], KE [5/5], DF [5/5], PF [5/5]    Reflex:  2 + thoroughout, Hollingsworth/Babinski negative

## 2023-10-21 LAB
ALBUMIN SERPL ELPH-MCNC: 2.7 G/DL — LOW (ref 3.3–5)
ALBUMIN SERPL ELPH-MCNC: 2.7 G/DL — LOW (ref 3.3–5)
ALP SERPL-CCNC: 80 U/L — SIGNIFICANT CHANGE UP (ref 40–120)
ALP SERPL-CCNC: 80 U/L — SIGNIFICANT CHANGE UP (ref 40–120)
ALT FLD-CCNC: 29 U/L — SIGNIFICANT CHANGE UP (ref 10–45)
ALT FLD-CCNC: 29 U/L — SIGNIFICANT CHANGE UP (ref 10–45)
ANION GAP SERPL CALC-SCNC: 6 MMOL/L — SIGNIFICANT CHANGE UP (ref 5–17)
ANION GAP SERPL CALC-SCNC: 6 MMOL/L — SIGNIFICANT CHANGE UP (ref 5–17)
AST SERPL-CCNC: 20 U/L — SIGNIFICANT CHANGE UP (ref 10–40)
AST SERPL-CCNC: 20 U/L — SIGNIFICANT CHANGE UP (ref 10–40)
BILIRUB SERPL-MCNC: 0.6 MG/DL — SIGNIFICANT CHANGE UP (ref 0.2–1.2)
BILIRUB SERPL-MCNC: 0.6 MG/DL — SIGNIFICANT CHANGE UP (ref 0.2–1.2)
BUN SERPL-MCNC: 19 MG/DL — SIGNIFICANT CHANGE UP (ref 7–23)
BUN SERPL-MCNC: 19 MG/DL — SIGNIFICANT CHANGE UP (ref 7–23)
CALCIUM SERPL-MCNC: 9 MG/DL — SIGNIFICANT CHANGE UP (ref 8.4–10.5)
CALCIUM SERPL-MCNC: 9 MG/DL — SIGNIFICANT CHANGE UP (ref 8.4–10.5)
CHLORIDE SERPL-SCNC: 97 MMOL/L — SIGNIFICANT CHANGE UP (ref 96–108)
CHLORIDE SERPL-SCNC: 97 MMOL/L — SIGNIFICANT CHANGE UP (ref 96–108)
CO2 SERPL-SCNC: 31 MMOL/L — SIGNIFICANT CHANGE UP (ref 22–31)
CO2 SERPL-SCNC: 31 MMOL/L — SIGNIFICANT CHANGE UP (ref 22–31)
CREAT SERPL-MCNC: 0.74 MG/DL — SIGNIFICANT CHANGE UP (ref 0.5–1.3)
CREAT SERPL-MCNC: 0.74 MG/DL — SIGNIFICANT CHANGE UP (ref 0.5–1.3)
EGFR: 98 ML/MIN/1.73M2 — SIGNIFICANT CHANGE UP
EGFR: 98 ML/MIN/1.73M2 — SIGNIFICANT CHANGE UP
GLUCOSE SERPL-MCNC: 116 MG/DL — HIGH (ref 70–99)
GLUCOSE SERPL-MCNC: 116 MG/DL — HIGH (ref 70–99)
HCT VFR BLD CALC: 35.9 % — LOW (ref 39–50)
HCT VFR BLD CALC: 35.9 % — LOW (ref 39–50)
HGB BLD-MCNC: 11.7 G/DL — LOW (ref 13–17)
HGB BLD-MCNC: 11.7 G/DL — LOW (ref 13–17)
MCHC RBC-ENTMCNC: 29.7 PG — SIGNIFICANT CHANGE UP (ref 27–34)
MCHC RBC-ENTMCNC: 29.7 PG — SIGNIFICANT CHANGE UP (ref 27–34)
MCHC RBC-ENTMCNC: 32.6 GM/DL — SIGNIFICANT CHANGE UP (ref 32–36)
MCHC RBC-ENTMCNC: 32.6 GM/DL — SIGNIFICANT CHANGE UP (ref 32–36)
MCV RBC AUTO: 91.1 FL — SIGNIFICANT CHANGE UP (ref 80–100)
MCV RBC AUTO: 91.1 FL — SIGNIFICANT CHANGE UP (ref 80–100)
NRBC # BLD: 0 /100 WBCS — SIGNIFICANT CHANGE UP (ref 0–0)
NRBC # BLD: 0 /100 WBCS — SIGNIFICANT CHANGE UP (ref 0–0)
PLATELET # BLD AUTO: 399 K/UL — SIGNIFICANT CHANGE UP (ref 150–400)
PLATELET # BLD AUTO: 399 K/UL — SIGNIFICANT CHANGE UP (ref 150–400)
POTASSIUM SERPL-MCNC: 4.1 MMOL/L — SIGNIFICANT CHANGE UP (ref 3.5–5.3)
POTASSIUM SERPL-MCNC: 4.1 MMOL/L — SIGNIFICANT CHANGE UP (ref 3.5–5.3)
POTASSIUM SERPL-SCNC: 4.1 MMOL/L — SIGNIFICANT CHANGE UP (ref 3.5–5.3)
POTASSIUM SERPL-SCNC: 4.1 MMOL/L — SIGNIFICANT CHANGE UP (ref 3.5–5.3)
PROT SERPL-MCNC: 6.8 G/DL — SIGNIFICANT CHANGE UP (ref 6–8.3)
PROT SERPL-MCNC: 6.8 G/DL — SIGNIFICANT CHANGE UP (ref 6–8.3)
RBC # BLD: 3.94 M/UL — LOW (ref 4.2–5.8)
RBC # BLD: 3.94 M/UL — LOW (ref 4.2–5.8)
RBC # FLD: 13 % — SIGNIFICANT CHANGE UP (ref 10.3–14.5)
RBC # FLD: 13 % — SIGNIFICANT CHANGE UP (ref 10.3–14.5)
SODIUM SERPL-SCNC: 134 MMOL/L — LOW (ref 135–145)
SODIUM SERPL-SCNC: 134 MMOL/L — LOW (ref 135–145)
WBC # BLD: 8.06 K/UL — SIGNIFICANT CHANGE UP (ref 3.8–10.5)
WBC # BLD: 8.06 K/UL — SIGNIFICANT CHANGE UP (ref 3.8–10.5)
WBC # FLD AUTO: 8.06 K/UL — SIGNIFICANT CHANGE UP (ref 3.8–10.5)
WBC # FLD AUTO: 8.06 K/UL — SIGNIFICANT CHANGE UP (ref 3.8–10.5)

## 2023-10-21 PROCEDURE — 99233 SBSQ HOSP IP/OBS HIGH 50: CPT

## 2023-10-21 PROCEDURE — 99222 1ST HOSP IP/OBS MODERATE 55: CPT

## 2023-10-21 RX ADMIN — SODIUM CHLORIDE 2 GRAM(S): 9 INJECTION INTRAMUSCULAR; INTRAVENOUS; SUBCUTANEOUS at 13:01

## 2023-10-21 RX ADMIN — Medication 120 MILLIGRAM(S): at 05:37

## 2023-10-21 RX ADMIN — HYDROMORPHONE HYDROCHLORIDE 4 MILLIGRAM(S): 2 INJECTION INTRAMUSCULAR; INTRAVENOUS; SUBCUTANEOUS at 21:49

## 2023-10-21 RX ADMIN — POLYETHYLENE GLYCOL 3350 17 GRAM(S): 17 POWDER, FOR SOLUTION ORAL at 18:05

## 2023-10-21 RX ADMIN — Medication 81 MILLIGRAM(S): at 13:00

## 2023-10-21 RX ADMIN — HYDROMORPHONE HYDROCHLORIDE 6 MILLIGRAM(S): 2 INJECTION INTRAMUSCULAR; INTRAVENOUS; SUBCUTANEOUS at 05:39

## 2023-10-21 RX ADMIN — METHOCARBAMOL 750 MILLIGRAM(S): 500 TABLET, FILM COATED ORAL at 21:53

## 2023-10-21 RX ADMIN — HYDROMORPHONE HYDROCHLORIDE 6 MILLIGRAM(S): 2 INJECTION INTRAMUSCULAR; INTRAVENOUS; SUBCUTANEOUS at 03:39

## 2023-10-21 RX ADMIN — Medication 25 MILLIGRAM(S): at 18:05

## 2023-10-21 RX ADMIN — SODIUM CHLORIDE 2 GRAM(S): 9 INJECTION INTRAMUSCULAR; INTRAVENOUS; SUBCUTANEOUS at 05:38

## 2023-10-21 RX ADMIN — POLYETHYLENE GLYCOL 3350 17 GRAM(S): 17 POWDER, FOR SOLUTION ORAL at 05:39

## 2023-10-21 RX ADMIN — TAMSULOSIN HYDROCHLORIDE 0.4 MILLIGRAM(S): 0.4 CAPSULE ORAL at 21:53

## 2023-10-21 RX ADMIN — HYDROMORPHONE HYDROCHLORIDE 6 MILLIGRAM(S): 2 INJECTION INTRAMUSCULAR; INTRAVENOUS; SUBCUTANEOUS at 10:10

## 2023-10-21 RX ADMIN — HYDROMORPHONE HYDROCHLORIDE 6 MILLIGRAM(S): 2 INJECTION INTRAMUSCULAR; INTRAVENOUS; SUBCUTANEOUS at 18:06

## 2023-10-21 RX ADMIN — HYDROMORPHONE HYDROCHLORIDE 6 MILLIGRAM(S): 2 INJECTION INTRAMUSCULAR; INTRAVENOUS; SUBCUTANEOUS at 09:36

## 2023-10-21 RX ADMIN — HYDROMORPHONE HYDROCHLORIDE 6 MILLIGRAM(S): 2 INJECTION INTRAMUSCULAR; INTRAVENOUS; SUBCUTANEOUS at 18:54

## 2023-10-21 RX ADMIN — Medication 25 MILLIGRAM(S): at 05:36

## 2023-10-21 RX ADMIN — LIDOCAINE 1 PATCH: 4 CREAM TOPICAL at 05:37

## 2023-10-21 RX ADMIN — METHOCARBAMOL 750 MILLIGRAM(S): 500 TABLET, FILM COATED ORAL at 05:36

## 2023-10-21 RX ADMIN — SENNA PLUS 2 TABLET(S): 8.6 TABLET ORAL at 21:53

## 2023-10-21 RX ADMIN — LIDOCAINE 1 PATCH: 4 CREAM TOPICAL at 00:08

## 2023-10-21 RX ADMIN — METHOCARBAMOL 750 MILLIGRAM(S): 500 TABLET, FILM COATED ORAL at 13:00

## 2023-10-21 RX ADMIN — SODIUM CHLORIDE 2 GRAM(S): 9 INJECTION INTRAMUSCULAR; INTRAVENOUS; SUBCUTANEOUS at 21:53

## 2023-10-21 RX ADMIN — LIDOCAINE 1 PATCH: 4 CREAM TOPICAL at 12:22

## 2023-10-21 RX ADMIN — ENOXAPARIN SODIUM 40 MILLIGRAM(S): 100 INJECTION SUBCUTANEOUS at 05:35

## 2023-10-21 NOTE — CONSULT NOTE ADULT - ASSESSMENT
Patient is 67yo M with history of chronic back pain (on chronic Dilaudid) who presented to [facility] 10/13 following a fall from a 12 foot ladder resulting in head strike and LOC. He was found to have acute displaced right lateral 8-10 rib fractures, and focal trauma occlusion/dissection of the left vertebral artery at C1 level with C1 anterior and posterior arch fractures with lateral displacement. Also noted to have spinous process fractures C2-C5, C5/C6 anterior subluxation with likely PLL injury, Left C6 transverse process fracture, T4 compression fracture, and C2-C3 epidural hematoma. Patient was admitted and underwent Posterior C1-C7 Decompression/Fusion for unstable spine fracture and evacuation of epidural hematoma. Hospital course complicated by tachycardia and pain.   Admitted to [facility] inpatient re[facility] on 10/20/23 for ADL, gait, and functional impairments.     # S/P Fall with TBI, Unstable cervical spine fracture s/p C1-C7 Decompression/Fusion and evacuation of EDH with cord compression   - Comprehensive Multidisciplinary Rehab Program  -  Pain Management per primary team  - Cervical collar at all times other than sleep  - Fall precautions  - Plan for removal of staples on POD # 14- 10/28/23     # Vertebral Artery Dissection  - ASA 81mg daily - no allergy per patient, has tolerated in the past    # COPD  - Not on home meds  - 2 L oxygen supplement PRN  - Incentive spirometry     #Tachycardia/PAT  - Ectopic atrial rhythm/PAT noted on tele 10/19  - Cont metoprolol   - Continue cardizem 120mg cd  - EP eval was deferred as pt not likely a candidate for ablation at this time  - Follow up with Dr Zeng as outpatient    #Aortic Root Dilatation  - Stable on recent outpt echo from 9/5/23  - Follow up for repeat echo q 12 months    #Hyponatremia  - Monitor BMP  - NaCl tabs 2g q8 hours      # DVT ppx:  - Lovenox

## 2023-10-21 NOTE — PROGRESS NOTE ADULT - SUBJECTIVE AND OBJECTIVE BOX
History of Present Illness:  Reason for Admission: S/P Fall with TBI and Multiple fractures, unstable cervical spine fracture s/p C1-C7 Decompression/Fusion  History of Present Illness:   Patient is 67yo M with history of chronic back pain (on chronic Dilaudid) who presented to [facility] 10/13 following a fall from a 12 foot ladder resulting in head strike and LOC. He was found to have acute displaced right lateral 8-10 rib fractures, and focal trauma occlusion/dissection of the left vertebral artery at C1 level with C1 anterior and posterior arch fractures with lateral displacement. Also noted to have spinous process fractures C2-C5, C5/C6 anterior subluxation with likely PLL injury, Left C6 transverse process fracture, T4 compression fracture, and C2-C3 epidural hematoma. Patient was admitted and underwent Posterior C1-C7 Decompression/Fusion for unstable spine fracture and evacuation of epidural hematoma, decompression of left C5 nerve root. He had routine post operative care in NSCU and then transferred to the floor, Surgical drain removed prior to discharge. Cardiology was consulted and medications were adjusted for tachycardia. Patient was seen by chronic pain and medications were adjusted with good results. He is to wear cervical collar at all times except during sleep. 10/19/23 he had episode of prolapsed hemorrhoids reduced at bedside by surgery team. Patient was evaluated by PM&R and therapy for functional deficits and gait/ ADL impairments and recommended acute rehabilitation. Patient was medically optimized for discharge to [facility] on 10/20.         Review of Systems/Subjective: Patient seen and examined at bedside this morning. Patient in bed in NAD, no SOB, no n/v, pain controlled.  Review of Systems: REVIEW OF SYSTEMS  Constitutional: No fever, No fatigue  HEENT: No eye pain, No visual disturbances  Pulm: No cough,  No shortness of breath  Cardio: No chest pain  GI:  No abdominal pain  : No dysuria  Neuro: (+) loss of strength, No tingling burning pain, No sensory deficits, No spasticity  MSK: No joint pain  Psych:  No depression, No anxiety      Allergies and Intolerances:        Allergies:  	Vioxx: Drug, Unknown, Originally Entered as [Unknown] reaction to [VIOX]  	Nuts: Food, Unknown, Originally Entered as [Unknown] reaction to [BRAZIL NUTS]  	Originally Entered as [Unknown] reaction to [PUMPKIN SEEDS] [freetext allergy; no alerts]: Miscellaneous, Unknown, Originally Entered as [Unknown] reaction to [PUMPKIN SEEDS]    .    Patient History:    Past Medical, Past Surgical, and Family History:  PAST MEDICAL HISTORY:  High cholesterol     History of chronic back pain     Insomnia.     PAST SURGICAL HISTORY:  H/O hernia repair b/l inguinal & abdominal    History of arthroplasty of right knee 5yrs    History of arthroscopy of left shoulder age 15 & right shoulder 1 yrs.     FAMILY HISTORY:  Sibling  Still living? Yes, Estimated age: Age Unknown  Family history of early CAD, Age at diagnosis: Age Unknown.     Social History:  · Substance use	No  · Social History (marital status, living situation, occupation, and sexual history)	SOCIAL HISTORY  Smoking - Denies  EtOH - Denies  Illicit Drugs - Denies    FUNCTIONAL HISTORY  Lives w wife in house w stairs  PTA Independent          Physical Exam:   Vital Signs Last 24 Hrs  T(C): 36.4 (21 Oct 2023 10:46), Max: 36.7 (20 Oct 2023 21:44)  T(F): 97.6 (21 Oct 2023 10:46), Max: 98 (20 Oct 2023 21:44)  HR: 98 (21 Oct 2023 10:46) (66 - 107)  BP: 131/86 (21 Oct 2023 10:46) (127/83 - 132/70)  BP(mean): --  RR: 16 (21 Oct 2023 10:46) (16 - 17)  SpO2: 92% (21 Oct 2023 10:46) (92% - 93%)    Parameters below as of 21 Oct 2023 10:46  Patient On (Oxygen Delivery Method): room air        General: NAD, Resting Comfortable                                HEENT: NCAT, MMM. (+) staples cervical spine, open to air, no d/c.  Cardio: RRR, Normal S1-S2                       Pulm: No Respiratory Distress,  Lungs CTAB                        Abdomen: ND, NT, Soft, BS+                                                MSK: No joint swelling, Full ROM                                         Ext: No C/C/E, Pulses (2+) throughout, No calf tenderness                                                            Neurological Examination    Cognitive: AAO x 3, follows 2SC                                                                                                                                            Communication:  Fluent,  No dysarthria                                                                          Sensory: Intact to light touch                                                                                            Tone: normal Throughout   Motor    LEFT    UE: SF [1/5], EF [3-/5], EE [3/5], WE [5/5],  [5/5]  RIGHT UE: SF [1/5], EF [3-/5], EE [3/5], WE [5/5],  [5/5]  LEFT    LE:  HF [5/5], KE [5/5], DF [5/5], PF [5/5]  RIGHT LE:  HF [5/5], KE [5/5], DF [5/5], PF [5/5]    Reflex:  2 + thoroughout, Hollingsworth/Babinski negative       Labs and Results:    LABS:                        11.7   8.06  )-----------( 399      ( 21 Oct 2023 06:20 )             35.9     21 Oct 2023 06:20    134    |  97     |  19     ----------------------------<  116    4.1     |  31     |  0.74     Ca    9.0        21 Oct 2023 06:20    TPro  6.8    /  Alb  2.7    /  TBili  0.6    /  DBili  x      /  AST  20     /  ALT  29     /  AlkPhos  80     21 Oct 2023 06:20      Labs, Radiology, Cardiology, and Other Results: < from: MR Cervical Spine No Cont (10.17.23 @ 18:56) >    IMPRESSION:    1. Posterior stabilization C1-C7 and C2-C6 laminectomy with routine   postoperative appearance    2. C6 T1 and T2 superior endplate fractures, unchanged 10/13/2023    3. Prevertebral fluid, likely posttraumatic, somewhat improved 10/13/2023    4. C1 fractures, better demonstrated by the CT technique, unchanged   10/13/2023      < from: [facility] Duplex Lower Ext Vein Scan, Bilat (10.18.23 @ 16:43) >      IMPRESSION:  No evidence of deep venous thrombosis in either lower extremity.    < end of copied text >    < from: CT Chest w/ IV Cont (10.13.23 @ 17:35) >    BONES: Right-sided rib fractures as above. Chronic appearing fracture   deformity of the right inferior pubic ramus. Degenerative changes of the   spine. Stable nonaggressive appearing lytic lesion in the T8 vertebral   body.    IMPRESSION:  Acute displaced right lateral 8-10th rib fractures.    Trace right pneumothorax and pneumomediastinum. Small right pleural   effusion.    < end of copied text >        < from: CT Head No Cont (10.13.23 @ 17:33) >    IMPRESSION:  CT head:  -No acute intracranial findings.    CT cervical spine:  -C1 anterior and posterior arch fractures with lateral displacement of   the lateral masses.  -Spinous process fractures from C2 through C5.  -Anterior subluxation of C5 on C6 with bilateral perched facets and   likely posterior ligamentous injury.  -Left C6 transverse process/transverse foramen fractures.  -Hyperdensity within theposterior spinal canal at C2-C3 levels, likely   representing epidural hematoma.    Recommend MRI cervical spine for further evaluation.    CT angiogram head:  -No vaso-occlusive disease.    CT angiogram neck:  -Focal traumatic occlusion/dissection of the left vertebral artery at C1   level. Reconstitution of flow is noted in the left intracranial vertebral   artery, likely retrograde.    < end of copied text >             Assessment:  	  Patient is 67yo M with history of chronic back pain (on chronic Dilaudid) who presented to [facility] 10/13 following a fall from a 12 foot ladder resulting in head strike and LOC. He was found to have acute displaced right lateral 8-10 rib fractures, and focal trauma occlusion/dissection of the left vertebral artery at C1 level with C1 anterior and posterior arch fractures with lateral displacement. Also noted to have spinous process fractures C2-C5, C5/C6 anterior subluxation with likely PLL injury, Left C6 transverse process fracture, T4 compression fracture, and C2-C3 epidural hematoma. Patient was admitted and underwent Posterior C1-C7 Decompression/Fusion for unstable spine fracture and evacuation of epidural hematoma. Hospital course complicated by tachycardia and pain.   Admitted to [facility] on 10/20/23 for ADL, gait, and functional impairments.     # S/P Fall with TBI, Unstable cervical spine fracture s/p C1-C7 Decompression/Fusion and evacuation of EDH with cord compression   - Continue Comprehensive Multidisciplinary Rehab Program: 3 hours a day, 5 days a week with PT/OT  - Seen by Pain Management; pain control with dilaudid as below  - Cervical collar at all times other than sleep  - Fall precautions  - Plan for removal of staples on POD # 14- 10/28/23     # Vertebral Artery Dissection  - ASA 81mg daily - no allergy per patient, has tolerated in the past    # COPD  - Not on home meds  - 2 L oxygen supplement PRN  - Incentive spirometry     #Tachycardia/PAT  - Ectopic atrial rhythm/PAT noted on tele 10/19  - Cont metoprolol   - Continue cardizem 120mg cd  - EP eval was deferred as pt not likely a candidate for ablation at this time  - Follow up with Dr Zeng as outpatient    #Aortic Root Dilatation  - Stable on recent outpt echo from 9/5/23  - Follow up for repeat echo q 12 months    #Hyponatremia  - Monitor BMP  - NaCl tabs 2g q8 hours  - Hospitalist consult appreciated    # Sleep:  - Melatonin qHS    # Pain Management:  - Recent Pain Medicine recs noted  - Tylenol ATC  - Lidocaine patch to ribs  - Robaxin 750mg TID  - Dilaudid 4mg/6mg q4 hours/q6 hours PRN Mod/Severe per Pain Medicine recs    # GI/Bowel:  - Senna QHS, Miralax daily BID  - Dulcolax suppository PRN    # /Bladder:  - Flomax 0.4mg QHS  - Bladder scan x1 on admission, SC PRN  - Encourage timed voids every 4 hours while awake     # Skin/Pressure Injury:  - Skin assessment on admission:  Intact  - Monitor Incisions: Posterior cervical incision well approximated, (+) mild erythema, stables are in place   - Turn every 2 hours while in bed    # Diet:   - Diet Consistency/Modifications: Reg/thin    # DVT ppx:  - Lovenox    # Restrictions/Precautions:  - ROM restrictions: Cervical collar at all times except sleep  - Precautions: Fall, aspiration    Labs:  CBC, CMP 10/23    ---------------  Outpatient Follow-up:    Spike Dawkins  Neurosurgery  [address], [address]  [place] [address]  Phone: [phone]  Fax: [phone]  Follow Up Time:     Jaylon Zeng  Cardiovascular Disease  [address], [address]  [place] [address]  Phone: [phone]  Fax: [phone]  Follow Up Time:     Eduard Knowles  Colon/Rectal Surgery  [address], [address]  [place] [address]  Phone: [phone]  Fax: [phone]  Follow Up Time:

## 2023-10-21 NOTE — CONSULT NOTE ADULT - SUBJECTIVE AND OBJECTIVE BOX
Patient is a 68y old  Male who presents with a chief complaint of S/P Fall with TBI and Multiple fractures, unstable cervical spine fracture s/p C1-C7 Decompression/Fusion (21 Oct 2023 11:27)      HPI:  Patient is 69yo M with history of chronic back pain (on chronic Dilaudid) who presented to [facility] 10/13 following a fall from a 12 foot ladder resulting in head strike and LOC. He was found to have acute displaced right lateral 8-10 rib fractures, and focal trauma occlusion/dissection of the left vertebral artery at C1 level with C1 anterior and posterior arch fractures with lateral displacement. Also noted to have spinous process fractures C2-C5, C5/C6 anterior subluxation with likely PLL injury, Left C6 transverse process fracture, T4 compression fracture, and C2-C3 epidural hematoma. Patient was admitted and underwent Posterior C1-C7 Decompression/Fusion for unstable spine fracture and evacuation of epidural hematoma, decompression of left C5 nerve root. He had routine post operative care in NSCU and then transferred to the floor, Surgical drain removed prior to discharge. Cardiology was consulted and medications were adjusted for tachycardia. Patient was seen by chronic pain and medications were adjusted with good results. He is to wear cervical collar at all times except during sleep. 10/19/23 he had episode of prolapsed hemorrhoids reduced at bedside by surgery team. Patient was evaluated by PM&R and therapy for functional deficits and gait/ ADL impairments and recommended acute rehabilitation. Patient was medically optimized for discharge to [facility] on 10/20.   (20 Oct 2023 12:57)      PAST MEDICAL & SURGICAL HISTORY:  High cholesterol      Insomnia      History of chronic back pain      History of arthroscopy of left shoulder  age 15 & right shoulder 3 yrs      H/O hernia repair  b/l inguinal & abdominal      History of arthroplasty of right knee  5yrs          Family hx: non-contributory          Substance Use (street drugs): ( X ) never used  (  ) other:  Tobacco Usage:  (   ) never smoked   ( X  ) former smoker   (   ) current smoker  (     ) pack year  Alcohol Usage: Denies        Allergies    Nuts (Unknown)  Originally Entered as [Unknown] reaction to [PUMPKIN SEEDS] (Unknown)  Vioxx (Unknown)    Intolerances        aspirin enteric coated 81 milliGRAM(s) Oral daily      REVIEW OF SYSTEMS:  Constitutional: No fever, No Chills, No fatigue  HEENT: No eye pain, No visual disturbances, No difficulty hearing, (+) posterior cervical spine incision, staples in place   Pulm: No cough,  No shortness of breath  Cardio: No chest pain, No palpitations  GI:  No abdominal pain, No nausea, No vomiting, No loose watery stools, No bowel incontinence  : No dysuria, No frequency, No hematuria, No urinary retention   Neuro: No headaches, No memory loss, No tremors, (+) loss of strength, No tingling burning pain, No sensory deficits, No spasticity  Skin: No itching, No rashes, incision as above  Endo: No temperature intolerance  MSK: No joint pain, No joint swelling, (+)  muscle pain, (+) neck pain, No back pain  Psych:  No depression, No anxiety    T(C): 36.4 (10-21-23 @ 10:46), Max: 36.7 (10-20-23 @ 21:44)  HR: 98 (10-21-23 @ 10:46) (66 - 107)  BP: 131/86 (10-21-23 @ 10:46) (127/83 - 132/70)  RR: 16 (10-21-23 @ 10:46) (16 - 17)  SpO2: 92% (10-21-23 @ 10:46) (92% - 93%)  Wt(kg): --Vital Signs Last 24 Hrs  T(C): 36.4 (21 Oct 2023 10:46), Max: 36.7 (20 Oct 2023 21:44)  T(F): 97.6 (21 Oct 2023 10:46), Max: 98 (20 Oct 2023 21:44)  HR: 98 (21 Oct 2023 10:46) (66 - 107)  BP: 131/86 (21 Oct 2023 10:46) (127/83 - 132/70)  BP(mean): --  RR: 16 (21 Oct 2023 10:46) (16 - 17)  SpO2: 92% (21 Oct 2023 10:46) (92% - 93%)    Parameters below as of 21 Oct 2023 10:46  Patient On (Oxygen Delivery Method): room air        PHYSICAL EXAM:  GENERAL: NAD, well-developed  HEAD:  Atraumatic, Normocephalic  EYES: EOMI,  conjunctiva and sclera clear  ENMT: Moist mucous membranes  NECK: Supple, No JVD  NERVOUS SYSTEM:  Alert & Oriented X3, Good concentration;  CHEST/LUNG: Clear to percussion bilaterally; No rales, rhonchi, wheezing  HEART: Regular rate and rhythm; S1 S2  ABDOMEN: Soft, Nontender, Nondistended; Bowel sounds present  EXTREMITIES: No clubbing, cyanosis, or edema      LABS:                        11.7   8.06  )-----------( 399      ( 21 Oct 2023 06:20 )             35.9     10-21    134<L>  |  97  |  19  ----------------------------<  116<H>  4.1   |  31  |  0.74    Ca    9.0      21 Oct 2023 06:20    TPro  6.8  /  Alb  2.7<L>  /  TBili  0.6  /  DBili  x   /  AST  20  /  ALT  29  /  AlkPhos  80  10-21      Urinalysis Basic - ( 21 Oct 2023 06:20 )    Color: x / Appearance: x / SG: x / pH: x  Gluc: 116 mg/dL / Ketone: x  / Bili: x / Urobili: x   Blood: x / Protein: x / Nitrite: x   Leuk Esterase: x / RBC: x / WBC x   Sq Epi: x / Non Sq Epi: x / Bacteria: x       CAPILLARY BLOOD GLUCOSE            Urinalysis Basic - ( 21 Oct 2023 06:20 )    Color: x / Appearance: x / SG: x / pH: x  Gluc: 116 mg/dL / Ketone: x  / Bili: x / Urobili: x   Blood: x / Protein: x / Nitrite: x   Leuk Esterase: x / RBC: x / WBC x   Sq Epi: x / Non Sq Epi: x / Bacteria: x        RADIOLOGY & ADDITIONAL TESTS:      Care Discussed with Consultants/Other Providers [ x] YES  [ ] NO  Imaging Personally Reviewed:  [ ] YES  [ ] NO

## 2023-10-22 PROCEDURE — 99233 SBSQ HOSP IP/OBS HIGH 50: CPT

## 2023-10-22 PROCEDURE — 99232 SBSQ HOSP IP/OBS MODERATE 35: CPT

## 2023-10-22 RX ADMIN — Medication 25 MILLIGRAM(S): at 17:22

## 2023-10-22 RX ADMIN — LIDOCAINE 1 PATCH: 4 CREAM TOPICAL at 07:04

## 2023-10-22 RX ADMIN — HYDROMORPHONE HYDROCHLORIDE 6 MILLIGRAM(S): 2 INJECTION INTRAMUSCULAR; INTRAVENOUS; SUBCUTANEOUS at 02:56

## 2023-10-22 RX ADMIN — HYDROMORPHONE HYDROCHLORIDE 4 MILLIGRAM(S): 2 INJECTION INTRAMUSCULAR; INTRAVENOUS; SUBCUTANEOUS at 20:17

## 2023-10-22 RX ADMIN — TAMSULOSIN HYDROCHLORIDE 0.4 MILLIGRAM(S): 0.4 CAPSULE ORAL at 22:20

## 2023-10-22 RX ADMIN — Medication 650 MILLIGRAM(S): at 13:55

## 2023-10-22 RX ADMIN — ENOXAPARIN SODIUM 40 MILLIGRAM(S): 100 INJECTION SUBCUTANEOUS at 06:16

## 2023-10-22 RX ADMIN — SENNA PLUS 2 TABLET(S): 8.6 TABLET ORAL at 22:20

## 2023-10-22 RX ADMIN — SODIUM CHLORIDE 2 GRAM(S): 9 INJECTION INTRAMUSCULAR; INTRAVENOUS; SUBCUTANEOUS at 13:28

## 2023-10-22 RX ADMIN — Medication 81 MILLIGRAM(S): at 13:28

## 2023-10-22 RX ADMIN — LIDOCAINE 1 PATCH: 4 CREAM TOPICAL at 00:22

## 2023-10-22 RX ADMIN — METHOCARBAMOL 750 MILLIGRAM(S): 500 TABLET, FILM COATED ORAL at 22:20

## 2023-10-22 RX ADMIN — HYDROMORPHONE HYDROCHLORIDE 6 MILLIGRAM(S): 2 INJECTION INTRAMUSCULAR; INTRAVENOUS; SUBCUTANEOUS at 09:01

## 2023-10-22 RX ADMIN — LIDOCAINE 1 PATCH: 4 CREAM TOPICAL at 12:05

## 2023-10-22 RX ADMIN — POLYETHYLENE GLYCOL 3350 17 GRAM(S): 17 POWDER, FOR SOLUTION ORAL at 06:18

## 2023-10-22 RX ADMIN — POLYETHYLENE GLYCOL 3350 17 GRAM(S): 17 POWDER, FOR SOLUTION ORAL at 17:24

## 2023-10-22 RX ADMIN — METHOCARBAMOL 750 MILLIGRAM(S): 500 TABLET, FILM COATED ORAL at 13:27

## 2023-10-22 RX ADMIN — SODIUM CHLORIDE 2 GRAM(S): 9 INJECTION INTRAMUSCULAR; INTRAVENOUS; SUBCUTANEOUS at 22:21

## 2023-10-22 RX ADMIN — Medication 25 MILLIGRAM(S): at 06:16

## 2023-10-22 RX ADMIN — Medication 120 MILLIGRAM(S): at 06:16

## 2023-10-22 RX ADMIN — HYDROMORPHONE HYDROCHLORIDE 6 MILLIGRAM(S): 2 INJECTION INTRAMUSCULAR; INTRAVENOUS; SUBCUTANEOUS at 09:45

## 2023-10-22 RX ADMIN — Medication 650 MILLIGRAM(S): at 14:30

## 2023-10-22 RX ADMIN — HYDROMORPHONE HYDROCHLORIDE 6 MILLIGRAM(S): 2 INJECTION INTRAMUSCULAR; INTRAVENOUS; SUBCUTANEOUS at 15:57

## 2023-10-22 RX ADMIN — METHOCARBAMOL 750 MILLIGRAM(S): 500 TABLET, FILM COATED ORAL at 06:17

## 2023-10-22 RX ADMIN — SODIUM CHLORIDE 2 GRAM(S): 9 INJECTION INTRAMUSCULAR; INTRAVENOUS; SUBCUTANEOUS at 06:18

## 2023-10-22 RX ADMIN — HYDROMORPHONE HYDROCHLORIDE 6 MILLIGRAM(S): 2 INJECTION INTRAMUSCULAR; INTRAVENOUS; SUBCUTANEOUS at 16:30

## 2023-10-22 NOTE — PROGRESS NOTE ADULT - ASSESSMENT
Patient is 67yo M with history of chronic back pain (on chronic Dilaudid) who presented to [facility] 10/13 following a fall from a 12 foot ladder resulting in head strike and LOC. He was found to have acute displaced right lateral 8-10 rib fractures, and focal trauma occlusion/dissection of the left vertebral artery at C1 level with C1 anterior and posterior arch fractures with lateral displacement. Also noted to have spinous process fractures C2-C5, C5/C6 anterior subluxation with likely PLL injury, Left C6 transverse process fracture, T4 compression fracture, and C2-C3 epidural hematoma. Patient was admitted and underwent Posterior C1-C7 Decompression/Fusion for unstable spine fracture and evacuation of epidural hematoma. Hospital course complicated by tachycardia and pain.   Admitted to [facility] on 10/20/23 for ADL, gait, and functional impairments.     # S/P Fall with TBI, Unstable cervical spine fracture s/p C1-C7 Decompression/Fusion and evacuation of EDH with cord compression   - Comprehensive Multidisciplinary Rehab Program  - Pain Management per primary team  - Cervical collar at all times other than sleep  - Fall precautions  - Plan for removal of staples on POD # 14- 10/28/23     # Vertebral Artery Dissection  - ASA 81mg daily - no allergy per patient, has tolerated in the past    # COPD  - Not on home meds  - 2 L oxygen supplement PRN  - Incentive spirometry     #Tachycardia/PAT  - Ectopic atrial rhythm/PAT noted on tele 10/19  - Cont metoprolol   - Continue cardizem 120mg cd  - EP eval was deferred as pt not likely a candidate for ablation at this time  - Follow up with Dr Zeng as outpatient    #Aortic Root Dilatation  - Stable on recent outpt echo from 9/5/23  - Follow up for repeat echo q 12 months    #Hyponatremia  - Monitor BMP  - NaCl tabs 2g q8 hours, titrate off if able      # DVT ppx:  - Lovenox

## 2023-10-22 NOTE — PROGRESS NOTE ADULT - SUBJECTIVE AND OBJECTIVE BOX
PROGRESS NOTE:     Patient is a 68y old  Male who presents with a chief complaint of S/P Fall with TBI and Multiple fractures, unstable cervical spine fracture s/p C1-C7 Decompression/Fusion (22 Oct 2023 07:56)          SUBJECTIVE & OBJECTIVE:   Pt seen and examined at bedside in AM    no overnight events.   still reporting of pain    REVIEW OF SYSTEMS: remaining ROS negative     PHYSICAL EXAM:  VITALS:  Vital Signs Last 24 Hrs  T(C): 36.5 (22 Oct 2023 07:45), Max: 37.2 (21 Oct 2023 21:02)  T(F): 97.7 (22 Oct 2023 07:45), Max: 99 (21 Oct 2023 21:02)  HR: 95 (22 Oct 2023 07:45) (95 - 103)  BP: 145/92 (22 Oct 2023 07:45) (111/70 - 150/90)  BP(mean): --  RR: 16 (22 Oct 2023 07:45) (14 - 16)  SpO2: 98% (22 Oct 2023 07:45) (93% - 98%)    Parameters below as of 22 Oct 2023 07:45  Patient On (Oxygen Delivery Method): room air          GENERAL: NAD,  no increased WOB  HEAD:  Atraumatic, Normocephalic  EYES: EOMI,  conjunctiva and sclera clear  ENMT: Moist mucous membranes  NECK: Supple, No JVD  NERVOUS SYSTEM:  Alert & Oriented X3  CHEST/LUNG: Clear to auscultation bilaterally; No rales, rhonchi, wheezing  HEART: Regular rate and rhythm; S1 S2  ABDOMEN: Soft, Nontender, Nondistended; Bowel sounds present  EXTREMITIES: No clubbing, cyanosis, calf tenderness or edema b/l      MEDICATIONS  (STANDING):  aspirin enteric coated 81 milliGRAM(s) Oral daily  diltiazem    milliGRAM(s) Oral daily  enoxaparin Injectable 40 milliGRAM(s) SubCutaneous every 24 hours  lidocaine   4% Patch 1 Patch Transdermal every 24 hours  methocarbamol 750 milliGRAM(s) Oral three times a day  metoprolol tartrate 25 milliGRAM(s) Oral two times a day  polyethylene glycol 3350 17 Gram(s) Oral two times a day  senna 2 Tablet(s) Oral at bedtime  sodium chloride 2 Gram(s) Oral every 8 hours  tamsulosin 0.4 milliGRAM(s) Oral at bedtime    MEDICATIONS  (PRN):  acetaminophen     Tablet .. 650 milliGRAM(s) Oral every 6 hours PRN Mild Pain (1 - 3)  bisacodyl Suppository 10 milliGRAM(s) Rectal daily PRN Constipation  HYDROmorphone   Tablet 6 milliGRAM(s) Oral every 6 hours PRN Severe Pain (7 - 10)  HYDROmorphone   Tablet 4 milliGRAM(s) Oral every 4 hours PRN Moderate Pain (4 - 6)      Allergies    Nuts (Unknown)  Originally Entered as [Unknown] reaction to [PUMPKIN SEEDS] (Unknown)  Vioxx (Unknown)    Intolerances              LABS:                           11.7   8.06  )-----------( 399      ( 21 Oct 2023 06:20 )             35.9     10-21    134<L>  |  97  |  19  ----------------------------<  116<H>  4.1   |  31  |  0.74    Ca    9.0      21 Oct 2023 06:20    TPro  6.8  /  Alb  2.7<L>  /  TBili  0.6  /  DBili  x   /  AST  20  /  ALT  29  /  AlkPhos  80  10-21      Urinalysis Basic - ( 21 Oct 2023 06:20 )    Color: x / Appearance: x / SG: x / pH: x  Gluc: 116 mg/dL / Ketone: x  / Bili: x / Urobili: x   Blood: x / Protein: x / Nitrite: x   Leuk Esterase: x / RBC: x / WBC x   Sq Epi: x / Non Sq Epi: x / Bacteria: x      CAPILLARY BLOOD GLUCOSE                    RECENT CULTURES:          RADIOLOGY & ADDITIONAL TESTS:        Care Discussed with Consultants/Other Providers [ x] YES  [ ] NO  Imaging Personally Reviewed:  [ ] YES  [ ] NO

## 2023-10-22 NOTE — PROGRESS NOTE ADULT - SUBJECTIVE AND OBJECTIVE BOX
History of Present Illness:  Reason for Admission: S/P Fall with TBI and Multiple fractures, unstable cervical spine fracture s/p C1-C7 Decompression/Fusion  History of Present Illness:   Patient is 67yo M with history of chronic back pain (on chronic Dilaudid) who presented to [facility] 10/13 following a fall from a 12 foot ladder resulting in head strike and LOC. He was found to have acute displaced right lateral 8-10 rib fractures, and focal trauma occlusion/dissection of the left vertebral artery at C1 level with C1 anterior and posterior arch fractures with lateral displacement. Also noted to have spinous process fractures C2-C5, C5/C6 anterior subluxation with likely PLL injury, Left C6 transverse process fracture, T4 compression fracture, and C2-C3 epidural hematoma. Patient was admitted and underwent Posterior C1-C7 Decompression/Fusion for unstable spine fracture and evacuation of epidural hematoma, decompression of left C5 nerve root. He had routine post operative care in NSCU and then transferred to the floor, Surgical drain removed prior to discharge. Cardiology was consulted and medications were adjusted for tachycardia. Patient was seen by chronic pain and medications were adjusted with good results. He is to wear cervical collar at all times except during sleep. 10/19/23 he had episode of prolapsed hemorrhoids reduced at bedside by surgery team. Patient was evaluated by PM&R and therapy for functional deficits and gait/ ADL impairments and recommended acute rehabilitation. Patient was medically optimized for discharge to [facility] on 10/20.         Review of Systems/Subjective: Patient seen and examined at bedside this morning. Patient in bed in NAD, no SOB, no n/v, pain controlled.  Review of Systems: REVIEW OF SYSTEMS  Constitutional: No fever  Pulm: No cough,  No shortness of breath  Cardio: No chest pain  GI:  No abdominal pain  : No dysuria  Neuro: (+) loss of strength  MSK: No joint pain  Psych:  No depression, No anxiety      Allergies and Intolerances:        Allergies:  	Vioxx: Drug, Unknown, Originally Entered as [Unknown] reaction to [VIOX]  	Nuts: Food, Unknown, Originally Entered as [Unknown] reaction to [BRAZIL NUTS]  	Originally Entered as [Unknown] reaction to [PUMPKIN SEEDS] [freetext allergy; no alerts]: Miscellaneous, Unknown, Originally Entered as [Unknown] reaction to [PUMPKIN SEEDS]    .    Patient History:    Past Medical, Past Surgical, and Family History:  PAST MEDICAL HISTORY:  High cholesterol     History of chronic back pain     Insomnia.     PAST SURGICAL HISTORY:  H/O hernia repair b/l inguinal & abdominal    History of arthroplasty of right knee 5yrs    History of arthroscopy of left shoulder age 15 & right shoulder 1 yrs.               Physical Exam:   Vital Signs Last 24 Hrs  T(C): 36.5 (22 Oct 2023 07:45), Max: 37.2 (21 Oct 2023 21:02)  T(F): 97.7 (22 Oct 2023 07:45), Max: 99 (21 Oct 2023 21:02)  HR: 95 (22 Oct 2023 07:45) (95 - 103)  BP: 145/92 (22 Oct 2023 07:45) (111/70 - 150/90)  BP(mean): --  RR: 16 (22 Oct 2023 07:45) (14 - 16)  SpO2: 98% (22 Oct 2023 07:45) (92% - 98%)    Parameters below as of 22 Oct 2023 07:45  Patient On (Oxygen Delivery Method): room air          General: NAD, Resting Comfortable                                HEENT: NCAT, MMM. (+) staples cervical spine, open to air, no d/c.  Cardio: RRR, Normal S1-S2                       Pulm: No Respiratory Distress,  Lungs CTAB                        Abdomen: ND, NT, Soft, BS+                                                MSK: No joint swelling, Full ROM                                         Ext: No C/C/E, No calf tenderness                                                            Neurological Examination    Cognitive: AAO x 3, follows 2SC                                                                                                                                            Communication:  Fluent,  No dysarthria                                                                          Sensory: Intact to light touch                                                                                            Tone: normal Throughout   Motor    LEFT    UE: SF [1/5], EF [3-/5], EE [3/5], WE [5/5],  [5/5]  RIGHT UE: SF [1/5], EF [3-/5], EE [3/5], WE [5/5],  [5/5]  LEFT    LE:  HF [5/5], KE [5/5], DF [5/5], PF [5/5]  RIGHT LE:  HF [5/5], KE [5/5], DF [5/5], PF [5/5]           Labs and Results:    LABS:  LABS:      Ca    9.0        21 Oct 2023 06:20                              11.7   8.06  )-----------( 399      ( 21 Oct 2023 06:20 )             35.9     21 Oct 2023 06:20    134    |  97     |  19     ----------------------------<  116    4.1     |  31     |  0.74     Ca    9.0        21 Oct 2023 06:20    TPro  6.8    /  Alb  2.7    /  TBili  0.6    /  DBili  x      /  AST  20     /  ALT  29     /  AlkPhos  80     21 Oct 2023 06:20      Labs, Radiology, Cardiology, and Other Results: < from: MR Cervical Spine No Cont (10.17.23 @ 18:56) >    IMPRESSION:    1. Posterior stabilization C1-C7 and C2-C6 laminectomy with routine   postoperative appearance    2. C6 T1 and T2 superior endplate fractures, unchanged 10/13/2023    3. Prevertebral fluid, likely posttraumatic, somewhat improved 10/13/2023    4. C1 fractures, better demonstrated by the CT technique, unchanged   10/13/2023      < from: [facility] Duplex Lower Ext Vein Scan, Bilat (10.18.23 @ 16:43) >      IMPRESSION:  No evidence of deep venous thrombosis in either lower extremity.    < end of copied text >    < from: CT Chest w/ IV Cont (10.13.23 @ 17:35) >    BONES: Right-sided rib fractures as above. Chronic appearing fracture   deformity of the right inferior pubic ramus. Degenerative changes of the   spine. Stable nonaggressive appearing lytic lesion in the T8 vertebral   body.    IMPRESSION:  Acute displaced right lateral 8-10th rib fractures.    Trace right pneumothorax and pneumomediastinum. Small right pleural   effusion.    < end of copied text >        < from: CT Head No Cont (10.13.23 @ 17:33) >    IMPRESSION:  CT head:  -No acute intracranial findings.    CT cervical spine:  -C1 anterior and posterior arch fractures with lateral displacement of   the lateral masses.  -Spinous process fractures from C2 through C5.  -Anterior subluxation of C5 on C6 with bilateral perched facets and   likely posterior ligamentous injury.  -Left C6 transverse process/transverse foramen fractures.  -Hyperdensity within theposterior spinal canal at C2-C3 levels, likely   representing epidural hematoma.    Recommend MRI cervical spine for further evaluation.    CT angiogram head:  -No vaso-occlusive disease.    CT angiogram neck:  -Focal traumatic occlusion/dissection of the left vertebral artery at C1   level. Reconstitution of flow is noted in the left intracranial vertebral   artery, likely retrograde.    < end of copied text >             Assessment:  	  Patient is 67yo M with history of chronic back pain (on chronic Dilaudid) who presented to [facility] 10/13 following a fall from a 12 foot ladder resulting in head strike and LOC. He was found to have acute displaced right lateral 8-10 rib fractures, and focal trauma occlusion/dissection of the left vertebral artery at C1 level with C1 anterior and posterior arch fractures with lateral displacement. Also noted to have spinous process fractures C2-C5, C5/C6 anterior subluxation with likely PLL injury, Left C6 transverse process fracture, T4 compression fracture, and C2-C3 epidural hematoma. Patient was admitted and underwent Posterior C1-C7 Decompression/Fusion for unstable spine fracture and evacuation of epidural hematoma. Hospital course complicated by tachycardia and pain.   Admitted to [facility] on 10/20/23 for ADL, gait, and functional impairments.     # S/P Fall with TBI, Unstable cervical spine fracture s/p C1-C7 Decompression/Fusion and evacuation of EDH with cord compression   - Continue Comprehensive Multidisciplinary Rehab Program: 3 hours a day, 5 days a week with PT/OT  - Seen by Pain Management; pain control with dilaudid as below  - Cervical collar at all times other than sleep  - Fall precautions  - Plan for removal of staples on POD # 14- 10/28/23     # Vertebral Artery Dissection  - ASA 81mg daily - no allergy per patient, has tolerated in the past    # COPD  - Not on home meds  - 2 L oxygen supplement PRN  - Incentive spirometry     #Tachycardia/PAT  - Ectopic atrial rhythm/PAT noted on tele 10/19  - Cont metoprolol   - Continue cardizem 120mg cd  - EP eval was deferred as pt not likely a candidate for ablation at this time  - Follow up with Dr Zeng as outpatient    #Aortic Root Dilatation  - Stable on recent outpt echo from 9/5/23  - Follow up for repeat echo q 12 months    #Hyponatremia  - Monitor BMP  - NaCl tabs 2g q8 hours  - Hospitalist consult appreciated    # Sleep:  - Melatonin qHS    # Pain Management:  - Recent Pain Medicine recs noted  - Tylenol ATC  - Lidocaine patch to ribs  - Robaxin 750mg TID  - Dilaudid 4mg/6mg q4 hours/q6 hours PRN Mod/Severe per Pain Medicine recs    # GI/Bowel:  - Senna QHS, Miralax daily BID  - Dulcolax suppository PRN    # /Bladder:  - Flomax 0.4mg QHS  - Bladder scan x1 on admission, SC PRN  - Encourage timed voids every 4 hours while awake     # Skin/Pressure Injury:  - Skin assessment on admission:  Intact  - Monitor Incisions: Posterior cervical incision well approximated, (+) mild erythema, stables are in place   - Turn every 2 hours while in bed    # Diet:   - Diet Consistency/Modifications: Reg/thin    # DVT ppx:  - Lovenox    # Restrictions/Precautions:  - ROM restrictions: Cervical collar at all times except sleep  - Precautions: Fall, aspiration    Labs:  CBC, CMP 10/23    ---------------  Outpatient Follow-up:    Spike Dawkins  Neurosurgery  [address], [address]  [place] [address]  Phone: [phone]  Fax: [phone]  Follow Up Time:     Jaylon Zeng  Cardiovascular Disease  [address], [address]  [place] [address]  Phone: [phone]  Fax: [phone]  Follow Up Time:     Eduard Knowles  Colon/Rectal Surgery  [address], [address]  [place] [address]  Phone: [phone]  Fax: [phone]  Follow Up Time:

## 2023-10-23 LAB
ALBUMIN SERPL ELPH-MCNC: 2.7 G/DL — LOW (ref 3.3–5)
ALBUMIN SERPL ELPH-MCNC: 2.7 G/DL — LOW (ref 3.3–5)
ALP SERPL-CCNC: 103 U/L — SIGNIFICANT CHANGE UP (ref 40–120)
ALP SERPL-CCNC: 103 U/L — SIGNIFICANT CHANGE UP (ref 40–120)
ALT FLD-CCNC: 36 U/L — SIGNIFICANT CHANGE UP (ref 10–45)
ALT FLD-CCNC: 36 U/L — SIGNIFICANT CHANGE UP (ref 10–45)
ANION GAP SERPL CALC-SCNC: 8 MMOL/L — SIGNIFICANT CHANGE UP (ref 5–17)
ANION GAP SERPL CALC-SCNC: 8 MMOL/L — SIGNIFICANT CHANGE UP (ref 5–17)
APPEARANCE UR: CLEAR — SIGNIFICANT CHANGE UP
APPEARANCE UR: CLEAR — SIGNIFICANT CHANGE UP
AST SERPL-CCNC: 19 U/L — SIGNIFICANT CHANGE UP (ref 10–40)
AST SERPL-CCNC: 19 U/L — SIGNIFICANT CHANGE UP (ref 10–40)
BASOPHILS # BLD AUTO: 0.08 K/UL — SIGNIFICANT CHANGE UP (ref 0–0.2)
BASOPHILS # BLD AUTO: 0.08 K/UL — SIGNIFICANT CHANGE UP (ref 0–0.2)
BASOPHILS NFR BLD AUTO: 0.7 % — SIGNIFICANT CHANGE UP (ref 0–2)
BASOPHILS NFR BLD AUTO: 0.7 % — SIGNIFICANT CHANGE UP (ref 0–2)
BILIRUB SERPL-MCNC: 0.5 MG/DL — SIGNIFICANT CHANGE UP (ref 0.2–1.2)
BILIRUB SERPL-MCNC: 0.5 MG/DL — SIGNIFICANT CHANGE UP (ref 0.2–1.2)
BILIRUB UR-MCNC: NEGATIVE — SIGNIFICANT CHANGE UP
BILIRUB UR-MCNC: NEGATIVE — SIGNIFICANT CHANGE UP
BUN SERPL-MCNC: 21 MG/DL — SIGNIFICANT CHANGE UP (ref 7–23)
BUN SERPL-MCNC: 21 MG/DL — SIGNIFICANT CHANGE UP (ref 7–23)
CALCIUM SERPL-MCNC: 9.2 MG/DL — SIGNIFICANT CHANGE UP (ref 8.4–10.5)
CALCIUM SERPL-MCNC: 9.2 MG/DL — SIGNIFICANT CHANGE UP (ref 8.4–10.5)
CHLORIDE SERPL-SCNC: 96 MMOL/L — SIGNIFICANT CHANGE UP (ref 96–108)
CHLORIDE SERPL-SCNC: 96 MMOL/L — SIGNIFICANT CHANGE UP (ref 96–108)
CO2 SERPL-SCNC: 30 MMOL/L — SIGNIFICANT CHANGE UP (ref 22–31)
CO2 SERPL-SCNC: 30 MMOL/L — SIGNIFICANT CHANGE UP (ref 22–31)
COLOR SPEC: YELLOW — SIGNIFICANT CHANGE UP
COLOR SPEC: YELLOW — SIGNIFICANT CHANGE UP
CREAT SERPL-MCNC: 0.82 MG/DL — SIGNIFICANT CHANGE UP (ref 0.5–1.3)
CREAT SERPL-MCNC: 0.82 MG/DL — SIGNIFICANT CHANGE UP (ref 0.5–1.3)
DIFF PNL FLD: NEGATIVE — SIGNIFICANT CHANGE UP
DIFF PNL FLD: NEGATIVE — SIGNIFICANT CHANGE UP
EGFR: 94 ML/MIN/1.73M2 — SIGNIFICANT CHANGE UP
EGFR: 94 ML/MIN/1.73M2 — SIGNIFICANT CHANGE UP
EOSINOPHIL # BLD AUTO: 0.17 K/UL — SIGNIFICANT CHANGE UP (ref 0–0.5)
EOSINOPHIL # BLD AUTO: 0.17 K/UL — SIGNIFICANT CHANGE UP (ref 0–0.5)
EOSINOPHIL NFR BLD AUTO: 1.4 % — SIGNIFICANT CHANGE UP (ref 0–6)
EOSINOPHIL NFR BLD AUTO: 1.4 % — SIGNIFICANT CHANGE UP (ref 0–6)
GLUCOSE SERPL-MCNC: 117 MG/DL — HIGH (ref 70–99)
GLUCOSE SERPL-MCNC: 117 MG/DL — HIGH (ref 70–99)
GLUCOSE UR QL: NEGATIVE MG/DL — SIGNIFICANT CHANGE UP
GLUCOSE UR QL: NEGATIVE MG/DL — SIGNIFICANT CHANGE UP
HCT VFR BLD CALC: 35.6 % — LOW (ref 39–50)
HCT VFR BLD CALC: 35.6 % — LOW (ref 39–50)
HGB BLD-MCNC: 11.6 G/DL — LOW (ref 13–17)
HGB BLD-MCNC: 11.6 G/DL — LOW (ref 13–17)
IMM GRANULOCYTES NFR BLD AUTO: 0.7 % — SIGNIFICANT CHANGE UP (ref 0–0.9)
IMM GRANULOCYTES NFR BLD AUTO: 0.7 % — SIGNIFICANT CHANGE UP (ref 0–0.9)
KETONES UR-MCNC: NEGATIVE MG/DL — SIGNIFICANT CHANGE UP
KETONES UR-MCNC: NEGATIVE MG/DL — SIGNIFICANT CHANGE UP
LEUKOCYTE ESTERASE UR-ACNC: NEGATIVE — SIGNIFICANT CHANGE UP
LEUKOCYTE ESTERASE UR-ACNC: NEGATIVE — SIGNIFICANT CHANGE UP
LYMPHOCYTES # BLD AUTO: 1.09 K/UL — SIGNIFICANT CHANGE UP (ref 1–3.3)
LYMPHOCYTES # BLD AUTO: 1.09 K/UL — SIGNIFICANT CHANGE UP (ref 1–3.3)
LYMPHOCYTES # BLD AUTO: 9 % — LOW (ref 13–44)
LYMPHOCYTES # BLD AUTO: 9 % — LOW (ref 13–44)
MCHC RBC-ENTMCNC: 29.9 PG — SIGNIFICANT CHANGE UP (ref 27–34)
MCHC RBC-ENTMCNC: 29.9 PG — SIGNIFICANT CHANGE UP (ref 27–34)
MCHC RBC-ENTMCNC: 32.6 GM/DL — SIGNIFICANT CHANGE UP (ref 32–36)
MCHC RBC-ENTMCNC: 32.6 GM/DL — SIGNIFICANT CHANGE UP (ref 32–36)
MCV RBC AUTO: 91.8 FL — SIGNIFICANT CHANGE UP (ref 80–100)
MCV RBC AUTO: 91.8 FL — SIGNIFICANT CHANGE UP (ref 80–100)
MONOCYTES # BLD AUTO: 1.23 K/UL — HIGH (ref 0–0.9)
MONOCYTES # BLD AUTO: 1.23 K/UL — HIGH (ref 0–0.9)
MONOCYTES NFR BLD AUTO: 10.2 % — SIGNIFICANT CHANGE UP (ref 2–14)
MONOCYTES NFR BLD AUTO: 10.2 % — SIGNIFICANT CHANGE UP (ref 2–14)
NEUTROPHILS # BLD AUTO: 9.43 K/UL — HIGH (ref 1.8–7.4)
NEUTROPHILS # BLD AUTO: 9.43 K/UL — HIGH (ref 1.8–7.4)
NEUTROPHILS NFR BLD AUTO: 78 % — HIGH (ref 43–77)
NEUTROPHILS NFR BLD AUTO: 78 % — HIGH (ref 43–77)
NITRITE UR-MCNC: NEGATIVE — SIGNIFICANT CHANGE UP
NITRITE UR-MCNC: NEGATIVE — SIGNIFICANT CHANGE UP
NRBC # BLD: 0 /100 WBCS — SIGNIFICANT CHANGE UP (ref 0–0)
NRBC # BLD: 0 /100 WBCS — SIGNIFICANT CHANGE UP (ref 0–0)
PH UR: 5.5 — SIGNIFICANT CHANGE UP (ref 5–8)
PH UR: 5.5 — SIGNIFICANT CHANGE UP (ref 5–8)
PLATELET # BLD AUTO: 496 K/UL — HIGH (ref 150–400)
PLATELET # BLD AUTO: 496 K/UL — HIGH (ref 150–400)
POTASSIUM SERPL-MCNC: 4.4 MMOL/L — SIGNIFICANT CHANGE UP (ref 3.5–5.3)
POTASSIUM SERPL-MCNC: 4.4 MMOL/L — SIGNIFICANT CHANGE UP (ref 3.5–5.3)
POTASSIUM SERPL-SCNC: 4.4 MMOL/L — SIGNIFICANT CHANGE UP (ref 3.5–5.3)
POTASSIUM SERPL-SCNC: 4.4 MMOL/L — SIGNIFICANT CHANGE UP (ref 3.5–5.3)
PROT SERPL-MCNC: 6.8 G/DL — SIGNIFICANT CHANGE UP (ref 6–8.3)
PROT SERPL-MCNC: 6.8 G/DL — SIGNIFICANT CHANGE UP (ref 6–8.3)
PROT UR-MCNC: NEGATIVE MG/DL — SIGNIFICANT CHANGE UP
PROT UR-MCNC: NEGATIVE MG/DL — SIGNIFICANT CHANGE UP
RBC # BLD: 3.88 M/UL — LOW (ref 4.2–5.8)
RBC # BLD: 3.88 M/UL — LOW (ref 4.2–5.8)
RBC # FLD: 13.1 % — SIGNIFICANT CHANGE UP (ref 10.3–14.5)
RBC # FLD: 13.1 % — SIGNIFICANT CHANGE UP (ref 10.3–14.5)
SODIUM SERPL-SCNC: 134 MMOL/L — LOW (ref 135–145)
SODIUM SERPL-SCNC: 134 MMOL/L — LOW (ref 135–145)
SP GR SPEC: 1.02 — SIGNIFICANT CHANGE UP (ref 1–1.03)
SP GR SPEC: 1.02 — SIGNIFICANT CHANGE UP (ref 1–1.03)
UROBILINOGEN FLD QL: 1 MG/DL — SIGNIFICANT CHANGE UP (ref 0.2–1)
UROBILINOGEN FLD QL: 1 MG/DL — SIGNIFICANT CHANGE UP (ref 0.2–1)
WBC # BLD: 12.17 K/UL — HIGH (ref 3.8–10.5)
WBC # BLD: 12.17 K/UL — HIGH (ref 3.8–10.5)
WBC # FLD AUTO: 12.17 K/UL — HIGH (ref 3.8–10.5)
WBC # FLD AUTO: 12.17 K/UL — HIGH (ref 3.8–10.5)

## 2023-10-23 PROCEDURE — 99232 SBSQ HOSP IP/OBS MODERATE 35: CPT | Mod: GC

## 2023-10-23 RX ORDER — LACTULOSE 10 G/15ML
10 SOLUTION ORAL
Refills: 0 | Status: COMPLETED | OUTPATIENT
Start: 2023-10-23 | End: 2023-10-26

## 2023-10-23 RX ORDER — LIDOCAINE 4 G/100G
2 CREAM TOPICAL EVERY 24 HOURS
Refills: 0 | Status: DISCONTINUED | OUTPATIENT
Start: 2023-10-23 | End: 2023-11-03

## 2023-10-23 RX ADMIN — Medication 81 MILLIGRAM(S): at 12:40

## 2023-10-23 RX ADMIN — Medication 650 MILLIGRAM(S): at 20:37

## 2023-10-23 RX ADMIN — SODIUM CHLORIDE 2 GRAM(S): 9 INJECTION INTRAMUSCULAR; INTRAVENOUS; SUBCUTANEOUS at 22:11

## 2023-10-23 RX ADMIN — Medication 650 MILLIGRAM(S): at 10:30

## 2023-10-23 RX ADMIN — LIDOCAINE 2 PATCH: 4 CREAM TOPICAL at 19:00

## 2023-10-23 RX ADMIN — TAMSULOSIN HYDROCHLORIDE 0.4 MILLIGRAM(S): 0.4 CAPSULE ORAL at 22:12

## 2023-10-23 RX ADMIN — SODIUM CHLORIDE 2 GRAM(S): 9 INJECTION INTRAMUSCULAR; INTRAVENOUS; SUBCUTANEOUS at 15:28

## 2023-10-23 RX ADMIN — SENNA PLUS 2 TABLET(S): 8.6 TABLET ORAL at 22:12

## 2023-10-23 RX ADMIN — Medication 120 MILLIGRAM(S): at 06:41

## 2023-10-23 RX ADMIN — POLYETHYLENE GLYCOL 3350 17 GRAM(S): 17 POWDER, FOR SOLUTION ORAL at 17:13

## 2023-10-23 RX ADMIN — Medication 25 MILLIGRAM(S): at 17:13

## 2023-10-23 RX ADMIN — Medication 650 MILLIGRAM(S): at 09:46

## 2023-10-23 RX ADMIN — HYDROMORPHONE HYDROCHLORIDE 6 MILLIGRAM(S): 2 INJECTION INTRAMUSCULAR; INTRAVENOUS; SUBCUTANEOUS at 12:51

## 2023-10-23 RX ADMIN — ENOXAPARIN SODIUM 40 MILLIGRAM(S): 100 INJECTION SUBCUTANEOUS at 06:40

## 2023-10-23 RX ADMIN — HYDROMORPHONE HYDROCHLORIDE 6 MILLIGRAM(S): 2 INJECTION INTRAMUSCULAR; INTRAVENOUS; SUBCUTANEOUS at 22:22

## 2023-10-23 RX ADMIN — LACTULOSE 10 GRAM(S): 10 SOLUTION ORAL at 17:14

## 2023-10-23 RX ADMIN — Medication 1 TABLET(S): at 12:40

## 2023-10-23 RX ADMIN — METHOCARBAMOL 750 MILLIGRAM(S): 500 TABLET, FILM COATED ORAL at 06:40

## 2023-10-23 RX ADMIN — Medication 25 MILLIGRAM(S): at 06:41

## 2023-10-23 RX ADMIN — LIDOCAINE 1 PATCH: 4 CREAM TOPICAL at 12:00

## 2023-10-23 RX ADMIN — HYDROMORPHONE HYDROCHLORIDE 6 MILLIGRAM(S): 2 INJECTION INTRAMUSCULAR; INTRAVENOUS; SUBCUTANEOUS at 00:10

## 2023-10-23 RX ADMIN — POLYETHYLENE GLYCOL 3350 17 GRAM(S): 17 POWDER, FOR SOLUTION ORAL at 06:40

## 2023-10-23 RX ADMIN — HYDROMORPHONE HYDROCHLORIDE 6 MILLIGRAM(S): 2 INJECTION INTRAMUSCULAR; INTRAVENOUS; SUBCUTANEOUS at 06:48

## 2023-10-23 RX ADMIN — HYDROMORPHONE HYDROCHLORIDE 6 MILLIGRAM(S): 2 INJECTION INTRAMUSCULAR; INTRAVENOUS; SUBCUTANEOUS at 13:30

## 2023-10-23 RX ADMIN — LIDOCAINE 1 PATCH: 4 CREAM TOPICAL at 00:00

## 2023-10-23 RX ADMIN — METHOCARBAMOL 750 MILLIGRAM(S): 500 TABLET, FILM COATED ORAL at 15:28

## 2023-10-23 RX ADMIN — LIDOCAINE 1 PATCH: 4 CREAM TOPICAL at 07:18

## 2023-10-23 RX ADMIN — METHOCARBAMOL 750 MILLIGRAM(S): 500 TABLET, FILM COATED ORAL at 22:12

## 2023-10-23 RX ADMIN — SODIUM CHLORIDE 2 GRAM(S): 9 INJECTION INTRAMUSCULAR; INTRAVENOUS; SUBCUTANEOUS at 06:41

## 2023-10-23 RX ADMIN — LIDOCAINE 2 PATCH: 4 CREAM TOPICAL at 12:38

## 2023-10-23 NOTE — DIETITIAN INITIAL EVALUATION ADULT - OTHER INFO
Reason for Admission: S/P Fall with TBI and Multiple fractures, unstable cervical spine fracture s/p C1-C7 Decompression/Fusion  History of Present Illness:   Patient is 69yo M with history of chronic back pain (on chronic Dilaudid) who presented to [facility] 10/13 following a fall from a 12 foot ladder resulting in head strike and LOC. He was found to have acute displaced right lateral 8-10 rib fractures, and focal trauma occlusion/dissection of the left vertebral artery at C1 level with C1 anterior and posterior arch fractures with lateral displacement. Also noted to have spinous process fractures C2-C5, C5/C6 anterior subluxation with likely PLL injury, Left C6 transverse process fracture, T4 compression fracture, and C2-C3 epidural hematoma. Patient was admitted and underwent Posterior C1-C7 Decompression/Fusion for unstable spine fracture and evacuation of epidural hematoma, decompression of left C5 nerve root. He had routine post operative care in NSCU and then transferred to the floor, Surgical drain removed prior to discharge. Cardiology was consulted and medications were adjusted for tachycardia. Patient was seen by chronic pain and medications were adjusted with good results. He is to wear cervical collar at all times except during sleep. 10/19/23 he had episode of prolapsed hemorrhoids reduced at bedside by surgery team. Patient was evaluated by PM&R and therapy for functional deficits and gait/ ADL impairments and recommended acute rehabilitation. Patient was medically optimized for discharge to [facility] on 10/20.    At this time patient tolerating diet w/ varied appetite/intake consuming % of meals. Reviewed preferences and menu alternatives. Ensure Plus High Protein Daily 8 oz (Provides 350 kcal, 20 grams of protein) QD to assist patient in meeting estimated energy requirements patient prefers chocolate or strawberry flavor. No issues w/ dentition or chewing and swallowing current diet texture. Denies N/V/C/D, last BM 10/19 Per nursing flowsheets. Reports 13lb weight loss UBW 154lb x 2 weeks ago, CBW 141lb (10/20). Physical appearance consistent with malnutrition (muscle & subcutaneous fat loss observed). Encouraged patient to focus on high-protein, high-calorie foods during meals to provide energy to assist patient in meeting estimated energy requirements.

## 2023-10-23 NOTE — DIETITIAN INITIAL EVALUATION ADULT - PERSON TAUGHT/METHOD
Optimal protein-energy intake/verbal instruction/teach back - (Patient repeats in own words)/patient instructed

## 2023-10-23 NOTE — DIETITIAN INITIAL EVALUATION ADULT - PERTINENT LABORATORY DATA
10-23    134<L>  |  96  |  21  ----------------------------<  117<H>  4.4   |  30  |  0.82    Ca    9.2      23 Oct 2023 06:31    TPro  6.8  /  Alb  2.7<L>  /  TBili  0.5  /  DBili  x   /  AST  19  /  ALT  36  /  AlkPhos  103  10-23

## 2023-10-23 NOTE — PROGRESS NOTE ADULT - ASSESSMENT
Assessment	  Patient is 67yo M with history of chronic back pain (on chronic Dilaudid) who presented to [facility] 10/13 following a fall from a 12 foot ladder resulting in head strike and LOC. He was found to have acute displaced right lateral 8-10 rib fractures, and focal trauma occlusion/dissection of the left vertebral artery at C1 level with C1 anterior and posterior arch fractures with lateral displacement. Also noted to have spinous process fractures C2-C5, C5/C6 anterior subluxation with likely PLL injury, Left C6 transverse process fracture, T4 compression fracture, and C2-C3 epidural hematoma. Patient was admitted and underwent Posterior C1-C7 Decompression/Fusion for unstable spine fracture and evacuation of epidural hematoma. Hospital course complicated by tachycardia and pain.   Admitted to [facility] on 10/20/23 for ADL, gait, and functional impairments.     # S/P Fall with TBI with LOC, Unstable cervical spine fracture s/p C1-C7 Decompression/Fusion and evacuation of EDH with cord compression   - Comprehensive Multidisciplinary Rehab Program: 3 hours a day, 5 days a week with PT/OT  - Seen by Pain Management; pain control with dilaudid as below  - Cervical collar at all times other than sleep  - Fall precautions  - Plan for removal of staples on (10/28)    # Leukocytosis  - No fever, chills, cough  - Check UA  - Monitor (10/23) 12.17    # Vertebral Artery Dissection  - ASA 81mg daily - no allergy per patient, has tolerated in the past    # COPD  - Not on home meds  - 2 L oxygen supplement PRN  - Incentive spirometry     #Tachycardia/PAT  - Ectopic atrial rhythm/PAT noted on tele 10/19  - Cont metoprolol   - Continue cardizem 120mg cd  - EP eval was deferred as pt not likely a candidate for ablation at this time  - Follow up with Dr Zeng as outpatient    #Aortic Root Dilatation  - Stable on recent outpt echo from 9/5/23  - Follow up for repeat echo q 12 months    #Hyponatremia  - Monitor BMP, (10/23) 134  - NaCl tabs 2g q8 hours  - Hospitalist consult appreciated    # Sleep:  - Melatonin qHS    # Pain Management:  - Recent Pain Medicine recs noted  - Tylenol ATC  - Lidocaine patch to ribs  - Robaxin 750mg TID  - Dilaudid 4mg/6mg q4 hours/q6 hours PRN Mod/Severe per Pain Medicine recs    # GI/Bowel:  - Senna QHS, Miralax daily BID  - Dulcolax suppository PRN  - Added Lactulose x 2 daily (10/23)     # /Bladder:  - Flomax 0.4mg QHS  - Bladder scan x1 on admission, SC PRN, Low PVRs, D/C bladder scan   - Encourage timed voids every 4 hours while awake     # Skin/Pressure Injury:  - Skin assessment on admission:  Intact  - Monitor Incisions: Posterior cervical incision well approximated, (+) mild erythema, stables are in place   - Turn every 2 hours while in bed    # Diet:   - Diet Consistency/Modifications: Reg/thin    # DVT ppx:  - Lovenox    # Restrictions/Precautions:  - ROM restrictions: Cervical collar at all times except sleep  - Precautions: Fall, aspiration    ---------------  Outpatient Follow-up:    Spike Dawkins  Neurosurgery  [address], [address]  [place] [address]  Phone: [phone]  Fax: [phone]  Follow Up Time:     Jaylon Zeng  Cardiovascular Disease  [address], [address]  [place] [address]  Phone: [phone]  Fax: [phone]  Follow Up Time:     Eduard Knowles  Colon/Rectal Surgery  [address], [address]  [place] [address]  Phone: [phone]  Fax: [phone]  Follow Up Time:  --------------

## 2023-10-23 NOTE — DIETITIAN INITIAL EVALUATION ADULT - ADD RECOMMEND
1. Continue Easy to Chew diet.   2. Recommend Ensure Plus High Protein Daily 8 oz (Provides 350 kcal, 20 grams of protein) QD to assist pt in meeting estimated protein energy needs.  3. Recommend Multivitamin, to ensure 100% RDA met   4. Monitor PO intake, GI tolerance, skin integrity, labs, weight, and bowel movement regularity.   5. Honor food preferences as feasible. Assist with meals PRN and encourage PO intake.  6. Follow SLP recommendations  7. Provide ongoing diet education as needed  8. RD remains available upon request and will follow-up per protocol

## 2023-10-23 NOTE — PROGRESS NOTE ADULT - SUBJECTIVE AND OBJECTIVE BOX
CC:   Feels weak     HPI:  Patient is 67yo M with history of chronic back pain (on chronic Dilaudid) who presented to [facility] 10/13 following a fall from a 12 foot ladder resulting in head strike and LOC. He was found to have acute displaced right lateral 8-10 rib fractures, and focal trauma occlusion/dissection of the left vertebral artery at C1 level with C1 anterior and posterior arch fractures with lateral displacement. Also noted to have spinous process fractures C2-C5, C5/C6 anterior subluxation with likely PLL injury, Left C6 transverse process fracture, T4 compression fracture, and C2-C3 epidural hematoma. Patient was admitted and underwent Posterior C1-C7 Decompression/Fusion for unstable spine fracture and evacuation of epidural hematoma, decompression of left C5 nerve root. He had routine post operative care in NSCU and then transferred to the floor, Surgical drain removed prior to discharge. Cardiology was consulted and medications were adjusted for tachycardia. Patient was seen by chronic pain and medications were adjusted with good results. He is to wear cervical collar at all times except during sleep. 10/19/23 he had episode of prolapsed hemorrhoids reduced at bedside by surgery team. Patient was evaluated by PM&R and therapy for functional deficits and gait/ ADL impairments and recommended acute rehabilitation. Patient was medically optimized for discharge to [facility] on 10/20.    Allergies:  Nuts (Unknown)  Originally Entered as [Unknown] reaction to [PUMPKIN SEEDS] (Unknown)  Vioxx (Unknown)    Subjective/ROS:  - Patient was seen and examined at bed side, comfortable in bed but looks irritable, No over night issues  - Slept well last night, no new complaints   - Pain, stated that is not well controlled, Explained to him the regimen that was started by pain management alternating between Hydromorphone 6 mg and 4 mg q 4 hours PRN, patient understood.  Also he was encouraged to take Tylenol   - GI/, No BM in few days, voiding urine well with low PVRs  - Participating and tolerating 3 hours of daily therapy without issues    - Denies CP, palpitation, fever, chills, SOB, cough, joint pain or swelling, headache, dizziness, hematuria, dysuria.     MEDICATIONS  (STANDING):  aspirin enteric coated 81 milliGRAM(s) Oral daily  diltiazem    milliGRAM(s) Oral daily  enoxaparin Injectable 40 milliGRAM(s) SubCutaneous every 24 hours  lactulose Syrup 10 Gram(s) Oral two times a day  lidocaine   4% Patch 2 Patch Transdermal every 24 hours  methocarbamol 750 milliGRAM(s) Oral three times a day  metoprolol tartrate 25 milliGRAM(s) Oral two times a day  multivitamin 1 Tablet(s) Oral daily  polyethylene glycol 3350 17 Gram(s) Oral two times a day  senna 2 Tablet(s) Oral at bedtime  sodium chloride 2 Gram(s) Oral every 8 hours  tamsulosin 0.4 milliGRAM(s) Oral at bedtime    MEDICATIONS  (PRN):  acetaminophen     Tablet .. 650 milliGRAM(s) Oral every 6 hours PRN Mild Pain (1 - 3)  bisacodyl Suppository 10 milliGRAM(s) Rectal daily PRN Constipation  HYDROmorphone   Tablet 6 milliGRAM(s) Oral every 6 hours PRN Severe Pain (7 - 10)  HYDROmorphone   Tablet 4 milliGRAM(s) Oral every 4 hours PRN Moderate Pain (4 - 6)    LAB                        11.6   12.17 )-----------( 496      ( 23 Oct 2023 06:31 )             35.6     10-23    134<L>  |  96  |  21  ----------------------------<  117<H>  4.4   |  30  |  0.82    Ca    9.2      23 Oct 2023 06:31    TPro  6.8  /  Alb  2.7<L>  /  TBili  0.5  /  DBili  x   /  AST  19  /  ALT  36  /  AlkPhos  103  10-23    LIVER FUNCTIONS - ( 23 Oct 2023 06:31 )  Alb: 2.7 g/dL / Pro: 6.8 g/dL / ALK PHOS: 103 U/L / ALT: 36 U/L / AST: 19 U/L / GGT: x           MR Cervical Spine No Cont (10.17.23 @ 18:56)   IMPRESSION:  1. Posterior stabilization C1-C7 and C2-C6 laminectomy with routine   postoperative appearance  2. C6 T1 and T2 superior endplate fractures, unchanged 10/13/2023  3. Prevertebral fluid, likely posttraumatic, somewhat improved 10/13/2023  4. C1 fractures, better demonstrated by the CT technique, unchanged   10/13/2023    VA Duplex Lower Ext Vein Scan, Bilat (10.18.23 @ 16:43)   IMPRESSION:  No evidence of deep venous thrombosis in either lower extremity.    CT Chest w/ IV Cont (10.13.23 @ 17:35)   BONES: Right-sided rib fractures as above. Chronic appearing fracture   deformity of the right inferior pubic ramus. Degenerative changes of the   spine. Stable nonaggressive appearing lytic lesion in the T8 vertebral   body.  IMPRESSION:  Acute displaced right lateral 8-10th rib fractures.  Trace right pneumothorax and pneumomediastinum. Small right pleural   effusion.    CT Head No Cont (10.13.23 @ 17:33)   IMPRESSION:  CT head:  -No acute intracranial findings.    CT cervical spine:  -C1 anterior and posterior arch fractures with lateral displacement of   the lateral masses.  -Spinous process fractures from C2 through C5.  -Anterior subluxation of C5 on C6 with bilateral perched facets and   likely posterior ligamentous injury.  -Left C6 transverse process/transverse foramen fractures.  -Hyperdensity within theposterior spinal canal at C2-C3 levels, likely   representing epidural hematoma.    CT angiogram head:  -No vaso-occlusive disease.    CT angiogram neck:  -Focal traumatic occlusion/dissection of the left vertebral artery at C1   level. Reconstitution of flow is noted in the left intracranial vertebral   artery, likely retrograde.    Physical Exam:    Vital Signs Last 24 Hrs  T(C): 36.8 (23 Oct 2023 07:27), Max: 36.9 (22 Oct 2023 21:28)  T(F): 98.3 (23 Oct 2023 07:27), Max: 98.4 (22 Oct 2023 21:28)  HR: 83 (23 Oct 2023 07:27) (64 - 100)  BP: 143/80 (23 Oct 2023 07:27) (121/74 - 143/80)  RR: 16 (23 Oct 2023 07:27) (14 - 16)  SpO2: 94% (23 Oct 2023 07:27) (92% - 94%)    General: NAD, Resting Comfortable, Neck collar is on                                  HEENT: NCAT, EOM I, PERRLA, Normal Conjunctivae  Cardio: RRR, Normal S1-S2                              Pulm: No Respiratory Distress,  Lungs CTAB                        Abdomen: ND, NT, Soft, BS+                                                MSK: No joint swelling, Full ROM                                         Ext: No C/C/E, Pulses (2+) throughout, No calf tenderness    Skin:  all skin intact                                                                   Neurological Examination    Cognitive: AAO x 3, follows command                                                                        Attention: Easily distracted, long processing time   Judgment: (+) evidence of judgement                               Memory:  Intact  Mood/Affect: wnl                                                                           Communication:  Fluent,  No dysarthria   CN II - XII  intact    Motor    LEFT    UE: SF [1/5], EF [3-/5], EE [3/5], WE [5/5],  [5/5]  RIGHT UE: SF [1/5], EF [3-/5], EE [3/5], WE [5/5],  [5/5]  LEFT    LE:  HF [5/5], KE [5/5], DF [5/5], PF [5/5]  RIGHT LE:  HF [5/5], KE [5/5], DF [5/5], PF [5/5]    Reflex:  2 + thoroughout, Hollingsworth/Babinski negative

## 2023-10-23 NOTE — DIETITIAN INITIAL EVALUATION ADULT - PERTINENT MEDS FT
MEDICATIONS  (STANDING):  aspirin enteric coated 81 milliGRAM(s) Oral daily  diltiazem    milliGRAM(s) Oral daily  enoxaparin Injectable 40 milliGRAM(s) SubCutaneous every 24 hours  lidocaine   4% Patch 1 Patch Transdermal every 24 hours  methocarbamol 750 milliGRAM(s) Oral three times a day  metoprolol tartrate 25 milliGRAM(s) Oral two times a day  polyethylene glycol 3350 17 Gram(s) Oral two times a day  senna 2 Tablet(s) Oral at bedtime  sodium chloride 2 Gram(s) Oral every 8 hours  tamsulosin 0.4 milliGRAM(s) Oral at bedtime    MEDICATIONS  (PRN):  acetaminophen     Tablet .. 650 milliGRAM(s) Oral every 6 hours PRN Mild Pain (1 - 3)  bisacodyl Suppository 10 milliGRAM(s) Rectal daily PRN Constipation  HYDROmorphone   Tablet 6 milliGRAM(s) Oral every 6 hours PRN Severe Pain (7 - 10)  HYDROmorphone   Tablet 4 milliGRAM(s) Oral every 4 hours PRN Moderate Pain (4 - 6)

## 2023-10-23 NOTE — DIETITIAN NUTRITION RISK NOTIFICATION - TREATMENT: THE FOLLOWING DIET HAS BEEN RECOMMENDED
Diet, Regular:   Easy to Chew (EASYTOCHEW) (10-20-23 @ 17:50) [Active]      Ensure Plus High Protein Daily 8 oz (Provides 350 kcal, 20 grams of protein) QD  MVI

## 2023-10-23 NOTE — DIETITIAN INITIAL EVALUATION ADULT - ORAL INTAKE PTA/DIET HISTORY
PTA Patient usual meal pattern reflects 1-2 balanced meals after work hours. Reports allergy to [place] nuts only. Reports poor appetite x 2 weeks, w/ 13 lb weight loss.

## 2023-10-24 LAB
HCT VFR BLD CALC: 36.1 % — LOW (ref 39–50)
HCT VFR BLD CALC: 36.1 % — LOW (ref 39–50)
HGB BLD-MCNC: 11.9 G/DL — LOW (ref 13–17)
HGB BLD-MCNC: 11.9 G/DL — LOW (ref 13–17)
MCHC RBC-ENTMCNC: 29.9 PG — SIGNIFICANT CHANGE UP (ref 27–34)
MCHC RBC-ENTMCNC: 29.9 PG — SIGNIFICANT CHANGE UP (ref 27–34)
MCHC RBC-ENTMCNC: 33 GM/DL — SIGNIFICANT CHANGE UP (ref 32–36)
MCHC RBC-ENTMCNC: 33 GM/DL — SIGNIFICANT CHANGE UP (ref 32–36)
MCV RBC AUTO: 90.7 FL — SIGNIFICANT CHANGE UP (ref 80–100)
MCV RBC AUTO: 90.7 FL — SIGNIFICANT CHANGE UP (ref 80–100)
NRBC # BLD: 0 /100 WBCS — SIGNIFICANT CHANGE UP (ref 0–0)
NRBC # BLD: 0 /100 WBCS — SIGNIFICANT CHANGE UP (ref 0–0)
PLATELET # BLD AUTO: 539 K/UL — HIGH (ref 150–400)
PLATELET # BLD AUTO: 539 K/UL — HIGH (ref 150–400)
RBC # BLD: 3.98 M/UL — LOW (ref 4.2–5.8)
RBC # BLD: 3.98 M/UL — LOW (ref 4.2–5.8)
RBC # FLD: 13.1 % — SIGNIFICANT CHANGE UP (ref 10.3–14.5)
RBC # FLD: 13.1 % — SIGNIFICANT CHANGE UP (ref 10.3–14.5)
WBC # BLD: 8.28 K/UL — SIGNIFICANT CHANGE UP (ref 3.8–10.5)
WBC # BLD: 8.28 K/UL — SIGNIFICANT CHANGE UP (ref 3.8–10.5)
WBC # FLD AUTO: 8.28 K/UL — SIGNIFICANT CHANGE UP (ref 3.8–10.5)
WBC # FLD AUTO: 8.28 K/UL — SIGNIFICANT CHANGE UP (ref 3.8–10.5)

## 2023-10-24 PROCEDURE — 99233 SBSQ HOSP IP/OBS HIGH 50: CPT | Mod: GC

## 2023-10-24 RX ORDER — AER TRAVELER 0.5 G/1
1 SOLUTION RECTAL; TOPICAL THREE TIMES A DAY
Refills: 0 | Status: DISCONTINUED | OUTPATIENT
Start: 2023-10-24 | End: 2023-11-03

## 2023-10-24 RX ORDER — PHENYLEPHRINE-SHARK LIVER OIL-MINERAL OIL-PETROLATUM RECTAL OINTMENT
1 OINTMENT (GRAM) RECTAL
Refills: 0 | Status: DISCONTINUED | OUTPATIENT
Start: 2023-10-24 | End: 2023-11-01

## 2023-10-24 RX ADMIN — Medication 650 MILLIGRAM(S): at 05:56

## 2023-10-24 RX ADMIN — HYDROMORPHONE HYDROCHLORIDE 6 MILLIGRAM(S): 2 INJECTION INTRAMUSCULAR; INTRAVENOUS; SUBCUTANEOUS at 23:41

## 2023-10-24 RX ADMIN — HYDROMORPHONE HYDROCHLORIDE 6 MILLIGRAM(S): 2 INJECTION INTRAMUSCULAR; INTRAVENOUS; SUBCUTANEOUS at 16:30

## 2023-10-24 RX ADMIN — Medication 81 MILLIGRAM(S): at 11:18

## 2023-10-24 RX ADMIN — Medication 1 TABLET(S): at 11:18

## 2023-10-24 RX ADMIN — METHOCARBAMOL 750 MILLIGRAM(S): 500 TABLET, FILM COATED ORAL at 05:55

## 2023-10-24 RX ADMIN — SENNA PLUS 2 TABLET(S): 8.6 TABLET ORAL at 22:41

## 2023-10-24 RX ADMIN — HYDROMORPHONE HYDROCHLORIDE 6 MILLIGRAM(S): 2 INJECTION INTRAMUSCULAR; INTRAVENOUS; SUBCUTANEOUS at 11:30

## 2023-10-24 RX ADMIN — HYDROMORPHONE HYDROCHLORIDE 6 MILLIGRAM(S): 2 INJECTION INTRAMUSCULAR; INTRAVENOUS; SUBCUTANEOUS at 10:30

## 2023-10-24 RX ADMIN — POLYETHYLENE GLYCOL 3350 17 GRAM(S): 17 POWDER, FOR SOLUTION ORAL at 17:28

## 2023-10-24 RX ADMIN — LACTULOSE 10 GRAM(S): 10 SOLUTION ORAL at 05:54

## 2023-10-24 RX ADMIN — Medication 25 MILLIGRAM(S): at 17:24

## 2023-10-24 RX ADMIN — Medication 25 MILLIGRAM(S): at 05:54

## 2023-10-24 RX ADMIN — POLYETHYLENE GLYCOL 3350 17 GRAM(S): 17 POWDER, FOR SOLUTION ORAL at 05:55

## 2023-10-24 RX ADMIN — LIDOCAINE 2 PATCH: 4 CREAM TOPICAL at 15:13

## 2023-10-24 RX ADMIN — LIDOCAINE 2 PATCH: 4 CREAM TOPICAL at 19:00

## 2023-10-24 RX ADMIN — HYDROMORPHONE HYDROCHLORIDE 6 MILLIGRAM(S): 2 INJECTION INTRAMUSCULAR; INTRAVENOUS; SUBCUTANEOUS at 22:41

## 2023-10-24 RX ADMIN — SODIUM CHLORIDE 2 GRAM(S): 9 INJECTION INTRAMUSCULAR; INTRAVENOUS; SUBCUTANEOUS at 22:43

## 2023-10-24 RX ADMIN — HYDROMORPHONE HYDROCHLORIDE 6 MILLIGRAM(S): 2 INJECTION INTRAMUSCULAR; INTRAVENOUS; SUBCUTANEOUS at 04:25

## 2023-10-24 RX ADMIN — METHOCARBAMOL 750 MILLIGRAM(S): 500 TABLET, FILM COATED ORAL at 15:11

## 2023-10-24 RX ADMIN — PHENYLEPHRINE-SHARK LIVER OIL-MINERAL OIL-PETROLATUM RECTAL OINTMENT 1 APPLICATION(S): at 17:24

## 2023-10-24 RX ADMIN — LIDOCAINE 2 PATCH: 4 CREAM TOPICAL at 00:38

## 2023-10-24 RX ADMIN — SODIUM CHLORIDE 2 GRAM(S): 9 INJECTION INTRAMUSCULAR; INTRAVENOUS; SUBCUTANEOUS at 05:56

## 2023-10-24 RX ADMIN — TAMSULOSIN HYDROCHLORIDE 0.4 MILLIGRAM(S): 0.4 CAPSULE ORAL at 22:43

## 2023-10-24 RX ADMIN — METHOCARBAMOL 750 MILLIGRAM(S): 500 TABLET, FILM COATED ORAL at 19:41

## 2023-10-24 RX ADMIN — ENOXAPARIN SODIUM 40 MILLIGRAM(S): 100 INJECTION SUBCUTANEOUS at 05:54

## 2023-10-24 RX ADMIN — SODIUM CHLORIDE 2 GRAM(S): 9 INJECTION INTRAMUSCULAR; INTRAVENOUS; SUBCUTANEOUS at 15:11

## 2023-10-24 RX ADMIN — Medication 120 MILLIGRAM(S): at 05:55

## 2023-10-24 RX ADMIN — Medication 650 MILLIGRAM(S): at 19:41

## 2023-10-24 RX ADMIN — HYDROMORPHONE HYDROCHLORIDE 6 MILLIGRAM(S): 2 INJECTION INTRAMUSCULAR; INTRAVENOUS; SUBCUTANEOUS at 17:29

## 2023-10-24 RX ADMIN — Medication 650 MILLIGRAM(S): at 20:41

## 2023-10-24 NOTE — PROGRESS NOTE ADULT - ASSESSMENT
Assessment	  Patient is 69yo M with history of chronic back pain (on chronic Dilaudid) who presented to [facility] 10/13 following a fall from a 12 foot ladder resulting in head strike and LOC. He was found to have acute displaced right lateral 8-10 rib fractures, and focal trauma occlusion/dissection of the left vertebral artery at C1 level with C1 anterior and posterior arch fractures with lateral displacement. Also noted to have spinous process fractures C2-C5, C5/C6 anterior subluxation with likely PLL injury, Left C6 transverse process fracture, T4 compression fracture, and C2-C3 epidural hematoma. Patient was admitted and underwent Posterior C1-C7 Decompression/Fusion for unstable spine fracture and evacuation of epidural hematoma. Hospital course complicated by tachycardia and pain.   Admitted to [facility] on 10/20/23 for ADL, gait, and functional impairments.     # S/P Fall with TBI with LOC, Unstable cervical spine fracture s/p C1-C7 Decompression/Fusion and evacuation of EDH with cord compression   - Comprehensive Multidisciplinary Rehab Program: 3 hours a day, 5 days a week with PT/OT  - Seen by Pain Management; pain control with dilaudid as below  - Cervical collar at all times other than sleep  - Fall precautions  - Plan for removal of staples on (10/28)    # Leukocytosis - resolved  - No fever, chills, cough  - UA - negative  - Monitor (10/23) 12.17, (10/24) 8.28    # Vertebral Artery Dissection  - ASA 81mg daily - no allergy per patient, has tolerated in the past    # COPD  - Not on home meds  - 2 L oxygen supplement PRN  - Incentive spirometry     #Tachycardia/PAT  - Ectopic atrial rhythm/PAT noted on tele 10/19  - Cont metoprolol   - Continue cardizem 120mg cd  - EP eval was deferred as pt not likely a candidate for ablation at this time  - Follow up with Dr Zeng as outpatient    #Aortic Root Dilatation  - Stable on recent outpt echo from 9/5/23  - Follow up for repeat echo q 12 months    #Hyponatremia  - Monitor BMP, (10/23) 134  - NaCl tabs 2g q8 hours  - Hospitalist consult appreciated    # Sleep:  - Melatonin qHS    # Pain Management:  - Recent Pain Medicine recs noted  - Tylenol ATC  - Lidocaine patch to ribs  - Robaxin 750mg TID  - Dilaudid 4mg/6mg q4 hours/q6 hours PRN Mod/Severe per Pain Medicine recs    # GI/Bowel:  - Senna QHS, Miralax daily BID  - Dulcolax suppository PRN  - Added Lactulose x 2 daily (10/23)   - Witch hazel and Prep H PRN    # /Bladder:  - Flomax 0.4mg QHS  - Bladder scan x1 on admission, SC PRN, Low PVRs, D/C bladder scan   - Encourage timed voids every 4 hours while awake     # Skin/Pressure Injury:  - Skin assessment on admission:  Intact  - Monitor Incisions: Posterior cervical incision well approximated, (+) mild erythema, stables are in place   - Turn every 2 hours while in bed    # Diet:   - Diet Consistency/Modifications: Reg/thin    # DVT ppx:  - Lovenox    # Restrictions/Precautions:  - ROM restrictions: Cervical collar at all times except sleep  - Precautions: Fall, aspiration    ---------------  Outpatient Follow-up:    Spike Dawkins  Neurosurgery  [address], [address]  [place] [address]  Phone: [phone]  Fax: [phone]  Follow Up Time:     Jaylon Zeng  Cardiovascular Disease  [address], [address]  [place] [address]  Phone: [phone]  Fax: [phone]  Follow Up Time:     Eduard Knowles  Colon/Rectal Surgery  [address], [address]  [place] [address]  Phone: [phone]  Fax: [phone]  Follow Up Time:  --------------   Assessment	  Patient is 67yo M with history of chronic back pain (on chronic Dilaudid) who presented to [facility] 10/13 following a fall from a 12 foot ladder resulting in head strike and LOC. He was found to have acute displaced right lateral 8-10 rib fractures, and focal trauma occlusion/dissection of the left vertebral artery at C1 level with C1 anterior and posterior arch fractures with lateral displacement. Also noted to have spinous process fractures C2-C5, C5/C6 anterior subluxation with likely PLL injury, Left C6 transverse process fracture, T4 compression fracture, and C2-C3 epidural hematoma. Patient was admitted and underwent Posterior C1-C7 Decompression/Fusion for unstable spine fracture and evacuation of epidural hematoma. Hospital course complicated by tachycardia and pain.   Admitted to [facility] on 10/20/23 for ADL, gait, and functional impairments.     # S/P Fall with TBI with LOC, Unstable cervical spine fracture s/p C1-C7 Decompression/Fusion and evacuation of EDH with cord compression   - Comprehensive Multidisciplinary Rehab Program: 3 hours a day, 5 days a week with PT/OT  - Seen by Pain Management; pain control with Dilaudid as below  - Cervical collar at all times other than sleep  - Fall precautions  - Plan for removal of staples on (10/28)    # Leukocytosis - resolved  - No fever, chills, cough  - UA - negative  - Monitor (10/23) 12.17, (10/24) 8.28    # Vertebral Artery Dissection  - ASA 81mg daily - no allergy per patient, has tolerated in the past    # COPD  - Not on home meds  - 2 L oxygen supplement PRN  - Incentive spirometry     #Tachycardia/PAT  - Ectopic atrial rhythm/PAT noted on tele 10/19  - Cont metoprolol   - Continue cardizem 120mg cd  - EP eval was deferred as pt not likely a candidate for ablation at this time  - Follow up with Dr Zeng as outpatient    #Aortic Root Dilatation  - Stable on recent outpt echo from 9/5/23  - Follow up for repeat echo q 12 months    #Hyponatremia  - Monitor BMP, (10/23) 134  - NaCl tabs 2g q8 hours  - Hospitalist consult appreciated    # Sleep:  - Melatonin qHS    # Pain Management:  - Recent Pain Medicine recs noted  - Tylenol ATC  - Lidocaine patch to ribs  - Robaxin 750mg TID  - Dilaudid 4mg/6mg q4 hours/q6 hours PRN Mod/Severe per Pain Medicine recs    # GI/Bowel:  - Senna QHS, Miralax daily BID  - Dulcolax suppository PRN  - Added Lactulose x 2 daily (10/23)   - Witch hazel and Prep H PRN    # /Bladder:  - Flomax 0.4mg QHS  - Bladder scan x1 on admission, SC PRN, Low PVRs, D/C bladder scan   - Encourage timed voids every 4 hours while awake     # Skin/Pressure Injury:  - Skin assessment on admission:  Intact  - Monitor Incisions: Posterior cervical incision well approximated, (+) mild erythema, stables are in place   - Turn every 2 hours while in bed    # Diet:   - Diet Consistency/Modifications: Reg/thin    # DVT ppx:  - Lovenox    # Restrictions/Precautions:  - ROM restrictions: Cervical collar at all times except sleep  - Precautions: Fall, aspiration    ---------------  Outpatient Follow-up:    Spike Dawkins  Neurosurgery  [address], [address]  [place] [address]  Phone: [phone]  Fax: [phone]  Follow Up Time:     Jaylon Zeng  Cardiovascular Disease  [address], [address]  [place] [address]  Phone: [phone]  Fax: [phone]  Follow Up Time:     Eduard Knowles  Colon/Rectal Surgery  [address], [address]  [place] [address]  Phone: [phone]  Fax: [phone]  Follow Up Time:  --------------

## 2023-10-24 NOTE — PROGRESS NOTE ADULT - SUBJECTIVE AND OBJECTIVE BOX
Patient is a 68y old  Male who presents with a chief complaint of S/P Fall with TBI and Multiple fractures, unstable cervical spine fracture s/p C1-C7 Decompression/Fusion (23 Oct 2023 10:49)    HPI:  Patient is 67yo M with history of chronic back pain (on chronic Dilaudid) who presented to [facility] 10/13 following a fall from a 12 foot ladder resulting in head strike and LOC. He was found to have acute displaced right lateral 8-10 rib fractures, and focal trauma occlusion/dissection of the left vertebral artery at C1 level with C1 anterior and posterior arch fractures with lateral displacement. Also noted to have spinous process fractures C2-C5, C5/C6 anterior subluxation with likely PLL injury, Left C6 transverse process fracture, T4 compression fracture, and C2-C3 epidural hematoma. Patient was admitted and underwent Posterior C1-C7 Decompression/Fusion for unstable spine fracture and evacuation of epidural hematoma, decompression of left C5 nerve root. He had routine post operative care in NSCU and then transferred to the floor, Surgical drain removed prior to discharge. Cardiology was consulted and medications were adjusted for tachycardia. Patient was seen by chronic pain and medications were adjusted with good results. He is to wear cervical collar at all times except during sleep. 10/19/23 he had episode of prolapsed hemorrhoids reduced at bedside by surgery team. Patient was evaluated by PM&R and therapy for functional deficits and gait/ ADL impairments and recommended acute rehabilitation. Patient was medically optimized for discharge to [facility] on 10/20.      SUBJECTIVE/ROS:  - Patient was seen and examined at bed side this morning, no new events over night.    - Patient states that they slept well last night. He reports feeling well this morning; no new complaints.  - Pain is well-controlled with current meds  - GI/: patient is moving bowels, was able to have bowel movement yesterday. Patient requesting witch hazel and hemorrhoidal cream. Voiding without issues  - Tolerating 3 hours of therapy without issues. Swallow evaluated by SLP today; diet adjusted to Minced and Moist  - Denies fever, chills, CP, palpitation, cough, SOB, abdominal pain, N/V/D/C     MEDICATIONS  (STANDING):  aspirin enteric coated 81 milliGRAM(s) Oral daily  diltiazem    milliGRAM(s) Oral daily  enoxaparin Injectable 40 milliGRAM(s) SubCutaneous every 24 hours  hemorrhoidal Ointment 1 Application(s) Rectal two times a day  lactulose Syrup 10 Gram(s) Oral two times a day  lidocaine   4% Patch 2 Patch Transdermal every 24 hours  methocarbamol 750 milliGRAM(s) Oral three times a day  metoprolol tartrate 25 milliGRAM(s) Oral two times a day  multivitamin 1 Tablet(s) Oral daily  polyethylene glycol 3350 17 Gram(s) Oral two times a day  senna 2 Tablet(s) Oral at bedtime  sodium chloride 2 Gram(s) Oral every 8 hours  tamsulosin 0.4 milliGRAM(s) Oral at bedtime    MEDICATIONS  (PRN):  acetaminophen     Tablet .. 650 milliGRAM(s) Oral every 6 hours PRN Mild Pain (1 - 3)  bisacodyl Suppository 10 milliGRAM(s) Rectal daily PRN Constipation  HYDROmorphone   Tablet 6 milliGRAM(s) Oral every 6 hours PRN Severe Pain (7 - 10)  HYDROmorphone   Tablet 4 milliGRAM(s) Oral every 4 hours PRN Moderate Pain (4 - 6)  witch hazel Pads 1 Application(s) Topical three times a day PRN Hemmorrhoids      RECENT LABS:                          11.9   8.28  )-----------( 539      ( 24 Oct 2023 09:00 )             36.1     10-23    134<L>  |  96  |  21  ----------------------------<  117<H>  4.4   |  30  |  0.82    Ca    9.2      23 Oct 2023 06:31    TPro  6.8  /  Alb  2.7<L>  /  TBili  0.5  /  DBili  x   /  AST  19  /  ALT  36  /  AlkPhos  103  10-23    LIVER FUNCTIONS - ( 23 Oct 2023 06:31 )  Alb: 2.7 g/dL / Pro: 6.8 g/dL / ALK PHOS: 103 U/L / ALT: 36 U/L / AST: 19 U/L / GGT: x             Urinalysis Basic - ( 23 Oct 2023 17:30 )    Color: Yellow / Appearance: Clear / S.022 / pH: x  Gluc: x / Ketone: Negative mg/dL  / Bili: Negative / Urobili: 1.0 mg/dL   Blood: x / Protein: Negative mg/dL / Nitrite: Negative   Leuk Esterase: Negative / RBC: x / WBC x   Sq Epi: x / Non Sq Epi: x / Bacteria: x       PHYSICAL EXAM:  Vital Signs Last 24 Hrs  T(C): 36.6 (24 Oct 2023 07:50), Max: 36.6 (24 Oct 2023 07:50)  T(F): 97.8 (24 Oct 2023 07:50), Max: 97.8 (24 Oct 2023 07:50)  HR: 66 (24 Oct 2023 07:50) (66 - 100)  BP: 129/76 (24 Oct 2023 07:50) (128/76 - 142/83)  BP(mean): --  RR: 16 (24 Oct 2023 07:50) (14 - 16)  SpO2: 94% (24 Oct 2023 07:50) (93% - 99%)    Parameters below as of 24 Oct 2023 07:50  Patient On (Oxygen Delivery Method): room air    General: NAD, Resting Comfortable, Neck collar is on                                  HEENT: NCAT, EOM I, PERRLA, Normal Conjunctivae  Cardio: RRR, Normal S1-S2                              Pulm: No Respiratory Distress,  Lungs CTAB                        Abdomen: ND, NT, Soft, BS+                                                MSK: No joint swelling, Full ROM                                         Ext: No C/C/E, Pulses (2+) throughout, No calf tenderness    Skin:  all skin intact                                                                   Neurological Examination    Cognitive: AAO x 3, follows command                                                                        Attention: Easily distracted, long processing time   Judgment: (+) evidence of judgement                               Memory:  Intact  Mood/Affect: wnl                                                                           Communication:  Fluent,  No dysarthria   CN II - XII  intact    Motor    LEFT    UE: SF [1/5], EF [3-/5], EE [3/5], WE [5/5],  [5/5]  RIGHT UE: SF [1/5], EF [3-/5], EE [3/5], WE [5/5],  [5/5]  LEFT    LE:  HF [5/5], KE [5/5], DF [5/5], PF [5/5]  RIGHT LE:  HF [5/5], KE [5/5], DF [5/5], PF [5/5]    Reflex:  2 + thoroughout, Hollingsworth/Babinski negative Patient is a 68y old  Male who presents with a chief complaint of S/P Fall with TBI and Multiple fractures, unstable cervical spine fracture s/p C1-C7 Decompression/Fusion (23 Oct 2023 10:49)    HPI:  Patient is 69yo M with history of chronic back pain (on chronic Dilaudid) who presented to [facility] 10/13 following a fall from a 12 foot ladder resulting in head strike and LOC. He was found to have acute displaced right lateral 8-10 rib fractures, and focal trauma occlusion/dissection of the left vertebral artery at C1 level with C1 anterior and posterior arch fractures with lateral displacement. Also noted to have spinous process fractures C2-C5, C5/C6 anterior subluxation with likely PLL injury, Left C6 transverse process fracture, T4 compression fracture, and C2-C3 epidural hematoma. Patient was admitted and underwent Posterior C1-C7 Decompression/Fusion for unstable spine fracture and evacuation of epidural hematoma, decompression of left C5 nerve root. He had routine post operative care in NSCU and then transferred to the floor, Surgical drain removed prior to discharge. Cardiology was consulted and medications were adjusted for tachycardia. Patient was seen by chronic pain and medications were adjusted with good results. He is to wear cervical collar at all times except during sleep. 10/19/23 he had episode of prolapsed hemorrhoids reduced at bedside by surgery team. Patient was evaluated by PM&R and therapy for functional deficits and gait/ ADL impairments and recommended acute rehabilitation. Patient was medically optimized for discharge to [facility] on 10/20.      SUBJECTIVE/ROS:  - Patient was seen and examined at bed side this morning, comfortable in his bed working with ST, no new events over night.    - Slept well last night, feeling well this morning; no new complaints.  - Pain is well-controlled with current meds  - GI/: patient is moving bowels, was able to have bowel movement yesterday. Patient requesting witch hazel and hemorrhoidal cream. Voiding without issues  - Case discussed at IDT meeting for progressing and discharge plan.   - Tolerating 3 hours of therapy without issues. Swallow evaluated by SLP today; diet adjusted to Minced and Moist  - Denies fever, chills, CP, palpitation, cough, SOB, abdominal pain, N/V/D/C, hematuria, dysuria, headache, joint pain      MEDICATIONS  (STANDING):  aspirin enteric coated 81 milliGRAM(s) Oral daily  diltiazem    milliGRAM(s) Oral daily  enoxaparin Injectable 40 milliGRAM(s) SubCutaneous every 24 hours  hemorrhoidal Ointment 1 Application(s) Rectal two times a day  lactulose Syrup 10 Gram(s) Oral two times a day  lidocaine   4% Patch 2 Patch Transdermal every 24 hours  methocarbamol 750 milliGRAM(s) Oral three times a day  metoprolol tartrate 25 milliGRAM(s) Oral two times a day  multivitamin 1 Tablet(s) Oral daily  polyethylene glycol 3350 17 Gram(s) Oral two times a day  senna 2 Tablet(s) Oral at bedtime  sodium chloride 2 Gram(s) Oral every 8 hours  tamsulosin 0.4 milliGRAM(s) Oral at bedtime    MEDICATIONS  (PRN):  acetaminophen     Tablet .. 650 milliGRAM(s) Oral every 6 hours PRN Mild Pain (1 - 3)  bisacodyl Suppository 10 milliGRAM(s) Rectal daily PRN Constipation  HYDROmorphone   Tablet 6 milliGRAM(s) Oral every 6 hours PRN Severe Pain (7 - 10)  HYDROmorphone   Tablet 4 milliGRAM(s) Oral every 4 hours PRN Moderate Pain (4 - 6)  witch hazel Pads 1 Application(s) Topical three times a day PRN Hemmorrhoids      RECENT LABS:                        11.9   8.28  )-----------( 539      ( 24 Oct 2023 09:00 )             36.1     10-23    134<L>  |  96  |  21  ----------------------------<  117<H>  4.4   |  30  |  0.82    Ca    9.2      23 Oct 2023 06:31    TPro  6.8  /  Alb  2.7<L>  /  TBili  0.5  /  DBili  x   /  AST  19  /  ALT  36  /  AlkPhos  103  10-23    LIVER FUNCTIONS - ( 23 Oct 2023 06:31 )  Alb: 2.7 g/dL / Pro: 6.8 g/dL / ALK PHOS: 103 U/L / ALT: 36 U/L / AST: 19 U/L / GGT: x             PHYSICAL EXAM:  Vital Signs Last 24 Hrs  T(C): 36.6 (24 Oct 2023 07:50), Max: 36.6 (24 Oct 2023 07:50)  T(F): 97.8 (24 Oct 2023 07:50), Max: 97.8 (24 Oct 2023 07:50)  HR: 66 (24 Oct 2023 07:50) (66 - 100)  BP: 129/76 (24 Oct 2023 07:50) (128/76 - 142/83)  RR: 16 (24 Oct 2023 07:50) (14 - 16)  SpO2: 94% (24 Oct 2023 07:50) (93% - 99%)    General: NAD, Resting Comfortable                                 HEENT: NCAT, EOM I, PERRLA  Cardio: RRR, Normal S1-S2                              Pulm: No Respiratory Distress,  Lungs CTAB                        Abdomen: ND, NT, Soft, BS+                                                MSK: No joint swelling, Full ROM                                         Ext: No C/C/E, Pulses (2+) throughout, No calf tenderness    Skin:  all skin intact                                                                   Neurological Examination    Cognitive: AAO x 3, follows command                                                                        Attention: Easily distracted, long processing time   Judgment: (+) evidence of judgement                               Memory:  Intact  Mood/Affect: wnl                                                                           Communication:  Fluent,  No dysarthria   CN II - XII  intact    Motor    LEFT    UE: SF [1/5], EF [3-/5], EE [3/5], WE [5/5],  [5/5]  RIGHT UE: SF [1/5], EF [3-/5], EE [3/5], WE [5/5],  [5/5]  LEFT    LE:  HF [5/5], KE [5/5], DF [5/5], PF [5/5]  RIGHT LE:  HF [5/5], KE [5/5], DF [5/5], PF [5/5]           Patient is a 68y old  Male who presents with a chief complaint of S/P Fall with TBI and Multiple fractures, unstable cervical spine fracture s/p C1-C7 Decompression/Fusion (23 Oct 2023 10:49)    HPI:  Patient is 69yo M with history of chronic back pain (on chronic Dilaudid) who presented to [facility] 10/13 following a fall from a 12 foot ladder resulting in head strike and LOC. He was found to have acute displaced right lateral 8-10 rib fractures, and focal trauma occlusion/dissection of the left vertebral artery at C1 level with C1 anterior and posterior arch fractures with lateral displacement. Also noted to have spinous process fractures C2-C5, C5/C6 anterior subluxation with likely PLL injury, Left C6 transverse process fracture, T4 compression fracture, and C2-C3 epidural hematoma. Patient was admitted and underwent Posterior C1-C7 Decompression/Fusion for unstable spine fracture and evacuation of epidural hematoma, decompression of left C5 nerve root. He had routine post operative care in NSCU and then transferred to the floor, Surgical drain removed prior to discharge. Cardiology was consulted and medications were adjusted for tachycardia. Patient was seen by chronic pain and medications were adjusted with good results. He is to wear cervical collar at all times except during sleep. 10/19/23 he had episode of prolapsed hemorrhoids reduced at bedside by surgery team. Patient was evaluated by PM&R and therapy for functional deficits and gait/ ADL impairments and recommended acute rehabilitation. Patient was medically optimized for discharge to [facility] on 10/20.      SUBJECTIVE/ROS:  - Patient was seen and examined at bed side this morning, comfortable in his bed working with ST, no new events over night.    - Slept well last night, feeling well this morning; no new complaints.  - Pain is well-controlled with current meds  - GI/: patient is moving bowels, was able to have bowel movement yesterday. Patient requesting witch hazel and hemorrhoidal cream. Voiding without issues  - Case discussed at IDT meeting for progressing and discharge plan.   - Tolerating 3 hours of therapy without issues. Swallow evaluated by SLP today; diet adjusted to Minced and Moist  - Denies fever, chills, CP, palpitation, cough, SOB, abdominal pain, N/V/D/C, hematuria, dysuria, headache, joint pain      MEDICATIONS  (STANDING):  aspirin enteric coated 81 milliGRAM(s) Oral daily  diltiazem    milliGRAM(s) Oral daily  enoxaparin Injectable 40 milliGRAM(s) SubCutaneous every 24 hours  hemorrhoidal Ointment 1 Application(s) Rectal two times a day  lactulose Syrup 10 Gram(s) Oral two times a day  lidocaine   4% Patch 2 Patch Transdermal every 24 hours  methocarbamol 750 milliGRAM(s) Oral three times a day  metoprolol tartrate 25 milliGRAM(s) Oral two times a day  multivitamin 1 Tablet(s) Oral daily  polyethylene glycol 3350 17 Gram(s) Oral two times a day  senna 2 Tablet(s) Oral at bedtime  sodium chloride 2 Gram(s) Oral every 8 hours  tamsulosin 0.4 milliGRAM(s) Oral at bedtime    MEDICATIONS  (PRN):  acetaminophen     Tablet .. 650 milliGRAM(s) Oral every 6 hours PRN Mild Pain (1 - 3)  bisacodyl Suppository 10 milliGRAM(s) Rectal daily PRN Constipation  HYDROmorphone   Tablet 6 milliGRAM(s) Oral every 6 hours PRN Severe Pain (7 - 10)  HYDROmorphone   Tablet 4 milliGRAM(s) Oral every 4 hours PRN Moderate Pain (4 - 6)  witch hazel Pads 1 Application(s) Topical three times a day PRN Hemmorrhoids      RECENT LABS:                        11.9   8.28  )-----------( 539      ( 24 Oct 2023 09:00 )             36.1     10-23    134<L>  |  96  |  21  ----------------------------<  117<H>  4.4   |  30  |  0.82    Ca    9.2      23 Oct 2023 06:31    TPro  6.8  /  Alb  2.7<L>  /  TBili  0.5  /  DBili  x   /  AST  19  /  ALT  36  /  AlkPhos  103  10-23    LIVER FUNCTIONS - ( 23 Oct 2023 06:31 )  Alb: 2.7 g/dL / Pro: 6.8 g/dL / ALK PHOS: 103 U/L / ALT: 36 U/L / AST: 19 U/L / GGT: x             PHYSICAL EXAM:  Vital Signs Last 24 Hrs  T(C): 36.6 (24 Oct 2023 07:50), Max: 36.6 (24 Oct 2023 07:50)  T(F): 97.8 (24 Oct 2023 07:50), Max: 97.8 (24 Oct 2023 07:50)  HR: 66 (24 Oct 2023 07:50) (66 - 100)  BP: 129/76 (24 Oct 2023 07:50) (128/76 - 142/83)  RR: 16 (24 Oct 2023 07:50) (14 - 16)  SpO2: 94% (24 Oct 2023 07:50) (93% - 99%)    General: NAD, Resting Comfortable                                 HEENT: NCAT, EOM I, PERRLA  Cardio: RRR, Normal S1-S2                              Pulm: No Respiratory Distress,  Lungs CTAB                        Abdomen: ND, NT, Soft, BS+                                                MSK: No joint swelling, Full ROM                                         Ext: No C/C/E, Pulses (2+) throughout, No calf tenderness    Skin:  all skin intact                                                                   Neurological Examination    Cognitive: AAO x 3, follows command                                                                        Attention: Easily distracted, long processing time   Judgment: (+) evidence of judgement                               Memory:  Intact  Mood/Affect: wnl                                                                           Communication:  Fluent,  No dysarthria   CN II - XII  intact    Motor    LEFT    UE: SF [1/5], EF [3-/5], EE [3/5], WE [5/5],  [5/5]  RIGHT UE: SF [1/5], EF [3-/5], EE [3/5], WE [5/5],  [5/5]  LEFT    LE:  HF [5/5], KE [5/5], DF [5/5], PF [5/5]  RIGHT LE:  HF [5/5], KE [5/5], DF [5/5], PF [5/5]        IDT rounds 10/24  SW - lives in a private home with spouse, 1 IKE and 1 flight inside, 1F setup available, spouse is able to support intermittently. Independent prior  SLP - soft and bite sized with thins; mild cog for higher level tasks  OT - MaxA for all txf and ADLs, Yoshi for toilet transfers; barriers include proximal weakness; Goals for Yoshi for all ADLs and supervision for fxnal transfers  PT - 150' no device CG-Yoshi for unsteady gait, CG transfers, 12 steps with HR CG; goals for Gui mobility  TDD: 11/3 Home

## 2023-10-25 LAB
HCT VFR BLD CALC: 36.4 % — LOW (ref 39–50)
HCT VFR BLD CALC: 36.4 % — LOW (ref 39–50)
HGB BLD-MCNC: 12 G/DL — LOW (ref 13–17)
HGB BLD-MCNC: 12 G/DL — LOW (ref 13–17)
MCHC RBC-ENTMCNC: 30 PG — SIGNIFICANT CHANGE UP (ref 27–34)
MCHC RBC-ENTMCNC: 30 PG — SIGNIFICANT CHANGE UP (ref 27–34)
MCHC RBC-ENTMCNC: 33 GM/DL — SIGNIFICANT CHANGE UP (ref 32–36)
MCHC RBC-ENTMCNC: 33 GM/DL — SIGNIFICANT CHANGE UP (ref 32–36)
MCV RBC AUTO: 91 FL — SIGNIFICANT CHANGE UP (ref 80–100)
MCV RBC AUTO: 91 FL — SIGNIFICANT CHANGE UP (ref 80–100)
NRBC # BLD: 0 /100 WBCS — SIGNIFICANT CHANGE UP (ref 0–0)
NRBC # BLD: 0 /100 WBCS — SIGNIFICANT CHANGE UP (ref 0–0)
PLATELET # BLD AUTO: 498 K/UL — HIGH (ref 150–400)
PLATELET # BLD AUTO: 498 K/UL — HIGH (ref 150–400)
RBC # BLD: 4 M/UL — LOW (ref 4.2–5.8)
RBC # BLD: 4 M/UL — LOW (ref 4.2–5.8)
RBC # FLD: 13.2 % — SIGNIFICANT CHANGE UP (ref 10.3–14.5)
RBC # FLD: 13.2 % — SIGNIFICANT CHANGE UP (ref 10.3–14.5)
WBC # BLD: 9.78 K/UL — SIGNIFICANT CHANGE UP (ref 3.8–10.5)
WBC # BLD: 9.78 K/UL — SIGNIFICANT CHANGE UP (ref 3.8–10.5)
WBC # FLD AUTO: 9.78 K/UL — SIGNIFICANT CHANGE UP (ref 3.8–10.5)
WBC # FLD AUTO: 9.78 K/UL — SIGNIFICANT CHANGE UP (ref 3.8–10.5)

## 2023-10-25 PROCEDURE — 99233 SBSQ HOSP IP/OBS HIGH 50: CPT | Mod: GC

## 2023-10-25 RX ADMIN — Medication 650 MILLIGRAM(S): at 22:51

## 2023-10-25 RX ADMIN — PHENYLEPHRINE-SHARK LIVER OIL-MINERAL OIL-PETROLATUM RECTAL OINTMENT 1 APPLICATION(S): at 18:13

## 2023-10-25 RX ADMIN — Medication 120 MILLIGRAM(S): at 05:33

## 2023-10-25 RX ADMIN — METHOCARBAMOL 750 MILLIGRAM(S): 500 TABLET, FILM COATED ORAL at 21:37

## 2023-10-25 RX ADMIN — HYDROMORPHONE HYDROCHLORIDE 6 MILLIGRAM(S): 2 INJECTION INTRAMUSCULAR; INTRAVENOUS; SUBCUTANEOUS at 11:40

## 2023-10-25 RX ADMIN — Medication 81 MILLIGRAM(S): at 12:32

## 2023-10-25 RX ADMIN — Medication 650 MILLIGRAM(S): at 05:36

## 2023-10-25 RX ADMIN — LIDOCAINE 2 PATCH: 4 CREAM TOPICAL at 11:04

## 2023-10-25 RX ADMIN — HYDROMORPHONE HYDROCHLORIDE 6 MILLIGRAM(S): 2 INJECTION INTRAMUSCULAR; INTRAVENOUS; SUBCUTANEOUS at 17:23

## 2023-10-25 RX ADMIN — SENNA PLUS 2 TABLET(S): 8.6 TABLET ORAL at 21:36

## 2023-10-25 RX ADMIN — LIDOCAINE 2 PATCH: 4 CREAM TOPICAL at 23:30

## 2023-10-25 RX ADMIN — Medication 1 TABLET(S): at 12:32

## 2023-10-25 RX ADMIN — Medication 25 MILLIGRAM(S): at 05:34

## 2023-10-25 RX ADMIN — POLYETHYLENE GLYCOL 3350 17 GRAM(S): 17 POWDER, FOR SOLUTION ORAL at 17:24

## 2023-10-25 RX ADMIN — Medication 650 MILLIGRAM(S): at 06:36

## 2023-10-25 RX ADMIN — HYDROMORPHONE HYDROCHLORIDE 6 MILLIGRAM(S): 2 INJECTION INTRAMUSCULAR; INTRAVENOUS; SUBCUTANEOUS at 18:12

## 2023-10-25 RX ADMIN — Medication 650 MILLIGRAM(S): at 21:37

## 2023-10-25 RX ADMIN — Medication 25 MILLIGRAM(S): at 17:23

## 2023-10-25 RX ADMIN — TAMSULOSIN HYDROCHLORIDE 0.4 MILLIGRAM(S): 0.4 CAPSULE ORAL at 21:37

## 2023-10-25 RX ADMIN — METHOCARBAMOL 750 MILLIGRAM(S): 500 TABLET, FILM COATED ORAL at 05:33

## 2023-10-25 RX ADMIN — LACTULOSE 10 GRAM(S): 10 SOLUTION ORAL at 17:24

## 2023-10-25 RX ADMIN — LIDOCAINE 2 PATCH: 4 CREAM TOPICAL at 18:13

## 2023-10-25 RX ADMIN — SODIUM CHLORIDE 2 GRAM(S): 9 INJECTION INTRAMUSCULAR; INTRAVENOUS; SUBCUTANEOUS at 21:37

## 2023-10-25 RX ADMIN — ENOXAPARIN SODIUM 40 MILLIGRAM(S): 100 INJECTION SUBCUTANEOUS at 05:33

## 2023-10-25 RX ADMIN — HYDROMORPHONE HYDROCHLORIDE 6 MILLIGRAM(S): 2 INJECTION INTRAMUSCULAR; INTRAVENOUS; SUBCUTANEOUS at 23:35

## 2023-10-25 RX ADMIN — METHOCARBAMOL 750 MILLIGRAM(S): 500 TABLET, FILM COATED ORAL at 15:17

## 2023-10-25 RX ADMIN — HYDROMORPHONE HYDROCHLORIDE 6 MILLIGRAM(S): 2 INJECTION INTRAMUSCULAR; INTRAVENOUS; SUBCUTANEOUS at 10:48

## 2023-10-25 NOTE — CONSULT NOTE ADULT - CONSULT REASON
no suupisorty docalax oral, senna shirt term not long term, 6-8 glaass water, fiber suup- metamucal , mobile  suger-  outpatient Colorectal Consulted for Rectal prolapse

## 2023-10-25 NOTE — CONSULT NOTE ADULT - SUBJECTIVE AND OBJECTIVE BOX
GENERAL SURGERY CONSULT NOTE    Patient is a 68y old  Male who presents with a chief complaint of S/P Fall with TBI and Multiple fractures, unstable cervical spine fracture s/p C1-C7 Decompression/Fusion (24 Oct 2023 10:25)      HPI:  Patient is 67yo M with history of chronic back pain (on chronic Dilaudid) who presented to [facility] 10/13 following a fall from a 12 foot ladder resulting in head strike and LOC. He was found to have acute displaced right lateral 8-10 rib fractures, and focal trauma occlusion/dissection of the left vertebral artery at C1 level with C1 anterior and posterior arch fractures with lateral displacement. Also noted to have spinous process fractures C2-C5, C5/C6 anterior subluxation with likely PLL injury, Left C6 transverse process fracture, T4 compression fracture, and C2-C3 epidural hematoma. Patient was admitted and underwent Posterior C1-C7 Decompression/Fusion for unstable spine fracture and evacuation of epidural hematoma, decompression of left C5 nerve root. He had routine post operative care in NSCU and then transferred to the floor, Surgical drain removed prior to discharge. Cardiology was consulted and medications were adjusted for tachycardia. Patient was seen by chronic pain and medications were adjusted with good results. He is to wear cervical collar at all times except during sleep. 10/19/23 he had episode of prolapsed hemorrhoids reduced at bedside by surgery team. Patient was evaluated by PM&R and therapy for functional deficits and gait/ ADL impairments and recommended acute rehabilitation. Patient was medically optimized for discharge to [facility] on 10/20.   (20 Oct 2023 12:57)      PAST MEDICAL & SURGICAL HISTORY:  High cholesterol      Insomnia      History of chronic back pain      History of arthroscopy of left shoulder  age 15 & right shoulder 3 yrs      H/O hernia repair  b/l inguinal & abdominal      History of arthroplasty of right knee  5yrs          Review of Systems:  Contained within HPI.    MEDICATIONS  (STANDING):  aspirin enteric coated 81 milliGRAM(s) Oral daily  diltiazem    milliGRAM(s) Oral daily  enoxaparin Injectable 40 milliGRAM(s) SubCutaneous every 24 hours  hemorrhoidal Ointment 1 Application(s) Rectal two times a day  lactulose Syrup 10 Gram(s) Oral two times a day  lidocaine   4% Patch 2 Patch Transdermal every 24 hours  methocarbamol 750 milliGRAM(s) Oral three times a day  metoprolol tartrate 25 milliGRAM(s) Oral two times a day  multivitamin 1 Tablet(s) Oral daily  polyethylene glycol 3350 17 Gram(s) Oral two times a day  senna 2 Tablet(s) Oral at bedtime  sodium chloride 2 Gram(s) Oral every 8 hours  tamsulosin 0.4 milliGRAM(s) Oral at bedtime    MEDICATIONS  (PRN):  acetaminophen     Tablet .. 650 milliGRAM(s) Oral every 6 hours PRN Mild Pain (1 - 3)  bisacodyl Suppository 10 milliGRAM(s) Rectal daily PRN Constipation  HYDROmorphone   Tablet 6 milliGRAM(s) Oral every 6 hours PRN Severe Pain (7 - 10)  HYDROmorphone   Tablet 4 milliGRAM(s) Oral every 4 hours PRN Moderate Pain (4 - 6)  witch hazel Pads 1 Application(s) Topical three times a day PRN Hemmorrhoids      Allergies    Nuts (Unknown)  Originally Entered as [Unknown] reaction to [PUMPKIN SEEDS] (Unknown)  Vioxx (Unknown)    Intolerances        SOCIAL HISTORY       FAMILY HISTORY:  Family history of early CAD (Sibling)        Vital Signs Last 24 Hrs  T(C): 36.4 (25 Oct 2023 07:55), Max: 36.7 (24 Oct 2023 22:41)  T(F): 97.5 (25 Oct 2023 07:55), Max: 98 (24 Oct 2023 22:41)  HR: 99 (25 Oct 2023 07:55) (75 - 99)  BP: 151/91 (25 Oct 2023 07:55) (133/82 - 151/91)  BP(mean): --  RR: 16 (25 Oct 2023 07:55) (16 - 16)  SpO2: 94% (25 Oct 2023 07:55) (94% - 95%)    Parameters below as of 25 Oct 2023 07:55  Patient On (Oxygen Delivery Method): room air        Physical Exam:    General:  Appears stated age, well-groomed, well-nourished, no distress  Eyes : MELE  HENT:  WNL, no JVD  Chest:  clear breath sounds  Cardiovascular:  Regular rate & rhythm  Abdomen:    Extremities:  Gait & station:    Skin:  No rash  Musculoskeletal:  normal strength  Neuro/Psych:  Alert, oriented to time, place and person       LABS:                        12.0   9.78  )-----------( 498      ( 25 Oct 2023 11:11 )             36.4             Urinalysis Basic - ( 23 Oct 2023 17:30 )    Color: Yellow / Appearance: Clear / S.022 / pH: x  Gluc: x / Ketone: Negative mg/dL  / Bili: Negative / Urobili: 1.0 mg/dL   Blood: x / Protein: Negative mg/dL / Nitrite: Negative   Leuk Esterase: Negative / RBC: x / WBC x   Sq Epi: x / Non Sq Epi: x / Bacteria: x        RADIOLOGY & ADDITIONAL STUDIES: COLORECTAL CONSULT NOTE    Patient is a 68y old  Male who presents with a chief complaint of S/P Fall with TBI and Multiple fractures, unstable cervical spine fracture s/p C1-C7 Decompression/Fusion (24 Oct 2023 10:25)    Colorectal Consulted for Rectal prolapse    HPI:  Patient is 67yo M with history of chronic back pain (on chronic Dilaudid) who presented to [facility] 10/13 following a fall from a 12 foot ladder resulting in head strike and LOC. He was found to have acute displaced right lateral 8-10 rib fractures, and focal trauma occlusion/dissection of the left vertebral artery at C1 level with C1 anterior and posterior arch fractures with lateral displacement. Also noted to have spinous process fractures C2-C5, C5/C6 anterior subluxation with likely PLL injury, Left C6 transverse process fracture, T4 compression fracture, and C2-C3 epidural hematoma. Patient was admitted and underwent Posterior C1-C7 Decompression/Fusion for unstable spine fracture and evacuation of epidural hematoma, decompression of left C5 nerve root. He had routine post operative care in NSCU and then transferred to the floor, Surgical drain removed prior to discharge. Cardiology was consulted and medications were adjusted for tachycardia. Patient was seen by chronic pain and medications were adjusted with good results. He is to wear cervical collar at all times except during sleep. 10/19/23 he had episode of prolapsed hemorrhoids reduced at bedside by surgery team. Patient was evaluated by PM&R and therapy for functional deficits and gait/ ADL impairments and recommended acute rehabilitation. Patient was medically optimized for discharge to [facility] on 10/20.   (20 Oct 2023 12:57)      PAST MEDICAL & SURGICAL HISTORY:  High cholesterol      Insomnia      History of chronic back pain      History of arthroscopy of left shoulder  age 15 & right shoulder 3 yrs      H/O hernia repair  b/l inguinal & abdominal      History of arthroplasty of right knee  5yrs          Review of Systems:  Contained within HPI.    MEDICATIONS  (STANDING):  aspirin enteric coated 81 milliGRAM(s) Oral daily  diltiazem    milliGRAM(s) Oral daily  enoxaparin Injectable 40 milliGRAM(s) SubCutaneous every 24 hours  hemorrhoidal Ointment 1 Application(s) Rectal two times a day  lactulose Syrup 10 Gram(s) Oral two times a day  lidocaine   4% Patch 2 Patch Transdermal every 24 hours  methocarbamol 750 milliGRAM(s) Oral three times a day  metoprolol tartrate 25 milliGRAM(s) Oral two times a day  multivitamin 1 Tablet(s) Oral daily  polyethylene glycol 3350 17 Gram(s) Oral two times a day  senna 2 Tablet(s) Oral at bedtime  sodium chloride 2 Gram(s) Oral every 8 hours  tamsulosin 0.4 milliGRAM(s) Oral at bedtime    MEDICATIONS  (PRN):  acetaminophen     Tablet .. 650 milliGRAM(s) Oral every 6 hours PRN Mild Pain (1 - 3)  bisacodyl Suppository 10 milliGRAM(s) Rectal daily PRN Constipation  HYDROmorphone   Tablet 6 milliGRAM(s) Oral every 6 hours PRN Severe Pain (7 - 10)  HYDROmorphone   Tablet 4 milliGRAM(s) Oral every 4 hours PRN Moderate Pain (4 - 6)  witch hazel Pads 1 Application(s) Topical three times a day PRN Hemmorrhoids      Allergies    Nuts (Unknown)  Originally Entered as [Unknown] reaction to [PUMPKIN SEEDS] (Unknown)  Vioxx (Unknown)    Intolerances        SOCIAL HISTORY       FAMILY HISTORY:  Family history of early CAD (Sibling)        Vital Signs Last 24 Hrs  T(C): 36.4 (25 Oct 2023 07:55), Max: 36.7 (24 Oct 2023 22:41)  T(F): 97.5 (25 Oct 2023 07:55), Max: 98 (24 Oct 2023 22:41)  HR: 99 (25 Oct 2023 07:55) (75 - 99)  BP: 151/91 (25 Oct 2023 07:55) (133/82 - 151/91)  BP(mean): --  RR: 16 (25 Oct 2023 07:55) (16 - 16)  SpO2: 94% (25 Oct 2023 07:55) (94% - 95%)    Parameters below as of 25 Oct 2023 07:55  Patient On (Oxygen Delivery Method): room air        Physical Exam:    General:  Appears stated age, well-groomed, well-nourished, no distress  HENT:  WNL, no JVD  Abdomen:  Soft, nondistended, no guarding  Skin:  No rash  Neuro/Psych:  Alert, oriented to time, place and person       LABS:                        12.0   9.78  )-----------( 498      ( 25 Oct 2023 11:11 )             36.4     Urinalysis Basic - ( 23 Oct 2023 17:30 )    Color: Yellow / Appearance: Clear / S.022 / pH: x  Gluc: x / Ketone: Negative mg/dL  / Bili: Negative / Urobili: 1.0 mg/dL   Blood: x / Protein: Negative mg/dL / Nitrite: Negative   Leuk Esterase: Negative / RBC: x / WBC x   Sq Epi: x / Non Sq Epi: x / Bacteria: x

## 2023-10-25 NOTE — PROGRESS NOTE ADULT - SUBJECTIVE AND OBJECTIVE BOX
Patient is a 68y old  Male who presents with a chief complaint of S/P Fall with TBI and Multiple fractures, unstable cervical spine fracture s/p C1-C7 Decompression/Fusion (23 Oct 2023 10:49)    HPI:  Patient is 67yo M with history of chronic back pain (on chronic Dilaudid) who presented to [facility] 10/13 following a fall from a 12 foot ladder resulting in head strike and LOC. He was found to have acute displaced right lateral 8-10 rib fractures, and focal trauma occlusion/dissection of the left vertebral artery at C1 level with C1 anterior and posterior arch fractures with lateral displacement. Also noted to have spinous process fractures C2-C5, C5/C6 anterior subluxation with likely PLL injury, Left C6 transverse process fracture, T4 compression fracture, and C2-C3 epidural hematoma. Patient was admitted and underwent Posterior C1-C7 Decompression/Fusion for unstable spine fracture and evacuation of epidural hematoma, decompression of left C5 nerve root. He had routine post operative care in NSCU and then transferred to the floor, Surgical drain removed prior to discharge. Cardiology was consulted and medications were adjusted for tachycardia. Patient was seen by chronic pain and medications were adjusted with good results. He is to wear cervical collar at all times except during sleep. 10/19/23 he had episode of prolapsed hemorrhoids reduced at bedside by surgery team. Patient was evaluated by PM&R and therapy for functional deficits and gait/ ADL impairments and recommended acute rehabilitation. Patient was medically optimized for discharge to [facility] on 10/20.      SUBJECTIVE/ROS:  - Patient was seen and examined at bed side this morning, comfortable in his bed, patient went to the bathroom with nursing for a BM, nursing staff informed me that he is in severe pain and refused to get out of the bathroom as his rectum was prolapsed.  Staff managed to transfer him safely to bed.  Upon my evaluation patient look uncomfortable and in pain, his rectum is prolapsed.  I tried to reduce it but unfortunately was painful and patient started to bleed, rectum was covered with NS saturated gauze, pain meds was given, surgery was called immediately spoke to Dr Blanco who will see the patient.   - Slept well last night, not feeling well this am b/c of his rectal prolapse  - Pain is well-controlled with current meds  - GI/: patient is moving bowels, Voiding without issues  - Tolerating 3 hours of therapy without issues. Swallow evaluated by SLP today; diet adjusted to Minced and Moist  - Denies fever, chills, CP, palpitation, cough, SOB, abdominal pain, N/V/D/C, hematuria, dysuria, headache, joint pain      MEDICATIONS  (STANDING):  aspirin enteric coated 81 milliGRAM(s) Oral daily  diltiazem    milliGRAM(s) Oral daily  enoxaparin Injectable 40 milliGRAM(s) SubCutaneous every 24 hours  hemorrhoidal Ointment 1 Application(s) Rectal two times a day  lactulose Syrup 10 Gram(s) Oral two times a day  lidocaine   4% Patch 2 Patch Transdermal every 24 hours  methocarbamol 750 milliGRAM(s) Oral three times a day  metoprolol tartrate 25 milliGRAM(s) Oral two times a day  multivitamin 1 Tablet(s) Oral daily  polyethylene glycol 3350 17 Gram(s) Oral two times a day  senna 2 Tablet(s) Oral at bedtime  sodium chloride 2 Gram(s) Oral every 8 hours  tamsulosin 0.4 milliGRAM(s) Oral at bedtime    MEDICATIONS  (PRN):  acetaminophen     Tablet .. 650 milliGRAM(s) Oral every 6 hours PRN Mild Pain (1 - 3)  bisacodyl Suppository 10 milliGRAM(s) Rectal daily PRN Constipation  HYDROmorphone   Tablet 6 milliGRAM(s) Oral every 6 hours PRN Severe Pain (7 - 10)  HYDROmorphone   Tablet 4 milliGRAM(s) Oral every 4 hours PRN Moderate Pain (4 - 6)  witch hazel Pads 1 Application(s) Topical three times a day PRN Hemmorrhoids      RECENT LABS:  LAB                        12.0   9.78  )-----------( 498      ( 25 Oct 2023 11:11 )             36.4                         11.9   8.28  )-----------( 539      ( 24 Oct 2023 09:00 )             36.1     10-23    134<L>  |  96  |  21  ----------------------------<  117<H>  4.4   |  30  |  0.82    Ca    9.2      23 Oct 2023 06:31    TPro  6.8  /  Alb  2.7<L>  /  TBili  0.5  /  DBili  x   /  AST  19  /  ALT  36  /  AlkPhos  103  10-23    LIVER FUNCTIONS - ( 23 Oct 2023 06:31 )  Alb: 2.7 g/dL / Pro: 6.8 g/dL / ALK PHOS: 103 U/L / ALT: 36 U/L / AST: 19 U/L / GGT: x             PHYSICAL EXAM:  Vital Signs Last 24 Hrs  T(C): 36.4 (25 Oct 2023 07:55), Max: 36.7 (24 Oct 2023 22:41)  T(F): 97.5 (25 Oct 2023 07:55), Max: 98 (24 Oct 2023 22:41)  HR: 99 (25 Oct 2023 07:55) (75 - 99)  BP: 151/91 (25 Oct 2023 07:55) (133/82 - 151/91)  RR: 16 (25 Oct 2023 07:55) (16 - 16)  SpO2: 94% (25 Oct 2023 07:55) (94% - 95%)    General: NAD,  uncomfortable                                HEENT: NCAT, EOM I, PERRLA  Cardio: RRR, Normal S1-S2                              Pulm: No Respiratory Distress,  Lungs CTAB                        Abdomen: ND, NT, Soft, BS+                                                MSK: No joint swelling, Full ROM                                         Ext: No C/C/E, Pulses (2+) throughout, No calf tenderness    Skin:  all skin intact                                                                   Neurological Examination    Cognitive: AAO x 3, follows command                                                                        Attention: Easily distracted, long processing time   Judgment: (+) evidence of judgement                               Memory:  Intact  Mood/Affect: wnl                                                                           Communication:  Fluent,  No dysarthria   CN II - XII  intact    Motor    LEFT    UE: SF [1/5], EF [3-/5], EE [3/5], WE [5/5],  [5/5]  RIGHT UE: SF [1/5], EF [3-/5], EE [3/5], WE [5/5],  [5/5]  LEFT    LE:  HF [5/5], KE [5/5], DF [5/5], PF [5/5]  RIGHT LE:  HF [5/5], KE [5/5], DF [5/5], PF [5/5]        IDT rounds 10/24  SW - lives in a private home with spouse, 1 IKE and 1 flight inside, 1F setup available, spouse is able to support intermittently. Independent prior  SLP - soft and bite sized with thins; mild cog for higher level tasks  OT - MaxA for all txf and ADLs, Yoshi for toilet transfers; barriers include proximal weakness; Goals for Yoshi for all ADLs and supervision for fxnal transfers  PT - 150' no device CG-Yoshi for unsteady gait, CG transfers, 12 steps with HR CG; goals for Gui mobility  TDD: 11/3 Home

## 2023-10-25 NOTE — CONSULT NOTE ADULT - ASSESSMENT
Patient is a 68y old  Male who presents with a chief complaint of S/P Fall with TBI and Multiple fractures, unstable cervical spine fracture s/p C1-C7 Decompression/Fusion (24 Oct 2023 10:25)    Colorectal Consulted for Rectal prolapse    Plan  Able to   no suupisorty docalax oral, senna shirt term not long term, 6-8 glaass water, fiber suup- metamucal , mobile  suger- fu outpatient Patient is a 68y old  Male who presents with a chief complaint of S/P Fall with TBI and Multiple fractures, unstable cervical spine fracture s/p C1-C7 Decompression/Fusion (24 Oct 2023 10:25)    Colorectal Consulted for Rectal prolapse    Plan  Able to manually reduce Rectal prolapse at bedside.   Recommend if patient experiences future events to place sugar over the prolapse bowel to reduce in size and then manually reduce with lube.  Please discontinue all suppository medications, will irritate the bowel  Continue aggressive bowel regimen  Recommend Senna for a short term period, not long term  Recommend fiber supplement such as MetaMucil  Encourage 6-8 glasses of water per day  Encourage patient to ambulate frequently     Patient may schedule an outpatient appointment with Paula Castro if he would like future surgical intervention. Please re consult Colorectal with any future concerns.

## 2023-10-25 NOTE — PROGRESS NOTE ADULT - ASSESSMENT
Assessment	  Patient is 67yo M with history of chronic back pain (on chronic Dilaudid) who presented to [facility] 10/13 following a fall from a 12 foot ladder resulting in head strike and LOC. He was found to have acute displaced right lateral 8-10 rib fractures, and focal trauma occlusion/dissection of the left vertebral artery at C1 level with C1 anterior and posterior arch fractures with lateral displacement. Also noted to have spinous process fractures C2-C5, C5/C6 anterior subluxation with likely PLL injury, Left C6 transverse process fracture, T4 compression fracture, and C2-C3 epidural hematoma. Patient was admitted and underwent Posterior C1-C7 Decompression/Fusion for unstable spine fracture and evacuation of epidural hematoma. Hospital course complicated by tachycardia and pain.   Admitted to [facility] on 10/20/23 for ADL, gait, and functional impairments.     # S/P Fall with TBI with LOC, Unstable cervical spine fracture s/p C1-C7 Decompression/Fusion and evacuation of EDH with cord compression   - Comprehensive Multidisciplinary Rehab Program: 3 hours a day, 5 days a week with PT/OT  - Seen by Pain Management; pain control with Dilaudid as below  - Cervical collar at all times other than sleep  - Fall precautions  - Plan for removal of staples on (10/28)     # Rectal prolapse  - (+) bleeding   - Bed rest  - Urgent surgery consult.     # Leukocytosis - resolved  - No fever, chills, cough  - UA - negative  - Monitor (10/23) 12.17, (10/24) 8.28    # Vertebral Artery Dissection  - ASA 81mg daily - no allergy per patient, has tolerated in the past    # COPD  - Not on home meds  - 2 L oxygen supplement PRN  - Incentive spirometry     #Tachycardia/PAT  - Ectopic atrial rhythm/PAT noted on tele 10/19  - Cont metoprolol   - Continue cardizem 120mg cd  - EP eval was deferred as pt not likely a candidate for ablation at this time  - Follow up with Dr Zeng as outpatient    #Aortic Root Dilatation  - Stable on recent outpt echo from 9/5/23  - Follow up for repeat echo q 12 months    #Hyponatremia  - Monitor BMP, (10/23) 134  - NaCl tabs 2g q8 hours  - Hospitalist consult appreciated    # Sleep:  - Melatonin qHS    # Pain Management:  - Recent Pain Medicine recs noted  - Tylenol ATC  - Lidocaine patch to ribs  - Robaxin 750mg TID  - Dilaudid 4mg/6mg q4 hours/q6 hours PRN Mod/Severe per Pain Medicine recs    # GI/Bowel:  - Senna QHS, Miralax daily BID  - Dulcolax suppository PRN  - Added Lactulose x 2 daily (10/23)   - Witch hazel and Prep H PRN    # /Bladder:  - Flomax 0.4mg QHS  - Bladder scan x1 on admission, SC PRN, Low PVRs, D/C bladder scan   - Encourage timed voids every 4 hours while awake     # Skin/Pressure Injury:  - Skin assessment on admission:  Intact  - Monitor Incisions: Posterior cervical incision well approximated, (+) mild erythema, stables are in place   - Turn every 2 hours while in bed    # Diet:   - Diet Consistency/Modifications: Reg/thin    # DVT ppx:  - Lovenox    # Restrictions/Precautions:  - ROM restrictions: Cervical collar at all times except sleep  - Precautions: Fall, aspiration    ---------------  Outpatient Follow-up:    Spike Dawkins  Neurosurgery  [address], [address]  [place] [address]  Phone: [phone]  Fax: [phone]  Follow Up Time:     Jaylon Zegn  Cardiovascular Disease  [address], [address]  [place] [address]  Phone: [phone]  Fax: [phone]  Follow Up Time:     Eduard Knowles  Colon/Rectal Surgery  [address], [address]  [place] [address]  Phone: [phone]  Fax: [phone]  Follow Up Time:  --------------

## 2023-10-25 NOTE — CONSULT NOTE ADULT - REASON FOR ADMISSION
S/P Fall with TBI and Multiple fractures, unstable cervical spine fracture s/p C1-C7 Decompression/Fusion
S/P Fall with TBI and Multiple fractures, unstable cervical spine fracture s/p C1-C7 Decompression/Fusion

## 2023-10-26 LAB
ALBUMIN SERPL ELPH-MCNC: 2.8 G/DL — LOW (ref 3.3–5)
ALBUMIN SERPL ELPH-MCNC: 2.8 G/DL — LOW (ref 3.3–5)
ALP SERPL-CCNC: 137 U/L — HIGH (ref 40–120)
ALP SERPL-CCNC: 137 U/L — HIGH (ref 40–120)
ALT FLD-CCNC: 34 U/L — SIGNIFICANT CHANGE UP (ref 10–45)
ALT FLD-CCNC: 34 U/L — SIGNIFICANT CHANGE UP (ref 10–45)
ANION GAP SERPL CALC-SCNC: 8 MMOL/L — SIGNIFICANT CHANGE UP (ref 5–17)
ANION GAP SERPL CALC-SCNC: 8 MMOL/L — SIGNIFICANT CHANGE UP (ref 5–17)
AST SERPL-CCNC: 22 U/L — SIGNIFICANT CHANGE UP (ref 10–40)
AST SERPL-CCNC: 22 U/L — SIGNIFICANT CHANGE UP (ref 10–40)
BILIRUB SERPL-MCNC: 0.4 MG/DL — SIGNIFICANT CHANGE UP (ref 0.2–1.2)
BILIRUB SERPL-MCNC: 0.4 MG/DL — SIGNIFICANT CHANGE UP (ref 0.2–1.2)
BUN SERPL-MCNC: 21 MG/DL — SIGNIFICANT CHANGE UP (ref 7–23)
BUN SERPL-MCNC: 21 MG/DL — SIGNIFICANT CHANGE UP (ref 7–23)
CALCIUM SERPL-MCNC: 9.3 MG/DL — SIGNIFICANT CHANGE UP (ref 8.4–10.5)
CALCIUM SERPL-MCNC: 9.3 MG/DL — SIGNIFICANT CHANGE UP (ref 8.4–10.5)
CHLORIDE SERPL-SCNC: 100 MMOL/L — SIGNIFICANT CHANGE UP (ref 96–108)
CHLORIDE SERPL-SCNC: 100 MMOL/L — SIGNIFICANT CHANGE UP (ref 96–108)
CO2 SERPL-SCNC: 28 MMOL/L — SIGNIFICANT CHANGE UP (ref 22–31)
CO2 SERPL-SCNC: 28 MMOL/L — SIGNIFICANT CHANGE UP (ref 22–31)
CREAT SERPL-MCNC: 0.87 MG/DL — SIGNIFICANT CHANGE UP (ref 0.5–1.3)
CREAT SERPL-MCNC: 0.87 MG/DL — SIGNIFICANT CHANGE UP (ref 0.5–1.3)
EGFR: 93 ML/MIN/1.73M2 — SIGNIFICANT CHANGE UP
EGFR: 93 ML/MIN/1.73M2 — SIGNIFICANT CHANGE UP
GLUCOSE SERPL-MCNC: 113 MG/DL — HIGH (ref 70–99)
GLUCOSE SERPL-MCNC: 113 MG/DL — HIGH (ref 70–99)
HCT VFR BLD CALC: 37.1 % — LOW (ref 39–50)
HCT VFR BLD CALC: 37.1 % — LOW (ref 39–50)
HGB BLD-MCNC: 12 G/DL — LOW (ref 13–17)
HGB BLD-MCNC: 12 G/DL — LOW (ref 13–17)
MCHC RBC-ENTMCNC: 29.6 PG — SIGNIFICANT CHANGE UP (ref 27–34)
MCHC RBC-ENTMCNC: 29.6 PG — SIGNIFICANT CHANGE UP (ref 27–34)
MCHC RBC-ENTMCNC: 32.3 GM/DL — SIGNIFICANT CHANGE UP (ref 32–36)
MCHC RBC-ENTMCNC: 32.3 GM/DL — SIGNIFICANT CHANGE UP (ref 32–36)
MCV RBC AUTO: 91.4 FL — SIGNIFICANT CHANGE UP (ref 80–100)
MCV RBC AUTO: 91.4 FL — SIGNIFICANT CHANGE UP (ref 80–100)
NRBC # BLD: 0 /100 WBCS — SIGNIFICANT CHANGE UP (ref 0–0)
NRBC # BLD: 0 /100 WBCS — SIGNIFICANT CHANGE UP (ref 0–0)
PLATELET # BLD AUTO: 574 K/UL — HIGH (ref 150–400)
PLATELET # BLD AUTO: 574 K/UL — HIGH (ref 150–400)
POTASSIUM SERPL-MCNC: 4.1 MMOL/L — SIGNIFICANT CHANGE UP (ref 3.5–5.3)
POTASSIUM SERPL-MCNC: 4.1 MMOL/L — SIGNIFICANT CHANGE UP (ref 3.5–5.3)
POTASSIUM SERPL-SCNC: 4.1 MMOL/L — SIGNIFICANT CHANGE UP (ref 3.5–5.3)
POTASSIUM SERPL-SCNC: 4.1 MMOL/L — SIGNIFICANT CHANGE UP (ref 3.5–5.3)
PROT SERPL-MCNC: 6.8 G/DL — SIGNIFICANT CHANGE UP (ref 6–8.3)
PROT SERPL-MCNC: 6.8 G/DL — SIGNIFICANT CHANGE UP (ref 6–8.3)
RBC # BLD: 4.06 M/UL — LOW (ref 4.2–5.8)
RBC # BLD: 4.06 M/UL — LOW (ref 4.2–5.8)
RBC # FLD: 13.2 % — SIGNIFICANT CHANGE UP (ref 10.3–14.5)
RBC # FLD: 13.2 % — SIGNIFICANT CHANGE UP (ref 10.3–14.5)
SODIUM SERPL-SCNC: 136 MMOL/L — SIGNIFICANT CHANGE UP (ref 135–145)
SODIUM SERPL-SCNC: 136 MMOL/L — SIGNIFICANT CHANGE UP (ref 135–145)
WBC # BLD: 9.57 K/UL — SIGNIFICANT CHANGE UP (ref 3.8–10.5)
WBC # BLD: 9.57 K/UL — SIGNIFICANT CHANGE UP (ref 3.8–10.5)
WBC # FLD AUTO: 9.57 K/UL — SIGNIFICANT CHANGE UP (ref 3.8–10.5)
WBC # FLD AUTO: 9.57 K/UL — SIGNIFICANT CHANGE UP (ref 3.8–10.5)

## 2023-10-26 PROCEDURE — 74230 X-RAY XM SWLNG FUNCJ C+: CPT | Mod: 26

## 2023-10-26 PROCEDURE — 99232 SBSQ HOSP IP/OBS MODERATE 35: CPT | Mod: GC

## 2023-10-26 RX ORDER — HYDROMORPHONE HYDROCHLORIDE 2 MG/ML
6 INJECTION INTRAMUSCULAR; INTRAVENOUS; SUBCUTANEOUS EVERY 6 HOURS
Refills: 0 | Status: DISCONTINUED | OUTPATIENT
Start: 2023-10-26 | End: 2023-10-30

## 2023-10-26 RX ORDER — HYDROMORPHONE HYDROCHLORIDE 2 MG/ML
4 INJECTION INTRAMUSCULAR; INTRAVENOUS; SUBCUTANEOUS EVERY 4 HOURS
Refills: 0 | Status: DISCONTINUED | OUTPATIENT
Start: 2023-10-26 | End: 2023-10-30

## 2023-10-26 RX ADMIN — LIDOCAINE 2 PATCH: 4 CREAM TOPICAL at 18:17

## 2023-10-26 RX ADMIN — HYDROMORPHONE HYDROCHLORIDE 6 MILLIGRAM(S): 2 INJECTION INTRAMUSCULAR; INTRAVENOUS; SUBCUTANEOUS at 23:23

## 2023-10-26 RX ADMIN — HYDROMORPHONE HYDROCHLORIDE 6 MILLIGRAM(S): 2 INJECTION INTRAMUSCULAR; INTRAVENOUS; SUBCUTANEOUS at 22:26

## 2023-10-26 RX ADMIN — PHENYLEPHRINE-SHARK LIVER OIL-MINERAL OIL-PETROLATUM RECTAL OINTMENT 1 APPLICATION(S): at 18:17

## 2023-10-26 RX ADMIN — Medication 81 MILLIGRAM(S): at 11:43

## 2023-10-26 RX ADMIN — SODIUM CHLORIDE 2 GRAM(S): 9 INJECTION INTRAMUSCULAR; INTRAVENOUS; SUBCUTANEOUS at 05:43

## 2023-10-26 RX ADMIN — METHOCARBAMOL 750 MILLIGRAM(S): 500 TABLET, FILM COATED ORAL at 22:13

## 2023-10-26 RX ADMIN — POLYETHYLENE GLYCOL 3350 17 GRAM(S): 17 POWDER, FOR SOLUTION ORAL at 05:42

## 2023-10-26 RX ADMIN — Medication 1 TABLET(S): at 11:43

## 2023-10-26 RX ADMIN — HYDROMORPHONE HYDROCHLORIDE 6 MILLIGRAM(S): 2 INJECTION INTRAMUSCULAR; INTRAVENOUS; SUBCUTANEOUS at 08:32

## 2023-10-26 RX ADMIN — HYDROMORPHONE HYDROCHLORIDE 6 MILLIGRAM(S): 2 INJECTION INTRAMUSCULAR; INTRAVENOUS; SUBCUTANEOUS at 14:50

## 2023-10-26 RX ADMIN — ENOXAPARIN SODIUM 40 MILLIGRAM(S): 100 INJECTION SUBCUTANEOUS at 05:42

## 2023-10-26 RX ADMIN — Medication 25 MILLIGRAM(S): at 05:42

## 2023-10-26 RX ADMIN — HYDROMORPHONE HYDROCHLORIDE 6 MILLIGRAM(S): 2 INJECTION INTRAMUSCULAR; INTRAVENOUS; SUBCUTANEOUS at 15:50

## 2023-10-26 RX ADMIN — SENNA PLUS 2 TABLET(S): 8.6 TABLET ORAL at 22:14

## 2023-10-26 RX ADMIN — TAMSULOSIN HYDROCHLORIDE 0.4 MILLIGRAM(S): 0.4 CAPSULE ORAL at 22:13

## 2023-10-26 RX ADMIN — SODIUM CHLORIDE 2 GRAM(S): 9 INJECTION INTRAMUSCULAR; INTRAVENOUS; SUBCUTANEOUS at 22:13

## 2023-10-26 RX ADMIN — Medication 25 MILLIGRAM(S): at 18:22

## 2023-10-26 RX ADMIN — Medication 120 MILLIGRAM(S): at 05:43

## 2023-10-26 RX ADMIN — LIDOCAINE 2 PATCH: 4 CREAM TOPICAL at 11:46

## 2023-10-26 RX ADMIN — HYDROMORPHONE HYDROCHLORIDE 6 MILLIGRAM(S): 2 INJECTION INTRAMUSCULAR; INTRAVENOUS; SUBCUTANEOUS at 09:34

## 2023-10-26 RX ADMIN — LACTULOSE 10 GRAM(S): 10 SOLUTION ORAL at 05:41

## 2023-10-26 RX ADMIN — METHOCARBAMOL 750 MILLIGRAM(S): 500 TABLET, FILM COATED ORAL at 05:43

## 2023-10-26 RX ADMIN — HYDROMORPHONE HYDROCHLORIDE 6 MILLIGRAM(S): 2 INJECTION INTRAMUSCULAR; INTRAVENOUS; SUBCUTANEOUS at 00:39

## 2023-10-26 RX ADMIN — PHENYLEPHRINE-SHARK LIVER OIL-MINERAL OIL-PETROLATUM RECTAL OINTMENT 1 APPLICATION(S): at 05:44

## 2023-10-26 RX ADMIN — LIDOCAINE 2 PATCH: 4 CREAM TOPICAL at 23:18

## 2023-10-26 RX ADMIN — METHOCARBAMOL 750 MILLIGRAM(S): 500 TABLET, FILM COATED ORAL at 14:50

## 2023-10-26 NOTE — PROGRESS NOTE ADULT - SUBJECTIVE AND OBJECTIVE BOX
Patient is a 68y old  Male who presents with a chief complaint of S/P Fall with TBI and Multiple fractures, unstable cervical spine fracture s/p C1-C7 Decompression/Fusion (25 Oct 2023 11:40)    HPI:  Patient is 69yo M with history of chronic back pain (on chronic Dilaudid) who presented to [facility] 10/13 following a fall from a 12 foot ladder resulting in head strike and LOC. He was found to have acute displaced right lateral 8-10 rib fractures, and focal trauma occlusion/dissection of the left vertebral artery at C1 level with C1 anterior and posterior arch fractures with lateral displacement. Also noted to have spinous process fractures C2-C5, C5/C6 anterior subluxation with likely PLL injury, Left C6 transverse process fracture, T4 compression fracture, and C2-C3 epidural hematoma. Patient was admitted and underwent Posterior C1-C7 Decompression/Fusion for unstable spine fracture and evacuation of epidural hematoma, decompression of left C5 nerve root. He had routine post operative care in NSCU and then transferred to the floor, Surgical drain removed prior to discharge. Cardiology was consulted and medications were adjusted for tachycardia. Patient was seen by chronic pain and medications were adjusted with good results. He is to wear cervical collar at all times except during sleep. 10/19/23 he had episode of prolapsed hemorrhoids reduced at bedside by surgery team. Patient was evaluated by PM&R and therapy for functional deficits and gait/ ADL impairments and recommended acute rehabilitation. Patient was medically optimized for discharge to [facility] on 10/20.   (20 Oct 2023 12:57)      SUBJECTIVE/ROS:  - Patient was seen and examined at bed side this morning, no new events over night.   - Patient states that they slept well last night  - Pain is well-controlled with current meds  - GI/: patient is moving bowels. Voiding without issues  - Tolerating 3 hours of therapy without issues  - Denies fever, chills, CP, palpitation, cough, SOB, abdominal pain, N/V/D/C, pain, urinary symptoms, joint pain or swelling    MEDICATIONS  (STANDING):  aspirin enteric coated 81 milliGRAM(s) Oral daily  diltiazem    milliGRAM(s) Oral daily  enoxaparin Injectable 40 milliGRAM(s) SubCutaneous every 24 hours  hemorrhoidal Ointment 1 Application(s) Rectal two times a day  lidocaine   4% Patch 2 Patch Transdermal every 24 hours  methocarbamol 750 milliGRAM(s) Oral three times a day  metoprolol tartrate 25 milliGRAM(s) Oral two times a day  multivitamin 1 Tablet(s) Oral daily  polyethylene glycol 3350 17 Gram(s) Oral two times a day  senna 2 Tablet(s) Oral at bedtime  sodium chloride 2 Gram(s) Oral every 8 hours  tamsulosin 0.4 milliGRAM(s) Oral at bedtime    MEDICATIONS  (PRN):  acetaminophen     Tablet .. 650 milliGRAM(s) Oral every 6 hours PRN Mild Pain (1 - 3)  HYDROmorphone   Tablet 6 milliGRAM(s) Oral every 6 hours PRN Severe Pain (7 - 10)  HYDROmorphone   Tablet 4 milliGRAM(s) Oral every 4 hours PRN Moderate Pain (4 - 6)  witch hazel Pads 1 Application(s) Topical three times a day PRN Hemmorrhoids      RECENT LABS:                          12.0   9.57  )-----------( 574      ( 26 Oct 2023 05:54 )             37.1     10-26    136  |  100  |  21  ----------------------------<  113<H>  4.1   |  28  |  0.87    Ca    9.3      26 Oct 2023 05:54    TPro  6.8  /  Alb  2.8<L>  /  TBili  0.4  /  DBili  x   /  AST  22  /  ALT  34  /  AlkPhos  137<H>  10-26    LIVER FUNCTIONS - ( 26 Oct 2023 05:54 )  Alb: 2.8 g/dL / Pro: 6.8 g/dL / ALK PHOS: 137 U/L / ALT: 34 U/L / AST: 22 U/L / GGT: x             Urinalysis Basic - ( 26 Oct 2023 05:54 )    Color: x / Appearance: x / SG: x / pH: x  Gluc: 113 mg/dL / Ketone: x  / Bili: x / Urobili: x   Blood: x / Protein: x / Nitrite: x   Leuk Esterase: x / RBC: x / WBC x   Sq Epi: x / Non Sq Epi: x / Bacteria: x        PHYSICAL EXAM:  Vital Signs Last 24 Hrs  T(C): 36.6 (26 Oct 2023 08:35), Max: 36.6 (25 Oct 2023 21:25)  T(F): 97.8 (26 Oct 2023 08:35), Max: 97.9 (25 Oct 2023 21:25)  HR: 73 (26 Oct 2023 08:35) (73 - 98)  BP: 141/83 (26 Oct 2023 08:35) (115/75 - 141/83)  BP(mean): --  RR: 17 (26 Oct 2023 08:35) (17 - 17)  SpO2: 93% (26 Oct 2023 08:35) (93% - 95%)    Parameters below as of 26 Oct 2023 08:35  Patient On (Oxygen Delivery Method): room air     Patient is a 68y old  Male who presents with a chief complaint of S/P Fall with TBI and Multiple fractures, unstable cervical spine fracture s/p C1-C7 Decompression/Fusion (25 Oct 2023 11:40)    HPI:  Patient is 69yo M with history of chronic back pain (on chronic Dilaudid) who presented to [facility] 10/13 following a fall from a 12 foot ladder resulting in head strike and LOC. He was found to have acute displaced right lateral 8-10 rib fractures, and focal trauma occlusion/dissection of the left vertebral artery at C1 level with C1 anterior and posterior arch fractures with lateral displacement. Also noted to have spinous process fractures C2-C5, C5/C6 anterior subluxation with likely PLL injury, Left C6 transverse process fracture, T4 compression fracture, and C2-C3 epidural hematoma. Patient was admitted and underwent Posterior C1-C7 Decompression/Fusion for unstable spine fracture and evacuation of epidural hematoma, decompression of left C5 nerve root. He had routine post operative care in NSCU and then transferred to the floor, Surgical drain removed prior to discharge. Cardiology was consulted and medications were adjusted for tachycardia. Patient was seen by chronic pain and medications were adjusted with good results. He is to wear cervical collar at all times except during sleep. 10/19/23 he had episode of prolapsed hemorrhoids reduced at bedside by surgery team. Patient was evaluated by PM&R and therapy for functional deficits and gait/ ADL impairments and recommended acute rehabilitation. Patient was medically optimized for discharge to [facility] on 10/20.   (20 Oct 2023 12:57)      SUBJECTIVE/ROS:  - Patient was seen and examined at bed side this morning, no new events over night.   - Patient states that they slept well last night. He denies any new complaints this morning.  - No further episodes of hemorrhoidal/rectal prolapse since patient was seen by surgery yesterday, though patient has anxiety surrounding possible future episodes. Patient was counseled at length on measures to avoid this, including avoiding straining, avoiding suppositories, proper hydration etc. and demonstrated good understanding.   - Pain is well-controlled with current meds  - GI/: patient is moving bowels. Voiding without issues  - Tolerating 3 hours of therapy without issues  - Denies fever, chills, CP, palpitation, cough, SOB, abdominal pain, N/V/D/C     MEDICATIONS  (STANDING):  aspirin enteric coated 81 milliGRAM(s) Oral daily  diltiazem    milliGRAM(s) Oral daily  enoxaparin Injectable 40 milliGRAM(s) SubCutaneous every 24 hours  hemorrhoidal Ointment 1 Application(s) Rectal two times a day  lidocaine   4% Patch 2 Patch Transdermal every 24 hours  methocarbamol 750 milliGRAM(s) Oral three times a day  metoprolol tartrate 25 milliGRAM(s) Oral two times a day  multivitamin 1 Tablet(s) Oral daily  polyethylene glycol 3350 17 Gram(s) Oral two times a day  senna 2 Tablet(s) Oral at bedtime  sodium chloride 2 Gram(s) Oral every 8 hours  tamsulosin 0.4 milliGRAM(s) Oral at bedtime    MEDICATIONS  (PRN):  acetaminophen     Tablet .. 650 milliGRAM(s) Oral every 6 hours PRN Mild Pain (1 - 3)  HYDROmorphone   Tablet 6 milliGRAM(s) Oral every 6 hours PRN Severe Pain (7 - 10)  HYDROmorphone   Tablet 4 milliGRAM(s) Oral every 4 hours PRN Moderate Pain (4 - 6)  witch hazel Pads 1 Application(s) Topical three times a day PRN Hemmorrhoids      RECENT LABS:                          12.0   9.57  )-----------( 574      ( 26 Oct 2023 05:54 )             37.1     10-26    136  |  100  |  21  ----------------------------<  113<H>  4.1   |  28  |  0.87    Ca    9.3      26 Oct 2023 05:54    TPro  6.8  /  Alb  2.8<L>  /  TBili  0.4  /  DBili  x   /  AST  22  /  ALT  34  /  AlkPhos  137<H>  10-26    LIVER FUNCTIONS - ( 26 Oct 2023 05:54 )  Alb: 2.8 g/dL / Pro: 6.8 g/dL / ALK PHOS: 137 U/L / ALT: 34 U/L / AST: 22 U/L / GGT: x             Urinalysis Basic - ( 26 Oct 2023 05:54 )    Color: x / Appearance: x / SG: x / pH: x  Gluc: 113 mg/dL / Ketone: x  / Bili: x / Urobili: x   Blood: x / Protein: x / Nitrite: x   Leuk Esterase: x / RBC: x / WBC x   Sq Epi: x / Non Sq Epi: x / Bacteria: x        PHYSICAL EXAM:  Vital Signs Last 24 Hrs  T(C): 36.6 (26 Oct 2023 08:35), Max: 36.6 (25 Oct 2023 21:25)  T(F): 97.8 (26 Oct 2023 08:35), Max: 97.9 (25 Oct 2023 21:25)  HR: 73 (26 Oct 2023 08:35) (73 - 98)  BP: 141/83 (26 Oct 2023 08:35) (115/75 - 141/83)  BP(mean): --  RR: 17 (26 Oct 2023 08:35) (17 - 17)  SpO2: 93% (26 Oct 2023 08:35) (93% - 95%)    Parameters below as of 26 Oct 2023 08:35  Patient On (Oxygen Delivery Method): room air    General: NAD,  uncomfortable                                HEENT: NCAT, EOM I, PERRLA  Cardio: RRR, Normal S1-S2                              Pulm: No Respiratory Distress,  Lungs CTAB                        Abdomen: ND, NT, Soft, BS+                                                MSK: No joint swelling, Full ROM                                         Ext: No C/C/E, Pulses (2+) throughout, No calf tenderness    Skin:  all skin intact                                                                   Neurological Examination    Cognitive: AAO x 3, follows command                                                                        Attention: Easily distracted, long processing time   Judgment: (+) evidence of judgement                               Memory:  Intact  Mood/Affect: wnl                                                                           Communication:  Fluent,  No dysarthria   CN II - XII  intact    Motor    LEFT    UE: SF [1/5], EF [3-/5], EE [3/5], WE [5/5],  [5/5]  RIGHT UE: SF [1/5], EF [3-/5], EE [3/5], WE [5/5],  [5/5]  LEFT    LE:  HF [5/5], KE [5/5], DF [5/5], PF [5/5]  RIGHT LE:  HF [5/5], KE [5/5], DF [5/5], PF [5/5]        IDT rounds 10/24  SW - lives in a private home with spouse, 1 IKE and 1 flight inside, 1F setup available, spouse is able to support intermittently. Independent prior  SLP - soft and bite sized with thins; mild cog for higher level tasks  OT - MaxA for all txf and ADLs, Yoshi for toilet transfers; barriers include proximal weakness; Goals for Yoshi for all ADLs and supervision for fxnal transfers  PT - 150' no device CG-Yoshi for unsteady gait, CG transfers, 12 steps with HR CG; goals for Gui mobility  TDD: 11/3 Home Patient is a 68y old  Male who presents with a chief complaint of S/P Fall with TBI and Multiple fractures, unstable cervical spine fracture s/p C1-C7 Decompression/Fusion (25 Oct 2023 11:40)    HPI:  Patient is 69yo M with history of chronic back pain (on chronic Dilaudid) who presented to [facility] 10/13 following a fall from a 12 foot ladder resulting in head strike and LOC. He was found to have acute displaced right lateral 8-10 rib fractures, and focal trauma occlusion/dissection of the left vertebral artery at C1 level with C1 anterior and posterior arch fractures with lateral displacement. Also noted to have spinous process fractures C2-C5, C5/C6 anterior subluxation with likely PLL injury, Left C6 transverse process fracture, T4 compression fracture, and C2-C3 epidural hematoma. Patient was admitted and underwent Posterior C1-C7 Decompression/Fusion for unstable spine fracture and evacuation of epidural hematoma, decompression of left C5 nerve root. He had routine post operative care in NSCU and then transferred to the floor, Surgical drain removed prior to discharge. Cardiology was consulted and medications were adjusted for tachycardia. Patient was seen by chronic pain and medications were adjusted with good results. He is to wear cervical collar at all times except during sleep. 10/19/23 he had episode of prolapsed hemorrhoids reduced at bedside by surgery team. Patient was evaluated by PM&R and therapy for functional deficits and gait/ ADL impairments and recommended acute rehabilitation. Patient was medically optimized for discharge to [facility] on 10/20.   (20 Oct 2023 12:57)      SUBJECTIVE/ROS:  - Patient was seen and examined at bed side this morning, no new events over night.   - Patient states that they slept well last night. He denies any new complaints this morning.  - No further episodes of hemorrhoidal/rectal prolapse since patient was seen by surgery yesterday, though patient has anxiety surrounding possible future episodes. Patient was counseled at length on measures to avoid this, including avoiding straining, avoiding suppositories, proper hydration etc. and demonstrated good understanding.   - Pain is well-controlled with current meds  - GI/: patient is moving bowels. Voiding without issues  - Tolerating 3 hours of therapy without issues  - Denies fever, chills, CP, palpitation, cough, SOB, abdominal pain, N/V/D/C     MEDICATIONS  (STANDING):  aspirin enteric coated 81 milliGRAM(s) Oral daily  diltiazem    milliGRAM(s) Oral daily  enoxaparin Injectable 40 milliGRAM(s) SubCutaneous every 24 hours  hemorrhoidal Ointment 1 Application(s) Rectal two times a day  lidocaine   4% Patch 2 Patch Transdermal every 24 hours  methocarbamol 750 milliGRAM(s) Oral three times a day  metoprolol tartrate 25 milliGRAM(s) Oral two times a day  multivitamin 1 Tablet(s) Oral daily  polyethylene glycol 3350 17 Gram(s) Oral two times a day  senna 2 Tablet(s) Oral at bedtime  sodium chloride 2 Gram(s) Oral every 8 hours  tamsulosin 0.4 milliGRAM(s) Oral at bedtime    MEDICATIONS  (PRN):  acetaminophen     Tablet .. 650 milliGRAM(s) Oral every 6 hours PRN Mild Pain (1 - 3)  HYDROmorphone   Tablet 6 milliGRAM(s) Oral every 6 hours PRN Severe Pain (7 - 10)  HYDROmorphone   Tablet 4 milliGRAM(s) Oral every 4 hours PRN Moderate Pain (4 - 6)  witch hazel Pads 1 Application(s) Topical three times a day PRN Hemmorrhoids      RECENT LABS:                          12.0   9.57  )-----------( 574      ( 26 Oct 2023 05:54 )             37.1     10-26    136  |  100  |  21  ----------------------------<  113<H>  4.1   |  28  |  0.87    Ca    9.3      26 Oct 2023 05:54    TPro  6.8  /  Alb  2.8<L>  /  TBili  0.4  /  DBili  x   /  AST  22  /  ALT  34  /  AlkPhos  137<H>  10-26    LIVER FUNCTIONS - ( 26 Oct 2023 05:54 )  Alb: 2.8 g/dL / Pro: 6.8 g/dL / ALK PHOS: 137 U/L / ALT: 34 U/L / AST: 22 U/L / GGT: x           PHYSICAL EXAM:  Vital Signs Last 24 Hrs  T(C): 36.6 (26 Oct 2023 08:35), Max: 36.6 (25 Oct 2023 21:25)  T(F): 97.8 (26 Oct 2023 08:35), Max: 97.9 (25 Oct 2023 21:25)  HR: 73 (26 Oct 2023 08:35) (73 - 98)  BP: 141/83 (26 Oct 2023 08:35) (115/75 - 141/83)  RR: 17 (26 Oct 2023 08:35) (17 - 17)  SpO2: 93% (26 Oct 2023 08:35) (93% - 95%)    General: NAD, comfortable                                HEENT: NCAT, EOM I, PERRLA  Cardio: RRR, Normal S1-S2                              Pulm: No Respiratory Distress,  Lungs CTAB                        Abdomen: ND, NT, Soft, BS+                                                MSK: No joint swelling, Full ROM                                         Ext: No C/C/E, Pulses (2+) throughout, No calf tenderness    Skin:  all skin intact                                                                   Neurological Examination    Cognitive: AAO x 3, follows command                                                                        Attention: Easily distracted, long processing time   Judgment: (+) evidence of judgement                               Memory:  Intact  Mood/Affect: wnl                                                                           Communication:  Fluent,  No dysarthria   CN II - XII  intact    Motor    LEFT    UE: SF [1/5], EF [3-/5], EE [3/5], WE [5/5],  [5/5]  RIGHT UE: SF [1/5], EF [3-/5], EE [3/5], WE [5/5],  [5/5]  LEFT    LE:  HF [5/5], KE [5/5], DF [5/5], PF [5/5]  RIGHT LE:  HF [5/5], KE [5/5], DF [5/5], PF [5/5]        IDT rounds 10/24  SW - lives in a private home with spouse, 1 IKE and 1 flight inside, 1F setup available, spouse is able to support intermittently. Independent prior  SLP - soft and bite sized with thins; mild cog for higher level tasks  OT - MaxA for all txf and ADLs, Yoshi for toilet transfers; barriers include proximal weakness; Goals for Yoshi for all ADLs and supervision for fxnal transfers  PT - 150' no device CG-Yoshi for unsteady gait, CG transfers, 12 steps with HR CG; goals for Gui mobility  TDD: 11/3 Home

## 2023-10-26 NOTE — PROGRESS NOTE ADULT - ASSESSMENT
Assessment	  Patient is 67yo M with history of chronic back pain (on chronic Dilaudid) who presented to [facility] 10/13 following a fall from a 12 foot ladder resulting in head strike and LOC. He was found to have acute displaced right lateral 8-10 rib fractures, and focal trauma occlusion/dissection of the left vertebral artery at C1 level with C1 anterior and posterior arch fractures with lateral displacement. Also noted to have spinous process fractures C2-C5, C5/C6 anterior subluxation with likely PLL injury, Left C6 transverse process fracture, T4 compression fracture, and C2-C3 epidural hematoma. Patient was admitted and underwent Posterior C1-C7 Decompression/Fusion for unstable spine fracture and evacuation of epidural hematoma. Hospital course complicated by tachycardia and pain.   Admitted to [facility] inpatient rehab on 10/20/23 for ADL, gait, and functional impairments.     # S/P Fall with TBI with LOC, Unstable cervical spine fracture s/p C1-C7 Decompression/Fusion and evacuation of EDH with cord compression   - Comprehensive Multidisciplinary Rehab Program: 3 hours a day, 5 days a week with PT/OT  - Seen by Pain Management; pain control with Dilaudid as below  - Cervical collar at all times other than sleep  - Fall precautions  - Plan for removal of staples on (10/28)     # Rectal prolapse  - CRS consult appreciated; prolapse reduced  - Hgb stable  - Avoid straining, continue bowel regimen    # Leukocytosis - resolved  - No fever, chills, cough  - UA - negative  - Monitor (10/23) 12.17, (10/24) 8.28, 9.57 (10/25)    # Vertebral Artery Dissection  - ASA 81mg daily - no allergy per patient, has tolerated in the past    # COPD  - Not on home meds  - 2 L oxygen supplement PRN  - Incentive spirometry     #Tachycardia/PAT  - Ectopic atrial rhythm/PAT noted on tele 10/19  - Cont metoprolol   - Continue cardizem 120mg cd  - EP eval was deferred as pt not likely a candidate for ablation at this time  - Follow up with Dr Zeng as outpatient    #Aortic Root Dilatation  - Stable on recent outpt echo from 9/5/23  - Follow up for repeat echo q 12 months    #Hyponatremia  - Monitor BMP, (10/23) 134  - NaCl tabs 2g q8 hours  - Hospitalist consult appreciated    # Sleep:  - Melatonin qHS    # Pain Management:  - Recent Pain Medicine recs noted  - Tylenol ATC  - Lidocaine patch to ribs  - Robaxin 750mg TID  - Dilaudid 4mg/6mg q4 hours/q6 hours PRN Mod/Severe per Pain Medicine recs    # GI/Bowel:  - Senna QHS, Miralax daily BID  - Dulcolax suppository PRN  - Added Lactulose x 2 daily (10/23)   - Witch hazel and Prep H PRN  - Avoid straining    # /Bladder:  - Flomax 0.4mg QHS  - Low PVRs, D/C bladder scan   - Encourage timed voids every 4 hours while awake     # Skin/Pressure Injury:  - Skin assessment on admission:  Intact  - Monitor Incisions: Posterior cervical incision well approximated, (+) mild erythema, stables are in place   - Turn every 2 hours while in bed    # Diet:   - Diet Consistency/Modifications: Soft+Bite/thin  - MBS completed 10/26    # DVT ppx:  - Lovenox    # Restrictions/Precautions:  - ROM restrictions: Cervical collar at all times except sleep  - Precautions: Fall, aspiration    ---------------  Outpatient Follow-up:    Spike Dawkins  Neurosurgery  [address]  [place] [address]  Phone: [phone]  Fax: [phone]  Follow Up Time:     Jaylon Zeng  Cardiovascular Disease  [address], [address]  [place] [address]  Phone: [phone]  Fax: [phone]  Follow Up Time:     Eduard Knowles  Colon/Rectal Surgery  [address], [address]  [place] [address]  Phone: [phone]  Fax: [phone]  Follow Up Time:  --------------   Assessment	  Patient is 69yo M with history of chronic back pain (on chronic Dilaudid) who presented to [facility] 10/13 following a fall from a 12 foot ladder resulting in head strike and LOC. He was found to have acute displaced right lateral 8-10 rib fractures, and focal trauma occlusion/dissection of the left vertebral artery at C1 level with C1 anterior and posterior arch fractures with lateral displacement. Also noted to have spinous process fractures C2-C5, C5/C6 anterior subluxation with likely PLL injury, Left C6 transverse process fracture, T4 compression fracture, and C2-C3 epidural hematoma. Patient was admitted and underwent Posterior C1-C7 Decompression/Fusion for unstable spine fracture and evacuation of epidural hematoma. Hospital course complicated by tachycardia and pain.   Admitted to [facility] inpatient [facility] on 10/20/23 for ADL, gait, and functional impairments.     # S/P Fall with TBI with LOC, Unstable cervical spine fracture s/p C1-C7 Decompression/Fusion and evacuation of EDH with cord compression   - Comprehensive Multidisciplinary Rehab Program: 3 hours a day, 5 days a week with PT/OT  - Seen by Pain Management; pain control with Dilaudid as below  - Cervical collar at all times other than sleep  - Fall precautions  - Plan for removal of staples on (10/28)     # Rectal prolapse/ Reduced   - CRS consult appreciated; prolapse reduced  - Hgb stable  - Avoid straining, continue bowel regimen    # Leukocytosis - resolved  - No fever, chills, cough  - UA - negative  - Monitor (10/23) 12.17, (10/24) 8.28, 9.57 (10/25)    # Vertebral Artery Dissection  - ASA 81mg daily - no allergy per patient, has tolerated in the past    # COPD  - Not on home meds  - 2 L oxygen supplement PRN  - Incentive spirometry     #Tachycardia/PAT  - Ectopic atrial rhythm/PAT noted on tele 10/19  - Cont metoprolol   - Continue cardizem 120mg cd  - EP eval was deferred as pt not likely a candidate for ablation at this time  - Follow up with Dr Zeng as outpatient    #Aortic Root Dilatation  - Stable on recent outpt echo from 9/5/23  - Follow up for repeat echo q 12 months    #Hyponatremia  - Monitor BMP, (10/23) 134  - NaCl tabs 2g q8 hours  - Hospitalist consult appreciated    # Sleep:  - Melatonin qHS    # Pain Management:  - Recent Pain Medicine recs noted  - Tylenol ATC  - Lidocaine patch to ribs  - Robaxin 750mg TID  - Dilaudid 4mg/6mg q4 hours/q6 hours PRN Mod/Severe per Pain Medicine recs    # GI/Bowel:  - Senna QHS, Miralax daily BID  - Dulcolax suppository PRN  - Added Lactulose x 2 daily (10/23)   - Witch hazel and Prep H PRN  - Avoid straining    # /Bladder:  - Flomax 0.4mg QHS  - Low PVRs, D/C bladder scan   - Encourage timed voids every 4 hours while awake     # Skin/Pressure Injury:  - Skin assessment on admission:  Intact  - Monitor Incisions: Posterior cervical incision well approximated, (+) mild erythema, stables are in place   - Turn every 2 hours while in bed    # Diet:   - Diet Consistency/Modifications: Soft+Bite/thin  - MBS completed 10/26, recommend same diet     # DVT ppx:  - Lovenox    # Restrictions/Precautions:  - ROM restrictions: Cervical collar at all times except sleep  - Precautions: Fall, aspiration    ---------------  Outpatient Follow-up:    Spike Dawkins  Neurosurgery  [address], [address]  [place] [address]  Phone: [phone]  Fax: [phone]  Follow Up Time:     Jaylon Zeng  Cardiovascular Disease  [address], [address]  [place] [address]  Phone: [phone]  Fax: [phone]  Follow Up Time:     Eduard Knowles  Colon/Rectal Surgery  [address], [address]  [place] [address]  Phone: [phone]  Fax: [phone]  Follow Up Time:  --------------

## 2023-10-27 PROCEDURE — 99232 SBSQ HOSP IP/OBS MODERATE 35: CPT | Mod: GC

## 2023-10-27 RX ADMIN — HYDROMORPHONE HYDROCHLORIDE 4 MILLIGRAM(S): 2 INJECTION INTRAMUSCULAR; INTRAVENOUS; SUBCUTANEOUS at 21:32

## 2023-10-27 RX ADMIN — LIDOCAINE 2 PATCH: 4 CREAM TOPICAL at 22:38

## 2023-10-27 RX ADMIN — PHENYLEPHRINE-SHARK LIVER OIL-MINERAL OIL-PETROLATUM RECTAL OINTMENT 1 APPLICATION(S): at 06:09

## 2023-10-27 RX ADMIN — SODIUM CHLORIDE 2 GRAM(S): 9 INJECTION INTRAMUSCULAR; INTRAVENOUS; SUBCUTANEOUS at 13:28

## 2023-10-27 RX ADMIN — ENOXAPARIN SODIUM 40 MILLIGRAM(S): 100 INJECTION SUBCUTANEOUS at 06:06

## 2023-10-27 RX ADMIN — Medication 650 MILLIGRAM(S): at 06:08

## 2023-10-27 RX ADMIN — HYDROMORPHONE HYDROCHLORIDE 6 MILLIGRAM(S): 2 INJECTION INTRAMUSCULAR; INTRAVENOUS; SUBCUTANEOUS at 14:00

## 2023-10-27 RX ADMIN — HYDROMORPHONE HYDROCHLORIDE 4 MILLIGRAM(S): 2 INJECTION INTRAMUSCULAR; INTRAVENOUS; SUBCUTANEOUS at 22:02

## 2023-10-27 RX ADMIN — POLYETHYLENE GLYCOL 3350 17 GRAM(S): 17 POWDER, FOR SOLUTION ORAL at 06:19

## 2023-10-27 RX ADMIN — Medication 650 MILLIGRAM(S): at 07:07

## 2023-10-27 RX ADMIN — SODIUM CHLORIDE 2 GRAM(S): 9 INJECTION INTRAMUSCULAR; INTRAVENOUS; SUBCUTANEOUS at 06:07

## 2023-10-27 RX ADMIN — POLYETHYLENE GLYCOL 3350 17 GRAM(S): 17 POWDER, FOR SOLUTION ORAL at 16:56

## 2023-10-27 RX ADMIN — HYDROMORPHONE HYDROCHLORIDE 4 MILLIGRAM(S): 2 INJECTION INTRAMUSCULAR; INTRAVENOUS; SUBCUTANEOUS at 19:45

## 2023-10-27 RX ADMIN — HYDROMORPHONE HYDROCHLORIDE 6 MILLIGRAM(S): 2 INJECTION INTRAMUSCULAR; INTRAVENOUS; SUBCUTANEOUS at 13:24

## 2023-10-27 RX ADMIN — METHOCARBAMOL 750 MILLIGRAM(S): 500 TABLET, FILM COATED ORAL at 13:27

## 2023-10-27 RX ADMIN — Medication 120 MILLIGRAM(S): at 06:07

## 2023-10-27 RX ADMIN — METHOCARBAMOL 750 MILLIGRAM(S): 500 TABLET, FILM COATED ORAL at 21:24

## 2023-10-27 RX ADMIN — SODIUM CHLORIDE 2 GRAM(S): 9 INJECTION INTRAMUSCULAR; INTRAVENOUS; SUBCUTANEOUS at 21:32

## 2023-10-27 RX ADMIN — TAMSULOSIN HYDROCHLORIDE 0.4 MILLIGRAM(S): 0.4 CAPSULE ORAL at 21:24

## 2023-10-27 RX ADMIN — Medication 25 MILLIGRAM(S): at 16:54

## 2023-10-27 RX ADMIN — Medication 81 MILLIGRAM(S): at 13:26

## 2023-10-27 RX ADMIN — LIDOCAINE 2 PATCH: 4 CREAM TOPICAL at 19:21

## 2023-10-27 RX ADMIN — HYDROMORPHONE HYDROCHLORIDE 4 MILLIGRAM(S): 2 INJECTION INTRAMUSCULAR; INTRAVENOUS; SUBCUTANEOUS at 09:16

## 2023-10-27 RX ADMIN — METHOCARBAMOL 750 MILLIGRAM(S): 500 TABLET, FILM COATED ORAL at 06:07

## 2023-10-27 RX ADMIN — PHENYLEPHRINE-SHARK LIVER OIL-MINERAL OIL-PETROLATUM RECTAL OINTMENT 1 APPLICATION(S): at 16:59

## 2023-10-27 RX ADMIN — Medication 25 MILLIGRAM(S): at 06:07

## 2023-10-27 RX ADMIN — Medication 1 TABLET(S): at 13:26

## 2023-10-27 RX ADMIN — LIDOCAINE 2 PATCH: 4 CREAM TOPICAL at 13:27

## 2023-10-27 NOTE — PROGRESS NOTE ADULT - SUBJECTIVE AND OBJECTIVE BOX
Patient is a 68y old  Male who presents with a chief complaint of S/P Fall with TBI and Multiple fractures, unstable cervical spine fracture s/p C1-C7 Decompression/Fusion (26 Oct 2023 10:58)    HPI:  Patient is 67yo M with history of chronic back pain (on chronic Dilaudid) who presented to [facility] 10/13 following a fall from a 12 foot ladder resulting in head strike and LOC. He was found to have acute displaced right lateral 8-10 rib fractures, and focal trauma occlusion/dissection of the left vertebral artery at C1 level with C1 anterior and posterior arch fractures with lateral displacement. Also noted to have spinous process fractures C2-C5, C5/C6 anterior subluxation with likely PLL injury, Left C6 transverse process fracture, T4 compression fracture, and C2-C3 epidural hematoma. Patient was admitted and underwent Posterior C1-C7 Decompression/Fusion for unstable spine fracture and evacuation of epidural hematoma, decompression of left C5 nerve root. He had routine post operative care in NSCU and then transferred to the floor, Surgical drain removed prior to discharge. Cardiology was consulted and medications were adjusted for tachycardia. Patient was seen by chronic pain and medications were adjusted with good results. He is to wear cervical collar at all times except during sleep. 10/19/23 he had episode of prolapsed hemorrhoids reduced at bedside by surgery team. Patient was evaluated by PM&R and therapy for functional deficits and gait/ ADL impairments and recommended acute rehabilitation. Patient was medically optimized for discharge to [facility] on 10/20.       SUBJECTIVE/ROS:  - Patient was seen and examined at bed side this morning, no new events over night.    - Patient states that they slept well last night  - Large rectal/hemorrhoidal prolapse reduced by team at bedside yesterday afternoon/  - Patient and family counselled extensively on measures to avoid recurrence.  - Removal of surgical staples today  - Pain is well-controlled with current meds  - GI/: patient is moving bowels, last yesterday. Amenable to continuing bowel regimen to ensure soft BMs. Voiding without issues  - Tolerating 3 hours of therapy without issues   - Denies fever, chills, CP, palpitation, cough, SOB, abdominal pain, N/V/D/C, pain, urinary symptoms, joint pain or swelling    MEDICATIONS  (STANDING):  aspirin enteric coated 81 milliGRAM(s) Oral daily  diltiazem    milliGRAM(s) Oral daily  enoxaparin Injectable 40 milliGRAM(s) SubCutaneous every 24 hours  hemorrhoidal Ointment 1 Application(s) Rectal two times a day  lidocaine   4% Patch 2 Patch Transdermal every 24 hours  methocarbamol 750 milliGRAM(s) Oral three times a day  metoprolol tartrate 25 milliGRAM(s) Oral two times a day  multivitamin 1 Tablet(s) Oral daily  polyethylene glycol 3350 17 Gram(s) Oral two times a day  senna 2 Tablet(s) Oral at bedtime  sodium chloride 2 Gram(s) Oral every 8 hours  tamsulosin 0.4 milliGRAM(s) Oral at bedtime    MEDICATIONS  (PRN):  acetaminophen     Tablet .. 650 milliGRAM(s) Oral every 6 hours PRN Mild Pain (1 - 3)  HYDROmorphone   Tablet 6 milliGRAM(s) Oral every 6 hours PRN Severe Pain (7 - 10)  HYDROmorphone   Tablet 4 milliGRAM(s) Oral every 4 hours PRN Moderate Pain (4 - 6)  witch hazel Pads 1 Application(s) Topical three times a day PRN Hemmorrhoids      RECENT LABS:                          12.0   9.57  )-----------( 574      ( 26 Oct 2023 05:54 )             37.1     10-26    136  |  100  |  21  ----------------------------<  113<H>  4.1   |  28  |  0.87    Ca    9.3      26 Oct 2023 05:54    TPro  6.8  /  Alb  2.8<L>  /  TBili  0.4  /  DBili  x   /  AST  22  /  ALT  34  /  AlkPhos  137<H>  10-26    LIVER FUNCTIONS - ( 26 Oct 2023 05:54 )  Alb: 2.8 g/dL / Pro: 6.8 g/dL / ALK PHOS: 137 U/L / ALT: 34 U/L / AST: 22 U/L / GGT: x             Urinalysis Basic - ( 26 Oct 2023 05:54 )    Color: x / Appearance: x / SG: x / pH: x  Gluc: 113 mg/dL / Ketone: x  / Bili: x / Urobili: x   Blood: x / Protein: x / Nitrite: x   Leuk Esterase: x / RBC: x / WBC x   Sq Epi: x / Non Sq Epi: x / Bacteria: x        PHYSICAL EXAM:  Vital Signs Last 24 Hrs  T(C): 36.7 (26 Oct 2023 20:33), Max: 36.7 (26 Oct 2023 20:33)  T(F): 98.1 (26 Oct 2023 20:33), Max: 98.1 (26 Oct 2023 20:33)  HR: 110 (27 Oct 2023 06:04) (73 - 116)  BP: 125/87 (27 Oct 2023 06:04) (125/87 - 141/83)  BP(mean): --  RR: 17 (26 Oct 2023 20:33) (17 - 17)  SpO2: 95% (26 Oct 2023 20:33) (92% - 95%)    Parameters below as of 26 Oct 2023 20:33  Patient On (Oxygen Delivery Method): room air    General: NAD, comfortable                                HEENT: NCAT, EOM I, PERRLA  Cardio: RRR, Normal S1-S2                              Pulm: No Respiratory Distress,  Lungs CTAB                        Abdomen: ND, NT, Soft, BS+                                                MSK: No joint swelling, Full ROM                                         Ext: No C/C/E, Pulses (2+) throughout, No calf tenderness    Skin:  all skin intact                                                                   Neurological Examination    Cognitive: AAO x 3, follows command                                                                        Attention: Easily distracted, long processing time   Judgment: (+) evidence of judgement                               Memory:  Intact  Mood/Affect: wnl                                                                           Communication:  Fluent,  No dysarthria   CN II - XII  intact    Motor    LEFT    UE: SF [1/5], EF [3-/5], EE [3/5], WE [5/5],  [5/5]  RIGHT UE: SF [1/5], EF [3-/5], EE [3/5], WE [5/5],  [5/5]  LEFT    LE:  HF [5/5], KE [5/5], DF [5/5], PF [5/5]  RIGHT LE:  HF [5/5], KE [5/5], DF [5/5], PF [5/5]        IDT rounds 10/24  SW - lives in a private home with spouse, 1 IKE and 1 flight inside, 1F setup available, spouse is able to support intermittently. Independent prior  SLP - soft and bite sized with thins; mild cog for higher level tasks  OT - MaxA for all txf and ADLs, Yoshi for toilet transfers; barriers include proximal weakness; Goals for Yoshi for all ADLs and supervision for fxnal transfers  PT - 150' no device CG-Yoshi for unsteady gait, CG transfers, 12 steps with HR CG; goals for Gui mobility  TDD: 11/3 Home Patient is a 68y old  Male who presents with a chief complaint of S/P Fall with TBI and Multiple fractures, unstable cervical spine fracture s/p C1-C7 Decompression/Fusion (26 Oct 2023 10:58)    HPI:  Patient is 69yo M with history of chronic back pain (on chronic Dilaudid) who presented to [facility] 10/13 following a fall from a 12 foot ladder resulting in head strike and LOC. He was found to have acute displaced right lateral 8-10 rib fractures, and focal trauma occlusion/dissection of the left vertebral artery at C1 level with C1 anterior and posterior arch fractures with lateral displacement. Also noted to have spinous process fractures C2-C5, C5/C6 anterior subluxation with likely PLL injury, Left C6 transverse process fracture, T4 compression fracture, and C2-C3 epidural hematoma. Patient was admitted and underwent Posterior C1-C7 Decompression/Fusion for unstable spine fracture and evacuation of epidural hematoma, decompression of left C5 nerve root. He had routine post operative care in NSCU and then transferred to the floor, Surgical drain removed prior to discharge. Cardiology was consulted and medications were adjusted for tachycardia. Patient was seen by chronic pain and medications were adjusted with good results. He is to wear cervical collar at all times except during sleep. 10/19/23 he had episode of prolapsed hemorrhoids reduced at bedside by surgery team. Patient was evaluated by PM&R and therapy for functional deficits and gait/ ADL impairments and recommended acute rehabilitation. Patient was medically optimized for discharge to [facility] on 10/20.       SUBJECTIVE/ROS:  - Patient was seen and examined at bed side this morning, comfortable, snoring easy to arouse with verbal command,  no new events over night.    - Patient states that they slept well last night, no new complaints.  - Large rectal/hemorrhoidal prolapse after a bowel movement, reduced by team at bedside yesterday afternoon  - Patient and family counselled extensively on measures to avoid recurrence.  - Removal of surgical staples today (10/27)   - Pain is well-controlled with current meds  - GI/: patient is moving bowels, last yesterday. Amenable to continuing bowel regimen to ensure soft BMs. Voiding without issues  - Tolerating 3 hours of therapy without issues   - Denies fever, chills, CP, palpitation, cough, SOB, abdominal pain, N/V/D/C, pain, urinary symptoms, joint pain or swelling    MEDICATIONS  (STANDING):  aspirin enteric coated 81 milliGRAM(s) Oral daily  diltiazem    milliGRAM(s) Oral daily  enoxaparin Injectable 40 milliGRAM(s) SubCutaneous every 24 hours  hemorrhoidal Ointment 1 Application(s) Rectal two times a day  lidocaine   4% Patch 2 Patch Transdermal every 24 hours  methocarbamol 750 milliGRAM(s) Oral three times a day  metoprolol tartrate 25 milliGRAM(s) Oral two times a day  multivitamin 1 Tablet(s) Oral daily  polyethylene glycol 3350 17 Gram(s) Oral two times a day  senna 2 Tablet(s) Oral at bedtime  sodium chloride 2 Gram(s) Oral every 8 hours  tamsulosin 0.4 milliGRAM(s) Oral at bedtime    MEDICATIONS  (PRN):  acetaminophen     Tablet .. 650 milliGRAM(s) Oral every 6 hours PRN Mild Pain (1 - 3)  HYDROmorphone   Tablet 6 milliGRAM(s) Oral every 6 hours PRN Severe Pain (7 - 10)  HYDROmorphone   Tablet 4 milliGRAM(s) Oral every 4 hours PRN Moderate Pain (4 - 6)  witch hazel Pads 1 Application(s) Topical three times a day PRN Hemmorrhoids      RECENT LABS:                        12.0   9.57  )-----------( 574      ( 26 Oct 2023 05:54 )             37.1     10-26    136  |  100  |  21  ----------------------------<  113<H>  4.1   |  28  |  0.87    Ca    9.3      26 Oct 2023 05:54    TPro  6.8  /  Alb  2.8<L>  /  TBili  0.4  /  DBili  x   /  AST  22  /  ALT  34  /  AlkPhos  137<H>  10-26    LIVER FUNCTIONS - ( 26 Oct 2023 05:54 )  Alb: 2.8 g/dL / Pro: 6.8 g/dL / ALK PHOS: 137 U/L / ALT: 34 U/L / AST: 22 U/L / GGT: x             PHYSICAL EXAM:  Vital Signs Last 24 Hrs  T(C): 36.7 (26 Oct 2023 20:33), Max: 36.7 (26 Oct 2023 20:33)  T(F): 98.1 (26 Oct 2023 20:33), Max: 98.1 (26 Oct 2023 20:33)  HR: 110 (27 Oct 2023 06:04) (73 - 116)  BP: 125/87 (27 Oct 2023 06:04) (125/87 - 141/83)  RR: 17 (26 Oct 2023 20:33) (17 - 17)  SpO2: 95% (26 Oct 2023 20:33) (92% - 95%)    General: NAD, comfortable                                HEENT: NCAT, EOM I, PERRLA  Cardio: RRR, Normal S1-S2                              Pulm: No Respiratory Distress,  Lungs CTAB                        Abdomen: ND, NT, Soft, BS+                                                MSK: No joint swelling, Full ROM                                         Ext: No C/C/E, Pulses (2+) throughout, No calf tenderness    Skin:  all skin intact                                                                   Neurological Examination    Cognitive: AAO x 3, follows command                                                                        Attention: Easily distracted, long processing time   Judgment: (+) evidence of judgement                               Memory:  Intact  Mood/Affect: wnl                                                                           Communication:  Fluent,  No dysarthria   CN II - XII  intact    Motor    LEFT    UE: SF [1/5], EF [3-/5], EE [3/5], WE [5/5],  [5/5]  RIGHT UE: SF [1/5], EF [3-/5], EE [3/5], WE [5/5],  [5/5]  LEFT    LE:  HF [5/5], KE [5/5], DF [5/5], PF [5/5]  RIGHT LE:  HF [5/5], KE [5/5], DF [5/5], PF [5/5]        IDT rounds 10/24  SW - lives in a private home with spouse, 1 IKE and 1 flight inside, 1F setup available, spouse is able to support intermittently. Independent prior  SLP - soft and bite sized with thins; mild cog for higher level tasks  OT - MaxA for all txf and ADLs, Yoshi for toilet transfers; barriers include proximal weakness; Goals for Yoshi for all ADLs and supervision for fxnal transfers  PT - 150' no device CG-Yoshi for unsteady gait, CG transfers, 12 steps with HR CG; goals for Gui mobility  TDD: 11/3 Home

## 2023-10-27 NOTE — PROGRESS NOTE ADULT - ASSESSMENT
Assessment	  Patient is 67yo M with history of chronic back pain (on chronic Dilaudid) who presented to [facility] 10/13 following a fall from a 12 foot ladder resulting in head strike and LOC. He was found to have acute displaced right lateral 8-10 rib fractures, and focal trauma occlusion/dissection of the left vertebral artery at C1 level with C1 anterior and posterior arch fractures with lateral displacement. Also noted to have spinous process fractures C2-C5, C5/C6 anterior subluxation with likely PLL injury, Left C6 transverse process fracture, T4 compression fracture, and C2-C3 epidural hematoma. Patient was admitted and underwent Posterior C1-C7 Decompression/Fusion for unstable spine fracture and evacuation of epidural hematoma. Hospital course complicated by tachycardia and pain.   Admitted to [facility] on 10/20/23 for ADL, gait, and functional impairments.     # S/P Fall with TBI with LOC, Unstable cervical spine fracture s/p C1-C7 Decompression/Fusion and evacuation of EDH with cord compression   - Comprehensive Multidisciplinary Rehab Program: 3 hours a day, 5 days a week with PT/OT  - Seen by Pain Management; pain control with Dilaudid as below  - Cervical collar at all times other than sleep  - Fall precautions  - Staples removed 10/27     # Rectal prolapse/Reduced   - CRS consult appreciated  - Prolapse reduced again at bedside 10/26  - Hgb remains stable  - Counselling provided - avoid straining, hydrate, continue bowel regimen    # Leukocytosis - resolved  - No fever, chills, cough  - UA - negative    # Vertebral Artery Dissection  - ASA 81mg daily - no allergy per patient, has tolerated in the past    # COPD  - Not on home meds  - 2 L oxygen supplement PRN  - Incentive spirometry     #Tachycardia/PAT  - Ectopic atrial rhythm/PAT noted on tele 10/19  - Cont metoprolol   - Continue cardizem 120mg cd  - EP eval was deferred as pt not likely a candidate for ablation at this time  - Follow up with Dr Zeng as outpatient    #Aortic Root Dilatation  - Stable on recent outpt echo from 9/5/23  - Follow up for repeat echo q 12 months    #Hyponatremia - resolved  - NaCl tabs 2g q8 hours  - Hospitalist consult appreciated    # Sleep:  - Melatonin qHS    # Pain Management:  - Recent Pain Medicine recs noted  - Tylenol ATC  - Lidocaine patch to ribs  - Robaxin 750mg TID  - Dilaudid 4mg/6mg q4 hours/q6 hours PRN Mod/Severe per Pain Medicine recs    # GI/Bowel:  - Senna QHS, Miralax daily BID  - Dulcolax suppository PRN  - Added Lactulose x 2 daily (10/23)   - Witch hazel and Prep H PRN  - Avoid straining    # /Bladder:  - Flomax 0.4mg QHS  - Low PVRs, D/C bladder scan   - Encourage timed voids every 4 hours while awake     # Skin/Pressure Injury:  - Skin assessment on admission:  Intact  - Monitor Incisions: Posterior cervical incision well approximated, (+) mild erythema, stables are in place   - Turn every 2 hours while in bed    # Diet:   - Diet Consistency/Modifications: Soft+Bite/thin  - MBS completed 10/26, recommend same diet     # DVT ppx:  - Lovenox    # Restrictions/Precautions:  - ROM restrictions: Cervical collar at all times except sleep  - Precautions: Fall, aspiration    ---------------  Outpatient Follow-up:    Spike Dawkins  Neurosurgery  [address]  [place] [address]  Phone: [phone]  Fax: [phone]  Follow Up Time:     Jaylon Zeng  Cardiovascular Disease  [address]  [place] [address]  Phone: [phone]  Fax: [phone]  Follow Up Time:     Eduard Knowles  Colon/Rectal Surgery  [address], [address]  [place] [address]  Phone: [phone]  Fax: [phone]  Follow Up Time:  --------------

## 2023-10-28 PROCEDURE — 99232 SBSQ HOSP IP/OBS MODERATE 35: CPT

## 2023-10-28 RX ADMIN — Medication 81 MILLIGRAM(S): at 12:18

## 2023-10-28 RX ADMIN — METHOCARBAMOL 750 MILLIGRAM(S): 500 TABLET, FILM COATED ORAL at 21:31

## 2023-10-28 RX ADMIN — ENOXAPARIN SODIUM 40 MILLIGRAM(S): 100 INJECTION SUBCUTANEOUS at 05:51

## 2023-10-28 RX ADMIN — SENNA PLUS 2 TABLET(S): 8.6 TABLET ORAL at 21:31

## 2023-10-28 RX ADMIN — METHOCARBAMOL 750 MILLIGRAM(S): 500 TABLET, FILM COATED ORAL at 15:06

## 2023-10-28 RX ADMIN — HYDROMORPHONE HYDROCHLORIDE 6 MILLIGRAM(S): 2 INJECTION INTRAMUSCULAR; INTRAVENOUS; SUBCUTANEOUS at 03:02

## 2023-10-28 RX ADMIN — Medication 25 MILLIGRAM(S): at 05:50

## 2023-10-28 RX ADMIN — PHENYLEPHRINE-SHARK LIVER OIL-MINERAL OIL-PETROLATUM RECTAL OINTMENT 1 APPLICATION(S): at 18:00

## 2023-10-28 RX ADMIN — Medication 120 MILLIGRAM(S): at 05:50

## 2023-10-28 RX ADMIN — POLYETHYLENE GLYCOL 3350 17 GRAM(S): 17 POWDER, FOR SOLUTION ORAL at 18:01

## 2023-10-28 RX ADMIN — LIDOCAINE 2 PATCH: 4 CREAM TOPICAL at 22:34

## 2023-10-28 RX ADMIN — Medication 25 MILLIGRAM(S): at 17:59

## 2023-10-28 RX ADMIN — PHENYLEPHRINE-SHARK LIVER OIL-MINERAL OIL-PETROLATUM RECTAL OINTMENT 1 APPLICATION(S): at 05:52

## 2023-10-28 RX ADMIN — HYDROMORPHONE HYDROCHLORIDE 6 MILLIGRAM(S): 2 INJECTION INTRAMUSCULAR; INTRAVENOUS; SUBCUTANEOUS at 10:42

## 2023-10-28 RX ADMIN — SODIUM CHLORIDE 2 GRAM(S): 9 INJECTION INTRAMUSCULAR; INTRAVENOUS; SUBCUTANEOUS at 15:06

## 2023-10-28 RX ADMIN — SODIUM CHLORIDE 2 GRAM(S): 9 INJECTION INTRAMUSCULAR; INTRAVENOUS; SUBCUTANEOUS at 05:53

## 2023-10-28 RX ADMIN — Medication 650 MILLIGRAM(S): at 05:50

## 2023-10-28 RX ADMIN — Medication 650 MILLIGRAM(S): at 06:20

## 2023-10-28 RX ADMIN — POLYETHYLENE GLYCOL 3350 17 GRAM(S): 17 POWDER, FOR SOLUTION ORAL at 05:50

## 2023-10-28 RX ADMIN — TAMSULOSIN HYDROCHLORIDE 0.4 MILLIGRAM(S): 0.4 CAPSULE ORAL at 21:31

## 2023-10-28 RX ADMIN — LIDOCAINE 2 PATCH: 4 CREAM TOPICAL at 11:18

## 2023-10-28 RX ADMIN — HYDROMORPHONE HYDROCHLORIDE 6 MILLIGRAM(S): 2 INJECTION INTRAMUSCULAR; INTRAVENOUS; SUBCUTANEOUS at 11:12

## 2023-10-28 RX ADMIN — LIDOCAINE 2 PATCH: 4 CREAM TOPICAL at 19:43

## 2023-10-28 RX ADMIN — METHOCARBAMOL 750 MILLIGRAM(S): 500 TABLET, FILM COATED ORAL at 05:51

## 2023-10-28 RX ADMIN — SODIUM CHLORIDE 2 GRAM(S): 9 INJECTION INTRAMUSCULAR; INTRAVENOUS; SUBCUTANEOUS at 21:31

## 2023-10-28 RX ADMIN — Medication 1 TABLET(S): at 12:18

## 2023-10-28 RX ADMIN — HYDROMORPHONE HYDROCHLORIDE 4 MILLIGRAM(S): 2 INJECTION INTRAMUSCULAR; INTRAVENOUS; SUBCUTANEOUS at 21:35

## 2023-10-28 RX ADMIN — HYDROMORPHONE HYDROCHLORIDE 6 MILLIGRAM(S): 2 INJECTION INTRAMUSCULAR; INTRAVENOUS; SUBCUTANEOUS at 02:32

## 2023-10-28 RX ADMIN — HYDROMORPHONE HYDROCHLORIDE 4 MILLIGRAM(S): 2 INJECTION INTRAMUSCULAR; INTRAVENOUS; SUBCUTANEOUS at 20:43

## 2023-10-28 NOTE — PROGRESS NOTE ADULT - SUBJECTIVE AND OBJECTIVE BOX
Cc: Gait dysfunction    HPI: Patient with no new medical issues today. Expresses some anxiety regarding his bowel movements due to recent rectal prolapse. Patient has been having softer BMs and reminded not to strain.   Pain controlled, no chest pain, no N/V, no Fevers/Chills. No other new ROS  Has been tolerating rehabilitation program.    acetaminophen     Tablet .. 650 milliGRAM(s) Oral every 6 hours PRN  aspirin enteric coated 81 milliGRAM(s) Oral daily  diltiazem    milliGRAM(s) Oral daily  enoxaparin Injectable 40 milliGRAM(s) SubCutaneous every 24 hours  hemorrhoidal Ointment 1 Application(s) Rectal two times a day  HYDROmorphone   Tablet 6 milliGRAM(s) Oral every 6 hours PRN  HYDROmorphone   Tablet 4 milliGRAM(s) Oral every 4 hours PRN  lidocaine   4% Patch 2 Patch Transdermal every 24 hours  methocarbamol 750 milliGRAM(s) Oral three times a day  metoprolol tartrate 25 milliGRAM(s) Oral two times a day  multivitamin 1 Tablet(s) Oral daily  polyethylene glycol 3350 17 Gram(s) Oral two times a day  senna 2 Tablet(s) Oral at bedtime  sodium chloride 2 Gram(s) Oral every 8 hours  tamsulosin 0.4 milliGRAM(s) Oral at bedtime  witch hazel Pads 1 Application(s) Topical three times a day PRN      T(C): 36.6 (10-28-23 @ 08:10), Max: 36.8 (10-28-23 @ 05:43)  HR: 73 (10-28-23 @ 08:10) (73 - 106)  BP: 122/69 (10-28-23 @ 08:10) (122/69 - 130/81)  RR: 16 (10-28-23 @ 08:10) (16 - 16)  SpO2: 95% (10-28-23 @ 08:10) (95% - 96%)    In NAD  HEENT- EOMI,  C spine staples removed 10/27, skin well approximated   Heart- RRR, S1S2  Lungs- CTA bl.  Abd- + BS, NT  Ext- No calf pain  Neuro- Exam unchanged          Imp: Patient with diagnosis of    S/P Fall with TBI and Multiple fractures, unstable cervical spine fracture s/p C1-C7 Decompression/Fusion      admitted for comprehensive acute rehabilitation.    Plan:  - Continue PT/OT  - DVT prophylaxis - lovenox   - Skin- Turn q2h, check skin daily, C spine staples removed 10/27, skin well approximated   - Continue current medications; patient medically stable.   -Active issues-   - rectal prolapse - reduced by CRS on 10/26, patient currently on bowel regimen. Is inquiring about re-adding lactulose to regimen. Will defer to primary team or surgical team for input.   - Patient is stable to continue current rehabilitation program.

## 2023-10-29 PROCEDURE — 99232 SBSQ HOSP IP/OBS MODERATE 35: CPT

## 2023-10-29 RX ORDER — LACTULOSE 10 G/15ML
10 SOLUTION ORAL
Refills: 0 | Status: DISCONTINUED | OUTPATIENT
Start: 2023-10-29 | End: 2023-11-03

## 2023-10-29 RX ORDER — METHOCARBAMOL 500 MG/1
500 TABLET, FILM COATED ORAL ONCE
Refills: 0 | Status: COMPLETED | OUTPATIENT
Start: 2023-10-29 | End: 2023-10-29

## 2023-10-29 RX ADMIN — HYDROMORPHONE HYDROCHLORIDE 4 MILLIGRAM(S): 2 INJECTION INTRAMUSCULAR; INTRAVENOUS; SUBCUTANEOUS at 21:16

## 2023-10-29 RX ADMIN — PHENYLEPHRINE-SHARK LIVER OIL-MINERAL OIL-PETROLATUM RECTAL OINTMENT 1 APPLICATION(S): at 05:45

## 2023-10-29 RX ADMIN — Medication 81 MILLIGRAM(S): at 12:20

## 2023-10-29 RX ADMIN — ENOXAPARIN SODIUM 40 MILLIGRAM(S): 100 INJECTION SUBCUTANEOUS at 05:44

## 2023-10-29 RX ADMIN — LIDOCAINE 2 PATCH: 4 CREAM TOPICAL at 22:36

## 2023-10-29 RX ADMIN — Medication 25 MILLIGRAM(S): at 05:45

## 2023-10-29 RX ADMIN — HYDROMORPHONE HYDROCHLORIDE 6 MILLIGRAM(S): 2 INJECTION INTRAMUSCULAR; INTRAVENOUS; SUBCUTANEOUS at 04:28

## 2023-10-29 RX ADMIN — SODIUM CHLORIDE 2 GRAM(S): 9 INJECTION INTRAMUSCULAR; INTRAVENOUS; SUBCUTANEOUS at 05:45

## 2023-10-29 RX ADMIN — LIDOCAINE 2 PATCH: 4 CREAM TOPICAL at 21:31

## 2023-10-29 RX ADMIN — Medication 25 MILLIGRAM(S): at 18:00

## 2023-10-29 RX ADMIN — HYDROMORPHONE HYDROCHLORIDE 6 MILLIGRAM(S): 2 INJECTION INTRAMUSCULAR; INTRAVENOUS; SUBCUTANEOUS at 14:32

## 2023-10-29 RX ADMIN — SODIUM CHLORIDE 2 GRAM(S): 9 INJECTION INTRAMUSCULAR; INTRAVENOUS; SUBCUTANEOUS at 21:16

## 2023-10-29 RX ADMIN — METHOCARBAMOL 750 MILLIGRAM(S): 500 TABLET, FILM COATED ORAL at 21:16

## 2023-10-29 RX ADMIN — METHOCARBAMOL 750 MILLIGRAM(S): 500 TABLET, FILM COATED ORAL at 05:45

## 2023-10-29 RX ADMIN — HYDROMORPHONE HYDROCHLORIDE 6 MILLIGRAM(S): 2 INJECTION INTRAMUSCULAR; INTRAVENOUS; SUBCUTANEOUS at 05:55

## 2023-10-29 RX ADMIN — Medication 1 TABLET(S): at 12:20

## 2023-10-29 RX ADMIN — HYDROMORPHONE HYDROCHLORIDE 4 MILLIGRAM(S): 2 INJECTION INTRAMUSCULAR; INTRAVENOUS; SUBCUTANEOUS at 22:36

## 2023-10-29 RX ADMIN — SODIUM CHLORIDE 2 GRAM(S): 9 INJECTION INTRAMUSCULAR; INTRAVENOUS; SUBCUTANEOUS at 15:14

## 2023-10-29 RX ADMIN — POLYETHYLENE GLYCOL 3350 17 GRAM(S): 17 POWDER, FOR SOLUTION ORAL at 12:33

## 2023-10-29 RX ADMIN — Medication 120 MILLIGRAM(S): at 05:45

## 2023-10-29 RX ADMIN — LIDOCAINE 2 PATCH: 4 CREAM TOPICAL at 12:19

## 2023-10-29 RX ADMIN — TAMSULOSIN HYDROCHLORIDE 0.4 MILLIGRAM(S): 0.4 CAPSULE ORAL at 21:16

## 2023-10-29 RX ADMIN — METHOCARBAMOL 500 MILLIGRAM(S): 500 TABLET, FILM COATED ORAL at 15:13

## 2023-10-29 NOTE — PROGRESS NOTE ADULT - SUBJECTIVE AND OBJECTIVE BOX
Cc: Gait dysfunction    HPI: Patient with no new medical issues today. Did not have a BM yesterday, would like to try raisins and prunes prior to using PRN lactulose. Otherwise no complaints.   Pain controlled, no chest pain, no N/V, no Fevers/Chills. No other new ROS  Has been tolerating rehabilitation program.    MEDICATIONS  (STANDING):  aspirin enteric coated 81 milliGRAM(s) Oral daily  diltiazem    milliGRAM(s) Oral daily  enoxaparin Injectable 40 milliGRAM(s) SubCutaneous every 24 hours  hemorrhoidal Ointment 1 Application(s) Rectal two times a day  lidocaine   4% Patch 2 Patch Transdermal every 24 hours  methocarbamol 750 milliGRAM(s) Oral three times a day  metoprolol tartrate 25 milliGRAM(s) Oral two times a day  multivitamin 1 Tablet(s) Oral daily  polyethylene glycol 3350 17 Gram(s) Oral two times a day  senna 2 Tablet(s) Oral at bedtime  sodium chloride 2 Gram(s) Oral every 8 hours  tamsulosin 0.4 milliGRAM(s) Oral at bedtime    MEDICATIONS  (PRN):  acetaminophen     Tablet .. 650 milliGRAM(s) Oral every 6 hours PRN Mild Pain (1 - 3)  HYDROmorphone   Tablet 6 milliGRAM(s) Oral every 6 hours PRN Severe Pain (7 - 10)  HYDROmorphone   Tablet 4 milliGRAM(s) Oral every 4 hours PRN Moderate Pain (4 - 6)  lactulose Syrup 10 Gram(s) Oral two times a day PRN Constipation  witch hazel Pads 1 Application(s) Topical three times a day PRN Hemmorrhoids        Vital Signs Last 24 Hrs  T(C): 36.6 (28 Oct 2023 19:38), Max: 36.6 (28 Oct 2023 19:38)  T(F): 97.8 (28 Oct 2023 19:38), Max: 97.8 (28 Oct 2023 19:38)  HR: 112 (29 Oct 2023 05:46) (94 - 116)  BP: 127/78 (29 Oct 2023 05:46) (124/89 - 135/93)  BP(mean): --  RR: 16 (28 Oct 2023 19:38) (16 - 16)  SpO2: 95% (28 Oct 2023 19:38) (95% - 95%)    Parameters below as of 28 Oct 2023 19:38  Patient On (Oxygen Delivery Method): room air      In NAD  HEENT- EOMI, C collar in place, C spine staples removed 10/27, skin well approximated   Heart- RRR, S1S2  Lungs- CTA bl.  Abd- + BS, NT  Ext- No calf pain  Neuro- Exam unchanged          Imp: Patient with diagnosis of    S/P Fall with TBI and Multiple fractures, unstable cervical spine fracture s/p C1-C7 Decompression/Fusion      admitted for comprehensive acute rehabilitation.    Plan:  - Continue PT/OT  - DVT prophylaxis - lovenox   - Skin- Turn q2h, check skin daily, C spine staples removed 10/27, skin well approximated   - Continue current medications; patient medically stable.   -Active issues-   - rectal prolapse - reduced by CRS on 10/26, patient currently on bowel regimen. Lactulose added PRN. Patient would like to try raisins and prunes first to facilitate BM.    - Patient is stable to continue current rehabilitation program.

## 2023-10-30 ENCOUNTER — TRANSCRIPTION ENCOUNTER (OUTPATIENT)
Age: 68
End: 2023-10-30

## 2023-10-30 LAB
ALBUMIN SERPL ELPH-MCNC: 2.7 G/DL — LOW (ref 3.3–5)
ALBUMIN SERPL ELPH-MCNC: 2.7 G/DL — LOW (ref 3.3–5)
ALP SERPL-CCNC: 152 U/L — HIGH (ref 40–120)
ALP SERPL-CCNC: 152 U/L — HIGH (ref 40–120)
ALT FLD-CCNC: 34 U/L — SIGNIFICANT CHANGE UP (ref 10–45)
ALT FLD-CCNC: 34 U/L — SIGNIFICANT CHANGE UP (ref 10–45)
ANION GAP SERPL CALC-SCNC: 6 MMOL/L — SIGNIFICANT CHANGE UP (ref 5–17)
ANION GAP SERPL CALC-SCNC: 6 MMOL/L — SIGNIFICANT CHANGE UP (ref 5–17)
AST SERPL-CCNC: 17 U/L — SIGNIFICANT CHANGE UP (ref 10–40)
AST SERPL-CCNC: 17 U/L — SIGNIFICANT CHANGE UP (ref 10–40)
BILIRUB SERPL-MCNC: 0.4 MG/DL — SIGNIFICANT CHANGE UP (ref 0.2–1.2)
BILIRUB SERPL-MCNC: 0.4 MG/DL — SIGNIFICANT CHANGE UP (ref 0.2–1.2)
BUN SERPL-MCNC: 21 MG/DL — SIGNIFICANT CHANGE UP (ref 7–23)
BUN SERPL-MCNC: 21 MG/DL — SIGNIFICANT CHANGE UP (ref 7–23)
CALCIUM SERPL-MCNC: 9.2 MG/DL — SIGNIFICANT CHANGE UP (ref 8.4–10.5)
CALCIUM SERPL-MCNC: 9.2 MG/DL — SIGNIFICANT CHANGE UP (ref 8.4–10.5)
CHLORIDE SERPL-SCNC: 104 MMOL/L — SIGNIFICANT CHANGE UP (ref 96–108)
CHLORIDE SERPL-SCNC: 104 MMOL/L — SIGNIFICANT CHANGE UP (ref 96–108)
CO2 SERPL-SCNC: 29 MMOL/L — SIGNIFICANT CHANGE UP (ref 22–31)
CO2 SERPL-SCNC: 29 MMOL/L — SIGNIFICANT CHANGE UP (ref 22–31)
CREAT SERPL-MCNC: 0.87 MG/DL — SIGNIFICANT CHANGE UP (ref 0.5–1.3)
CREAT SERPL-MCNC: 0.87 MG/DL — SIGNIFICANT CHANGE UP (ref 0.5–1.3)
EGFR: 93 ML/MIN/1.73M2 — SIGNIFICANT CHANGE UP
EGFR: 93 ML/MIN/1.73M2 — SIGNIFICANT CHANGE UP
GLUCOSE SERPL-MCNC: 112 MG/DL — HIGH (ref 70–99)
GLUCOSE SERPL-MCNC: 112 MG/DL — HIGH (ref 70–99)
HCT VFR BLD CALC: 36.6 % — LOW (ref 39–50)
HCT VFR BLD CALC: 36.6 % — LOW (ref 39–50)
HGB BLD-MCNC: 11.6 G/DL — LOW (ref 13–17)
HGB BLD-MCNC: 11.6 G/DL — LOW (ref 13–17)
MCHC RBC-ENTMCNC: 29.8 PG — SIGNIFICANT CHANGE UP (ref 27–34)
MCHC RBC-ENTMCNC: 29.8 PG — SIGNIFICANT CHANGE UP (ref 27–34)
MCHC RBC-ENTMCNC: 31.7 GM/DL — LOW (ref 32–36)
MCHC RBC-ENTMCNC: 31.7 GM/DL — LOW (ref 32–36)
MCV RBC AUTO: 94.1 FL — SIGNIFICANT CHANGE UP (ref 80–100)
MCV RBC AUTO: 94.1 FL — SIGNIFICANT CHANGE UP (ref 80–100)
NRBC # BLD: 0 /100 WBCS — SIGNIFICANT CHANGE UP (ref 0–0)
NRBC # BLD: 0 /100 WBCS — SIGNIFICANT CHANGE UP (ref 0–0)
PLATELET # BLD AUTO: 558 K/UL — HIGH (ref 150–400)
PLATELET # BLD AUTO: 558 K/UL — HIGH (ref 150–400)
POTASSIUM SERPL-MCNC: 4.2 MMOL/L — SIGNIFICANT CHANGE UP (ref 3.5–5.3)
POTASSIUM SERPL-MCNC: 4.2 MMOL/L — SIGNIFICANT CHANGE UP (ref 3.5–5.3)
POTASSIUM SERPL-SCNC: 4.2 MMOL/L — SIGNIFICANT CHANGE UP (ref 3.5–5.3)
POTASSIUM SERPL-SCNC: 4.2 MMOL/L — SIGNIFICANT CHANGE UP (ref 3.5–5.3)
PROT SERPL-MCNC: 6.5 G/DL — SIGNIFICANT CHANGE UP (ref 6–8.3)
PROT SERPL-MCNC: 6.5 G/DL — SIGNIFICANT CHANGE UP (ref 6–8.3)
RBC # BLD: 3.89 M/UL — LOW (ref 4.2–5.8)
RBC # BLD: 3.89 M/UL — LOW (ref 4.2–5.8)
RBC # FLD: 13.5 % — SIGNIFICANT CHANGE UP (ref 10.3–14.5)
RBC # FLD: 13.5 % — SIGNIFICANT CHANGE UP (ref 10.3–14.5)
SODIUM SERPL-SCNC: 139 MMOL/L — SIGNIFICANT CHANGE UP (ref 135–145)
SODIUM SERPL-SCNC: 139 MMOL/L — SIGNIFICANT CHANGE UP (ref 135–145)
WBC # BLD: 8.42 K/UL — SIGNIFICANT CHANGE UP (ref 3.8–10.5)
WBC # BLD: 8.42 K/UL — SIGNIFICANT CHANGE UP (ref 3.8–10.5)
WBC # FLD AUTO: 8.42 K/UL — SIGNIFICANT CHANGE UP (ref 3.8–10.5)
WBC # FLD AUTO: 8.42 K/UL — SIGNIFICANT CHANGE UP (ref 3.8–10.5)

## 2023-10-30 PROCEDURE — 99232 SBSQ HOSP IP/OBS MODERATE 35: CPT | Mod: GC

## 2023-10-30 RX ORDER — LIDOCAINE 4 G/100G
1 CREAM TOPICAL EVERY 24 HOURS
Refills: 0 | Status: DISCONTINUED | OUTPATIENT
Start: 2023-10-30 | End: 2023-11-03

## 2023-10-30 RX ORDER — HYDROMORPHONE HYDROCHLORIDE 2 MG/ML
2 INJECTION INTRAMUSCULAR; INTRAVENOUS; SUBCUTANEOUS ONCE
Refills: 0 | Status: DISCONTINUED | OUTPATIENT
Start: 2023-10-30 | End: 2023-10-30

## 2023-10-30 RX ORDER — HYDROMORPHONE HYDROCHLORIDE 2 MG/ML
4 INJECTION INTRAMUSCULAR; INTRAVENOUS; SUBCUTANEOUS EVERY 4 HOURS
Refills: 0 | Status: DISCONTINUED | OUTPATIENT
Start: 2023-10-30 | End: 2023-11-03

## 2023-10-30 RX ORDER — HYDROMORPHONE HYDROCHLORIDE 2 MG/ML
6 INJECTION INTRAMUSCULAR; INTRAVENOUS; SUBCUTANEOUS EVERY 6 HOURS
Refills: 0 | Status: DISCONTINUED | OUTPATIENT
Start: 2023-10-30 | End: 2023-11-03

## 2023-10-30 RX ADMIN — HYDROMORPHONE HYDROCHLORIDE 6 MILLIGRAM(S): 2 INJECTION INTRAMUSCULAR; INTRAVENOUS; SUBCUTANEOUS at 02:56

## 2023-10-30 RX ADMIN — Medication 25 MILLIGRAM(S): at 05:43

## 2023-10-30 RX ADMIN — LIDOCAINE 2 PATCH: 4 CREAM TOPICAL at 11:55

## 2023-10-30 RX ADMIN — METHOCARBAMOL 750 MILLIGRAM(S): 500 TABLET, FILM COATED ORAL at 05:43

## 2023-10-30 RX ADMIN — Medication 650 MILLIGRAM(S): at 19:02

## 2023-10-30 RX ADMIN — Medication 2 MILLIGRAM(S): at 22:26

## 2023-10-30 RX ADMIN — ENOXAPARIN SODIUM 40 MILLIGRAM(S): 100 INJECTION SUBCUTANEOUS at 05:43

## 2023-10-30 RX ADMIN — SODIUM CHLORIDE 2 GRAM(S): 9 INJECTION INTRAMUSCULAR; INTRAVENOUS; SUBCUTANEOUS at 05:44

## 2023-10-30 RX ADMIN — Medication 650 MILLIGRAM(S): at 19:32

## 2023-10-30 RX ADMIN — HYDROMORPHONE HYDROCHLORIDE 6 MILLIGRAM(S): 2 INJECTION INTRAMUSCULAR; INTRAVENOUS; SUBCUTANEOUS at 10:01

## 2023-10-30 RX ADMIN — HYDROMORPHONE HYDROCHLORIDE 4 MILLIGRAM(S): 2 INJECTION INTRAMUSCULAR; INTRAVENOUS; SUBCUTANEOUS at 16:09

## 2023-10-30 RX ADMIN — Medication 120 MILLIGRAM(S): at 05:43

## 2023-10-30 RX ADMIN — HYDROMORPHONE HYDROCHLORIDE 2 MILLIGRAM(S): 2 INJECTION INTRAMUSCULAR; INTRAVENOUS; SUBCUTANEOUS at 18:12

## 2023-10-30 RX ADMIN — Medication 25 MILLIGRAM(S): at 19:03

## 2023-10-30 RX ADMIN — HYDROMORPHONE HYDROCHLORIDE 6 MILLIGRAM(S): 2 INJECTION INTRAMUSCULAR; INTRAVENOUS; SUBCUTANEOUS at 03:09

## 2023-10-30 RX ADMIN — METHOCARBAMOL 750 MILLIGRAM(S): 500 TABLET, FILM COATED ORAL at 13:21

## 2023-10-30 RX ADMIN — HYDROMORPHONE HYDROCHLORIDE 6 MILLIGRAM(S): 2 INJECTION INTRAMUSCULAR; INTRAVENOUS; SUBCUTANEOUS at 23:58

## 2023-10-30 RX ADMIN — SENNA PLUS 2 TABLET(S): 8.6 TABLET ORAL at 21:30

## 2023-10-30 RX ADMIN — TAMSULOSIN HYDROCHLORIDE 0.4 MILLIGRAM(S): 0.4 CAPSULE ORAL at 21:30

## 2023-10-30 RX ADMIN — SODIUM CHLORIDE 2 GRAM(S): 9 INJECTION INTRAMUSCULAR; INTRAVENOUS; SUBCUTANEOUS at 21:30

## 2023-10-30 RX ADMIN — Medication 81 MILLIGRAM(S): at 11:53

## 2023-10-30 RX ADMIN — HYDROMORPHONE HYDROCHLORIDE 4 MILLIGRAM(S): 2 INJECTION INTRAMUSCULAR; INTRAVENOUS; SUBCUTANEOUS at 15:39

## 2023-10-30 RX ADMIN — HYDROMORPHONE HYDROCHLORIDE 6 MILLIGRAM(S): 2 INJECTION INTRAMUSCULAR; INTRAVENOUS; SUBCUTANEOUS at 10:31

## 2023-10-30 RX ADMIN — HYDROMORPHONE HYDROCHLORIDE 2 MILLIGRAM(S): 2 INJECTION INTRAMUSCULAR; INTRAVENOUS; SUBCUTANEOUS at 18:42

## 2023-10-30 RX ADMIN — METHOCARBAMOL 750 MILLIGRAM(S): 500 TABLET, FILM COATED ORAL at 21:30

## 2023-10-30 RX ADMIN — SODIUM CHLORIDE 2 GRAM(S): 9 INJECTION INTRAMUSCULAR; INTRAVENOUS; SUBCUTANEOUS at 13:20

## 2023-10-30 RX ADMIN — LIDOCAINE 2 PATCH: 4 CREAM TOPICAL at 19:16

## 2023-10-30 RX ADMIN — LIDOCAINE 2 PATCH: 4 CREAM TOPICAL at 23:52

## 2023-10-30 RX ADMIN — Medication 1 TABLET(S): at 11:54

## 2023-10-30 RX ADMIN — LIDOCAINE 1 PATCH: 4 CREAM TOPICAL at 21:31

## 2023-10-30 RX ADMIN — POLYETHYLENE GLYCOL 3350 17 GRAM(S): 17 POWDER, FOR SOLUTION ORAL at 11:53

## 2023-10-30 NOTE — CHART NOTE - NSCHARTNOTEFT_GEN_A_CORE
[facility] Interdisciplinary Plan of Care    REHABILITATION DIAGNOSIS:  TBI with LOC  Spinal cord compression with B/L UE weakness  Status post arthrodesis    COMORBIDITIES/COMPLICATING CONDITIONS IMPACTING REHABILITATION:  HEALTH ISSUES - PROBLEM Dx:    High cholesterol  Insomnia  History of chronic back pain  History of arthroscopy of left shoulder  age 15 & right shoulder 1 yrs  H/O hernia repair  b/l inguinal & abdominal  History of arthroplasty of right knee  5yrs    Based upon consideration of the patient's impairments, functional status, complicating conditions and any other contributing factors and after information garnered from the assessments of all therapy disciplines involved in treating the patient and other pertinent clinicians:    INTERDISCIPLINARY REHABILITATION INTERVENTIONS:    [ X  ] Transfer Training  [ X  ] Bed Mobility  [ X  ] Therapeutic Exercise  [ X ] Balance/Coordination Exercises  [ X ] Locomotion retraining  [ X  ] Stairs  [  X ] Functional Transfer Training  [ X  ] Bowel/Bladder program  [ X  ] Pain Management  [ X  ] Skin/Wound Care  [ X  ] Visual/Perceptual Training  [ X  ] Therapeutic Recreation Activities  [  X ] Neuromuscular Re-education  [ X  ] Activities of Daily Living  [ X  ] Speech Exercise  [X   ] Swallowing Exercises  [   ] Vital Stim  [   ] Dietary Supplements  [   ] Calorie Count  [ X  ] Cognitive Exercises  [  X ] Congnitive/Linguistic Treatment  [   ] Behavior Program  [  x ] Neuropsych Therapy  [ X  ] Patient/Family Counseling  [ X ] Family Training  [ X  ] Community Re-entry  [   ] Orthotic Evaluation  [   ] Prosthetic Eval/Training    MEDICAL PROGNOSIS: good    REHAB POTENTIAL:  Good    EXPECTED DAILY THERAPY:  3 hours/day           ESTIMATED LOS:  [  ] 5-7 Days  [  ] 7-10 Days  [ x ] 10- 14 Days  [  ] 14- 18 Days  [  ] 18- 21 Days    ESTIMATED DISPOSITION:  [  ] Home   [  ] Home with Outpatient Therapies  [ x ] Home with Home Therapies  [  ] Assisted Living  [  ] Nursing Home  [  ] Long Term Acute Care    INTERDISCIPLINARY FUNCTIONAL OUTCOMES/GOALS:         Gait/Mobility: 6       Transfers: 6       ADLs: 6       Functional Transfers: 6       Medication Management: 6       Communication: 7       Cognitive: 7       Dysphagia: 7       Bladder: 7       Bowel: 7     Functional Independent Measures:   7 = Independent  6 = Modified Independent  5 = Supervision  4 = Minimal Assist/ Contact Guard  3 = Moderate Assistance  2 = Maximum Assistance  1 = Total Assistance  0 = Unable to assess
NUTRITION FOLLOW UP    SOURCE: Patient [X)   Family [ ]    Medical Record (X)    Diet, Regular:   Soft and Bite Sized (SOFTBTSZ)  Supplement Feeding Modality:  Oral  Ensure Plus High Protein Cans or Servings Per Day:  1       Frequency:  Daily (10-24-23 @ 11:55) [Active]      Pt s/p MBS today, SLP rec to continue with soft & bite sized texture. Pt states that he has never been big on eating bfast- generally only eats 1-2 meals/day. Has only taken few sips of Ensure supplement. PO intake otherwise % at meals. Standing bfast preferences obtained to optimize intake in the morning- explained energy requirements for performance at therapy (8oz whole milk, oatmeal +syrup, yogurt, berries, rice krispies cereal x 3). PO intake further inhibited by rectal prolapse (treated at bedside per surgery note). Last BM 10/25- aggressive bowel regimen. Encourage po fluids. Pt continues on MVI po daily.                  CURRENT WEIGHT: 144.1klbs (10/22)    PERTINENT MEDS:   Pertinent Medications: MEDICATIONS  (STANDING):  aspirin enteric coated 81 milliGRAM(s) Oral daily  diltiazem    milliGRAM(s) Oral daily  enoxaparin Injectable 40 milliGRAM(s) SubCutaneous every 24 hours  hemorrhoidal Ointment 1 Application(s) Rectal two times a day  lidocaine   4% Patch 2 Patch Transdermal every 24 hours  methocarbamol 750 milliGRAM(s) Oral three times a day  metoprolol tartrate 25 milliGRAM(s) Oral two times a day  multivitamin 1 Tablet(s) Oral daily  polyethylene glycol 3350 17 Gram(s) Oral two times a day  senna 2 Tablet(s) Oral at bedtime  sodium chloride 2 Gram(s) Oral every 8 hours  tamsulosin 0.4 milliGRAM(s) Oral at bedtime    MEDICATIONS  (PRN):  acetaminophen     Tablet .. 650 milliGRAM(s) Oral every 6 hours PRN Mild Pain (1 - 3)  HYDROmorphone   Tablet 6 milliGRAM(s) Oral every 6 hours PRN Severe Pain (7 - 10)  HYDROmorphone   Tablet 4 milliGRAM(s) Oral every 4 hours PRN Moderate Pain (4 - 6)  witch hazel Pads 1 Application(s) Topical three times a day PRN Hemmorrhoids      PERTINENT LABS:  10-26 Na136 mmol/L Glu 113 mg/dL<H> K+ 4.1 mmol/L Cr  0.87 mg/dL BUN 21 mg/dL 10-26 Alb 2.8 g/dL<L>        SKIN:  surgical incisions  EDEMA: none  LAST BM: 10/25    ESTIMATED NEEDS:   [X] no change since previous assessment  [ ] recalculated:     PREVIOUS NUTRITION DIAGNOSIS:    1. Severe malnutrition - addressed with ensure qd, meal preferences     NUTRITION DIAGNOSIS is :  (x)  Ongoing           NEW NUTRITION DIAGNOSIS: N/A    NUTRITION RECOMMENDATIONS:   1. Continue Ensure HP qd (350kcals, 20g protein)  2. MBS 10/26- soft & bite sized with thins  3. Standing bfast on file with nutrition office    MONITORING AND EVALUATION:   1. Tolerance to diet prescription   2. PO intake  3. Weights  4. Labs  5. Follow Up per protocol     RD to remain available   Marlena Munguia RDN   [phone] or TEAMS
Patient with rectal/hemorrhoidal prolapse with BM today.   Called to bedside to evaluate.  Noted combined hemorrhoidal and rectal prolapse with minimal bleeding.  Discussed with colorectal surgery.  Performed bedside reduction with Dr. Bales 15 minutes after application of sugar for edema reduction.  Reduction was successful with no evidence of bleeding.  Discussed with patient and his wife at bedside.  PRN analgesia.  Continue bowel regimen to avoid hard stools/straining. Patient counselled at length.  Encourage PO hydration.  Outpatient follow up with CRS for elective procedure.    Clarke Maki MD
Nutrition Follow Up Note  Hospital Course   (Per Electronic Medical Record)    Source:  Patient [X]  Family Member [X]   Nursing Staff [X]   Medical Record [X]      Diet: Diet, Regular:   Supplement Feeding Modality:  Oral  Ensure Plus High Protein Cans or Servings Per Day:  1       Frequency:  Daily (10-27-23 @ 12:09) [Active]    At this time patient tolerating diet w/ varied appetite/intake consuming 0-100% of meals. Reviewed preferences and menu alternatives; likes Strawberry flavored Ensure, requesting Chocolate Ice Cream x2 TID to take w/ medications noted in Kardex. No issues w/ dentition or chewing and swallowing current diet texture. Denies N/V/D last BM 10/29- aggressive bowel regimen. Encourage po fluids. Pt continues on MVI po daily. Noted: Hyponatremia - resolved addressed w/ NaCl tabs 2g q8 hours.      Enteral/Parenteral Nutrition: Not Applicable    Current Weight: 152.3lb on 10/29  144.1klbs (10/22)    Pertinent Medications: MEDICATIONS  (STANDING):  aspirin enteric coated 81 milliGRAM(s) Oral daily  diltiazem    milliGRAM(s) Oral daily  enoxaparin Injectable 40 milliGRAM(s) SubCutaneous every 24 hours  hemorrhoidal Ointment 1 Application(s) Rectal two times a day  lidocaine   4% Patch 2 Patch Transdermal every 24 hours  methocarbamol 750 milliGRAM(s) Oral three times a day  metoprolol tartrate 25 milliGRAM(s) Oral two times a day  multivitamin 1 Tablet(s) Oral daily  polyethylene glycol 3350 17 Gram(s) Oral two times a day  senna 2 Tablet(s) Oral at bedtime  sodium chloride 2 Gram(s) Oral every 8 hours  tamsulosin 0.4 milliGRAM(s) Oral at bedtime    MEDICATIONS  (PRN):  acetaminophen     Tablet .. 650 milliGRAM(s) Oral every 6 hours PRN Mild Pain (1 - 3)  HYDROmorphone   Tablet 6 milliGRAM(s) Oral every 6 hours PRN Severe Pain (7 - 10)  HYDROmorphone   Tablet 4 milliGRAM(s) Oral every 4 hours PRN Moderate Pain (4 - 6)  lactulose Syrup 10 Gram(s) Oral two times a day PRN Constipation  witch hazel Pads 1 Application(s) Topical three times a day PRN Hemmorrhoids      Pertinent Labs:  10-30 Na139 mmol/L Glu 112 mg/dL<H> K+ 4.2 mmol/L Cr  0.87 mg/dL BUN 21 mg/dL 10-30 Alb 2.7 g/dL<L>    Skin: No pressure injury per nursing flowsheets. Surgical Incision, Per nursing flowsheets     Edema: No edema noted per nursing flowsheet    Last Bowel Movement: on 10/29 Per nursing flowsheets     Estimated Needs:   [X] No Change Since Previous Assessment    Previous Nutrition Diagnosis:   1. Severe malnutrition - addressed with ensure qd, meal preferences     NUTRITION DIAGNOSIS is :  (x)  Ongoing        New Nutrition Diagnosis: [X] Not Applicable    Interventions:   1. Continue Ensure HP qd (350kcals, 20g protein)  2. Diet upgraded to regular with thins (10/27)  3. Standing bfast on file with nutrition office  4. Provide Ice cream x2 TID to assist with acceptance of medications     Monitoring & Evaluation:   [X] Weights   [X] PO Intake   [X] Skin Integrity   [X] Follow Up (Per Protocol)  [X] Tolerance to Diet Prescription   [X] Other: Labs    Registered Dietitian/Nutritionist Remains Available.  Sayra Cantor RD, MS, CDN    Phone# [phone]

## 2023-10-30 NOTE — DISCHARGE NOTE PROVIDER - NSDCCPCAREPLAN_GEN_ALL_CORE_FT
PRINCIPAL DISCHARGE DIAGNOSIS  Diagnosis: Fusion of spine, cervical region  Assessment and Plan of Treatment: You were admitted to [facility] for rehabilitation following your surgery, and you received physical and occupational therapy during your admission. Please continue taking your medications as prescribed and follow up with your Neurosurgeon, PM&R, and PCP following your discharge from rehab. Continue to wear your cervical collar at all times other than sleeping hours, unless instructed otherwise by neurosurgery. Please also follow up with your Pain Management doctor or PCP for longitudinal management of your chronic opioids, which should be gradually weaned as tolerated.      SECONDARY DISCHARGE DIAGNOSES  Diagnosis: Rectal prolapse  Assessment and Plan of Treatment: You experienced prolapse of your rectum and hemorrhoids while at rehab, which was reduced at bedside. You were seen by colorectal surgery, who recommended surgery as an outpatient. Please follow up with your colorectal surgery for further care.    Diagnosis: Tachycardia  Assessment and Plan of Treatment: During your acute admission, you were noted to have an abnormal heart rate (PAT) and you were seen by cardiology. Please follow up with Dr. Coello as an outpatient for further management.    Diagnosis: Aortic root dilatation  Assessment and Plan of Treatment: You were noted to have stable dilatation of your aortic root. Please follow up with cardiology for repeat echo every year for monitoring of this abnormal finding.

## 2023-10-30 NOTE — DISCHARGE NOTE PROVIDER - REASON FOR ADMISSION
S/P Fall with TBI and Multiple fractures, unstable cervical spine fracture s/p C1-C7 Decompression/Fusion

## 2023-10-30 NOTE — DISCHARGE NOTE PROVIDER - HOSPITAL COURSE
Patient is 69yo M with history of chronic back pain (on chronic Dilaudid) who presented to [facility] 10/13 following a fall from a 12 foot ladder resulting in head strike and LOC. He was found to have acute displaced right lateral 8-10 rib fractures, and focal trauma occlusion/dissection of the left vertebral artery at C1 level with C1 anterior and posterior arch fractures with lateral displacement. Also noted to have spinous process fractures C2-C5, C5/C6 anterior subluxation with likely PLL injury, Left C6 transverse process fracture, T4 compression fracture, and C2-C3 epidural hematoma. Patient was admitted and underwent Posterior C1-C7 Decompression/Fusion for unstable spine fracture and evacuation of epidural hematoma, decompression of left C5 nerve root. He had routine post operative care in NSCU and then transferred to the floor, Surgical drain removed prior to discharge. Cardiology was consulted and medications were adjusted for tachycardia. Patient was seen by chronic pain and medications were adjusted with good results. He is to wear cervical collar at all times except during sleep. 10/19/23 he had episode of prolapsed hemorrhoids reduced at bedside by surgery team. Patient was evaluated by PM&R and therapy for functional deficits and gait/ ADL impairments and recommended acute rehabilitation. Patient was medically optimized for discharge to [facility] on 10/20.    REHAB COURSE:  Patient was stable upon rehab admission to  Inpatient Rehabilitation Facility. Admitted with gait instability, ADL, and functional impairments.     Rehab Course was significant for recurrent rectal/hemorrhoidal prolapse. Colorectal surgery was consulted who recommended outpatient follow up for elective surgery.   Prolapse was reduced repeatedly at bedside.   MBS was performed on 10/26; SLP recommended continuation of Soft+Bite-Sized solids with thin liquids.     Patient tolerated course of inpatient PT/OT/SLP rehab with significant functional improvements and met rehab goals prior to discharge. Patient was medically stable and optimized for discharge home with follow-up with Neurosurgery, Cardiology, Colorectal Surgery, PM&R and PCP.   Patient is 67yo M with history of chronic back pain (on chronic Dilaudid) who presented to [facility] 10/13 following a fall from a 12 foot ladder resulting in head strike and LOC. He was found to have acute displaced right lateral 8-10 rib fractures, and focal trauma occlusion/dissection of the left vertebral artery at C1 level with C1 anterior and posterior arch fractures with lateral displacement. Also noted to have spinous process fractures C2-C5, C5/C6 anterior subluxation with likely PLL injury, Left C6 transverse process fracture, T4 compression fracture, and C2-C3 epidural hematoma. Patient was admitted and underwent Posterior C1-C7 Decompression/Fusion for unstable spine fracture and evacuation of epidural hematoma, decompression of left C5 nerve root. He had routine post operative care in NSCU and then transferred to the floor, Surgical drain removed prior to discharge. Cardiology was consulted and medications were adjusted for tachycardia. Patient was seen by chronic pain and medications were adjusted with good results. He is to wear cervical collar at all times except during sleep. 10/19/23 he had episode of prolapsed hemorrhoids reduced at bedside by surgery team. Patient was evaluated by PM&R and therapy for functional deficits and gait/ ADL impairments and recommended acute rehabilitation. Patient was medically optimized for discharge to [facility] on 10/20.    REHAB COURSE:  Patient was stable upon rehab admission to  Inpatient Rehabilitation Facility. Admitted with gait instability, ADL, and functional impairments.     Rehab Course was significant for recurrent rectal/hemorrhoidal prolapse. Colorectal surgery was consulted who recommended outpatient follow up for elective surgery.   Prolapse was reduced repeatedly at bedside.   MBS was performed on 10/26; SLP recommended upgrade to solids with thin liquids.     Patient tolerated course of inpatient PT/OT/SLP rehab with significant functional improvements and met rehab goals prior to discharge. Patient was medically stable and optimized for discharge to [facility] with follow-up with Neurosurgery, Cardiology, Colorectal Surgery, PM&R and PCP.    Patient is scheduled for discharge on 11/3 to [facility].

## 2023-10-30 NOTE — DISCHARGE NOTE PROVIDER - NSFOLLOWUPCLINICSTOKEN_GEN_ALL_ED_FT
Subjective   Patient ID: Lilia is a 9 year old female who is accompanied by: mother     Chief Complaint   Patient presents with   • Follow-up     Recheck neck     Child is here for recheck of warts and skin tags there were previously treated with liquid nitrogen.  No bleeding of lesions.     Review of Systems   Constitutional: Negative for fever.   HENT: Negative for ear pain.    Eyes: Negative for discharge.   Respiratory: Negative for cough.    Musculoskeletal: Negative for neck stiffness.     Lilia has a current medication list which includes the following prescription(s): polymyxin b-trimethoprim.  Lilia has No Known Allergies.    Objective   Vitals:/57   Pulse 74   Temp 97.7 °F (36.5 °C) (Temporal)   Ht 4' 10.27\" (1.48 m)   Wt 45.8 kg (100 lb 15.5 oz)   BMI 20.91 kg/m²   BSA 1.37 m²      Physical Exam  Vitals signs reviewed.   Constitutional:       General: She is active. She is not in acute distress.     Appearance: Normal appearance. She is well-developed. She is not toxic-appearing.   HENT:      Head: Normocephalic.      Right Ear: Tympanic membrane, ear canal and external ear normal.      Left Ear: Tympanic membrane, ear canal and external ear normal.      Nose: Nose normal.      Mouth/Throat:      Mouth: Mucous membranes are moist.      Pharynx: Oropharynx is clear.   Eyes:      General:         Right eye: No discharge.         Left eye: No discharge.      Extraocular Movements: Extraocular movements intact.      Conjunctiva/sclera: Conjunctivae normal.      Pupils: Pupils are equal, round, and reactive to light.   Neck:      Musculoskeletal: Normal range of motion and neck supple.   Cardiovascular:      Rate and Rhythm: Normal rate and regular rhythm.      Pulses: Normal pulses.      Heart sounds: Normal heart sounds.   Pulmonary:      Effort: Pulmonary effort is normal.      Breath sounds: Normal breath sounds.   Musculoskeletal: Normal range of motion.         General: No swelling or  deformity.   Lymphadenopathy:      Cervical: No cervical adenopathy.   Skin:     General: Skin is warm.      Comments: Lesions are resolved   Neurological:      Mental Status: She is alert and oriented for age.   Psychiatric:         Mood and Affect: Mood normal.         Behavior: Behavior normal.       Assessment   Problem List Items Addressed This Visit     None      Visit Diagnoses     Skin tag    -  Primary    resolved    Viral warts, unspecified type        resolved        Plan    No further treatment is needed.   [id number]: || ||00\01||False;

## 2023-10-30 NOTE — PROGRESS NOTE ADULT - SUBJECTIVE AND OBJECTIVE BOX
Patient is a 68y old  Male who presents with a chief complaint of S/P Fall with TBI and Multiple fractures, unstable cervical spine fracture s/p C1-C7 Decompression/Fusion (29 Oct 2023 13:01)    HPI:  Patient is 67yo M with history of chronic back pain (on chronic Dilaudid) who presented to [facility] 10/13 following a fall from a 12 foot ladder resulting in head strike and LOC. He was found to have acute displaced right lateral 8-10 rib fractures, and focal trauma occlusion/dissection of the left vertebral artery at C1 level with C1 anterior and posterior arch fractures with lateral displacement. Also noted to have spinous process fractures C2-C5, C5/C6 anterior subluxation with likely PLL injury, Left C6 transverse process fracture, T4 compression fracture, and C2-C3 epidural hematoma. Patient was admitted and underwent Posterior C1-C7 Decompression/Fusion for unstable spine fracture and evacuation of epidural hematoma, decompression of left C5 nerve root. He had routine post operative care in NSCU and then transferred to the floor, Surgical drain removed prior to discharge. Cardiology was consulted and medications were adjusted for tachycardia. Patient was seen by chronic pain and medications were adjusted with good results. He is to wear cervical collar at all times except during sleep. 10/19/23 he had episode of prolapsed hemorrhoids reduced at bedside by surgery team. Patient was evaluated by PM&R and therapy for functional deficits and gait/ ADL impairments and recommended acute rehabilitation. Patient was medically optimized for discharge to [facility] on 10/20.      SUBJECTIVE/ROS:  - Patient was seen and examined at bed side this morning, no new events over night.  - Yesterday, patient experienced rectal prolapse again which was reduced at bedside with the previously described technique, first using sugar to reduce edema.  - Patient states that they slept well last night.  - Pain is well-controlled with current meds; however, he did experience a persistent "burning" sensation in his rectum due to irritation from the prolapse and reduction  - GI/: patient is moving bowels, last yesterday. Voiding without issues  - Tolerating 3 hours of therapy without issues  - Denies fever, chills, CP, palpitation, cough, SOB, abdominal pain, N/V/D/C     MEDICATIONS  (STANDING):  aspirin enteric coated 81 milliGRAM(s) Oral daily  diltiazem    milliGRAM(s) Oral daily  enoxaparin Injectable 40 milliGRAM(s) SubCutaneous every 24 hours  hemorrhoidal Ointment 1 Application(s) Rectal two times a day  lidocaine   4% Patch 2 Patch Transdermal every 24 hours  methocarbamol 750 milliGRAM(s) Oral three times a day  metoprolol tartrate 25 milliGRAM(s) Oral two times a day  multivitamin 1 Tablet(s) Oral daily  polyethylene glycol 3350 17 Gram(s) Oral two times a day  senna 2 Tablet(s) Oral at bedtime  sodium chloride 2 Gram(s) Oral every 8 hours  tamsulosin 0.4 milliGRAM(s) Oral at bedtime    MEDICATIONS  (PRN):  acetaminophen     Tablet .. 650 milliGRAM(s) Oral every 6 hours PRN Mild Pain (1 - 3)  HYDROmorphone   Tablet 6 milliGRAM(s) Oral every 6 hours PRN Severe Pain (7 - 10)  HYDROmorphone   Tablet 4 milliGRAM(s) Oral every 4 hours PRN Moderate Pain (4 - 6)  lactulose Syrup 10 Gram(s) Oral two times a day PRN Constipation  witch hazel Pads 1 Application(s) Topical three times a day PRN Hemmorrhoids      RECENT LABS:                          11.6   8.42  )-----------( 558      ( 30 Oct 2023 06:38 )             36.6     10-30    139  |  104  |  21  ----------------------------<  112<H>  4.2   |  29  |  0.87    Ca    9.2      30 Oct 2023 06:38    TPro  6.5  /  Alb  2.7<L>  /  TBili  0.4  /  DBili  x   /  AST  17  /  ALT  34  /  AlkPhos  152<H>  10-30    LIVER FUNCTIONS - ( 30 Oct 2023 06:38 )  Alb: 2.7 g/dL / Pro: 6.5 g/dL / ALK PHOS: 152 U/L / ALT: 34 U/L / AST: 17 U/L / GGT: x             Urinalysis Basic - ( 30 Oct 2023 06:38 )    Color: x / Appearance: x / SG: x / pH: x  Gluc: 112 mg/dL / Ketone: x  / Bili: x / Urobili: x   Blood: x / Protein: x / Nitrite: x   Leuk Esterase: x / RBC: x / WBC x   Sq Epi: x / Non Sq Epi: x / Bacteria: x        PHYSICAL EXAM:  Vital Signs Last 24 Hrs  T(C): 37.1 (30 Oct 2023 07:55), Max: 37.1 (30 Oct 2023 07:55)  T(F): 98.7 (30 Oct 2023 07:55), Max: 98.7 (30 Oct 2023 07:55)  HR: 62 (30 Oct 2023 07:55) (62 - 111)  BP: 119/72 (30 Oct 2023 07:55) (119/72 - 142/97)  BP(mean): --  RR: 16 (30 Oct 2023 07:55) (16 - 16)  SpO2: 95% (30 Oct 2023 07:55) (95% - 95%)    Parameters below as of 30 Oct 2023 07:55  Patient On (Oxygen Delivery Method): room air    General: NAD, comfortable                                HEENT: NCAT, EOM I, PERRLA  Cardio: RRR, Normal S1-S2                              Pulm: No Respiratory Distress,  Lungs CTAB                        Abdomen: ND, NT, Soft, BS+                                                MSK: No joint swelling, Full ROM                                         Ext: No C/C/E, Pulses (2+) throughout, No calf tenderness    Skin:  all skin intact                                                                   Neurological Examination    Cognitive: AAO x 3, follows command                                                                        Attention: Easily distracted, long processing time   Judgment: (+) evidence of judgement                               Memory:  Intact  Mood/Affect: wnl                                                                           Communication:  Fluent,  No dysarthria   CN II - XII  intact    Motor    LEFT    UE: SF [1/5], EF [3-/5], EE [3/5], WE [5/5],  [5/5]  RIGHT UE: SF [1/5], EF [3-/5], EE [3/5], WE [5/5],  [5/5]  LEFT    LE:  HF [5/5], KE [5/5], DF [5/5], PF [5/5]  RIGHT LE:  HF [5/5], KE [5/5], DF [5/5], PF [5/5]        IDT rounds 10/24  SW - lives in a private home with spouse, 1 IKE and 1 flight inside, 1F setup available, spouse is able to support intermittently. Independent prior  SLP - soft and bite sized with thins; mild cog for higher level tasks  OT - MaxA for all txf and ADLs, Yoshi for toilet transfers; barriers include proximal weakness; Goals for Yoshi for all ADLs and supervision for fxnal transfers  PT - 150' no device CG-Yoshi for unsteady gait, CG transfers, 12 steps with HR CG; goals for Gui mobility  TDD: 11/3 Home Patient is a 68y old  Male who presents with a chief complaint of S/P Fall with TBI and Multiple fractures, unstable cervical spine fracture s/p C1-C7 Decompression/Fusion (29 Oct 2023 13:01)    HPI:  Patient is 67yo M with history of chronic back pain (on chronic Dilaudid) who presented to [facility] 10/13 following a fall from a 12 foot ladder resulting in head strike and LOC. He was found to have acute displaced right lateral 8-10 rib fractures, and focal trauma occlusion/dissection of the left vertebral artery at C1 level with C1 anterior and posterior arch fractures with lateral displacement. Also noted to have spinous process fractures C2-C5, C5/C6 anterior subluxation with likely PLL injury, Left C6 transverse process fracture, T4 compression fracture, and C2-C3 epidural hematoma. Patient was admitted and underwent Posterior C1-C7 Decompression/Fusion for unstable spine fracture and evacuation of epidural hematoma, decompression of left C5 nerve root. He had routine post operative care in NSCU and then transferred to the floor, Surgical drain removed prior to discharge. Cardiology was consulted and medications were adjusted for tachycardia. Patient was seen by chronic pain and medications were adjusted with good results. He is to wear cervical collar at all times except during sleep. 10/19/23 he had episode of prolapsed hemorrhoids reduced at bedside by surgery team. Patient was evaluated by PM&R and therapy for functional deficits and gait/ ADL impairments and recommended acute rehabilitation. Patient was medically optimized for discharge to [facility] on 10/20.      SUBJECTIVE/ROS:  - Patient was seen and examined at bed side this morning, no new events over night.  - Yesterday, patient experienced rectal prolapse again which was reduced at bedside with the previously described technique, first using sugar to reduce edema.  - Patient states that they slept well last night.  - Pain is well-controlled with current meds; however, he did experience a persistent "burning" sensation in his rectum due to irritation from the prolapse and reduction  - GI/: patient is moving bowels, last yesterday. Voiding without issues  - Tolerating 3 hours of therapy without issues  - Denies fever, chills, CP, palpitation, cough, SOB, abdominal pain, N/V/D/C     MEDICATIONS  (STANDING):  aspirin enteric coated 81 milliGRAM(s) Oral daily  diltiazem    milliGRAM(s) Oral daily  enoxaparin Injectable 40 milliGRAM(s) SubCutaneous every 24 hours  hemorrhoidal Ointment 1 Application(s) Rectal two times a day  lidocaine   4% Patch 2 Patch Transdermal every 24 hours  methocarbamol 750 milliGRAM(s) Oral three times a day  metoprolol tartrate 25 milliGRAM(s) Oral two times a day  multivitamin 1 Tablet(s) Oral daily  polyethylene glycol 3350 17 Gram(s) Oral two times a day  senna 2 Tablet(s) Oral at bedtime  sodium chloride 2 Gram(s) Oral every 8 hours  tamsulosin 0.4 milliGRAM(s) Oral at bedtime    MEDICATIONS  (PRN):  acetaminophen     Tablet .. 650 milliGRAM(s) Oral every 6 hours PRN Mild Pain (1 - 3)  HYDROmorphone   Tablet 6 milliGRAM(s) Oral every 6 hours PRN Severe Pain (7 - 10)  HYDROmorphone   Tablet 4 milliGRAM(s) Oral every 4 hours PRN Moderate Pain (4 - 6)  lactulose Syrup 10 Gram(s) Oral two times a day PRN Constipation  witch hazel Pads 1 Application(s) Topical three times a day PRN Hemmorrhoids      RECENT LABS:                          11.6   8.42  )-----------( 558      ( 30 Oct 2023 06:38 )             36.6     10-30    139  |  104  |  21  ----------------------------<  112<H>  4.2   |  29  |  0.87    Ca    9.2      30 Oct 2023 06:38    TPro  6.5  /  Alb  2.7<L>  /  TBili  0.4  /  DBili  x   /  AST  17  /  ALT  34  /  AlkPhos  152<H>  10-30    LIVER FUNCTIONS - ( 30 Oct 2023 06:38 )  Alb: 2.7 g/dL / Pro: 6.5 g/dL / ALK PHOS: 152 U/L / ALT: 34 U/L / AST: 17 U/L / GGT: x           PHYSICAL EXAM:  Vital Signs Last 24 Hrs  T(C): 37.1 (30 Oct 2023 07:55), Max: 37.1 (30 Oct 2023 07:55)  T(F): 98.7 (30 Oct 2023 07:55), Max: 98.7 (30 Oct 2023 07:55)  HR: 62 (30 Oct 2023 07:55) (62 - 111)  BP: 119/72 (30 Oct 2023 07:55) (119/72 - 142/97)  RR: 16 (30 Oct 2023 07:55) (16 - 16)  SpO2: 95% (30 Oct 2023 07:55) (95% - 95%)    General: NAD, comfortable                                HEENT: NCAT, EOM I, PERRLA  Cardio: RRR, Normal S1-S2                              Pulm: No Respiratory Distress,  Lungs CTAB                        Abdomen: ND, NT, Soft, BS+                                                MSK: No joint swelling, Full ROM                                         Ext: No C/C/E, Pulses (2+) throughout, No calf tenderness    Skin:  all skin intact                                                                   Neurological Examination    Cognitive: AAO x 3, follows command                                                                        Attention: Easily distracted, long processing time   Judgment: (+) evidence of judgement                               Memory:  Intact  Mood/Affect: wnl                                                                           Communication:  Fluent,  No dysarthria   CN II - XII  intact    Motor    LEFT    UE: SF [1/5], EF [3-/5], EE [3/5], WE [5/5],  [5/5]  RIGHT UE: SF [1/5], EF [3-/5], EE [3/5], WE [5/5],  [5/5]  LEFT    LE:  HF [5/5], KE [5/5], DF [5/5], PF [5/5]  RIGHT LE:  HF [5/5], KE [5/5], DF [5/5], PF [5/5]        IDT rounds 10/24  SW - lives in a private home with spouse, 1 IKE and 1 flight inside, 1F setup available, spouse is able to support intermittently. Independent prior  SLP - soft and bite sized with thins; mild cog for higher level tasks  OT - MaxA for all txf and ADLs, Yoshi for toilet transfers; barriers include proximal weakness; Goals for Yoshi for all ADLs and supervision for fxnal transfers  PT - 150' no device CG-Yoshi for unsteady gait, CG transfers, 12 steps with HR CG; goals for Gui mobility  TDD: 11/3 Home

## 2023-10-30 NOTE — DISCHARGE NOTE PROVIDER - CARE PROVIDERS DIRECT ADDRESSES
,DirectAddress_Unknown,DirectAddress_Unknown,[email or web address],DirectAddress_Unknown

## 2023-10-30 NOTE — PROGRESS NOTE ADULT - ASSESSMENT
Assessment	  Patient is 67yo M with history of chronic back pain (on chronic Dilaudid) who presented to [facility] 10/13 following a fall from a 12 foot ladder resulting in head strike and LOC. He was found to have acute displaced right lateral 8-10 rib fractures, and focal trauma occlusion/dissection of the left vertebral artery at C1 level with C1 anterior and posterior arch fractures with lateral displacement. Also noted to have spinous process fractures C2-C5, C5/C6 anterior subluxation with likely PLL injury, Left C6 transverse process fracture, T4 compression fracture, and C2-C3 epidural hematoma. Patient was admitted and underwent Posterior C1-C7 Decompression/Fusion for unstable spine fracture and evacuation of epidural hematoma. Hospital course complicated by tachycardia and pain.   Admitted to [facility] on 10/20/23 for ADL, gait, and functional impairments.     # S/P Fall with TBI with LOC, Unstable cervical spine fracture s/p C1-C7 Decompression/Fusion and evacuation of EDH with cord compression   - Comprehensive Multidisciplinary Rehab Program: 3 hours a day, 5 days a week with PT/OT  - Seen by Pain Management; pain control with Dilaudid as below  - Cervical collar at all times other than sleep  - Fall precautions  - Staples removed 10/27     # Rectal prolapse - Reduced/recurrent   - CRS consult appreciated  - Prolapse reduced again at bedside 10/26 and 10/29  - Hgb remains stable  - Counselling provided - avoid straining, hydrate, continue bowel regimen    # Leukocytosis - resolved  - No fever, chills, cough  - UA - negative    # Vertebral Artery Dissection  - ASA 81mg daily - no allergy per patient, has tolerated in the past    # COPD  - Not on home meds  - 2 L oxygen supplement PRN  - Incentive spirometry     #Tachycardia/PAT  - Ectopic atrial rhythm/PAT noted on tele 10/19  - Cont metoprolol   - Continue cardizem 120mg cd  - EP eval was deferred as pt not likely a candidate for ablation at this time  - Follow up with Dr Zeng as outpatient    #Aortic Root Dilatation  - Stable on recent outpt echo from 9/5/23  - Follow up for repeat echo q 12 months    #Hyponatremia - resolved  - NaCl tabs 2g q8 hours  - Hospitalist consult appreciated    # Sleep:  - Melatonin qHS    # Pain Management:  - Recent Pain Medicine recs noted  - Tylenol ATC  - Lidocaine patch to ribs  - Robaxin 750mg TID  - Dilaudid 4mg/6mg q4 hours/q6 hours PRN Mod/Severe per Pain Medicine recs    # GI/Bowel:  - Senna QHS, Miralax daily BID  - Dulcolax suppository PRN  - Added Lactulose x 2 daily (10/23)   - Witch hazel PRN. Creams discontinued  - Avoid straining    # /Bladder:  - Flomax 0.4mg QHS  - Low PVRs, D/C bladder scan   - Encourage timed voids every 4 hours while awake     # Skin/Pressure Injury:  - Skin assessment on admission:  Intact  - Monitor Incisions: Posterior cervical incision well approximated, (+) mild erythema, stables are in place   - Turn every 2 hours while in bed    # Diet:   - Diet Consistency/Modifications: Soft+Bite/thin  - MBS completed 10/26, recommend same diet     # DVT ppx:  - Lovenox    # Restrictions/Precautions:  - ROM restrictions: Cervical collar at all times except sleep  - Precautions: Fall, aspiration    ---------------  Outpatient Follow-up:    Spike Dawkins  Neurosurgery  [address], [address]  [place] [address]  Phone: [phone]  Fax: [phone]  Follow Up Time:     Jaylon Zeng  Cardiovascular Disease  [address], [address]  [place] [address]  Phone: [phone]  Fax: [phone]  Follow Up Time:     Eduard Knowles  Colon/Rectal Surgery  [address], [address]  [place] [address]  Phone: [phone]  Fax: [phone]  Follow Up Time:  --------------

## 2023-10-30 NOTE — DISCHARGE NOTE PROVIDER - PROVIDER TOKENS
PROVIDER:[TOKEN:[[id number]]],PROVIDER:[TOKEN:[[id number]]],PROVIDER:[TOKEN:[[id number]]],PROVIDER:[TOKEN:[[id number]]]

## 2023-10-30 NOTE — DISCHARGE NOTE PROVIDER - NSDCMRMEDTOKEN_GEN_ALL_CORE_FT
acetaminophen 325 mg oral tablet: 2 tab(s) orally every 6 hours As needed Temp greater or equal to 38C (100.4F), Mild Pain (1 - 3)  aspirin 81 mg oral tablet: 1 tab(s) orally once a day  bisacodyl 10 mg rectal suppository: 1 suppository(ies) rectal once a day As needed Constipation  dilTIAZem 120 mg/24 hours oral capsule, extended release: 1 cap(s) orally once a day  enoxaparin: 40 milligram(s) subcutaneous once a day (at bedtime)  HYDROmorphone 2 mg oral tablet: 3 tab(s) orally every 6 hours As needed Severe Pain (7 - 10)  HYDROmorphone 4 mg oral tablet: 1 tab(s) orally every 4 hours As needed Moderate Pain (4 - 6)  lidocaine 4% topical film: Apply topically to affected area once a day  methocarbamol 750 mg oral tablet: 1 tab(s) orally 3 times a day  metoprolol tartrate 25 mg oral tablet: 1 tab(s) orally 2 times a day  polyethylene glycol 3350 oral powder for reconstitution: 17 gram(s) orally 2 times a day  senna leaf extract oral tablet: 2 tab(s) orally once a day (at bedtime)  sodium chloride 1 g oral tablet: 2 tab(s) orally every 8 hours  tamsulosin 0.4 mg oral capsule: 1 cap(s) orally once a day   acetaminophen 325 mg oral tablet: 2 tab(s) orally every 6 hours As needed Mild Pain (1 - 3)  aspirin 81 mg oral delayed release tablet: 1 tab(s) orally once a day  dilTIAZem 120 mg/24 hours oral capsule, extended release: 1 cap(s) orally once a day  enoxaparin: 40 milligram(s) subcutaneous once a day (at bedtime)  HYDROmorphone 2 mg oral tablet: 3 tab(s) orally every 6 hours As needed Severe Pain (7 - 10)  HYDROmorphone 4 mg oral tablet: 1 tab(s) orally every 4 hours As needed Moderate Pain (4 - 6)  lactulose 10 g/15 mL oral syrup: 15 milliliter(s) orally 2 times a day As needed Constipation  lidocaine 4% topical film: Apply topically to affected area once a day as needed for  pain  lidocaine 4% topical film: Apply topically to affected area once a day as needed for  pain  methocarbamol 750 mg oral tablet: 1 tab(s) orally 3 times a day  metoprolol tartrate 25 mg oral tablet: 1 tab(s) orally 2 times a day  Multiple Vitamins oral tablet: 1 tab(s) orally once a day  polyethylene glycol 3350 oral powder for reconstitution: 17 gram(s) orally 2 times a day  senna leaf extract oral tablet: 2 tab(s) orally once a day (at bedtime)  sodium chloride 1 g oral tablet: 2 tab(s) orally every 8 hours  tamsulosin 0.4 mg oral capsule: 1 cap(s) orally once a day (at bedtime)  witch hazel 50% topical pad: 1 Apply topically to affected area 3 times a day As needed Hemmorrhoids

## 2023-10-30 NOTE — PROGRESS NOTE ADULT - ATTENDING COMMENTS
I independently performed the documented the history, exam, and medical decision making. I have made amendments to the documentation where necessary. I have personally seen and examined the patient. Medical records were reviewed and I have made amendments to the documentation where necessary and adjusted the history, physical examination, and plan as documented by the Resident Physician.

## 2023-10-30 NOTE — DISCHARGE NOTE PROVIDER - CARE PROVIDER_API CALL
Spike Dawkins  Neurosurgery  [address], [address]  [place] [address]  Phone: [phone]  Fax: [phone]  Follow Up Time:     Jaylon Zeng  Cardiovascular Disease  [address], [address]  [place] [address]  Phone: [phone]  Fax: [phone]  Follow Up Time:     Eduard Knowles  Colon/Rectal Surgery  [address], [address]  [place] [address]  Phone: [phone]  Fax: [phone]  Follow Up Time:     Geovany Bales  Physical/Rehab Medicine  Follow Up Time:

## 2023-10-30 NOTE — DISCHARGE NOTE PROVIDER - DETAILS OF MALNUTRITION DIAGNOSIS/DIAGNOSES
This patient has been assessed with a concern for Malnutrition and was treated during this hospitalization for the following Nutrition diagnosis/diagnoses:     -  10/23/2023: Severe protein-calorie malnutrition

## 2023-10-31 PROCEDURE — 73030 X-RAY EXAM OF SHOULDER: CPT | Mod: 26,LT

## 2023-10-31 PROCEDURE — 99233 SBSQ HOSP IP/OBS HIGH 50: CPT | Mod: GC

## 2023-10-31 RX ORDER — HYDROMORPHONE HYDROCHLORIDE 2 MG/ML
2 INJECTION INTRAMUSCULAR; INTRAVENOUS; SUBCUTANEOUS ONCE
Refills: 0 | Status: DISCONTINUED | OUTPATIENT
Start: 2023-10-31 | End: 2023-10-31

## 2023-10-31 RX ORDER — HYDROMORPHONE HYDROCHLORIDE 2 MG/ML
6 INJECTION INTRAMUSCULAR; INTRAVENOUS; SUBCUTANEOUS ONCE
Refills: 0 | Status: DISCONTINUED | OUTPATIENT
Start: 2023-10-31 | End: 2023-10-31

## 2023-10-31 RX ADMIN — LIDOCAINE 1 PATCH: 4 CREAM TOPICAL at 20:32

## 2023-10-31 RX ADMIN — HYDROMORPHONE HYDROCHLORIDE 4 MILLIGRAM(S): 2 INJECTION INTRAMUSCULAR; INTRAVENOUS; SUBCUTANEOUS at 06:36

## 2023-10-31 RX ADMIN — POLYETHYLENE GLYCOL 3350 17 GRAM(S): 17 POWDER, FOR SOLUTION ORAL at 06:41

## 2023-10-31 RX ADMIN — LIDOCAINE 2 PATCH: 4 CREAM TOPICAL at 19:14

## 2023-10-31 RX ADMIN — Medication 650 MILLIGRAM(S): at 10:28

## 2023-10-31 RX ADMIN — SODIUM CHLORIDE 2 GRAM(S): 9 INJECTION INTRAMUSCULAR; INTRAVENOUS; SUBCUTANEOUS at 06:35

## 2023-10-31 RX ADMIN — HYDROMORPHONE HYDROCHLORIDE 2 MILLIGRAM(S): 2 INJECTION INTRAMUSCULAR; INTRAVENOUS; SUBCUTANEOUS at 01:17

## 2023-10-31 RX ADMIN — METHOCARBAMOL 750 MILLIGRAM(S): 500 TABLET, FILM COATED ORAL at 21:53

## 2023-10-31 RX ADMIN — POLYETHYLENE GLYCOL 3350 17 GRAM(S): 17 POWDER, FOR SOLUTION ORAL at 18:23

## 2023-10-31 RX ADMIN — HYDROMORPHONE HYDROCHLORIDE 4 MILLIGRAM(S): 2 INJECTION INTRAMUSCULAR; INTRAVENOUS; SUBCUTANEOUS at 07:21

## 2023-10-31 RX ADMIN — SODIUM CHLORIDE 2 GRAM(S): 9 INJECTION INTRAMUSCULAR; INTRAVENOUS; SUBCUTANEOUS at 21:53

## 2023-10-31 RX ADMIN — LIDOCAINE 2 PATCH: 4 CREAM TOPICAL at 13:36

## 2023-10-31 RX ADMIN — HYDROMORPHONE HYDROCHLORIDE 2 MILLIGRAM(S): 2 INJECTION INTRAMUSCULAR; INTRAVENOUS; SUBCUTANEOUS at 00:20

## 2023-10-31 RX ADMIN — Medication 650 MILLIGRAM(S): at 09:58

## 2023-10-31 RX ADMIN — METHOCARBAMOL 750 MILLIGRAM(S): 500 TABLET, FILM COATED ORAL at 06:36

## 2023-10-31 RX ADMIN — ENOXAPARIN SODIUM 40 MILLIGRAM(S): 100 INJECTION SUBCUTANEOUS at 06:34

## 2023-10-31 RX ADMIN — SODIUM CHLORIDE 2 GRAM(S): 9 INJECTION INTRAMUSCULAR; INTRAVENOUS; SUBCUTANEOUS at 13:35

## 2023-10-31 RX ADMIN — Medication 81 MILLIGRAM(S): at 13:33

## 2023-10-31 RX ADMIN — TAMSULOSIN HYDROCHLORIDE 0.4 MILLIGRAM(S): 0.4 CAPSULE ORAL at 21:53

## 2023-10-31 RX ADMIN — LIDOCAINE 1 PATCH: 4 CREAM TOPICAL at 10:06

## 2023-10-31 RX ADMIN — Medication 25 MILLIGRAM(S): at 06:36

## 2023-10-31 RX ADMIN — Medication 1 TABLET(S): at 13:32

## 2023-10-31 RX ADMIN — HYDROMORPHONE HYDROCHLORIDE 6 MILLIGRAM(S): 2 INJECTION INTRAMUSCULAR; INTRAVENOUS; SUBCUTANEOUS at 00:35

## 2023-10-31 RX ADMIN — SENNA PLUS 2 TABLET(S): 8.6 TABLET ORAL at 21:53

## 2023-10-31 RX ADMIN — METHOCARBAMOL 750 MILLIGRAM(S): 500 TABLET, FILM COATED ORAL at 13:35

## 2023-10-31 RX ADMIN — Medication 120 MILLIGRAM(S): at 06:35

## 2023-10-31 RX ADMIN — Medication 25 MILLIGRAM(S): at 18:24

## 2023-10-31 NOTE — PROGRESS NOTE ADULT - ASSESSMENT
Assessment	  Patient is 69yo M with history of chronic back pain (on chronic Dilaudid) who presented to [facility] 10/13 following a fall from a 12 foot ladder resulting in head strike and LOC. He was found to have acute displaced right lateral 8-10 rib fractures, and focal trauma occlusion/dissection of the left vertebral artery at C1 level with C1 anterior and posterior arch fractures with lateral displacement. Also noted to have spinous process fractures C2-C5, C5/C6 anterior subluxation with likely PLL injury, Left C6 transverse process fracture, T4 compression fracture, and C2-C3 epidural hematoma. Patient was admitted and underwent Posterior C1-C7 Decompression/Fusion for unstable spine fracture and evacuation of epidural hematoma. Hospital course complicated by tachycardia and pain.   Admitted to [place] [facility] inpatient rehab on 10/20/23 for ADL, gait, and functional impairments.     # S/P Fall with TBI with LOC, Unstable cervical spine fracture s/p C1-C7 Decompression/Fusion and evacuation of EDH with cord compression   - Comprehensive Multidisciplinary Rehab Program: 3 hours a day, 5 days a week with PT/OT  - Seen by Pain Management; pain control with Dilaudid as below  - Cervical collar at all times other than sleep  - Fall precautions  - Staples removed 10/27    # Left shoulder Pain  - Shoulder X ray  - Continue Ice pack, Lidoderm patch, Dilaudid       # Rectal prolapse - Reduced/recurrent   - CRS consult appreciated  - Prolapse reduced again at bedside 10/26 and 10/29  - Hgb remains stable  - Counselling provided - avoid straining, hydrate, continue bowel regimen    # Leukocytosis - resolved  - No fever, chills, cough  - UA - negative    # Vertebral Artery Dissection  - ASA 81mg daily - no allergy per patient, has tolerated in the past    # COPD  - Not on home meds  - 2 L oxygen supplement PRN  - Incentive spirometry     #Tachycardia/PAT  - Ectopic atrial rhythm/PAT noted on tele 10/19  - Cont metoprolol   - Continue cardizem 120mg cd  - EP eval was deferred as pt not likely a candidate for ablation at this time  - Follow up with Dr Zeng as outpatient    #Aortic Root Dilatation  - Stable on recent outpt echo from 9/5/23  - Follow up for repeat echo q 12 months    #Hyponatremia - resolved  - NaCl tabs 2g q8 hours  - Hospitalist consult appreciated    # Sleep:  - Melatonin qHS    # Pain Management:  - Recent Pain Medicine recs noted  - Tylenol ATC  - Lidocaine patch to ribs  - Robaxin 750mg TID  - Dilaudid 4mg/6mg q4 hours/q6 hours PRN Mod/Severe per Pain Medicine recs    # GI/Bowel:  - Senna QHS, Miralax daily BID  - Dulcolax suppository PRN  - Added Lactulose x 2 daily (10/23)   - Witch hazel PRN. Creams discontinued  - Avoid straining    # /Bladder:  - Flomax 0.4mg QHS  - Low PVRs, D/C bladder scan   - Encourage timed voids every 4 hours while awake     # Skin/Pressure Injury:  - Skin assessment on admission:  Intact  - Monitor Incisions: Posterior cervical incision well approximated, (+) mild erythema, stables are in place   - Turn every 2 hours while in bed    # Diet:   - Diet Consistency/Modifications: Soft+Bite/thin  - MBS completed 10/26, recommend same diet     # DVT ppx:  - Lovenox    # Restrictions/Precautions:  - ROM restrictions: Cervical collar at all times except sleep  - Precautions: Fall, aspiration    ---------------  Outpatient Follow-up:    Spike Dawkins  Neurosurgery  [address], [address]  [place] [address]  Phone: [phone]  Fax: [phone]  Follow Up Time:     Jaylon Zeng  Cardiovascular Disease  [address], [address]  [place] [address]  Phone: [phone]  Fax: [phone]  Follow Up Time:     Eduard Knowles  Colon/Rectal Surgery  [address], [address]  [place] [address]  Phone: [phone]  Fax: [phone]  Follow Up Time:  --------------   Assessment	  Patient is 69yo M with history of chronic back pain (on chronic Dilaudid) who presented to [facility] 10/13 following a fall from a 12 foot ladder resulting in head strike and LOC. He was found to have acute displaced right lateral 8-10 rib fractures, and focal trauma occlusion/dissection of the left vertebral artery at C1 level with C1 anterior and posterior arch fractures with lateral displacement. Also noted to have spinous process fractures C2-C5, C5/C6 anterior subluxation with likely PLL injury, Left C6 transverse process fracture, T4 compression fracture, and C2-C3 epidural hematoma. Patient was admitted and underwent Posterior C1-C7 Decompression/Fusion for unstable spine fracture and evacuation of epidural hematoma. Hospital course complicated by tachycardia and pain.   Admitted to [place] acute inpatient rehab on 10/20/23 for ADL, gait, and functional impairments.     # S/P Fall with TBI with LOC, Unstable cervical spine fracture s/p C1-C7 Decompression/Fusion and evacuation of EDH with cord compression   - Comprehensive Multidisciplinary Rehab Program: 3 hours a day, 5 days a week with PT/OT  - Seen by Pain Management; pain control with Dilaudid as below  - Cervical collar at all times other than sleep  - Fall precautions  - Staples removed 10/27    # Left shoulder Pain  - Shoulder X ray, No acute finding and no new changes   - Continue Ice pack, Lidoderm patch, Dilaudid    - Gil ROM      # Rectal prolapse - Reduced/recurrent   - CRS consult appreciated  - Prolapse reduced again at bedside 10/26 and 10/29  - Hgb remains stable  - Counselling provided - avoid straining, hydrate, continue bowel regimen    # Leukocytosis - resolved  - No fever, chills, cough  - UA - negative    # Vertebral Artery Dissection  - ASA 81mg daily - no allergy per patient, has tolerated in the past    # COPD  - Not on home meds  - 2 L oxygen supplement PRN  - Incentive spirometry     #Tachycardia/PAT  - Ectopic atrial rhythm/PAT noted on tele 10/19  - Cont metoprolol   - Continue cardizem 120mg cd  - EP eval was deferred as pt not likely a candidate for ablation at this time  - Follow up with Dr Zeng as outpatient    #Aortic Root Dilatation  - Stable on recent outpt echo from 9/5/23  - Follow up for repeat echo q 12 months    #Hyponatremia - resolved  - NaCl tabs 2g q8 hours  - Hospitalist consult appreciated    # Sleep:  - Melatonin qHS    # Pain Management:  - Recent Pain Medicine recs noted  - Tylenol ATC  - Lidocaine patch to ribs  - Robaxin 750mg TID  - Dilaudid 4mg/6mg q4 hours/q6 hours PRN Mod/Severe per Pain Medicine recs    # GI/Bowel:  - Senna QHS, Miralax daily BID  - Dulcolax suppository PRN  - Added Lactulose x 2 daily (10/23)   - Witch hazel PRN. Creams discontinued  - Avoid straining    # /Bladder:  - Flomax 0.4mg QHS  - Low PVRs, D/C bladder scan   - Encourage timed voids every 4 hours while awake     # Skin/Pressure Injury:  - Skin assessment on admission:  Intact  - Monitor Incisions: Posterior cervical incision well approximated, (+) mild erythema, stables are in place   - Turn every 2 hours while in bed    # Diet:   - Diet Consistency/Modifications: Soft+Bite/thin  - MBS completed 10/26, recommend same diet     # DVT ppx:  - Lovenox    # Restrictions/Precautions:  - ROM restrictions: Cervical collar at all times except sleep  - Precautions: Fall, aspiration    ---------------  Outpatient Follow-up:    Spike Dawkins  Neurosurgery  [address], [address]  [place] [address]  Phone: [phone]  Fax: [phone]  Follow Up Time:     Jaylon Zeng  Cardiovascular Disease  [address], [address]  [place] [address]  Phone: [phone]  Fax: [phone]  Follow Up Time:     Eduard Knowles  Colon/Rectal Surgery  [address], [address]  [place] [address]  Phone: [phone]  Fax: [phone]  Follow Up Time:  --------------

## 2023-10-31 NOTE — PROGRESS NOTE ADULT - SUBJECTIVE AND OBJECTIVE BOX
Patient is a 68y old  Male who presents with a chief complaint of S/P Fall with TBI and Multiple fractures, unstable cervical spine fracture s/p C1-C7 Decompression/Fusion (29 Oct 2023 13:01)    HPI:  Patient is 69yo M with history of chronic back pain (on chronic Dilaudid) who presented to [facility] 10/13 following a fall from a 12 foot ladder resulting in head strike and LOC. He was found to have acute displaced right lateral 8-10 rib fractures, and focal trauma occlusion/dissection of the left vertebral artery at C1 level with C1 anterior and posterior arch fractures with lateral displacement. Also noted to have spinous process fractures C2-C5, C5/C6 anterior subluxation with likely PLL injury, Left C6 transverse process fracture, T4 compression fracture, and C2-C3 epidural hematoma. Patient was admitted and underwent Posterior C1-C7 Decompression/Fusion for unstable spine fracture and evacuation of epidural hematoma, decompression of left C5 nerve root. He had routine post operative care in NSCU and then transferred to the floor, Surgical drain removed prior to discharge. Cardiology was consulted and medications were adjusted for tachycardia. Patient was seen by chronic pain and medications were adjusted with good results. He is to wear cervical collar at all times except during sleep. 10/19/23 he had episode of prolapsed hemorrhoids reduced at bedside by surgery team. Patient was evaluated by PM&R and therapy for functional deficits and gait/ ADL impairments and recommended acute rehabilitation. Patient was medically optimized for discharge to [facility] on 10/20.      SUBJECTIVE:  - Patient was seen and examined at bed side this morning, working with OT,  complains of left shoulder pain which started after he came back from bathroom and transfer himself into the bed,, sharp, 8/10, intermittent, no radiation, meds and ice pack helped, touching and movement increase pain.  - Patient states that they slept well last night after pain meds   - Pain is well-controlled with current meds;   - GI/: patient is moving bowels. Voiding without issues  - Tolerating 3 hours of therapy without issues, progressing   - Case discussed iat IDT meeting for progress and discharge plan.     ROS:   - Denies fever, chills, CP, palpitation, cough, SOB, abdominal pain, N/V/D/C, headache, dizziness, hematuria, dysuria, joint swelling, (+) joint pain.      MEDICATIONS  (STANDING):  aspirin enteric coated 81 milliGRAM(s) Oral daily  diltiazem    milliGRAM(s) Oral daily  enoxaparin Injectable 40 milliGRAM(s) SubCutaneous every 24 hours  hemorrhoidal Ointment 1 Application(s) Rectal two times a day  lidocaine   4% Patch 1 Patch Transdermal every 24 hours  lidocaine   4% Patch 2 Patch Transdermal every 24 hours  methocarbamol 750 milliGRAM(s) Oral three times a day  metoprolol tartrate 25 milliGRAM(s) Oral two times a day  multivitamin 1 Tablet(s) Oral daily  polyethylene glycol 3350 17 Gram(s) Oral two times a day  senna 2 Tablet(s) Oral at bedtime  sodium chloride 2 Gram(s) Oral every 8 hours  tamsulosin 0.4 milliGRAM(s) Oral at bedtime    MEDICATIONS  (PRN):  acetaminophen     Tablet .. 650 milliGRAM(s) Oral every 6 hours PRN Mild Pain (1 - 3)  HYDROmorphone   Tablet 6 milliGRAM(s) Oral every 6 hours PRN Severe Pain (7 - 10)  HYDROmorphone   Tablet 4 milliGRAM(s) Oral every 4 hours PRN Moderate Pain (4 - 6)  lactulose Syrup 10 Gram(s) Oral two times a day PRN Constipation  witch hazel Pads 1 Application(s) Topical three times a day PRN Hemmorrhoids      RECENT LABS:                          11.6   8.42  )-----------( 558      ( 30 Oct 2023 06:38 )             36.6     10-30    139  |  104  |  21  ----------------------------<  112<H>  4.2   |  29  |  0.87    Ca    9.2      30 Oct 2023 06:38    TPro  6.5  /  Alb  2.7<L>  /  TBili  0.4  /  DBili  x   /  AST  17  /  ALT  34  /  AlkPhos  152<H>  10-30    LIVER FUNCTIONS - ( 30 Oct 2023 06:38 )  Alb: 2.7 g/dL / Pro: 6.5 g/dL / ALK PHOS: 152 U/L / ALT: 34 U/L / AST: 17 U/L / GGT: x           PHYSICAL EXAM:  Vital Signs Last 24 Hrs  T(C): 37.4 (31 Oct 2023 07:50), Max: 37.4 (31 Oct 2023 07:50)  T(F): 99.3 (31 Oct 2023 07:50), Max: 99.3 (31 Oct 2023 07:50)  HR: 96 (31 Oct 2023 07:50) (95 - 111)  BP: 125/80 (31 Oct 2023 07:50) (120/80 - 154/98)  RR: 18 (31 Oct 2023 07:50) (16 - 18)  SpO2: 93% (31 Oct 2023 07:50) (93% - 94%)    General: NAD, comfortable                                HEENT: NCAT, EOM I, PERRLA  Cardio: RRR, Normal S1-S2                              Pulm: No Respiratory Distress,  Lungs CTAB                        Abdomen: ND, NT, Soft, (+) BS                                                MSK: No joint swelling, Full ROM, limited in left shoulder, (+) left shoulder tenderness.                                         Ext: No C/C/E, Pulses (2+) throughout, No calf tenderness    Skin:  all skin intact                                                                   Neurological Examination    Cognitive: AAO x 3, follows command                                                                        Attention: Easily distracted, long processing time, better  Judgment: (+) evidence of judgement                               Memory:  Intact  Mood/Affect: wnl                                                                           Communication:  Fluent,  No dysarthria   CN II - XII  intact    Motor    LEFT    UE: SF [1/5], EF [3-/5], EE [3/5], WE [5/5],  [5/5]  RIGHT UE: SF [1/5], EF [3-/5], EE [3/5], WE [5/5],  [5/5]  LEFT    LE:  HF [5/5], KE [5/5], DF [5/5], PF [5/5]  RIGHT LE:  HF [5/5], KE [5/5], DF [5/5], PF [5/5]        IDT rounds 10/24  SW - lives in a private home with spouse, 1 IKE and 1 flight inside, 1F setup available, spouse is able to support intermittently. Independent prior  SLP - soft and bite sized with thins; mild cog for higher level tasks  OT - MaxA for all txf and ADLs, Yoshi for toilet transfers; barriers include proximal weakness; Goals for Yoshi for all ADLs and supervision for fxnal transfers  PT - 150' no device CG-Yoshi for unsteady gait, CG transfers, 12 steps with HR CG; goals for Gui mobility  TDD: 11/3 Home Patient is a 68y old  Male who presents with a chief complaint of S/P Fall with TBI and Multiple fractures, unstable cervical spine fracture s/p C1-C7 Decompression/Fusion (29 Oct 2023 13:01)    HPI:  Patient is 69yo M with history of chronic back pain (on chronic Dilaudid) who presented to [facility] 10/13 following a fall from a 12 foot ladder resulting in head strike and LOC. He was found to have acute displaced right lateral 8-10 rib fractures, and focal trauma occlusion/dissection of the left vertebral artery at C1 level with C1 anterior and posterior arch fractures with lateral displacement. Also noted to have spinous process fractures C2-C5, C5/C6 anterior subluxation with likely PLL injury, Left C6 transverse process fracture, T4 compression fracture, and C2-C3 epidural hematoma. Patient was admitted and underwent Posterior C1-C7 Decompression/Fusion for unstable spine fracture and evacuation of epidural hematoma, decompression of left C5 nerve root. He had routine post operative care in NSCU and then transferred to the floor, Surgical drain removed prior to discharge. Cardiology was consulted and medications were adjusted for tachycardia. Patient was seen by chronic pain and medications were adjusted with good results. He is to wear cervical collar at all times except during sleep. 10/19/23 he had episode of prolapsed hemorrhoids reduced at bedside by surgery team. Patient was evaluated by PM&R and therapy for functional deficits and gait/ ADL impairments and recommended acute rehabilitation. Patient was medically optimized for discharge to [facility] on 10/20.      SUBJECTIVE:  - Patient was seen and examined at bed side this morning, working with OT,  complains of left shoulder pain which started after he came back from bathroom and transfer himself into the bed,, sharp, 8/10, intermittent, no radiation, meds and ice pack helped, touching and movement increase pain.  X ray showed no acute finding and no new changes  - Patient states that they slept well last night after pain meds   - Pain is well-controlled with current meds;   - GI/: patient is moving bowels. Voiding without issues  - Tolerating 3 hours of therapy without issues, progressing   - Case discussed iat IDT meeting for progress and discharge plan.     ROS:   - Denies fever, chills, CP, palpitation, cough, SOB, abdominal pain, N/V/D/C, headache, dizziness, hematuria, dysuria, joint swelling, (+) joint pain.      MEDICATIONS  (STANDING):  aspirin enteric coated 81 milliGRAM(s) Oral daily  diltiazem    milliGRAM(s) Oral daily  enoxaparin Injectable 40 milliGRAM(s) SubCutaneous every 24 hours  hemorrhoidal Ointment 1 Application(s) Rectal two times a day  lidocaine   4% Patch 1 Patch Transdermal every 24 hours  lidocaine   4% Patch 2 Patch Transdermal every 24 hours  methocarbamol 750 milliGRAM(s) Oral three times a day  metoprolol tartrate 25 milliGRAM(s) Oral two times a day  multivitamin 1 Tablet(s) Oral daily  polyethylene glycol 3350 17 Gram(s) Oral two times a day  senna 2 Tablet(s) Oral at bedtime  sodium chloride 2 Gram(s) Oral every 8 hours  tamsulosin 0.4 milliGRAM(s) Oral at bedtime    MEDICATIONS  (PRN):  acetaminophen     Tablet .. 650 milliGRAM(s) Oral every 6 hours PRN Mild Pain (1 - 3)  HYDROmorphone   Tablet 6 milliGRAM(s) Oral every 6 hours PRN Severe Pain (7 - 10)  HYDROmorphone   Tablet 4 milliGRAM(s) Oral every 4 hours PRN Moderate Pain (4 - 6)  lactulose Syrup 10 Gram(s) Oral two times a day PRN Constipation  witch hazel Pads 1 Application(s) Topical three times a day PRN Hemmorrhoids      RECENT LABS:                          11.6   8.42  )-----------( 558      ( 30 Oct 2023 06:38 )             36.6     10-30    139  |  104  |  21  ----------------------------<  112<H>  4.2   |  29  |  0.87    Ca    9.2      30 Oct 2023 06:38    TPro  6.5  /  Alb  2.7<L>  /  TBili  0.4  /  DBili  x   /  AST  17  /  ALT  34  /  AlkPhos  152<H>  10-30    LIVER FUNCTIONS - ( 30 Oct 2023 06:38 )  Alb: 2.7 g/dL / Pro: 6.5 g/dL / ALK PHOS: 152 U/L / ALT: 34 U/L / AST: 17 U/L / GGT: x           PHYSICAL EXAM:  Vital Signs Last 24 Hrs  T(C): 37.4 (31 Oct 2023 07:50), Max: 37.4 (31 Oct 2023 07:50)  T(F): 99.3 (31 Oct 2023 07:50), Max: 99.3 (31 Oct 2023 07:50)  HR: 96 (31 Oct 2023 07:50) (95 - 111)  BP: 125/80 (31 Oct 2023 07:50) (120/80 - 154/98)  RR: 18 (31 Oct 2023 07:50) (16 - 18)  SpO2: 93% (31 Oct 2023 07:50) (93% - 94%)    General: NAD, comfortable                                HEENT: NCAT, EOM I, PERRLA  Cardio: RRR, Normal S1-S2                              Pulm: No Respiratory Distress,  Lungs CTAB                        Abdomen: ND, NT, Soft, (+) BS                                                MSK: No joint swelling, Full ROM, limited in left shoulder, (+) left shoulder tenderness.                                         Ext: No C/C/E, Pulses (2+) throughout, No calf tenderness    Skin:  all skin intact                                                                   Neurological Examination    Cognitive: AAO x 3, follows command                                                                        Attention: Easily distracted, long processing time, better  Judgment: (+) evidence of judgement                               Memory:  Intact  Mood/Affect: wnl                                                                           Communication:  Fluent,  No dysarthria   CN II - XII  intact    Motor    LEFT    UE: SF [1/5], EF [3-/5], EE [3/5], WE [5/5],  [5/5]  RIGHT UE: SF [1/5], EF [3-/5], EE [3/5], WE [5/5],  [5/5]  LEFT    LE:  HF [5/5], KE [5/5], DF [5/5], PF [5/5]  RIGHT LE:  HF [5/5], KE [5/5], DF [5/5], PF [5/5]        IDT rounds 10/24  SW - lives in a private home with spouse, 1 IKE and 1 flight inside, 1F setup available, spouse is able to support intermittently. Independent prior  SLP - soft and bite sized with thins; mild cog for higher level tasks  OT - MaxA for all txf and ADLs, Yoshi for toilet transfers; barriers include proximal weakness; Goals for Yoshi for all ADLs and supervision for fxnal transfers  PT - 150' no device CG-Yoshi for unsteady gait, CG transfers, 12 steps with HR CG; goals for Gui mobility  TDD: 11/3 Home    IDT rounds 10/31  SW - lives in a private home with spouse, 1 IKE and 1 flight inside, 1F setup available, spouse is able to support intermittently. Independent prior  SLP - reg/thin per request, mild cog, short term memory and organization, needs supervision  OT - remains MaxA for all txf and ADLs, CG for toilet transfers, LBD ModA;  PT - 200' no device CGt, CG transfers, 24 steps with HR CG; goals for Gui mobility  TDD: 11/3 LJ

## 2023-11-01 PROCEDURE — 99232 SBSQ HOSP IP/OBS MODERATE 35: CPT

## 2023-11-01 PROCEDURE — 99233 SBSQ HOSP IP/OBS HIGH 50: CPT

## 2023-11-01 PROCEDURE — 99232 SBSQ HOSP IP/OBS MODERATE 35: CPT | Mod: GC

## 2023-11-01 RX ORDER — LACTULOSE 10 G/15ML
15 SOLUTION ORAL
Qty: 0 | Refills: 0 | DISCHARGE
Start: 2023-11-01

## 2023-11-01 RX ORDER — SENNA PLUS 8.6 MG/1
2 TABLET ORAL
Qty: 0 | Refills: 0 | DISCHARGE
Start: 2023-11-01

## 2023-11-01 RX ORDER — LIDOCAINE 4 G/100G
1 CREAM TOPICAL EVERY 8 HOURS
Refills: 0 | Status: DISCONTINUED | OUTPATIENT
Start: 2023-11-01 | End: 2023-11-03

## 2023-11-01 RX ORDER — TAMSULOSIN HYDROCHLORIDE 0.4 MG/1
1 CAPSULE ORAL
Qty: 0 | Refills: 0 | DISCHARGE
Start: 2023-11-01

## 2023-11-01 RX ORDER — METOPROLOL TARTRATE 50 MG
1 TABLET ORAL
Qty: 0 | Refills: 0 | DISCHARGE
Start: 2023-11-01

## 2023-11-01 RX ORDER — AER TRAVELER 0.5 G/1
1 SOLUTION RECTAL; TOPICAL
Qty: 0 | Refills: 0 | DISCHARGE
Start: 2023-11-01

## 2023-11-01 RX ORDER — ASPIRIN/CALCIUM CARB/MAGNESIUM 324 MG
1 TABLET ORAL
Qty: 0 | Refills: 0 | DISCHARGE
Start: 2023-11-01

## 2023-11-01 RX ORDER — DILTIAZEM HCL 120 MG
1 CAPSULE, EXT RELEASE 24 HR ORAL
Qty: 0 | Refills: 0 | DISCHARGE
Start: 2023-11-01

## 2023-11-01 RX ORDER — ACETAMINOPHEN 500 MG
2 TABLET ORAL
Qty: 0 | Refills: 0 | DISCHARGE
Start: 2023-11-01

## 2023-11-01 RX ORDER — HYDROMORPHONE HYDROCHLORIDE 2 MG/ML
3 INJECTION INTRAMUSCULAR; INTRAVENOUS; SUBCUTANEOUS
Qty: 0 | Refills: 0 | DISCHARGE
Start: 2023-11-01

## 2023-11-01 RX ORDER — LIDOCAINE 4 G/100G
1 CREAM TOPICAL
Qty: 0 | Refills: 0 | DISCHARGE
Start: 2023-11-01

## 2023-11-01 RX ORDER — ASPIRIN/CALCIUM CARB/MAGNESIUM 324 MG
1 TABLET ORAL
Qty: 0 | Refills: 0 | DISCHARGE

## 2023-11-01 RX ORDER — HYDROMORPHONE HYDROCHLORIDE 2 MG/ML
1 INJECTION INTRAMUSCULAR; INTRAVENOUS; SUBCUTANEOUS
Qty: 0 | Refills: 0 | DISCHARGE
Start: 2023-11-01

## 2023-11-01 RX ORDER — SODIUM CHLORIDE 9 MG/ML
2 INJECTION INTRAMUSCULAR; INTRAVENOUS; SUBCUTANEOUS
Qty: 0 | Refills: 0 | DISCHARGE
Start: 2023-11-01

## 2023-11-01 RX ORDER — METHOCARBAMOL 500 MG/1
1 TABLET, FILM COATED ORAL
Qty: 0 | Refills: 0 | DISCHARGE
Start: 2023-11-01

## 2023-11-01 RX ORDER — POLYETHYLENE GLYCOL 3350 17 G/17G
17 POWDER, FOR SOLUTION ORAL
Qty: 0 | Refills: 0 | DISCHARGE
Start: 2023-11-01

## 2023-11-01 RX ADMIN — LIDOCAINE 2 PATCH: 4 CREAM TOPICAL at 19:55

## 2023-11-01 RX ADMIN — Medication 25 MILLIGRAM(S): at 17:09

## 2023-11-01 RX ADMIN — Medication 650 MILLIGRAM(S): at 22:04

## 2023-11-01 RX ADMIN — HYDROMORPHONE HYDROCHLORIDE 4 MILLIGRAM(S): 2 INJECTION INTRAMUSCULAR; INTRAVENOUS; SUBCUTANEOUS at 12:40

## 2023-11-01 RX ADMIN — POLYETHYLENE GLYCOL 3350 17 GRAM(S): 17 POWDER, FOR SOLUTION ORAL at 17:09

## 2023-11-01 RX ADMIN — METHOCARBAMOL 750 MILLIGRAM(S): 500 TABLET, FILM COATED ORAL at 06:20

## 2023-11-01 RX ADMIN — LIDOCAINE 2 PATCH: 4 CREAM TOPICAL at 13:26

## 2023-11-01 RX ADMIN — POLYETHYLENE GLYCOL 3350 17 GRAM(S): 17 POWDER, FOR SOLUTION ORAL at 06:20

## 2023-11-01 RX ADMIN — HYDROMORPHONE HYDROCHLORIDE 4 MILLIGRAM(S): 2 INJECTION INTRAMUSCULAR; INTRAVENOUS; SUBCUTANEOUS at 12:08

## 2023-11-01 RX ADMIN — LIDOCAINE 1 PATCH: 4 CREAM TOPICAL at 09:48

## 2023-11-01 RX ADMIN — Medication 650 MILLIGRAM(S): at 12:07

## 2023-11-01 RX ADMIN — ENOXAPARIN SODIUM 40 MILLIGRAM(S): 100 INJECTION SUBCUTANEOUS at 06:19

## 2023-11-01 RX ADMIN — HYDROMORPHONE HYDROCHLORIDE 6 MILLIGRAM(S): 2 INJECTION INTRAMUSCULAR; INTRAVENOUS; SUBCUTANEOUS at 18:00

## 2023-11-01 RX ADMIN — METHOCARBAMOL 750 MILLIGRAM(S): 500 TABLET, FILM COATED ORAL at 22:06

## 2023-11-01 RX ADMIN — METHOCARBAMOL 750 MILLIGRAM(S): 500 TABLET, FILM COATED ORAL at 15:45

## 2023-11-01 RX ADMIN — LIDOCAINE 2 PATCH: 4 CREAM TOPICAL at 01:11

## 2023-11-01 RX ADMIN — LIDOCAINE 1 PATCH: 4 CREAM TOPICAL at 10:00

## 2023-11-01 RX ADMIN — HYDROMORPHONE HYDROCHLORIDE 6 MILLIGRAM(S): 2 INJECTION INTRAMUSCULAR; INTRAVENOUS; SUBCUTANEOUS at 09:34

## 2023-11-01 RX ADMIN — HYDROMORPHONE HYDROCHLORIDE 6 MILLIGRAM(S): 2 INJECTION INTRAMUSCULAR; INTRAVENOUS; SUBCUTANEOUS at 17:26

## 2023-11-01 RX ADMIN — SODIUM CHLORIDE 2 GRAM(S): 9 INJECTION INTRAMUSCULAR; INTRAVENOUS; SUBCUTANEOUS at 06:21

## 2023-11-01 RX ADMIN — Medication 25 MILLIGRAM(S): at 06:20

## 2023-11-01 RX ADMIN — SODIUM CHLORIDE 2 GRAM(S): 9 INJECTION INTRAMUSCULAR; INTRAVENOUS; SUBCUTANEOUS at 13:26

## 2023-11-01 RX ADMIN — HYDROMORPHONE HYDROCHLORIDE 6 MILLIGRAM(S): 2 INJECTION INTRAMUSCULAR; INTRAVENOUS; SUBCUTANEOUS at 01:16

## 2023-11-01 RX ADMIN — TAMSULOSIN HYDROCHLORIDE 0.4 MILLIGRAM(S): 0.4 CAPSULE ORAL at 22:04

## 2023-11-01 RX ADMIN — HYDROMORPHONE HYDROCHLORIDE 6 MILLIGRAM(S): 2 INJECTION INTRAMUSCULAR; INTRAVENOUS; SUBCUTANEOUS at 02:14

## 2023-11-01 RX ADMIN — Medication 650 MILLIGRAM(S): at 12:40

## 2023-11-01 RX ADMIN — HYDROMORPHONE HYDROCHLORIDE 6 MILLIGRAM(S): 2 INJECTION INTRAMUSCULAR; INTRAVENOUS; SUBCUTANEOUS at 10:05

## 2023-11-01 RX ADMIN — Medication 81 MILLIGRAM(S): at 13:26

## 2023-11-01 RX ADMIN — Medication 650 MILLIGRAM(S): at 23:00

## 2023-11-01 RX ADMIN — Medication 1 TABLET(S): at 13:26

## 2023-11-01 RX ADMIN — SODIUM CHLORIDE 2 GRAM(S): 9 INJECTION INTRAMUSCULAR; INTRAVENOUS; SUBCUTANEOUS at 22:06

## 2023-11-01 RX ADMIN — Medication 120 MILLIGRAM(S): at 06:20

## 2023-11-01 NOTE — PROGRESS NOTE ADULT - SUBJECTIVE AND OBJECTIVE BOX
COLORECTAL RE-CONSULT    Pt. seen and examined at bedside for recurrent rectal prolapse. Patient c/o rectal prolapse reduced about 5x in the past 3 weeks, and it has been very painful for him. Ambulating well with PT, recovering well. Also c/o new Left shoulder pain.   Denies chest pain, sob, dizziness, fever/chills.     Vital Signs Last 24 Hrs  T(C): 36.7 (01 Nov 2023 08:01), Max: 37.1 (31 Oct 2023 20:28)  T(F): 98 (01 Nov 2023 08:01), Max: 98.7 (31 Oct 2023 20:28)  HR: 95 (01 Nov 2023 08:01) (94 - 113)  BP: 119/80 (01 Nov 2023 08:01) (119/80 - 131/81)  RR: 16 (01 Nov 2023 08:01) (15 - 17)  SpO2: 93% (01 Nov 2023 08:01) (93% - 95%)    Parameters below as of 01 Nov 2023 08:01  Patient On (Oxygen Delivery Method): room air      PHYSICAL EXAM:  GENERAL: NAD  HEAD:  Atraumatic, Normocephalic  CHEST/LUNG: Clear to ausculation, bilaterally   HEART: S1S2, RRR  ABDOMEN: ND, Soft, NT.   RECTUM: Rectal prolapse reduced at bedside, no bleeding.   EXTREMITIES:  calf soft, non tender b/l. 2+ distal pulses b/l.     I&O's Detail    31 Oct 2023 07:01  -  01 Nov 2023 07:00  --------------------------------------------------------  IN:  Total IN: 0 mL    OUT:    Voided (mL): 350 mL  Total OUT: 350 mL    Total NET: -350 mL      A/P: 67yo M with PMH HLD and chronic back pain (on chronic Dilaudid) who presented to [facility] 10/13 following a fall from a 12 foot ladder resulting in acute displaced right lateral 8-10 rib fractures, and focal trauma occlusion/dissection of the left vertebral artery at C1 level with C1 anterior and posterior arch fractures with lateral displacement. Also noted to have spinous process fractures C2-C5, C5/C6 anterior subluxation with likely PLL injury, Left C6 transverse process fracture, T4 compression fracture, and C2-C3 epidural hematoma s/p Posterior C1-C7 Decompression/Fusion for unstable spine fracture and evacuation of epidural hematoma. Admitted to [place] acute inpatient rehab on 10/20/23 for ADL, gait, and functional impairments.     Colorectal consulted for Recurrent Rectal Prolapse    Continue aggressive bowel regimen  Recommend Senna for a short term period   Recommend fiber supplement such as MetaMucil  Encourage 6-8 glasses of water per day  Encourage patient to ambulate frequently      COLORECTAL RE-CONSULT    Pt. seen and examined at bedside for recurrent rectal prolapse. Patient c/o rectal prolapse reduced about 5x in the past 3 weeks, and it has been very painful for him. Ambulating well with PT, recovering well. Also c/o new Left shoulder pain.   Denies chest pain, sob, dizziness, fever/chills.     Vital Signs Last 24 Hrs  T(C): 36.7 (01 Nov 2023 08:01), Max: 37.1 (31 Oct 2023 20:28)  T(F): 98 (01 Nov 2023 08:01), Max: 98.7 (31 Oct 2023 20:28)  HR: 95 (01 Nov 2023 08:01) (94 - 113)  BP: 119/80 (01 Nov 2023 08:01) (119/80 - 131/81)  RR: 16 (01 Nov 2023 08:01) (15 - 17)  SpO2: 93% (01 Nov 2023 08:01) (93% - 95%)    Parameters below as of 01 Nov 2023 08:01  Patient On (Oxygen Delivery Method): room air      PHYSICAL EXAM:  GENERAL: NAD  HEAD:  Atraumatic, Normocephalic  CHEST/LUNG: Clear to ausculation, bilaterally   HEART: S1S2, RRR  ABDOMEN: ND, Soft, NT.   RECTUM: Rectal prolapse reduced at bedside, no bleeding.   EXTREMITIES:  calf soft, non tender b/l. 2+ distal pulses b/l.     I&O's Detail    31 Oct 2023 07:01  -  01 Nov 2023 07:00  --------------------------------------------------------  IN:  Total IN: 0 mL    OUT:    Voided (mL): 350 mL  Total OUT: 350 mL    Total NET: -350 mL      A/P: 67yo M with PMH HLD and chronic back pain (on chronic Dilaudid) who presented to [facility] 10/13 following a fall from a 12 foot ladder resulting in acute displaced right lateral 8-10 rib fractures, and focal trauma occlusion/dissection of the left vertebral artery at C1 level with C1 anterior and posterior arch fractures with lateral displacement. Also noted to have spinous process fractures C2-C5, C5/C6 anterior subluxation with likely PLL injury, Left C6 transverse process fracture, T4 compression fracture, and C2-C3 epidural hematoma s/p Posterior C1-C7 Decompression/Fusion for unstable spine fracture and evacuation of epidural hematoma. Admitted to [place] acute inpatient rehab on 10/20/23 for ADL, gait, and functional impairments.     Colorectal consulted for Recurrent Rectal Prolapse and hemorrhoids     Continue aggressive bowel regimen  Recommend Senna for a short term period   Recommend fiber supplement such as MetaMucil  Encourage 6-8 glasses of water per day  Encourage patient to ambulate frequently   No emergent surgical intervention warranted at this time, patient does not need to remain in the hospital for procedure - f/u OP with Dr. Sharif for elective surgical repair   Discussed with Dr. Sharif

## 2023-11-01 NOTE — PROGRESS NOTE ADULT - SUBJECTIVE AND OBJECTIVE BOX
Patient is a 68y old  Male who presents with a chief complaint of S/P Fall with TBI and Multiple fractures, unstable cervical spine fracture s/p C1-C7 Decompression/Fusion (31 Oct 2023 10:27)      INTERVAL HPI:  OVERNIGHT EVENTS:  T(F): 98 (11-01-23 @ 08:01), Max: 98.7 (10-31-23 @ 20:28)  HR: 95 (11-01-23 @ 08:01) (94 - 113)  BP: 119/80 (11-01-23 @ 08:01) (119/80 - 131/81)  RR: 16 (11-01-23 @ 08:01) (15 - 17)  SpO2: 93% (11-01-23 @ 08:01) (93% - 95%)  I&O's Summary    31 Oct 2023 07:01  -  01 Nov 2023 07:00  --------------------------------------------------------  IN: 0 mL / OUT: 350 mL / NET: -350 mL          PHYSICAL EXAM:  GENERAL: NAD, well-groomed, well-developed  HEAD:  Atraumatic, Normocephalic  EYES: EOMI, PERRLA, conjunctiva and sclera clear  ENMT: No tonsillar erythema, exudates, or enlargement; Moist mucous membranes, Good dentition, No lesions  NECK: Supple, No JVD, Normal thyroid  NERVOUS SYSTEM:  Alert & Oriented X3, Good concentration; Motor Strength 5/5 B/L upper and lower extremities; DTRs 2+ intact and symmetric  CHEST/LUNG: Clear to percussion bilaterally; No rales, rhonchi, wheezing, or rubs  HEART: Regular rate and rhythm; No murmurs, rubs, or gallops  ABDOMEN: Soft, Nontender, Nondistended; Bowel sounds present  EXTREMITIES:  2+ Peripheral Pulses, No clubbing, cyanosis, or edema  LYMPH: No lymphadenopathy noted  SKIN: No rashes or lesions    LABS:              CAPILLARY BLOOD GLUCOSE                  MEDICATIONS  (STANDING):  aspirin enteric coated 81 milliGRAM(s) Oral daily  diltiazem    milliGRAM(s) Oral daily  enoxaparin Injectable 40 milliGRAM(s) SubCutaneous every 24 hours  hemorrhoidal Ointment 1 Application(s) Rectal two times a day  lidocaine   4% Patch 1 Patch Transdermal every 24 hours  lidocaine   4% Patch 2 Patch Transdermal every 24 hours  methocarbamol 750 milliGRAM(s) Oral three times a day  metoprolol tartrate 25 milliGRAM(s) Oral two times a day  multivitamin 1 Tablet(s) Oral daily  polyethylene glycol 3350 17 Gram(s) Oral two times a day  senna 2 Tablet(s) Oral at bedtime  sodium chloride 2 Gram(s) Oral every 8 hours  tamsulosin 0.4 milliGRAM(s) Oral at bedtime    MEDICATIONS  (PRN):  acetaminophen     Tablet .. 650 milliGRAM(s) Oral every 6 hours PRN Mild Pain (1 - 3)  HYDROmorphone   Tablet 6 milliGRAM(s) Oral every 6 hours PRN Severe Pain (7 - 10)  HYDROmorphone   Tablet 4 milliGRAM(s) Oral every 4 hours PRN Moderate Pain (4 - 6)  lactulose Syrup 10 Gram(s) Oral two times a day PRN Constipation  witch hazel Pads 1 Application(s) Topical three times a day PRN Hemmorrhoids       Patient is a 68y old  Male who presents with a chief complaint of S/P Fall with TBI and Multiple fractures, unstable cervical spine fracture s/p C1-C7 Decompression/Fusion (31 Oct 2023 10:27)      INTERVAL HPI: Pt seen and examined. complaining of L should discomfort that is sharp and chronic in nature as well as rectal prolapse that is painful with defecation, pt denies any other acute complaints at this time.     OVERNIGHT EVENTS: none noted  T(F): 98 (11-01-23 @ 08:01), Max: 98.7 (10-31-23 @ 20:28)  HR: 95 (11-01-23 @ 08:01) (94 - 113)  BP: 119/80 (11-01-23 @ 08:01) (119/80 - 131/81)  RR: 16 (11-01-23 @ 08:01) (15 - 17)  SpO2: 93% (11-01-23 @ 08:01) (93% - 95%)  I&O's Summary    31 Oct 2023 07:01  -  01 Nov 2023 07:00  --------------------------------------------------------  IN: 0 mL / OUT: 350 mL / NET: -350 mL          PHYSICAL EXAM:  GENERAL: NAD  HEAD:  Atraumatic, Normocephalic  EYES: EOMI,  conjunctiva and sclera clear  ENMT: Moist mucous membranes  NECK: Supple, No JVD  NERVOUS SYSTEM:  Alert & Oriented X3  CHEST/LUNG: Clear to auscultation bilaterally; No rales, rhonchi, wheezing  HEART: Regular rate and rhythm; S1 S2  ABDOMEN: Soft, Nontender, Nondistended; Bowel sounds present  EXTREMITIES: No clubbing, cyanosis, calf tenderness or edema b/l    LABS:              CAPILLARY BLOOD GLUCOSE                  MEDICATIONS  (STANDING):  aspirin enteric coated 81 milliGRAM(s) Oral daily  diltiazem    milliGRAM(s) Oral daily  enoxaparin Injectable 40 milliGRAM(s) SubCutaneous every 24 hours  hemorrhoidal Ointment 1 Application(s) Rectal two times a day  lidocaine   4% Patch 1 Patch Transdermal every 24 hours  lidocaine   4% Patch 2 Patch Transdermal every 24 hours  methocarbamol 750 milliGRAM(s) Oral three times a day  metoprolol tartrate 25 milliGRAM(s) Oral two times a day  multivitamin 1 Tablet(s) Oral daily  polyethylene glycol 3350 17 Gram(s) Oral two times a day  senna 2 Tablet(s) Oral at bedtime  sodium chloride 2 Gram(s) Oral every 8 hours  tamsulosin 0.4 milliGRAM(s) Oral at bedtime    MEDICATIONS  (PRN):  acetaminophen     Tablet .. 650 milliGRAM(s) Oral every 6 hours PRN Mild Pain (1 - 3)  HYDROmorphone   Tablet 6 milliGRAM(s) Oral every 6 hours PRN Severe Pain (7 - 10)  HYDROmorphone   Tablet 4 milliGRAM(s) Oral every 4 hours PRN Moderate Pain (4 - 6)  lactulose Syrup 10 Gram(s) Oral two times a day PRN Constipation  witch hazel Pads 1 Application(s) Topical three times a day PRN Hemmorrhoids       Patient is a 68y old  Male who presents with a chief complaint of S/P Fall with TBI and Multiple fractures, unstable cervical spine fracture s/p C1-C7 Decompression/Fusion (31 Oct 2023 10:27)      INTERVAL HPI: Pt seen and examined Complaining of L should discomfort that is sharp and chronic in nature as well as rectal prolapse that is painful with defecation, pt denies any other acute complaints at this time.     OVERNIGHT EVENTS: none noted  T(F): 98 (11-01-23 @ 08:01), Max: 98.7 (10-31-23 @ 20:28)  HR: 95 (11-01-23 @ 08:01) (94 - 113)  BP: 119/80 (11-01-23 @ 08:01) (119/80 - 131/81)  RR: 16 (11-01-23 @ 08:01) (15 - 17)  SpO2: 93% (11-01-23 @ 08:01) (93% - 95%)  I&O's Summary    31 Oct 2023 07:01  -  01 Nov 2023 07:00  --------------------------------------------------------  IN: 0 mL / OUT: 350 mL / NET: -350 mL          PHYSICAL EXAM:  GENERAL: NAD  HEAD:  Atraumatic, Normocephalic  EYES: EOMI,  conjunctiva and sclera clear  ENMT: Moist mucous membranes  NECK: Supple, No JVD  NERVOUS SYSTEM:  Alert & Oriented X3  CHEST/LUNG: Clear to auscultation bilaterally; No rales, rhonchi, wheezing  HEART: Regular rate and rhythm; S1 S2  ABDOMEN: Soft, Nontender, Nondistended; Bowel sounds present  EXTREMITIES: No clubbing, cyanosis, calf tenderness or edema b/l    LABS:              CAPILLARY BLOOD GLUCOSE                  MEDICATIONS  (STANDING):  aspirin enteric coated 81 milliGRAM(s) Oral daily  diltiazem    milliGRAM(s) Oral daily  enoxaparin Injectable 40 milliGRAM(s) SubCutaneous every 24 hours  hemorrhoidal Ointment 1 Application(s) Rectal two times a day  lidocaine   4% Patch 1 Patch Transdermal every 24 hours  lidocaine   4% Patch 2 Patch Transdermal every 24 hours  methocarbamol 750 milliGRAM(s) Oral three times a day  metoprolol tartrate 25 milliGRAM(s) Oral two times a day  multivitamin 1 Tablet(s) Oral daily  polyethylene glycol 3350 17 Gram(s) Oral two times a day  senna 2 Tablet(s) Oral at bedtime  sodium chloride 2 Gram(s) Oral every 8 hours  tamsulosin 0.4 milliGRAM(s) Oral at bedtime    MEDICATIONS  (PRN):  acetaminophen     Tablet .. 650 milliGRAM(s) Oral every 6 hours PRN Mild Pain (1 - 3)  HYDROmorphone   Tablet 6 milliGRAM(s) Oral every 6 hours PRN Severe Pain (7 - 10)  HYDROmorphone   Tablet 4 milliGRAM(s) Oral every 4 hours PRN Moderate Pain (4 - 6)  lactulose Syrup 10 Gram(s) Oral two times a day PRN Constipation  witch hazel Pads 1 Application(s) Topical three times a day PRN Hemmorrhoids

## 2023-11-01 NOTE — PROGRESS NOTE ADULT - ASSESSMENT
Assessment	  Patient is 69yo M with history of chronic back pain (on chronic Dilaudid) who presented to [facility] 10/13 following a fall from a 12 foot ladder resulting in head strike and LOC. He was found to have acute displaced right lateral 8-10 rib fractures, and focal trauma occlusion/dissection of the left vertebral artery at C1 level with C1 anterior and posterior arch fractures with lateral displacement. Also noted to have spinous process fractures C2-C5, C5/C6 anterior subluxation with likely PLL injury, Left C6 transverse process fracture, T4 compression fracture, and C2-C3 epidural hematoma. Patient was admitted and underwent Posterior C1-C7 Decompression/Fusion for unstable spine fracture and evacuation of epidural hematoma. Hospital course complicated by tachycardia and pain.   Admitted to [place] acute inpatient rehab on 10/20/23 for ADL, gait, and functional impairments.     # S/P Fall with TBI with LOC, Unstable cervical spine fracture s/p C1-C7 Decompression/Fusion and evacuation of EDH with cord compression   - Comprehensive Multidisciplinary Rehab Program: 3 hours a day, 5 days a week with PT/OT  - Seen by Pain Management; pain control with Dilaudid as below  - Cervical collar at all times other than sleep  - Fall precautions  - Staples removed 10/27    # Left shoulder Pain  - Shoulder X ray, No acute finding and no new changes   - Continue Ice pack, Lidoderm patch, Dilaudid    - Gil ROM      # Rectal prolapse - Reduced/recurrent   - CRS consult appreciated  - Patient is not compliant, keep on straining, tries to reduce the prolapse with his hand   - Prolapse reduced again at bedside 10/26, 10/29 and this am (11/01) this is the 4th time   - Hgb remains stable  - Counselling provided - avoid straining, hydrate, continue bowel regimen  - Hospitalist and surgery are involved.  Surgery MAGUI Sparrow will evaluate and treat today.     # Leukocytosis - resolved  - No fever, chills, cough  - UA - negative    # Vertebral Artery Dissection  - ASA 81mg daily - no allergy per patient, has tolerated in the past    # COPD  - Not on home meds  - 2 L oxygen supplement PRN  - Incentive spirometry     #Tachycardia/PAT  - Ectopic atrial rhythm/PAT noted on tele 10/19  - Cont metoprolol   - Continue cardizem 120mg cd  - EP eval was deferred as pt not likely a candidate for ablation at this time  - Follow up with Dr Zeng as outpatient    #Aortic Root Dilatation  - Stable on recent outpt echo from 9/5/23  - Follow up for repeat echo q 12 months    #Hyponatremia - resolved  - NaCl tabs 2g q8 hours  - Hospitalist consult appreciated    # Sleep:  - Melatonin qHS    # Pain Management:  - Recent Pain Medicine recs noted  - Tylenol ATC  - Lidocaine patch to ribs  - Robaxin 750mg TID  - Dilaudid 4mg/6mg q4 hours/q6 hours PRN Mod/Severe per Pain Medicine recs    # GI/Bowel:  - Senna QHS, Miralax daily BID  - Dulcolax suppository PRN --> D/C   - Added Lactulose x 2 daily (10/23)   - Witch hazel PRN. Creams discontinued  - Avoid straining    # /Bladder:  - Flomax 0.4mg QHS  - Low PVRs, D/C bladder scan   - Encourage timed voids every 4 hours while awake     # Skin/Pressure Injury:  - Skin assessment on admission:  Intact  - Monitor Incisions: Posterior cervical incision well approximated, (+) mild erythema  - Turn every 2 hours while in bed    # Diet:   - Diet Consistency/Modifications: Soft+Bite/thin  - MBS completed 10/26, recommend same diet     # DVT ppx:  - Lovenox    # Restrictions/Precautions:  - ROM restrictions: Cervical collar at all times except sleep  - Precautions: Fall, aspiration    ---------------  Outpatient Follow-up:    Spike Dawkins  Neurosurgery  [address], [address]  [place] [address]  Phone: [phone]  Fax: [phone]  Follow Up Time:     Jaylon Zeng  Cardiovascular Disease  [address], [address]  [place] [address]  Phone: [phone]  Fax: [phone]  Follow Up Time:     Eduard Knowles  Colon/Rectal Surgery  [address], [address]  [place] [address]  Phone: [phone]  Fax: [phone]  Follow Up Time:  --------------

## 2023-11-01 NOTE — PROGRESS NOTE ADULT - ASSESSMENT
Patient is 69yo M with history of chronic back pain (on chronic Dilaudid) who presented to [facility] 10/13 following a fall from a 12 foot ladder resulting in head strike and LOC. He was found to have acute displaced right lateral 8-10 rib fractures, and focal trauma occlusion/dissection of the left vertebral artery at C1 level with C1 anterior and posterior arch fractures with lateral displacement. Also noted to have spinous process fractures C2-C5, C5/C6 anterior subluxation with likely PLL injury, Left C6 transverse process fracture, T4 compression fracture, and C2-C3 epidural hematoma. Patient was admitted and underwent Posterior C1-C7 Decompression/Fusion for unstable spine fracture and evacuation of epidural hematoma. Hospital course complicated by tachycardia and pain.   Admitted to [place] acute inpatient rehab on 10/20/23 for ADL, gait, and functional impairments.     # S/P Fall with TBI, Unstable cervical spine fracture s/p C1-C7 Decompression/Fusion and evacuation of EDH with cord compression   - Comprehensive Multidisciplinary Rehab Program  - Pain Management per primary team  - Cervical collar at all times other than sleep  - Fall precautions  - Plan for removal of staples on POD # 14- 10/28/23     # Vertebral Artery Dissection  - ASA 81mg daily - no allergy per patient, has tolerated in the past    # COPD  - Not on home meds  - 2 L oxygen supplement PRN  - Incentive spirometry     #Tachycardia/PAT  - Ectopic atrial rhythm/PAT noted on tele 10/19  - Cont metoprolol   - Continue cardizem 120mg cd  - EP eval was deferred as pt not likely a candidate for ablation at this time  - Follow up with Dr Zeng as outpatient    #Aortic Root Dilatation  - Stable on recent outpt echo from 9/5/23  - Follow up for repeat echo q 12 months    #Hyponatremia  - Monitor BMP  - NaCl tabs 2g q8 hours, titrate off if able      # DVT ppx:  - Lovenox   Patient is 67yo M with history of chronic back pain (on chronic Dilaudid) who presented to [facility] 10/13 following a fall from a 12 foot ladder resulting in head strike and LOC. He was found to have acute displaced right lateral 8-10 rib fractures, and focal trauma occlusion/dissection of the left vertebral artery at C1 level with C1 anterior and posterior arch fractures with lateral displacement. Also noted to have spinous process fractures C2-C5, C5/C6 anterior subluxation with likely PLL injury, Left C6 transverse process fracture, T4 compression fracture, and C2-C3 epidural hematoma. Patient was admitted and underwent Posterior C1-C7 Decompression/Fusion for unstable spine fracture and evacuation of epidural hematoma. Hospital course complicated by tachycardia and pain.   Admitted to [place] acute inpatient rehab on 10/20/23 for ADL, gait, and functional impairments.     # S/P Fall with TBI, Unstable cervical spine fracture s/p C1-C7 Decompression/Fusion and evacuation of EDH with cord compression   - Comprehensive Multidisciplinary Rehab Program  - Pain Management per primary team  - Cervical collar at all times other than sleep  - Fall precautions  - Staples removed 10/27    # Left shoulder Pain-acute on chronic  - Shoulder X ray, No acute finding and no new changes   - Continue Ice pack, Lidoderm patch, Dilaudid    - Gil ROM      # Rectal prolapse - Reduced/recurrent   - CRS consult appreciated  - Prolapse reduced again at bedside 10/26 and 10/29 by primary team  - Hgb remains stable  - Counselling provided - avoid straining, hydrate, continue bowel regimen  -apprec colorectal prev recs:Recommend if patient experiences future events to place sugar over the prolapse bowel to reduce in size and then manually reduce with lube.  Please discontinue all suppository medications, will irritate the bowel  Continue aggressive bowel regimen  Recommend Senna for a short term period, not long term  Recommend fiber supplement such as MetaMucil  Encourage 6-8 glasses of water per day  Encourage patient to ambulate frequently   Patient may schedule an outpatient appointment with Paula Castro if he would like future surgical intervention. Please re consult Colorectal with any future concerns.     Reconsulted today, spoke to surgical PA Fatoumata Sparrow who will come to bedside to reduce again and will speak to attenidng today to offer any other recs as refractory issue      # Vertebral Artery Dissection  - ASA 81mg daily - no allergy per patient, has tolerated in the past    # COPD  - Not on home meds  - 2 L oxygen supplement PRN  - Incentive spirometry     #Tachycardia/PAT  - Ectopic atrial rhythm/PAT noted on tele 10/19  - Cont metoprolol   - Continue cardizem 120mg cd  - EP eval was deferred as pt not likely a candidate for ablation at this time  - Follow up with Dr Zeng as outpatient    #Aortic Root Dilatation  - Stable on recent outpt echo from 9/5/23  - Follow up for repeat echo q 12 months    #Hyponatremia  - Monitor BMP  - NaCl tabs 2g q8 hours, titrate off if able      # DVT ppx:  - Lovenox

## 2023-11-01 NOTE — PROGRESS NOTE ADULT - TIME BILLING
This is a delayed attestation - patient was seen and examined on 11/1/2023 at 12:30pm.  Patient seen and examined, chart reviewed.  I agree with the history and physical examination as documented above.  68yoM with prolapsing tissue per rectum - previously diagnosed with hemorrhoids during his hospitalization at [place].  He had at least one episode while in rehab where the prolapsing tissue became incarcerated and required application of sugar to reduce edema before reduction, and the tissue has required manual reduction several times in the last few weeks.  He does describe increased constipation likely secondary to his opioid requirements and he is on a bowel regimen, but he has been straining more while in rehab and notes that while he previously had to reduce a smaller amount of tissue manually, it seems increased compared to prior.  On exam his anal tone is excellent and he is not able to reproduce the tissue prolapse with Valsalva.  No fluctuance, erythema.  There are enlarged perianal skin tags that are benign in appearance but no thromboses.    At this time is remains unclear whether the prolapsing tissue is internal hemorrhoids or full-thickness rectal prolapse; however, recommend bowel regimen (fiber, plenty of oral hydration, stool softeners as needed; minimize stimulant laxatives).  He is scheduled for discharge to subacute rehab in 1-2 days.  He can follow up as outpatient to discuss further workup and management, which would likely include eventual MR defecography and in-office anoscopy.
care coordination, plan of care discussed with patient face to face, 1S IDR team

## 2023-11-01 NOTE — PROVIDER CONTACT NOTE (OTHER) - BACKGROUND
pt admitted for s/p fall, arthrodesis, lami, post neck surgical incisions-staples intact
Pt has experienced rectal prolapse during this admission
pt admitted for s/p arthrodesis, hx of chronic back pain

## 2023-11-01 NOTE — PROGRESS NOTE ADULT - SUBJECTIVE AND OBJECTIVE BOX
Patient is a 68y old  Male who presents with a chief complaint of S/P Fall with TBI and Multiple fractures, unstable cervical spine fracture s/p C1-C7 Decompression/Fusion (29 Oct 2023 13:01)    HPI:  Patient is 69yo M with history of chronic back pain (on chronic Dilaudid) who presented to [facility] 10/13 following a fall from a 12 foot ladder resulting in head strike and LOC. He was found to have acute displaced right lateral 8-10 rib fractures, and focal trauma occlusion/dissection of the left vertebral artery at C1 level with C1 anterior and posterior arch fractures with lateral displacement. Also noted to have spinous process fractures C2-C5, C5/C6 anterior subluxation with likely PLL injury, Left C6 transverse process fracture, T4 compression fracture, and C2-C3 epidural hematoma. Patient was admitted and underwent Posterior C1-C7 Decompression/Fusion for unstable spine fracture and evacuation of epidural hematoma, decompression of left C5 nerve root. He had routine post operative care in NSCU and then transferred to the floor, Surgical drain removed prior to discharge. Cardiology was consulted and medications were adjusted for tachycardia. Patient was seen by chronic pain and medications were adjusted with good results. He is to wear cervical collar at all times except during sleep. 10/19/23 he had episode of prolapsed hemorrhoids reduced at bedside by surgery team. Patient was evaluated by PM&R and therapy for functional deficits and gait/ ADL impairments and recommended acute rehabilitation. Patient was medically optimized for discharge to [facility] on 10/20.      SUBJECTIVE:  - Patient was seen and examined at bed side,  (+) rectal prolapse this am.  Contacted Hospitalist and surgery to evaluate and treat.  Patient was noticed moving his shoulder without any ROM restriction and without signs of pain. No over night events.   - Slept well last night, feeling uncomfortable with his rectal prolapse, no new complaints   - Pain is well-controlled with current meds; Patient has been asking for narcotics more frequently   - GI/: patient is moving bowels, LBM this am. Voiding without issues  - Tolerating 3 hours of therapy without issues, progressing   - Discharge plan was discussed with patient to go for [facility], patient understood.    ROS:   - Denies fever, chills, CP, palpitation, cough, SOB, abdominal pain, N/V/D/C, headache, dizziness, hematuria, dysuria, joint swelling, no joint pain.      MEDICATIONS  (STANDING):  aspirin enteric coated 81 milliGRAM(s) Oral daily  diltiazem    milliGRAM(s) Oral daily  enoxaparin Injectable 40 milliGRAM(s) SubCutaneous every 24 hours  hemorrhoidal Ointment 1 Application(s) Rectal two times a day  lidocaine   4% Patch 1 Patch Transdermal every 24 hours  lidocaine   4% Patch 2 Patch Transdermal every 24 hours  methocarbamol 750 milliGRAM(s) Oral three times a day  metoprolol tartrate 25 milliGRAM(s) Oral two times a day  multivitamin 1 Tablet(s) Oral daily  polyethylene glycol 3350 17 Gram(s) Oral two times a day  senna 2 Tablet(s) Oral at bedtime  sodium chloride 2 Gram(s) Oral every 8 hours  tamsulosin 0.4 milliGRAM(s) Oral at bedtime    MEDICATIONS  (PRN):  acetaminophen     Tablet .. 650 milliGRAM(s) Oral every 6 hours PRN Mild Pain (1 - 3)  HYDROmorphone   Tablet 6 milliGRAM(s) Oral every 6 hours PRN Severe Pain (7 - 10)  HYDROmorphone   Tablet 4 milliGRAM(s) Oral every 4 hours PRN Moderate Pain (4 - 6)  lactulose Syrup 10 Gram(s) Oral two times a day PRN Constipation  witch hazel Pads 1 Application(s) Topical three times a day PRN Hemmorrhoids      RECENT LABS:                          11.6   8.42  )-----------( 558      ( 30 Oct 2023 06:38 )             36.6     10-30    139  |  104  |  21  ----------------------------<  112<H>  4.2   |  29  |  0.87    Ca    9.2      30 Oct 2023 06:38    TPro  6.5  /  Alb  2.7<L>  /  TBili  0.4  /  DBili  x   /  AST  17  /  ALT  34  /  AlkPhos  152<H>  10-30    LIVER FUNCTIONS - ( 30 Oct 2023 06:38 )  Alb: 2.7 g/dL / Pro: 6.5 g/dL / ALK PHOS: 152 U/L / ALT: 34 U/L / AST: 17 U/L / GGT: x           PHYSICAL EXAM:  Vital Signs Last 24 Hrs  T(C): 36.7 (01 Nov 2023 08:01), Max: 37.1 (31 Oct 2023 20:28)  T(F): 98 (01 Nov 2023 08:01), Max: 98.7 (31 Oct 2023 20:28)  HR: 95 (01 Nov 2023 08:01) (94 - 113)  BP: 119/80 (01 Nov 2023 08:01) (119/80 - 131/81)  RR: 16 (01 Nov 2023 08:01) (15 - 17)  SpO2: 93% (01 Nov 2023 08:01) (93% - 95%)    General: NAD, comfortable                                HEENT: NCAT, EOMI, PERRLA  Cardio: RRR, Normal S1-S2                              Pulm: No Respiratory Distress,  Lungs CTAB                        Abdomen: ND, NT, Soft, (+) BS                                                MSK: No joint swelling, Full ROM                                      Ext: No C/C/E, Pulses (2+) throughout, No calf tenderness    Skin:  all skin intact                                                                   Neurological Examination    Cognitive: AAO x 3, follows command                                                                        Attention: Easily distracted, long processing time, better  Judgment: (+) evidence of judgement                               Memory:  Intact  Mood/Affect: wnl                                                                           Communication:  Fluent,  No dysarthria   CN II - XII  intact    Motor    LEFT    UE: SF [1/5], EF [3-/5], EE [3/5], WE [5/5],  [5/5]  RIGHT UE: SF [1/5], EF [3-/5], EE [3/5], WE [5/5],  [5/5]  LEFT    LE:  HF [5/5], KE [5/5], DF [5/5], PF [5/5]  RIGHT LE:  HF [5/5], KE [5/5], DF [5/5], PF [5/5]        IDT rounds 10/24  SW - lives in a private home with spouse, 1 IKE and 1 flight inside, 1F setup available, spouse is able to support intermittently. Independent prior  SLP - soft and bite sized with thins; mild cog for higher level tasks  OT - MaxA for all txf and ADLs, Yoshi for toilet transfers; barriers include proximal weakness; Goals for Yoshi for all ADLs and supervision for fxnal transfers  PT - 150' no device CG-Yoshi for unsteady gait, CG transfers, 12 steps with HR CG; goals for Gui mobility  TDD: 11/3 Home    IDT rounds 10/31  SW - lives in a private home with spouse, 1 IKE and 1 flight inside, 1F setup available, spouse is able to support intermittently. Independent prior  SLP - reg/thin per request, mild cog, short term memory and organization, needs supervision  OT - remains MaxA for all txf and ADLs, CG for toilet transfers, LBD ModA;  PT - 200' no device CGt, CG transfers, 24 steps with HR CG; goals for Gui mobility  TDD: 11/3 [facility] Patient is a 68y old  Male who presents with a chief complaint of S/P Fall with TBI and Multiple fractures, unstable cervical spine fracture s/p C1-C7 Decompression/Fusion (29 Oct 2023 13:01)    HPI:  Patient is 69yo M with history of chronic back pain (on chronic Dilaudid) who presented to [facility] 10/13 following a fall from a 12 foot ladder resulting in head strike and LOC. He was found to have acute displaced right lateral 8-10 rib fractures, and focal trauma occlusion/dissection of the left vertebral artery at C1 level with C1 anterior and posterior arch fractures with lateral displacement. Also noted to have spinous process fractures C2-C5, C5/C6 anterior subluxation with likely PLL injury, Left C6 transverse process fracture, T4 compression fracture, and C2-C3 epidural hematoma. Patient was admitted and underwent Posterior C1-C7 Decompression/Fusion for unstable spine fracture and evacuation of epidural hematoma, decompression of left C5 nerve root. He had routine post operative care in NSCU and then transferred to the floor, Surgical drain removed prior to discharge. Cardiology was consulted and medications were adjusted for tachycardia. Patient was seen by chronic pain and medications were adjusted with good results. He is to wear cervical collar at all times except during sleep. 10/19/23 he had episode of prolapsed hemorrhoids reduced at bedside by surgery team. Patient was evaluated by PM&R and therapy for functional deficits and gait/ ADL impairments and recommended acute rehabilitation. Patient was medically optimized for discharge to [facility] on 10/20.      SUBJECTIVE:  - Patient was seen and examined at bed side,  (+) rectal prolapse this am.  Contacted Hospitalist and surgery to evaluate and treat.  Patient was noticed moving his shoulder without any ROM restriction and without signs of pain. No over night events.   - Slept well last night, feeling uncomfortable with his rectal prolapse, no new complaints   - Pain is well-controlled with current meds; Patient has been asking for narcotics more frequently   - GI/: patient is moving bowels, LBM this am. Voiding without issues  - Tolerating 3 hours of therapy without issues, progressing   - Discharge plan was discussed with patient which will be on (11/03) to [facility], patient understood.  SW to work with patient and family to choose a facility.     ROS:   - Denies fever, chills, CP, palpitation, cough, SOB, abdominal pain, N/V/D/C, headache, dizziness, hematuria, dysuria, joint swelling, no joint pain.      MEDICATIONS  (STANDING):  aspirin enteric coated 81 milliGRAM(s) Oral daily  diltiazem    milliGRAM(s) Oral daily  enoxaparin Injectable 40 milliGRAM(s) SubCutaneous every 24 hours  hemorrhoidal Ointment 1 Application(s) Rectal two times a day  lidocaine   4% Patch 1 Patch Transdermal every 24 hours  lidocaine   4% Patch 2 Patch Transdermal every 24 hours  methocarbamol 750 milliGRAM(s) Oral three times a day  metoprolol tartrate 25 milliGRAM(s) Oral two times a day  multivitamin 1 Tablet(s) Oral daily  polyethylene glycol 3350 17 Gram(s) Oral two times a day  senna 2 Tablet(s) Oral at bedtime  sodium chloride 2 Gram(s) Oral every 8 hours  tamsulosin 0.4 milliGRAM(s) Oral at bedtime    MEDICATIONS  (PRN):  acetaminophen     Tablet .. 650 milliGRAM(s) Oral every 6 hours PRN Mild Pain (1 - 3)  HYDROmorphone   Tablet 6 milliGRAM(s) Oral every 6 hours PRN Severe Pain (7 - 10)  HYDROmorphone   Tablet 4 milliGRAM(s) Oral every 4 hours PRN Moderate Pain (4 - 6)  lactulose Syrup 10 Gram(s) Oral two times a day PRN Constipation  witch hazel Pads 1 Application(s) Topical three times a day PRN Hemmorrhoids      RECENT LABS:                          11.6   8.42  )-----------( 558      ( 30 Oct 2023 06:38 )             36.6     10-30    139  |  104  |  21  ----------------------------<  112<H>  4.2   |  29  |  0.87    Ca    9.2      30 Oct 2023 06:38    TPro  6.5  /  Alb  2.7<L>  /  TBili  0.4  /  DBili  x   /  AST  17  /  ALT  34  /  AlkPhos  152<H>  10-30    LIVER FUNCTIONS - ( 30 Oct 2023 06:38 )  Alb: 2.7 g/dL / Pro: 6.5 g/dL / ALK PHOS: 152 U/L / ALT: 34 U/L / AST: 17 U/L / GGT: x           PHYSICAL EXAM:  Vital Signs Last 24 Hrs  T(C): 36.7 (01 Nov 2023 08:01), Max: 37.1 (31 Oct 2023 20:28)  T(F): 98 (01 Nov 2023 08:01), Max: 98.7 (31 Oct 2023 20:28)  HR: 95 (01 Nov 2023 08:01) (94 - 113)  BP: 119/80 (01 Nov 2023 08:01) (119/80 - 131/81)  RR: 16 (01 Nov 2023 08:01) (15 - 17)  SpO2: 93% (01 Nov 2023 08:01) (93% - 95%)    General: NAD, comfortable                                HEENT: NCAT, EOMI, PERRLA  Cardio: RRR, Normal S1-S2                              Pulm: No Respiratory Distress,  Lungs CTAB                        Abdomen: ND, NT, Soft, (+) BS                                                MSK: No joint swelling, Full ROM                                      Ext: No C/C/E, Pulses (2+) throughout, No calf tenderness    Skin:  all skin intact                                                                   Neurological Examination    Cognitive: AAO x 3, follows command                                                                        Attention: Easily distracted, long processing time, better  Judgment: (+) evidence of judgement                               Memory:  Intact  Mood/Affect: wnl                                                                           Communication:  Fluent,  No dysarthria   CN II - XII  intact    Motor    LEFT    UE: SF [1/5], EF [3-/5], EE [3/5], WE [5/5],  [5/5]  RIGHT UE: SF [1/5], EF [3-/5], EE [3/5], WE [5/5],  [5/5]  LEFT    LE:  HF [5/5], KE [5/5], DF [5/5], PF [5/5]  RIGHT LE:  HF [5/5], KE [5/5], DF [5/5], PF [5/5]        IDT rounds 10/24  SW - lives in a private home with spouse, 1 IKE and 1 flight inside, 1F setup available, spouse is able to support intermittently. Independent prior  SLP - soft and bite sized with thins; mild cog for higher level tasks  OT - MaxA for all txf and ADLs, Yoshi for toilet transfers; barriers include proximal weakness; Goals for Yoshi for all ADLs and supervision for fxnal transfers  PT - 150' no device CG-Yoshi for unsteady gait, CG transfers, 12 steps with HR CG; goals for Gui mobility  TDD: 11/3 Home    IDT rounds 10/31  SW - lives in a private home with spouse, 1 IKE and 1 flight inside, 1F setup available, spouse is able to support intermittently. Independent prior  SLP - reg/thin per request, mild cog, short term memory and organization, needs supervision  OT - remains MaxA for all txf and ADLs, CG for toilet transfers, LBD ModA;  PT - 200' no device CGt, CG transfers, 24 steps with HR CG; goals for Gui mobility  TDD: 11/3 [facility]

## 2023-11-01 NOTE — PROVIDER CONTACT NOTE (OTHER) - SITUATION
pt has rectal prolapse with bleeding while having BM
pt  went to the bathroom, started to have bleeding and rectum prolapse
Pt experienced rectal prolapse when in bathroom.

## 2023-11-01 NOTE — PROVIDER CONTACT NOTE (OTHER) - ACTION/TREATMENT ORDERED:
pt put back to his bed, DR Bales and Dr Maki came and fixed the prolapse at the moment, pain med administered as ordered
Dr Bales assessed pt, need surgery consult, rectum packed with saline gauze, covered with abdominal pad, warm compressed, pain med administered as ordered, waiting for surgical team
Surgery able to reduce prolapse with surgical lubrication and lidocaine gel. Colon-rectal surgeon assessed patient. Lidocaine gel ordered for patient as needed.

## 2023-11-01 NOTE — PROVIDER CONTACT NOTE (OTHER) - ASSESSMENT
VSS
VSS, c/o pain, rectal bleeding
AOX4. Pt returned to bed. Pt is not bleeding per rectum. Prolapse is prominent. MD notified and assessed. MD called surgery team to assess and make recommendations

## 2023-11-02 ENCOUNTER — TRANSCRIPTION ENCOUNTER (OUTPATIENT)
Age: 68
End: 2023-11-02

## 2023-11-02 LAB
ALBUMIN SERPL ELPH-MCNC: 2.5 G/DL — LOW (ref 3.3–5)
ALBUMIN SERPL ELPH-MCNC: 2.5 G/DL — LOW (ref 3.3–5)
ALP SERPL-CCNC: 132 U/L — HIGH (ref 40–120)
ALP SERPL-CCNC: 132 U/L — HIGH (ref 40–120)
ALT FLD-CCNC: 28 U/L — SIGNIFICANT CHANGE UP (ref 10–45)
ALT FLD-CCNC: 28 U/L — SIGNIFICANT CHANGE UP (ref 10–45)
ANION GAP SERPL CALC-SCNC: 4 MMOL/L — LOW (ref 5–17)
ANION GAP SERPL CALC-SCNC: 4 MMOL/L — LOW (ref 5–17)
AST SERPL-CCNC: 14 U/L — SIGNIFICANT CHANGE UP (ref 10–40)
AST SERPL-CCNC: 14 U/L — SIGNIFICANT CHANGE UP (ref 10–40)
BILIRUB SERPL-MCNC: 0.4 MG/DL — SIGNIFICANT CHANGE UP (ref 0.2–1.2)
BILIRUB SERPL-MCNC: 0.4 MG/DL — SIGNIFICANT CHANGE UP (ref 0.2–1.2)
BUN SERPL-MCNC: 24 MG/DL — HIGH (ref 7–23)
BUN SERPL-MCNC: 24 MG/DL — HIGH (ref 7–23)
CALCIUM SERPL-MCNC: 9.1 MG/DL — SIGNIFICANT CHANGE UP (ref 8.4–10.5)
CALCIUM SERPL-MCNC: 9.1 MG/DL — SIGNIFICANT CHANGE UP (ref 8.4–10.5)
CHLORIDE SERPL-SCNC: 103 MMOL/L — SIGNIFICANT CHANGE UP (ref 96–108)
CHLORIDE SERPL-SCNC: 103 MMOL/L — SIGNIFICANT CHANGE UP (ref 96–108)
CO2 SERPL-SCNC: 31 MMOL/L — SIGNIFICANT CHANGE UP (ref 22–31)
CO2 SERPL-SCNC: 31 MMOL/L — SIGNIFICANT CHANGE UP (ref 22–31)
CREAT SERPL-MCNC: 0.76 MG/DL — SIGNIFICANT CHANGE UP (ref 0.5–1.3)
CREAT SERPL-MCNC: 0.76 MG/DL — SIGNIFICANT CHANGE UP (ref 0.5–1.3)
EGFR: 97 ML/MIN/1.73M2 — SIGNIFICANT CHANGE UP
EGFR: 97 ML/MIN/1.73M2 — SIGNIFICANT CHANGE UP
GLUCOSE SERPL-MCNC: 121 MG/DL — HIGH (ref 70–99)
GLUCOSE SERPL-MCNC: 121 MG/DL — HIGH (ref 70–99)
HCT VFR BLD CALC: 36.7 % — LOW (ref 39–50)
HCT VFR BLD CALC: 36.7 % — LOW (ref 39–50)
HGB BLD-MCNC: 11.5 G/DL — LOW (ref 13–17)
HGB BLD-MCNC: 11.5 G/DL — LOW (ref 13–17)
MCHC RBC-ENTMCNC: 30 PG — SIGNIFICANT CHANGE UP (ref 27–34)
MCHC RBC-ENTMCNC: 30 PG — SIGNIFICANT CHANGE UP (ref 27–34)
MCHC RBC-ENTMCNC: 31.3 GM/DL — LOW (ref 32–36)
MCHC RBC-ENTMCNC: 31.3 GM/DL — LOW (ref 32–36)
MCV RBC AUTO: 95.8 FL — SIGNIFICANT CHANGE UP (ref 80–100)
MCV RBC AUTO: 95.8 FL — SIGNIFICANT CHANGE UP (ref 80–100)
NRBC # BLD: 0 /100 WBCS — SIGNIFICANT CHANGE UP (ref 0–0)
NRBC # BLD: 0 /100 WBCS — SIGNIFICANT CHANGE UP (ref 0–0)
PLATELET # BLD AUTO: 449 K/UL — HIGH (ref 150–400)
PLATELET # BLD AUTO: 449 K/UL — HIGH (ref 150–400)
POTASSIUM SERPL-MCNC: 3.9 MMOL/L — SIGNIFICANT CHANGE UP (ref 3.5–5.3)
POTASSIUM SERPL-MCNC: 3.9 MMOL/L — SIGNIFICANT CHANGE UP (ref 3.5–5.3)
POTASSIUM SERPL-SCNC: 3.9 MMOL/L — SIGNIFICANT CHANGE UP (ref 3.5–5.3)
POTASSIUM SERPL-SCNC: 3.9 MMOL/L — SIGNIFICANT CHANGE UP (ref 3.5–5.3)
PROT SERPL-MCNC: 6.8 G/DL — SIGNIFICANT CHANGE UP (ref 6–8.3)
PROT SERPL-MCNC: 6.8 G/DL — SIGNIFICANT CHANGE UP (ref 6–8.3)
RBC # BLD: 3.83 M/UL — LOW (ref 4.2–5.8)
RBC # BLD: 3.83 M/UL — LOW (ref 4.2–5.8)
RBC # FLD: 13.7 % — SIGNIFICANT CHANGE UP (ref 10.3–14.5)
RBC # FLD: 13.7 % — SIGNIFICANT CHANGE UP (ref 10.3–14.5)
SODIUM SERPL-SCNC: 138 MMOL/L — SIGNIFICANT CHANGE UP (ref 135–145)
SODIUM SERPL-SCNC: 138 MMOL/L — SIGNIFICANT CHANGE UP (ref 135–145)
WBC # BLD: 6.86 K/UL — SIGNIFICANT CHANGE UP (ref 3.8–10.5)
WBC # BLD: 6.86 K/UL — SIGNIFICANT CHANGE UP (ref 3.8–10.5)
WBC # FLD AUTO: 6.86 K/UL — SIGNIFICANT CHANGE UP (ref 3.8–10.5)
WBC # FLD AUTO: 6.86 K/UL — SIGNIFICANT CHANGE UP (ref 3.8–10.5)

## 2023-11-02 PROCEDURE — 99232 SBSQ HOSP IP/OBS MODERATE 35: CPT | Mod: GC

## 2023-11-02 RX ADMIN — SODIUM CHLORIDE 2 GRAM(S): 9 INJECTION INTRAMUSCULAR; INTRAVENOUS; SUBCUTANEOUS at 13:18

## 2023-11-02 RX ADMIN — Medication 25 MILLIGRAM(S): at 05:16

## 2023-11-02 RX ADMIN — SENNA PLUS 2 TABLET(S): 8.6 TABLET ORAL at 21:28

## 2023-11-02 RX ADMIN — TAMSULOSIN HYDROCHLORIDE 0.4 MILLIGRAM(S): 0.4 CAPSULE ORAL at 21:28

## 2023-11-02 RX ADMIN — LIDOCAINE 2 PATCH: 4 CREAM TOPICAL at 01:08

## 2023-11-02 RX ADMIN — SODIUM CHLORIDE 2 GRAM(S): 9 INJECTION INTRAMUSCULAR; INTRAVENOUS; SUBCUTANEOUS at 05:20

## 2023-11-02 RX ADMIN — ENOXAPARIN SODIUM 40 MILLIGRAM(S): 100 INJECTION SUBCUTANEOUS at 05:15

## 2023-11-02 RX ADMIN — SODIUM CHLORIDE 2 GRAM(S): 9 INJECTION INTRAMUSCULAR; INTRAVENOUS; SUBCUTANEOUS at 21:33

## 2023-11-02 RX ADMIN — METHOCARBAMOL 750 MILLIGRAM(S): 500 TABLET, FILM COATED ORAL at 21:28

## 2023-11-02 RX ADMIN — HYDROMORPHONE HYDROCHLORIDE 4 MILLIGRAM(S): 2 INJECTION INTRAMUSCULAR; INTRAVENOUS; SUBCUTANEOUS at 17:42

## 2023-11-02 RX ADMIN — LIDOCAINE 2 PATCH: 4 CREAM TOPICAL at 13:19

## 2023-11-02 RX ADMIN — Medication 120 MILLIGRAM(S): at 05:16

## 2023-11-02 RX ADMIN — Medication 1 TABLET(S): at 13:18

## 2023-11-02 RX ADMIN — HYDROMORPHONE HYDROCHLORIDE 6 MILLIGRAM(S): 2 INJECTION INTRAMUSCULAR; INTRAVENOUS; SUBCUTANEOUS at 04:34

## 2023-11-02 RX ADMIN — METHOCARBAMOL 750 MILLIGRAM(S): 500 TABLET, FILM COATED ORAL at 13:19

## 2023-11-02 RX ADMIN — Medication 81 MILLIGRAM(S): at 13:18

## 2023-11-02 RX ADMIN — Medication 25 MILLIGRAM(S): at 17:42

## 2023-11-02 RX ADMIN — HYDROMORPHONE HYDROCHLORIDE 4 MILLIGRAM(S): 2 INJECTION INTRAMUSCULAR; INTRAVENOUS; SUBCUTANEOUS at 18:30

## 2023-11-02 RX ADMIN — HYDROMORPHONE HYDROCHLORIDE 4 MILLIGRAM(S): 2 INJECTION INTRAMUSCULAR; INTRAVENOUS; SUBCUTANEOUS at 12:00

## 2023-11-02 RX ADMIN — HYDROMORPHONE HYDROCHLORIDE 6 MILLIGRAM(S): 2 INJECTION INTRAMUSCULAR; INTRAVENOUS; SUBCUTANEOUS at 03:36

## 2023-11-02 RX ADMIN — METHOCARBAMOL 750 MILLIGRAM(S): 500 TABLET, FILM COATED ORAL at 05:15

## 2023-11-02 RX ADMIN — HYDROMORPHONE HYDROCHLORIDE 6 MILLIGRAM(S): 2 INJECTION INTRAMUSCULAR; INTRAVENOUS; SUBCUTANEOUS at 23:07

## 2023-11-02 RX ADMIN — HYDROMORPHONE HYDROCHLORIDE 4 MILLIGRAM(S): 2 INJECTION INTRAMUSCULAR; INTRAVENOUS; SUBCUTANEOUS at 11:09

## 2023-11-02 NOTE — PROGRESS NOTE ADULT - ASSESSMENT
Assessment	  Patient is 69yo M with history of chronic back pain (on chronic Dilaudid) who presented to [facility] 10/13 following a fall from a 12 foot ladder resulting in head strike and LOC. He was found to have acute displaced right lateral 8-10 rib fractures, and focal trauma occlusion/dissection of the left vertebral artery at C1 level with C1 anterior and posterior arch fractures with lateral displacement. Also noted to have spinous process fractures C2-C5, C5/C6 anterior subluxation with likely PLL injury, Left C6 transverse process fracture, T4 compression fracture, and C2-C3 epidural hematoma. Patient was admitted and underwent Posterior C1-C7 Decompression/Fusion for unstable spine fracture and evacuation of epidural hematoma. Hospital course complicated by tachycardia and pain.   Admitted to [facility] on 10/20/23 for ADL, gait, and functional impairments.     # S/P Fall with TBI with LOC, Unstable cervical spine fracture s/p C1-C7 Decompression/Fusion and evacuation of EDH with cord compression   - Comprehensive Multidisciplinary Rehab Program: 3 hours a day, 5 days a week with PT/OT  - Seen by Pain Management; pain control with Dilaudid as below  - Cervical collar at all times other than sleep  - Fall precautions  - Staples removed 10/27    # Left shoulder Pain/ Resolved   - Shoulder X ray, No acute finding and no new changes   - Continue Ice pack, Lidoderm patch, Dilaudid    - Gil ROM      # Rectal prolapse - Reduced/recurrent   - Colorectal surgery consult appreciated, to F/U as an out patient   - Patient is not compliant, keep on straining, tries to reduce the prolapse with his hand   - Prolapse reduced again at bedside 10/26, 10/29 and this am (11/01) this is the 4th time   - Hgb remains stable  - Counselling provided - avoid straining, hydrate, continue bowel regimen  - Hospitalist and surgery are involved.  Surgery MAGUI Sparrow will evaluate and treat today.     # Leukocytosis - resolved  - No fever, chills, cough  - UA - negative    # Vertebral Artery Dissection  - ASA 81mg daily - no allergy per patient, has tolerated in the past    # COPD  - Not on home meds  - 2 L oxygen supplement PRN  - Incentive spirometry     #Tachycardia/PAT  - Ectopic atrial rhythm/PAT noted on tele 10/19  - Cont metoprolol   - Continue cardizem 120mg cd  - EP eval was deferred as pt not likely a candidate for ablation at this time  - Follow up with Dr Zeng as outpatient    #Aortic Root Dilatation  - Stable on recent outpt echo from 9/5/23  - Follow up for repeat echo q 12 months    #Hyponatremia - resolved  - NaCl tabs 2g q8 hours  - Hospitalist consult appreciated    # Sleep:  - Melatonin qHS    # Pain Management:  - Recent Pain Medicine recs noted  - Tylenol ATC  - Lidocaine patch to ribs  - Robaxin 750mg TID  - Dilaudid 4mg/6mg q4 hours/q6 hours PRN Mod/Severe per Pain Medicine recs    # GI/Bowel:  - Senna QHS, Miralax daily BID  - Dulcolax suppository PRN --> D/C   - Added Lactulose x 2 daily (10/23)   - Witch hazel PRN. Creams discontinued  - Avoid straining    # /Bladder:  - Flomax 0.4mg QHS  - Low PVRs, D/C bladder scan   - Encourage timed voids every 4 hours while awake     # Skin/Pressure Injury:  - Skin assessment on admission:  Intact  - Monitor Incisions: Posterior cervical incision well approximated, (+) mild erythema  - Turn every 2 hours while in bed    # Diet:   - Diet Consistency/Modifications: Soft+Bite/thin  - MBS completed 10/26, recommend same diet     # DVT ppx:  - Lovenox    # Restrictions/Precautions:  - ROM restrictions: Cervical collar at all times except sleep  - Precautions: Fall, aspiration    ---------------  Outpatient Follow-up:    Spike Dawkins  Neurosurgery  [address], [address]  [place] [address]  Phone: [phone]  Fax: [phone]  Follow Up Time:     Jaylon Zeng  Cardiovascular Disease  [address], [address]  [place] [address]  Phone: [phone]  Fax: [phone]  Follow Up Time:     Eduard Knowles  Colon/Rectal Surgery  [address], [address]  [place] [address]  Phone: [phone]  Fax: [phone]  Follow Up Time:  --------------

## 2023-11-02 NOTE — DISCHARGE NOTE NURSING/CASE MANAGEMENT/SOCIAL WORK - CAREGIVER PHONE NUMBER
[phone] Pt seen and examined, agree with above Pt needs SICU monitoring for Q1 neurovascular checks  Pain control  IV hydration   HD monitoring  NV checks Q 1  DVT/GI prophylaxis  A/C in am after sheath removal  Perioperative antibiotics  resume home meds  Resume home meds  F/u post op labs  Blood sugar monitoring  Pt once cleared by [facility]

## 2023-11-02 NOTE — DISCHARGE NOTE NURSING/CASE MANAGEMENT/SOCIAL WORK - PATIENT PORTAL LINK FT
You can access the FollowMyHealth Patient Portal offered by [facility] by registering at the following website: [email or web address]. By joining [facility]’s FollowMyHealth portal, you will also be able to view your health information using other applications (apps) compatible with our system.

## 2023-11-02 NOTE — PROGRESS NOTE ADULT - SUBJECTIVE AND OBJECTIVE BOX
Patient is a 68y old  Male who presents with a chief complaint of S/P Fall with TBI and Multiple fractures, unstable cervical spine fracture s/p C1-C7 Decompression/Fusion (29 Oct 2023 13:01)    HPI:  Patient is 67yo M with history of chronic back pain (on chronic Dilaudid) who presented to [facility] 10/13 following a fall from a 12 foot ladder resulting in head strike and LOC. He was found to have acute displaced right lateral 8-10 rib fractures, and focal trauma occlusion/dissection of the left vertebral artery at C1 level with C1 anterior and posterior arch fractures with lateral displacement. Also noted to have spinous process fractures C2-C5, C5/C6 anterior subluxation with likely PLL injury, Left C6 transverse process fracture, T4 compression fracture, and C2-C3 epidural hematoma. Patient was admitted and underwent Posterior C1-C7 Decompression/Fusion for unstable spine fracture and evacuation of epidural hematoma, decompression of left C5 nerve root. He had routine post operative care in NSCU and then transferred to the floor, Surgical drain removed prior to discharge. Cardiology was consulted and medications were adjusted for tachycardia. Patient was seen by chronic pain and medications were adjusted with good results. He is to wear cervical collar at all times except during sleep. 10/19/23 he had episode of prolapsed hemorrhoids reduced at bedside by surgery team. Patient was evaluated by PM&R and therapy for functional deficits and gait/ ADL impairments and recommended acute rehabilitation. Patient was medically optimized for discharge to [facility] on 10/20.      SUBJECTIVE:  - Patient was seen and examined at bed side,  comfortable in his bed. No over night events.   - Slept well last night, feeling well this am, no prolapse , no new complaints   - Pain is well-controlled with current meds; Patient has been asking for narcotics more frequently   - GI/: patient is moving bowels. Voiding without issues  - Tolerating 3 hours of therapy without issues, progressing   - Discharge plan was discussed with patient which will be on (11/03) to [facility], patient understood.  SW to work with patient and family to choose a facility.     ROS:   - Denies fever, chills, CP, palpitation, cough, SOB, abdominal pain, N/V/D/C, headache, dizziness, hematuria, dysuria, joint swelling, no joint pain.      MEDICATIONS  (STANDING):  aspirin enteric coated 81 milliGRAM(s) Oral daily  diltiazem    milliGRAM(s) Oral daily  enoxaparin Injectable 40 milliGRAM(s) SubCutaneous every 24 hours  hemorrhoidal Ointment 1 Application(s) Rectal two times a day  lidocaine   4% Patch 1 Patch Transdermal every 24 hours  lidocaine   4% Patch 2 Patch Transdermal every 24 hours  methocarbamol 750 milliGRAM(s) Oral three times a day  metoprolol tartrate 25 milliGRAM(s) Oral two times a day  multivitamin 1 Tablet(s) Oral daily  polyethylene glycol 3350 17 Gram(s) Oral two times a day  senna 2 Tablet(s) Oral at bedtime  sodium chloride 2 Gram(s) Oral every 8 hours  tamsulosin 0.4 milliGRAM(s) Oral at bedtime    MEDICATIONS  (PRN):  acetaminophen     Tablet .. 650 milliGRAM(s) Oral every 6 hours PRN Mild Pain (1 - 3)  HYDROmorphone   Tablet 6 milliGRAM(s) Oral every 6 hours PRN Severe Pain (7 - 10)  HYDROmorphone   Tablet 4 milliGRAM(s) Oral every 4 hours PRN Moderate Pain (4 - 6)  lactulose Syrup 10 Gram(s) Oral two times a day PRN Constipation  witch hazel Pads 1 Application(s) Topical three times a day PRN Hemmorrhoids      RECENT LABS:                        11.5   6.86  )-----------( 449      ( 02 Nov 2023 07:36 )             36.7     11-02    138  |  103  |  24<H>  ----------------------------<  121<H>  3.9   |  31  |  0.76    Ca    9.1      02 Nov 2023 07:36    TPro  6.8  /  Alb  2.5<L>  /  TBili  0.4  /  DBili  x   /  AST  14  /  ALT  28  /  AlkPhos  132<H>  11-02    LIVER FUNCTIONS - ( 02 Nov 2023 07:36 )  Alb: 2.5 g/dL / Pro: 6.8 g/dL / ALK PHOS: 132 U/L / ALT: 28 U/L / AST: 14 U/L / GGT: x           PHYSICAL EXAM:  Vital Signs Last 24 Hrs  T(C): 36.6 (02 Nov 2023 08:50), Max: 36.6 (02 Nov 2023 08:50)  T(F): 97.8 (02 Nov 2023 08:50), Max: 97.8 (02 Nov 2023 08:50)  HR: 94 (02 Nov 2023 08:50) (93 - 99)  BP: 126/82 (02 Nov 2023 08:50) (115/73 - 127/81)  RR: 17 (02 Nov 2023 08:50) (16 - 17)  SpO2: 96% (02 Nov 2023 08:50) (94% - 96%)    General: NAD, comfortable                                HEENT: NCAT, EOMI, PERRLA  Cardio: RRR, Normal S1-S2                              Pulm: No Respiratory Distress,  Lungs CTAB                        Abdomen: ND, NT, Soft, (+) BS                                                MSK: No joint swelling, Full ROM                                      Ext: No C/C/E, Pulses (2+) throughout, No calf tenderness    Skin:  all skin intact                                                                   Neurological Examination    Cognitive: AAO x 3, follows command                                                                        Attention: Easily distracted, long processing time, better  Judgment: (+) evidence of judgement                               Memory:  Intact  Mood/Affect: wnl                                                                           Communication:  Fluent,  No dysarthria   CN II - XII  intact    Motor    LEFT    UE: SF [1/5], EF [3-/5], EE [3/5], WE [5/5],  [5/5]  RIGHT UE: SF [1/5], EF [3-/5], EE [3/5], WE [5/5],  [5/5]  LEFT    LE:  HF [5/5], KE [5/5], DF [5/5], PF [5/5]  RIGHT LE:  HF [5/5], KE [5/5], DF [5/5], PF [5/5]        IDT rounds 10/24  SW - lives in a private home with spouse, 1 IKE and 1 flight inside, 1F setup available, spouse is able to support intermittently. Independent prior  SLP - soft and bite sized with thins; mild cog for higher level tasks  OT - MaxA for all txf and ADLs, Yoshi for toilet transfers; barriers include proximal weakness; Goals for Yoshi for all ADLs and supervision for fxnal transfers  PT - 150' no device CG-Yoshi for unsteady gait, CG transfers, 12 steps with HR CG; goals for Gui mobility  TDD: 11/3 Home    IDT rounds 10/31  SW - lives in a private home with spouse, 1 IKE and 1 flight inside, 1F setup available, spouse is able to support intermittently. Independent prior  SLP - reg/thin per request, mild cog, short term memory and organization, needs supervision  OT - remains MaxA for all txf and ADLs, CG for toilet transfers, LBD ModA;  PT - 200' no device CGt, CG transfers, 24 steps with HR CG; goals for Gui mobility  TDD: 11/3 LJ

## 2023-11-03 VITALS
DIASTOLIC BLOOD PRESSURE: 65 MMHG | OXYGEN SATURATION: 92 % | HEART RATE: 98 BPM | TEMPERATURE: 98 F | RESPIRATION RATE: 18 BRPM | SYSTOLIC BLOOD PRESSURE: 109 MMHG

## 2023-11-03 PROCEDURE — 92523 SPEECH SOUND LANG COMPREHEN: CPT

## 2023-11-03 PROCEDURE — 92611 MOTION FLUOROSCOPY/SWALLOW: CPT

## 2023-11-03 PROCEDURE — 92526 ORAL FUNCTION THERAPY: CPT

## 2023-11-03 PROCEDURE — 97163 PT EVAL HIGH COMPLEX 45 MIN: CPT

## 2023-11-03 PROCEDURE — 80053 COMPREHEN METABOLIC PANEL: CPT

## 2023-11-03 PROCEDURE — 92507 TX SP LANG VOICE COMM INDIV: CPT

## 2023-11-03 PROCEDURE — 97167 OT EVAL HIGH COMPLEX 60 MIN: CPT

## 2023-11-03 PROCEDURE — 97112 NEUROMUSCULAR REEDUCATION: CPT

## 2023-11-03 PROCEDURE — 74230 X-RAY XM SWLNG FUNCJ C+: CPT

## 2023-11-03 PROCEDURE — 97535 SELF CARE MNGMENT TRAINING: CPT

## 2023-11-03 PROCEDURE — 73030 X-RAY EXAM OF SHOULDER: CPT

## 2023-11-03 PROCEDURE — 97116 GAIT TRAINING THERAPY: CPT

## 2023-11-03 PROCEDURE — 85027 COMPLETE CBC AUTOMATED: CPT

## 2023-11-03 PROCEDURE — 97110 THERAPEUTIC EXERCISES: CPT

## 2023-11-03 PROCEDURE — 36415 COLL VENOUS BLD VENIPUNCTURE: CPT

## 2023-11-03 PROCEDURE — 97530 THERAPEUTIC ACTIVITIES: CPT

## 2023-11-03 PROCEDURE — 99239 HOSP IP/OBS DSCHRG MGMT >30: CPT | Mod: GC

## 2023-11-03 PROCEDURE — 92610 EVALUATE SWALLOWING FUNCTION: CPT

## 2023-11-03 RX ORDER — HYDROMORPHONE HYDROCHLORIDE 2 MG/ML
6 INJECTION INTRAMUSCULAR; INTRAVENOUS; SUBCUTANEOUS ONCE
Refills: 0 | Status: DISCONTINUED | OUTPATIENT
Start: 2023-11-03 | End: 2023-11-03

## 2023-11-03 RX ADMIN — HYDROMORPHONE HYDROCHLORIDE 6 MILLIGRAM(S): 2 INJECTION INTRAMUSCULAR; INTRAVENOUS; SUBCUTANEOUS at 00:07

## 2023-11-03 RX ADMIN — HYDROMORPHONE HYDROCHLORIDE 6 MILLIGRAM(S): 2 INJECTION INTRAMUSCULAR; INTRAVENOUS; SUBCUTANEOUS at 10:41

## 2023-11-03 RX ADMIN — Medication 81 MILLIGRAM(S): at 11:21

## 2023-11-03 RX ADMIN — HYDROMORPHONE HYDROCHLORIDE 6 MILLIGRAM(S): 2 INJECTION INTRAMUSCULAR; INTRAVENOUS; SUBCUTANEOUS at 06:52

## 2023-11-03 RX ADMIN — POLYETHYLENE GLYCOL 3350 17 GRAM(S): 17 POWDER, FOR SOLUTION ORAL at 05:40

## 2023-11-03 RX ADMIN — Medication 1 TABLET(S): at 11:21

## 2023-11-03 RX ADMIN — ENOXAPARIN SODIUM 40 MILLIGRAM(S): 100 INJECTION SUBCUTANEOUS at 05:29

## 2023-11-03 RX ADMIN — Medication 120 MILLIGRAM(S): at 05:30

## 2023-11-03 RX ADMIN — SODIUM CHLORIDE 2 GRAM(S): 9 INJECTION INTRAMUSCULAR; INTRAVENOUS; SUBCUTANEOUS at 05:30

## 2023-11-03 RX ADMIN — LIDOCAINE 2 PATCH: 4 CREAM TOPICAL at 01:00

## 2023-11-03 RX ADMIN — LIDOCAINE 1 APPLICATION(S): 4 CREAM TOPICAL at 09:36

## 2023-11-03 RX ADMIN — METHOCARBAMOL 750 MILLIGRAM(S): 500 TABLET, FILM COATED ORAL at 05:30

## 2023-11-03 RX ADMIN — Medication 25 MILLIGRAM(S): at 05:30

## 2023-11-03 RX ADMIN — LIDOCAINE 2 PATCH: 4 CREAM TOPICAL at 07:47

## 2023-11-03 NOTE — PROGRESS NOTE ADULT - ASSESSMENT
Assessment	  Patient is 69yo M with history of chronic back pain (on chronic Dilaudid) who presented to [facility] 10/13 following a fall from a 12 foot ladder resulting in head strike and LOC. He was found to have acute displaced right lateral 8-10 rib fractures, and focal trauma occlusion/dissection of the left vertebral artery at C1 level with C1 anterior and posterior arch fractures with lateral displacement. Also noted to have spinous process fractures C2-C5, C5/C6 anterior subluxation with likely PLL injury, Left C6 transverse process fracture, T4 compression fracture, and C2-C3 epidural hematoma. Patient was admitted and underwent Posterior C1-C7 Decompression/Fusion for unstable spine fracture and evacuation of epidural hematoma. Hospital course complicated by tachycardia and pain.   Admitted to [facility] on 10/20/23 for ADL, gait, and functional impairments.     # S/P Fall with TBI with LOC, Unstable cervical spine fracture s/p C1-C7 Decompression/Fusion and evacuation of EDH with cord compression   - Comprehensive Multidisciplinary Rehab Program: 3 hours a day, 5 days a week with PT/OT, to be continued at [facility].  - Seen by Pain Management; pain control with Dilaudid as below  - Cervical collar at all times other than when in bed and sleep  - Fall precautions  - Staples removed 10/27    # Left shoulder Pain/ Resolved   - Shoulder X ray, No acute finding and no new changes   - Continue Ice pack, Lidoderm patch, Dilaudid    - Gil ROM      # Rectal prolapse - Reduced/recurrent   - Colorectal surgery consult appreciated, to F/U as an out patient   - Patient is not compliant, keep on straining, tries to reduce the prolapse with his hand   - Prolapse reduced again at bedside 10/26, 10/29 and this am (11/01) this is the 4th time. This am (11/03)  the prolapse looked like a Mercedes Valente symbol, reduced by me after applying sugar to the area and left for 15 minutes, the prolapse was reduced easily after removing the sugar from the area.  Lidocaine jell was applied to the area. Patient and wife were happy and appreciable.   - Hgb remains stable  - Counselling provided - avoid straining, hydrate, continue bowel regimen  - Hospitalist and surgery are involved.  Surgery MAGUI Sparrow will evaluate and treat (11/01).     # Leukocytosis - resolved  - No fever, chills, cough  - UA - negative    # Vertebral Artery Dissection  - ASA 81mg daily - no allergy per patient, has tolerated in the past    # COPD  - Not on home meds  - 2 L oxygen supplement PRN  - Incentive spirometry     #Tachycardia/PAT  - Ectopic atrial rhythm/PAT noted on tele 10/19  - Cont metoprolol   - Continue cardizem 120mg cd  - EP eval was deferred as pt not likely a candidate for ablation at this time  - Follow up with Dr Zeng as outpatient    #Aortic Root Dilatation  - Stable on recent outpt echo from 9/5/23  - Follow up for repeat echo q 12 months    #Hyponatremia - resolved  - NaCl tabs 2g q8 hours  - Hospitalist consult appreciated    # Sleep:  - Melatonin qHS    # Pain Management:  - Recent Pain Medicine recs noted  - Tylenol ATC  - Lidocaine patch to ribs  - Robaxin 750mg TID  - Dilaudid 4mg/6mg q4 hours/q6 hours PRN Mod/Severe per Pain Medicine recs    # GI/Bowel:  - Senna QHS, Miralax daily BID  - Dulcolax suppository PRN --> D/C   - Added Lactulose x 2 daily (10/23)   - Witch hazel PRN. Creams discontinued  - Avoid straining    # /Bladder:  - Flomax 0.4mg QHS  - Low PVRs, D/C bladder scan   - Encourage timed voids every 4 hours while awake     # Skin/Pressure Injury:  - Skin assessment on admission:  Intact  - Monitor Incisions: Posterior cervical incision well approximated, (+) mild erythema  - Turn every 2 hours while in bed    # Diet:   - Diet Consistency/Modifications: Soft+Bite/thin  - MBS completed 10/26, recommend same diet     # DVT ppx:  - Lovenox    # Restrictions/Precautions:  - ROM restrictions: Cervical collar at all times except sleep  - Precautions: Fall, aspiration    ---------------  Outpatient Follow-up:    Spike Dawkins  Neurosurgery  [address], [address]  [place] [address]  Phone: [phone]  Fax: [phone]  Follow Up Time:     Jaylon Zeng  Cardiovascular Disease  [address]  [place] [address]  Phone: [phone]  Fax: [phone]  Follow Up Time:     Eduard Knowles  Colon/Rectal Surgery  [address] [place] [address], [address]  [place] [address]  Phone: [phone]  Fax: [phone]  Follow Up Time:  --------------

## 2023-11-03 NOTE — PROGRESS NOTE ADULT - SUBJECTIVE AND OBJECTIVE BOX
Patient is a 68y old  Male who presents with a chief complaint of S/P Fall with TBI and Multiple fractures, unstable cervical spine fracture s/p C1-C7 Decompression/Fusion (29 Oct 2023 13:01)    HPI:  Patient is 67yo M with history of chronic back pain (on chronic Dilaudid) who presented to [facility] 10/13 following a fall from a 12 foot ladder resulting in head strike and LOC. He was found to have acute displaced right lateral 8-10 rib fractures, and focal trauma occlusion/dissection of the left vertebral artery at C1 level with C1 anterior and posterior arch fractures with lateral displacement. Also noted to have spinous process fractures C2-C5, C5/C6 anterior subluxation with likely PLL injury, Left C6 transverse process fracture, T4 compression fracture, and C2-C3 epidural hematoma. Patient was admitted and underwent Posterior C1-C7 Decompression/Fusion for unstable spine fracture and evacuation of epidural hematoma, decompression of left C5 nerve root. He had routine post operative care in NSCU and then transferred to the floor, Surgical drain removed prior to discharge. Cardiology was consulted and medications were adjusted for tachycardia. Patient was seen by chronic pain and medications were adjusted with good results. He is to wear cervical collar at all times except during sleep. 10/19/23 he had episode of prolapsed hemorrhoids reduced at bedside by surgery team. Patient was evaluated by PM&R and therapy for functional deficits and gait/ ADL impairments and recommended acute rehabilitation. Patient was medically optimized for discharge to [facility] on 10/20.      SUBJECTIVE:  - Patient was seen and examined at bed side,  uncomfortable as he has another rectal prolapse. No over night events.   - Slept well last night, feeling well this am, no new complaints except for the prolapse this am   - Pain is well-controlled with current meds; Patient has been asking for narcotics more frequently   - GI/: patient is moving bowels. Voiding without issues  - Tolerating 3 hours of therapy without issues, progressing, will continue at the [facility]  - Discharge plan was discussed with patient which will be on (11/03) to [facility], patient understood.  Has abed and will discharge today     ROS:   - Denies fever, chills, CP, palpitation, cough, SOB, abdominal pain, N/V/D/C, headache, dizziness, hematuria, dysuria, joint swelling, no joint pain. (+) rectal bleed and prolapse      MEDICATIONS  (STANDING):  aspirin enteric coated 81 milliGRAM(s) Oral daily  diltiazem    milliGRAM(s) Oral daily  enoxaparin Injectable 40 milliGRAM(s) SubCutaneous every 24 hours  lidocaine   4% Patch 1 Patch Transdermal every 24 hours  lidocaine   4% Patch 2 Patch Transdermal every 24 hours  methocarbamol 750 milliGRAM(s) Oral three times a day  metoprolol tartrate 25 milliGRAM(s) Oral two times a day  multivitamin 1 Tablet(s) Oral daily  polyethylene glycol 3350 17 Gram(s) Oral two times a day  senna 2 Tablet(s) Oral at bedtime  sodium chloride 2 Gram(s) Oral every 8 hours  tamsulosin 0.4 milliGRAM(s) Oral at bedtime    MEDICATIONS  (PRN):  acetaminophen     Tablet .. 650 milliGRAM(s) Oral every 6 hours PRN Mild Pain (1 - 3)  HYDROmorphone   Tablet 6 milliGRAM(s) Oral every 6 hours PRN Severe Pain (7 - 10)  HYDROmorphone   Tablet 4 milliGRAM(s) Oral every 4 hours PRN Moderate Pain (4 - 6)  lactulose Syrup 10 Gram(s) Oral two times a day PRN Constipation  lidocaine 2% Gel 1 Application(s) Topical every 8 hours PRN rectal pain  witch hazel Pads 1 Application(s) Topical three times a day PRN Hemmorrhoids        RECENT LABS:                        11.5   6.86  )-----------( 449      ( 02 Nov 2023 07:36 )             36.7     11-02    138  |  103  |  24<H>  ----------------------------<  121<H>  3.9   |  31  |  0.76    Ca    9.1      02 Nov 2023 07:36    TPro  6.8  /  Alb  2.5<L>  /  TBili  0.4  /  DBili  x   /  AST  14  /  ALT  28  /  AlkPhos  132<H>  11-02    LIVER FUNCTIONS - ( 02 Nov 2023 07:36 )  Alb: 2.5 g/dL / Pro: 6.8 g/dL / ALK PHOS: 132 U/L / ALT: 28 U/L / AST: 14 U/L / GGT: x           PHYSICAL EXAM:  Vital Signs Last 24 Hrs  T(C): 36.8 (02 Nov 2023 21:26), Max: 36.8 (02 Nov 2023 21:26)  T(F): 98.3 (02 Nov 2023 21:26), Max: 98.3 (02 Nov 2023 21:26)  HR: 100 (02 Nov 2023 21:26) (86 - 117)  BP: 153/82 (02 Nov 2023 21:26) (108/75 - 153/82)  RR: 16 (02 Nov 2023 21:26) (16 - 16)  SpO2: 95% (02 Nov 2023 21:26) (93% - 95%)      General: NAD, uncomfortable                                HEENT: NCAT, EOMI, PERRLA  Cardio: RRR, Normal S1-S2                              Pulm: No Respiratory Distress,  Lungs CTAB                        Abdomen: ND, NT, Soft, (+) BS                                                MSK: No joint swelling, Full ROM                                      Ext: No C/C/E, Pulses (2+) throughout, No calf tenderness    Skin:  all skin intact                                                                   Neurological Examination    Cognitive: AAO x 3, follows command                                                                        Attention: Easily distracted, long processing time, better  Judgment: (+) evidence of judgement                               Memory:  Intact  Mood/Affect: wnl                                                                           Communication:  Fluent,  No dysarthria   CN II - XII  intact    Motor    LEFT    UE: SF [1/5], EF [3-/5], EE [3/5], WE [5/5],  [5/5]  RIGHT UE: SF [1/5], EF [3-/5], EE [3/5], WE [5/5],  [5/5]  LEFT    LE:  HF [5/5], KE [5/5], DF [5/5], PF [5/5]  RIGHT LE:  HF [5/5], KE [5/5], DF [5/5], PF [5/5]        IDT rounds 10/24  SW - lives in a private home with spouse, 1 IKE and 1 flight inside, 1F setup available, spouse is able to support intermittently. Independent prior  SLP - soft and bite sized with thins; mild cog for higher level tasks  OT - MaxA for all txf and ADLs, Yoshi for toilet transfers; barriers include proximal weakness; Goals for Yoshi for all ADLs and supervision for fxnal transfers  PT - 150' no device CG-Yoshi for unsteady gait, CG transfers, 12 steps with HR CG; goals for Gui mobility  TDD: 11/3 Home    IDT rounds 10/31  SW - lives in a private home with spouse, 1 IKE and 1 flight inside, 1F setup available, spouse is able to support intermittently. Independent prior  SLP - reg/thin per request, mild cog, short term memory and organization, needs supervision  OT - remains MaxA for all txf and ADLs, CG for toilet transfers, LBD ModA;  PT - 200' no device CGt, CG transfers, 24 steps with HR CG; goals for Gui mobility  TDD: 11/3 LJ

## 2023-11-03 NOTE — PROGRESS NOTE ADULT - PROVIDER SPECIALTY LIST ADULT
Physiatry
Rehab Medicine
Colorectal Surgery
Hospitalist
Physiatry
Physiatry
Rehab Medicine
Hospitalist
Physiatry

## 2023-11-03 NOTE — PROGRESS NOTE ADULT - NSPROGADDITIONALINFOA_GEN_ALL_CORE
Spent 55 minutes on face-face visit, evaluate, examine and treat, excluding teaching time
Spent 40 minutes on face-face visit, evaluate, examine and treat, excluding teaching time
Spent 35 minutes on face-face visit, evaluate, examine and treat, excluding teaching time
Spent 1 hour on evaluating, examining and with team meeting where we discussed patient's progress and discharge plan.   Excluding teaching time.
Spent 36 minutes on face-face visit, evaluate, examine and treat, excluding teaching time
Spent 50 minutes on face-face visit, evaluating, examining, prepare discharge papers, discuss discharge meds and F/U visits
Spent 36 minutes on face-face visit, evaluate, examine and treat, excluding teaching time
Spent 1 hour on evaluating, examining and with team meeting where we discussed patient's progress and discharge plan.  Excluding teaching time
Spent 36 minutes on face-face visit, evaluate, examine and treat, excluding teaching time
Spent 38 minutes on face-face visit, evaluate, examine and treat, excluding teaching time

## 2023-11-03 NOTE — PROGRESS NOTE ADULT - NUTRITIONAL ASSESSMENT
This patient has been assessed with a concern for Malnutrition and has been determined to have a diagnosis/diagnoses of Severe protein-calorie malnutrition.    This patient is being managed with:   Diet Regular-  Easy to Chew (EASYTOCHEW)  Supplement Feeding Modality:  Oral  Ensure Plus High Protein Cans or Servings Per Day:  1       Frequency:  Daily  Entered: Oct 23 2023  9:31AM  
This patient has been assessed with a concern for Malnutrition and has been determined to have a diagnosis/diagnoses of Severe protein-calorie malnutrition.    This patient is being managed with:   Diet Regular-  Minced and Moist (MINCEDMOIST)  Supplement Feeding Modality:  Oral  Ensure Plus High Protein Cans or Servings Per Day:  1       Frequency:  Daily  Entered: Oct 24 2023  9:53AM  
This patient has been assessed with a concern for Malnutrition and has been determined to have a diagnosis/diagnoses of Severe protein-calorie malnutrition.    This patient is being managed with:   Diet Regular-  Supplement Feeding Modality:  Oral  Ensure Plus High Protein Cans or Servings Per Day:  1       Frequency:  Daily  Entered: Oct 27 2023 12:10PM  
This patient has been assessed with a concern for Malnutrition and has been determined to have a diagnosis/diagnoses of Severe protein-calorie malnutrition.    This patient is being managed with:   Diet Regular-  Soft and Bite Sized (SOFTBTSZ)  Supplement Feeding Modality:  Oral  Ensure Plus High Protein Cans or Servings Per Day:  1       Frequency:  Daily  Entered: Oct 24 2023 11:55AM  
This patient has been assessed with a concern for Malnutrition and has been determined to have a diagnosis/diagnoses of Severe protein-calorie malnutrition.    This patient is being managed with:   Diet Regular-  Soft and Bite Sized (SOFTBTSZ)  Supplement Feeding Modality:  Oral  Ensure Plus High Protein Cans or Servings Per Day:  1       Frequency:  Daily  Entered: Oct 24 2023 11:55AM  
This patient has been assessed with a concern for Malnutrition and has been determined to have a diagnosis/diagnoses of Severe protein-calorie malnutrition.    This patient is being managed with:   Diet Regular-  Supplement Feeding Modality:  Oral  Ensure Plus High Protein Cans or Servings Per Day:  1       Frequency:  Daily  Entered: Oct 27 2023 12:10PM  
This patient has been assessed with a concern for Malnutrition and has been determined to have a diagnosis/diagnoses of Severe protein-calorie malnutrition.    This patient is being managed with:   Diet Regular-  Soft and Bite Sized (SOFTBTSZ)  Supplement Feeding Modality:  Oral  Ensure Plus High Protein Cans or Servings Per Day:  1       Frequency:  Daily  Entered: Oct 24 2023 11:55AM  
This patient has been assessed with a concern for Malnutrition and has been determined to have a diagnosis/diagnoses of Severe protein-calorie malnutrition.    This patient is being managed with:   Diet Regular-  Supplement Feeding Modality:  Oral  Ensure Plus High Protein Cans or Servings Per Day:  1       Frequency:  Daily  Entered: Oct 27 2023 12:10PM

## 2023-11-05 ENCOUNTER — TRANSCRIPTION ENCOUNTER (OUTPATIENT)
Age: 68
End: 2023-11-05

## 2023-11-05 ENCOUNTER — INPATIENT (INPATIENT)
Facility: HOSPITAL | Age: 68
LOS: 5 days | Discharge: NURSING, REHAB OR LONG-TERM CARE | DRG: 349 | End: 2023-11-11
Attending: INTERNAL MEDICINE | Admitting: STUDENT IN AN ORGANIZED HEALTH CARE EDUCATION/TRAINING PROGRAM
Payer: MEDICARE

## 2023-11-05 VITALS
SYSTOLIC BLOOD PRESSURE: 123 MMHG | HEART RATE: 115 BPM | RESPIRATION RATE: 16 BRPM | OXYGEN SATURATION: 94 % | TEMPERATURE: 98 F | DIASTOLIC BLOOD PRESSURE: 88 MMHG | WEIGHT: 138.01 LBS | HEIGHT: 72 IN

## 2023-11-05 DIAGNOSIS — K62.3 RECTAL PROLAPSE: ICD-10-CM

## 2023-11-05 DIAGNOSIS — Z96.651 PRESENCE OF RIGHT ARTIFICIAL KNEE JOINT: Chronic | ICD-10-CM

## 2023-11-05 DIAGNOSIS — Z98.890 OTHER SPECIFIED POSTPROCEDURAL STATES: Chronic | ICD-10-CM

## 2023-11-05 DIAGNOSIS — Z98.1 ARTHRODESIS STATUS: Chronic | ICD-10-CM

## 2023-11-05 LAB
ALBUMIN SERPL ELPH-MCNC: 3.1 G/DL — LOW (ref 3.3–5)
ALBUMIN SERPL ELPH-MCNC: 3.1 G/DL — LOW (ref 3.3–5)
ALP SERPL-CCNC: 148 U/L — HIGH (ref 40–120)
ALP SERPL-CCNC: 148 U/L — HIGH (ref 40–120)
ALT FLD-CCNC: 31 U/L — SIGNIFICANT CHANGE UP (ref 10–45)
ALT FLD-CCNC: 31 U/L — SIGNIFICANT CHANGE UP (ref 10–45)
ANION GAP SERPL CALC-SCNC: 7 MMOL/L — SIGNIFICANT CHANGE UP (ref 5–17)
ANION GAP SERPL CALC-SCNC: 7 MMOL/L — SIGNIFICANT CHANGE UP (ref 5–17)
AST SERPL-CCNC: 15 U/L — SIGNIFICANT CHANGE UP (ref 10–40)
AST SERPL-CCNC: 15 U/L — SIGNIFICANT CHANGE UP (ref 10–40)
BASOPHILS # BLD AUTO: 0.05 K/UL — SIGNIFICANT CHANGE UP (ref 0–0.2)
BASOPHILS # BLD AUTO: 0.05 K/UL — SIGNIFICANT CHANGE UP (ref 0–0.2)
BASOPHILS NFR BLD AUTO: 0.5 % — SIGNIFICANT CHANGE UP (ref 0–2)
BASOPHILS NFR BLD AUTO: 0.5 % — SIGNIFICANT CHANGE UP (ref 0–2)
BILIRUB SERPL-MCNC: 0.5 MG/DL — SIGNIFICANT CHANGE UP (ref 0.2–1.2)
BILIRUB SERPL-MCNC: 0.5 MG/DL — SIGNIFICANT CHANGE UP (ref 0.2–1.2)
BLD GP AB SCN SERPL QL: SIGNIFICANT CHANGE UP
BLD GP AB SCN SERPL QL: SIGNIFICANT CHANGE UP
BUN SERPL-MCNC: 18 MG/DL — SIGNIFICANT CHANGE UP (ref 7–23)
BUN SERPL-MCNC: 18 MG/DL — SIGNIFICANT CHANGE UP (ref 7–23)
CALCIUM SERPL-MCNC: 9.8 MG/DL — SIGNIFICANT CHANGE UP (ref 8.4–10.5)
CALCIUM SERPL-MCNC: 9.8 MG/DL — SIGNIFICANT CHANGE UP (ref 8.4–10.5)
CHLORIDE SERPL-SCNC: 100 MMOL/L — SIGNIFICANT CHANGE UP (ref 96–108)
CHLORIDE SERPL-SCNC: 100 MMOL/L — SIGNIFICANT CHANGE UP (ref 96–108)
CO2 SERPL-SCNC: 30 MMOL/L — SIGNIFICANT CHANGE UP (ref 22–31)
CO2 SERPL-SCNC: 30 MMOL/L — SIGNIFICANT CHANGE UP (ref 22–31)
CREAT SERPL-MCNC: 0.84 MG/DL — SIGNIFICANT CHANGE UP (ref 0.5–1.3)
CREAT SERPL-MCNC: 0.84 MG/DL — SIGNIFICANT CHANGE UP (ref 0.5–1.3)
EGFR: 94 ML/MIN/1.73M2 — SIGNIFICANT CHANGE UP
EGFR: 94 ML/MIN/1.73M2 — SIGNIFICANT CHANGE UP
EOSINOPHIL # BLD AUTO: 0.1 K/UL — SIGNIFICANT CHANGE UP (ref 0–0.5)
EOSINOPHIL # BLD AUTO: 0.1 K/UL — SIGNIFICANT CHANGE UP (ref 0–0.5)
EOSINOPHIL NFR BLD AUTO: 1 % — SIGNIFICANT CHANGE UP (ref 0–6)
EOSINOPHIL NFR BLD AUTO: 1 % — SIGNIFICANT CHANGE UP (ref 0–6)
GLUCOSE SERPL-MCNC: 122 MG/DL — HIGH (ref 70–99)
GLUCOSE SERPL-MCNC: 122 MG/DL — HIGH (ref 70–99)
HCT VFR BLD CALC: 40.6 % — SIGNIFICANT CHANGE UP (ref 39–50)
HCT VFR BLD CALC: 40.6 % — SIGNIFICANT CHANGE UP (ref 39–50)
HGB BLD-MCNC: 13.1 G/DL — SIGNIFICANT CHANGE UP (ref 13–17)
HGB BLD-MCNC: 13.1 G/DL — SIGNIFICANT CHANGE UP (ref 13–17)
IMM GRANULOCYTES NFR BLD AUTO: 0.3 % — SIGNIFICANT CHANGE UP (ref 0–0.9)
IMM GRANULOCYTES NFR BLD AUTO: 0.3 % — SIGNIFICANT CHANGE UP (ref 0–0.9)
LIDOCAIN IGE QN: 26 U/L — SIGNIFICANT CHANGE UP (ref 16–77)
LIDOCAIN IGE QN: 26 U/L — SIGNIFICANT CHANGE UP (ref 16–77)
LYMPHOCYTES # BLD AUTO: 0.96 K/UL — LOW (ref 1–3.3)
LYMPHOCYTES # BLD AUTO: 0.96 K/UL — LOW (ref 1–3.3)
LYMPHOCYTES # BLD AUTO: 10 % — LOW (ref 13–44)
LYMPHOCYTES # BLD AUTO: 10 % — LOW (ref 13–44)
MCHC RBC-ENTMCNC: 29.8 PG — SIGNIFICANT CHANGE UP (ref 27–34)
MCHC RBC-ENTMCNC: 29.8 PG — SIGNIFICANT CHANGE UP (ref 27–34)
MCHC RBC-ENTMCNC: 32.3 GM/DL — SIGNIFICANT CHANGE UP (ref 32–36)
MCHC RBC-ENTMCNC: 32.3 GM/DL — SIGNIFICANT CHANGE UP (ref 32–36)
MCV RBC AUTO: 92.5 FL — SIGNIFICANT CHANGE UP (ref 80–100)
MCV RBC AUTO: 92.5 FL — SIGNIFICANT CHANGE UP (ref 80–100)
MONOCYTES # BLD AUTO: 0.62 K/UL — SIGNIFICANT CHANGE UP (ref 0–0.9)
MONOCYTES # BLD AUTO: 0.62 K/UL — SIGNIFICANT CHANGE UP (ref 0–0.9)
MONOCYTES NFR BLD AUTO: 6.4 % — SIGNIFICANT CHANGE UP (ref 2–14)
MONOCYTES NFR BLD AUTO: 6.4 % — SIGNIFICANT CHANGE UP (ref 2–14)
NEUTROPHILS # BLD AUTO: 7.87 K/UL — HIGH (ref 1.8–7.4)
NEUTROPHILS # BLD AUTO: 7.87 K/UL — HIGH (ref 1.8–7.4)
NEUTROPHILS NFR BLD AUTO: 81.8 % — HIGH (ref 43–77)
NEUTROPHILS NFR BLD AUTO: 81.8 % — HIGH (ref 43–77)
NRBC # BLD: 0 /100 WBCS — SIGNIFICANT CHANGE UP (ref 0–0)
NRBC # BLD: 0 /100 WBCS — SIGNIFICANT CHANGE UP (ref 0–0)
PLATELET # BLD AUTO: 464 K/UL — HIGH (ref 150–400)
PLATELET # BLD AUTO: 464 K/UL — HIGH (ref 150–400)
POTASSIUM SERPL-MCNC: 3.8 MMOL/L — SIGNIFICANT CHANGE UP (ref 3.5–5.3)
POTASSIUM SERPL-MCNC: 3.8 MMOL/L — SIGNIFICANT CHANGE UP (ref 3.5–5.3)
POTASSIUM SERPL-SCNC: 3.8 MMOL/L — SIGNIFICANT CHANGE UP (ref 3.5–5.3)
POTASSIUM SERPL-SCNC: 3.8 MMOL/L — SIGNIFICANT CHANGE UP (ref 3.5–5.3)
PROT SERPL-MCNC: 7.6 G/DL — SIGNIFICANT CHANGE UP (ref 6–8.3)
PROT SERPL-MCNC: 7.6 G/DL — SIGNIFICANT CHANGE UP (ref 6–8.3)
RBC # BLD: 4.39 M/UL — SIGNIFICANT CHANGE UP (ref 4.2–5.8)
RBC # BLD: 4.39 M/UL — SIGNIFICANT CHANGE UP (ref 4.2–5.8)
RBC # FLD: 13.1 % — SIGNIFICANT CHANGE UP (ref 10.3–14.5)
RBC # FLD: 13.1 % — SIGNIFICANT CHANGE UP (ref 10.3–14.5)
SODIUM SERPL-SCNC: 137 MMOL/L — SIGNIFICANT CHANGE UP (ref 135–145)
SODIUM SERPL-SCNC: 137 MMOL/L — SIGNIFICANT CHANGE UP (ref 135–145)
WBC # BLD: 9.63 K/UL — SIGNIFICANT CHANGE UP (ref 3.8–10.5)
WBC # BLD: 9.63 K/UL — SIGNIFICANT CHANGE UP (ref 3.8–10.5)
WBC # FLD AUTO: 9.63 K/UL — SIGNIFICANT CHANGE UP (ref 3.8–10.5)
WBC # FLD AUTO: 9.63 K/UL — SIGNIFICANT CHANGE UP (ref 3.8–10.5)

## 2023-11-05 PROCEDURE — 71045 X-RAY EXAM CHEST 1 VIEW: CPT | Mod: 26

## 2023-11-05 PROCEDURE — 99285 EMERGENCY DEPT VISIT HI MDM: CPT

## 2023-11-05 PROCEDURE — 93010 ELECTROCARDIOGRAM REPORT: CPT

## 2023-11-05 PROCEDURE — 99223 1ST HOSP IP/OBS HIGH 75: CPT

## 2023-11-05 RX ORDER — SODIUM CHLORIDE 9 MG/ML
1000 INJECTION INTRAMUSCULAR; INTRAVENOUS; SUBCUTANEOUS ONCE
Refills: 0 | Status: COMPLETED | OUTPATIENT
Start: 2023-11-05 | End: 2023-11-05

## 2023-11-05 RX ORDER — ONDANSETRON 8 MG/1
4 TABLET, FILM COATED ORAL EVERY 8 HOURS
Refills: 0 | Status: DISCONTINUED | OUTPATIENT
Start: 2023-11-05 | End: 2023-11-06

## 2023-11-05 RX ORDER — LACTULOSE 10 G/15ML
10 SOLUTION ORAL
Refills: 0 | Status: DISCONTINUED | OUTPATIENT
Start: 2023-11-05 | End: 2023-11-06

## 2023-11-05 RX ORDER — LANOLIN ALCOHOL/MO/W.PET/CERES
3 CREAM (GRAM) TOPICAL AT BEDTIME
Refills: 0 | Status: DISCONTINUED | OUTPATIENT
Start: 2023-11-05 | End: 2023-11-06

## 2023-11-05 RX ORDER — ASPIRIN/CALCIUM CARB/MAGNESIUM 324 MG
81 TABLET ORAL DAILY
Refills: 0 | Status: DISCONTINUED | OUTPATIENT
Start: 2023-11-05 | End: 2023-11-06

## 2023-11-05 RX ORDER — AER TRAVELER 0.5 G/1
1 SOLUTION RECTAL; TOPICAL THREE TIMES A DAY
Refills: 0 | Status: DISCONTINUED | OUTPATIENT
Start: 2023-11-05 | End: 2023-11-06

## 2023-11-05 RX ORDER — TAMSULOSIN HYDROCHLORIDE 0.4 MG/1
0.4 CAPSULE ORAL AT BEDTIME
Refills: 0 | Status: DISCONTINUED | OUTPATIENT
Start: 2023-11-05 | End: 2023-11-06

## 2023-11-05 RX ORDER — HYDROMORPHONE HYDROCHLORIDE 2 MG/ML
6 INJECTION INTRAMUSCULAR; INTRAVENOUS; SUBCUTANEOUS EVERY 6 HOURS
Refills: 0 | Status: DISCONTINUED | OUTPATIENT
Start: 2023-11-05 | End: 2023-11-06

## 2023-11-05 RX ORDER — ALPRAZOLAM 0.25 MG
0.5 TABLET ORAL EVERY 6 HOURS
Refills: 0 | Status: DISCONTINUED | OUTPATIENT
Start: 2023-11-05 | End: 2023-11-05

## 2023-11-05 RX ORDER — DIAZEPAM 5 MG
5 TABLET ORAL EVERY 8 HOURS
Refills: 0 | Status: DISCONTINUED | OUTPATIENT
Start: 2023-11-05 | End: 2023-11-06

## 2023-11-05 RX ORDER — MORPHINE SULFATE 50 MG/1
4 CAPSULE, EXTENDED RELEASE ORAL ONCE
Refills: 0 | Status: DISCONTINUED | OUTPATIENT
Start: 2023-11-05 | End: 2023-11-05

## 2023-11-05 RX ORDER — POLYETHYLENE GLYCOL 3350 17 G/17G
17 POWDER, FOR SOLUTION ORAL
Refills: 0 | Status: DISCONTINUED | OUTPATIENT
Start: 2023-11-05 | End: 2023-11-06

## 2023-11-05 RX ORDER — METHOCARBAMOL 500 MG/1
750 TABLET, FILM COATED ORAL THREE TIMES A DAY
Refills: 0 | Status: DISCONTINUED | OUTPATIENT
Start: 2023-11-05 | End: 2023-11-06

## 2023-11-05 RX ORDER — ENOXAPARIN SODIUM 100 MG/ML
40 INJECTION SUBCUTANEOUS EVERY 24 HOURS
Refills: 0 | Status: DISCONTINUED | OUTPATIENT
Start: 2023-11-05 | End: 2023-11-06

## 2023-11-05 RX ORDER — SENNA PLUS 8.6 MG/1
2 TABLET ORAL AT BEDTIME
Refills: 0 | Status: DISCONTINUED | OUTPATIENT
Start: 2023-11-05 | End: 2023-11-06

## 2023-11-05 RX ORDER — HYDROMORPHONE HYDROCHLORIDE 2 MG/ML
4 INJECTION INTRAMUSCULAR; INTRAVENOUS; SUBCUTANEOUS EVERY 4 HOURS
Refills: 0 | Status: DISCONTINUED | OUTPATIENT
Start: 2023-11-05 | End: 2023-11-06

## 2023-11-05 RX ORDER — METOPROLOL TARTRATE 50 MG
25 TABLET ORAL
Refills: 0 | Status: DISCONTINUED | OUTPATIENT
Start: 2023-11-05 | End: 2023-11-06

## 2023-11-05 RX ORDER — DIAZEPAM 5 MG
5 TABLET ORAL ONCE
Refills: 0 | Status: DISCONTINUED | OUTPATIENT
Start: 2023-11-05 | End: 2023-11-05

## 2023-11-05 RX ORDER — DILTIAZEM HCL 120 MG
120 CAPSULE, EXT RELEASE 24 HR ORAL DAILY
Refills: 0 | Status: DISCONTINUED | OUTPATIENT
Start: 2023-11-05 | End: 2023-11-06

## 2023-11-05 RX ORDER — ACETAMINOPHEN 500 MG
650 TABLET ORAL EVERY 6 HOURS
Refills: 0 | Status: DISCONTINUED | OUTPATIENT
Start: 2023-11-05 | End: 2023-11-06

## 2023-11-05 RX ORDER — SODIUM CHLORIDE 9 MG/ML
2 INJECTION INTRAMUSCULAR; INTRAVENOUS; SUBCUTANEOUS EVERY 8 HOURS
Refills: 0 | Status: DISCONTINUED | OUTPATIENT
Start: 2023-11-05 | End: 2023-11-06

## 2023-11-05 RX ADMIN — POLYETHYLENE GLYCOL 3350 17 GRAM(S): 17 POWDER, FOR SOLUTION ORAL at 18:51

## 2023-11-05 RX ADMIN — HYDROMORPHONE HYDROCHLORIDE 6 MILLIGRAM(S): 2 INJECTION INTRAMUSCULAR; INTRAVENOUS; SUBCUTANEOUS at 11:07

## 2023-11-05 RX ADMIN — HYDROMORPHONE HYDROCHLORIDE 4 MILLIGRAM(S): 2 INJECTION INTRAMUSCULAR; INTRAVENOUS; SUBCUTANEOUS at 21:56

## 2023-11-05 RX ADMIN — ENOXAPARIN SODIUM 40 MILLIGRAM(S): 100 INJECTION SUBCUTANEOUS at 11:06

## 2023-11-05 RX ADMIN — Medication 81 MILLIGRAM(S): at 13:16

## 2023-11-05 RX ADMIN — MORPHINE SULFATE 4 MILLIGRAM(S): 50 CAPSULE, EXTENDED RELEASE ORAL at 09:41

## 2023-11-05 RX ADMIN — SODIUM CHLORIDE 1000 MILLILITER(S): 9 INJECTION INTRAMUSCULAR; INTRAVENOUS; SUBCUTANEOUS at 09:12

## 2023-11-05 RX ADMIN — Medication 5 MILLIGRAM(S): at 16:34

## 2023-11-05 RX ADMIN — METHOCARBAMOL 750 MILLIGRAM(S): 500 TABLET, FILM COATED ORAL at 13:16

## 2023-11-05 RX ADMIN — SODIUM CHLORIDE 2 GRAM(S): 9 INJECTION INTRAMUSCULAR; INTRAVENOUS; SUBCUTANEOUS at 15:46

## 2023-11-05 RX ADMIN — Medication 1 TABLET(S): at 11:06

## 2023-11-05 RX ADMIN — HYDROMORPHONE HYDROCHLORIDE 4 MILLIGRAM(S): 2 INJECTION INTRAMUSCULAR; INTRAVENOUS; SUBCUTANEOUS at 22:56

## 2023-11-05 RX ADMIN — HYDROMORPHONE HYDROCHLORIDE 6 MILLIGRAM(S): 2 INJECTION INTRAMUSCULAR; INTRAVENOUS; SUBCUTANEOUS at 19:03

## 2023-11-05 RX ADMIN — MORPHINE SULFATE 4 MILLIGRAM(S): 50 CAPSULE, EXTENDED RELEASE ORAL at 09:11

## 2023-11-05 RX ADMIN — HYDROMORPHONE HYDROCHLORIDE 6 MILLIGRAM(S): 2 INJECTION INTRAMUSCULAR; INTRAVENOUS; SUBCUTANEOUS at 19:33

## 2023-11-05 RX ADMIN — TAMSULOSIN HYDROCHLORIDE 0.4 MILLIGRAM(S): 0.4 CAPSULE ORAL at 21:58

## 2023-11-05 RX ADMIN — SODIUM CHLORIDE 2 GRAM(S): 9 INJECTION INTRAMUSCULAR; INTRAVENOUS; SUBCUTANEOUS at 21:56

## 2023-11-05 RX ADMIN — SODIUM CHLORIDE 1000 MILLILITER(S): 9 INJECTION INTRAMUSCULAR; INTRAVENOUS; SUBCUTANEOUS at 11:38

## 2023-11-05 RX ADMIN — HYDROMORPHONE HYDROCHLORIDE 6 MILLIGRAM(S): 2 INJECTION INTRAMUSCULAR; INTRAVENOUS; SUBCUTANEOUS at 11:37

## 2023-11-05 RX ADMIN — SENNA PLUS 2 TABLET(S): 8.6 TABLET ORAL at 21:57

## 2023-11-05 RX ADMIN — Medication 120 MILLIGRAM(S): at 21:56

## 2023-11-05 RX ADMIN — Medication 5 MILLIGRAM(S): at 09:02

## 2023-11-05 RX ADMIN — METHOCARBAMOL 750 MILLIGRAM(S): 500 TABLET, FILM COATED ORAL at 21:57

## 2023-11-05 RX ADMIN — Medication 25 MILLIGRAM(S): at 18:50

## 2023-11-05 NOTE — H&P ADULT - NSHPPHYSICALEXAM_GEN_ALL_CORE
Wt(kg): --Vital Signs Last 24 Hrs  T(C): 36.6 (05 Nov 2023 08:23), Max: 36.6 (05 Nov 2023 08:23)  T(F): 97.9 (05 Nov 2023 08:23), Max: 97.9 (05 Nov 2023 08:23)  HR: 115 (05 Nov 2023 08:23) (115 - 115)  BP: 123/88 (05 Nov 2023 08:23) (123/88 - 123/88)  BP(mean): --  RR: 16 (05 Nov 2023 08:23) (16 - 16)  SpO2: 94% (05 Nov 2023 08:23) (94% - 94%)    Parameters below as of 05 Nov 2023 08:23  Patient On (Oxygen Delivery Method): room air    PHYSICAL EXAM:  GENERAL: NAD, well-groomed, well-developed  NERVOUS SYSTEM:  Alert & Oriented X3, Good concentration; Motor Strength 5/5 B/L upper and lower extremities; DTRs 2+ intact and symmetric  HEAD:  Atraumatic, Normocephalic  EYES: EOMI, PERRLA, conjunctiva and sclera clear  ENMT: No tonsillar erythema, exudates, or enlargement; Moist mucous membranes, Good dentition, No lesions  NECK: Supple, No JVD, Normal thyroid  CHEST/LUNG: Clear to percussion bilaterally; No rales, rhonchi, wheezing, or rubs  HEART: Regular rate and rhythm; No murmurs, rubs, or gallops  ABDOMEN: Soft, Nontender, Nondistended; Bowel sounds present  EXTREMITIES:  2+ Peripheral Pulses, No clubbing, cyanosis, or edema  LYMPH: No lymphadenopathy noted  SKIN: No rashes or lesions

## 2023-11-05 NOTE — ED ADULT NURSE NOTE - NSICDXPASTMEDICALHX_GEN_ALL_CORE_FT
PAST MEDICAL HISTORY:  Chronic obstructive pulmonary disease, unspecified COPD type     High cholesterol     History of chronic back pain     History of fall from ladder     Insomnia     Rectal prolapse

## 2023-11-05 NOTE — ED ADULT NURSE NOTE - NSFALLHARMRISKINTERV_ED_ALL_ED

## 2023-11-05 NOTE — ED ADULT TRIAGE NOTE - BP NONINVASIVE SYSTOLIC (MM HG)
123 Pharmacist Admission Medication Reconciliation Pending Note    Prior to Admission Medications were reviewed by the pharmacist and pended for provider review during admission medication reconciliation.    Medications were pended by the pharmacist at this time as follows:    Pended Admission Order Reconciliation Actions     Order Name Action Reordered As    aspirin 81 MG tablet Order for Admission aspirin (ECOTRIN) enteric coated tablet 81 mg    Cholecalciferol 5000 units Tab Order for Admission cholecalciferol (VITAMIN D3) tablet 5,000 Units    cyanocobalamin (VITAMIN B-12) 500 MCG tablet Order for Admission cyanocobalamin (Vitamin B-12) tablet 500 mcg    Multiple Vitamins-Minerals (PRESERVISION AREDS 2) Cap Order for Admission vitamin - therapeutic multivitamins w/minerals 1 tablet    ranitidine (ZANTAC) 150 MG tablet Order for Admission famotidine (PEPCID) tablet 20 mg    nebivolol (BYSTOLIC) 5 MG tablet Order for Admission metoPROLOL succinate (TOPROL-XL) ER tablet 50 mg    sertraline (ZOLOFT) 50 MG tablet Order for Admission sertraline (ZOLOFT) tablet 50 mg    atorvastatin (LIPITOR) 10 MG tablet Order for Admission atorvastatin (LIPITOR) tablet 10 mg            Orders Pended To Continue For Hospital Stay     ID Description Pended By When Reason    [id number] aspirin (ECOTRIN) enteric coated tablet 81 mg-EVERY EVENING [facility] Nolet, [facility] 06/12/18 1810     [id number] atorvastatin (LIPITOR) tablet 10 mg-DAILY Liseth Nolet, [facility] 06/12/18 1810     [id number] cholecalciferol (VITAMIN D3) tablet 5,000 Units-DAILY Liseth Nolet, [facility] 06/12/18 1810     [id number] cyanocobalamin (Vitamin B-12) tablet 500 mcg-DAILY Liseth Nolet, [facility] 06/12/18 1810     [id number] vitamin - therapeutic multivitamins w/minerals 1 tablet-DAILY Liseth Nolet, [facility] 06/12/18 1810     [id number] famotidine (PEPCID) tablet 20 mg-DAILY Liseth Nolet, [facility] 06/12/18 1810     [id number] sertraline (ZOLOFT) tablet 50 mg-DAILY Liseth Nolet, [facility] 06/12/18 1810     [id number]  metoPROLOL succinate (TOPROL-XL) ER tablet 50 mg-DAILY Liseth Zuniga [facility] 06/12/18 1810             Pharmacist Notations:           Orders that are ultimately reconciled and signed during admission medication reconciliation may differ from the pended actions above.    Please contact the pharmacist for questions.    Liseth Zuniga [facility]  6/12/2018 6:10 PM

## 2023-11-05 NOTE — ED PROVIDER NOTE - OBJECTIVE STATEMENT
Patient is 67yo M with history of chronic back pain sustained a 12 foot fall from ladder on 10/13 resulting in multiple injuries that ultimately led to Posterior C1-C7 Decompression/Fusion for unstable spine fracture and evacuation of epidural hematoma, decompression of left C5 nerve root. He had routine post operative care in [facility]. He is to wear cervical collar at all times except during sleep. 10/19/23 he had episode of prolapsed hemorrhoids reduced at bedside by surgery team.   Rehab Course was significant for recurrent rectal/hemorrhoidal prolapse. Colorectal surgery was consulted who recommended outpatient follow up for elective surgery.   Prolapse was reduced repeatedly at bedside.   Patient returns to [facility] ED for prolapse with pain and inability to reduce by medical staff at subacute. Pt states he has incredible pain and, in fact, limits his food intake due to fear that he would need to have BM and cause prolapse to fall out again.   They contacted Dr Sharif office after discharge to arrange outpatient follow up date but no answer (they were discharged Friday and they called Saturday).   +blood in stool/bleeding into underwear (not heavily). Pt and family unclear on treatment plan for this - other than they were instructed to call office.

## 2023-11-05 NOTE — ED ADULT NURSE NOTE - NSICDXPASTSURGICALHX_GEN_ALL_CORE_FT
PAST SURGICAL HISTORY:  H/O hernia repair b/l inguinal & abdominal    History of arthroplasty of right knee 5yrs    History of arthroscopy of left shoulder age 15 & right shoulder 1 yrs    History of fusion of cervical spine

## 2023-11-05 NOTE — H&P ADULT - NSHPROSALLOTHERNEGRD_GEN_ALL_CORE
well developed, well nourished , in no acute distress , ambulating without difficulty , normal communication ability All other review of systems negative, except as noted in HPI

## 2023-11-05 NOTE — H&P ADULT - NSHPLABSRESULTS_GEN_ALL_CORE
LABS:                        13.1   9.63  )-----------( 464      ( 05 Nov 2023 09:14 )             40.6     11-05    137  |  100  |  18  ----------------------------<  122<H>  3.8   |  30  |  0.84    Ca    9.8      05 Nov 2023 09:14    TPro  7.6  /  Alb  3.1<L>  /  TBili  0.5  /  DBili  x   /  AST  15  /  ALT  31  /  AlkPhos  148<H>  11-05    Urinalysis Basic - ( 05 Nov 2023 09:14 )    Color: x / Appearance: x / SG: x / pH: x  Gluc: 122 mg/dL / Ketone: x  / Bili: x / Urobili: x   Blood: x / Protein: x / Nitrite: x   Leuk Esterase: x / RBC: x / WBC x   Sq Epi: x / Non Sq Epi: x / Bacteria: x    CAPILLARY BLOOD GLUCOSE    Urinalysis Basic - ( 05 Nov 2023 09:14 )    Color: x / Appearance: x / SG: x / pH: x  Gluc: 122 mg/dL / Ketone: x  / Bili: x / Urobili: x   Blood: x / Protein: x / Nitrite: x   Leuk Esterase: x / RBC: x / WBC x   Sq Epi: x / Non Sq Epi: x / Bacteria: x    RADIOLOGY & ADDITIONAL TESTS:    Consultant(s) Notes Reviewed:  [x ] YES  [ ] NO  Care Discussed with Consultants/Other Providers [ x] YES  [ ] NO d/w Dr. Davalos, Dr. Reyes  Imaging Personally Reviewed:  [X] YES  [ ] NO  Chest X-Ray (personally reviewed by me): Portable film, fair inspiratory effort. No focal consolidation

## 2023-11-05 NOTE — H&P ADULT - HISTORY OF PRESENT ILLNESS
SHARI VALLEJO is a 68 year old Male with PMH of chronic pain, recent fall (10/13) with posterior C1-C7 decompression/fusion for spine fracture, TBI (recently discharged [place] [facility] [place] 11/3), COPD, Vertebral Artery Dissection, PAT who presents to the ER complaining of severe rectal pain and rectal prolapse.         On ER arrival: T 97.9; /88; RR 16; ; sat 94% on room air  Given 5mg PO Valium, 4mg IVP Morphine, 1L NS SHARI VALLEJO is a 68 year old Male with PMH of chronic pain, recent fall (10/13) with posterior C1-C7 decompression/fusion for spine fracture, TBI (recently discharged from [facility] [place] 11/3), COPD, Vertebral Artery Dissection, PAT who presents to the ER complaining of severe rectal pain and rectal prolapse.     Patient states he has been having on/off rectal pain with prolapse since acute rehab admission, it was reduced several times in rehab. Recently discharged and prolapsed again yesterday resulting in severe rectal pain. Was unable to be reduced at [facility] which prompted ER visit.   Denies fever, chills, chest pain, shortness of breath, nausea, vomiting.   +anxiety, +blood in stool     On ER arrival: T 97.9; /88; RR 16; ; sat 94% on room air  Given 5mg PO Valium, 4mg IVP Morphine, 1L NS    ER contact [place]-rectal surgery who recommended admission for further management.

## 2023-11-05 NOTE — PATIENT PROFILE ADULT - NSPROMEDSBROUGHTTOHOSP_GEN_A_NUR
DIABETES EDUCATION    Met with Dorota Reed for approx 20 minutes for inpatient diabetes education. He had an appointment scheduled for 5/20 for outpatient diabetes education but had to cancel d/t being admitted into the hospital.  Dorota Reed was recently diagnosed with type 2 diabetes with an A1C of 6.5%. States his \"mother had diabetes for 20 years so [he] was expecting it. \" He is taking metformin 500 mg daily. At home he was monitoring his blood sugar twice a day; fasting in the AM and before supper, with \"low\" readings. All SAFE handouts reviewed. Denies questions at this time. States will read over the handouts again after supper and will probably have questions then. Encouraged to call with any questions or concerns. Labs:   Lab Results   Component Value Date    LABA1C 6.5 (H) 04/30/2021    LABA1C 6.2 05/27/2020    LABA1C 6.0 (H) 05/23/2018     Lab Results   Component Value Date    LABMICR 6 06/24/2014    CREATININE 0.90 05/23/2021         Did Dietitian see patient? [x] Yes  [] No    Does patient have a monitor and strips? [x] Yes      [] No        Frequency of monitoring at home: 2 x a day. Fasting in AM and before supper. Can patient afford medications? [x]  Yes     [] No   Medication taking at home:    [x] Oral medication - Metformin [] Insulin     [] Other     [] None     New to insulin? [] Yes       [] No   Instructed on insulin use? [] Yes      [] No       [x] N/A    \"SAFE\" HANDOUTS:   [x] Where do I Begin?  booklet   [x] What is Diabetes? [x] What is Insulin?               [x] Hypo- hyperglycemia    [x] Diabetes pills                     [x] How to Check your sugar  [x] Be safe with needles   [x] Sick Days with Diabetes    [x] When to Call the Doctor  [] Other                                  [] Type 2 DM and adding Insulin       [x] Carbohydrate Counting for People with Diabetes        Marilyn Hernandez RN  [facility]  Diabetes clinic educator  5/26/2021  5:10 PM no

## 2023-11-05 NOTE — ED ADULT NURSE NOTE - OBJECTIVE STATEMENT
Pt. to ED from [facility] for rectal pain.  Pt. discharged from [facility] on Friday with rectal prolapse and to follow up with surgeon.  Pt. states that since then the prolapse has gotten worse again.  Pt. given dilaudid at 230am from facility with no relief.  Rectum prolapse oozing serous sanguineous fluid.  Pt. with spinal cord injury and unable to raise bilateral arms but has use of legs.

## 2023-11-05 NOTE — ED PROVIDER NOTE - CLINICAL SUMMARY MEDICAL DECISION MAKING FREE TEXT BOX
Patient is 67yo M with history of chronic back pain sustained a 12 foot fall from ladder on 10/13 resulting in multiple injuries that ultimately led to Posterior C1-C7 Decompression/Fusion for unstable spine fracture and evacuation of epidural hematoma, decompression of left C5 nerve root. He had routine post operative care in [facility]. He is to wear cervical collar at all times except during sleep. 10/19/23 he had episode of prolapsed hemorrhoids reduced at bedside by surgery team.   Rehab Course was significant for recurrent rectal/hemorrhoidal prolapse. Colorectal surgery was consulted who recommended outpatient follow up for elective surgery.   Prolapse was reduced repeatedly at bedside.   Patient returns to [facility] ED for prolapse with pain and inability to reduce by medical staff at subacute. Pt states he has incredible pain and, in fact, limits his food intake due to fear that he would need to have BM and cause prolapse to fall out again.   They contacted Dr Sharif office after discharge to arrange outpatient follow up date but no answer (they were discharged Friday and they called Saturday).   +blood in stool/bleeding into underwear (not heavily). Pt and family unclear on treatment plan for this - other than they were instructed to call office.    Exam as stated. Pt needs pain control and reduction. Unable to fully reduce. Contacted Dr Mejia/ colorectal.     Plan for admission and team will follow and advise accordingly. Patient is 69yo M with history of chronic back pain sustained a 12 foot fall from ladder on 10/13 resulting in multiple injuries that ultimately led to Posterior C1-C7 Decompression/Fusion for unstable spine fracture and evacuation of epidural hematoma, decompression of left C5 nerve root. He had routine post operative care in [facility]. He is to wear cervical collar at all times except during sleep. 10/19/23 he had episode of prolapsed hemorrhoids reduced at bedside by surgery team.   Rehab Course was significant for recurrent rectal/hemorrhoidal prolapse. Colorectal surgery was consulted who recommended outpatient follow up for elective surgery.   Prolapse was reduced repeatedly at bedside.   Patient returns to [facility] ED for prolapse with pain and inability to reduce by medical staff at subacute. Pt states he has incredible pain and, in fact, limits his food intake due to fear that he would need to have BM and cause prolapse to fall out again.   They contacted Dr Sharif office after discharge to arrange outpatient follow up date but no answer (they were discharged Friday and they called Saturday).   +blood in stool/bleeding into underwear (not heavily). Pt and family unclear on treatment plan for this - other than they were instructed to call office.    Exam as stated. Pt needs pain control and reduction. Unable to fully reduce. Contacted Dr Mejia/ colorectal.     Plan for admission and team will follow and advise accordingly. d/w pt and wife. Patient is 69yo M with history of chronic back pain sustained a 12 foot fall from ladder on 10/13 resulting in multiple injuries that ultimately led to Posterior C1-C7 Decompression/Fusion for unstable spine fracture and evacuation of epidural hematoma, decompression of left C5 nerve root. He had routine post operative care in [facility]. He is to wear cervical collar at all times except during sleep. 10/19/23 he had episode of prolapsed hemorrhoids reduced at bedside by surgery team.   Rehab Course was significant for recurrent rectal/hemorrhoidal prolapse. Colorectal surgery was consulted who recommended outpatient follow up for elective surgery.   Prolapse was reduced repeatedly at bedside.   Patient returns to [facility] ED for prolapse with pain and inability to reduce by medical staff at subacute. Pt states he has incredible pain and, in fact, limits his food intake due to fear that he would need to have BM and cause prolapse to fall out again.   They contacted Dr Sharif office after discharge to arrange outpatient follow up date but no answer (they were discharged Friday and they called Saturday).   +blood in stool/bleeding into underwear (not heavily). Pt and family unclear on treatment plan for this - other than they were instructed to call office.    Exam as stated. Pt needs pain control and reduction. Unable to fully reduce. Contacted Dr Mejia/ colorectal.     Plan for admission and team will follow and advise accordingly. d/w pt and wife.    IF WIFE IS NOT AT BEDSIDE WHEN COLORECTAL TEAM SEES PATIENT, THEY WOULD LIKE TEAM TO CALL HER TO DISCUSS FURTHER.

## 2023-11-05 NOTE — H&P ADULT - ASSESSMENT
68 year old Male with PMH of chronic pain, recent fall (10/13) with posterior C1-C7 decompression/fusion for spine fracture, TBI (recently discharged from [facility] [place] 11/3), COPD, Vertebral Artery Dissection, PAT who presents to the ER complaining of severe rectal pain and rectal prolapse.     Rectal Pain - prolapse vs hemorrhoid  - Unsuccessful prolapse in ER  - Colorectal consulted by ER, will eval  - Analgesics prn  - Fiber diet, hydration    Recent fall with TBI  Unstable cervical spine fracture s/p C1-C7 Decompression/Fusion and evacuation of EDH with cord compression   - Cervical collar   - Pain management     Vertebral Artery Dissection   - ASA 81mg daily     COPD  - not on inhalers  - oxygen prn    Tachycardia/PAT  - Ectopic atrial rhythm/PAT noted on tele 10/19  - Cont metoprolol   - Continue cardizem 120mg cd  - EP eval was deferred as pt not likely a candidate for ablation at this time  - Follow up with Dr Zeng as outpatient    Aortic Root Dilation  - Stable on recent outpt echo from 9/5/23  - Follow up for repeat echo q 12 months    Chronic Hyponatremia   - on salt tab 2g q8h  - wean down/off as tolerated     DVT Prophylaxis:  - Lovenox    68 year old Male with PMH of chronic pain, recent fall (10/13) with posterior C1-C7 decompression/fusion for spine fracture, TBI (recently discharged from [facility] [place] 11/3), COPD, Vertebral Artery Dissection, PAT who presents to the ER complaining of severe rectal pain and rectal prolapse.     Rectal Pain - prolapse vs hemorrhoid  - Unsuccessful prolapse in ER  - Colorectal consulted by ER, will eval  - Analgesics prn  - Fiber diet, hydration    Recent fall with TBI  Unstable cervical spine fracture s/p C1-C7 Decompression/Fusion and evacuation of EDH with cord compression   - Cervical collar   - Pain management     Vertebral Artery Dissection   - ASA 81mg daily     COPD  - not on inhalers  - oxygen prn    Tachycardia/PAT  - Ectopic atrial rhythm/PAT noted on tele 10/19  - Cont metoprolol   - Continue cardizem 120mg cd  - EP eval was deferred as pt not likely a candidate for ablation at this time  - Follow up with Dr Zeng as outpatient    Aortic Root Dilation  - Stable on recent outpt echo from 9/5/23  - Follow up for repeat echo q 12 months    Chronic Hyponatremia   - on salt tab 2g q8h  - wean down/off as tolerated     DVT Prophylaxis:  - Lovenox     Updated wife at bedside.     IMPROVE VTE Individual Risk Assessment          RISK                                                          Points  [  ] Previous VTE                                                3  [  ] Thrombophilia                                             2  [  ] Lower limb paralysis                                   2        (unable to hold up >15 seconds)    [  ] Current Cancer                                             2         (within 6 months)  [  ] Immobilization > 24 hrs                              1  [  ] ICU/CCU stay > 24 hours                             1  [ X ] Age > 60                                                         1    IMPROVE VTE Score:         [    1     ]

## 2023-11-06 ENCOUNTER — TRANSCRIPTION ENCOUNTER (OUTPATIENT)
Age: 68
End: 2023-11-06

## 2023-11-06 ENCOUNTER — RESULT REVIEW (OUTPATIENT)
Age: 68
End: 2023-11-06

## 2023-11-06 LAB
ALBUMIN SERPL ELPH-MCNC: 2.4 G/DL — LOW (ref 3.3–5)
ALBUMIN SERPL ELPH-MCNC: 2.4 G/DL — LOW (ref 3.3–5)
ALP SERPL-CCNC: 119 U/L — SIGNIFICANT CHANGE UP (ref 40–120)
ALP SERPL-CCNC: 119 U/L — SIGNIFICANT CHANGE UP (ref 40–120)
ALT FLD-CCNC: 26 U/L — SIGNIFICANT CHANGE UP (ref 10–45)
ALT FLD-CCNC: 26 U/L — SIGNIFICANT CHANGE UP (ref 10–45)
ANION GAP SERPL CALC-SCNC: 7 MMOL/L — SIGNIFICANT CHANGE UP (ref 5–17)
ANION GAP SERPL CALC-SCNC: 7 MMOL/L — SIGNIFICANT CHANGE UP (ref 5–17)
APTT BLD: 29 SEC — SIGNIFICANT CHANGE UP (ref 24.5–35.6)
APTT BLD: 29 SEC — SIGNIFICANT CHANGE UP (ref 24.5–35.6)
AST SERPL-CCNC: 12 U/L — SIGNIFICANT CHANGE UP (ref 10–40)
AST SERPL-CCNC: 12 U/L — SIGNIFICANT CHANGE UP (ref 10–40)
BILIRUB SERPL-MCNC: 0.4 MG/DL — SIGNIFICANT CHANGE UP (ref 0.2–1.2)
BILIRUB SERPL-MCNC: 0.4 MG/DL — SIGNIFICANT CHANGE UP (ref 0.2–1.2)
BUN SERPL-MCNC: 15 MG/DL — SIGNIFICANT CHANGE UP (ref 7–23)
BUN SERPL-MCNC: 15 MG/DL — SIGNIFICANT CHANGE UP (ref 7–23)
CALCIUM SERPL-MCNC: 9.1 MG/DL — SIGNIFICANT CHANGE UP (ref 8.4–10.5)
CALCIUM SERPL-MCNC: 9.1 MG/DL — SIGNIFICANT CHANGE UP (ref 8.4–10.5)
CHLORIDE SERPL-SCNC: 103 MMOL/L — SIGNIFICANT CHANGE UP (ref 96–108)
CHLORIDE SERPL-SCNC: 103 MMOL/L — SIGNIFICANT CHANGE UP (ref 96–108)
CO2 SERPL-SCNC: 27 MMOL/L — SIGNIFICANT CHANGE UP (ref 22–31)
CO2 SERPL-SCNC: 27 MMOL/L — SIGNIFICANT CHANGE UP (ref 22–31)
CREAT SERPL-MCNC: 0.7 MG/DL — SIGNIFICANT CHANGE UP (ref 0.5–1.3)
CREAT SERPL-MCNC: 0.7 MG/DL — SIGNIFICANT CHANGE UP (ref 0.5–1.3)
EGFR: 99 ML/MIN/1.73M2 — SIGNIFICANT CHANGE UP
EGFR: 99 ML/MIN/1.73M2 — SIGNIFICANT CHANGE UP
GLUCOSE SERPL-MCNC: 125 MG/DL — HIGH (ref 70–99)
GLUCOSE SERPL-MCNC: 125 MG/DL — HIGH (ref 70–99)
HCT VFR BLD CALC: 36.7 % — LOW (ref 39–50)
HCT VFR BLD CALC: 36.7 % — LOW (ref 39–50)
HGB BLD-MCNC: 11.6 G/DL — LOW (ref 13–17)
HGB BLD-MCNC: 11.6 G/DL — LOW (ref 13–17)
INR BLD: 1.06 RATIO — SIGNIFICANT CHANGE UP (ref 0.85–1.18)
INR BLD: 1.06 RATIO — SIGNIFICANT CHANGE UP (ref 0.85–1.18)
MCHC RBC-ENTMCNC: 29.3 PG — SIGNIFICANT CHANGE UP (ref 27–34)
MCHC RBC-ENTMCNC: 29.3 PG — SIGNIFICANT CHANGE UP (ref 27–34)
MCHC RBC-ENTMCNC: 31.6 GM/DL — LOW (ref 32–36)
MCHC RBC-ENTMCNC: 31.6 GM/DL — LOW (ref 32–36)
MCV RBC AUTO: 92.7 FL — SIGNIFICANT CHANGE UP (ref 80–100)
MCV RBC AUTO: 92.7 FL — SIGNIFICANT CHANGE UP (ref 80–100)
NRBC # BLD: 0 /100 WBCS — SIGNIFICANT CHANGE UP (ref 0–0)
NRBC # BLD: 0 /100 WBCS — SIGNIFICANT CHANGE UP (ref 0–0)
PLATELET # BLD AUTO: 412 K/UL — HIGH (ref 150–400)
PLATELET # BLD AUTO: 412 K/UL — HIGH (ref 150–400)
POTASSIUM SERPL-MCNC: 3.9 MMOL/L — SIGNIFICANT CHANGE UP (ref 3.5–5.3)
POTASSIUM SERPL-MCNC: 3.9 MMOL/L — SIGNIFICANT CHANGE UP (ref 3.5–5.3)
POTASSIUM SERPL-SCNC: 3.9 MMOL/L — SIGNIFICANT CHANGE UP (ref 3.5–5.3)
POTASSIUM SERPL-SCNC: 3.9 MMOL/L — SIGNIFICANT CHANGE UP (ref 3.5–5.3)
PROT SERPL-MCNC: 6.4 G/DL — SIGNIFICANT CHANGE UP (ref 6–8.3)
PROT SERPL-MCNC: 6.4 G/DL — SIGNIFICANT CHANGE UP (ref 6–8.3)
PROTHROM AB SERPL-ACNC: 12.1 SEC — SIGNIFICANT CHANGE UP (ref 9.5–13)
PROTHROM AB SERPL-ACNC: 12.1 SEC — SIGNIFICANT CHANGE UP (ref 9.5–13)
RBC # BLD: 3.96 M/UL — LOW (ref 4.2–5.8)
RBC # BLD: 3.96 M/UL — LOW (ref 4.2–5.8)
RBC # FLD: 13.2 % — SIGNIFICANT CHANGE UP (ref 10.3–14.5)
RBC # FLD: 13.2 % — SIGNIFICANT CHANGE UP (ref 10.3–14.5)
SODIUM SERPL-SCNC: 137 MMOL/L — SIGNIFICANT CHANGE UP (ref 135–145)
SODIUM SERPL-SCNC: 137 MMOL/L — SIGNIFICANT CHANGE UP (ref 135–145)
WBC # BLD: 9.92 K/UL — SIGNIFICANT CHANGE UP (ref 3.8–10.5)
WBC # BLD: 9.92 K/UL — SIGNIFICANT CHANGE UP (ref 3.8–10.5)
WBC # FLD AUTO: 9.92 K/UL — SIGNIFICANT CHANGE UP (ref 3.8–10.5)
WBC # FLD AUTO: 9.92 K/UL — SIGNIFICANT CHANGE UP (ref 3.8–10.5)

## 2023-11-06 PROCEDURE — ZZZZZ: CPT

## 2023-11-06 PROCEDURE — 99222 1ST HOSP IP/OBS MODERATE 55: CPT

## 2023-11-06 PROCEDURE — 99221 1ST HOSP IP/OBS SF/LOW 40: CPT | Mod: 57

## 2023-11-06 PROCEDURE — 88304 TISSUE EXAM BY PATHOLOGIST: CPT | Mod: 26

## 2023-11-06 PROCEDURE — 99233 SBSQ HOSP IP/OBS HIGH 50: CPT | Mod: FS

## 2023-11-06 PROCEDURE — 46260 REMOVE IN/EX HEM GROUPS 2+: CPT

## 2023-11-06 RX ORDER — LACTULOSE 10 G/15ML
10 SOLUTION ORAL
Refills: 0 | Status: DISCONTINUED | OUTPATIENT
Start: 2023-11-06 | End: 2023-11-11

## 2023-11-06 RX ORDER — ASPIRIN/CALCIUM CARB/MAGNESIUM 324 MG
81 TABLET ORAL DAILY
Refills: 0 | Status: DISCONTINUED | OUTPATIENT
Start: 2023-11-06 | End: 2023-11-11

## 2023-11-06 RX ORDER — METHOCARBAMOL 500 MG/1
750 TABLET, FILM COATED ORAL THREE TIMES A DAY
Refills: 0 | Status: DISCONTINUED | OUTPATIENT
Start: 2023-11-06 | End: 2023-11-11

## 2023-11-06 RX ORDER — TAMSULOSIN HYDROCHLORIDE 0.4 MG/1
0.4 CAPSULE ORAL AT BEDTIME
Refills: 0 | Status: DISCONTINUED | OUTPATIENT
Start: 2023-11-06 | End: 2023-11-07

## 2023-11-06 RX ORDER — METOPROLOL TARTRATE 50 MG
25 TABLET ORAL
Refills: 0 | Status: DISCONTINUED | OUTPATIENT
Start: 2023-11-06 | End: 2023-11-11

## 2023-11-06 RX ORDER — SODIUM CHLORIDE 9 MG/ML
1000 INJECTION, SOLUTION INTRAVENOUS
Refills: 0 | Status: DISCONTINUED | OUTPATIENT
Start: 2023-11-06 | End: 2023-11-06

## 2023-11-06 RX ORDER — HYDROMORPHONE HYDROCHLORIDE 2 MG/ML
4 INJECTION INTRAMUSCULAR; INTRAVENOUS; SUBCUTANEOUS EVERY 4 HOURS
Refills: 0 | Status: DISCONTINUED | OUTPATIENT
Start: 2023-11-06 | End: 2023-11-11

## 2023-11-06 RX ORDER — POLYETHYLENE GLYCOL 3350 17 G/17G
17 POWDER, FOR SOLUTION ORAL
Refills: 0 | Status: DISCONTINUED | OUTPATIENT
Start: 2023-11-06 | End: 2023-11-08

## 2023-11-06 RX ORDER — DIAZEPAM 5 MG
5 TABLET ORAL EVERY 6 HOURS
Refills: 0 | Status: DISCONTINUED | OUTPATIENT
Start: 2023-11-06 | End: 2023-11-11

## 2023-11-06 RX ORDER — DIAZEPAM 5 MG
5 TABLET ORAL EVERY 4 HOURS
Refills: 0 | Status: DISCONTINUED | OUTPATIENT
Start: 2023-11-06 | End: 2023-11-06

## 2023-11-06 RX ORDER — ENOXAPARIN SODIUM 100 MG/ML
40 INJECTION SUBCUTANEOUS EVERY 24 HOURS
Refills: 0 | Status: DISCONTINUED | OUTPATIENT
Start: 2023-11-06 | End: 2023-11-11

## 2023-11-06 RX ORDER — HYDROMORPHONE HYDROCHLORIDE 2 MG/ML
0.5 INJECTION INTRAMUSCULAR; INTRAVENOUS; SUBCUTANEOUS
Refills: 0 | Status: DISCONTINUED | OUTPATIENT
Start: 2023-11-06 | End: 2023-11-06

## 2023-11-06 RX ORDER — ONDANSETRON 8 MG/1
4 TABLET, FILM COATED ORAL ONCE
Refills: 0 | Status: DISCONTINUED | OUTPATIENT
Start: 2023-11-06 | End: 2023-11-06

## 2023-11-06 RX ORDER — DIAZEPAM 5 MG
5 TABLET ORAL ONCE
Refills: 0 | Status: DISCONTINUED | OUTPATIENT
Start: 2023-11-06 | End: 2023-11-06

## 2023-11-06 RX ORDER — SODIUM CHLORIDE 9 MG/ML
2 INJECTION INTRAMUSCULAR; INTRAVENOUS; SUBCUTANEOUS EVERY 8 HOURS
Refills: 0 | Status: DISCONTINUED | OUTPATIENT
Start: 2023-11-06 | End: 2023-11-11

## 2023-11-06 RX ORDER — HYDROMORPHONE HYDROCHLORIDE 2 MG/ML
6 INJECTION INTRAMUSCULAR; INTRAVENOUS; SUBCUTANEOUS EVERY 6 HOURS
Refills: 0 | Status: DISCONTINUED | OUTPATIENT
Start: 2023-11-06 | End: 2023-11-11

## 2023-11-06 RX ORDER — DILTIAZEM HCL 120 MG
120 CAPSULE, EXT RELEASE 24 HR ORAL DAILY
Refills: 0 | Status: DISCONTINUED | OUTPATIENT
Start: 2023-11-07 | End: 2023-11-11

## 2023-11-06 RX ORDER — ACETAMINOPHEN 500 MG
650 TABLET ORAL EVERY 6 HOURS
Refills: 0 | Status: DISCONTINUED | OUTPATIENT
Start: 2023-11-06 | End: 2023-11-11

## 2023-11-06 RX ORDER — LANOLIN ALCOHOL/MO/W.PET/CERES
3 CREAM (GRAM) TOPICAL AT BEDTIME
Refills: 0 | Status: DISCONTINUED | OUTPATIENT
Start: 2023-11-06 | End: 2023-11-11

## 2023-11-06 RX ADMIN — Medication 5 MILLIGRAM(S): at 22:41

## 2023-11-06 RX ADMIN — Medication 25 MILLIGRAM(S): at 05:15

## 2023-11-06 RX ADMIN — POLYETHYLENE GLYCOL 3350 17 GRAM(S): 17 POWDER, FOR SOLUTION ORAL at 05:49

## 2023-11-06 RX ADMIN — Medication 5 MILLIGRAM(S): at 10:53

## 2023-11-06 RX ADMIN — HYDROMORPHONE HYDROCHLORIDE 6 MILLIGRAM(S): 2 INJECTION INTRAMUSCULAR; INTRAVENOUS; SUBCUTANEOUS at 04:26

## 2023-11-06 RX ADMIN — HYDROMORPHONE HYDROCHLORIDE 4 MILLIGRAM(S): 2 INJECTION INTRAMUSCULAR; INTRAVENOUS; SUBCUTANEOUS at 09:52

## 2023-11-06 RX ADMIN — ENOXAPARIN SODIUM 40 MILLIGRAM(S): 100 INJECTION SUBCUTANEOUS at 22:43

## 2023-11-06 RX ADMIN — TAMSULOSIN HYDROCHLORIDE 0.4 MILLIGRAM(S): 0.4 CAPSULE ORAL at 22:41

## 2023-11-06 RX ADMIN — Medication 5 MILLIGRAM(S): at 05:15

## 2023-11-06 RX ADMIN — HYDROMORPHONE HYDROCHLORIDE 4 MILLIGRAM(S): 2 INJECTION INTRAMUSCULAR; INTRAVENOUS; SUBCUTANEOUS at 23:04

## 2023-11-06 RX ADMIN — METHOCARBAMOL 750 MILLIGRAM(S): 500 TABLET, FILM COATED ORAL at 05:15

## 2023-11-06 RX ADMIN — HYDROMORPHONE HYDROCHLORIDE 6 MILLIGRAM(S): 2 INJECTION INTRAMUSCULAR; INTRAVENOUS; SUBCUTANEOUS at 18:06

## 2023-11-06 RX ADMIN — SODIUM CHLORIDE 2 GRAM(S): 9 INJECTION INTRAMUSCULAR; INTRAVENOUS; SUBCUTANEOUS at 22:42

## 2023-11-06 RX ADMIN — POLYETHYLENE GLYCOL 3350 17 GRAM(S): 17 POWDER, FOR SOLUTION ORAL at 18:06

## 2023-11-06 RX ADMIN — SODIUM CHLORIDE 2 GRAM(S): 9 INJECTION INTRAMUSCULAR; INTRAVENOUS; SUBCUTANEOUS at 05:15

## 2023-11-06 RX ADMIN — HYDROMORPHONE HYDROCHLORIDE 6 MILLIGRAM(S): 2 INJECTION INTRAMUSCULAR; INTRAVENOUS; SUBCUTANEOUS at 03:56

## 2023-11-06 RX ADMIN — Medication 120 MILLIGRAM(S): at 05:49

## 2023-11-06 RX ADMIN — HYDROMORPHONE HYDROCHLORIDE 4 MILLIGRAM(S): 2 INJECTION INTRAMUSCULAR; INTRAVENOUS; SUBCUTANEOUS at 08:52

## 2023-11-06 RX ADMIN — METHOCARBAMOL 750 MILLIGRAM(S): 500 TABLET, FILM COATED ORAL at 22:42

## 2023-11-06 RX ADMIN — HYDROMORPHONE HYDROCHLORIDE 6 MILLIGRAM(S): 2 INJECTION INTRAMUSCULAR; INTRAVENOUS; SUBCUTANEOUS at 18:36

## 2023-11-06 RX ADMIN — Medication 25 MILLIGRAM(S): at 18:06

## 2023-11-06 NOTE — BRIEF OPERATIVE NOTE - SPECIMENS
hemorrhoidal tissue x 3 R anterior internal and external hemorrhoid; L lateral internal and external hemorrhoid; and Posterior internal and external hemorrhoid.

## 2023-11-06 NOTE — PROGRESS NOTE ADULT - ASSESSMENT
68 year old Male with PMH of chronic pain, recent fall (10/13) with posterior C1-C7 decompression/fusion for spine fracture, TBI (recently discharged from [facility] [place] 11/3), COPD, Vertebral Artery Dissection, PAT who presents to the ER complaining of severe rectal pain and rectal prolapse.     Rectal Pain - likely hemorrhoid vs prolapse  - Unsuccessful reduction in ER  - Colorectal recs. Possible surgery later today  -NPO  - Analgesics/antispasmodics prn  - hydration    Recent fall with TBI  Unstable cervical spine fracture s/p C1-C7 Decompression/Fusion and evacuation of EDH with cord compression   - Cervical collar   - Pain management     Vertebral Artery Dissection   - ASA 81mg daily     COPD  - not on inhalers  - oxygen prn    Tachycardia/PAT  - Ectopic atrial rhythm/PAT noted on tele 10/19  - Cont metoprolol   - Continue cardizem 120mg cd  - EP eval was deferred as pt not likely a candidate for ablation at this time  - Follow up with Dr Zeng as outpatient    Aortic Root Dilation  - Stable on recent outpt echo from 9/5/23  - Follow up for repeat echo q 12 months    Chronic Hyponatremia   - on salt tab 2g q8h  - wean down/off as tolerated     DVT Prophylaxis:  - Lovenox     Wife Desi updated

## 2023-11-06 NOTE — PROGRESS NOTE ADULT - ASSESSMENT
68 year old Male with PMH of chronic pain, recent fall (10/13) with posterior C1-C7 decompression/fusion for spine fracture, TBI (recently discharged from [facility] [place] 11/3), COPD, Vertebral Artery Dissection, PAT who presents to the ER complaining of severe rectal pain and rectal prolapse.     Rectal Pain - likely 2/2 irreducible hemorrhoids  - Colorectal consulted, d/w at bedside this AM. planning for OR this afternoon.  - OR for hemorrhoidectomy  - NPO for OR today  - Analgesics prn    ekg on admission, appears grossly unchanged form prior  Ni Sonya-operative risk of 0.7%  medically optimized for proposed procedure    Recent fall with TBI  Unstable cervical spine fracture s/p C1-C7 Decompression/Fusion and evacuation of EDH with cord compression   - Cervical collar   - Pain management     Vertebral Artery Dissection   - ASA 81mg daily     COPD  - not on inhalers  - oxygen prn    Tachycardia/PAT  - Ectopic atrial rhythm/PAT noted on tele 10/19  - Cont metoprolol   - Continue cardizem 120mg cd  - EP eval was deferred as pt not likely a candidate for ablation at this time    - Cardio consulted for cardiac risk stratification, f/u recs  -keg on admission appears unchanged    Aortic Root Dilation  - Stable on recent outpt echo from 9/5/23  - Follow up for repeat echo q 12 months    Chronic Hyponatremia. Na stable at 137.  - on salt tab 2g q8h  - wean down/off as tolerated     DVT Prophylaxis:  - Lovenox

## 2023-11-06 NOTE — CONSULT NOTE ADULT - ASSESSMENT
68M with significant PMHx here with irreducible hemorrhoids  - NPO  - OR today for hemorrhoidectomy  - Medical/cardiac clearances  - Hold LVX today  - Medical management as per primary team  Plan discussed with Dr. Sharif

## 2023-11-06 NOTE — CONSULT NOTE ADULT - SUBJECTIVE AND OBJECTIVE BOX
GENERAL SURGERY CONSULT NOTE    Patient is a 68y old  Male who presents with a chief complaint of rectal pain (05 Nov 2023 10:12)    HPI:  SHARI VALLEJO is a 68 year old Male with PMH of chronic pain, recent fall (10/13) with posterior C1-C7 decompression/fusion for spine fracture, TBI (recently discharged from [facility] 11/3), COPD, Vertebral Artery Dissection, PAT who presents to the ER complaining of severe rectal pain and rectal prolapse.     Patient states he has been having on/off rectal pain with prolapse since acute rehab admission, it was reduced several times in rehab. Recently discharged and prolapsed again yesterday resulting in severe rectal pain. Was unable to be reduced at [facility] which prompted ER visit.   Denies fever, chills, chest pain, shortness of breath, nausea, vomiting.   +anxiety, +blood in stool     On ER arrival: T 97.9; /88; RR 16; ; sat 94% on room air  Given 5mg PO Valium, 4mg IVP Morphine, 1L NS    ER contact [facility]-rectal surgery who recommended admission for further management.  (05 Nov 2023 10:12)    Colorectal surgery consulted for rectal prolapse and pain. Pt reports this has been intermittently going on since acute rehab admission, previously reduced in rehab but irreducible now. Reports pain is 8-10/10, valium along with ice and heat pack alleviates the pain for some time.       PAST MEDICAL & SURGICAL HISTORY:  High cholesterol      Insomnia      History of chronic back pain      Chronic obstructive pulmonary disease, unspecified COPD type      History of fall from ladder      Rectal prolapse      History of arthroscopy of left shoulder  age 15 & right shoulder 3 yrs      H/O hernia repair  b/l inguinal & abdominal      History of arthroplasty of right knee  5yrs      History of fusion of cervical spine          Review of Systems:  Contained within HPI.    MEDICATIONS  (STANDING):  aspirin enteric coated 81 milliGRAM(s) Oral daily  diazepam  Injectable 5 milliGRAM(s) IV Push once  diltiazem    milliGRAM(s) Oral daily  methocarbamol 750 milliGRAM(s) Oral three times a day  metoprolol tartrate 25 milliGRAM(s) Oral two times a day  multivitamin 1 Tablet(s) Oral daily  polyethylene glycol 3350 17 Gram(s) Oral two times a day  senna 2 Tablet(s) Oral at bedtime  sodium chloride 2 Gram(s) Oral every 8 hours  tamsulosin 0.4 milliGRAM(s) Oral at bedtime    MEDICATIONS  (PRN):  acetaminophen     Tablet .. 650 milliGRAM(s) Oral every 6 hours PRN Mild Pain (1 - 3)  aluminum hydroxide/magnesium hydroxide/simethicone Suspension 30 milliLiter(s) Oral every 4 hours PRN Dyspepsia  diazepam    Tablet 5 milliGRAM(s) Oral every 4 hours PRN spasm pain  HYDROmorphone   Tablet 6 milliGRAM(s) Oral every 6 hours PRN Severe Pain (7 - 10)  HYDROmorphone   Tablet 4 milliGRAM(s) Oral every 4 hours PRN Moderate Pain (4 - 6)  lactulose Syrup 10 Gram(s) Oral two times a day PRN Constipation  melatonin 3 milliGRAM(s) Oral at bedtime PRN Insomnia  ondansetron Injectable 4 milliGRAM(s) IV Push every 8 hours PRN Nausea and/or Vomiting  witch hazel Pads 1 Application(s) Topical three times a day PRN Hemmorrhoids      Allergies    Originally Entered as [Unknown] reaction to [PUMPKIN SEEDS] (Unknown)  Vioxx (Unknown)  Nuts (Unknown)    Intolerances        SOCIAL HISTORY       FAMILY HISTORY:  Family history of early CAD (Sibling)        Vital Signs Last 24 Hrs  T(C): 36.4 (06 Nov 2023 08:49), Max: 36.6 (06 Nov 2023 04:50)  T(F): 97.6 (06 Nov 2023 08:49), Max: 97.8 (06 Nov 2023 04:50)  HR: 82 (06 Nov 2023 08:49) (82 - 106)  BP: 119/82 (06 Nov 2023 08:49) (112/71 - 142/92)  BP(mean): --  RR: 16 (06 Nov 2023 08:49) (15 - 18)  SpO2: 95% (06 Nov 2023 08:49) (94% - 95%)    Parameters below as of 06 Nov 2023 08:49  Patient On (Oxygen Delivery Method): room air      Physical Exam:  General: awake and alert, NAD  Eyes: EOMI  HENT: atraumatic, normocephalic; no JVD  Chest: non-labored breathing  Cardiovascular: normal HR  Abdomen: soft, NT, ND; irreducible hemorrhoids, tender on exam, normal sphincter tone  Extremities: no calf tenderness b/l  Skin: No rash noted on examined surfaces examined  Neuro/Psych:  Alert, oriented to time, place and person       LABS:                        11.6   9.92  )-----------( 412      ( 06 Nov 2023 06:50 )             36.7     11-06    137  |  103  |  15  ----------------------------<  125<H>  3.9   |  27  |  0.70    Ca    9.1      06 Nov 2023 06:50    TPro  6.4  /  Alb  2.4<L>  /  TBili  0.4  /  DBili  x   /  AST  12  /  ALT  26  /  AlkPhos  119  11-06    PT/INR - ( 06 Nov 2023 06:50 )   PT: 12.1 sec;   INR: 1.06 ratio         PTT - ( 06 Nov 2023 06:50 )  PTT:29.0 sec  Urinalysis Basic - ( 06 Nov 2023 06:50 )    Color: x / Appearance: x / SG: x / pH: x  Gluc: 125 mg/dL / Ketone: x  / Bili: x / Urobili: x   Blood: x / Protein: x / Nitrite: x   Leuk Esterase: x / RBC: x / WBC x   Sq Epi: x / Non Sq Epi: x / Bacteria: x

## 2023-11-06 NOTE — PROGRESS NOTE ADULT - SUBJECTIVE AND OBJECTIVE BOX
Initial Evaluation Note    68y Male admitted POD # 0 from hemorrhoicectomy        Vital Signs Last 24 Hrs  T(C): 36.3 (06 Nov 2023 18:00), Max: 36.7 (06 Nov 2023 12:00)  T(F): 97.3 (06 Nov 2023 18:00), Max: 98.1 (06 Nov 2023 12:00)  HR: 100 (06 Nov 2023 18:00) (75 - 100)  BP: 126/72 (06 Nov 2023 18:00) (101/72 - 126/72)  BP(mean): --  RR: 18 (06 Nov 2023 18:00) (15 - 20)  SpO2: 94% (06 Nov 2023 18:00) (94% - 98%)    Parameters below as of 06 Nov 2023 18:00  Patient On (Oxygen Delivery Method): room air                                11.6   9.92  )-----------( 412      ( 06 Nov 2023 06:50 )             36.7         11-06    137  |  103  |  15  ----------------------------<  125<H>  3.9   |  27  |  0.70    Ca    9.1      06 Nov 2023 06:50    TPro  6.4  /  Alb  2.4<L>  /  TBili  0.4  /  DBili  x   /  AST  12  /  ALT  26  /  AlkPhos  119  11-06        NEURO: no headaches, blurry vision, tremors, depression, anxity  CV: no chest pain, palpitations, murmurs, orthopnea  Resp: no shortness of breath, cough, wheeze, sputum production  GI: no stomach pain,nausea, vomitting, flatulence, hematemesis, hematochezia  PV: no swelling of extremities, no hair loss, no coolness to extremities  ENDO: no polydypsia, polyphagia, polyuria, weight loss, night sweats          NEURO: awake and alert  CV: (+) S1/S2, rrr, no mrg  RESP: CTA b/l  GI: soft, non tender

## 2023-11-06 NOTE — BRIEF OPERATIVE NOTE - COMMENTS
OK for regular diet; please continue bowel regimen.  Multimodal pain control.  May be discharged once cleared by medical team.

## 2023-11-06 NOTE — CONSULT NOTE ADULT - SUBJECTIVE AND OBJECTIVE BOX
SHARI VALLEJO  [id number]      HPI:    Shari Vallejo is a 68 year old man with past medical history of Chronic back pain, COPD and Paroxysmal atrial tachycardia with recent hospitalization at [facility] after mechanical fall with resultant loss of consciousness, displaced right lateral rib fractures and focal trauma occlusion/dissection of left vertebral artery and cervical neck fractures s/p decompression and fusion and evacuation of epidural hematoma now presents with rectal pain and prolapse.       ALLERGIES:  Originally Entered as [Unknown] reaction to [PUMPKIN SEEDS] (Unknown)  Vioxx (Unknown)  Nuts (Unknown)      PAST MEDICAL & SURGICAL HISTORY:  History of chronic back pain  COPD   Rectal prolapse  H/O hernia repair  History of arthroplasty of right knee  History of fusion of cervical spine      CURRENT MEDICATIONS:  acetaminophen     Tablet .. 650 milliGRAM(s) Oral every 6 hours PRN  aluminum hydroxide/magnesium hydroxide/simethicone Suspension 30 milliLiter(s) Oral every 4 hours PRN  aspirin enteric coated 81 milliGRAM(s) Oral daily  diazepam    Tablet 5 milliGRAM(s) Oral every 4 hours PRN  diltiazem    milliGRAM(s) Oral daily  HYDROmorphone   Tablet 6 milliGRAM(s) Oral every 6 hours PRN  HYDROmorphone   Tablet 4 milliGRAM(s) Oral every 4 hours PRN  lactulose Syrup 10 Gram(s) Oral two times a day PRN  melatonin 3 milliGRAM(s) Oral at bedtime PRN  methocarbamol 750 milliGRAM(s) Oral three times a day  metoprolol tartrate 25 milliGRAM(s) Oral two times a day  multivitamin 1 Tablet(s) Oral daily  ondansetron Injectable 4 milliGRAM(s) IV Push every 8 hours PRN  polyethylene glycol 3350 17 Gram(s) Oral two times a day  senna 2 Tablet(s) Oral at bedtime  sodium chloride 2 Gram(s) Oral every 8 hours  tamsulosin 0.4 milliGRAM(s) Oral at bedtime  witch hazel Pads 1 Application(s) Topical three times a day PRN      ROS:  All 10 systems reviewed and positives noted in HPI    OBJECTIVE:    VITAL SIGNS:  Vital Signs Last 24 Hrs  T(C): 36.4 (06 Nov 2023 08:49), Max: 36.6 (06 Nov 2023 04:50)  T(F): 97.6 (06 Nov 2023 08:49), Max: 97.8 (06 Nov 2023 04:50)  HR: 82 (06 Nov 2023 08:49) (82 - 106)  BP: 119/82 (06 Nov 2023 08:49) (112/71 - 142/92)  BP(mean): --  RR: 16 (06 Nov 2023 08:49) (15 - 18)  SpO2: 95% (06 Nov 2023 08:49) (94% - 95%)    Parameters below as of 06 Nov 2023 08:49  Patient On (Oxygen Delivery Method): room air        PHYSICAL EXAM:  General: well appearing, no distress  HEENT: sclera anicteric  Neck: supple, no carotid bruits b/l  CVS: JVP ~ 7 cm H20, RRR, s1, s2, no murmurs/rubs/gallops  Chest: unlabored respirations, clear to auscultation b/l  Abdomen: non-distended  Extremities: no lower extremity edema b/l  Neuro: awake, alert & oriented x 3  Psych: normal affect      LABS:                        11.6   9.92  )-----------( 412      ( 06 Nov 2023 06:50 )             36.7     11-06    137  |  103  |  15  ----------------------------<  125<H>  3.9   |  27  |  0.70    Ca    9.1      06 Nov 2023 06:50    TPro  6.4  /  Alb  2.4<L>  /  TBili  0.4  /  DBili  x   /  AST  12  /  ALT  26  /  AlkPhos  119  11-06        PT/INR - ( 06 Nov 2023 06:50 )   PT: 12.1 sec;   INR: 1.06 ratio         PTT - ( 06 Nov 2023 06:50 )  PTT:29.0 sec    TTE (2/2023):  Normal LV and RV systolic function  Dilated aortic root (4.3 cm), dilated ascending aorta (4 cm)      Coronary angiogram (2/2023):  LM: no disease  LAD: no disease  LCx: no disease  RCA: no disease    ECG (11/5/23): sinus tachycardia at 104 BPM SHARI VALLEJO  [id number]      HPI:    Shari Vallejo is a 68 year old man with past medical history of Chronic back pain, COPD and Paroxysmal atrial tachycardia with recent hospitalization at [facility] after mechanical fall with resultant loss of consciousness, displaced right lateral rib fractures and focal trauma occlusion/dissection of left vertebral artery and cervical neck fractures s/p decompression and fusion and evacuation of epidural hematoma now presents with rectal pain and prolapse.       ALLERGIES:  Originally Entered as [Unknown] reaction to [PUMPKIN SEEDS] (Unknown)  Vioxx (Unknown)  Nuts (Unknown)      PAST MEDICAL & SURGICAL HISTORY:  History of chronic back pain  COPD   Rectal prolapse  H/O hernia repair  History of arthroplasty of right knee  History of fusion of cervical spine      CURRENT MEDICATIONS:  acetaminophen     Tablet .. 650 milliGRAM(s) Oral every 6 hours PRN  aluminum hydroxide/magnesium hydroxide/simethicone Suspension 30 milliLiter(s) Oral every 4 hours PRN  aspirin enteric coated 81 milliGRAM(s) Oral daily  diazepam    Tablet 5 milliGRAM(s) Oral every 4 hours PRN  diltiazem    milliGRAM(s) Oral daily  HYDROmorphone   Tablet 6 milliGRAM(s) Oral every 6 hours PRN  HYDROmorphone   Tablet 4 milliGRAM(s) Oral every 4 hours PRN  lactulose Syrup 10 Gram(s) Oral two times a day PRN  melatonin 3 milliGRAM(s) Oral at bedtime PRN  methocarbamol 750 milliGRAM(s) Oral three times a day  metoprolol tartrate 25 milliGRAM(s) Oral two times a day  multivitamin 1 Tablet(s) Oral daily  ondansetron Injectable 4 milliGRAM(s) IV Push every 8 hours PRN  polyethylene glycol 3350 17 Gram(s) Oral two times a day  senna 2 Tablet(s) Oral at bedtime  sodium chloride 2 Gram(s) Oral every 8 hours  tamsulosin 0.4 milliGRAM(s) Oral at bedtime  witch hazel Pads 1 Application(s) Topical three times a day PRN      ROS:  All 10 systems reviewed and positives noted in HPI    OBJECTIVE:    VITAL SIGNS:  Vital Signs Last 24 Hrs  T(C): 36.4 (06 Nov 2023 08:49), Max: 36.6 (06 Nov 2023 04:50)  T(F): 97.6 (06 Nov 2023 08:49), Max: 97.8 (06 Nov 2023 04:50)  HR: 82 (06 Nov 2023 08:49) (82 - 106)  BP: 119/82 (06 Nov 2023 08:49) (112/71 - 142/92)  BP(mean): --  RR: 16 (06 Nov 2023 08:49) (15 - 18)  SpO2: 95% (06 Nov 2023 08:49) (94% - 95%)    Parameters below as of 06 Nov 2023 08:49  Patient On (Oxygen Delivery Method): room air        PHYSICAL EXAM:  General: no acute distress  HEENT: sclera anicteric  Neck: supple  CVS: JVP ~ 7 cm H20, RRR, s1, s2, no murmurs/rubs/gallops  Chest: unlabored respirations, clear to auscultation b/l  Extremities: no lower extremity edema b/l  Neuro: awake, alert & oriented  Psych: normal affect      LABS:                        11.6   9.92  )-----------( 412      ( 06 Nov 2023 06:50 )             36.7     11-06    137  |  103  |  15  ----------------------------<  125<H>  3.9   |  27  |  0.70    Ca    9.1      06 Nov 2023 06:50    TPro  6.4  /  Alb  2.4<L>  /  TBili  0.4  /  DBili  x   /  AST  12  /  ALT  26  /  AlkPhos  119  11-06        PT/INR - ( 06 Nov 2023 06:50 )   PT: 12.1 sec;   INR: 1.06 ratio         PTT - ( 06 Nov 2023 06:50 )  PTT:29.0 sec      TTE (2/2023):  Normal LV and RV systolic function  Dilated aortic root (4.3 cm), dilated ascending aorta (4 cm)      Coronary angiogram (2/2023):  LM: no disease  LAD: no disease  LCx: no disease  RCA: no disease    ECG (11/5/23): sinus tachycardia at 104 BPM

## 2023-11-06 NOTE — PROGRESS NOTE ADULT - ASSESSMENT
68y Male admitted POD # 0 from hemorrhoicectomy          PLAN:    -serial exams, any changes in exam to be escelated to surgical team immedietly  -pain control  -finish post op anbx  -IVFs until taking adequate PO  -advance diet per surgical team  -dressing changes per surgical team  -DVT prophylaxis  -AM labs         TIME SPENT:  55 minutes of  time spent providing medical care for patient's acute illness/conditions that impairs at least one vital organ system and/or poses a high risk of imminent or life threatening deterioration in the patient's condition. It includes time spent evaluating and treating the patient's acute illness as well as time spent reviewing labs, radiology, discussing goals of care with patient and/or patient's family, and discussing the case with a multidisciplinary team, including the eICU, in an effort to prevent further life threatening deterioration or end organ damage. This time is independent of any procedures performed.    DATE OF ENTRY OF THIS NOTE IS EQUAL TO THE DATE OF SERVICES RENDERED

## 2023-11-06 NOTE — CONSULT NOTE ADULT - ASSESSMENT
Assessment:  Bolivar Trinidad is a 68 year old man with past medical history of Chronic back pain, COPD and Paroxysmal atrial tachycardia with recent hospitalization at [facility] after mechanical fall with resultant loss of consciousness, displaced right lateral rib fractures and focal trauma occlusion/dissection of left vertebral artery and cervical neck fractures s/p decompression and fusion and evacuation of epidural hematoma now presents with rectal pain and prolapse.     Cardiology consulted for pre-operative cardiac risk stratification prior to hemorrhoidectomy   Assessment:  Bolivar Trinidad is a 68 year old man with past medical history of Chronic back pain, COPD and Paroxysmal atrial tachycardia with recent hospitalization at [facility] after mechanical fall with resultant loss of consciousness, displaced right lateral rib fractures and focal trauma occlusion/dissection of left vertebral artery and cervical neck fractures s/p decompression and fusion and evacuation of epidural hematoma now presents with rectal pain and prolapse.     Cardiology consulted for pre-operative cardiac risk stratification prior to hemorrhoidectomy. CXR unremarkable. ECG consistent with sinus tachycardia, no acute ischemic ST abnormalities. Recent coronary angiogram in February with normal coronaries. Recent echocardiogram with normal LV and RV systolic function and dilated aortic root and dilated ascending aorta. The patient denies angina, there are no signs of acute coronary syndrome, decompensated heart failure or malignant arrhythmia at this time.     Recommendations:  [] Pre-operative cardiac risk stratification: The patient is at moderate cardiovascular risk prior to low risk surgery; hemorrhoidectomy, may proceed at this level of risk. Would continue Diltiazem and Metoprolol throughout this time for history of paroxysmal atrial tachycardia. Place patient on telemetry.    We will continue to follow along.    Elizabeth Patel MD  Cardiology

## 2023-11-06 NOTE — PROGRESS NOTE ADULT - SUBJECTIVE AND OBJECTIVE BOX
CC: Patient is a 68y old  Male who presents with a chief complaint of rectal pain (06 Nov 2023 10:03)      Interval History:  Patient seen and examined at bedside.  No overnight events overnight.   C/o rectal pain with BM.   Has anxiety, wants to know if valium frequency can be increased.    ALLERGIES:  Originally Entered as [Unknown] reaction to [PUMPKIN SEEDS] (Unknown)  Vioxx (Unknown)  Nuts (Unknown)    MEDICATIONS  (STANDING):  aspirin enteric coated 81 milliGRAM(s) Oral daily  diltiazem    milliGRAM(s) Oral daily  methocarbamol 750 milliGRAM(s) Oral three times a day  metoprolol tartrate 25 milliGRAM(s) Oral two times a day  multivitamin 1 Tablet(s) Oral daily  polyethylene glycol 3350 17 Gram(s) Oral two times a day  senna 2 Tablet(s) Oral at bedtime  sodium chloride 2 Gram(s) Oral every 8 hours  tamsulosin 0.4 milliGRAM(s) Oral at bedtime    MEDICATIONS  (PRN):  acetaminophen     Tablet .. 650 milliGRAM(s) Oral every 6 hours PRN Mild Pain (1 - 3)  aluminum hydroxide/magnesium hydroxide/simethicone Suspension 30 milliLiter(s) Oral every 4 hours PRN Dyspepsia  diazepam    Tablet 5 milliGRAM(s) Oral every 4 hours PRN spasm pain  HYDROmorphone   Tablet 6 milliGRAM(s) Oral every 6 hours PRN Severe Pain (7 - 10)  HYDROmorphone   Tablet 4 milliGRAM(s) Oral every 4 hours PRN Moderate Pain (4 - 6)  lactulose Syrup 10 Gram(s) Oral two times a day PRN Constipation  melatonin 3 milliGRAM(s) Oral at bedtime PRN Insomnia  ondansetron Injectable 4 milliGRAM(s) IV Push every 8 hours PRN Nausea and/or Vomiting  witch hazel Pads 1 Application(s) Topical three times a day PRN Hemmorrhoids    Vital Signs Last 24 Hrs  T(F): 97.6 (06 Nov 2023 08:49), Max: 97.8 (06 Nov 2023 04:50)  HR: 82 (06 Nov 2023 08:49) (82 - 106)  BP: 119/82 (06 Nov 2023 08:49) (112/71 - 142/92)  RR: 16 (06 Nov 2023 08:49) (15 - 18)  SpO2: 95% (06 Nov 2023 08:49) (94% - 95%)  I&O's Summary    05 Nov 2023 07:01  -  06 Nov 2023 07:00  --------------------------------------------------------  IN: 0 mL / OUT: 100 mL / NET: -100 mL    06 Nov 2023 07:01  -  06 Nov 2023 11:39  --------------------------------------------------------  IN: 0 mL / OUT: 200 mL / NET: -200 mL      BMI (kg/m2): 18.7 (11-05-23 @ 08:23)    PHYSICAL EXAM:  GENERAL: NAD  NERVOUS SYSTEM:  CN II - XII intact; Sensation intact; follows commands  CHEST/LUNG: Clear to percussion bilaterally; No rales, rhonchi, wheezing, or rubs; normal respiratory effort, no intercostal retractions  HEART: Regular rate and rhythm; No murmurs, rubs, or gallops; No pitting edema  ABDOMEN: Soft, Nontender, Nondistended; Bowel sounds present; No HSM or masses  MUSCULOSKELETAL/EXTREMITIES:  2+ Peripheral Pulses, No clubbing or digital cyanosis; FROM of extremeties (pain, crepitation or contracture)  PSYCH: Appropriate affect, Alert & Oriented x 3, Good Memory; Good insight    LABS:                        11.6   9.92  )-----------( 412      ( 06 Nov 2023 06:50 )             36.7       11-06    137  |  103  |  15  ----------------------------<  125  3.9   |  27  |  0.70    Ca    9.1      06 Nov 2023 06:50    TPro  6.4  /  Alb  2.4  /  TBili  0.4  /  DBili  x   /  AST  12  /  ALT  26  /  AlkPhos  119  11-06       PT/INR - ( 06 Nov 2023 06:50 )   PT: 12.1 sec;   INR: 1.06 ratio         PTT - ( 06 Nov 2023 06:50 )  PTT:29.0 sec             Urinalysis Basic - ( 06 Nov 2023 06:50 )    Color: x / Appearance: x / SG: x / pH: x  Gluc: 125 mg/dL / Ketone: x  / Bili: x / Urobili: x   Blood: x / Protein: x / Nitrite: x   Leuk Esterase: x / RBC: x / WBC x   Sq Epi: x / Non Sq Epi: x / Bacteria: x            Care Discussed with Consultants/Other Providers: Yes

## 2023-11-06 NOTE — PROGRESS NOTE ADULT - SUBJECTIVE AND OBJECTIVE BOX
1Patient is a 68y old  Male who presents with a chief complaint of rectal pain (06 Nov 2023 10:03)      Patient seen and examined at bedside. c/o rectal pain     ALLERGIES:  Originally Entered as [Unknown] reaction to [PUMPKIN SEEDS] (Unknown)  Vioxx (Unknown)  Nuts (Unknown)    MEDICATIONS  (STANDING):  aspirin enteric coated 81 milliGRAM(s) Oral daily  diltiazem    milliGRAM(s) Oral daily  methocarbamol 750 milliGRAM(s) Oral three times a day  metoprolol tartrate 25 milliGRAM(s) Oral two times a day  multivitamin 1 Tablet(s) Oral daily  polyethylene glycol 3350 17 Gram(s) Oral two times a day  senna 2 Tablet(s) Oral at bedtime  sodium chloride 2 Gram(s) Oral every 8 hours  tamsulosin 0.4 milliGRAM(s) Oral at bedtime    MEDICATIONS  (PRN):  acetaminophen     Tablet .. 650 milliGRAM(s) Oral every 6 hours PRN Mild Pain (1 - 3)  aluminum hydroxide/magnesium hydroxide/simethicone Suspension 30 milliLiter(s) Oral every 4 hours PRN Dyspepsia  diazepam    Tablet 5 milliGRAM(s) Oral every 4 hours PRN spasm pain  HYDROmorphone   Tablet 6 milliGRAM(s) Oral every 6 hours PRN Severe Pain (7 - 10)  HYDROmorphone   Tablet 4 milliGRAM(s) Oral every 4 hours PRN Moderate Pain (4 - 6)  lactulose Syrup 10 Gram(s) Oral two times a day PRN Constipation  melatonin 3 milliGRAM(s) Oral at bedtime PRN Insomnia  ondansetron Injectable 4 milliGRAM(s) IV Push every 8 hours PRN Nausea and/or Vomiting  witch hazel Pads 1 Application(s) Topical three times a day PRN Hemmorrhoids    Vital Signs Last 24 Hrs  T(F): 97.6 (06 Nov 2023 08:49), Max: 97.8 (06 Nov 2023 04:50)  HR: 82 (06 Nov 2023 08:49) (82 - 106)  BP: 119/82 (06 Nov 2023 08:49) (112/71 - 142/92)  RR: 16 (06 Nov 2023 08:49) (15 - 18)  SpO2: 95% (06 Nov 2023 08:49) (94% - 95%)  I&O's Summary    05 Nov 2023 07:01  -  06 Nov 2023 07:00  --------------------------------------------------------  IN: 0 mL / OUT: 100 mL / NET: -100 mL    06 Nov 2023 07:01  -  06 Nov 2023 11:35  --------------------------------------------------------  IN: 0 mL / OUT: 200 mL / NET: -200 mL      PHYSICAL EXAM:  General: NAD, A/O x 3  ENT: MMM, no oral thrush   Neck: Supple, No JVD  Lungs: Clear to auscultation bilaterally, non labored breathing  Cardio: RRR, S1/S2, No murmurs  Abdomen:  irreducible hemorrhoids, Soft, Nontender, Nondistended; Bowel sounds present  Extremities: No calf tenderness, No pitting edema    LABS:                        11.6   9.92  )-----------( 412      ( 06 Nov 2023 06:50 )             36.7     11-06    137  |  103  |  15  ----------------------------<  125  3.9   |  27  |  0.70    Ca    9.1      06 Nov 2023 06:50    TPro  6.4  /  Alb  2.4  /  TBili  0.4  /  DBili  x   /  AST  12  /  ALT  26  /  AlkPhos  119  11-06      PT/INR - ( 06 Nov 2023 06:50 )   PT: 12.1 sec;   INR: 1.06 ratio         PTT - ( 06 Nov 2023 06:50 )  PTT:29.0 sec                Urinalysis Basic - ( 06 Nov 2023 06:50 )    Color: x / Appearance: x / SG: x / pH: x  Gluc: 125 mg/dL / Ketone: x  / Bili: x / Urobili: x   Blood: x / Protein: x / Nitrite: x   Leuk Esterase: x / RBC: x / WBC x   Sq Epi: x / Non Sq Epi: x / Bacteria: x            RADIOLOGY & ADDITIONAL TESTS:  < from: Xray Chest 1 View AP/PA (11.05.23 @ 09:39) >  IMPRESSION:  No acute pulmonary pathology.    --- End of Report ---            JOSE IBARRA MD; Attending Interventional Radiologist  This document has been electronically signed. Nov 5 2023 10:37AM    < end of copied text >    Care Discussed with Consultants/Other Providers:   Surgery PA

## 2023-11-07 PROBLEM — J44.9 CHRONIC OBSTRUCTIVE PULMONARY DISEASE, UNSPECIFIED: Chronic | Status: ACTIVE | Noted: 2023-11-05

## 2023-11-07 LAB
ANION GAP SERPL CALC-SCNC: 6 MMOL/L — SIGNIFICANT CHANGE UP (ref 5–17)
ANION GAP SERPL CALC-SCNC: 6 MMOL/L — SIGNIFICANT CHANGE UP (ref 5–17)
APPEARANCE UR: CLEAR — SIGNIFICANT CHANGE UP
APPEARANCE UR: CLEAR — SIGNIFICANT CHANGE UP
BILIRUB UR-MCNC: NEGATIVE — SIGNIFICANT CHANGE UP
BILIRUB UR-MCNC: NEGATIVE — SIGNIFICANT CHANGE UP
BUN SERPL-MCNC: 18 MG/DL — SIGNIFICANT CHANGE UP (ref 7–23)
BUN SERPL-MCNC: 18 MG/DL — SIGNIFICANT CHANGE UP (ref 7–23)
CALCIUM SERPL-MCNC: 9.2 MG/DL — SIGNIFICANT CHANGE UP (ref 8.4–10.5)
CALCIUM SERPL-MCNC: 9.2 MG/DL — SIGNIFICANT CHANGE UP (ref 8.4–10.5)
CHLORIDE SERPL-SCNC: 102 MMOL/L — SIGNIFICANT CHANGE UP (ref 96–108)
CHLORIDE SERPL-SCNC: 102 MMOL/L — SIGNIFICANT CHANGE UP (ref 96–108)
CO2 SERPL-SCNC: 30 MMOL/L — SIGNIFICANT CHANGE UP (ref 22–31)
CO2 SERPL-SCNC: 30 MMOL/L — SIGNIFICANT CHANGE UP (ref 22–31)
COLOR SPEC: YELLOW — SIGNIFICANT CHANGE UP
COLOR SPEC: YELLOW — SIGNIFICANT CHANGE UP
CREAT SERPL-MCNC: 0.84 MG/DL — SIGNIFICANT CHANGE UP (ref 0.5–1.3)
CREAT SERPL-MCNC: 0.84 MG/DL — SIGNIFICANT CHANGE UP (ref 0.5–1.3)
DIFF PNL FLD: NEGATIVE — SIGNIFICANT CHANGE UP
DIFF PNL FLD: NEGATIVE — SIGNIFICANT CHANGE UP
EGFR: 94 ML/MIN/1.73M2 — SIGNIFICANT CHANGE UP
EGFR: 94 ML/MIN/1.73M2 — SIGNIFICANT CHANGE UP
GLUCOSE SERPL-MCNC: 151 MG/DL — HIGH (ref 70–99)
GLUCOSE SERPL-MCNC: 151 MG/DL — HIGH (ref 70–99)
GLUCOSE UR QL: 100 MG/DL
GLUCOSE UR QL: 100 MG/DL
HCT VFR BLD CALC: 34.2 % — LOW (ref 39–50)
HCT VFR BLD CALC: 34.2 % — LOW (ref 39–50)
HGB BLD-MCNC: 11.1 G/DL — LOW (ref 13–17)
HGB BLD-MCNC: 11.1 G/DL — LOW (ref 13–17)
KETONES UR-MCNC: NEGATIVE MG/DL — SIGNIFICANT CHANGE UP
KETONES UR-MCNC: NEGATIVE MG/DL — SIGNIFICANT CHANGE UP
LEUKOCYTE ESTERASE UR-ACNC: NEGATIVE — SIGNIFICANT CHANGE UP
LEUKOCYTE ESTERASE UR-ACNC: NEGATIVE — SIGNIFICANT CHANGE UP
MCHC RBC-ENTMCNC: 30 PG — SIGNIFICANT CHANGE UP (ref 27–34)
MCHC RBC-ENTMCNC: 30 PG — SIGNIFICANT CHANGE UP (ref 27–34)
MCHC RBC-ENTMCNC: 32.5 GM/DL — SIGNIFICANT CHANGE UP (ref 32–36)
MCHC RBC-ENTMCNC: 32.5 GM/DL — SIGNIFICANT CHANGE UP (ref 32–36)
MCV RBC AUTO: 92.4 FL — SIGNIFICANT CHANGE UP (ref 80–100)
MCV RBC AUTO: 92.4 FL — SIGNIFICANT CHANGE UP (ref 80–100)
NITRITE UR-MCNC: NEGATIVE — SIGNIFICANT CHANGE UP
NITRITE UR-MCNC: NEGATIVE — SIGNIFICANT CHANGE UP
NRBC # BLD: 0 /100 WBCS — SIGNIFICANT CHANGE UP (ref 0–0)
NRBC # BLD: 0 /100 WBCS — SIGNIFICANT CHANGE UP (ref 0–0)
PH UR: 5 — SIGNIFICANT CHANGE UP (ref 5–8)
PH UR: 5 — SIGNIFICANT CHANGE UP (ref 5–8)
PLATELET # BLD AUTO: 375 K/UL — SIGNIFICANT CHANGE UP (ref 150–400)
PLATELET # BLD AUTO: 375 K/UL — SIGNIFICANT CHANGE UP (ref 150–400)
POTASSIUM SERPL-MCNC: 4 MMOL/L — SIGNIFICANT CHANGE UP (ref 3.5–5.3)
POTASSIUM SERPL-MCNC: 4 MMOL/L — SIGNIFICANT CHANGE UP (ref 3.5–5.3)
POTASSIUM SERPL-SCNC: 4 MMOL/L — SIGNIFICANT CHANGE UP (ref 3.5–5.3)
POTASSIUM SERPL-SCNC: 4 MMOL/L — SIGNIFICANT CHANGE UP (ref 3.5–5.3)
PROT UR-MCNC: NEGATIVE MG/DL — SIGNIFICANT CHANGE UP
PROT UR-MCNC: NEGATIVE MG/DL — SIGNIFICANT CHANGE UP
RBC # BLD: 3.7 M/UL — LOW (ref 4.2–5.8)
RBC # BLD: 3.7 M/UL — LOW (ref 4.2–5.8)
RBC # FLD: 13.3 % — SIGNIFICANT CHANGE UP (ref 10.3–14.5)
RBC # FLD: 13.3 % — SIGNIFICANT CHANGE UP (ref 10.3–14.5)
SODIUM SERPL-SCNC: 138 MMOL/L — SIGNIFICANT CHANGE UP (ref 135–145)
SODIUM SERPL-SCNC: 138 MMOL/L — SIGNIFICANT CHANGE UP (ref 135–145)
SP GR SPEC: 1.02 — SIGNIFICANT CHANGE UP (ref 1–1.03)
SP GR SPEC: 1.02 — SIGNIFICANT CHANGE UP (ref 1–1.03)
UROBILINOGEN FLD QL: 0.2 MG/DL — SIGNIFICANT CHANGE UP (ref 0.2–1)
UROBILINOGEN FLD QL: 0.2 MG/DL — SIGNIFICANT CHANGE UP (ref 0.2–1)
WBC # BLD: 13.05 K/UL — HIGH (ref 3.8–10.5)
WBC # BLD: 13.05 K/UL — HIGH (ref 3.8–10.5)
WBC # FLD AUTO: 13.05 K/UL — HIGH (ref 3.8–10.5)
WBC # FLD AUTO: 13.05 K/UL — HIGH (ref 3.8–10.5)

## 2023-11-07 PROCEDURE — 99232 SBSQ HOSP IP/OBS MODERATE 35: CPT

## 2023-11-07 RX ORDER — TAMSULOSIN HYDROCHLORIDE 0.4 MG/1
0.4 CAPSULE ORAL
Refills: 0 | Status: DISCONTINUED | OUTPATIENT
Start: 2023-11-07 | End: 2023-11-11

## 2023-11-07 RX ADMIN — Medication 1 TABLET(S): at 12:40

## 2023-11-07 RX ADMIN — Medication 81 MILLIGRAM(S): at 12:40

## 2023-11-07 RX ADMIN — Medication 120 MILLIGRAM(S): at 05:26

## 2023-11-07 RX ADMIN — LACTULOSE 10 GRAM(S): 10 SOLUTION ORAL at 12:39

## 2023-11-07 RX ADMIN — SODIUM CHLORIDE 2 GRAM(S): 9 INJECTION INTRAMUSCULAR; INTRAVENOUS; SUBCUTANEOUS at 05:28

## 2023-11-07 RX ADMIN — METHOCARBAMOL 750 MILLIGRAM(S): 500 TABLET, FILM COATED ORAL at 22:24

## 2023-11-07 RX ADMIN — Medication 25 MILLIGRAM(S): at 18:48

## 2023-11-07 RX ADMIN — METHOCARBAMOL 750 MILLIGRAM(S): 500 TABLET, FILM COATED ORAL at 13:59

## 2023-11-07 RX ADMIN — SODIUM CHLORIDE 2 GRAM(S): 9 INJECTION INTRAMUSCULAR; INTRAVENOUS; SUBCUTANEOUS at 22:26

## 2023-11-07 RX ADMIN — HYDROMORPHONE HYDROCHLORIDE 6 MILLIGRAM(S): 2 INJECTION INTRAMUSCULAR; INTRAVENOUS; SUBCUTANEOUS at 00:03

## 2023-11-07 RX ADMIN — POLYETHYLENE GLYCOL 3350 17 GRAM(S): 17 POWDER, FOR SOLUTION ORAL at 05:27

## 2023-11-07 RX ADMIN — Medication 25 MILLIGRAM(S): at 05:25

## 2023-11-07 RX ADMIN — SODIUM CHLORIDE 2 GRAM(S): 9 INJECTION INTRAMUSCULAR; INTRAVENOUS; SUBCUTANEOUS at 14:00

## 2023-11-07 RX ADMIN — TAMSULOSIN HYDROCHLORIDE 0.4 MILLIGRAM(S): 0.4 CAPSULE ORAL at 18:50

## 2023-11-07 RX ADMIN — HYDROMORPHONE HYDROCHLORIDE 4 MILLIGRAM(S): 2 INJECTION INTRAMUSCULAR; INTRAVENOUS; SUBCUTANEOUS at 00:00

## 2023-11-07 RX ADMIN — Medication 5 MILLIGRAM(S): at 05:25

## 2023-11-07 RX ADMIN — HYDROMORPHONE HYDROCHLORIDE 6 MILLIGRAM(S): 2 INJECTION INTRAMUSCULAR; INTRAVENOUS; SUBCUTANEOUS at 02:23

## 2023-11-07 RX ADMIN — Medication 5 MILLIGRAM(S): at 13:59

## 2023-11-07 RX ADMIN — ENOXAPARIN SODIUM 40 MILLIGRAM(S): 100 INJECTION SUBCUTANEOUS at 22:26

## 2023-11-07 RX ADMIN — METHOCARBAMOL 750 MILLIGRAM(S): 500 TABLET, FILM COATED ORAL at 05:25

## 2023-11-07 NOTE — PROGRESS NOTE ADULT - ASSESSMENT
68 year old Male with PMH of chronic pain, recent fall (10/13) with posterior C1-C7 decompression/fusion for spine fracture, TBI (recently discharged from [facility] 11/3), COPD, Vertebral Artery Dissection, PAT who presents to the ER complaining of severe rectal pain and rectal prolapse.     Rectal Pain - likely 2/2 irreducible hemorrhoids  - s/p hemorrhoidectomy on 11/6  - diet resumed  - bowel regimen  - f/u with colorectal in 6 weeks at [facility]  - Analgesics prn    Recent fall with TBI  Unstable cervical spine fracture s/p C1-C7 Decompression/Fusion and evacuation of EDH with cord compression   - Cervical collar   - Pain management     Vertebral Artery Dissection   - ASA 81mg daily     COPD  - not on inhalers  - oxygen prn    Tachycardia/PAT  - Ectopic atrial rhythm/PAT noted on tele 10/19  - Cont metoprolol   - Continue cardizem 120mg cd  - EP eval was deferred as pt not likely a candidate for ablation at this time    - Cardio consulted for cardiac risk stratification, f/u recs  -keg on admission appears unchanged    Aortic Root Dilation  - Stable on recent outpt echo from 9/5/23  - Follow up for repeat echo q 12 months    Chronic Hyponatremia. Na stable at 137.  - on salt tab 2g q8h  - wean down/off as tolerated     urinary retention overnight, 500cc in bladder. s/p straight cath.  -pr wife, wasn't urinating great prior  -UA from this AM neg.  -UCx sent  -bladder scans q8, straight cath PRN  -increase flomax to BID    leukocytosis: 13k today, possible reactive  monitor for fever  trend WBC on am labs    DVT Prophylaxis:  - Lovenox

## 2023-11-07 NOTE — PROGRESS NOTE ADULT - SUBJECTIVE AND OBJECTIVE BOX
Patient is a 68y old  Male who presents with a chief complaint of rectal pain (07 Nov 2023 13:41)  pt POD # 1 s/p hemorrhoidectomy   no events noted over night  pt feels well, states pain is well controlled     Patient seen and examined at bedside    ALLERGIES:  Originally Entered as [Unknown] reaction to [PUMPKIN SEEDS] (Unknown)  Vioxx (Unknown)  Nuts (Unknown)    MEDICATIONS  (STANDING):  aspirin enteric coated 81 milliGRAM(s) Oral daily  diltiazem    milliGRAM(s) Oral daily  enoxaparin Injectable 40 milliGRAM(s) SubCutaneous every 24 hours  methocarbamol 750 milliGRAM(s) Oral three times a day  metoprolol tartrate 25 milliGRAM(s) Oral two times a day  multivitamin 1 Tablet(s) Oral daily  polyethylene glycol 3350 17 Gram(s) Oral two times a day  sodium chloride 2 Gram(s) Oral every 8 hours  tamsulosin 0.4 milliGRAM(s) Oral two times a day    MEDICATIONS  (PRN):  acetaminophen     Tablet .. 650 milliGRAM(s) Oral every 6 hours PRN Mild Pain (1 - 3)  aluminum hydroxide/magnesium hydroxide/simethicone Suspension 30 milliLiter(s) Oral every 4 hours PRN Dyspepsia  diazepam    Tablet 5 milliGRAM(s) Oral every 6 hours PRN muscle spasm/pain  HYDROmorphone   Tablet 6 milliGRAM(s) Oral every 6 hours PRN Severe Pain (7 - 10)  HYDROmorphone   Tablet 4 milliGRAM(s) Oral every 4 hours PRN Moderate Pain (4 - 6)  lactulose Syrup 10 Gram(s) Oral two times a day PRN Constipation  melatonin 3 milliGRAM(s) Oral at bedtime PRN Insomnia    Vital Signs Last 24 Hrs  T(F): 97.6 (07 Nov 2023 12:37), Max: 97.6 (07 Nov 2023 12:37)  HR: 96 (07 Nov 2023 12:37) (85 - 100)  BP: 130/83 (07 Nov 2023 12:37) (102/79 - 130/83)  RR: 17 (07 Nov 2023 12:37) (15 - 20)  SpO2: 94% (07 Nov 2023 12:37) (93% - 98%)    I&O's Summary    06 Nov 2023 07:01 - 07 Nov 2023 07:00  --------------------------------------------------------  IN: 80 mL / OUT: 300 mL / NET: -220 mL    PHYSICAL EXAM:  General: NAD, A/O x 3  ENT: MMM  Neck: Supple, No JVD  Abdomen: Soft, Nontender, Nondistended  rectum s/p hemorrhoidectomy, no active bleeding noted, no need for dressing   Extremities: No calf tenderness, No pitting edema    LABS:                        11.1   13.05 )-----------( 375      ( 07 Nov 2023 06:17 )             34.2     11-07    138  |  102  |  18  ----------------------------<  151  4.0   |  30  |  0.84    Ca    9.2      07 Nov 2023 06:17    TPro  6.4  /  Alb  2.4  /  TBili  0.4  /  DBili  x   /  AST  12  /  ALT  26  /  AlkPhos  119  11-06      PT/INR - ( 06 Nov 2023 06:50 )   PT: 12.1 sec;   INR: 1.06 ratio         PTT - ( 06 Nov 2023 06:50 )  PTT:29.0 sec    Urinalysis Basic - ( 07 Nov 2023 06:17 )    Color: x / Appearance: x / SG: x / pH: x  Gluc: 151 mg/dL / Ketone: x  / Bili: x / Urobili: x   Blood: x / Protein: x / Nitrite: x   Leuk Esterase: x / RBC: x / WBC x   Sq Epi: x / Non Sq Epi: x / Bacteria: x

## 2023-11-07 NOTE — PROGRESS NOTE ADULT - SUBJECTIVE AND OBJECTIVE BOX
SHARI VALLEJO  [id number]      Chief Complaint: Prolapsed hemorrhoids/History of paroxysmal atrial tachycardia     Interval History: The patient reports mild rectal discomfort and bleeding. Denies chest pain or shortness of breath.     Tele: sinus rhythm 90s BPM      Current meds:   acetaminophen     Tablet .. 650 milliGRAM(s) Oral every 6 hours PRN  aluminum hydroxide/magnesium hydroxide/simethicone Suspension 30 milliLiter(s) Oral every 4 hours PRN  aspirin enteric coated 81 milliGRAM(s) Oral daily  diazepam    Tablet 5 milliGRAM(s) Oral every 6 hours PRN  diltiazem    milliGRAM(s) Oral daily  enoxaparin Injectable 40 milliGRAM(s) SubCutaneous every 24 hours  HYDROmorphone   Tablet 6 milliGRAM(s) Oral every 6 hours PRN  HYDROmorphone   Tablet 4 milliGRAM(s) Oral every 4 hours PRN  lactulose Syrup 10 Gram(s) Oral two times a day PRN  melatonin 3 milliGRAM(s) Oral at bedtime PRN  methocarbamol 750 milliGRAM(s) Oral three times a day  metoprolol tartrate 25 milliGRAM(s) Oral two times a day  multivitamin 1 Tablet(s) Oral daily  polyethylene glycol 3350 17 Gram(s) Oral two times a day  sodium chloride 2 Gram(s) Oral every 8 hours  tamsulosin 0.4 milliGRAM(s) Oral at bedtime      Objective:     Vital Signs:   T(C): 36.3 (11-07-23 @ 05:12), Max: 36.7 (11-06-23 @ 12:00)  HR: 97 (11-07-23 @ 05:12) (75 - 100)  BP: 119/82 (11-07-23 @ 05:12) (101/72 - 126/72)  RR: 17 (11-07-23 @ 05:12) (15 - 20)  SpO2: 93% (11-07-23 @ 05:12) (93% - 98%)      Physical Exam:   General: no acute distress  HEENT: sclera anicteric  Neck: supple  CVS: JVP ~ 7 cm H20, RRR, s1, s2, no murmurs/rubs/gallops  Chest: unlabored respirations, clear to auscultation b/l  Extremities: no lower extremity edema b/l  Neuro: awake, alert & oriented  Psych: normal affect      Labs:   07 Nov 2023 06:17    138    |  102    |  18     ----------------------------<  151    4.0     |  30     |  0.84     Ca    9.2        07 Nov 2023 06:17    TPro  6.4    /  Alb  2.4    /  TBili  0.4    /  DBili  x      /  AST  12     /  ALT  26     /  AlkPhos  119    06 Nov 2023 06:50                          11.1   13.05 )-----------( 375      ( 07 Nov 2023 06:17 )             34.2     PT/INR - ( 06 Nov 2023 06:50 )   PT: 12.1 sec;   INR: 1.06 ratio         PTT - ( 06 Nov 2023 06:50 )  PTT:29.0 sec        TTE (2/2023):  Normal LV and RV systolic function  Dilated aortic root (4.3 cm), dilated ascending aorta (4 cm)      Coronary angiogram (2/2023):  LM: no disease  LAD: no disease  LCx: no disease  RCA: no disease    ECG (11/5/23): sinus tachycardia at 104 BPM

## 2023-11-07 NOTE — PHYSICAL THERAPY INITIAL EVALUATION ADULT - ADDITIONAL COMMENTS
pt from [facility] 2/2 fall/tbi, C1-C7 decompression. prior to rehab pt resided in a pvt home w/ spouse, 1 step to enter, 1 flight to negotiate inside. PTA pt was independent w/ all functional mobility & ADL's. Did not use an AD for ambulation. pt from [facility] 2/2 fall/tbi, C1-C7 spinal fracture, s/p decompression. prior to rehab pt resided in a pvt home w/ spouse, 1 step to enter, 1 flight to negotiate inside. PTA pt was independent w/ all functional mobility & ADL's. Did not use an AD for ambulation.

## 2023-11-07 NOTE — PROGRESS NOTE ADULT - ASSESSMENT
Patient is a 68y old  Male who presents with a chief complaint of rectal pain (07 Nov 2023 13:41)  pt POD # 1 s/p hemorrhoidectomy   no events noted over night  pt feels well, states pain is well controlled     pt doing well from surgical standpoint    plan  - regular diet  - sitz baths q 8 hours  - no need for pt to have BM prior to d/c home  - multimodal pain management  - bowel regimen  - f/u with Dr. Sharif in 6 weeks at the [facility]    case d/w Dr. Tsang

## 2023-11-07 NOTE — PROGRESS NOTE ADULT - ASSESSMENT
Assessment:  Bolivar Trinidad is a 68 year old man with past medical history of Chronic back pain, COPD and Paroxysmal atrial tachycardia with recent hospitalization at [facility] after mechanical fall with resultant loss of consciousness, displaced right lateral rib fractures and focal trauma occlusion/dissection of left vertebral artery and cervical neck fractures s/p decompression and fusion and evacuation of epidural hematoma now presents with rectal pain and prolapse.     Cardiology consulted for pre-operative cardiac risk stratification prior to hemorrhoidectomy. CXR unremarkable. ECG consistent with sinus tachycardia, no acute ischemic ST abnormalities. Recent coronary angiogram in February with normal coronaries. Recent echocardiogram with normal LV and RV systolic function and dilated aortic root and dilated ascending aorta. The patient denies angina, there are no signs of acute coronary syndrome, decompensated heart failure or malignant arrhythmia at this time.     Recommendations:  [] The patient is now s/p complex hemorrhoidectomy and tolerated procedure well. He denies angina or dyspnea and remains in sinus rhythm on telemetry. Would continue Diltiazem and Metoprolol throughout this time for history of paroxysmal atrial tachycardia. Further care per Surgical team.     We will sign off, please re-consult if needed.    Elizabeth Patel MD  Cardiology

## 2023-11-07 NOTE — PROGRESS NOTE ADULT - SUBJECTIVE AND OBJECTIVE BOX
CC: Patient is a 68y old  Male who presents with a chief complaint of rectal pain (07 Nov 2023 09:42)      Interval History:  bladder scan high this am and was straight cath'd.   not voiding were not done     ALLERGIES:  Originally Entered as [Unknown] reaction to [PUMPKIN SEEDS] (Unknown)  Vioxx (Unknown)  Nuts (Unknown)    MEDICATIONS  (STANDING):  aspirin enteric coated 81 milliGRAM(s) Oral daily  diltiazem    milliGRAM(s) Oral daily  enoxaparin Injectable 40 milliGRAM(s) SubCutaneous every 24 hours  methocarbamol 750 milliGRAM(s) Oral three times a day  metoprolol tartrate 25 milliGRAM(s) Oral two times a day  multivitamin 1 Tablet(s) Oral daily  polyethylene glycol 3350 17 Gram(s) Oral two times a day  sodium chloride 2 Gram(s) Oral every 8 hours  tamsulosin 0.4 milliGRAM(s) Oral at bedtime    MEDICATIONS  (PRN):  acetaminophen     Tablet .. 650 milliGRAM(s) Oral every 6 hours PRN Mild Pain (1 - 3)  aluminum hydroxide/magnesium hydroxide/simethicone Suspension 30 milliLiter(s) Oral every 4 hours PRN Dyspepsia  diazepam    Tablet 5 milliGRAM(s) Oral every 6 hours PRN muscle spasm/pain  HYDROmorphone   Tablet 6 milliGRAM(s) Oral every 6 hours PRN Severe Pain (7 - 10)  HYDROmorphone   Tablet 4 milliGRAM(s) Oral every 4 hours PRN Moderate Pain (4 - 6)  lactulose Syrup 10 Gram(s) Oral two times a day PRN Constipation  melatonin 3 milliGRAM(s) Oral at bedtime PRN Insomnia    Vital Signs Last 24 Hrs  T(F): 97.6 (07 Nov 2023 12:37), Max: 97.6 (07 Nov 2023 12:37)  HR: 96 (07 Nov 2023 12:37) (85 - 100)  BP: 130/83 (07 Nov 2023 12:37) (102/79 - 130/83)  RR: 17 (07 Nov 2023 12:37) (15 - 20)  SpO2: 94% (07 Nov 2023 12:37) (93% - 98%)  I&O's Summary    06 Nov 2023 07:01  -  07 Nov 2023 07:00  --------------------------------------------------------  IN: 80 mL / OUT: 300 mL / NET: -220 mL      BMI (kg/m2): 18.7 (11-05-23 @ 08:23)    PHYSICAL EXAM:  GENERAL: NAD  NERVOUS SYSTEM:  CN II - XII intact; Sensation intact; follows commands  CHEST/LUNG: Clear to percussion bilaterally; No rales, rhonchi, wheezing, or rubs; normal respiratory effort, no intercostal retractions  HEART: Regular rate and rhythm; No murmurs, rubs, or gallops; No pitting edema  ABDOMEN: Soft, Nontender, Nondistended; Bowel sounds present; No HSM or masses  MUSCULOSKELETAL/EXTREMITIES:  2+ Peripheral Pulses, No clubbing or digital cyanosis; FROM of extremeties (pain, crepitation or contracture)  PSYCH: Appropriate affect, Alert & Oriented x 3, Good Memory; Good insight    LABS:                        11.1   13.05 )-----------( 375      ( 07 Nov 2023 06:17 )             34.2       11-07    138  |  102  |  18  ----------------------------<  151  4.0   |  30  |  0.84    Ca    9.2      07 Nov 2023 06:17    TPro  6.4  /  Alb  2.4  /  TBili  0.4  /  DBili  x   /  AST  12  /  ALT  26  /  AlkPhos  119  11-06       PT/INR - ( 06 Nov 2023 06:50 )   PT: 12.1 sec;   INR: 1.06 ratio         PTT - ( 06 Nov 2023 06:50 )  PTT:29.0 sec               Urinalysis Basic - ( 07 Nov 2023 06:17 )    Color: x / Appearance: x / SG: x / pH: x  Gluc: 151 mg/dL / Ketone: x  / Bili: x / Urobili: x   Blood: x / Protein: x / Nitrite: x   Leuk Esterase: x / RBC: x / WBC x   Sq Epi: x / Non Sq Epi: x / Bacteria: x            Care Discussed with Consultants/Other Providers: Yes

## 2023-11-07 NOTE — PHYSICAL THERAPY INITIAL EVALUATION ADULT - PERTINENT HX OF CURRENT PROBLEM, REHAB EVAL
SHARI VALLEJO is a 68 year old Male with PMH of chronic pain, recent fall (10/13) with posterior C1-C7 decompression/fusion for spine fracture, TBI (recently discharged from [facility] 11/3), COPD, Vertebral Artery Dissection, PAT who presents to the ER complaining of severe rectal pain and rectal prolapse.   Patient states he has been having on/off rectal pain with prolapse since acute rehab admission, it was reduced several times in rehab. Recently discharged and prolapsed again yesterday resulting in severe rectal pain. Was unable to be reduced at [facility] which prompted ER visit.   Denies fever, chills, chest pain, shortness of breath, nausea, vomiting.   +anxiety, +blood in stool

## 2023-11-08 ENCOUNTER — TRANSCRIPTION ENCOUNTER (OUTPATIENT)
Age: 68
End: 2023-11-08

## 2023-11-08 LAB
ANION GAP SERPL CALC-SCNC: 5 MMOL/L — SIGNIFICANT CHANGE UP (ref 5–17)
ANION GAP SERPL CALC-SCNC: 5 MMOL/L — SIGNIFICANT CHANGE UP (ref 5–17)
BUN SERPL-MCNC: 19 MG/DL — SIGNIFICANT CHANGE UP (ref 7–23)
BUN SERPL-MCNC: 19 MG/DL — SIGNIFICANT CHANGE UP (ref 7–23)
CALCIUM SERPL-MCNC: 9.1 MG/DL — SIGNIFICANT CHANGE UP (ref 8.4–10.5)
CALCIUM SERPL-MCNC: 9.1 MG/DL — SIGNIFICANT CHANGE UP (ref 8.4–10.5)
CHLORIDE SERPL-SCNC: 104 MMOL/L — SIGNIFICANT CHANGE UP (ref 96–108)
CHLORIDE SERPL-SCNC: 104 MMOL/L — SIGNIFICANT CHANGE UP (ref 96–108)
CO2 SERPL-SCNC: 31 MMOL/L — SIGNIFICANT CHANGE UP (ref 22–31)
CO2 SERPL-SCNC: 31 MMOL/L — SIGNIFICANT CHANGE UP (ref 22–31)
CREAT SERPL-MCNC: 0.74 MG/DL — SIGNIFICANT CHANGE UP (ref 0.5–1.3)
CREAT SERPL-MCNC: 0.74 MG/DL — SIGNIFICANT CHANGE UP (ref 0.5–1.3)
CULTURE RESULTS: SIGNIFICANT CHANGE UP
CULTURE RESULTS: SIGNIFICANT CHANGE UP
EGFR: 97 ML/MIN/1.73M2 — SIGNIFICANT CHANGE UP
EGFR: 97 ML/MIN/1.73M2 — SIGNIFICANT CHANGE UP
GLUCOSE SERPL-MCNC: 134 MG/DL — HIGH (ref 70–99)
GLUCOSE SERPL-MCNC: 134 MG/DL — HIGH (ref 70–99)
HCT VFR BLD CALC: 35.5 % — LOW (ref 39–50)
HCT VFR BLD CALC: 35.5 % — LOW (ref 39–50)
HGB BLD-MCNC: 11.4 G/DL — LOW (ref 13–17)
HGB BLD-MCNC: 11.4 G/DL — LOW (ref 13–17)
MCHC RBC-ENTMCNC: 29.8 PG — SIGNIFICANT CHANGE UP (ref 27–34)
MCHC RBC-ENTMCNC: 29.8 PG — SIGNIFICANT CHANGE UP (ref 27–34)
MCHC RBC-ENTMCNC: 32.1 GM/DL — SIGNIFICANT CHANGE UP (ref 32–36)
MCHC RBC-ENTMCNC: 32.1 GM/DL — SIGNIFICANT CHANGE UP (ref 32–36)
MCV RBC AUTO: 92.9 FL — SIGNIFICANT CHANGE UP (ref 80–100)
MCV RBC AUTO: 92.9 FL — SIGNIFICANT CHANGE UP (ref 80–100)
NRBC # BLD: 0 /100 WBCS — SIGNIFICANT CHANGE UP (ref 0–0)
NRBC # BLD: 0 /100 WBCS — SIGNIFICANT CHANGE UP (ref 0–0)
PLATELET # BLD AUTO: 375 K/UL — SIGNIFICANT CHANGE UP (ref 150–400)
PLATELET # BLD AUTO: 375 K/UL — SIGNIFICANT CHANGE UP (ref 150–400)
POTASSIUM SERPL-MCNC: 4.1 MMOL/L — SIGNIFICANT CHANGE UP (ref 3.5–5.3)
POTASSIUM SERPL-MCNC: 4.1 MMOL/L — SIGNIFICANT CHANGE UP (ref 3.5–5.3)
POTASSIUM SERPL-SCNC: 4.1 MMOL/L — SIGNIFICANT CHANGE UP (ref 3.5–5.3)
POTASSIUM SERPL-SCNC: 4.1 MMOL/L — SIGNIFICANT CHANGE UP (ref 3.5–5.3)
RBC # BLD: 3.82 M/UL — LOW (ref 4.2–5.8)
RBC # BLD: 3.82 M/UL — LOW (ref 4.2–5.8)
RBC # FLD: 13.5 % — SIGNIFICANT CHANGE UP (ref 10.3–14.5)
RBC # FLD: 13.5 % — SIGNIFICANT CHANGE UP (ref 10.3–14.5)
SODIUM SERPL-SCNC: 140 MMOL/L — SIGNIFICANT CHANGE UP (ref 135–145)
SODIUM SERPL-SCNC: 140 MMOL/L — SIGNIFICANT CHANGE UP (ref 135–145)
SPECIMEN SOURCE: SIGNIFICANT CHANGE UP
SPECIMEN SOURCE: SIGNIFICANT CHANGE UP
WBC # BLD: 11.82 K/UL — HIGH (ref 3.8–10.5)
WBC # BLD: 11.82 K/UL — HIGH (ref 3.8–10.5)
WBC # FLD AUTO: 11.82 K/UL — HIGH (ref 3.8–10.5)
WBC # FLD AUTO: 11.82 K/UL — HIGH (ref 3.8–10.5)

## 2023-11-08 PROCEDURE — 99232 SBSQ HOSP IP/OBS MODERATE 35: CPT | Mod: FS

## 2023-11-08 RX ADMIN — Medication 5 MILLIGRAM(S): at 03:56

## 2023-11-08 RX ADMIN — Medication 81 MILLIGRAM(S): at 13:20

## 2023-11-08 RX ADMIN — Medication 120 MILLIGRAM(S): at 07:12

## 2023-11-08 RX ADMIN — TAMSULOSIN HYDROCHLORIDE 0.4 MILLIGRAM(S): 0.4 CAPSULE ORAL at 07:13

## 2023-11-08 RX ADMIN — TAMSULOSIN HYDROCHLORIDE 0.4 MILLIGRAM(S): 0.4 CAPSULE ORAL at 18:16

## 2023-11-08 RX ADMIN — METHOCARBAMOL 750 MILLIGRAM(S): 500 TABLET, FILM COATED ORAL at 22:51

## 2023-11-08 RX ADMIN — Medication 25 MILLIGRAM(S): at 18:18

## 2023-11-08 RX ADMIN — Medication 25 MILLIGRAM(S): at 07:14

## 2023-11-08 RX ADMIN — METHOCARBAMOL 750 MILLIGRAM(S): 500 TABLET, FILM COATED ORAL at 07:14

## 2023-11-08 RX ADMIN — Medication 1 TABLET(S): at 13:21

## 2023-11-08 RX ADMIN — SODIUM CHLORIDE 2 GRAM(S): 9 INJECTION INTRAMUSCULAR; INTRAVENOUS; SUBCUTANEOUS at 07:13

## 2023-11-08 RX ADMIN — ENOXAPARIN SODIUM 40 MILLIGRAM(S): 100 INJECTION SUBCUTANEOUS at 22:51

## 2023-11-08 RX ADMIN — METHOCARBAMOL 750 MILLIGRAM(S): 500 TABLET, FILM COATED ORAL at 15:41

## 2023-11-08 RX ADMIN — SODIUM CHLORIDE 2 GRAM(S): 9 INJECTION INTRAMUSCULAR; INTRAVENOUS; SUBCUTANEOUS at 22:54

## 2023-11-08 RX ADMIN — SODIUM CHLORIDE 2 GRAM(S): 9 INJECTION INTRAMUSCULAR; INTRAVENOUS; SUBCUTANEOUS at 18:16

## 2023-11-08 NOTE — DISCHARGE NOTE PROVIDER - NSDCMRMEDTOKEN_GEN_ALL_CORE_FT
HYDROmorphone 4 mg oral tablet: 1 tab(s) orally 3 times a day as needed for  severe pain  Toprol-XL 25 mg oral tablet, extended release: 1 tab(s) orally once a day   acetaminophen 325 mg oral tablet: 2 tab(s) orally every 6 hours As needed Mild Pain (1 - 3)  aluminum hydroxide-magnesium hydroxide 200 mg-200 mg/5 mL oral suspension: 30 milliliter(s) orally every 4 hours As needed Dyspepsia  aspirin 81 mg oral delayed release tablet: 1 tab(s) orally once a day  Colace 100 mg oral capsule: 1 cap(s) orally 3 times a day  diazePAM 5 mg oral tablet: 1 tab(s) orally every 6 hours As needed muscle spasm/pain  dilTIAZem 120 mg/24 hours oral capsule, extended release: 1 cap(s) orally once a day  HYDROmorphone 2 mg oral tablet: 3 tab(s) orally every 6 hours As needed Severe Pain (7 - 10)  HYDROmorphone 4 mg oral tablet: 1 tab(s) orally every 4 hours As needed Moderate Pain (4 - 6)  lidocaine 4% topical film: Apply topically to affected area once a day as needed for pain  melatonin 3 mg oral tablet: 1 tab(s) orally once a day (at bedtime) As needed Insomnia  Metamucil 3.4 g/5.2 g oral powder for reconstitution: 3.4 gram(s) orally 2 times a day  methocarbamol 750 mg oral tablet: 1 tab(s) orally 3 times a day  Multiple Vitamins oral tablet: 1 tab(s) orally once a day  ocular lubricant ophthalmic solution: 1 drop(s) to each affected eye 4 times a day  polyethylene glycol 3350 oral powder for reconstitution: 17 gram(s) orally once a day (at bedtime)  sodium chloride 1 g oral tablet: 2 tab(s) orally every 8 hours  tamsulosin 0.4 mg oral capsule: 1 cap(s) orally 2 times a day  Toprol-XL 25 mg oral tablet, extended release: 0.5 tab(s) orally once a day

## 2023-11-08 NOTE — DISCHARGE NOTE PROVIDER - PROVIDER TOKENS
PROVIDER:[TOKEN:[[id number]]] PROVIDER:[TOKEN:[[id number]]],PROVIDER:[TOKEN:[[id number]]] PROVIDER:[TOKEN:[[id number]],FOLLOWUP:[1 month]],PROVIDER:[TOKEN:[[id number]]]

## 2023-11-08 NOTE — DISCHARGE NOTE PROVIDER - NSDCCPCAREPLAN_GEN_ALL_CORE_FT
PRINCIPAL DISCHARGE DIAGNOSIS  Diagnosis: Rectal prolapse  Assessment and Plan of Treatment: hemorrhoids removed. continue meds. sitzh bath daily as needed. see Follow up with Dr Kole beckett of Dec 11, 2023. use  laxatives, stool softener and metamucil, mobilize to avoid constipation. avoid senna      SECONDARY DISCHARGE DIAGNOSES  Diagnosis: Urinary retention  Assessment and Plan of Treatment: simon placed on 11/10. transient hematuria after simon was inserted which was resolved. urine test shows no significant infection, only blood. drink plenty. simon needs to be removed in 3-4 days after mobilization. avoid constipation. infome rehab team that you wants high commode seat. return to ER if fever, blood in urine, worsening abdominal pain or any other concerning symptoms. see urology if retention persist    Diagnosis: Debility  Assessment and Plan of Treatment: get therapy. fall precautions. drink plenty    Diagnosis: Chronic hyponatremia  Assessment and Plan of Treatment: continue salt tab. wean off as able    Diagnosis: Cervicalgia  Assessment and Plan of Treatment: use pain meds as prescribed. avoid valium and opioid as much posssible. can cause constipation, drowsyness, increased risk of fall and injury. do not drink alcohol or drive after taking valium or hydromorphone.     PRINCIPAL DISCHARGE DIAGNOSIS  Diagnosis: Rectal prolapse  Assessment and Plan of Treatment: hemorrhoids removed. continue meds. sitzh bath daily as needed. see Follow up with Dr Kole beckett of Dec 11, 2023. use  laxatives, stool softener and metamucil, mobilize to avoid constipation. avoid senna      SECONDARY DISCHARGE DIAGNOSES  Diagnosis: Urinary retention  Assessment and Plan of Treatment: simon placed on 11/10. transient hematuria after simon was inserted which was resolved. urine test shows no significant infection, only blood. drink plenty. simon needs to be removed in 3-4 days after mobilization. avoid constipation. infome rehab team that you wants high commode seat. return to ER if fever, blood in urine, worsening abdominal pain or any other concerning symptoms. see urology if retention persist    Diagnosis: Debility  Assessment and Plan of Treatment: get therapy. fall precautions. drink plenty    Diagnosis: Chronic hyponatremia  Assessment and Plan of Treatment: continue salt tab. wean off as able    Diagnosis: Cervicalgia  Assessment and Plan of Treatment: use pain meds as prescribed. avoid valium and opioid as much posssible. can cause constipation, drowsyness, increased risk of fall and injury. do not drink alcohol or drive after taking valium or hydromorphone. use cervical collar when out of bed.

## 2023-11-08 NOTE — DISCHARGE NOTE PROVIDER - HOSPITAL COURSE
68 year old Male with PMH of chronic pain, recent fall (10/13) with posterior C1-C7 decompression/fusion for spine fracture, TBI (recently discharged [facility] 11/[facility]), COPD, Vertebral Artery Dissection, PAT who presents to the ER complaining of severe rectal pain and rectal prolapse.     Patient states he has been having on/off rectal pain with prolapse since acute rehab admission, it was reduced several times in rehab. Recently discharged and prolapsed again resulting in severe rectal pain. Admitted to hospitalist. Colorectal surgery consulted and performed a complex hemorrhoidectomy internal and external hemorrhoids.   Recommended sitz baths, pain control and bowel regimen.    PT consulted and rec [facility]  Stable for dc to [facility] 68 year old Male with PMH of chronic pain, recent fall (10/13) with posterior C1-C7 decompression/fusion for spine fracture, TBI (recently discharged [facility]), COPD, Vertebral Artery Dissection, PAT who presents to the ER complaining of severe rectal pain and rectal prolapse. Patient states he has been having on/off rectal pain with prolapse since acute rehab admission, it was reduced several times in rehab. Recently discharged and prolapsed again resulting in severe rectal pain. Admitted to hospitalist. Colorectal surgery consulted and performed a complex hemorrhoidectomy internal and external hemorrhoids.   Recommended sitz baths, pain control and bowel regimen.    PT consulted and rec [facility]  Stable for dc to [facility]  SNF provider:     discharging provider 68 year old Male with PMH of chronic pain, recent fall (10/13) with posterior C1-C7 decompression/fusion for spine fracture, TBI (recently discharged from [facility] 11/[facility]), COPD, Vertebral Artery Dissection, PAT who presents to the ER complaining of severe rectal pain and rectal prolapse. Patient states he has been having on/off rectal pain with prolapse since acute rehab admission, it was reduced several times in rehab. Recently discharged and prolapsed again resulting in severe rectal pain. Admitted to hospitalist. Colorectal surgery consulted and performed a complex hemorrhoidectomy internal and external hemorrhoids.   Recommended sitz baths, pain control and bowel regimen.    PT consulted and rec [facility]  Stable for dc to [facility]  SNF provider:     discharging provider: rob Edgar MD. cell: [phone]. please call with Qs

## 2023-11-08 NOTE — DISCHARGE NOTE PROVIDER - NSDCFUSCHEDAPPT_GEN_ALL_CORE_FT
Spike Dawkins  [facility]  SP[facility] [address]  Scheduled Appointment: 11/20/2023

## 2023-11-08 NOTE — PROGRESS NOTE ADULT - NS ATTEND AMEND GEN_ALL_CORE FT
agree with above  urine w/u negative thus far  frequent BM today.  completely hold bowel regimen  d/c to rehab once medically stable

## 2023-11-08 NOTE — DISCHARGE NOTE PROVIDER - NSDCFUADDINST_GEN_ALL_CORE_FT
please bring all DC papers and medications to all health care providers. Return to ER if symptoms recur and any new concerning symptoms.  please bring all DC papers and medications to all health care providers. Return to ER if symptoms recur and any new concerning symptoms.   SITZ BATHS TWICE A DAY   TAKE PAIN MEDICATION AS DIRECTED   MAY USE BARRIER CREAM AROUND RECTAL AREA  BOWEL  REGIMEN AS DIRECTED TO AVOID CONSTIPATION

## 2023-11-08 NOTE — DISCHARGE NOTE PROVIDER - CARE PROVIDER_API CALL
Paula Sharif  Colon/Rectal Surgery  [address] [place] [address]  Phone: [phone]  Fax: [phone]  Follow Up Time:    Paula hSarif  Colon/Rectal Surgery  [address], [place] [address]  Phone: [phone]  Fax: [phone]  Follow Up Time:     Saima Ball  Urology  [address] [place] [address]  Phone: [phone]  Fax: [phone]  Follow Up Time:    Paula Sharif  Colon/Rectal Surgery  [address], [place] [address]  Phone: [phone]  Fax: [phone]  Follow Up Time: 1 month    Saima Ball  Urology  [address] [place] [address]  Phone: [phone]  Fax: [phone]  Follow Up Time:

## 2023-11-08 NOTE — PROGRESS NOTE ADULT - SUBJECTIVE AND OBJECTIVE BOX
Patient is a 68y old  Male who presents with a chief complaint of rectal pain (07 Nov 2023 13:41)  Frequent loose BMs-non bloody    Patient seen and examined at bedside.    ALLERGIES:  Originally Entered as [Unknown] reaction to [PUMPKIN SEEDS] (Unknown)  Vioxx (Unknown)  Nuts (Unknown)    MEDICATIONS  (STANDING):  aspirin enteric coated 81 milliGRAM(s) Oral daily  diltiazem    milliGRAM(s) Oral daily  enoxaparin Injectable 40 milliGRAM(s) SubCutaneous every 24 hours  methocarbamol 750 milliGRAM(s) Oral three times a day  metoprolol tartrate 25 milliGRAM(s) Oral two times a day  multivitamin 1 Tablet(s) Oral daily  polyethylene glycol 3350 17 Gram(s) Oral two times a day  sodium chloride 2 Gram(s) Oral every 8 hours  tamsulosin 0.4 milliGRAM(s) Oral two times a day    MEDICATIONS  (PRN):  acetaminophen     Tablet .. 650 milliGRAM(s) Oral every 6 hours PRN Mild Pain (1 - 3)  aluminum hydroxide/magnesium hydroxide/simethicone Suspension 30 milliLiter(s) Oral every 4 hours PRN Dyspepsia  diazepam    Tablet 5 milliGRAM(s) Oral every 6 hours PRN muscle spasm/pain  HYDROmorphone   Tablet 4 milliGRAM(s) Oral every 4 hours PRN Moderate Pain (4 - 6)  HYDROmorphone   Tablet 6 milliGRAM(s) Oral every 6 hours PRN Severe Pain (7 - 10)  lactulose Syrup 10 Gram(s) Oral two times a day PRN Constipation  melatonin 3 milliGRAM(s) Oral at bedtime PRN Insomnia    Vital Signs Last 24 Hrs  T(F): 97.9 (08 Nov 2023 05:01), Max: 98.5 (07 Nov 2023 21:23)  HR: 105 (08 Nov 2023 05:01) (96 - 105)  BP: 141/90 (08 Nov 2023 05:01) (111/81 - 141/90)  RR: 16 (08 Nov 2023 05:01) (16 - 17)  SpO2: 93% (08 Nov 2023 05:01) (93% - 94%)  I&O's Summary    07 Nov 2023 07:01  -  08 Nov 2023 07:00  --------------------------------------------------------  IN: 200 mL / OUT: 550 mL / NET: -350 mL      BMI (kg/m2): 18.7 (11-05-23 @ 08:23)  PHYSICAL EXAM:  General: NAD, A/O x 3  ENT: MMM, no thrush  Neck: Supple, No JVD  Lungs: Non labored breathing,  Clear to auscultation bilaterally,   Cardio: RRR, S1/S2, no pitting edema bilaterally  Abdomen: Soft, Nontender, Nondistended; Bowel sounds present  Extremities: No calf tenderness, limited ROM UE     LABS:                        11.4   11.82 )-----------( 375      ( 08 Nov 2023 09:33 )             35.5       11-08    140  |  104  |  19  ----------------------------<  134  4.1   |  31  |  0.74    Ca    9.1      08 Nov 2023 09:33    TPro  6.4  /  Alb  2.4  /  TBili  0.4  /  DBili  x   /  AST  12  /  ALT  26  /  AlkPhos  119  11-06       PT/INR - ( 06 Nov 2023 06:50 )   PT: 12.1 sec;   INR: 1.06 ratio         PTT - ( 06 Nov 2023 06:50 )  PTT:29.0 sec                           Urinalysis Basic - ( 08 Nov 2023 09:33 )    Color: x / Appearance: x / SG: x / pH: x  Gluc: 134 mg/dL / Ketone: x  / Bili: x / Urobili: x   Blood: x / Protein: x / Nitrite: x   Leuk Esterase: x / RBC: x / WBC x   Sq Epi: x / Non Sq Epi: x / Bacteria: x        Culture - Urine (collected 07 Nov 2023 06:04)  Source: Catheterized Catheterized  Final Report (08 Nov 2023 07:10):    <10,000 CFU/mL Normal Urogenital Ariadne          RADIOLOGY & ADDITIONAL TESTS:    Care Discussed with Consultants/Other Providers:    Patient is a 68y old  Male who presents with a chief complaint of rectal pain (07 Nov 2023 13:41)  Frequent loose BMs-non bloody. last rec'd miralax at 5am on 11/7.    Patient seen and examined at bedside.    ALLERGIES:  Originally Entered as [Unknown] reaction to [PUMPKIN SEEDS] (Unknown)  Vioxx (Unknown)  Nuts (Unknown)    MEDICATIONS  (STANDING):  aspirin enteric coated 81 milliGRAM(s) Oral daily  diltiazem    milliGRAM(s) Oral daily  enoxaparin Injectable 40 milliGRAM(s) SubCutaneous every 24 hours  methocarbamol 750 milliGRAM(s) Oral three times a day  metoprolol tartrate 25 milliGRAM(s) Oral two times a day  multivitamin 1 Tablet(s) Oral daily  polyethylene glycol 3350 17 Gram(s) Oral two times a day  sodium chloride 2 Gram(s) Oral every 8 hours  tamsulosin 0.4 milliGRAM(s) Oral two times a day    MEDICATIONS  (PRN):  acetaminophen     Tablet .. 650 milliGRAM(s) Oral every 6 hours PRN Mild Pain (1 - 3)  aluminum hydroxide/magnesium hydroxide/simethicone Suspension 30 milliLiter(s) Oral every 4 hours PRN Dyspepsia  diazepam    Tablet 5 milliGRAM(s) Oral every 6 hours PRN muscle spasm/pain  HYDROmorphone   Tablet 4 milliGRAM(s) Oral every 4 hours PRN Moderate Pain (4 - 6)  HYDROmorphone   Tablet 6 milliGRAM(s) Oral every 6 hours PRN Severe Pain (7 - 10)  lactulose Syrup 10 Gram(s) Oral two times a day PRN Constipation  melatonin 3 milliGRAM(s) Oral at bedtime PRN Insomnia    Vital Signs Last 24 Hrs  T(F): 97.9 (08 Nov 2023 05:01), Max: 98.5 (07 Nov 2023 21:23)  HR: 105 (08 Nov 2023 05:01) (96 - 105)  BP: 141/90 (08 Nov 2023 05:01) (111/81 - 141/90)  RR: 16 (08 Nov 2023 05:01) (16 - 17)  SpO2: 93% (08 Nov 2023 05:01) (93% - 94%)  I&O's Summary    07 Nov 2023 07:01  -  08 Nov 2023 07:00  --------------------------------------------------------  IN: 200 mL / OUT: 550 mL / NET: -350 mL      BMI (kg/m2): 18.7 (11-05-23 @ 08:23)  PHYSICAL EXAM:  General: NAD, A/O x 3  ENT: MMM, no thrush  Neck: Supple, No JVD  Lungs: Non labored breathing,  Clear to auscultation bilaterally,   Cardio: RRR, S1/S2, no pitting edema bilaterally  Abdomen: Soft, Nontender, Nondistended; Bowel sounds present  Extremities: No calf tenderness, limited ROM UE     LABS:                        11.4   11.82 )-----------( 375      ( 08 Nov 2023 09:33 )             35.5       11-08    140  |  104  |  19  ----------------------------<  134  4.1   |  31  |  0.74    Ca    9.1      08 Nov 2023 09:33    TPro  6.4  /  Alb  2.4  /  TBili  0.4  /  DBili  x   /  AST  12  /  ALT  26  /  AlkPhos  119  11-06       PT/INR - ( 06 Nov 2023 06:50 )   PT: 12.1 sec;   INR: 1.06 ratio         PTT - ( 06 Nov 2023 06:50 )  PTT:29.0 sec                           Urinalysis Basic - ( 08 Nov 2023 09:33 )    Color: x / Appearance: x / SG: x / pH: x  Gluc: 134 mg/dL / Ketone: x  / Bili: x / Urobili: x   Blood: x / Protein: x / Nitrite: x   Leuk Esterase: x / RBC: x / WBC x   Sq Epi: x / Non Sq Epi: x / Bacteria: x        Culture - Urine (collected 07 Nov 2023 06:04)  Source: Catheterized Catheterized  Final Report (08 Nov 2023 07:10):    <10,000 CFU/mL Normal Urogenital Ariadne          RADIOLOGY & ADDITIONAL TESTS:    Care Discussed with Consultants/Other Providers:

## 2023-11-08 NOTE — PROGRESS NOTE ADULT - ASSESSMENT
68 year old M with PMH of chronic pain, recent fall (10/13) with posterior C1-C7 decompression/fusion for spine fracture, TBI (recently discharged from [facility] 11/3), COPD, Vertebral Artery Dissection, PAT who presents to the ER complaining of severe rectal pain and rectal prolapse.     *Rectal Pain - likely 2/2 irreducible hemorrhoids  - s/p hemorrhoidectomy on 11/6  - diet resumed  - bowel regimen--holding lactulose for now due to frequent BMs  - f/u with colorectal Dr Sharif in 6 weeks at [facility]  - Analgesics prn    Recent fall with TBI  Unstable cervical spine fracture s/p C1-C7 Decompression/Fusion and evacuation of EDH with cord compression   - Cervical collar   - Pain management     Vertebral Artery Dissection   - ASA 81mg daily     COPD  - not on inhalers  - oxygen prn    Tachycardia/PAT  - Ectopic atrial rhythm/PAT noted on tele 10/19  - Cont metoprolol   - Continue cardizem 120mg cd  - EP eval was deferred as pt not likely a candidate for ablation at this time  - Cardio consulted for cardiac risk stratification,- cont BB and diltiazem      Aortic Root Dilation  - Stable on recent outpt echo from 9/5/23  - Follow up for repeat echo q 12 months    Chronic Hyponatremia. Na stable at 140.  - on salt tab 2g q8h  - stable    *leukocytosis:   suspect reactive from OR  Downtrending  UA clear    DVT Prophylaxis:  - Lovenox     Dispo: stable for discharge. Was at [facility] but wants to consider [facility]. Appreciate case management   68 year old M with PMH of chronic pain, recent fall (10/13) with posterior C1-C7 decompression/fusion for spine fracture, TBI (recently discharged from [facility] 11/3), COPD, Vertebral Artery Dissection, PAT who presents to the ER complaining of severe rectal pain and rectal prolapse.     *Rectal Pain - likely 2/2 irreducible hemorrhoids  - s/p hemorrhoidectomy on 11/6  - diet resumed  - bowel regimen--holding lactulose for now due to frequent BMs. hold miralax.  - f/u with colorectal Dr Sharif in 6 weeks at [facility]  - Analgesics prn    Recent fall with TBI  Unstable cervical spine fracture s/p C1-C7 Decompression/Fusion and evacuation of EDH with cord compression   - Cervical collar   - Pain management     Vertebral Artery Dissection   - ASA 81mg daily     COPD  - not on inhalers  - oxygen prn    Tachycardia/PAT  - Ectopic atrial rhythm/PAT noted on tele 10/19  - Cont metoprolol   - Continue cardizem 120mg cd  - EP eval was deferred as pt not likely a candidate for ablation at this time  - Cardio consulted for cardiac risk stratification,- cont BB and diltiazem      Aortic Root Dilation  - Stable on recent outpt echo from 9/5/23  - Follow up for repeat echo q 12 months    Chronic Hyponatremia. Na stable at 140.  - on salt tab 2g q8h  - stable    *leukocytosis:   suspect reactive from OR  Downtrending  UA clear    DVT Prophylaxis:  - Lovenox     Dispo: stable for discharge. Was at [facility] but wants to consider [facility]. Appreciate case management   68 year old M with PMH of chronic pain, recent fall (10/13) with posterior C1-C7 decompression/fusion for spine fracture, TBI (recently discharged from [facility] 11/3), COPD, Vertebral Artery Dissection, PAT who presents to the ER complaining of severe rectal pain and rectal prolapse.     *Rectal Pain - likely 2/2 irreducible hemorrhoids  - s/p hemorrhoidectomy on 11/6  - diet resumed  - bowel regimen--holding lactulose for now due to frequent BMs. hold miralax.  - f/u with colorectal Dr Sharif in 6 weeks at [facility]  - Analgesics prn    Recent fall with TBI  Unstable cervical spine fracture s/p C1-C7 Decompression/Fusion and evacuation of EDH with cord compression   - Cervical collar   - Pain management     Vertebral Artery Dissection   - ASA 81mg daily     COPD  - not on inhalers  - oxygen prn    Tachycardia/PAT  - Ectopic atrial rhythm/PAT noted on tele 10/19  - Cont metoprolol   - Continue cardizem 120mg cd  - EP eval was deferred as pt not likely a candidate for ablation at this time  - Cardio consulted for cardiac risk stratification,- cont BB and diltiazem      Aortic Root Dilation  - Stable on recent outpt echo from 9/5/23  - Follow up for repeat echo q 12 months    Chronic Hyponatremia. Na stable at 140.  - on salt tab 2g q8h  - stable    *leukocytosis:   suspect reactive from OR  Downtrending  UA clear    DVT Prophylaxis:  - Lovenox     Dispo: stable for discharge when having less BMs. Was at [facility] but wants to consider [facility]. Appreciate case management    11/8: WIfe updated at bedside

## 2023-11-08 NOTE — PROGRESS NOTE ADULT - SUBJECTIVE AND OBJECTIVE BOX
SURGERY PROGRESS NOTE  Pt. seen and examined at bedside resting comfortably. Pt reports multiple frequent bowel movements starting yesterday. Pt states he was unable to have bm prior, Rn reports pt received lactulose yesterday. Reports small amount of blood in stools. Pt tolerating diet, denies n/v. Pt reports dysuria and difficulty urinating. Only able to void frequently in small amounts. He was straight cath'd x1 overnight and 550cc drained. Denies fever/chills, chest pain, dyspnea, cough, dizziness.     Vital Signs Last 24 Hrs  T(C): 36.6 (08 Nov 2023 05:01), Max: 36.9 (07 Nov 2023 21:23)  T(F): 97.9 (08 Nov 2023 05:01), Max: 98.5 (07 Nov 2023 21:23)  HR: 105 (08 Nov 2023 05:01) (102 - 105)  BP: 141/90 (08 Nov 2023 05:01) (111/81 - 141/90)  BP(mean): --  RR: 16 (08 Nov 2023 05:01) (16 - 17)  SpO2: 93% (08 Nov 2023 05:01) (93% - 94%)    Parameters below as of 08 Nov 2023 05:01  Patient On (Oxygen Delivery Method): room air      PHYSICAL EXAM:    GENERAL: NAD  HEAD:  Atraumatic, Normocephalic  EYES: EOMI, PERRLA, conjunctiva and sclera clear  ABDOMEN: soft, ND, ttp over suprapubic region. Rectum s/p hemorrhoidectomy, minimal amount of dried blood around rectum, no active bleeding noted  EXTREMITIES:  calf soft, non tender b/l    I&O's Detail    07 Nov 2023 07:01  -  08 Nov 2023 07:00  --------------------------------------------------------  IN:    Oral Fluid: 200 mL  Total IN: 200 mL    OUT:    Intermittent Catheterization - Urethral (mL): 550 mL  Total OUT: 550 mL    Total NET: -350 mL          LABS:                        11.4   11.82 )-----------( 375      ( 08 Nov 2023 09:33 )             35.5     11-08    140  |  104  |  19  ----------------------------<  134<H>  4.1   |  31  |  0.74    Ca    9.1      08 Nov 2023 09:33        Urinalysis Basic - ( 08 Nov 2023 09:33 )    Color: x / Appearance: x / SG: x / pH: x  Gluc: 134 mg/dL / Ketone: x  / Bili: x / Urobili: x   Blood: x / Protein: x / Nitrite: x   Leuk Esterase: x / RBC: x / WBC x   Sq Epi: x / Non Sq Epi: x / Bacteria: x      Culture Results:   <10,000 CFU/mL Normal Urogenital Ariadne (11-07 @ 06:04)    type    RADIOLOGY & ADDITIONAL STUDIES:    Impression: 68y old Male who presents with a chief complaint of rectal pain (07 Nov 2023 13:41)  pt POD # 2 s/p hemorrhoidectomy, doing well surgically. Having frequent loose bm, minimal blood. Noted that overnight pt was straight cathd due to retention, 550cc drained. Pt was straight cathd again this afternoon. Pt reporting difficulty urinating and dysuria. UA yesterday wnl. Pt HD stable, tolerating diet.    Plan:  - Regular diet  - Sitz baths  - Multimodal pain regimen  - Bowel regimen: fiber supplement and miralax/colace  - Recommend urology consult to assess urinary retention  - F/u w/ Dr. Sharif in 6 weeks at [facility]    Discussed w/ Dr. Sharif  Colorectal surgery

## 2023-11-08 NOTE — DISCHARGE NOTE PROVIDER - NSDCACTIVITY_GEN_ALL_CORE
Follow Instructions Provided by your Surgical Team No heavy lifting/straining/Follow Instructions Provided by your Surgical Team Showering allowed/Walking - Indoors allowed/No heavy lifting/straining/Walking - Outdoors allowed/Follow Instructions Provided by your Surgical Team

## 2023-11-08 NOTE — PROGRESS NOTE ADULT - NS_MD_PANP_GEN_ALL_CORE
Attending and PA/NP shared services statement (NON-critical care):
Attending and PA/NP shared services statement (NON-critical care):
VAGINAL BLEEDING

## 2023-11-09 LAB
ANION GAP SERPL CALC-SCNC: 11 MMOL/L — SIGNIFICANT CHANGE UP (ref 5–17)
ANION GAP SERPL CALC-SCNC: 11 MMOL/L — SIGNIFICANT CHANGE UP (ref 5–17)
BUN SERPL-MCNC: 16 MG/DL — SIGNIFICANT CHANGE UP (ref 7–23)
BUN SERPL-MCNC: 16 MG/DL — SIGNIFICANT CHANGE UP (ref 7–23)
CALCIUM SERPL-MCNC: 9.3 MG/DL — SIGNIFICANT CHANGE UP (ref 8.4–10.5)
CALCIUM SERPL-MCNC: 9.3 MG/DL — SIGNIFICANT CHANGE UP (ref 8.4–10.5)
CHLORIDE SERPL-SCNC: 103 MMOL/L — SIGNIFICANT CHANGE UP (ref 96–108)
CHLORIDE SERPL-SCNC: 103 MMOL/L — SIGNIFICANT CHANGE UP (ref 96–108)
CO2 SERPL-SCNC: 27 MMOL/L — SIGNIFICANT CHANGE UP (ref 22–31)
CO2 SERPL-SCNC: 27 MMOL/L — SIGNIFICANT CHANGE UP (ref 22–31)
CREAT SERPL-MCNC: 0.77 MG/DL — SIGNIFICANT CHANGE UP (ref 0.5–1.3)
CREAT SERPL-MCNC: 0.77 MG/DL — SIGNIFICANT CHANGE UP (ref 0.5–1.3)
EGFR: 96 ML/MIN/1.73M2 — SIGNIFICANT CHANGE UP
EGFR: 96 ML/MIN/1.73M2 — SIGNIFICANT CHANGE UP
GLUCOSE SERPL-MCNC: 126 MG/DL — HIGH (ref 70–99)
GLUCOSE SERPL-MCNC: 126 MG/DL — HIGH (ref 70–99)
HCT VFR BLD CALC: 36.5 % — LOW (ref 39–50)
HCT VFR BLD CALC: 36.5 % — LOW (ref 39–50)
HGB BLD-MCNC: 11.6 G/DL — LOW (ref 13–17)
HGB BLD-MCNC: 11.6 G/DL — LOW (ref 13–17)
MCHC RBC-ENTMCNC: 29.9 PG — SIGNIFICANT CHANGE UP (ref 27–34)
MCHC RBC-ENTMCNC: 29.9 PG — SIGNIFICANT CHANGE UP (ref 27–34)
MCHC RBC-ENTMCNC: 31.8 GM/DL — LOW (ref 32–36)
MCHC RBC-ENTMCNC: 31.8 GM/DL — LOW (ref 32–36)
MCV RBC AUTO: 94.1 FL — SIGNIFICANT CHANGE UP (ref 80–100)
MCV RBC AUTO: 94.1 FL — SIGNIFICANT CHANGE UP (ref 80–100)
NRBC # BLD: 0 /100 WBCS — SIGNIFICANT CHANGE UP (ref 0–0)
NRBC # BLD: 0 /100 WBCS — SIGNIFICANT CHANGE UP (ref 0–0)
PLATELET # BLD AUTO: 353 K/UL — SIGNIFICANT CHANGE UP (ref 150–400)
PLATELET # BLD AUTO: 353 K/UL — SIGNIFICANT CHANGE UP (ref 150–400)
POTASSIUM SERPL-MCNC: 3.8 MMOL/L — SIGNIFICANT CHANGE UP (ref 3.5–5.3)
POTASSIUM SERPL-MCNC: 3.8 MMOL/L — SIGNIFICANT CHANGE UP (ref 3.5–5.3)
POTASSIUM SERPL-SCNC: 3.8 MMOL/L — SIGNIFICANT CHANGE UP (ref 3.5–5.3)
POTASSIUM SERPL-SCNC: 3.8 MMOL/L — SIGNIFICANT CHANGE UP (ref 3.5–5.3)
RBC # BLD: 3.88 M/UL — LOW (ref 4.2–5.8)
RBC # BLD: 3.88 M/UL — LOW (ref 4.2–5.8)
RBC # FLD: 13.7 % — SIGNIFICANT CHANGE UP (ref 10.3–14.5)
RBC # FLD: 13.7 % — SIGNIFICANT CHANGE UP (ref 10.3–14.5)
SODIUM SERPL-SCNC: 141 MMOL/L — SIGNIFICANT CHANGE UP (ref 135–145)
SODIUM SERPL-SCNC: 141 MMOL/L — SIGNIFICANT CHANGE UP (ref 135–145)
WBC # BLD: 7.18 K/UL — SIGNIFICANT CHANGE UP (ref 3.8–10.5)
WBC # BLD: 7.18 K/UL — SIGNIFICANT CHANGE UP (ref 3.8–10.5)
WBC # FLD AUTO: 7.18 K/UL — SIGNIFICANT CHANGE UP (ref 3.8–10.5)
WBC # FLD AUTO: 7.18 K/UL — SIGNIFICANT CHANGE UP (ref 3.8–10.5)

## 2023-11-09 PROCEDURE — 76770 US EXAM ABDO BACK WALL COMP: CPT | Mod: 26

## 2023-11-09 PROCEDURE — 99232 SBSQ HOSP IP/OBS MODERATE 35: CPT

## 2023-11-09 PROCEDURE — 51701 INSERT BLADDER CATHETER: CPT

## 2023-11-09 PROCEDURE — A4312: CPT | Mod: NC

## 2023-11-09 PROCEDURE — 99283 EMERGENCY DEPT VISIT LOW MDM: CPT

## 2023-11-09 RX ORDER — CIPROFLOXACIN LACTATE 400MG/40ML
500 VIAL (ML) INTRAVENOUS ONCE
Refills: 0 | Status: COMPLETED | OUTPATIENT
Start: 2023-11-09 | End: 2023-11-09

## 2023-11-09 RX ORDER — PSYLLIUM SEED (WITH DEXTROSE)
1 POWDER (GRAM) ORAL THREE TIMES A DAY
Refills: 0 | Status: DISCONTINUED | OUTPATIENT
Start: 2023-11-09 | End: 2023-11-11

## 2023-11-09 RX ORDER — POLYETHYLENE GLYCOL 3350 17 G/17G
17 POWDER, FOR SOLUTION ORAL AT BEDTIME
Refills: 0 | Status: DISCONTINUED | OUTPATIENT
Start: 2023-11-09 | End: 2023-11-11

## 2023-11-09 RX ORDER — SENNA PLUS 8.6 MG/1
2 TABLET ORAL AT BEDTIME
Refills: 0 | Status: DISCONTINUED | OUTPATIENT
Start: 2023-11-09 | End: 2023-11-09

## 2023-11-09 RX ADMIN — METHOCARBAMOL 750 MILLIGRAM(S): 500 TABLET, FILM COATED ORAL at 21:27

## 2023-11-09 RX ADMIN — Medication 25 MILLIGRAM(S): at 18:58

## 2023-11-09 RX ADMIN — Medication 5 MILLIGRAM(S): at 10:46

## 2023-11-09 RX ADMIN — HYDROMORPHONE HYDROCHLORIDE 6 MILLIGRAM(S): 2 INJECTION INTRAMUSCULAR; INTRAVENOUS; SUBCUTANEOUS at 22:37

## 2023-11-09 RX ADMIN — Medication 1 TABLET(S): at 14:21

## 2023-11-09 RX ADMIN — TAMSULOSIN HYDROCHLORIDE 0.4 MILLIGRAM(S): 0.4 CAPSULE ORAL at 06:01

## 2023-11-09 RX ADMIN — ENOXAPARIN SODIUM 40 MILLIGRAM(S): 100 INJECTION SUBCUTANEOUS at 21:27

## 2023-11-09 RX ADMIN — HYDROMORPHONE HYDROCHLORIDE 6 MILLIGRAM(S): 2 INJECTION INTRAMUSCULAR; INTRAVENOUS; SUBCUTANEOUS at 06:01

## 2023-11-09 RX ADMIN — Medication 5 MILLIGRAM(S): at 18:01

## 2023-11-09 RX ADMIN — Medication 500 MILLIGRAM(S): at 21:28

## 2023-11-09 RX ADMIN — SODIUM CHLORIDE 2 GRAM(S): 9 INJECTION INTRAMUSCULAR; INTRAVENOUS; SUBCUTANEOUS at 06:03

## 2023-11-09 RX ADMIN — METHOCARBAMOL 750 MILLIGRAM(S): 500 TABLET, FILM COATED ORAL at 14:21

## 2023-11-09 RX ADMIN — SODIUM CHLORIDE 2 GRAM(S): 9 INJECTION INTRAMUSCULAR; INTRAVENOUS; SUBCUTANEOUS at 21:27

## 2023-11-09 RX ADMIN — Medication 81 MILLIGRAM(S): at 14:21

## 2023-11-09 RX ADMIN — SODIUM CHLORIDE 2 GRAM(S): 9 INJECTION INTRAMUSCULAR; INTRAVENOUS; SUBCUTANEOUS at 14:20

## 2023-11-09 RX ADMIN — HYDROMORPHONE HYDROCHLORIDE 6 MILLIGRAM(S): 2 INJECTION INTRAMUSCULAR; INTRAVENOUS; SUBCUTANEOUS at 21:37

## 2023-11-09 RX ADMIN — Medication 120 MILLIGRAM(S): at 06:02

## 2023-11-09 RX ADMIN — Medication 1 PACKET(S): at 21:28

## 2023-11-09 RX ADMIN — TAMSULOSIN HYDROCHLORIDE 0.4 MILLIGRAM(S): 0.4 CAPSULE ORAL at 18:58

## 2023-11-09 RX ADMIN — Medication 25 MILLIGRAM(S): at 06:01

## 2023-11-09 RX ADMIN — METHOCARBAMOL 750 MILLIGRAM(S): 500 TABLET, FILM COATED ORAL at 06:02

## 2023-11-09 RX ADMIN — Medication 5 MILLIGRAM(S): at 02:28

## 2023-11-09 NOTE — PROGRESS NOTE ADULT - SUBJECTIVE AND OBJECTIVE BOX
Medicine Progress Note    Patient is a 68y old  Male who presents with a chief complaint of rectal pain (09 Nov 2023 08:44)      SUBJECTIVE / OVERNIGHT EVENTS:  lower abdominal and penile pain. LLQ pain. not improved after hemorrhoidectomy but better than before.   urinary retention even though voiding good urine output. urology consulted today. meds adjusted  BM once early am, small amount.     ADDITIONAL REVIEW OF SYSTEMS:  denied fever/chills/CP/SOB/cough/palpitation/dizziness/abdominal pian/nausea/vomiting/diarrhoea/constipation/dysuria/leg or calf pain/headaches.all other ROS neg    MEDICATIONS  (STANDING):  aspirin enteric coated 81 milliGRAM(s) Oral daily  diltiazem    milliGRAM(s) Oral daily  enoxaparin Injectable 40 milliGRAM(s) SubCutaneous every 24 hours  methocarbamol 750 milliGRAM(s) Oral three times a day  metoprolol tartrate 25 milliGRAM(s) Oral two times a day  multivitamin 1 Tablet(s) Oral daily  sodium chloride 2 Gram(s) Oral every 8 hours  tamsulosin 0.4 milliGRAM(s) Oral two times a day    MEDICATIONS  (PRN):  acetaminophen     Tablet .. 650 milliGRAM(s) Oral every 6 hours PRN Mild Pain (1 - 3)  aluminum hydroxide/magnesium hydroxide/simethicone Suspension 30 milliLiter(s) Oral every 4 hours PRN Dyspepsia  diazepam    Tablet 5 milliGRAM(s) Oral every 6 hours PRN muscle spasm/pain  HYDROmorphone   Tablet 4 milliGRAM(s) Oral every 4 hours PRN Moderate Pain (4 - 6)  HYDROmorphone   Tablet 6 milliGRAM(s) Oral every 6 hours PRN Severe Pain (7 - 10)  lactulose Syrup 10 Gram(s) Oral two times a day PRN Constipation  melatonin 3 milliGRAM(s) Oral at bedtime PRN Insomnia    CAPILLARY BLOOD GLUCOSE        I&O's Summary    08 Nov 2023 07:01  -  09 Nov 2023 07:00  --------------------------------------------------------  IN: 0 mL / OUT: 1100 mL / NET: -1100 mL        PHYSICAL EXAM:  Vital Signs Last 24 Hrs  T(C): 36.5 (09 Nov 2023 05:28), Max: 36.7 (08 Nov 2023 14:00)  T(F): 97.7 (09 Nov 2023 05:28), Max: 98 (08 Nov 2023 14:00)  HR: 86 (09 Nov 2023 05:28) (86 - 96)  BP: 122/82 (09 Nov 2023 05:28) (122/82 - 123/88)  BP(mean): --  RR: 17 (09 Nov 2023 05:28) (17 - 17)  SpO2: 96% (09 Nov 2023 05:28) (95% - 96%)    Parameters below as of 09 Nov 2023 05:28  Patient On (Oxygen Delivery Method): room air    GENERAL: Not in distress. Alert    HEENT: clear conjuctiva, MMM. no pallor or icterus  CARDIOVASCULAR: RRR S1, S2. No murmur/rubs/gallop  LUNGS: BLAE+, no rales, no wheezing, no rhonchi.    ABDOMEN: ND. Soft,  NT, no guarding / rebound / rigidity. BS normoactive  BACK: No spine tenderness.  EXTREMITIES: no edema. no leg or calf TP.  SKIN: warm and dry  PSYCHIATRIC: Calm.  No agitation.  CNS: AAO. moves limbs, follows commands    LABS:                        11.6   7.18  )-----------( 353      ( 09 Nov 2023 06:31 )             36.5     11-09    141  |  103  |  16  ----------------------------<  126<H>  3.8   |  27  |  0.77    Ca    9.3      09 Nov 2023 06:31            Urinalysis Basic - ( 09 Nov 2023 06:31 )    Color: x / Appearance: x / SG: x / pH: x  Gluc: 126 mg/dL / Ketone: x  / Bili: x / Urobili: x   Blood: x / Protein: x / Nitrite: x   Leuk Esterase: x / RBC: x / WBC x   Sq Epi: x / Non Sq Epi: x / Bacteria: x        Culture - Urine (collected 07 Nov 2023 06:04)  Source: Catheterized Catheterized  Final Report (08 Nov 2023 07:10):    <10,000 CFU/mL Normal Urogenital Ariadne          RADIOLOGY & ADDITIONAL TESTS:  Imaging from Last 24 Hours:    Electrocardiogram/QTc Interval:    COORDINATION OF CARE:  Care Discussed with Consultants/Other Providers:

## 2023-11-09 NOTE — CONSULT NOTE ADULT - SUBJECTIVE AND OBJECTIVE BOX
HPI:  Patient is a 67 yo Male without sig past urological history who presented with severe rectal pain requiring hemorrhoidectomy. Pt states prior to this hospitalization he has no urinary complaints. Was able to urinate well, did not wake overnight, no sensation of retention, occasionally had to push to empty.   Prior to this hospitalization he was very constipated. He reports since hemmorrhoid surgery he has required straight cath twice >550cc. Pt states yesterday he was awoken by RN, had a bladder scan performed and was no given the opportunity to urinate prior to straight cath. He has been voiding into  a urinal while laying in bed. He did not have the urge to urinate during this event.     No fevers/ chills./ flank pain/ hematuria/ dysuria/ cannot recall if PSA has been checked     PAST MEDICAL & SURGICAL HISTORY:  High cholesterol      Insomnia      History of chronic back pain      Chronic obstructive pulmonary disease, unspecified COPD type      History of fall from ladder      Rectal prolapse      History of arthroscopy of left shoulder  age 15 & right shoulder 3 yrs      H/O hernia repair  b/l inguinal & abdominal      History of arthroplasty of right knee  5yrs      History of fusion of cervical spine        FAMILY HISTORY:  Family history of early CAD (Sibling)     No known  malignancy   SOCIAL HISTORY: retired construction      MEDICATIONS  (STANDING):  aspirin enteric coated 81 milliGRAM(s) Oral daily  diltiazem    milliGRAM(s) Oral daily  enoxaparin Injectable 40 milliGRAM(s) SubCutaneous every 24 hours  methocarbamol 750 milliGRAM(s) Oral three times a day  metoprolol tartrate 25 milliGRAM(s) Oral two times a day  multivitamin 1 Tablet(s) Oral daily  sodium chloride 2 Gram(s) Oral every 8 hours  tamsulosin 0.4 milliGRAM(s) Oral two times a day    MEDICATIONS  (PRN):  acetaminophen     Tablet .. 650 milliGRAM(s) Oral every 6 hours PRN Mild Pain (1 - 3)  aluminum hydroxide/magnesium hydroxide/simethicone Suspension 30 milliLiter(s) Oral every 4 hours PRN Dyspepsia  diazepam    Tablet 5 milliGRAM(s) Oral every 6 hours PRN muscle spasm/pain  HYDROmorphone   Tablet 4 milliGRAM(s) Oral every 4 hours PRN Moderate Pain (4 - 6)  HYDROmorphone   Tablet 6 milliGRAM(s) Oral every 6 hours PRN Severe Pain (7 - 10)  lactulose Syrup 10 Gram(s) Oral two times a day PRN Constipation  melatonin 3 milliGRAM(s) Oral at bedtime PRN Insomnia    Allergies    Originally Entered as [Unknown] reaction to [PUMPKIN SEEDS] (Unknown)  Vioxx (Unknown)  Nuts (Unknown)    Intolerances        REVIEW OF SYSTEMS: Pertinent positives and negatives as stated in HPI, otherwise negative    Vital signs  T(C): 36.5 (11-09-23 @ 05:28), Max: 36.7 (11-08-23 @ 14:00)  HR: 86 (11-09-23 @ 05:28)  BP: 122/82 (11-09-23 @ 05:28)  SpO2: 96% (11-09-23 @ 05:28)  Wt(kg): --    Physical Exam    Gen: awake alert NAD nontoxic appearing     HEENT: normocephalic, atraumatic, no scleral icterus    Pulm: No respiratory distress, no subcostal retractions, no accessory muscle use     CV: S1 S2,    Abd: flat  Soft, NT, ND,    : nonpalp bladder no suprapubic tenderness  circ phallus bl desc testis nontender no mass  monica deferred due to recent anal surgery     MSK:  No CVAT bl  Moving all extremities, full ROM in all extremities, No edema present    NEURO: A&Ox3, no focal neurological deficits, CN 2-12 grossly intact    SKIN: warm and dry     LABS:                          11.6   7.18  )-----------( 353      ( 09 Nov 2023 06:31 )             36.5       11-08    140  |  104  |  19  ----------------------------<  134<H>  4.1   |  31  |  0.74    Ca    9.1      08 Nov 2023 09:33        Urinalysis Basic - ( 08 Nov 2023 09:33 )    Color: x / Appearance: x / SG: x / pH: x  Gluc: 134 mg/dL / Ketone: x  / Bili: x / Urobili: x   Blood: x / Protein: x / Nitrite: x   Leuk Esterase: x / RBC: x / WBC x   Sq Epi: x / Non Sq Epi: x / Bacteria: x        Urine Cx: Culture - Urine (11.07.23 @ 06:04)    Specimen Source: Catheterized Catheterized   Culture Results:   <10,000 CFU/mL Normal Urogenital [place]    Radiology:  < from: CT Abdomen and Pelvis w/ IV Cont (10.13.23 @ 17:35) >  FINDINGS:  CHEST:  LUNGS AND LARGE AIRWAYS: Patent central airways. Mild dependent   atelectasis.  PLEURA: Small right pleural effusion. Trace right pneumothorax.  VESSELS: Atherosclerotic changes of the aorta and coronary arteries.  HEART: Heart size is normal. No pericardial effusion. Small focus of air   in the anterior mediastinum.  MEDIASTINUM AND ALYCIA: No lymphadenopathy.  CHEST WALL AND LOWER NECK: Acute displaced right 8th lateral rib fracture   with overriding fragments and displaced 9th and 10th lateral rib   fractures. There is associated right chest wall subcutaneous emphysema   and soft tissue swelling.    ABDOMEN AND PELVIS:  LIVER: Numerous cysts and subcentimeter hypodense foci too small to   characterize.  BILE DUCTS: Normal caliber.  GALLBLADDER: Within normal limits.  SPLEEN: Within normal limits.  PANCREAS: Within normal limits.  ADRENALS: Within normal limits.  KIDNEYS/URETERS: No hydronephrosis. Right renal cysts and additional   subcentimeter hypodense foci too small to characterize.    BLADDER: Within normal limits.  REPRODUCTIVE ORGANS: Prostate within normal limits.    BOWEL: No bowel obstruction.  PERITONEUM: No ascites or free air.  VESSELS: Atherosclerotic changes.  RETROPERITONEUM/LYMPH NODES: No lymphadenopathy.  ABDOMINAL WALL: Within normal limits.  BONES: Right-sided rib fractures as above. Chronic appearing fracture   deformity of the right inferior pubic ramus. Degenerative changes of the   spine. Stable nonaggressive appearing lytic lesion in the T8 vertebral   body.    IMPRESSION:  Acute displaced right lateral 8-10th rib fractures.    Trace right pneumothorax and pneumomediastinum. Small right pleural   effusion.        < end of copied text >     HPI:  Patient is a 67 yo Male without sig past urological history who presented with severe rectal pain requiring hemorrhoidectomy. Pt states prior to this hospitalization he has no urinary complaints. Was able to urinate well, did not wake overnight, no sensation of retention, occasionally had to push to empty.   Prior to this hospitalization he was very constipated. He reports since hemorrhoid surgery he has required straight cath twice >550cc. Pt states yesterday he was awoken by RN, had a bladder scan performed and was no given the opportunity to urinate prior to straight cath. He has been voiding into  a urinal while laying in bed. He did not have the urge to urinate during this event.     No fevers/ chills./ flank pain/ hematuria/ dysuria/ cannot recall if PSA has been checked     PAST MEDICAL & SURGICAL HISTORY:  High cholesterol      Insomnia      History of chronic back pain      Chronic obstructive pulmonary disease, unspecified COPD type      History of fall from ladder      Rectal prolapse      History of arthroscopy of left shoulder  age 15 & right shoulder 3 yrs      H/O hernia repair  b/l inguinal & abdominal      History of arthroplasty of right knee  5yrs      History of fusion of cervical spine        FAMILY HISTORY:  Family history of early CAD (Sibling)     No known  malignancy   SOCIAL HISTORY: retired construction      MEDICATIONS  (STANDING):  aspirin enteric coated 81 milliGRAM(s) Oral daily  diltiazem    milliGRAM(s) Oral daily  enoxaparin Injectable 40 milliGRAM(s) SubCutaneous every 24 hours  methocarbamol 750 milliGRAM(s) Oral three times a day  metoprolol tartrate 25 milliGRAM(s) Oral two times a day  multivitamin 1 Tablet(s) Oral daily  sodium chloride 2 Gram(s) Oral every 8 hours  tamsulosin 0.4 milliGRAM(s) Oral two times a day    MEDICATIONS  (PRN):  acetaminophen     Tablet .. 650 milliGRAM(s) Oral every 6 hours PRN Mild Pain (1 - 3)  aluminum hydroxide/magnesium hydroxide/simethicone Suspension 30 milliLiter(s) Oral every 4 hours PRN Dyspepsia  diazepam    Tablet 5 milliGRAM(s) Oral every 6 hours PRN muscle spasm/pain  HYDROmorphone   Tablet 4 milliGRAM(s) Oral every 4 hours PRN Moderate Pain (4 - 6)  HYDROmorphone   Tablet 6 milliGRAM(s) Oral every 6 hours PRN Severe Pain (7 - 10)  lactulose Syrup 10 Gram(s) Oral two times a day PRN Constipation  melatonin 3 milliGRAM(s) Oral at bedtime PRN Insomnia    Allergies    Originally Entered as [Unknown] reaction to [PUMPKIN SEEDS] (Unknown)  Vioxx (Unknown)  Nuts (Unknown)    Intolerances        REVIEW OF SYSTEMS: Pertinent positives and negatives as stated in HPI, otherwise negative    Vital signs  T(C): 36.5 (11-09-23 @ 05:28), Max: 36.7 (11-08-23 @ 14:00)  HR: 86 (11-09-23 @ 05:28)  BP: 122/82 (11-09-23 @ 05:28)  SpO2: 96% (11-09-23 @ 05:28)  Wt(kg): --    Physical Exam    Gen: awake alert NAD nontoxic appearing     HEENT: normocephalic, atraumatic, no scleral icterus    Pulm: No respiratory distress, no subcostal retractions, no accessory muscle use     CV: S1 S2,    Abd: flat  Soft, NT, ND,    : nonpalp bladder no suprapubic tenderness  circ phallus bl desc testis nontender no mass  monica deferred due to recent anal surgery     MSK:  No CVAT bl  Moving all extremities, full ROM in all extremities, No edema present    NEURO: A&Ox3, no focal neurological deficits, CN 2-12 grossly intact    SKIN: warm and dry     LABS:                          11.6   7.18  )-----------( 353      ( 09 Nov 2023 06:31 )             36.5       11-08    140  |  104  |  19  ----------------------------<  134<H>  4.1   |  31  |  0.74    Ca    9.1      08 Nov 2023 09:33        Urinalysis Basic - ( 08 Nov 2023 09:33 )    Color: x / Appearance: x / SG: x / pH: x  Gluc: 134 mg/dL / Ketone: x  / Bili: x / Urobili: x   Blood: x / Protein: x / Nitrite: x   Leuk Esterase: x / RBC: x / WBC x   Sq Epi: x / Non Sq Epi: x / Bacteria: x        Urine Cx: Culture - Urine (11.07.23 @ 06:04)    Specimen Source: Catheterized Catheterized   Culture Results:   <10,000 CFU/mL Normal Urogenital [place]    Radiology:  < from: CT Abdomen and Pelvis w/ IV Cont (10.13.23 @ 17:35) >  FINDINGS:  CHEST:  LUNGS AND LARGE AIRWAYS: Patent central airways. Mild dependent   atelectasis.  PLEURA: Small right pleural effusion. Trace right pneumothorax.  VESSELS: Atherosclerotic changes of the aorta and coronary arteries.  HEART: Heart size is normal. No pericardial effusion. Small focus of air   in the anterior mediastinum.  MEDIASTINUM AND ALYCIA: No lymphadenopathy.  CHEST WALL AND LOWER NECK: Acute displaced right 8th lateral rib fracture   with overriding fragments and displaced 9th and 10th lateral rib   fractures. There is associated right chest wall subcutaneous emphysema   and soft tissue swelling.    ABDOMEN AND PELVIS:  LIVER: Numerous cysts and subcentimeter hypodense foci too small to   characterize.  BILE DUCTS: Normal caliber.  GALLBLADDER: Within normal limits.  SPLEEN: Within normal limits.  PANCREAS: Within normal limits.  ADRENALS: Within normal limits.  KIDNEYS/URETERS: No hydronephrosis. Right renal cysts and additional   subcentimeter hypodense foci too small to characterize.    BLADDER: Within normal limits.  REPRODUCTIVE ORGANS: Prostate within normal limits.    BOWEL: No bowel obstruction.  PERITONEUM: No ascites or free air.  VESSELS: Atherosclerotic changes.  RETROPERITONEUM/LYMPH NODES: No lymphadenopathy.  ABDOMINAL WALL: Within normal limits.  BONES: Right-sided rib fractures as above. Chronic appearing fracture   deformity of the right inferior pubic ramus. Degenerative changes of the   spine. Stable nonaggressive appearing lytic lesion in the T8 vertebral   body.    IMPRESSION:  Acute displaced right lateral 8-10th rib fractures.    Trace right pneumothorax and pneumomediastinum. Small right pleural   effusion.        < end of copied text >

## 2023-11-09 NOTE — CONSULT NOTE ADULT - NS ATTEND AMEND GEN_ALL_CORE FT
Patient did not void since last catheterization. Simon inserted via sterile technique. @ 500 ml + drained clear yellow urine.   CT scan images with large middle lobe into bladder.    For now keep simon until better pain control  continue simon until better pain control and improved mobility.  Continue bowel regimen  Continue Tamsulosin 0.8 mg.    Cipro 500 mg x 1.

## 2023-11-09 NOTE — CONSULT NOTE ADULT - CONSULT REASON
urinary retention
Rectal prolapse
Pre-operative cardiac risk stratification prior to rectal prolapse surgical repair

## 2023-11-09 NOTE — PROCEDURE NOTE - ADDITIONAL PROCEDURE DETAILS
Pt with urinary retention. Using aseptic technique, 16f simon inserted easily without event. 600cc of yellow urine drained.

## 2023-11-09 NOTE — CONSULT NOTE ADULT - TIME BILLING
Patient seen and examined, chart reviewed.  Re-presented due to continued prolapse of tissue per rectum - photograph obtained on admission now clearly shows that this is consistent with grade IV hemorrhoids.  Patient has been NPO since earlier this morning.  He has been cleared for surgery by Medicine team.  He is most appropriate for hemorrhoidectomy; will perform this in lithotomy position with patient's C-collar in place.  Plan for telemetry postop as per Cardiology recommendations - moderate risk for surgery.    Given that his hemorrhoidal prolapse is a persistently recurrent issue and causing significant pain he will undergo hemorrhoidectomy.  This is typically an outpatient procedure and unless any untoward events in OR, patient would be cleared for discharge anytime postoperatively from Colorectal standpoint.  Informed consent obtained.  Discussed with patient that pain is to be expected postoperatively and that there will also be significant swelling.  Pain control will need to be multimodal, including Tylenol, NSAIDs, PRN oxycodone, PRN valium; adjunct such as sitz baths and ice/heat to area as desired.  Can resume regular diet postop as well as previous level of activity, though avoiding heavy lifting.  Also spoke with patient's wife Desi this morning to review surgery and postop expectations.  Would plan on outpatient follow up in 6 weeks, likely in my [place] office.  All questions answered.
discussion with team. Care coordination

## 2023-11-09 NOTE — CONSULT NOTE ADULT - ASSESSMENT
68 year old M with PMH of chronic pain, recent fall (10/13) with posterior C1-C7 decompression/fusion for spine fracture, TBI (recently discharged from [facility] [place] 11/3), COPD, Vertebral Artery Dissection, PAT who presents to the ER complaining of severe rectal pain and rectal prolapse requiring hemorrhoidectomy incidentally found with urinary retention x 2.    please endure next bladder scan is done post urination to assess the most accurate result.  to optimize urination please have pt stand to urinate  agree with increasing dosage of Flomax  bowel regimen to avoid constipation will help to improve urination  medications reviewed for side effects. Benzos can make urination more difficult but given past medication history will likely continue to require.

## 2023-11-09 NOTE — PROGRESS NOTE ADULT - ASSESSMENT
68 year old M with PMH of chronic pain, recent fall (10/13) with posterior C1-C7 decompression/fusion for spine fracture, TBI (recently discharged from [facility] [place] 11/3), COPD, Vertebral Artery Dissection, PAT who presents to the ER complaining of severe rectal pain and rectal prolapse.     *Rectal Pain - likely 2/2 irreducible hemorrhoids  LLQ discomfort and lowe abd discomfort likely combination of retention and constipation hemorroids/hemorroidectomy  - s/p hemorrhoidectomy on 11/6  - diet resumed  - bowel regimen--holding lactulose for now due to frequent BMs. hold miralax.  - f/u with colorectal Dr Sharif in 6 weeks at [facility]. seen by surgery MAGUI Ham in am  - Analgesics prn  - reluctant to use laxatives. refusing SC time to time    Acute Urinary retention  -  ml. s/p SC. patient has been refusing SC time to time. voiding well  - urology cx noted. record PVR after voiding. stand for urination,   - increased flomax to 0.4 mg BID.  - monitor UO  - kidney and bladder USG  - bladder scan Q6hrs and SCX if PVR>500 mls    Recent fall with TBI  Unstable cervical spine fracture s/p C1-C7 Decompression/Fusion and evacuation of EDH with cord compression   - Cervical collar   - Pain management     Vertebral Artery Dissection   - ASA 81mg daily     COPD  - not on inhalers  - oxygen prn    Tachycardia/PAT  - Ectopic atrial rhythm/PAT noted on tele 10/19  - Cont metoprolol   - Continue cardizem 120mg cd  - EP eval was deferred as pt not likely a candidate for ablation at this time  - Cardio consulted for cardiac risk stratification,- cont BB and diltiazem      Aortic Root Dilation  - Stable on recent outpt echo from 9/5/23  - Follow up for repeat echo q 12 months    Chronic Hyponatremia. Na stable at 140.  - on salt tab 2g q8h  - stable    *leukocytosis:   suspect reactive from OR  Downtrending  UA clear    DVT Prophylaxis:  - Lovenox     Dispo: Was at [facility] but wants to consider [facility]. Appreciate case management. anticipate DC in 24 hrs if pain is controlled and retention improving    11/8: WIfe updated at bedside

## 2023-11-10 LAB
APPEARANCE UR: ABNORMAL
APPEARANCE UR: ABNORMAL
BACTERIA # UR AUTO: ABNORMAL /HPF
BACTERIA # UR AUTO: ABNORMAL /HPF
BILIRUB UR-MCNC: NEGATIVE — SIGNIFICANT CHANGE UP
BILIRUB UR-MCNC: NEGATIVE — SIGNIFICANT CHANGE UP
COD CRY URNS QL: PRESENT
COD CRY URNS QL: PRESENT
COLOR SPEC: ABNORMAL
COLOR SPEC: ABNORMAL
DIFF PNL FLD: ABNORMAL
DIFF PNL FLD: ABNORMAL
EPI CELLS # UR: 0 — SIGNIFICANT CHANGE UP
EPI CELLS # UR: 0 — SIGNIFICANT CHANGE UP
GLUCOSE UR QL: NEGATIVE MG/DL — SIGNIFICANT CHANGE UP
GLUCOSE UR QL: NEGATIVE MG/DL — SIGNIFICANT CHANGE UP
KETONES UR-MCNC: NEGATIVE MG/DL — SIGNIFICANT CHANGE UP
KETONES UR-MCNC: NEGATIVE MG/DL — SIGNIFICANT CHANGE UP
LEUKOCYTE ESTERASE UR-ACNC: ABNORMAL
LEUKOCYTE ESTERASE UR-ACNC: ABNORMAL
NITRITE UR-MCNC: NEGATIVE — SIGNIFICANT CHANGE UP
NITRITE UR-MCNC: NEGATIVE — SIGNIFICANT CHANGE UP
PH UR: 5.5 — SIGNIFICANT CHANGE UP (ref 5–8)
PH UR: 5.5 — SIGNIFICANT CHANGE UP (ref 5–8)
PROT UR-MCNC: 100 MG/DL
PROT UR-MCNC: 100 MG/DL
RBC CASTS # UR COMP ASSIST: >50 /HPF — HIGH (ref 0–4)
RBC CASTS # UR COMP ASSIST: >50 /HPF — HIGH (ref 0–4)
SP GR SPEC: 1.02 — SIGNIFICANT CHANGE UP (ref 1–1.03)
SP GR SPEC: 1.02 — SIGNIFICANT CHANGE UP (ref 1–1.03)
UROBILINOGEN FLD QL: 0.2 MG/DL — SIGNIFICANT CHANGE UP (ref 0.2–1)
UROBILINOGEN FLD QL: 0.2 MG/DL — SIGNIFICANT CHANGE UP (ref 0.2–1)
WBC UR QL: 6 /HPF — HIGH (ref 0–5)
WBC UR QL: 6 /HPF — HIGH (ref 0–5)

## 2023-11-10 PROCEDURE — 99233 SBSQ HOSP IP/OBS HIGH 50: CPT

## 2023-11-10 RX ORDER — SODIUM CHLORIDE 9 MG/ML
1000 INJECTION INTRAMUSCULAR; INTRAVENOUS; SUBCUTANEOUS
Refills: 0 | Status: DISCONTINUED | OUTPATIENT
Start: 2023-11-10 | End: 2023-11-11

## 2023-11-10 RX ORDER — ERYTHROMYCIN BASE 5 MG/GRAM
1 OINTMENT (GRAM) OPHTHALMIC (EYE) AT BEDTIME
Refills: 0 | Status: DISCONTINUED | OUTPATIENT
Start: 2023-11-10 | End: 2023-11-10

## 2023-11-10 RX ORDER — ERYTHROMYCIN BASE 5 MG/GRAM
1 OINTMENT (GRAM) OPHTHALMIC (EYE) ONCE
Refills: 0 | Status: DISCONTINUED | OUTPATIENT
Start: 2023-11-10 | End: 2023-11-10

## 2023-11-10 RX ORDER — ERYTHROMYCIN BASE 5 MG/GRAM
1 OINTMENT (GRAM) OPHTHALMIC (EYE)
Refills: 0 | Status: DISCONTINUED | OUTPATIENT
Start: 2023-11-10 | End: 2023-11-11

## 2023-11-10 RX ORDER — LIDOCAINE 4 G/100G
1 CREAM TOPICAL EVERY 24 HOURS
Refills: 0 | Status: DISCONTINUED | OUTPATIENT
Start: 2023-11-10 | End: 2023-11-11

## 2023-11-10 RX ADMIN — LIDOCAINE 1 PATCH: 4 CREAM TOPICAL at 11:38

## 2023-11-10 RX ADMIN — Medication 81 MILLIGRAM(S): at 14:12

## 2023-11-10 RX ADMIN — Medication 120 MILLIGRAM(S): at 05:47

## 2023-11-10 RX ADMIN — Medication 5 MILLIGRAM(S): at 00:16

## 2023-11-10 RX ADMIN — HYDROMORPHONE HYDROCHLORIDE 6 MILLIGRAM(S): 2 INJECTION INTRAMUSCULAR; INTRAVENOUS; SUBCUTANEOUS at 06:55

## 2023-11-10 RX ADMIN — HYDROMORPHONE HYDROCHLORIDE 6 MILLIGRAM(S): 2 INJECTION INTRAMUSCULAR; INTRAVENOUS; SUBCUTANEOUS at 05:56

## 2023-11-10 RX ADMIN — SODIUM CHLORIDE 60 MILLILITER(S): 9 INJECTION INTRAMUSCULAR; INTRAVENOUS; SUBCUTANEOUS at 22:14

## 2023-11-10 RX ADMIN — LIDOCAINE 1 PATCH: 4 CREAM TOPICAL at 18:28

## 2023-11-10 RX ADMIN — LIDOCAINE 1 PATCH: 4 CREAM TOPICAL at 23:11

## 2023-11-10 RX ADMIN — Medication 1 PACKET(S): at 14:24

## 2023-11-10 RX ADMIN — SODIUM CHLORIDE 2 GRAM(S): 9 INJECTION INTRAMUSCULAR; INTRAVENOUS; SUBCUTANEOUS at 14:13

## 2023-11-10 RX ADMIN — HYDROMORPHONE HYDROCHLORIDE 6 MILLIGRAM(S): 2 INJECTION INTRAMUSCULAR; INTRAVENOUS; SUBCUTANEOUS at 20:23

## 2023-11-10 RX ADMIN — SODIUM CHLORIDE 2 GRAM(S): 9 INJECTION INTRAMUSCULAR; INTRAVENOUS; SUBCUTANEOUS at 05:46

## 2023-11-10 RX ADMIN — METHOCARBAMOL 750 MILLIGRAM(S): 500 TABLET, FILM COATED ORAL at 05:47

## 2023-11-10 RX ADMIN — TAMSULOSIN HYDROCHLORIDE 0.4 MILLIGRAM(S): 0.4 CAPSULE ORAL at 05:46

## 2023-11-10 RX ADMIN — METHOCARBAMOL 750 MILLIGRAM(S): 500 TABLET, FILM COATED ORAL at 14:13

## 2023-11-10 RX ADMIN — ENOXAPARIN SODIUM 40 MILLIGRAM(S): 100 INJECTION SUBCUTANEOUS at 22:49

## 2023-11-10 RX ADMIN — HYDROMORPHONE HYDROCHLORIDE 6 MILLIGRAM(S): 2 INJECTION INTRAMUSCULAR; INTRAVENOUS; SUBCUTANEOUS at 21:04

## 2023-11-10 RX ADMIN — Medication 1 TABLET(S): at 14:12

## 2023-11-10 RX ADMIN — TAMSULOSIN HYDROCHLORIDE 0.4 MILLIGRAM(S): 0.4 CAPSULE ORAL at 19:11

## 2023-11-10 RX ADMIN — HYDROMORPHONE HYDROCHLORIDE 6 MILLIGRAM(S): 2 INJECTION INTRAMUSCULAR; INTRAVENOUS; SUBCUTANEOUS at 14:12

## 2023-11-10 RX ADMIN — HYDROMORPHONE HYDROCHLORIDE 6 MILLIGRAM(S): 2 INJECTION INTRAMUSCULAR; INTRAVENOUS; SUBCUTANEOUS at 15:10

## 2023-11-10 RX ADMIN — SODIUM CHLORIDE 2 GRAM(S): 9 INJECTION INTRAMUSCULAR; INTRAVENOUS; SUBCUTANEOUS at 22:49

## 2023-11-10 RX ADMIN — Medication 5 MILLIGRAM(S): at 11:37

## 2023-11-10 RX ADMIN — METHOCARBAMOL 750 MILLIGRAM(S): 500 TABLET, FILM COATED ORAL at 22:48

## 2023-11-10 RX ADMIN — Medication 25 MILLIGRAM(S): at 05:47

## 2023-11-10 NOTE — PROGRESS NOTE ADULT - ASSESSMENT
68 year old M with PMH of chronic pain, recent fall (10/13) with posterior C1-C7 decompression/fusion for spine fracture, TBI (recently discharged from [facility] [place] 11/3), COPD, Vertebral Artery Dissection, PAT who presents to the ER complaining of severe rectal pain and rectal prolapse.     *Rectal Pain - likely 2/2 irreducible hemorrhoids  LLQ discomfort and lowe abd discomfort likely combination of retention and constipation hemorroids/hemorroidectomy  constipation  - s/p hemorrhoidectomy on 11/6  - diet resumed  - bowel regimen adjusted per colorectal.  - f/u with colorectal Dr Sharif in 6 weeks at [facility].   - Analgesics prn  - on simon  - sitz bath    Acute Urinary retention  hematuria  - simon was inserted last night by urology. suspect old blood. discussed with urology. suggested increased hydration. ordered NS. TOV at rehab once patient is mobilized more and no further constipation. Rn was informed to mobilize patient.   - UA, UC ordered. cbc and BMP in am  - encouraged PO hydration  - flomax to 0.4 mg BID.  - monitor UO  - kidney and bladder USG: Subcentimeter echogenic focus right kidney could reflect intrarenal calcification. No hydronephrosis. Small dependent echogenic debris within the urinary bladder; correlate for infection or hemorrhage.  - urology on board. follow up OP    Recent fall with TBI  Unstable cervical spine fracture s/p C1-C7 Decompression/Fusion and evacuation of EDH with cord compression   - Cervical collar   - Pain management     Vertebral Artery Dissection   - ASA 81mg daily     COPD  - not on inhalers  - oxygen prn    Tachycardia/PAT  - Ectopic atrial rhythm/PAT noted on tele 10/19  - Cont metoprolol   - Continue cardizem 120mg cd  - EP eval was deferred as pt not likely a candidate for ablation at this time  - Cardio consulted for cardiac risk stratification,- cont BB and diltiazem  - cardio f/u OP       Aortic Root Dilation  - Stable on recent outpt echo from 9/5/23  - Follow up for repeat echo q 12 months    Chronic Hyponatremia. Na stable at 140.  - on salt tab 2g q8h  - stable    *leukocytosis:   suspect reactive from OR  Downtrending  UA clear. f.u repeat UA and UC    DVT Prophylaxis:  - Lovenox     Dispo: DC to [facility] once medically cleared. anticipate D [facility] 24-48 hrs    11/11: WIfe updated at bedside

## 2023-11-10 NOTE — PROGRESS NOTE ADULT - SUBJECTIVE AND OBJECTIVE BOX
Medicine Progress Note    Patient is a 68y old  Male who presents with a chief complaint of rectal pain (09 Nov 2023 13:57)      SUBJECTIVE / OVERNIGHT EVENTS:  seen and examined  Chart reviewed  simon was inserted overnight  BM+. + constipation  still have rectal pain but improving  DC held today for hematuria. wife at bedside.       ADDITIONAL REVIEW OF SYSTEMS:  denied fever/chills/CP/SOB/cough/palpitation/dizziness/abdominal pian/nausea/vomiting/diarrhoea/constipation/dysuria/leg or calf pain/headaches.all other ROS neg    MEDICATIONS  (STANDING):  artificial tears (preservative free) Ophthalmic Solution 1 Drop(s) Both EYES four times a day  aspirin enteric coated 81 milliGRAM(s) Oral daily  diltiazem    milliGRAM(s) Oral daily  enoxaparin Injectable 40 milliGRAM(s) SubCutaneous every 24 hours  erythromycin   Ointment 1 Application(s) Right EYE four times a day  lidocaine   4% Patch 1 Patch Transdermal every 24 hours  methocarbamol 750 milliGRAM(s) Oral three times a day  metoprolol tartrate 25 milliGRAM(s) Oral two times a day  multivitamin 1 Tablet(s) Oral daily  polyethylene glycol 3350 17 Gram(s) Oral at bedtime  psyllium Powder 1 Packet(s) Oral three times a day  sodium chloride 2 Gram(s) Oral every 8 hours  sodium chloride 0.9%. 1000 milliLiter(s) (60 mL/Hr) IV Continuous <Continuous>  tamsulosin 0.4 milliGRAM(s) Oral two times a day    MEDICATIONS  (PRN):  acetaminophen     Tablet .. 650 milliGRAM(s) Oral every 6 hours PRN Mild Pain (1 - 3)  aluminum hydroxide/magnesium hydroxide/simethicone Suspension 30 milliLiter(s) Oral every 4 hours PRN Dyspepsia  diazepam    Tablet 5 milliGRAM(s) Oral every 6 hours PRN muscle spasm/pain  HYDROmorphone   Tablet 6 milliGRAM(s) Oral every 6 hours PRN Severe Pain (7 - 10)  HYDROmorphone   Tablet 4 milliGRAM(s) Oral every 4 hours PRN Moderate Pain (4 - 6)  lactulose Syrup 10 Gram(s) Oral two times a day PRN Constipation  melatonin 3 milliGRAM(s) Oral at bedtime PRN Insomnia    CAPILLARY BLOOD GLUCOSE        I&O's Summary    09 Nov 2023 07:01  -  10 Nov 2023 07:00  --------------------------------------------------------  IN: 240 mL / OUT: 800 mL / NET: -560 mL    10 Nov 2023 07:01  -  10 Nov 2023 16:51  --------------------------------------------------------  IN: 360 mL / OUT: 0 mL / NET: 360 mL        PHYSICAL EXAM:  Vital Signs Last 24 Hrs  T(C): 36.4 (10 Nov 2023 11:40), Max: 37.1 (09 Nov 2023 20:00)  T(F): 97.6 (10 Nov 2023 11:40), Max: 98.8 (09 Nov 2023 20:00)  HR: 88 (10 Nov 2023 11:40) (75 - 91)  BP: 104/62 (10 Nov 2023 11:40) (104/62 - 113/77)  BP(mean): --  RR: 16 (10 Nov 2023 11:40) (16 - 17)  SpO2: 93% (10 Nov 2023 11:40) (93% - 96%)    Parameters below as of 10 Nov 2023 11:40  Patient On (Oxygen Delivery Method): room air    GENERAL: Not in distress. Alert    HEENT: clear conjuctiva, MMM. no pallor or icterus  CARDIOVASCULAR: RRR S1, S2. No murmur/rubs/gallop  LUNGS: BLAE+, no rales, no wheezing, no rhonchi.    ABDOMEN: ND. Soft,  NT, no guarding / rebound / rigidity. BS normoactive  : simon with dark urine and small amount of blood, looks like old blood.   BACK: No spine tenderness.  EXTREMITIES: no edema. no leg or calf TP.  SKIN: warm and dry  PSYCHIATRIC: Calm.  No agitation.  CNS: AAO. moves limbs, follows commands    LABS:                        11.6   7.18  )-----------( 353      ( 09 Nov 2023 06:31 )             36.5     11-09    141  |  103  |  16  ----------------------------<  126<H>  3.8   |  27  |  0.77    Ca    9.3      09 Nov 2023 06:31            Urinalysis Basic - ( 09 Nov 2023 06:31 )    Color: x / Appearance: x / SG: x / pH: x  Gluc: 126 mg/dL / Ketone: x  / Bili: x / Urobili: x   Blood: x / Protein: x / Nitrite: x   Leuk Esterase: x / RBC: x / WBC x   Sq Epi: x / Non Sq Epi: x / Bacteria: x            RADIOLOGY & ADDITIONAL TESTS:  Imaging from Last 24 Hours:    Electrocardiogram/QTc Interval:    COORDINATION OF CARE:  Care Discussed with Consultants/Other Providers:

## 2023-11-10 NOTE — PROGRESS NOTE ADULT - REASON FOR ADMISSION
rectal pain

## 2023-11-10 NOTE — PROGRESS NOTE ADULT - PROVIDER SPECIALTY LIST ADULT
Cardiology
Colorectal Surgery
Hospitalist
Critical Care
Hospitalist
Hospitalist
Colorectal Surgery

## 2023-11-11 ENCOUNTER — TRANSCRIPTION ENCOUNTER (OUTPATIENT)
Age: 68
End: 2023-11-11

## 2023-11-11 VITALS
RESPIRATION RATE: 18 BRPM | HEART RATE: 95 BPM | OXYGEN SATURATION: 95 % | SYSTOLIC BLOOD PRESSURE: 121 MMHG | DIASTOLIC BLOOD PRESSURE: 72 MMHG | TEMPERATURE: 98 F

## 2023-11-11 LAB
ANION GAP SERPL CALC-SCNC: 6 MMOL/L — SIGNIFICANT CHANGE UP (ref 5–17)
ANION GAP SERPL CALC-SCNC: 6 MMOL/L — SIGNIFICANT CHANGE UP (ref 5–17)
BASOPHILS # BLD AUTO: 0.06 K/UL — SIGNIFICANT CHANGE UP (ref 0–0.2)
BASOPHILS # BLD AUTO: 0.06 K/UL — SIGNIFICANT CHANGE UP (ref 0–0.2)
BASOPHILS NFR BLD AUTO: 0.7 % — SIGNIFICANT CHANGE UP (ref 0–2)
BASOPHILS NFR BLD AUTO: 0.7 % — SIGNIFICANT CHANGE UP (ref 0–2)
BUN SERPL-MCNC: 17 MG/DL — SIGNIFICANT CHANGE UP (ref 7–23)
BUN SERPL-MCNC: 17 MG/DL — SIGNIFICANT CHANGE UP (ref 7–23)
CALCIUM SERPL-MCNC: 8.8 MG/DL — SIGNIFICANT CHANGE UP (ref 8.4–10.5)
CALCIUM SERPL-MCNC: 8.8 MG/DL — SIGNIFICANT CHANGE UP (ref 8.4–10.5)
CHLORIDE SERPL-SCNC: 103 MMOL/L — SIGNIFICANT CHANGE UP (ref 96–108)
CHLORIDE SERPL-SCNC: 103 MMOL/L — SIGNIFICANT CHANGE UP (ref 96–108)
CO2 SERPL-SCNC: 30 MMOL/L — SIGNIFICANT CHANGE UP (ref 22–31)
CO2 SERPL-SCNC: 30 MMOL/L — SIGNIFICANT CHANGE UP (ref 22–31)
CREAT SERPL-MCNC: 0.77 MG/DL — SIGNIFICANT CHANGE UP (ref 0.5–1.3)
CREAT SERPL-MCNC: 0.77 MG/DL — SIGNIFICANT CHANGE UP (ref 0.5–1.3)
EGFR: 96 ML/MIN/1.73M2 — SIGNIFICANT CHANGE UP
EGFR: 96 ML/MIN/1.73M2 — SIGNIFICANT CHANGE UP
EOSINOPHIL # BLD AUTO: 0.36 K/UL — SIGNIFICANT CHANGE UP (ref 0–0.5)
EOSINOPHIL # BLD AUTO: 0.36 K/UL — SIGNIFICANT CHANGE UP (ref 0–0.5)
EOSINOPHIL NFR BLD AUTO: 4.5 % — SIGNIFICANT CHANGE UP (ref 0–6)
EOSINOPHIL NFR BLD AUTO: 4.5 % — SIGNIFICANT CHANGE UP (ref 0–6)
GLUCOSE SERPL-MCNC: 104 MG/DL — HIGH (ref 70–99)
GLUCOSE SERPL-MCNC: 104 MG/DL — HIGH (ref 70–99)
HCT VFR BLD CALC: 33.8 % — LOW (ref 39–50)
HCT VFR BLD CALC: 33.8 % — LOW (ref 39–50)
HGB BLD-MCNC: 10.6 G/DL — LOW (ref 13–17)
HGB BLD-MCNC: 10.6 G/DL — LOW (ref 13–17)
IMM GRANULOCYTES NFR BLD AUTO: 0.5 % — SIGNIFICANT CHANGE UP (ref 0–0.9)
IMM GRANULOCYTES NFR BLD AUTO: 0.5 % — SIGNIFICANT CHANGE UP (ref 0–0.9)
LYMPHOCYTES # BLD AUTO: 1.33 K/UL — SIGNIFICANT CHANGE UP (ref 1–3.3)
LYMPHOCYTES # BLD AUTO: 1.33 K/UL — SIGNIFICANT CHANGE UP (ref 1–3.3)
LYMPHOCYTES # BLD AUTO: 16.5 % — SIGNIFICANT CHANGE UP (ref 13–44)
LYMPHOCYTES # BLD AUTO: 16.5 % — SIGNIFICANT CHANGE UP (ref 13–44)
MCHC RBC-ENTMCNC: 29.2 PG — SIGNIFICANT CHANGE UP (ref 27–34)
MCHC RBC-ENTMCNC: 29.2 PG — SIGNIFICANT CHANGE UP (ref 27–34)
MCHC RBC-ENTMCNC: 31.4 GM/DL — LOW (ref 32–36)
MCHC RBC-ENTMCNC: 31.4 GM/DL — LOW (ref 32–36)
MCV RBC AUTO: 93.1 FL — SIGNIFICANT CHANGE UP (ref 80–100)
MCV RBC AUTO: 93.1 FL — SIGNIFICANT CHANGE UP (ref 80–100)
MONOCYTES # BLD AUTO: 0.55 K/UL — SIGNIFICANT CHANGE UP (ref 0–0.9)
MONOCYTES # BLD AUTO: 0.55 K/UL — SIGNIFICANT CHANGE UP (ref 0–0.9)
MONOCYTES NFR BLD AUTO: 6.8 % — SIGNIFICANT CHANGE UP (ref 2–14)
MONOCYTES NFR BLD AUTO: 6.8 % — SIGNIFICANT CHANGE UP (ref 2–14)
NEUTROPHILS # BLD AUTO: 5.73 K/UL — SIGNIFICANT CHANGE UP (ref 1.8–7.4)
NEUTROPHILS # BLD AUTO: 5.73 K/UL — SIGNIFICANT CHANGE UP (ref 1.8–7.4)
NEUTROPHILS NFR BLD AUTO: 71 % — SIGNIFICANT CHANGE UP (ref 43–77)
NEUTROPHILS NFR BLD AUTO: 71 % — SIGNIFICANT CHANGE UP (ref 43–77)
NRBC # BLD: 0 /100 WBCS — SIGNIFICANT CHANGE UP (ref 0–0)
NRBC # BLD: 0 /100 WBCS — SIGNIFICANT CHANGE UP (ref 0–0)
PLATELET # BLD AUTO: 340 K/UL — SIGNIFICANT CHANGE UP (ref 150–400)
PLATELET # BLD AUTO: 340 K/UL — SIGNIFICANT CHANGE UP (ref 150–400)
POTASSIUM SERPL-MCNC: 4.1 MMOL/L — SIGNIFICANT CHANGE UP (ref 3.5–5.3)
POTASSIUM SERPL-MCNC: 4.1 MMOL/L — SIGNIFICANT CHANGE UP (ref 3.5–5.3)
POTASSIUM SERPL-SCNC: 4.1 MMOL/L — SIGNIFICANT CHANGE UP (ref 3.5–5.3)
POTASSIUM SERPL-SCNC: 4.1 MMOL/L — SIGNIFICANT CHANGE UP (ref 3.5–5.3)
RBC # BLD: 3.63 M/UL — LOW (ref 4.2–5.8)
RBC # BLD: 3.63 M/UL — LOW (ref 4.2–5.8)
RBC # FLD: 13.2 % — SIGNIFICANT CHANGE UP (ref 10.3–14.5)
RBC # FLD: 13.2 % — SIGNIFICANT CHANGE UP (ref 10.3–14.5)
SODIUM SERPL-SCNC: 139 MMOL/L — SIGNIFICANT CHANGE UP (ref 135–145)
SODIUM SERPL-SCNC: 139 MMOL/L — SIGNIFICANT CHANGE UP (ref 135–145)
WBC # BLD: 8.07 K/UL — SIGNIFICANT CHANGE UP (ref 3.8–10.5)
WBC # BLD: 8.07 K/UL — SIGNIFICANT CHANGE UP (ref 3.8–10.5)
WBC # FLD AUTO: 8.07 K/UL — SIGNIFICANT CHANGE UP (ref 3.8–10.5)
WBC # FLD AUTO: 8.07 K/UL — SIGNIFICANT CHANGE UP (ref 3.8–10.5)

## 2023-11-11 PROCEDURE — 85027 COMPLETE CBC AUTOMATED: CPT

## 2023-11-11 PROCEDURE — 85025 COMPLETE CBC W/AUTO DIFF WBC: CPT

## 2023-11-11 PROCEDURE — 96374 THER/PROPH/DIAG INJ IV PUSH: CPT

## 2023-11-11 PROCEDURE — 80053 COMPREHEN METABOLIC PANEL: CPT

## 2023-11-11 PROCEDURE — 99239 HOSP IP/OBS DSCHRG MGMT >30: CPT

## 2023-11-11 PROCEDURE — 86900 BLOOD TYPING SEROLOGIC ABO: CPT

## 2023-11-11 PROCEDURE — 81001 URINALYSIS AUTO W/SCOPE: CPT

## 2023-11-11 PROCEDURE — 76770 US EXAM ABDO BACK WALL COMP: CPT

## 2023-11-11 PROCEDURE — 97162 PT EVAL MOD COMPLEX 30 MIN: CPT

## 2023-11-11 PROCEDURE — 88304 TISSUE EXAM BY PATHOLOGIST: CPT

## 2023-11-11 PROCEDURE — 85610 PROTHROMBIN TIME: CPT

## 2023-11-11 PROCEDURE — 83690 ASSAY OF LIPASE: CPT

## 2023-11-11 PROCEDURE — 85730 THROMBOPLASTIN TIME PARTIAL: CPT

## 2023-11-11 PROCEDURE — 71045 X-RAY EXAM CHEST 1 VIEW: CPT

## 2023-11-11 PROCEDURE — 36415 COLL VENOUS BLD VENIPUNCTURE: CPT

## 2023-11-11 PROCEDURE — 99285 EMERGENCY DEPT VISIT HI MDM: CPT

## 2023-11-11 PROCEDURE — 87086 URINE CULTURE/COLONY COUNT: CPT

## 2023-11-11 PROCEDURE — 93005 ELECTROCARDIOGRAM TRACING: CPT

## 2023-11-11 PROCEDURE — 86901 BLOOD TYPING SEROLOGIC RH(D): CPT

## 2023-11-11 PROCEDURE — 80048 BASIC METABOLIC PNL TOTAL CA: CPT

## 2023-11-11 PROCEDURE — 86850 RBC ANTIBODY SCREEN: CPT

## 2023-11-11 RX ORDER — METOPROLOL TARTRATE 50 MG
1 TABLET ORAL
Refills: 0 | DISCHARGE

## 2023-11-11 RX ORDER — TAMSULOSIN HYDROCHLORIDE 0.4 MG/1
1 CAPSULE ORAL
Qty: 0 | Refills: 0 | DISCHARGE
Start: 2023-11-11

## 2023-11-11 RX ORDER — METHOCARBAMOL 500 MG/1
1 TABLET, FILM COATED ORAL
Qty: 0 | Refills: 0 | DISCHARGE
Start: 2023-11-11

## 2023-11-11 RX ORDER — LANOLIN ALCOHOL/MO/W.PET/CERES
1 CREAM (GRAM) TOPICAL
Qty: 0 | Refills: 0 | DISCHARGE
Start: 2023-11-11

## 2023-11-11 RX ORDER — PSYLLIUM SEED (WITH DEXTROSE)
3.4 POWDER (GRAM) ORAL
Qty: 1 | Refills: 0
Start: 2023-11-11

## 2023-11-11 RX ORDER — METOPROLOL TARTRATE 50 MG
12.5 TABLET ORAL
Refills: 0 | Status: DISCONTINUED | OUTPATIENT
Start: 2023-11-11 | End: 2023-11-11

## 2023-11-11 RX ORDER — HYDROMORPHONE HYDROCHLORIDE 2 MG/ML
3 INJECTION INTRAMUSCULAR; INTRAVENOUS; SUBCUTANEOUS
Qty: 0 | Refills: 0 | DISCHARGE
Start: 2023-11-11

## 2023-11-11 RX ORDER — POLYETHYLENE GLYCOL 3350 17 G/17G
17 POWDER, FOR SOLUTION ORAL
Qty: 0 | Refills: 0 | DISCHARGE
Start: 2023-11-11

## 2023-11-11 RX ORDER — SODIUM CHLORIDE 9 MG/ML
2 INJECTION INTRAMUSCULAR; INTRAVENOUS; SUBCUTANEOUS
Qty: 0 | Refills: 0 | DISCHARGE
Start: 2023-11-11

## 2023-11-11 RX ORDER — ACETAMINOPHEN 500 MG
2 TABLET ORAL
Qty: 0 | Refills: 0 | DISCHARGE
Start: 2023-11-11

## 2023-11-11 RX ORDER — PSYLLIUM SEED (WITH DEXTROSE)
3.4 POWDER (GRAM) ORAL
Qty: 1 | Refills: 0 | DISCHARGE
Start: 2023-11-11

## 2023-11-11 RX ORDER — DILTIAZEM HCL 120 MG
1 CAPSULE, EXT RELEASE 24 HR ORAL
Qty: 0 | Refills: 0 | DISCHARGE
Start: 2023-11-11

## 2023-11-11 RX ORDER — DIAZEPAM 5 MG
1 TABLET ORAL
Qty: 0 | Refills: 0 | DISCHARGE
Start: 2023-11-11

## 2023-11-11 RX ORDER — LIDOCAINE 4 G/100G
1 CREAM TOPICAL
Qty: 0 | Refills: 0 | DISCHARGE
Start: 2023-11-11

## 2023-11-11 RX ORDER — HYDROMORPHONE HYDROCHLORIDE 2 MG/ML
1 INJECTION INTRAMUSCULAR; INTRAVENOUS; SUBCUTANEOUS
Refills: 0 | DISCHARGE

## 2023-11-11 RX ORDER — DOCUSATE SODIUM 100 MG
1 CAPSULE ORAL
Qty: 1 | Refills: 0
Start: 2023-11-11

## 2023-11-11 RX ORDER — HYDROMORPHONE HYDROCHLORIDE 2 MG/ML
1 INJECTION INTRAMUSCULAR; INTRAVENOUS; SUBCUTANEOUS
Qty: 0 | Refills: 0 | DISCHARGE
Start: 2023-11-11

## 2023-11-11 RX ORDER — ASPIRIN/CALCIUM CARB/MAGNESIUM 324 MG
1 TABLET ORAL
Qty: 0 | Refills: 0 | DISCHARGE
Start: 2023-11-11

## 2023-11-11 RX ORDER — METOPROLOL TARTRATE 50 MG
0.5 TABLET ORAL
Qty: 1 | Refills: 0
Start: 2023-11-11

## 2023-11-11 RX ADMIN — TAMSULOSIN HYDROCHLORIDE 0.4 MILLIGRAM(S): 0.4 CAPSULE ORAL at 07:15

## 2023-11-11 RX ADMIN — METHOCARBAMOL 750 MILLIGRAM(S): 500 TABLET, FILM COATED ORAL at 07:15

## 2023-11-11 RX ADMIN — METHOCARBAMOL 750 MILLIGRAM(S): 500 TABLET, FILM COATED ORAL at 13:09

## 2023-11-11 RX ADMIN — HYDROMORPHONE HYDROCHLORIDE 4 MILLIGRAM(S): 2 INJECTION INTRAMUSCULAR; INTRAVENOUS; SUBCUTANEOUS at 15:38

## 2023-11-11 RX ADMIN — Medication 81 MILLIGRAM(S): at 13:09

## 2023-11-11 RX ADMIN — SODIUM CHLORIDE 2 GRAM(S): 9 INJECTION INTRAMUSCULAR; INTRAVENOUS; SUBCUTANEOUS at 14:46

## 2023-11-11 RX ADMIN — Medication 120 MILLIGRAM(S): at 07:16

## 2023-11-11 RX ADMIN — Medication 5 MILLIGRAM(S): at 14:45

## 2023-11-11 RX ADMIN — Medication 1 TABLET(S): at 13:09

## 2023-11-11 RX ADMIN — LIDOCAINE 1 PATCH: 4 CREAM TOPICAL at 13:10

## 2023-11-11 RX ADMIN — HYDROMORPHONE HYDROCHLORIDE 4 MILLIGRAM(S): 2 INJECTION INTRAMUSCULAR; INTRAVENOUS; SUBCUTANEOUS at 15:08

## 2023-11-11 RX ADMIN — Medication 25 MILLIGRAM(S): at 07:14

## 2023-11-11 RX ADMIN — SODIUM CHLORIDE 2 GRAM(S): 9 INJECTION INTRAMUSCULAR; INTRAVENOUS; SUBCUTANEOUS at 07:14

## 2023-11-11 NOTE — DISCHARGE NOTE NURSING/CASE MANAGEMENT/SOCIAL WORK - PATIENT PORTAL LINK FT
You can access the FollowMyHealth Patient Portal offered by [facility] by registering at the following website: [email or web address]. By joining [employer]’s FollowMyHealth portal, you will also be able to view your health information using other applications (apps) compatible with our system.

## 2023-11-11 NOTE — DISCHARGE NOTE NURSING/CASE MANAGEMENT/SOCIAL WORK - NSDCPEFALRISK_GEN_ALL_CORE
For information on Fall & Injury Prevention, visit: [email or web address] OR  [email or web address] OR  https://www.cdc.gov/steadi/patient.html

## 2023-11-12 ENCOUNTER — INPATIENT (INPATIENT)
Facility: HOSPITAL | Age: 68
LOS: 2 days | Discharge: NURSING, REHAB OR LONG-TERM CARE | DRG: 560 | End: 2023-11-15
Attending: STUDENT IN AN ORGANIZED HEALTH CARE EDUCATION/TRAINING PROGRAM | Admitting: STUDENT IN AN ORGANIZED HEALTH CARE EDUCATION/TRAINING PROGRAM
Payer: MEDICARE

## 2023-11-12 ENCOUNTER — EMERGENCY (EMERGENCY)
Facility: HOSPITAL | Age: 68
LOS: 1 days | Discharge: ANOTHER HOSPITAL | End: 2023-11-12
Attending: STUDENT IN AN ORGANIZED HEALTH CARE EDUCATION/TRAINING PROGRAM | Admitting: STUDENT IN AN ORGANIZED HEALTH CARE EDUCATION/TRAINING PROGRAM
Payer: MEDICARE

## 2023-11-12 VITALS
HEART RATE: 98 BPM | TEMPERATURE: 98 F | DIASTOLIC BLOOD PRESSURE: 72 MMHG | RESPIRATION RATE: 18 BRPM | WEIGHT: 145.51 LBS | OXYGEN SATURATION: 98 % | SYSTOLIC BLOOD PRESSURE: 106 MMHG | HEIGHT: 72 IN

## 2023-11-12 VITALS
HEART RATE: 75 BPM | HEIGHT: 72 IN | DIASTOLIC BLOOD PRESSURE: 76 MMHG | RESPIRATION RATE: 16 BRPM | WEIGHT: 138.01 LBS | OXYGEN SATURATION: 96 % | SYSTOLIC BLOOD PRESSURE: 116 MMHG | TEMPERATURE: 98 F

## 2023-11-12 VITALS
HEART RATE: 90 BPM | OXYGEN SATURATION: 99 % | RESPIRATION RATE: 18 BRPM | SYSTOLIC BLOOD PRESSURE: 116 MMHG | DIASTOLIC BLOOD PRESSURE: 76 MMHG

## 2023-11-12 DIAGNOSIS — N40.1 BENIGN PROSTATIC HYPERPLASIA WITH LOWER URINARY TRACT SYMPTOMS: ICD-10-CM

## 2023-11-12 DIAGNOSIS — Z98.890 OTHER SPECIFIED POSTPROCEDURAL STATES: Chronic | ICD-10-CM

## 2023-11-12 DIAGNOSIS — J44.1 CHRONIC OBSTRUCTIVE PULMONARY DISEASE WITH (ACUTE) EXACERBATION: ICD-10-CM

## 2023-11-12 DIAGNOSIS — J18.9 PNEUMONIA, UNSPECIFIED ORGANISM: ICD-10-CM

## 2023-11-12 DIAGNOSIS — Z98.1 ARTHRODESIS STATUS: Chronic | ICD-10-CM

## 2023-11-12 DIAGNOSIS — E87.1 HYPO-OSMOLALITY AND HYPONATREMIA: ICD-10-CM

## 2023-11-12 DIAGNOSIS — Z96.651 PRESENCE OF RIGHT ARTIFICIAL KNEE JOINT: Chronic | ICD-10-CM

## 2023-11-12 DIAGNOSIS — Z29.9 ENCOUNTER FOR PROPHYLACTIC MEASURES, UNSPECIFIED: ICD-10-CM

## 2023-11-12 DIAGNOSIS — R93.89 ABNORMAL FINDINGS ON DIAGNOSTIC IMAGING OF OTHER SPECIFIED BODY STRUCTURES: ICD-10-CM

## 2023-11-12 DIAGNOSIS — Z86.79 PERSONAL HISTORY OF OTHER DISEASES OF THE CIRCULATORY SYSTEM: ICD-10-CM
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
## 2023-11-12 DIAGNOSIS — S12.9XXA FRACTURE OF NECK, UNSPECIFIED, INITIAL ENCOUNTER: ICD-10-CM

## 2023-11-12 PROBLEM — Z78.9 OTHER SPECIFIED HEALTH STATUS: Chronic | Status: ACTIVE | Noted: 2023-11-05

## 2023-11-12 LAB
ALBUMIN SERPL ELPH-MCNC: 3.1 G/DL — LOW (ref 3.3–5)
ALBUMIN SERPL ELPH-MCNC: 3.1 G/DL — LOW (ref 3.3–5)
ALP SERPL-CCNC: 117 U/L — SIGNIFICANT CHANGE UP (ref 40–120)
ALP SERPL-CCNC: 117 U/L — SIGNIFICANT CHANGE UP (ref 40–120)
ALT FLD-CCNC: 22 U/L — SIGNIFICANT CHANGE UP (ref 10–45)
ALT FLD-CCNC: 22 U/L — SIGNIFICANT CHANGE UP (ref 10–45)
ANION GAP SERPL CALC-SCNC: 10 MMOL/L — SIGNIFICANT CHANGE UP (ref 5–17)
ANION GAP SERPL CALC-SCNC: 10 MMOL/L — SIGNIFICANT CHANGE UP (ref 5–17)
APTT BLD: 28.7 SEC — SIGNIFICANT CHANGE UP (ref 24.5–35.6)
APTT BLD: 28.7 SEC — SIGNIFICANT CHANGE UP (ref 24.5–35.6)
AST SERPL-CCNC: 13 U/L — SIGNIFICANT CHANGE UP (ref 10–40)
AST SERPL-CCNC: 13 U/L — SIGNIFICANT CHANGE UP (ref 10–40)
BASOPHILS # BLD AUTO: 0.06 K/UL — SIGNIFICANT CHANGE UP (ref 0–0.2)
BASOPHILS # BLD AUTO: 0.06 K/UL — SIGNIFICANT CHANGE UP (ref 0–0.2)
BASOPHILS NFR BLD AUTO: 0.8 % — SIGNIFICANT CHANGE UP (ref 0–2)
BASOPHILS NFR BLD AUTO: 0.8 % — SIGNIFICANT CHANGE UP (ref 0–2)
BILIRUB SERPL-MCNC: 0.3 MG/DL — SIGNIFICANT CHANGE UP (ref 0.2–1.2)
BILIRUB SERPL-MCNC: 0.3 MG/DL — SIGNIFICANT CHANGE UP (ref 0.2–1.2)
BLD GP AB SCN SERPL QL: NEGATIVE — SIGNIFICANT CHANGE UP
BLD GP AB SCN SERPL QL: NEGATIVE — SIGNIFICANT CHANGE UP
BUN SERPL-MCNC: 17 MG/DL — SIGNIFICANT CHANGE UP (ref 7–23)
BUN SERPL-MCNC: 17 MG/DL — SIGNIFICANT CHANGE UP (ref 7–23)
CALCIUM SERPL-MCNC: 9.2 MG/DL — SIGNIFICANT CHANGE UP (ref 8.4–10.5)
CALCIUM SERPL-MCNC: 9.2 MG/DL — SIGNIFICANT CHANGE UP (ref 8.4–10.5)
CHLORIDE SERPL-SCNC: 102 MMOL/L — SIGNIFICANT CHANGE UP (ref 96–108)
CHLORIDE SERPL-SCNC: 102 MMOL/L — SIGNIFICANT CHANGE UP (ref 96–108)
CO2 SERPL-SCNC: 28 MMOL/L — SIGNIFICANT CHANGE UP (ref 22–31)
CO2 SERPL-SCNC: 28 MMOL/L — SIGNIFICANT CHANGE UP (ref 22–31)
CREAT SERPL-MCNC: 0.75 MG/DL — SIGNIFICANT CHANGE UP (ref 0.5–1.3)
CREAT SERPL-MCNC: 0.75 MG/DL — SIGNIFICANT CHANGE UP (ref 0.5–1.3)
EGFR: 97 ML/MIN/1.73M2 — SIGNIFICANT CHANGE UP
EGFR: 97 ML/MIN/1.73M2 — SIGNIFICANT CHANGE UP
EOSINOPHIL # BLD AUTO: 0.32 K/UL — SIGNIFICANT CHANGE UP (ref 0–0.5)
EOSINOPHIL # BLD AUTO: 0.32 K/UL — SIGNIFICANT CHANGE UP (ref 0–0.5)
EOSINOPHIL NFR BLD AUTO: 4.3 % — SIGNIFICANT CHANGE UP (ref 0–6)
EOSINOPHIL NFR BLD AUTO: 4.3 % — SIGNIFICANT CHANGE UP (ref 0–6)
GLUCOSE SERPL-MCNC: 108 MG/DL — HIGH (ref 70–99)
GLUCOSE SERPL-MCNC: 108 MG/DL — HIGH (ref 70–99)
HCT VFR BLD CALC: 33.8 % — LOW (ref 39–50)
HCT VFR BLD CALC: 33.8 % — LOW (ref 39–50)
HGB BLD-MCNC: 10.8 G/DL — LOW (ref 13–17)
HGB BLD-MCNC: 10.8 G/DL — LOW (ref 13–17)
IMM GRANULOCYTES NFR BLD AUTO: 0.4 % — SIGNIFICANT CHANGE UP (ref 0–0.9)
IMM GRANULOCYTES NFR BLD AUTO: 0.4 % — SIGNIFICANT CHANGE UP (ref 0–0.9)
INR BLD: 1.18 RATIO — SIGNIFICANT CHANGE UP (ref 0.85–1.18)
INR BLD: 1.18 RATIO — SIGNIFICANT CHANGE UP (ref 0.85–1.18)
LYMPHOCYTES # BLD AUTO: 1.27 K/UL — SIGNIFICANT CHANGE UP (ref 1–3.3)
LYMPHOCYTES # BLD AUTO: 1.27 K/UL — SIGNIFICANT CHANGE UP (ref 1–3.3)
LYMPHOCYTES # BLD AUTO: 17 % — SIGNIFICANT CHANGE UP (ref 13–44)
LYMPHOCYTES # BLD AUTO: 17 % — SIGNIFICANT CHANGE UP (ref 13–44)
MCHC RBC-ENTMCNC: 29.6 PG — SIGNIFICANT CHANGE UP (ref 27–34)
MCHC RBC-ENTMCNC: 29.6 PG — SIGNIFICANT CHANGE UP (ref 27–34)
MCHC RBC-ENTMCNC: 32 GM/DL — SIGNIFICANT CHANGE UP (ref 32–36)
MCHC RBC-ENTMCNC: 32 GM/DL — SIGNIFICANT CHANGE UP (ref 32–36)
MCV RBC AUTO: 92.6 FL — SIGNIFICANT CHANGE UP (ref 80–100)
MCV RBC AUTO: 92.6 FL — SIGNIFICANT CHANGE UP (ref 80–100)
MONOCYTES # BLD AUTO: 0.59 K/UL — SIGNIFICANT CHANGE UP (ref 0–0.9)
MONOCYTES # BLD AUTO: 0.59 K/UL — SIGNIFICANT CHANGE UP (ref 0–0.9)
MONOCYTES NFR BLD AUTO: 7.9 % — SIGNIFICANT CHANGE UP (ref 2–14)
MONOCYTES NFR BLD AUTO: 7.9 % — SIGNIFICANT CHANGE UP (ref 2–14)
NEUTROPHILS # BLD AUTO: 5.21 K/UL — SIGNIFICANT CHANGE UP (ref 1.8–7.4)
NEUTROPHILS # BLD AUTO: 5.21 K/UL — SIGNIFICANT CHANGE UP (ref 1.8–7.4)
NEUTROPHILS NFR BLD AUTO: 69.6 % — SIGNIFICANT CHANGE UP (ref 43–77)
NEUTROPHILS NFR BLD AUTO: 69.6 % — SIGNIFICANT CHANGE UP (ref 43–77)
NRBC # BLD: 0 /100 WBCS — SIGNIFICANT CHANGE UP (ref 0–0)
NRBC # BLD: 0 /100 WBCS — SIGNIFICANT CHANGE UP (ref 0–0)
PLATELET # BLD AUTO: 328 K/UL — SIGNIFICANT CHANGE UP (ref 150–400)
PLATELET # BLD AUTO: 328 K/UL — SIGNIFICANT CHANGE UP (ref 150–400)
POTASSIUM SERPL-MCNC: 4.1 MMOL/L — SIGNIFICANT CHANGE UP (ref 3.5–5.3)
POTASSIUM SERPL-MCNC: 4.1 MMOL/L — SIGNIFICANT CHANGE UP (ref 3.5–5.3)
POTASSIUM SERPL-SCNC: 4.1 MMOL/L — SIGNIFICANT CHANGE UP (ref 3.5–5.3)
POTASSIUM SERPL-SCNC: 4.1 MMOL/L — SIGNIFICANT CHANGE UP (ref 3.5–5.3)
PROT SERPL-MCNC: 6.1 G/DL — SIGNIFICANT CHANGE UP (ref 6–8.3)
PROT SERPL-MCNC: 6.1 G/DL — SIGNIFICANT CHANGE UP (ref 6–8.3)
PROTHROM AB SERPL-ACNC: 12.3 SEC — SIGNIFICANT CHANGE UP (ref 9.5–13)
PROTHROM AB SERPL-ACNC: 12.3 SEC — SIGNIFICANT CHANGE UP (ref 9.5–13)
RBC # BLD: 3.65 M/UL — LOW (ref 4.2–5.8)
RBC # BLD: 3.65 M/UL — LOW (ref 4.2–5.8)
RBC # FLD: 13.1 % — SIGNIFICANT CHANGE UP (ref 10.3–14.5)
RBC # FLD: 13.1 % — SIGNIFICANT CHANGE UP (ref 10.3–14.5)
RH IG SCN BLD-IMP: POSITIVE — SIGNIFICANT CHANGE UP
RH IG SCN BLD-IMP: POSITIVE — SIGNIFICANT CHANGE UP
SODIUM SERPL-SCNC: 140 MMOL/L — SIGNIFICANT CHANGE UP (ref 135–145)
SODIUM SERPL-SCNC: 140 MMOL/L — SIGNIFICANT CHANGE UP (ref 135–145)
WBC # BLD: 7.48 K/UL — SIGNIFICANT CHANGE UP (ref 3.8–10.5)
WBC # BLD: 7.48 K/UL — SIGNIFICANT CHANGE UP (ref 3.8–10.5)
WBC # FLD AUTO: 7.48 K/UL — SIGNIFICANT CHANGE UP (ref 3.8–10.5)
WBC # FLD AUTO: 7.48 K/UL — SIGNIFICANT CHANGE UP (ref 3.8–10.5)

## 2023-11-12 PROCEDURE — 74176 CT ABD & PELVIS W/O CONTRAST: CPT | Mod: 26,MA

## 2023-11-12 PROCEDURE — 71250 CT THORAX DX C-: CPT | Mod: MA

## 2023-11-12 PROCEDURE — 70450 CT HEAD/BRAIN W/O DYE: CPT | Mod: 26,MA

## 2023-11-12 PROCEDURE — 71250 CT THORAX DX C-: CPT | Mod: 26,MA

## 2023-11-12 PROCEDURE — 99285 EMERGENCY DEPT VISIT HI MDM: CPT | Mod: 25

## 2023-11-12 PROCEDURE — 99285 EMERGENCY DEPT VISIT HI MDM: CPT | Mod: FS

## 2023-11-12 PROCEDURE — 72125 CT NECK SPINE W/O DYE: CPT | Mod: MA

## 2023-11-12 PROCEDURE — 70450 CT HEAD/BRAIN W/O DYE: CPT | Mod: MA

## 2023-11-12 PROCEDURE — 99223 1ST HOSP IP/OBS HIGH 75: CPT

## 2023-11-12 PROCEDURE — 99285 EMERGENCY DEPT VISIT HI MDM: CPT

## 2023-11-12 PROCEDURE — 72125 CT NECK SPINE W/O DYE: CPT | Mod: 26,MA

## 2023-11-12 PROCEDURE — 74176 CT ABD & PELVIS W/O CONTRAST: CPT | Mod: MA

## 2023-11-12 RX ORDER — ONDANSETRON 8 MG/1
4 TABLET, FILM COATED ORAL EVERY 8 HOURS
Refills: 0 | Status: DISCONTINUED | OUTPATIENT
Start: 2023-11-12 | End: 2023-11-15

## 2023-11-12 RX ORDER — ACETAMINOPHEN 500 MG
1000 TABLET ORAL EVERY 6 HOURS
Refills: 0 | Status: COMPLETED | OUTPATIENT
Start: 2023-11-12 | End: 2023-11-13

## 2023-11-12 RX ORDER — SODIUM CHLORIDE 9 MG/ML
2 INJECTION INTRAMUSCULAR; INTRAVENOUS; SUBCUTANEOUS EVERY 8 HOURS
Refills: 0 | Status: DISCONTINUED | OUTPATIENT
Start: 2023-11-12 | End: 2023-11-15

## 2023-11-12 RX ORDER — LANOLIN ALCOHOL/MO/W.PET/CERES
3 CREAM (GRAM) TOPICAL AT BEDTIME
Refills: 0 | Status: DISCONTINUED | OUTPATIENT
Start: 2023-11-12 | End: 2023-11-15

## 2023-11-12 RX ORDER — ACETAMINOPHEN 500 MG
1000 TABLET ORAL ONCE
Refills: 0 | Status: COMPLETED | OUTPATIENT
Start: 2023-11-12 | End: 2023-11-12

## 2023-11-12 RX ORDER — AZITHROMYCIN 500 MG/1
500 TABLET, FILM COATED ORAL EVERY 24 HOURS
Refills: 0 | Status: DISCONTINUED | OUTPATIENT
Start: 2023-11-12 | End: 2023-11-13

## 2023-11-12 RX ORDER — METOPROLOL TARTRATE 50 MG
25 TABLET ORAL
Refills: 0 | Status: DISCONTINUED | OUTPATIENT
Start: 2023-11-12 | End: 2023-11-15

## 2023-11-12 RX ORDER — ASPIRIN/CALCIUM CARB/MAGNESIUM 324 MG
81 TABLET ORAL DAILY
Refills: 0 | Status: DISCONTINUED | OUTPATIENT
Start: 2023-11-12 | End: 2023-11-15

## 2023-11-12 RX ORDER — DILTIAZEM HCL 120 MG
120 CAPSULE, EXT RELEASE 24 HR ORAL DAILY
Refills: 0 | Status: DISCONTINUED | OUTPATIENT
Start: 2023-11-12 | End: 2023-11-15

## 2023-11-12 RX ORDER — ENOXAPARIN SODIUM 100 MG/ML
40 INJECTION SUBCUTANEOUS EVERY 24 HOURS
Refills: 0 | Status: DISCONTINUED | OUTPATIENT
Start: 2023-11-12 | End: 2023-11-15

## 2023-11-12 RX ORDER — PSYLLIUM SEED (WITH DEXTROSE)
1 POWDER (GRAM) ORAL DAILY
Refills: 0 | Status: DISCONTINUED | OUTPATIENT
Start: 2023-11-12 | End: 2023-11-15

## 2023-11-12 RX ORDER — TAMSULOSIN HYDROCHLORIDE 0.4 MG/1
0.8 CAPSULE ORAL AT BEDTIME
Refills: 0 | Status: DISCONTINUED | OUTPATIENT
Start: 2023-11-12 | End: 2023-11-15

## 2023-11-12 RX ORDER — OXYCODONE HYDROCHLORIDE 5 MG/1
5 TABLET ORAL EVERY 4 HOURS
Refills: 0 | Status: DISCONTINUED | OUTPATIENT
Start: 2023-11-12 | End: 2023-11-13

## 2023-11-12 RX ORDER — SODIUM CHLORIDE 9 MG/ML
1000 INJECTION INTRAMUSCULAR; INTRAVENOUS; SUBCUTANEOUS
Refills: 0 | Status: DISCONTINUED | OUTPATIENT
Start: 2023-11-12 | End: 2023-11-12

## 2023-11-12 RX ORDER — OXYCODONE HYDROCHLORIDE 5 MG/1
10 TABLET ORAL EVERY 4 HOURS
Refills: 0 | Status: DISCONTINUED | OUTPATIENT
Start: 2023-11-12 | End: 2023-11-13

## 2023-11-12 RX ORDER — SENNA PLUS 8.6 MG/1
2 TABLET ORAL AT BEDTIME
Refills: 0 | Status: DISCONTINUED | OUTPATIENT
Start: 2023-11-12 | End: 2023-11-15

## 2023-11-12 RX ORDER — METHOCARBAMOL 500 MG/1
500 TABLET, FILM COATED ORAL EVERY 8 HOURS
Refills: 0 | Status: DISCONTINUED | OUTPATIENT
Start: 2023-11-12 | End: 2023-11-15

## 2023-11-12 RX ORDER — HYDROMORPHONE HYDROCHLORIDE 2 MG/ML
4 INJECTION INTRAMUSCULAR; INTRAVENOUS; SUBCUTANEOUS ONCE
Refills: 0 | Status: DISCONTINUED | OUTPATIENT
Start: 2023-11-12 | End: 2023-11-13

## 2023-11-12 RX ORDER — INFLUENZA VIRUS VACCINE 15; 15; 15; 15 UG/.5ML; UG/.5ML; UG/.5ML; UG/.5ML
0.7 SUSPENSION INTRAMUSCULAR ONCE
Refills: 0 | Status: COMPLETED | OUTPATIENT
Start: 2023-11-12 | End: 2023-11-12

## 2023-11-12 RX ORDER — CEFTRIAXONE 500 MG/1
1000 INJECTION, POWDER, FOR SOLUTION INTRAMUSCULAR; INTRAVENOUS EVERY 24 HOURS
Refills: 0 | Status: DISCONTINUED | OUTPATIENT
Start: 2023-11-12 | End: 2023-11-13

## 2023-11-12 RX ADMIN — Medication 400 MILLIGRAM(S): at 15:51

## 2023-11-12 RX ADMIN — SODIUM CHLORIDE 2 GRAM(S): 9 INJECTION INTRAMUSCULAR; INTRAVENOUS; SUBCUTANEOUS at 21:16

## 2023-11-12 RX ADMIN — SENNA PLUS 2 TABLET(S): 8.6 TABLET ORAL at 21:16

## 2023-11-12 RX ADMIN — OXYCODONE HYDROCHLORIDE 10 MILLIGRAM(S): 5 TABLET ORAL at 20:02

## 2023-11-12 RX ADMIN — Medication 400 MILLIGRAM(S): at 10:01

## 2023-11-12 RX ADMIN — Medication 1000 MILLIGRAM(S): at 11:00

## 2023-11-12 RX ADMIN — AZITHROMYCIN 255 MILLIGRAM(S): 500 TABLET, FILM COATED ORAL at 16:09

## 2023-11-12 RX ADMIN — ENOXAPARIN SODIUM 40 MILLIGRAM(S): 100 INJECTION SUBCUTANEOUS at 21:16

## 2023-11-12 RX ADMIN — Medication 25 MILLIGRAM(S): at 17:24

## 2023-11-12 RX ADMIN — Medication 1000 MILLIGRAM(S): at 21:47

## 2023-11-12 RX ADMIN — TAMSULOSIN HYDROCHLORIDE 0.8 MILLIGRAM(S): 0.4 CAPSULE ORAL at 21:16

## 2023-11-12 RX ADMIN — OXYCODONE HYDROCHLORIDE 10 MILLIGRAM(S): 5 TABLET ORAL at 20:32

## 2023-11-12 RX ADMIN — Medication 1000 MILLIGRAM(S): at 16:10

## 2023-11-12 RX ADMIN — Medication 400 MILLIGRAM(S): at 21:17

## 2023-11-12 RX ADMIN — Medication 1 DROP(S): at 17:24

## 2023-11-12 RX ADMIN — CEFTRIAXONE 100 MILLIGRAM(S): 500 INJECTION, POWDER, FOR SOLUTION INTRAMUSCULAR; INTRAVENOUS at 14:19

## 2023-11-12 NOTE — ED ADULT NURSE NOTE - CHIEF COMPLAINT QUOTE
GILDA from  [place]  witness fall around 11.30 pm ,  pt. is on aspirin c/o  pain in back of the head,  8/10, pt. had spine surgery on october wearing c-collar, pt. awake, alert, oriented x3

## 2023-11-12 NOTE — ED PROVIDER NOTE - CLINICAL SUMMARY MEDICAL DECISION MAKING FREE TEXT BOX
Ester Byrne, Attending Physician: 68-year-old male with mechanical fall with possible new findings on CT performed at outside hospital. will obtain IV for pain control as well as labs as patient here will likely need admission either for further placement or surgical intervention as patient does not feel comfortable going back to the facility he was discharged to given the fall with he feels like for supervision.  No clinical evidence of cord compression at this time.  Will consult neurosurgery for further recommendation.

## 2023-11-12 NOTE — ED PROVIDER NOTE - PHYSICAL EXAMINATION
VITAL SIGNS: I have reviewed nursing notes and confirm.   GEN: Well-developed; well-nourished; in no acute distress. Speaking full sentences.  SKIN: Warm, pink, no rash, no diaphoresis, no cyanosis, well perfused.   HEAD: Normocephalic; atraumatic. No scalp lacerations, no abrasions.  NECK: Supple; non tender.   EYES: Pupils 3mm equal, round, reactive to light and accomodation, conjunctiva and sclera clear. Extra-ocular movements intact bilaterally.  ENT: No nasal discharge; airway clear. Trachea is midline. Normal dentition.  CV: RRR. S1, S2 normal; no murmurs, gallops, or rubs. Capillary refill < 2 seconds throughout. Distal pulses intact 2+ throughout.  RESP: CTA bilaterally. No wheezes, rales, or rhonchi.   ABD: Normal bowel sounds, soft, non-distended, non-tender, no rebound, no guarding, no rigidity, no hepatosplenomegaly.  MSK: Normal range of motion and movement of all 4 extremities. No apparent joint or muscular pain throughout.    BACK: No thoracolumbar midline or paravertebral tenderness. No step-offs or obvious deformities.  NEURO: Alert & oriented x 3, Grossly unremarkable. Sensory and motor intact throughout. No focal deficits. Gait: Fluid. Normal speech and coordination. VITAL SIGNS: I have reviewed nursing notes and confirm.   GEN: Well-developed; well-nourished; in no acute distress. Speaking full sentences.  SKIN: Warm, pink, no rash, no diaphoresis, no cyanosis, well perfused.   HEAD: Normocephalic; atraumatic. No scalp lacerations, no abrasions.  NECK: Supple; non tender C-Collar in place. No step offs.  EYES: Pupils 3mm equal, round, reactive to light and accomodation, conjunctiva and sclera clear. Extra-ocular movements intact bilaterally.  ENT: No nasal discharge; airway clear. Trachea is midline. Normal dentition.  CV: RRR. S1, S2 normal; no murmurs, gallops, or rubs. Capillary refill < 2 seconds throughout. Distal pulses intact 2+ throughout.  RESP: CTA bilaterally. No wheezes, rales, or rhonchi.   ABD: Normal bowel sounds, soft, non-distended, non-tender, no rebound, no guarding, no rigidity   MSK: Normal range of motion and movement of b/l legs; No apparent joint or muscular pain throughout.    BACK: No thoracolumbar midline or paravertebral tenderness. No step-offs or obvious deformities.  NEURO: Alert & oriented x 3, sensation to soft touch intact throughout. (+) 3/5 mtr strength in b/l deltoids and 5/5 mtr strength in triceps/biceps/wrist/hand. (+) slow speech and coordination.

## 2023-11-12 NOTE — ED PROVIDER NOTE - OBJECTIVE STATEMENT
68F PMHx of chronic pain, recent fall (10/13) with posterior C1-C7 decompression/fusion for spine fracture, TBI (recently discharged from [facility] [place] 11/3), COPD, Vertebral Artery Dissection, PAT presenting from rehab for fall from toilet today. Describes being helped by staff to toilet and fell face forward onto floor. No LOC. Had hard-collar in place. Otherwise, he has weakness in his upper arms/shoulder b/l that is unchanged post-trauma.     Denies any chest pain, abdominal pain, shortness of breath, nausea/vomiting, headaches, fevers, chills, diarrhea ,constipation, weakness, syncope, hematuria, dysuria, urinary symptoms, subjective neurological deficits.

## 2023-11-12 NOTE — ED PROVIDER NOTE - PHYSICAL EXAMINATION
CONSTITUTIONAL: Patient is awake, alert and oriented x 3. Patient is chronically ill appearing;   HEAD: NCAT  EYES: PERRL bilaterally,   ENT: Airway patent, Nasal mucosa clear.   NECK: Supple,   LUNGS: CTA B/L,   HEART: RRR.+S1S2   ABDOMEN: Soft, non-tender to palpation throughout all four quadrants,   MSK: FROM upper and lower ext b/l, no step offs or deformities noted; C collar in place, ttp to right lateral cervical sine;   NEURO: Sensation intact,   SKIN: No rash or lesions (+) 3/5 strength in b/l deltoids and 5/5 strength in triceps/biceps/wrist/hand.

## 2023-11-12 NOTE — PATIENT PROFILE ADULT - FALL HARM RISK - HARM RISK INTERVENTIONS
Assistance with ambulation/Assistance OOB with selected safe patient handling equipment/Communicate Risk of Fall with Harm to all staff/Discuss with provider need for PT consult/Monitor gait and stability/Reinforce activity limits and safety measures with patient and family/Tailored Fall Risk Interventions/Visual Cue: Yellow wristband and red socks/Bed in lowest position, wheels locked, appropriate side rails in place/Call bell, personal items and telephone in reach/Instruct patient to call for assistance before getting out of bed or chair/Non-slip footwear when patient is out of bed/[place] to call system/Physically safe environment - no spills, clutter or unnecessary equipment/Purposeful Proactive Rounding/Room/bathroom lighting operational, light cord in reach

## 2023-11-12 NOTE — ED ADULT NURSE NOTE - NSFALLRISKINTERV_ED_ALL_ED
Assistance OOB with selected safe patient handling equipment if applicable/Assistance with ambulation/Communicate fall risk and risk factors to all staff, patient, and family/Monitor gait and stability/Provide visual cue: yellow wristband, yellow gown, etc/Reinforce activity limits and safety measures with patient and family/Call bell, personal items and telephone in reach/Instruct patient to call for assistance before getting out of bed/chair/stretcher/Non-slip footwear applied when patient is off stretcher/[place] to call system/Physically safe environment - no spills, clutter or unnecessary equipment/Purposeful Proactive Rounding/Room/bathroom lighting operational, light cord in reach

## 2023-11-12 NOTE — CONSULT NOTE ADULT - SUBJECTIVE AND OBJECTIVE BOX
p ([id number])     HPI: 68M no AC/AP Hx 10ft fall off ladder, s/p 10/14/23 C3-6 posterior decompression and fusion incl C5 root, evac of epidural hematoma w/ Dr. Dawkins. Fell last night at  rehab, hit neck and head. Was not wearing collar at the time. CT C spine c/t 10/15 shows: Rt C1 screw haloing at tip and superior migration. Rods in place. RUCHI incr from 3.9 to 7.9mm. PLTs/coags wnl. Exam: stable vs mild improved from d/c 10/20: AOx3, RUE Delt 1/5, bicep 3-/5, tricep 5/5, hg 5/5. LUE delt 2/5, o/w 5/5, ble 5/5, SILT. No hoffmans, no clonus. C-collar in place since fall, well fitted. Moderate neck pain.    =====================  PAST MEDICAL HISTORY   High cholesterol    Insomnia    History of chronic back pain    Chronic obstructive pulmonary disease, unspecified COPD type    History of fall from ladder    Rectal prolapse      PAST SURGICAL HISTORY   History of arthroscopy of left shoulder    H/O hernia repair    History of arthroplasty of right knee    History of fusion of cervical spine      Vioxx (Unknown)  Originally Entered as [Unknown] reaction to [PUMPKIN SEEDS] (Unknown)  Nuts (Unknown)      MEDICATIONS:  Antibiotics:    Neuro:    Other:      SOCIAL HISTORY:   Occupation:   Marital Status:     FAMILY HISTORY:  No pertinent family history in first degree relatives    No pertinent family history in first degree relatives    Family history of early CAD (Sibling)        ROS: Negative except per HPI    LABS:  PT/INR - ( 12 Nov 2023 10:08 )   PT: 12.3 sec;   INR: 1.18 ratio         PTT - ( 12 Nov 2023 10:08 )  PTT:28.7 sec                        10.8   7.48  )-----------( 328      ( 12 Nov 2023 10:08 )             33.8     11-12    140  |  102  |  17  ----------------------------<  108<H>  4.1   |  28  |  0.75    Ca    9.2      12 Nov 2023 10:08    TPro  6.1  /  Alb  3.1<L>  /  TBili  0.3  /  DBili  x   /  AST  13  /  ALT  22  /  AlkPhos  117  11-12

## 2023-11-12 NOTE — H&P ADULT - PROBLEM SELECTOR PLAN 2
Incidental findings of consolidation on CT with new 6mm pulmonary nodule after discussion with wife about pt's clinical status will treat with Ceftriaxone and Azithromycin for 5 days

## 2023-11-12 NOTE — H&P ADULT - ASSESSMENT
68 year old M with PMH of chronic pain, recent fall (10/13) with posterior C1-C7 decompression/fusion for spine fracture, TBI (recently discharged from [facility] [place] 11/3), COPD, Vertebral Artery Dissection, PAT, Recent Fall from Ladder with multiple fractures, s/p hemorrhoidectomy 1 week ago, fell at  Rehab transferred to [facility] for NSG evaluation

## 2023-11-12 NOTE — H&P ADULT - NSHPLABSRESULTS_GEN_ALL_CORE
CBC, BMP unremarkable    < from: CT Abdomen and Pelvis No Cont (11.12.23 @ 03:28) >    CHEST WALL AND LOWER NECK: Healing angulated displaced lateral right   eighth fracture. Displaced lateral right ninth rib fracture with healing,   but without osseous bridging of the fracture fragments. Healing   posterolateral right tenth rib fracture. Old left anterior second and   lateral tenth ribfractures. No acute rib fracture. Old fracture   deformity of the anterior-inferior glenoid. Screw within a fracture   fragment inferior to the left glenoid which is not contiguous with the   left glenoid. Redemonstration of trabecular thickening andosseous   expansion of the T8 vertebral body suggestive of Paget's disease. New   sclerosis in the superior endplate of the T2 and and T4 vertebral bodies   with slight loss of height of the T4 vertebral body suggestive of   age-indeterminate compression fractures.LUNGS AND LARGE AIRWAYS: New 6 mm nodule in the distal right main   bronchus (series 303, image 261) likely debris or secretions. Airspace   consolidation with air bronchograms in the right lower lobe. Bibasilar   subsegmental atelectasis. 3 mm nodule in the right upper lobe (series   303, image 243). Elevated right hemidiaphragm. Left upper lobe bleb.LIVER: Redemonstration of multiple cysts and subcentimeter hypodense   lesions too small for accurate characterization..ADRENALS: 0.9 cm right adrenal adenoma. Thickening of the limbs of the   left adrenal gland.KIDNEYS/URETERS: Right renal cyst. Nonobstructing 3 mm left renal   calculus. No hydronephrosis. Nonspecific bilateral perinephric stranding.BLADDER: Collapsed by Matam[place]os catheter balloon. Intravesicular gas   secondary to instrumentation.  REPRODUCTIVE ORGANS: Enlarged prostate to 5.0 cm.BONES: Degenerative changes of the spine. Old right inferior pubic ramus   fracture. No acute fracture or dislocation.    < end of copied text >    < from: CT Cervical Spine No Cont (11.12.23 @ 03:27) >    3.  Compared to the prior postoperative CT of 10/15/2023, the tip of the   left C1 lateral mass screw, which was previously in the center of the   left lateral mass of C1, appears to have migrated superiorly and is now   at the articular surface of the left C1 superior facet.  There is trace   lucency around the tip of the left C1 lateral mass screw.  4.  Compared to the prior CT of 10/15/2023, there has been posterior   superior migration of the dens, which now abuts the basion; this may   slightly impinge on the ventral aspect of the cervicomedullary junction.  5. Compared to the prior CT of 10/15/2023, there has been interval   increase in the anterior atlantodental axial interval which now measures   up to 5 mm at its superior aspect, increased from 3 mm on 10/15/2023.  6. Atlantoaxial instability is not excluded.  Flexion and extension   imaging may be obtained as clinically warranted.  Follow-up cervical   spine MRI may be obtained as clinically warranted.    < end of copied text >

## 2023-11-12 NOTE — ED ADULT NURSE NOTE - OBJECTIVE STATEMENT
68Y M AXO3 PMH of COPD, HLD, chronic back pain,  C1-C7 fracture and rib fracture and PSH of cervical repair surgery and hemorrhoid surgery presented to the ED via EMS from [place] after fall yesterday in rehabilitation center. EMS states CT of head and x-rays were performed which showed " cervical hardware movement." EMS reports pt had simon catheter inserted on 11/9 in previous hospitalization. Upon arrival to the ED, the pt is well appearing, has bilateral even and unlabored chest rise, and airway is patent. Upon assessment, pt has even and bilateral peripheral pulses, ROM limited to upper extremities, strength and sensation intact, gross neuro intact, PERRLA, soft, non-tender, non-distended abdomen. 250 cc of yellow clear urine noted from simon catheter. Pt denies chest pain, SOB, n/v/d, headaches, dizziness, vision changes, fevers, chills, urinary symptoms, and black or bloody stools. IV access obtained, bed in lowest position, side rails  up, and call bell within reach. Pt's wife at bedside.

## 2023-11-12 NOTE — ED ADULT NURSE NOTE - NSFALLHARMRISKINTERV_ED_ALL_ED
Assistance OOB with selected safe patient handling equipment if applicable/Assistance with ambulation/Communicate risk of Fall with Harm to all staff, patient, and family/Monitor gait and stability/Provide visual cue: red socks, yellow wristband, yellow gown, etc/Reinforce activity limits and safety measures with patient and family/Bed in lowest position, wheels locked, appropriate side rails in place/Call bell, personal items and telephone in reach/Instruct patient to call for assistance before getting out of bed/chair/stretcher/Non-slip footwear applied when patient is off stretcher/[place] to call system/Physically safe environment - no spills, clutter or unnecessary equipment/Purposeful Proactive Rounding/Room/bathroom lighting operational, light cord in reach

## 2023-11-12 NOTE — ED ADULT NURSE NOTE - ED STAT RN HAND OFF
Patient is a 38y old  Female who presents with a chief complaint of placement (31 Dec 2019 14:16)    #Intellectual disability with bipolar disorder complicated by frequent emotional outburst and refusal for dc  dw psych , no further intervention per psych , will fu      #Obesity Morbid BMI (kg/m2): 52.4 (26 Dec 2019 22:15) patient needs to see dieitian outpatient for further evaluation     #Suspect osteoarthritis of left knee due to body habitus - pt and weight loss,     #Hypothyroidism on synthroid    Progress Note Handoff    Pending:  radha for dispo planning   Disposition: Group home Handoff

## 2023-11-12 NOTE — ED PROVIDER NOTE - PROGRESS NOTE DETAILS
Neurosurgery evaluated at bedside. No acute surgical intervention required. [facility] medicine admission for PT/OT, pain control and new rehab placement. They will re-evaluate pt in four weeks to see if he is surgical candidate. Will admit to medicine. Tracey Andrea PA-C

## 2023-11-12 NOTE — ED PROVIDER NOTE - CLINICAL SUMMARY MEDICAL DECISION MAKING FREE TEXT BOX
68F PMHx of chronic pain, recent fall (10/13) with posterior C1-C7 decompression/fusion for spine fracture, TBI (recently discharged from [facility] [place] 11/3), COPD, Vertebral Artery Dissection, PAT presenting from rehab for fall from toilet today. Describes being helped by staff to toilet and fell face forward onto floor. No LOC. Had hard-collar in place. Otherwise, he has weakness in his upper arms/shoulder b/l that is unchanged post-trauma.     Denies any chest pain, abdominal pain, shortness of breath, nausea/vomiting, headaches, fevers, chills, diarrhea ,constipation, weakness, syncope, hematuria, dysuria, urinary symptoms, subjective neurological deficits.    History obtained from independent historian: wife and patient  External note reviewed: [facility] admission notes  DDx: new traumatic injuries, hardware fx  ED course, interpretation of imaging studies, and consults:   - CT showin) movement of left C1, lateral mass screw from center to superiorly at the articular surface of the left C1 superior facet  2) posterior to superior migration of the dens may impinge on the central aspect of the cervicomedullary junction  3) increased anterior atlantodental axial interval now measuring 5 mm from 3mm.   Consideration hospitalization vs de-escalation of care: XXX  Disposition: XXX 68F PMHx of chronic pain, recent fall (10/13) with posterior C1-C7 decompression/fusion for spine fracture, TBI (recently discharged from [facility] [place] 11/3), COPD, Vertebral Artery Dissection, PAT presenting from rehab for fall from toilet today. Describes being helped by staff to toilet and fell face forward onto floor. No LOC. Had hard-collar in place. Otherwise, he has weakness in his upper arms/shoulder b/l that is unchanged post-trauma.     Denies any chest pain, abdominal pain, shortness of breath, nausea/vomiting, headaches, fevers, chills, diarrhea ,constipation, weakness, syncope, hematuria, dysuria, urinary symptoms, subjective neurological deficits.    History obtained from independent historian: wife and patient  External note reviewed: [facility] admission notes  DDx: new traumatic injuries, hardware fx  ED course, interpretation of imaging studies, and consults:   - CT showin) movement of left C1, lateral mass screw from center to superiorly at the articular surface of the left C1 superior facet  2) posterior to superior migration of the dens may impinge on the central aspect of the cervicomedullary junction  3) increased anterior atlantodental axial interval now measuring 5 mm from 3mm.   - Consulted NSG, case discussed. Transfer to [facility] for further evaluation of hardware movement      Disposition: [facility] TRSF 68F PMHx of chronic pain, recent fall (10/13) with posterior C1-C7 decompression/fusion for spine fracture, TBI (recently discharged [facility] [place] 11/3), COPD, Vertebral Artery Dissection, PAT presenting from rehab for fall from toilet today. Describes being helped by staff to toilet and fell face forward onto floor. No LOC. Had hard-collar in place. Otherwise, he has weakness in his upper arms/shoulder b/l that is unchanged post-trauma.     Denies any chest pain, abdominal pain, shortness of breath, nausea/vomiting, headaches, fevers, chills, diarrhea ,constipation, weakness, syncope, hematuria, dysuria, urinary symptoms, subjective neurological deficits.    History obtained from independent historian: wife and patient  External note reviewed: [facility] admission notes  DDx: new traumatic injuries, hardware fx  ED course, interpretation of imaging studies, and consults:   - CT showin) movement of left C1, lateral mass screw from center to superiorly at the articular surface of the left C1 superior facet  2) posterior to superior migration of the dens may impinge on the central aspect of the cervicomedullary junction  3) increased anterior atlantodental axial interval now measuring 5 mm from 3mm.   - Consulted NSG, case discussed. Recommend Transfer to [facility] for further evaluation of hardware movement  Disposition: [facility] TRSF

## 2023-11-12 NOTE — H&P ADULT - PROBLEM SELECTOR PLAN 1
sustained TBI, unstable C1-C7 Decompression/Fusion evaluation of epidural hematoma with cord compression  increase in atlantoaxial interval, NSG consulted, no acute intervention, rehab, pain control, c collar.   Repeat CT C spine as an outpt    PT eval  Pain control with oxycodone and robaxin, ordered by nsg  Hold Valium due to Sedation

## 2023-11-12 NOTE — ED PROVIDER NOTE - ATTENDING CONTRIBUTION TO CARE
see mdm    edited by Ester Byrne DO - attending physician.   Please see progress notes for status/labs/consult updates and ED course after initial presentation.

## 2023-11-12 NOTE — ED ADULT NURSE NOTE - ED STAT RN HANDOFF DETAILS
report received from Dayana RN, pt resting in bed comfortably with wife at bedside on cardiac monitor. VSS.

## 2023-11-12 NOTE — ED PROVIDER NOTE - NS_EDPROVIDERDISPOUSERTYPE_ED_A_ED
Attending Attestation (For Attendings USE Only)... Is This A New Presentation, Or A Follow-Up?: Skin Lesions How Severe Is Your Skin Lesion?: mild Have Your Skin Lesions Been Treated?: not been treated

## 2023-11-12 NOTE — ED ADULT NURSE NOTE - OBJECTIVE STATEMENT
Pt presents from the ER from [facility] for a witnessed fall around 11:30PM and is complaining of back of the head pain. Pt states he takes Aspirin. Pt had spine surgery in October and is wearing a c-collar. A&OX4. Pt denies LOC, SOB, chest pain, nausea, vomiting, dizziness or headache.

## 2023-11-12 NOTE — ED ADULT TRIAGE NOTE - CHIEF COMPLAINT QUOTE
transfer from [place] rehab for "cervical hardware movement"  had fall last night   arrives with c-collar in place

## 2023-11-12 NOTE — H&P ADULT - HISTORY OF PRESENT ILLNESS
68 year old M with PMH of chronic pain, recent fall (10/13) with posterior C1-C7 decompression/fusion for spine fracture, TBI (recently discharged [place] [facility] [place] 11/3), COPD, Vertebral Artery Dissection, PAT, Recent Fall from Ladder with multiple fractures, s/p hemorrhoidectomy 1 week ago, fell at  Rehab transferred to [facility] for NSG evaluationFell last night at  rehab, hit neck and head. Was not wearing collar at the time. CT C spine c/t 10/15 shows: Rt C1 screw haloing at tip and superior migration. Rods in place. RUCHI incr from 3.9 to 7.9mm. According to the patient he was taken to the bathroom by the aides at rehab. After he was was cleaned up, he got up to walk and was not wearing his socks and did not have assistance, he fell and hit his head. He denies any LOC or prodrome. He does not report any fever, cough, chest pain, sputum production, SOB or palpitations.

## 2023-11-12 NOTE — ED ADULT NURSE NOTE - CHIEF COMPLAINT QUOTE
transfer from [place] rehab for "cervical hardware movement"  had fall last night   arrives with c-collar in place

## 2023-11-12 NOTE — ED PROVIDER NOTE - OBJECTIVE STATEMENT
68F PMHx of chronic pain, recent fall off of 12 foot ladder (10/13) which resulted in posterior C1-C7 decompression/fusion for spine fracture, TBI (recently discharged [facility] [place] 11/3), COPD, Vertebral Artery Dissection, PAT presents to the ED transferred from [facility] for neurosurgery evaluation. Patient was at rehab today and fell of off toilet hitting his head and neck. He went to [place] ED and had CT scans which revealed movement of cervical spine hardware. Patient complains of neck pain today. He has not had anything today for pain. Still states that he has upper extremity weakness b/l which is unchanged. No fevers, chills, chest pain, cough.

## 2023-11-13 PROBLEM — K62.3 RECTAL PROLAPSE: Chronic | Status: ACTIVE | Noted: 2023-11-05

## 2023-11-13 LAB
ANION GAP SERPL CALC-SCNC: 8 MMOL/L — SIGNIFICANT CHANGE UP (ref 5–17)
ANION GAP SERPL CALC-SCNC: 8 MMOL/L — SIGNIFICANT CHANGE UP (ref 5–17)
BASOPHILS # BLD AUTO: 0.07 K/UL — SIGNIFICANT CHANGE UP (ref 0–0.2)
BASOPHILS # BLD AUTO: 0.07 K/UL — SIGNIFICANT CHANGE UP (ref 0–0.2)
BASOPHILS NFR BLD AUTO: 0.8 % — SIGNIFICANT CHANGE UP (ref 0–2)
BASOPHILS NFR BLD AUTO: 0.8 % — SIGNIFICANT CHANGE UP (ref 0–2)
BUN SERPL-MCNC: 16 MG/DL — SIGNIFICANT CHANGE UP (ref 7–23)
BUN SERPL-MCNC: 16 MG/DL — SIGNIFICANT CHANGE UP (ref 7–23)
CALCIUM SERPL-MCNC: 9.4 MG/DL — SIGNIFICANT CHANGE UP (ref 8.4–10.5)
CALCIUM SERPL-MCNC: 9.4 MG/DL — SIGNIFICANT CHANGE UP (ref 8.4–10.5)
CHLORIDE SERPL-SCNC: 101 MMOL/L — SIGNIFICANT CHANGE UP (ref 96–108)
CHLORIDE SERPL-SCNC: 101 MMOL/L — SIGNIFICANT CHANGE UP (ref 96–108)
CO2 SERPL-SCNC: 30 MMOL/L — SIGNIFICANT CHANGE UP (ref 22–31)
CO2 SERPL-SCNC: 30 MMOL/L — SIGNIFICANT CHANGE UP (ref 22–31)
CREAT SERPL-MCNC: 0.86 MG/DL — SIGNIFICANT CHANGE UP (ref 0.5–1.3)
CREAT SERPL-MCNC: 0.86 MG/DL — SIGNIFICANT CHANGE UP (ref 0.5–1.3)
EGFR: 93 ML/MIN/1.73M2 — SIGNIFICANT CHANGE UP
EGFR: 93 ML/MIN/1.73M2 — SIGNIFICANT CHANGE UP
EOSINOPHIL # BLD AUTO: 0.37 K/UL — SIGNIFICANT CHANGE UP (ref 0–0.5)
EOSINOPHIL # BLD AUTO: 0.37 K/UL — SIGNIFICANT CHANGE UP (ref 0–0.5)
EOSINOPHIL NFR BLD AUTO: 4 % — SIGNIFICANT CHANGE UP (ref 0–6)
EOSINOPHIL NFR BLD AUTO: 4 % — SIGNIFICANT CHANGE UP (ref 0–6)
GLUCOSE SERPL-MCNC: 104 MG/DL — HIGH (ref 70–99)
GLUCOSE SERPL-MCNC: 104 MG/DL — HIGH (ref 70–99)
HCT VFR BLD CALC: 36.3 % — LOW (ref 39–50)
HCT VFR BLD CALC: 36.3 % — LOW (ref 39–50)
HGB BLD-MCNC: 11.6 G/DL — LOW (ref 13–17)
HGB BLD-MCNC: 11.6 G/DL — LOW (ref 13–17)
IMM GRANULOCYTES NFR BLD AUTO: 0.5 % — SIGNIFICANT CHANGE UP (ref 0–0.9)
IMM GRANULOCYTES NFR BLD AUTO: 0.5 % — SIGNIFICANT CHANGE UP (ref 0–0.9)
LYMPHOCYTES # BLD AUTO: 1.16 K/UL — SIGNIFICANT CHANGE UP (ref 1–3.3)
LYMPHOCYTES # BLD AUTO: 1.16 K/UL — SIGNIFICANT CHANGE UP (ref 1–3.3)
LYMPHOCYTES # BLD AUTO: 12.6 % — LOW (ref 13–44)
LYMPHOCYTES # BLD AUTO: 12.6 % — LOW (ref 13–44)
MCHC RBC-ENTMCNC: 29.6 PG — SIGNIFICANT CHANGE UP (ref 27–34)
MCHC RBC-ENTMCNC: 29.6 PG — SIGNIFICANT CHANGE UP (ref 27–34)
MCHC RBC-ENTMCNC: 32 GM/DL — SIGNIFICANT CHANGE UP (ref 32–36)
MCHC RBC-ENTMCNC: 32 GM/DL — SIGNIFICANT CHANGE UP (ref 32–36)
MCV RBC AUTO: 92.6 FL — SIGNIFICANT CHANGE UP (ref 80–100)
MCV RBC AUTO: 92.6 FL — SIGNIFICANT CHANGE UP (ref 80–100)
MONOCYTES # BLD AUTO: 0.72 K/UL — SIGNIFICANT CHANGE UP (ref 0–0.9)
MONOCYTES # BLD AUTO: 0.72 K/UL — SIGNIFICANT CHANGE UP (ref 0–0.9)
MONOCYTES NFR BLD AUTO: 7.9 % — SIGNIFICANT CHANGE UP (ref 2–14)
MONOCYTES NFR BLD AUTO: 7.9 % — SIGNIFICANT CHANGE UP (ref 2–14)
NEUTROPHILS # BLD AUTO: 6.8 K/UL — SIGNIFICANT CHANGE UP (ref 1.8–7.4)
NEUTROPHILS # BLD AUTO: 6.8 K/UL — SIGNIFICANT CHANGE UP (ref 1.8–7.4)
NEUTROPHILS NFR BLD AUTO: 74.2 % — SIGNIFICANT CHANGE UP (ref 43–77)
NEUTROPHILS NFR BLD AUTO: 74.2 % — SIGNIFICANT CHANGE UP (ref 43–77)
NRBC # BLD: 0 /100 WBCS — SIGNIFICANT CHANGE UP (ref 0–0)
NRBC # BLD: 0 /100 WBCS — SIGNIFICANT CHANGE UP (ref 0–0)
PLATELET # BLD AUTO: 360 K/UL — SIGNIFICANT CHANGE UP (ref 150–400)
PLATELET # BLD AUTO: 360 K/UL — SIGNIFICANT CHANGE UP (ref 150–400)
POTASSIUM SERPL-MCNC: 4.2 MMOL/L — SIGNIFICANT CHANGE UP (ref 3.5–5.3)
POTASSIUM SERPL-MCNC: 4.2 MMOL/L — SIGNIFICANT CHANGE UP (ref 3.5–5.3)
POTASSIUM SERPL-SCNC: 4.2 MMOL/L — SIGNIFICANT CHANGE UP (ref 3.5–5.3)
POTASSIUM SERPL-SCNC: 4.2 MMOL/L — SIGNIFICANT CHANGE UP (ref 3.5–5.3)
PROCALCITONIN SERPL-MCNC: 0.07 NG/ML — SIGNIFICANT CHANGE UP (ref 0.02–0.1)
PROCALCITONIN SERPL-MCNC: 0.07 NG/ML — SIGNIFICANT CHANGE UP (ref 0.02–0.1)
RBC # BLD: 3.92 M/UL — LOW (ref 4.2–5.8)
RBC # BLD: 3.92 M/UL — LOW (ref 4.2–5.8)
RBC # FLD: 13.2 % — SIGNIFICANT CHANGE UP (ref 10.3–14.5)
RBC # FLD: 13.2 % — SIGNIFICANT CHANGE UP (ref 10.3–14.5)
SODIUM SERPL-SCNC: 139 MMOL/L — SIGNIFICANT CHANGE UP (ref 135–145)
SODIUM SERPL-SCNC: 139 MMOL/L — SIGNIFICANT CHANGE UP (ref 135–145)
WBC # BLD: 9.17 K/UL — SIGNIFICANT CHANGE UP (ref 3.8–10.5)
WBC # BLD: 9.17 K/UL — SIGNIFICANT CHANGE UP (ref 3.8–10.5)
WBC # FLD AUTO: 9.17 K/UL — SIGNIFICANT CHANGE UP (ref 3.8–10.5)
WBC # FLD AUTO: 9.17 K/UL — SIGNIFICANT CHANGE UP (ref 3.8–10.5)

## 2023-11-13 PROCEDURE — 96374 THER/PROPH/DIAG INJ IV PUSH: CPT

## 2023-11-13 PROCEDURE — 74177 CT ABD & PELVIS W/CONTRAST: CPT | Mod: MA

## 2023-11-13 PROCEDURE — 70450 CT HEAD/BRAIN W/O DYE: CPT | Mod: MA

## 2023-11-13 PROCEDURE — 36415 COLL VENOUS BLD VENIPUNCTURE: CPT

## 2023-11-13 PROCEDURE — 85025 COMPLETE CBC W/AUTO DIFF WBC: CPT

## 2023-11-13 PROCEDURE — 97166 OT EVAL MOD COMPLEX 45 MIN: CPT

## 2023-11-13 PROCEDURE — 85730 THROMBOPLASTIN TIME PARTIAL: CPT

## 2023-11-13 PROCEDURE — 71045 X-RAY EXAM CHEST 1 VIEW: CPT

## 2023-11-13 PROCEDURE — 86850 RBC ANTIBODY SCREEN: CPT

## 2023-11-13 PROCEDURE — 84295 ASSAY OF SERUM SODIUM: CPT

## 2023-11-13 PROCEDURE — 76000 FLUOROSCOPY <1 HR PHYS/QHP: CPT

## 2023-11-13 PROCEDURE — 80053 COMPREHEN METABOLIC PANEL: CPT

## 2023-11-13 PROCEDURE — 97110 THERAPEUTIC EXERCISES: CPT

## 2023-11-13 PROCEDURE — 85018 HEMOGLOBIN: CPT

## 2023-11-13 PROCEDURE — 82330 ASSAY OF CALCIUM: CPT

## 2023-11-13 PROCEDURE — 97112 NEUROMUSCULAR REEDUCATION: CPT

## 2023-11-13 PROCEDURE — 85014 HEMATOCRIT: CPT

## 2023-11-13 PROCEDURE — 83735 ASSAY OF MAGNESIUM: CPT

## 2023-11-13 PROCEDURE — 99285 EMERGENCY DEPT VISIT HI MDM: CPT

## 2023-11-13 PROCEDURE — 71260 CT THORAX DX C+: CPT | Mod: MA

## 2023-11-13 PROCEDURE — 80307 DRUG TEST PRSMV CHEM ANLYZR: CPT

## 2023-11-13 PROCEDURE — C1889: CPT

## 2023-11-13 PROCEDURE — C9399: CPT

## 2023-11-13 PROCEDURE — 88304 TISSUE EXAM BY PATHOLOGIST: CPT

## 2023-11-13 PROCEDURE — 97535 SELF CARE MNGMENT TRAINING: CPT

## 2023-11-13 PROCEDURE — 86900 BLOOD TYPING SEROLOGIC ABO: CPT

## 2023-11-13 PROCEDURE — 82947 ASSAY GLUCOSE BLOOD QUANT: CPT

## 2023-11-13 PROCEDURE — 84132 ASSAY OF SERUM POTASSIUM: CPT

## 2023-11-13 PROCEDURE — 82803 BLOOD GASES ANY COMBINATION: CPT

## 2023-11-13 PROCEDURE — 86901 BLOOD TYPING SEROLOGIC RH(D): CPT

## 2023-11-13 PROCEDURE — 97162 PT EVAL MOD COMPLEX 30 MIN: CPT

## 2023-11-13 PROCEDURE — 93970 EXTREMITY STUDY: CPT

## 2023-11-13 PROCEDURE — 97116 GAIT TRAINING THERAPY: CPT

## 2023-11-13 PROCEDURE — 82435 ASSAY OF BLOOD CHLORIDE: CPT

## 2023-11-13 PROCEDURE — 83605 ASSAY OF LACTIC ACID: CPT

## 2023-11-13 PROCEDURE — 70498 CT ANGIOGRAPHY NECK: CPT | Mod: MA

## 2023-11-13 PROCEDURE — 80048 BASIC METABOLIC PNL TOTAL CA: CPT

## 2023-11-13 PROCEDURE — 99233 SBSQ HOSP IP/OBS HIGH 50: CPT

## 2023-11-13 PROCEDURE — C1713: CPT

## 2023-11-13 PROCEDURE — 97530 THERAPEUTIC ACTIVITIES: CPT

## 2023-11-13 PROCEDURE — 70496 CT ANGIOGRAPHY HEAD: CPT | Mod: MA

## 2023-11-13 PROCEDURE — 84100 ASSAY OF PHOSPHORUS: CPT

## 2023-11-13 PROCEDURE — 83690 ASSAY OF LIPASE: CPT

## 2023-11-13 PROCEDURE — 92610 EVALUATE SWALLOWING FUNCTION: CPT

## 2023-11-13 PROCEDURE — 72125 CT NECK SPINE W/O DYE: CPT

## 2023-11-13 PROCEDURE — 72040 X-RAY EXAM NECK SPINE 2-3 VW: CPT

## 2023-11-13 PROCEDURE — 72141 MRI NECK SPINE W/O DYE: CPT | Mod: MA

## 2023-11-13 PROCEDURE — 85027 COMPLETE CBC AUTOMATED: CPT

## 2023-11-13 PROCEDURE — 85610 PROTHROMBIN TIME: CPT

## 2023-11-13 RX ORDER — POLYVINYL ALCOHOL 1.4 %
DROPS OPHTHALMIC (EYE) 4 TIMES DAILY
Refills: 0 | Status: ACTIVE | COMMUNITY

## 2023-11-13 RX ORDER — LORATADINE 5 MG
17 TABLET,CHEWABLE ORAL DAILY
Refills: 0 | Status: ACTIVE | COMMUNITY

## 2023-11-13 RX ORDER — HYDROMORPHONE HYDROCHLORIDE 2 MG/ML
4 INJECTION INTRAMUSCULAR; INTRAVENOUS; SUBCUTANEOUS EVERY 4 HOURS
Refills: 0 | Status: DISCONTINUED | OUTPATIENT
Start: 2023-11-13 | End: 2023-11-15

## 2023-11-13 RX ORDER — ALFUZOSIN HYDROCHLORIDE 10 MG/1
10 TABLET, EXTENDED RELEASE ORAL
Refills: 0 | Status: DISCONTINUED | COMMUNITY
Start: 2023-02-07 | End: 2023-11-13

## 2023-11-13 RX ORDER — METOPROLOL SUCCINATE 25 MG/1
25 TABLET, EXTENDED RELEASE ORAL
Refills: 3 | Status: DISCONTINUED | COMMUNITY
Start: 2023-02-21 | End: 2023-11-13

## 2023-11-13 RX ORDER — ASPIRIN ENTERIC COATED TABLETS 81 MG 81 MG/1
81 TABLET, DELAYED RELEASE ORAL DAILY
Refills: 0 | Status: ACTIVE | COMMUNITY

## 2023-11-13 RX ORDER — POLYETHYLENE GLYCOL 3350 17 G/17G
17 POWDER, FOR SOLUTION ORAL DAILY
Refills: 0 | Status: DISCONTINUED | OUTPATIENT
Start: 2023-11-13 | End: 2023-11-15

## 2023-11-13 RX ORDER — DIAZEPAM 5 MG
5 TABLET ORAL EVERY 8 HOURS
Refills: 0 | Status: DISCONTINUED | OUTPATIENT
Start: 2023-11-13 | End: 2023-11-15

## 2023-11-13 RX ORDER — TADALAFIL 20 MG/1
20 TABLET ORAL
Refills: 0 | Status: COMPLETED | COMMUNITY
Start: 2023-01-20 | End: 2023-11-13

## 2023-11-13 RX ORDER — DIAZEPAM 5 MG
2 TABLET ORAL ONCE
Refills: 0 | Status: DISCONTINUED | OUTPATIENT
Start: 2023-11-13 | End: 2023-11-13

## 2023-11-13 RX ORDER — SILDENAFIL CITRATE 100 MG/1
100 TABLET, FILM COATED ORAL
Qty: 6 | Refills: 5 | Status: COMPLETED | COMMUNITY
Start: 2022-04-07 | End: 2023-11-13

## 2023-11-13 RX ORDER — MELOXICAM 15 MG/1
15 TABLET ORAL
Qty: 14 | Refills: 2 | Status: DISCONTINUED | COMMUNITY
Start: 2022-01-25 | End: 2023-11-13

## 2023-11-13 RX ORDER — METOPROLOL SUCCINATE 25 MG/1
25 TABLET, EXTENDED RELEASE ORAL DAILY
Refills: 0 | Status: ACTIVE | COMMUNITY

## 2023-11-13 RX ORDER — HYDROMORPHONE HYDROCHLORIDE 2 MG/ML
6 INJECTION INTRAMUSCULAR; INTRAVENOUS; SUBCUTANEOUS EVERY 6 HOURS
Refills: 0 | Status: DISCONTINUED | OUTPATIENT
Start: 2023-11-13 | End: 2023-11-15

## 2023-11-13 RX ADMIN — Medication 1 DROP(S): at 17:25

## 2023-11-13 RX ADMIN — POLYETHYLENE GLYCOL 3350 17 GRAM(S): 17 POWDER, FOR SOLUTION ORAL at 18:46

## 2023-11-13 RX ADMIN — Medication 400 MILLIGRAM(S): at 11:36

## 2023-11-13 RX ADMIN — OXYCODONE HYDROCHLORIDE 10 MILLIGRAM(S): 5 TABLET ORAL at 06:04

## 2023-11-13 RX ADMIN — Medication 5 MILLIGRAM(S): at 23:41

## 2023-11-13 RX ADMIN — CEFTRIAXONE 100 MILLIGRAM(S): 500 INJECTION, POWDER, FOR SOLUTION INTRAMUSCULAR; INTRAVENOUS at 13:26

## 2023-11-13 RX ADMIN — SODIUM CHLORIDE 2 GRAM(S): 9 INJECTION INTRAMUSCULAR; INTRAVENOUS; SUBCUTANEOUS at 06:03

## 2023-11-13 RX ADMIN — Medication 1000 MILLIGRAM(S): at 06:33

## 2023-11-13 RX ADMIN — AZITHROMYCIN 255 MILLIGRAM(S): 500 TABLET, FILM COATED ORAL at 14:03

## 2023-11-13 RX ADMIN — OXYCODONE HYDROCHLORIDE 10 MILLIGRAM(S): 5 TABLET ORAL at 11:44

## 2023-11-13 RX ADMIN — Medication 25 MILLIGRAM(S): at 17:25

## 2023-11-13 RX ADMIN — OXYCODONE HYDROCHLORIDE 10 MILLIGRAM(S): 5 TABLET ORAL at 06:34

## 2023-11-13 RX ADMIN — Medication 2 MILLIGRAM(S): at 01:14

## 2023-11-13 RX ADMIN — METHOCARBAMOL 500 MILLIGRAM(S): 500 TABLET, FILM COATED ORAL at 06:04

## 2023-11-13 RX ADMIN — Medication 1 DROP(S): at 06:04

## 2023-11-13 RX ADMIN — HYDROMORPHONE HYDROCHLORIDE 6 MILLIGRAM(S): 2 INJECTION INTRAMUSCULAR; INTRAVENOUS; SUBCUTANEOUS at 22:07

## 2023-11-13 RX ADMIN — Medication 81 MILLIGRAM(S): at 11:37

## 2023-11-13 RX ADMIN — Medication 120 MILLIGRAM(S): at 06:03

## 2023-11-13 RX ADMIN — Medication 1 DROP(S): at 11:36

## 2023-11-13 RX ADMIN — OXYCODONE HYDROCHLORIDE 10 MILLIGRAM(S): 5 TABLET ORAL at 10:44

## 2023-11-13 RX ADMIN — Medication 400 MILLIGRAM(S): at 06:03

## 2023-11-13 RX ADMIN — TAMSULOSIN HYDROCHLORIDE 0.8 MILLIGRAM(S): 0.4 CAPSULE ORAL at 22:06

## 2023-11-13 RX ADMIN — Medication 1 PACKET(S): at 11:37

## 2023-11-13 RX ADMIN — Medication 25 MILLIGRAM(S): at 06:04

## 2023-11-13 RX ADMIN — HYDROMORPHONE HYDROCHLORIDE 6 MILLIGRAM(S): 2 INJECTION INTRAMUSCULAR; INTRAVENOUS; SUBCUTANEOUS at 23:07

## 2023-11-13 RX ADMIN — ENOXAPARIN SODIUM 40 MILLIGRAM(S): 100 INJECTION SUBCUTANEOUS at 22:01

## 2023-11-13 NOTE — PHYSICAL THERAPY INITIAL EVALUATION ADULT - ADDITIONAL COMMENTS
Pt lives in a house, w/ family, 1 step to enter (-) handrail. Pt reports being independent w/ all ADLs/IADLs and reports ambulating independently w/o AD prior to falling. Pt however, has had multiple falls and admissions to rehab in past 1 month.

## 2023-11-13 NOTE — PHYSICAL THERAPY INITIAL EVALUATION ADULT - MANUAL MUSCLE TESTING RESULTS, REHAB EVAL
3+/5 BLE, 1/5 B shold flex/elbow flex, 3/5 B elbow ext assessed via functional mobility/grossly assessed due to

## 2023-11-13 NOTE — PHYSICAL THERAPY INITIAL EVALUATION ADULT - PERTINENT HX OF CURRENT PROBLEM, REHAB EVAL
68 year old M with PMH of chronic pain, recent fall (10/13) with posterior C1-C7 decompression/fusion for spine fracture, TBI (recently discharged from [facility] [place] 11/3), COPD, Vertebral Artery Dissection, PAT, Recent Fall from Ladder with multiple fractures, s/p hemorrhoidectomy 1 week ago, fell at  Rehab transferred [facility] for NSG evaluationFell last night at  rehab, hit neck and head. Was not wearing collar at the time. CT C spine c/t 10/15 shows: Rt C1 screw haloing at tip and superior migration. Rods in place. RUCHI incr from 3.9 to 7.9mm. According to the patient he was taken to the bathroom by the aides at rehab. After he was was cleaned up, he got up to walk and was not wearing his socks and did not have assistance, he fell and hit his head. He denies any LOC or prodrome. He does not report any fever, cough, chest pain, sputum production, SOB or palpitations.

## 2023-11-13 NOTE — PHYSICAL THERAPY INITIAL EVALUATION ADULT - IMPAIRMENTS CONTRIBUTING TO GAIT DEVIATIONS, PT EVAL
impaired balance/decreased strength Closure 3 Information: This tab is for additional flaps and grafts above and beyond our usual structured repairs.  Please note if you enter information here it will not currently bill and you will need to add the billing information manually.

## 2023-11-13 NOTE — CONSULT NOTE ADULT - SUBJECTIVE AND OBJECTIVE BOX
CARDIOLOGY CONSULT - Dr. Zeng         HPI:  68 year old M with PMH of chronic pain, recent fall (10/13) with posterior C1-C7 decompression/fusion for spine fracture, TBI (recently discharged from [facility] [place] 11/3), COPD, Vertebral Artery Dissection, PAT, Recent Fall from Ladder with multiple fractures, s/p hemorrhoidectomy 1 week ago, fell at  Rehab [facility] for NS evaluationFell last night at  rehab, hit neck and head. Was not wearing collar at the time. CT C spine c/t 10/15 shows: Rt C1 screw haloing at tip and superior migration. Rods in place. RUCHI incr from 3.9 to 7.9mm. According to the patient he was taken to the bathroom by the aides at rehab. After he was was cleaned up, he got up to walk and was not wearing his socks and did not have assistance, he fell and hit his head. He denies any LOC or prodrome. He does not report any fever, cough, chest pain, sputum production, SOB or palpitations. (12 Nov 2023 14:12)      PAST MEDICAL & SURGICAL HISTORY:  High cholesterol      Insomnia      History of chronic back pain      Chronic obstructive pulmonary disease, unspecified COPD type      History of fall from ladder      Rectal prolapse      History of arthroscopy of left shoulder  age 15 & right shoulder 3 yrs      H/O hernia repair  b/l inguinal & abdominal      History of arthroplasty of right knee  5yrs      History of fusion of cervical spine        PREVIOUS DIAGNOSTIC TESTING:    [x] Echocardiogram:  < from: Transthoracic Echocardiogram (02.17.23 @ 08:08) >  ------------------------------------------------------------------------  Conclusions:  1. Normal mitral valve. Mild mitral regurgitation.  2. Mildly calcified trileaflet aortic valve with normal  opening. No aortic valve regurgitation seen.  3. Mild aortic root and ascending aorta dilatation  (Ao:  4.3 cm at the sinuses of Valsalva). Ascending aorta  diameter: 4 cm.  4. Normal left ventricular systolic function. No segmental  wall motion abnormalities.  5. Normal right ventricular size and function.  *** No previous Echo exam.    < end of copied text >    [ ]  Catheterization:  [ ] Stress Test:  	    MEDICATIONS:  Home Medications:  aspirin 81 mg oral delayed release tablet: 1 tab(s) orally once a day (12 Nov 2023 12:37)  diazePAM 5 mg oral tablet: 1 tab(s) orally every 6 hours As needed muscle spasm/pain (12 Nov 2023 12:37)  Lovenox 40 mg/0.4 mL injectable solution: 40 milligram(s) subcutaneously once a day (12 Nov 2023 12:40)  Metamucil 3.4 g/5.2 g oral powder for reconstitution: 3.4 gram(s) orally 3 times a day (12 Nov 2023 12:38)  methocarbamol 750 mg oral tablet: 1 tab(s) orally 3 times a day (12 Nov 2023 12:42)  metoprolol tartrate 25 mg oral tablet: 1 tab(s) orally 2 times a day (13 Nov 2023 06:20)  ocular lubricant ophthalmic solution: 1 drop(s) to each affected eye 4 times a day (12 Nov 2023 12:42)  senna (sennosides) 8.6 mg oral tablet: 2 tab(s) orally once a day (at bedtime) (13 Nov 2023 06:20)  sodium chloride 1 g oral tablet: 2 tab(s) orally every 8 hours (12 Nov 2023 12:42)  tamsulosin 0.4 mg oral capsule: 1 cap(s) orally 2 times a day (12 Nov 2023 12:42)      MEDICATIONS  (STANDING):  artificial  tears Solution 1 Drop(s) Both EYES four times a day  aspirin enteric coated 81 milliGRAM(s) Oral daily  azithromycin  IVPB 500 milliGRAM(s) IV Intermittent every 24 hours  cefTRIAXone   IVPB 1000 milliGRAM(s) IV Intermittent every 24 hours  diltiazem    milliGRAM(s) Oral daily  enoxaparin Injectable 40 milliGRAM(s) SubCutaneous every 24 hours  HYDROmorphone   Tablet 4 milliGRAM(s) Oral once  metoprolol tartrate 25 milliGRAM(s) Oral two times a day  psyllium Powder 1 Packet(s) Oral daily  senna 2 Tablet(s) Oral at bedtime  sodium chloride 2 Gram(s) Oral every 8 hours  tamsulosin 0.8 milliGRAM(s) Oral at bedtime      FAMILY HISTORY:  Family history of early CAD (Sibling)        SOCIAL HISTORY:    [x] Non-smoker  [ ] Smoker  [ ] Alcohol    Allergies    Vioxx (Unknown)  Originally Entered as [Unknown] reaction to [PUMPKIN SEEDS] (Unknown)  Nuts (Unknown)    Intolerances    	    REVIEW OF SYSTEMS:  CONSTITUTIONAL: No fever, weight loss, or fatigue  EYES: No eye pain, visual disturbances, or discharge  ENMT:  No difficulty hearing, tinnitus, vertigo; No sinus or throat pain  NECK: No pain or stiffness  RESPIRATORY: No cough, wheezing, chills or hemoptysis; No Shortness of Breath  CARDIOVASCULAR: No chest pain, palpitations, passing out, dizziness, or leg swelling  GASTROINTESTINAL: No abdominal or epigastric pain. No nausea, vomiting, or hematemesis; No diarrhea or constipation. No melena or hematochezia.  GENITOURINARY: No dysuria, frequency, hematuria, or incontinence  NEUROLOGICAL: No headaches, memory loss, loss of strength, numbness, or tremors  SKIN: No itching, burning, rashes, or lesions   MSK: +neck pain  	    [x] All others negative	  [ ] Unable to obtain    PHYSICAL EXAM:  T(C): 36.5 (11-13-23 @ 08:56), Max: 36.8 (11-12-23 @ 14:45)  HR: 70 (11-13-23 @ 08:56) (70 - 96)  BP: 104/70 (11-13-23 @ 08:56) (104/70 - 126/82)  RR: 16 (11-13-23 @ 08:56) (16 - 16)  SpO2: 93% (11-13-23 @ 08:56) (93% - 96%)  Wt(kg): --  I&O's Summary    12 Nov 2023 07:01  -  13 Nov 2023 07:00  --------------------------------------------------------  IN: 200 mL / OUT: 1250 mL / NET: -1050 mL    13 Nov 2023 07:01  -  13 Nov 2023 12:44  --------------------------------------------------------  IN: 240 mL / OUT: 600 mL / NET: -360 mL        Appearance: Normal, +neck brace	  Psychiatry: A & O x 3, Mood & affect appropriate  HEENT:   Normal oral mucosa, PERRL, EOMI	  Lymphatic: No lymphadenopathy  Cardiovascular: Normal S1 S2,RRR, No JVD, No murmurs  Respiratory: Lungs clear to auscultation	  Gastrointestinal:  Soft, Non-tender, + BS	  Skin: No rashes, No ecchymoses, No cyanosis	  Neurologic: Non-focal  Extremities: Normal range of motion, No clubbing, cyanosis or edema  Vascular: Peripheral pulses palpable 2+ bilaterally    TELEMETRY: 	    ECG:  Sinus tach 104  RADIOLOGY:  < from: MR Cervical Spine No Cont (10.17.23 @ 18:56) >    IMPRESSION:    1. Posterior stabilization C1-C7 and C2-C6 laminectomy with routine   postoperative appearance    2. C6 T1 and T2 superior endplate fractures, unchanged 10/13/2023    3. Prevertebral fluid, likely posttraumatic, somewhat improved 10/13/2023    4. C1 fractures, better demonstrated by the CT technique, unchanged   10/13/2023    --- End of Report ---    < end of copied text >    OTHER: 	  	  LABS:	 	    CARDIAC MARKERS:                                  11.6   9.17  )-----------( 360      ( 13 Nov 2023 07:07 )             36.3     11-13    139  |  101  |  16  ----------------------------<  104<H>  4.2   |  30  |  0.86    Ca    9.4      13 Nov 2023 07:13    TPro  6.1  /  Alb  3.1<L>  /  TBili  0.3  /  DBili  x   /  AST  13  /  ALT  22  /  AlkPhos  117  11-12    PT/INR - ( 12 Nov 2023 10:08 )   PT: 12.3 sec;   INR: 1.18 ratio         PTT - ( 12 Nov 2023 10:08 )  PTT:28.7 sec  proBNP:   Lipid Profile:   HgA1c:   TSH:

## 2023-11-13 NOTE — OCCUPATIONAL THERAPY INITIAL EVALUATION ADULT - PERTINENT HX OF CURRENT PROBLEM, REHAB EVAL
68 year old M with PMH of chronic pain, recent fall (10/13) with posterior C1-C7 decompression/fusion for spine fracture, TBI (recently discharged from [facility] [place] 11/3), COPD, Vertebral Artery Dissection, PAT, Recent Fall from Ladder with multiple fractures, s/p hemorrhoidectomy 1 week ago, fell at  Rehab transferred [facility] for NSG evaluationFell last night at  rehab, hit neck and head. Was not wearing collar at the time. CT C spine c/t 10/15 shows: Rt C1 screw haloing at tip and superior migration. Rods in place. RUCHI incr from 3.9 to 7.9mm. According to the patient he was taken to the bathroom by the aides at rehab. After he was was cleaned up, he got up to walk and was not wearing his socks and did not have assistance, he fell and hit his head. He denies any LOC or prodrome. He does not report any fever, cough, chest pain, sputum production, SOB or palpitations.

## 2023-11-13 NOTE — CONSULT NOTE ADULT - TIME BILLING
Patient seen and examined, agree with the above assessment and plan by MAGUI Garsia  68 year old M with PMH of chronic pain, recent fall (10/13) with posterior C1-C7 decompression/fusion for spine fracture, TBI (recently discharged [facility] [place] 11/3), COPD, Vertebral Artery Dissection, PAT, Recent Fall from Ladder with multiple fractures, s/p hemorrhoidectomy 1 week ago, fell at  Rehab transferred to [facility] for NSG evaluationFell last night at  rehab, hit neck and head. Was not wearing collar at the time.      #sp fall   -Mechanical as per patient report   -s/p recent posterior C1-C6 decompression fusion, evacuation of epidural hematoma  -ecg w no ischemic changes   -no chf on exam  -MRI noted   -Rt C1 screw haloing at tip and superior migration  -Per neurosx no surgical intervention    # hx  Aortic Root Dilatation  -stable on recent outpt echo from 9/5/23:  The ascending aorta is mildly dilated.  Aortic Root diameter is (2D-mode) 4.02 cm. Ascending Aortic Diameter: 3.98 cm.  -echo q 12 months    # Atrial arrhythmia  -Ecg no changes  -Continue lopressor 25mg BID for now - **of note pt has complain of INCREASE FATIGUE with higher BB dosing-- was placed on toprol 25 mg PO daily as outpt**  -Continue cardizem 120mg cd  -Defer ep eval as pt not likely a candidate for ablation at this time        dvt ppx

## 2023-11-13 NOTE — OCCUPATIONAL THERAPY INITIAL EVALUATION ADULT - ADDITIONAL COMMENTS
Pt presents from  rehab  Pt lives in PH +1 IKE and 1 flight to bedroom. Pt reports independence with all aspects of self care and functional mobility without AD PTA.

## 2023-11-14 PROCEDURE — 99233 SBSQ HOSP IP/OBS HIGH 50: CPT

## 2023-11-14 PROCEDURE — 99222 1ST HOSP IP/OBS MODERATE 55: CPT | Mod: GC

## 2023-11-14 RX ADMIN — Medication 120 MILLIGRAM(S): at 05:31

## 2023-11-14 RX ADMIN — HYDROMORPHONE HYDROCHLORIDE 6 MILLIGRAM(S): 2 INJECTION INTRAMUSCULAR; INTRAVENOUS; SUBCUTANEOUS at 22:07

## 2023-11-14 RX ADMIN — Medication 5 MILLIGRAM(S): at 16:31

## 2023-11-14 RX ADMIN — TAMSULOSIN HYDROCHLORIDE 0.8 MILLIGRAM(S): 0.4 CAPSULE ORAL at 21:17

## 2023-11-14 RX ADMIN — HYDROMORPHONE HYDROCHLORIDE 6 MILLIGRAM(S): 2 INJECTION INTRAMUSCULAR; INTRAVENOUS; SUBCUTANEOUS at 21:20

## 2023-11-14 RX ADMIN — METHOCARBAMOL 500 MILLIGRAM(S): 500 TABLET, FILM COATED ORAL at 21:28

## 2023-11-14 RX ADMIN — ENOXAPARIN SODIUM 40 MILLIGRAM(S): 100 INJECTION SUBCUTANEOUS at 21:17

## 2023-11-14 RX ADMIN — Medication 25 MILLIGRAM(S): at 17:29

## 2023-11-14 RX ADMIN — Medication 25 MILLIGRAM(S): at 05:31

## 2023-11-14 RX ADMIN — Medication 1 DROP(S): at 05:30

## 2023-11-14 RX ADMIN — Medication 81 MILLIGRAM(S): at 11:20

## 2023-11-14 NOTE — PROGRESS NOTE ADULT - PROBLEM SELECTOR PLAN 8
DVT ppx: Lovenox  Artificial tears, maalox, tylenol PRN  senna, psyllium    PT: acute rehab
DVT ppx: Lovenox  Artificial tears, maalox, tylenol PRN  senna, psyllium    PT: acute rehab vs LJ, stable for DC

## 2023-11-14 NOTE — CONSULT NOTE ADULT - ASSESSMENT
68M no AC/AP Hx 10ft fall off ladder, s/p 10/14/23 C3-6 posterior decompression and fusion incl C5 root, evac of epidural hematoma w/ Dr. Dawkins. Fell last night at  rehab, hit neck and head. Was not wearing collar at the time. CT C spine c/t 10/15 shows: Rt C1 screw haloing at tip and superior migration. Rods in place. RUCHI incr from 3.9 to 7.9mm. PLTs/coags wnl. Exam: stable vs mild improved from d/c 10/20: AOx3, RUE Delt 1/5, bicep 3-/5, tricep 5/5, hg 5/5. LUE delt 2/5, o/w 5/5, ble 5/5, SILT. No hoffmans, no clonus. C-collar in place since fall, well fitted. Moderate neck pain.    -OK to eat/drink  -no acute nsgy intervention  -xfer rehab vs adm med for placement/PT/Pain ctrl  -pain ctrl per primary  -CT c spine outpt in 4-6 weeks  -F/u with Dr. Dawkins in clinic shortly thereafter 
Impression:   67 yo M with functional deficits secondary to diagnosis of traumatic spinal fracture and hematoma after a fall off a ladder s/p C1-7 fusion on 10/13 and s/p acute inpatient rehabilitation at [facility]. Returns to [facility] after a ground level fall at [facility].     Plan:  Rehabilitation team with recommendation for Subacute Rehab- Patient can actively participate and benefit from a return to Subacute rehabilitation 1-2 hours a day to complete rehab prior to long term goal of returning home.    PT- ROM, Bed Mob, Transfers, Amb w AD and bracing as needed  OT- ADLs, bracing  Prec- Falls, Cardiac, Aspiration  DVT Prophylaxis - Lovenox  Skin- Turn q2 h  Dispo- return to [facility]
  A/p  68 year old M with PMH of chronic pain, recent fall (10/13) with posterior C1-C7 decompression/fusion for spine fracture, TBI (recently discharged from [facility] [place] 11/3), COPD, Vertebral Artery Dissection, PAT, Recent Fall from Ladder with multiple fractures, s/p hemorrhoidectomy 1 week ago, fell at  Rehab [facility] for NSG evaluationFell last night at  rehab, hit neck and head. Was not wearing collar at the time.      #sp fall   -Mechanical as per patient report   -s/p recent posterior C1-C6 decompression fusion, evacuation of epidural hematoma  -ecg w no ischemic changes   -no chf on exam  -MRI noted   -Rt C1 screw haloing at tip and superior migration  -Per neurosx no surgical intervention    # hx  Aortic Root Dilatation  -stable on recent outpt echo from 9/5/23:  The ascending aorta is mildly dilated.  Aortic Root diameter is (2D-mode) 4.02 cm. Ascending Aortic Diameter: 3.98 cm.  -echo q 12 months    # Atrial arrhythmia  -Ecg no changes  -Continue lopressor 25mg BID for now - **of note pt has complain of INCREASE FATIGUE with higher BB dosing-- was placed on toprol 25 mg PO daily as outpt**  -Continue cardizem 120mg cd  -Defer ep eval as pt not likely a candidate for ablation at this time        dvt ppx

## 2023-11-14 NOTE — PROGRESS NOTE ADULT - PROBLEM SELECTOR PLAN 2
Incidental findings of consolidation on CT with new 6mm pulmonary nodule after discussion with wife about pt's clinical status will treat with Ceftriaxone and Azithromycin for 5 days  - Procalcitonin negative, will DC Abx   - Incentive spirometry
Incidental findings of consolidation on CT with new 6mm pulmonary nodule after discussion with wife about pt's clinical status will treat with Ceftriaxone and Azithromycin for 5 days  - Procalcitonin negative, will DC Abx   - Incentive spirometry

## 2023-11-14 NOTE — PROGRESS NOTE ADULT - PROBLEM SELECTOR PLAN 6
continue tamsulosin  Matamoros placed at , will DC to monitor TOV   CT findings reviewed - renal calculus noted and enlarged prostate  can start finasteride if TOV fails
continue tamsulosin  Matamoros placed at , will DC to monitor TOV   CT findings reviewed - renal calculus noted and enlarged prostate  can start finasteride if TOV fails

## 2023-11-14 NOTE — CONSULT NOTE ADULT - ATTENDING COMMENTS
68 year old man with functional deficits after fall off ladder  s/p C1-C7 decompression/fusion, was discharged from Acute rehab 11/3 to [facility]   admitted to [facility] 11/5 through 11/11 for abdominal pain, hemorrhoids  fell at [facility] 11/11, was not wearing collar, no acute neurosurgery intervention recommended   patient with upper extremity weakness, would benefit from continued inpatient rehabilitation, [facility]

## 2023-11-14 NOTE — CONSULT NOTE ADULT - SUBJECTIVE AND OBJECTIVE BOX
Patient is a 68y old  Male who presents with a chief complaint of fall (14 Nov 2023 12:56)      HPI:  68 year old M with PMH of chronic pain, recent fall (10/13) with posterior C1-C7 decompression/fusion for spine fracture, TBI (recently discharged from [facility] [place] 11/3), COPD, Vertebral Artery Dissection, PAT, Recent Fall from Ladder with multiple fractures, s/p hemorrhoidectomy 1 week ago, fell at [facility] transferred to [facility] for NSG evaluation. Fell hit neck and head. Was not wearing collar at the time. CT C spine c/t 10/15 shows: Rt C1 screw haloing at tip and superior migration. Rods in place. RUCHI incr from 3.9 to 7.9mm. According to the patient he was taken to the bathroom by the aides at rehab. After he was was cleaned up, he got up to walk and was not wearing his socks and did not have assistance, he fell and hit his head. He denies any LOC or prodrome. He does not report any fever, cough, chest pain, sputum production, SOB or palpitations. (12 Nov 2023 14:12)    INTERVAL HISTORY  CT without acute hemorrhage intracranially. Patient's Ct demonstrating superior migration of C1 screw. NSGY consulted and patient requires no acute surgical interventions after fall at [facility]. Repeat C spine CT outpatient. Patient receiving CT and azithromycin for CAP. Patient confirms he was previously at [place] for acute inpatient rehabilitation s/p his C spine fusion after his fall off a ladder. He was discharged to [facility] at [facility] after an appeal for longer inpatient rehab which was denied. Patient reports no pain when looking forward with neck in his C collar. Patient seen in bed reading a book. He reports his biggest complaint his BUE weakness and as a consequence states he would like to return to inpatient rehabilitation. Discussed that without new diagnosis patient likely would not be approved for inpatient rehabilitation.       REVIEW OF SYSTEMS: No chest pain, shortness of breath, nausea, vomiting or diarhea; other ROS neg     PAST MEDICAL & SURGICAL HISTORY  High cholesterol    Hypertension    Insomnia    History of chronic back pain    Chronic obstructive pulmonary disease, unspecified COPD type    History of fall from ladder    Rectal prolapse    History of arthroscopy of left shoulder    H/O hernia repair    History of arthroplasty of right knee    History of fusion of cervical spine        FUNCTIONAL HISTORY:   Lives with family in a house with 1 IKE  Independent prior to C1-C7 fusion on 10/13    CURRENT FUNCTIONAL STATUS:  Bed Mobility CG  Transfers Guy  Gait Guy 40ft w/ RW and C collar    FAMILY HISTORY   No pertinent family history in first degree relatives    No pertinent family history in first degree relatives    Family history of early CAD (Sibling)        MEDICATIONS   aluminum hydroxide/magnesium hydroxide/simethicone Suspension 30 milliLiter(s) Oral every 4 hours PRN  artificial  tears Solution 1 Drop(s) Both EYES four times a day  aspirin enteric coated 81 milliGRAM(s) Oral daily  diazepam    Tablet 5 milliGRAM(s) Oral every 8 hours PRN  diltiazem    milliGRAM(s) Oral daily  enoxaparin Injectable 40 milliGRAM(s) SubCutaneous every 24 hours  HYDROmorphone   Tablet 4 milliGRAM(s) Oral every 4 hours PRN  HYDROmorphone   Tablet 6 milliGRAM(s) Oral every 6 hours PRN  melatonin 3 milliGRAM(s) Oral at bedtime PRN  methocarbamol 500 milliGRAM(s) Oral every 8 hours PRN  metoprolol tartrate 25 milliGRAM(s) Oral two times a day  ondansetron Injectable 4 milliGRAM(s) IV Push every 8 hours PRN  polyethylene glycol 3350 17 Gram(s) Oral daily PRN  psyllium Powder 1 Packet(s) Oral daily  senna 2 Tablet(s) Oral at bedtime  sodium chloride 2 Gram(s) Oral every 8 hours  tamsulosin 0.8 milliGRAM(s) Oral at bedtime      ALLERGIES  Vioxx (Unknown)  Originally Entered as [Unknown] reaction to [PUMPKIN SEEDS] (Unknown)  Nuts (Unknown)      VITALS  T(C): 36.4 (11-14-23 @ 05:09), Max: 36.9 (11-13-23 @ 21:08)  HR: 77 (11-14-23 @ 05:09) (70 - 81)  BP: 124/76 (11-14-23 @ 05:09) (114/69 - 138/89)  RR: 18 (11-14-23 @ 05:09) (18 - 18)  SpO2: 92% (11-14-23 @ 05:09) (92% - 94%)  Wt(kg): --    PHYSICAL EXAM  Constitutional - NAD, Comfortable  HEENT - NCAT, EOMI  Neck - Supple  Chest - No distress, no use of accessory muscles for respiration  Cardiovascular -Well perfused  Abdomen - BS+, Soft, NTND  Extremities - No C/C/E, No calf tenderness   Neurologic Exam -                    Cognitive - Awake, Alert, AAO to self, place, date, year, situation     Communication - Fluent, No dysarthria, no aphasia     Cranial Nerves - CN 2-12 intact     Motor - BUE 2/5 ShAb, 3/5 EF; rest 5/5 strength      Sensory - Intact to LT     Reflexes - DTR Intact, No primitive reflexive  Psychiatric - Mood stable, Affect WNL    RECENT LABS/IMAGING  CBC Full  -  ( 13 Nov 2023 07:07 )  WBC Count : 9.17 K/uL  RBC Count : 3.92 M/uL  Hemoglobin : 11.6 g/dL  Hematocrit : 36.3 %  Platelet Count - Automated : 360 K/uL  Mean Cell Volume : 92.6 fl  Mean Cell Hemoglobin : 29.6 pg  Mean Cell Hemoglobin Concentration : 32.0 gm/dL  Auto Neutrophil # : 6.80 K/uL  Auto Lymphocyte # : 1.16 K/uL  Auto Monocyte # : 0.72 K/uL  Auto Eosinophil # : 0.37 K/uL  Auto Basophil # : 0.07 K/uL  Auto Neutrophil % : 74.2 %  Auto Lymphocyte % : 12.6 %  Auto Monocyte % : 7.9 %  Auto Eosinophil % : 4.0 %  Auto Basophil % : 0.8 %    11-13    139  |  101  |  16  ----------------------------<  104<H>  4.2   |  30  |  0.86    Ca    9.4      13 Nov 2023 07:13      Urinalysis Basic - ( 13 Nov 2023 07:13 )    Color: x / Appearance: x / SG: x / pH: x  Gluc: 104 mg/dL / Ketone: x  / Bili: x / Urobili: x   Blood: x / Protein: x / Nitrite: x   Leuk Esterase: x / RBC: x / WBC x   Sq Epi: x / Non Sq Epi: x / Bacteria: x      < from: CT Abdomen and Pelvis No Cont (11.12.23 @ 03:28) >  IMPRESSION:  1. No acute intrathoracic, intra-abdominal or intrapelvic trauma.  2. Right lower lobe airspace consolidation with air bronchograms which   could be due to atelectasis versus a pneumonia in the correct clinical   setting.    < end of copied text >  < from: CT Head No Cont (11.12.23 @ 03:27) >  IMPRESSION:  CT Head:  No CT evidence of acute intracranial pathology.    CT Cervical Spine:  1.  Redemonstration of C1 burst fracture with mild interval callus   formation since prior CT scans on 10/13/2023 and 10/15/2023.  2. The patient is status post partial laminectomy at C2 and complete   laminectomies at C3-C6.  The patient is status post C1-C7 posterior   spinal fusion with maura screw construct.  3.  Compared to the prior postoperative CT of 10/15/2023, the tip of the   left C1 lateral mass screw, which was previously in the center of the   left lateral mass of C1, appears to have migrated superiorly and is now   at the articular surface of the left C1 superior facet.  There is trace   lucency around the tip of the left C1 lateral mass screw.  4.  Compared to the prior CT of 10/15/2023, there has been posterior   superior migration of the dens, which now abuts the basion; this may   slightly impinge on the ventral aspect of the cervicomedullary junction.  5. Compared to the prior CT of 10/15/2023, there has been interval   increase in the anterior atlantodental axial interval which now measures   up to 5 mm at its superior aspect, increased from 3 mm on 10/15/2023.  6. Atlantoaxial instability is not excluded.  Flexion and extension   imaging may be obtained as clinically warranted.  Follow-up cervical   spine MRI may be obtained as clinically warranted.    < end of copied text >     Patient is a 68y old  Male who presents with a chief complaint of fall (14 Nov 2023 12:56)      HPI:  68 year old M with PMH of chronic pain, recent fall (10/13) with posterior C1-C7 decompression/fusion for spine fracture, TBI (recently discharged from [facility] [place] 11/3), COPD, Vertebral Artery Dissection, PAT, Recent Fall from Ladder with multiple fractures, s/p hemorrhoidectomy 1 week ago, fell at [facility] transferred to [facility] for NSG evaluation. Fell hit neck and head. Was not wearing collar at the time. CT C spine c/t 10/15 shows: Rt C1 screw haloing at tip and superior migration. Rods in place. RUCHI incr from 3.9 to 7.9mm. According to the patient he was taken to the bathroom by the aides at rehab. After he was was cleaned up, he got up to walk and was not wearing his socks and did not have assistance, he fell and hit his head. He denies any LOC or prodrome. He does not report any fever, cough, chest pain, sputum production, SOB or palpitations. (12 Nov 2023 14:12)    INTERVAL HISTORY  CT without acute hemorrhage intracranially. Patient's Ct demonstrating superior migration of C1 screw. NSGY consulted and patient requires no acute surgical interventions after fall at [facility]. Repeat C spine CT outpatient. Patient receiving CT and azithromycin for CAP. Patient confirms he was previously at [place] for acute inpatient rehabilitation s/p his C spine fusion after his fall off a ladder. He was discharged to [facility] after an appeal for longer inpatient rehab which was denied. Patient reports no pain when looking forward with neck in his C collar. Patient seen in bed reading a book. He reports his biggest complaint his BUE weakness and as a consequence states he would like to return to inpatient rehabilitation. Discussed that without new diagnosis patient likely would not be approved for inpatient rehabilitation.       REVIEW OF SYSTEMS: No chest pain, shortness of breath, nausea, vomiting or diarhea; other ROS neg     PAST MEDICAL & SURGICAL HISTORY  High cholesterol    Hypertension    Insomnia    History of chronic back pain    Chronic obstructive pulmonary disease, unspecified COPD type    History of fall from ladder    Rectal prolapse    History of arthroscopy of left shoulder    H/O hernia repair    History of arthroplasty of right knee    History of fusion of cervical spine        FUNCTIONAL HISTORY:   Lives with family in a house with 1 IKE  Independent prior to C1-C7 fusion on 10/13    CURRENT FUNCTIONAL STATUS:  11/14 PT  bed mobility CG/min assist  transfers CG with RW  Gait Guy 40ft w/ RW and C collar    11/13 OT  bed mobility CG   transfers min assist with RW      FAMILY HISTORY   Family history of early CAD (Sibling)        MEDICATIONS   aluminum hydroxide/magnesium hydroxide/simethicone Suspension 30 milliLiter(s) Oral every 4 hours PRN  artificial  tears Solution 1 Drop(s) Both EYES four times a day  aspirin enteric coated 81 milliGRAM(s) Oral daily  diazepam    Tablet 5 milliGRAM(s) Oral every 8 hours PRN  diltiazem    milliGRAM(s) Oral daily  enoxaparin Injectable 40 milliGRAM(s) SubCutaneous every 24 hours  HYDROmorphone   Tablet 4 milliGRAM(s) Oral every 4 hours PRN  HYDROmorphone   Tablet 6 milliGRAM(s) Oral every 6 hours PRN  melatonin 3 milliGRAM(s) Oral at bedtime PRN  methocarbamol 500 milliGRAM(s) Oral every 8 hours PRN  metoprolol tartrate 25 milliGRAM(s) Oral two times a day  ondansetron Injectable 4 milliGRAM(s) IV Push every 8 hours PRN  polyethylene glycol 3350 17 Gram(s) Oral daily PRN  psyllium Powder 1 Packet(s) Oral daily  senna 2 Tablet(s) Oral at bedtime  sodium chloride 2 Gram(s) Oral every 8 hours  tamsulosin 0.8 milliGRAM(s) Oral at bedtime      ALLERGIES  Vioxx (Unknown)  Originally Entered as [Unknown] reaction to [PUMPKIN SEEDS] (Unknown)  Nuts (Unknown)      VITALS  T(C): 36.4 (11-14-23 @ 05:09), Max: 36.9 (11-13-23 @ 21:08)  HR: 77 (11-14-23 @ 05:09) (70 - 81)  BP: 124/76 (11-14-23 @ 05:09) (114/69 - 138/89)  RR: 18 (11-14-23 @ 05:09) (18 - 18)  SpO2: 92% (11-14-23 @ 05:09) (92% - 94%)  Wt(kg): --    PHYSICAL EXAM  Constitutional - NAD, Comfortable  HEENT - NCAT, EOMI  Neck - Supple  Chest - No distress, no use of accessory muscles for respiration  Cardiovascular -Well perfused  Abdomen - BS+, Soft, NTND  Extremities - No C/C/E, No calf tenderness   Neurologic Exam -                    Cognitive - Awake, Alert, AAO to self, place, date, year, situation     Communication - Fluent, No dysarthria, no aphasia     Cranial Nerves - CN 2-12 intact     Motor - BUE 2/5 ShAb, 3/5 EF; rest 5/5 strength      Sensory - Intact to LT     Psychiatric - Mood stable, Affect WNL    RECENT LABS/IMAGING  CBC Full  -  ( 13 Nov 2023 07:07 )  WBC Count : 9.17 K/uL  RBC Count : 3.92 M/uL  Hemoglobin : 11.6 g/dL  Hematocrit : 36.3 %  Platelet Count - Automated : 360 K/uL  Mean Cell Volume : 92.6 fl  Mean Cell Hemoglobin : 29.6 pg  Mean Cell Hemoglobin Concentration : 32.0 gm/dL  Auto Neutrophil # : 6.80 K/uL  Auto Lymphocyte # : 1.16 K/uL  Auto Monocyte # : 0.72 K/uL  Auto Eosinophil # : 0.37 K/uL  Auto Basophil # : 0.07 K/uL  Auto Neutrophil % : 74.2 %  Auto Lymphocyte % : 12.6 %  Auto Monocyte % : 7.9 %  Auto Eosinophil % : 4.0 %  Auto Basophil % : 0.8 %    11-13    139  |  101  |  16  ----------------------------<  104<H>  4.2   |  30  |  0.86    Ca    9.4      13 Nov 2023 07:13      Urinalysis Basic - ( 13 Nov 2023 07:13 )    Color: x / Appearance: x / SG: x / pH: x  Gluc: 104 mg/dL / Ketone: x  / Bili: x / Urobili: x   Blood: x / Protein: x / Nitrite: x   Leuk Esterase: x / RBC: x / WBC x   Sq Epi: x / Non Sq Epi: x / Bacteria: x      < from: CT Abdomen and Pelvis No Cont (11.12.23 @ 03:28) >  IMPRESSION:  1. No acute intrathoracic, intra-abdominal or intrapelvic trauma.  2. Right lower lobe airspace consolidation with air bronchograms which   could be due to atelectasis versus a pneumonia in the correct clinical   setting.    < end of copied text >  < from: CT Head No Cont (11.12.23 @ 03:27) >  IMPRESSION:  CT Head:  No CT evidence of acute intracranial pathology.    CT Cervical Spine:  1.  Redemonstration of C1 burst fracture with mild interval callus   formation since prior CT scans on 10/13/2023 and 10/15/2023.  2. The patient is status post partial laminectomy at C2 and complete   laminectomies at C3-C6.  The patient is status post C1-C7 posterior   spinal fusion with maura screw construct.  3.  Compared to the prior postoperative CT of 10/15/2023, the tip of the   left C1 lateral mass screw, which was previously in the center of the   left lateral mass of C1, appears to have migrated superiorly and is now   at the articular surface of the left C1 superior facet.  There is trace   lucency around the tip of the left C1 lateral mass screw.  4.  Compared to the prior CT of 10/15/2023, there has been posterior   superior migration of the dens, which now abuts the basion; this may   slightly impinge on the ventral aspect of the cervicomedullary junction.  5. Compared to the prior CT of 10/15/2023, there has been interval   increase in the anterior atlantodental axial interval which now measures   up to 5 mm at its superior aspect, increased from 3 mm on 10/15/2023.  6. Atlantoaxial instability is not excluded.  Flexion and extension   imaging may be obtained as clinically warranted.  Follow-up cervical   spine MRI may be obtained as clinically warranted.    < end of copied text >

## 2023-11-14 NOTE — PROGRESS NOTE ADULT - PROBLEM SELECTOR PLAN 1
sustained TBI, unstable C1-C7 Decompression/Fusion evaluation of epidural hematoma with cord compression  increase in atlantoaxial interval, NSG consulted, no acute intervention, rehab, pain control, c collar.   Repeat CT C spine as an outpt    PT eval  - Resumed prior pain management. Recommended incentive spirometry  - Narcan   - Bowel regimen
sustained TBI, unstable C1-C7 Decompression/Fusion evaluation of epidural hematoma with cord compression  increase in atlantoaxial interval, NSG consulted, no acute intervention, rehab, pain control, c collar.   Repeat CT C spine as an outpt    PT eval  - Resumed prior pain management. Recommended incentive spirometry  - Narcan   - Bowel regimen

## 2023-11-14 NOTE — PROGRESS NOTE ADULT - PROBLEM SELECTOR PLAN 3
pt followed with [facility] 5 years ago for sclerotic lesion on the spine and was found to be stable  Aware of Adrenal adenoma and hepatic cysts
pt followed with [facility] 5 years ago for sclerotic lesion on the spine and was found to be stable  Aware of Adrenal adenoma and hepatic cysts

## 2023-11-14 NOTE — PROGRESS NOTE ADULT - PROBLEM SELECTOR PLAN 7
NOT in exacerbation  incentive spirometry  not on any inhalers, will monitor
NOT in exacerbation  incentive spirometry  not on any inhalers, will monitor

## 2023-11-14 NOTE — PROGRESS NOTE ADULT - PROBLEM SELECTOR PLAN 4
Vertebral a. dissection    continue asa 81mg po daily  Cardizem 120mg Po daily
Vertebral a. dissection    continue asa 81mg po daily  Cardizem 120mg Po daily

## 2023-11-15 ENCOUNTER — TRANSCRIPTION ENCOUNTER (OUTPATIENT)
Age: 68
End: 2023-11-15

## 2023-11-15 VITALS
DIASTOLIC BLOOD PRESSURE: 66 MMHG | HEART RATE: 90 BPM | OXYGEN SATURATION: 98 % | RESPIRATION RATE: 18 BRPM | SYSTOLIC BLOOD PRESSURE: 99 MMHG

## 2023-11-15 PROCEDURE — 80048 BASIC METABOLIC PNL TOTAL CA: CPT

## 2023-11-15 PROCEDURE — 97110 THERAPEUTIC EXERCISES: CPT

## 2023-11-15 PROCEDURE — 97162 PT EVAL MOD COMPLEX 30 MIN: CPT

## 2023-11-15 PROCEDURE — 85730 THROMBOPLASTIN TIME PARTIAL: CPT

## 2023-11-15 PROCEDURE — 99285 EMERGENCY DEPT VISIT HI MDM: CPT | Mod: 25

## 2023-11-15 PROCEDURE — 86900 BLOOD TYPING SEROLOGIC ABO: CPT

## 2023-11-15 PROCEDURE — 96374 THER/PROPH/DIAG INJ IV PUSH: CPT

## 2023-11-15 PROCEDURE — 97116 GAIT TRAINING THERAPY: CPT

## 2023-11-15 PROCEDURE — 84145 PROCALCITONIN (PCT): CPT

## 2023-11-15 PROCEDURE — 85610 PROTHROMBIN TIME: CPT

## 2023-11-15 PROCEDURE — 97165 OT EVAL LOW COMPLEX 30 MIN: CPT

## 2023-11-15 PROCEDURE — 85025 COMPLETE CBC W/AUTO DIFF WBC: CPT

## 2023-11-15 PROCEDURE — 36415 COLL VENOUS BLD VENIPUNCTURE: CPT

## 2023-11-15 PROCEDURE — 86901 BLOOD TYPING SEROLOGIC RH(D): CPT

## 2023-11-15 PROCEDURE — 80053 COMPREHEN METABOLIC PANEL: CPT

## 2023-11-15 PROCEDURE — 99239 HOSP IP/OBS DSCHRG MGMT >30: CPT

## 2023-11-15 PROCEDURE — 86850 RBC ANTIBODY SCREEN: CPT

## 2023-11-15 RX ORDER — HYDROMORPHONE HYDROCHLORIDE 2 MG/ML
1 INJECTION INTRAMUSCULAR; INTRAVENOUS; SUBCUTANEOUS
Qty: 0 | Refills: 0 | DISCHARGE
Start: 2023-11-15

## 2023-11-15 RX ORDER — ENOXAPARIN SODIUM 100 MG/ML
40 INJECTION SUBCUTANEOUS
Refills: 0 | DISCHARGE

## 2023-11-15 RX ORDER — TAMSULOSIN HYDROCHLORIDE 0.4 MG/1
2 CAPSULE ORAL
Qty: 0 | Refills: 0 | DISCHARGE
Start: 2023-11-15

## 2023-11-15 RX ORDER — SENNA PLUS 8.6 MG/1
2 TABLET ORAL
Refills: 0 | DISCHARGE

## 2023-11-15 RX ORDER — METOPROLOL TARTRATE 50 MG
1 TABLET ORAL
Qty: 0 | Refills: 0 | DISCHARGE
Start: 2023-11-15

## 2023-11-15 RX ORDER — POLYETHYLENE GLYCOL 3350 17 G/17G
17 POWDER, FOR SOLUTION ORAL
Qty: 0 | Refills: 0 | DISCHARGE
Start: 2023-11-15

## 2023-11-15 RX ORDER — LANOLIN ALCOHOL/MO/W.PET/CERES
1 CREAM (GRAM) TOPICAL
Qty: 0 | Refills: 0 | DISCHARGE
Start: 2023-11-15

## 2023-11-15 RX ORDER — METOPROLOL TARTRATE 50 MG
1 TABLET ORAL
Refills: 0 | DISCHARGE

## 2023-11-15 RX ORDER — HYDROMORPHONE HYDROCHLORIDE 2 MG/ML
3 INJECTION INTRAMUSCULAR; INTRAVENOUS; SUBCUTANEOUS
Qty: 0 | Refills: 0 | DISCHARGE
Start: 2023-11-15

## 2023-11-15 RX ORDER — SENNA PLUS 8.6 MG/1
2 TABLET ORAL
Qty: 0 | Refills: 0 | DISCHARGE
Start: 2023-11-15

## 2023-11-15 RX ORDER — METHOCARBAMOL 500 MG/1
1 TABLET, FILM COATED ORAL
Qty: 0 | Refills: 0 | DISCHARGE
Start: 2023-11-15

## 2023-11-15 RX ORDER — SODIUM CHLORIDE 9 MG/ML
2 INJECTION INTRAMUSCULAR; INTRAVENOUS; SUBCUTANEOUS
Qty: 0 | Refills: 0 | DISCHARGE
Start: 2023-11-15

## 2023-11-15 RX ORDER — DIAZEPAM 5 MG
1 TABLET ORAL
Qty: 0 | Refills: 0 | DISCHARGE
Start: 2023-11-15

## 2023-11-15 RX ORDER — DILTIAZEM HCL 120 MG
1 CAPSULE, EXT RELEASE 24 HR ORAL
Qty: 0 | Refills: 0 | DISCHARGE
Start: 2023-11-15

## 2023-11-15 RX ORDER — ASPIRIN/CALCIUM CARB/MAGNESIUM 324 MG
1 TABLET ORAL
Qty: 0 | Refills: 0 | DISCHARGE
Start: 2023-11-15

## 2023-11-15 RX ADMIN — METHOCARBAMOL 500 MILLIGRAM(S): 500 TABLET, FILM COATED ORAL at 13:02

## 2023-11-15 RX ADMIN — Medication 81 MILLIGRAM(S): at 11:23

## 2023-11-15 RX ADMIN — Medication 120 MILLIGRAM(S): at 05:20

## 2023-11-15 RX ADMIN — Medication 1 DROP(S): at 11:23

## 2023-11-15 RX ADMIN — Medication 25 MILLIGRAM(S): at 05:20

## 2023-11-15 RX ADMIN — Medication 5 MILLIGRAM(S): at 01:37

## 2023-11-15 RX ADMIN — Medication 1 PACKET(S): at 11:22

## 2023-11-15 NOTE — DISCHARGE NOTE PROVIDER - NSDCMRMEDTOKEN_GEN_ALL_CORE_FT
aspirin 81 mg oral delayed release tablet: 1 tab(s) orally once a day  diazePAM 5 mg oral tablet: 1 tab(s) orally every 6 hours As needed muscle spasm/pain  Lovenox 40 mg/0.4 mL injectable solution: 40 milligram(s) subcutaneously once a day  Metamucil 3.4 g/5.2 g oral powder for reconstitution: 3.4 gram(s) orally 3 times a day  methocarbamol 750 mg oral tablet: 1 tab(s) orally 3 times a day  metoprolol tartrate 25 mg oral tablet: 1 tab(s) orally 2 times a day  ocular lubricant ophthalmic solution: 1 drop(s) to each affected eye 4 times a day  senna (sennosides) 8.6 mg oral tablet: 2 tab(s) orally once a day (at bedtime)  sodium chloride 1 g oral tablet: 2 tab(s) orally every 8 hours  tamsulosin 0.4 mg oral capsule: 1 cap(s) orally 2 times a day   aluminum hydroxide-magnesium hydroxide 200 mg-200 mg/5 mL oral suspension: 30 milliliter(s) orally every 4 hours As needed Dyspepsia  aspirin 81 mg oral delayed release tablet: 1 tab(s) orally once a day  diazePAM 5 mg oral tablet: 1 tab(s) orally every 8 hours As needed muscle spasm/pain  dilTIAZem 120 mg/24 hours oral capsule, extended release: 1 cap(s) orally once a day  melatonin 3 mg oral tablet: 1 tab(s) orally once a day (at bedtime) As needed Insomnia  Metamucil 3.4 g/5.2 g oral powder for reconstitution: 3.4 gram(s) orally 3 times a day  methocarbamol 500 mg oral tablet: 1 tab(s) orally every 8 hours As needed Muscle Spasm  metoprolol tartrate 25 mg oral tablet: 1 tab(s) orally 2 times a day  ocular lubricant ophthalmic solution: 1 drop(s) to each affected eye 4 times a day  polyethylene glycol 3350 oral powder for reconstitution: 17 gram(s) orally once a day As needed Constipation  senna leaf extract oral tablet: 2 tab(s) orally once a day (at bedtime)  sodium chloride 1 g oral tablet: 2 tab(s) orally every 8 hours  tamsulosin 0.4 mg oral capsule: 2 cap(s) orally once a day (at bedtime)   aluminum hydroxide-magnesium hydroxide 200 mg-200 mg/5 mL oral suspension: 30 milliliter(s) orally every 4 hours As needed Dyspepsia  aspirin 81 mg oral delayed release tablet: 1 tab(s) orally once a day  diazePAM 5 mg oral tablet: 1 tab(s) orally every 8 hours As needed muscle spasm/pain  dilTIAZem 120 mg/24 hours oral capsule, extended release: 1 cap(s) orally once a day  HYDROmorphone 2 mg oral tablet: 3 tab(s) orally every 6 hours As needed Severe Pain (7 - 10)  HYDROmorphone 4 mg oral tablet: 1 tab(s) orally every 4 hours As needed Moderate Pain (4 - 6)  melatonin 3 mg oral tablet: 1 tab(s) orally once a day (at bedtime) As needed Insomnia  Metamucil 3.4 g/5.2 g oral powder for reconstitution: 3.4 gram(s) orally 3 times a day  methocarbamol 500 mg oral tablet: 1 tab(s) orally every 8 hours As needed Muscle Spasm  metoprolol tartrate 25 mg oral tablet: 1 tab(s) orally 2 times a day  ocular lubricant ophthalmic solution: 1 drop(s) to each affected eye 4 times a day  polyethylene glycol 3350 oral powder for reconstitution: 17 gram(s) orally once a day As needed Constipation  senna leaf extract oral tablet: 2 tab(s) orally once a day (at bedtime)  sodium chloride 1 g oral tablet: 2 tab(s) orally every 8 hours  tamsulosin 0.4 mg oral capsule: 2 cap(s) orally once a day (at bedtime)

## 2023-11-15 NOTE — DISCHARGE NOTE PROVIDER - CARE PROVIDER_API CALL
Spike Dawkins  Neurosurgery  [address], [address]  [place] [address]  Phone: [phone]  Fax: [phone]  Follow Up Time:

## 2023-11-15 NOTE — DISCHARGE NOTE NURSING/CASE MANAGEMENT/SOCIAL WORK - FLU SEASON?
Problem: Prexisting or High Potential for Compromised Skin Integrity  Goal: Skin integrity is maintained or improved  Description: INTERVENTIONS:  - Identify patients at risk for skin breakdown  - Assess and monitor skin integrity  - Assess and monitor nutrition and hydration status  - Monitor labs   - Assess for incontinence   - Turn and reposition patient  - Assist with mobility/ambulation  - Relieve pressure over bony prominences  - Avoid friction and shearing  - Provide appropriate hygiene as needed including keeping skin clean and dry  - Evaluate need for skin moisturizer/barrier cream  - Collaborate with interdisciplinary team   - Patient/family teaching  - Consider wound care consult   Outcome: Progressing     Problem: PAIN - ADULT  Goal: Verbalizes/displays adequate comfort level or baseline comfort level  Description: Interventions:  - Encourage patient to monitor pain and request assistance  - Assess pain using appropriate pain scale  - Administer analgesics based on type and severity of pain and evaluate response  - Implement non-pharmacological measures as appropriate and evaluate response  - Consider cultural and social influences on pain and pain management  - Notify physician/advanced practitioner if interventions unsuccessful or patient reports new pain  Outcome: Progressing     Problem: INFECTION - ADULT  Goal: Absence or prevention of progression during hospitalization  Description: INTERVENTIONS:  - Assess and monitor for signs and symptoms of infection  - Monitor lab/diagnostic results  - Monitor all insertion sites, i.e. indwelling lines, tubes, and drains  - Monitor endotracheal if appropriate and nasal secretions for changes in amount and color  - [place] appropriate cooling/warming therapies per order  - Administer medications as ordered  - Instruct and encourage patient and family to use good hand hygiene technique  - Identify and instruct in appropriate isolation precautions for identified infection/condition  Outcome: Progressing  Goal: Absence of fever/infection during neutropenic period  Description: INTERVENTIONS:  - Monitor WBC    Outcome: Progressing     Problem: SAFETY ADULT  Goal: Patient will remain free of falls  Description: INTERVENTIONS:  - Educate patient/family on patient safety including physical limitations  - Instruct patient to call for assistance with activity   - Consult OT/PT to assist with strengthening/mobility   - Keep Call bell within reach  - Keep bed low and locked with side rails adjusted as appropriate  - Keep care items and personal belongings within reach  - Initiate and maintain comfort rounds  - Make Fall Risk Sign visible to staff  - Offer Toileting every 4 Hours, in advance of need  - Initiate/Maintain bed and alarm  - Apply yellow socks and bracelet for high fall risk patients  - Consider moving patient to room near nurses station  Outcome: Progressing  Goal: Maintain or return to baseline ADL function  Description: INTERVENTIONS:  -  Assess patient's ability to carry out ADLs; assess patient's baseline for ADL function and identify physical deficits which impact ability to perform ADLs (bathing, care of mouth/teeth, toileting, grooming, dressing, etc.)  - Assess/evaluate cause of self-care deficits   - Assess range of motion  - Assess patient's mobility; develop plan if impaired  - Assess patient's need for assistive devices and provide as appropriate  - Encourage maximum independence but intervene and supervise when necessary  - Involve family in performance of ADLs  - Assess for home care needs following discharge   - Consider OT consult to assist with ADL evaluation and planning for discharge  - Provide patient education as appropriate  Outcome: Progressing  Goal: Maintains/Returns to pre admission functional level  Description: INTERVENTIONS:  - Set and communicate daily mobility goal to care team and patient/family/caregiver.    - Collaborate with rehabilitation services on mobility goals if consulted  - Out of bed for toileting  - Record patient progress and toleration of activity level   Outcome: Progressing     Problem: DISCHARGE PLANNING  Goal: Discharge to home or other facility with appropriate resources  Description: INTERVENTIONS:  - Identify barriers to discharge w/patient and caregiver  - Arrange for needed discharge resources and transportation as appropriate  - Identify discharge learning needs (meds, wound care, etc.)  - Arrange for interpretive services to assist at discharge as needed  - Refer to Case Management Department for coordinating discharge planning if the patient needs post-hospital services based on physician/advanced practitioner order or complex needs related to functional status, cognitive ability, or social support system  Outcome: Progressing     Problem: Nutrition/Hydration-ADULT  Goal: Nutrient/Hydration intake appropriate for improving, restoring or maintaining nutritional needs  Description: Monitor and assess patient's nutrition/hydration status for malnutrition. Collaborate with interdisciplinary team and initiate plan and interventions as ordered. Monitor patient's weight and dietary intake as ordered or per policy. Utilize nutrition screening tool and intervene as necessary. Determine patient's food preferences and provide high-protein, high-caloric foods as appropriate.      INTERVENTIONS:  - Monitor oral intake, urinary output, labs, and treatment plans  - Assess nutrition and hydration status and recommend course of action  - Evaluate amount of meals eaten  - Assist patient with eating if necessary   - Allow adequate time for meals  - Recommend/ encourage appropriate diets, oral nutritional supplements, and vitamin/mineral supplements  - Order, calculate, and assess calorie counts as needed  - Recommend, monitor, and adjust tube feedings and TPN/PPN based on assessed needs  - Assess need for intravenous fluids  - Provide specific nutrition/hydration education as appropriate  - Include patient/family/caregiver in decisions related to nutrition  Outcome: Progressing     Problem: MOBILITY - ADULT  Goal: Maintain or return to baseline ADL function  Description: INTERVENTIONS:  -  Assess patient's ability to carry out ADLs; assess patient's baseline for ADL function and identify physical deficits which impact ability to perform ADLs (bathing, care of mouth/teeth, toileting, grooming, dressing, etc.)  - Assess/evaluate cause of self-care deficits   - Assess range of motion  - Assess patient's mobility; develop plan if impaired  - Assess patient's need for assistive devices and provide as appropriate  - Encourage maximum independence but intervene and supervise when necessary  - Involve family in performance of ADLs  - Assess for home care needs following discharge   - Consider OT consult to assist with ADL evaluation and planning for discharge  - Provide patient education as appropriate  Outcome: Progressing  Goal: Maintains/Returns to pre admission functional level  Description: INTERVENTIONS:  - Perform BMAT or MOVE assessment daily.   - Set and communicate daily mobility goal to care team and patient/family/caregiver.    - Collaborate with rehabilitation services on mobility goals if consulted  - Out of bed for toileting  - Record patient progress and toleration of activity level   Outcome: Progressing Yes...

## 2023-11-15 NOTE — DISCHARGE NOTE NURSING/CASE MANAGEMENT/SOCIAL WORK - PATIENT PORTAL LINK FT
You can access the FollowMyHealth Patient Portal offered by [facility] by registering at the following website: [email or web address]. By joining "[email or web address]’s FollowMyHealth portal, you will also be able to view your health information using other applications (apps) compatible with our system.

## 2023-11-15 NOTE — PROGRESS NOTE ADULT - TIME BILLING
agree with above  cv stable  cont current mgmt
chart reviewing, history taking, physical exam, assessment and documentation, including speaking to specialist/SW/CM regarding the management.
chart reviewing, history taking, physical exam, assessment and documentation, including speaking to specialist/SW/CM regarding the management.

## 2023-11-15 NOTE — PROGRESS NOTE ADULT - ASSESSMENT
A/p  68 year old M with PMH of chronic pain, recent fall (10/13) with posterior C1-C7 decompression/fusion for spine fracture, TBI (recently discharged from [facility] [place] 11/3), COPD, Vertebral Artery Dissection, PAT, Recent Fall from Ladder with multiple fractures, s/p hemorrhoidectomy 1 week ago, fell at  Rehab [facility] for NSG evaluationFell last night at  rehab, hit neck and head. Was not wearing collar at the time.      #sp fall   -Mechanical as per patient report   -s/p recent posterior C1-C6 decompression fusion, evacuation of epidural hematoma  -ecg w no ischemic changes   -no chf on exam  -MRI noted   -Rt C1 screw haloing at tip and superior migration  -Per neurosx no surgical intervention    # hx  Aortic Root Dilatation  -stable on recent outpt echo from 9/5/23:  The ascending aorta is mildly dilated.  Aortic Root diameter is (2D-mode) 4.02 cm. Ascending Aortic Diameter: 3.98 cm.  -echo q 12 months    # Atrial arrhythmia  -Ecg no changes  -Continue lopressor 25mg BID for now - **of note pt has complain of INCREASE FATIGUE with higher BB dosing-- was placed on toprol 25 mg PO daily as outpt**  -Continue cardizem 120mg cd  -Defer ep eval as pt not likely a candidate for ablation at this time        dvt ppx     35 minutes spent on total encounter; more than 50% of the visit was spent counseling and/or coordinating care by the attending physician.  
  A/p  68 year old M with PMH of chronic pain, recent fall (10/13) with posterior C1-C7 decompression/fusion for spine fracture, TBI (recently discharged from [facility] [place] 11/3), COPD, Vertebral Artery Dissection, PAT, Recent Fall from Ladder with multiple fractures, s/p hemorrhoidectomy 1 week ago, fell at  Rehab transferred [facility] for NSG evaluationFell last night at  rehab, hit neck and head. Was not wearing collar at the time.      #sp fall   -Mechanical as per patient report   -s/p recent posterior C1-C6 decompression fusion, evacuation of epidural hematoma  -ecg w no ischemic changes   -no chf on exam  -MRI noted   -Rt C1 screw haloing at tip and superior migration  -Per neurosx no surgical intervention    # hx  Aortic Root Dilatation  -stable on recent outpt echo from 9/5/23:  The ascending aorta is mildly dilated.  Aortic Root diameter is (2D-mode) 4.02 cm. Ascending Aortic Diameter: 3.98 cm.  -echo q 12 months    # Atrial arrhythmia  -Ecg no changes  -Continue lopressor 25mg BID for now - **of note pt has complain of INCREASE FATIGUE with higher BB dosing-- was placed on toprol 25 mg PO daily as outpt**  -Continue cardizem 120mg cd  -Defer ep eval as pt not likely a candidate for ablation at this time        dvt ppx   
68 year old M with PMH of chronic pain, recent fall (10/13) with posterior C1-C7 decompression/fusion for spine fracture, TBI (recently discharged [place] [facility] 11/3), COPD, Vertebral Artery Dissection, PAT, Recent Fall from Ladder with multiple fractures, s/p hemorrhoidectomy 1 week ago, fell at  Rehab transferred to [facility] for NSG evaluation
68 year old M with PMH of chronic pain, recent fall (10/13) with posterior C1-C7 decompression/fusion for spine fracture, TBI (recently discharged [facility] [place] 11/3), COPD, Vertebral Artery Dissection, PAT, Recent Fall from Ladder with multiple fractures, s/p hemorrhoidectomy 1 week ago, fell at  Rehab transferred to [facility] for NSG evaluation

## 2023-11-15 NOTE — DISCHARGE NOTE PROVIDER - NSDCFUADDAPPT_GEN_ALL_CORE_FT
APPTS ARE READY TO BE MADE: [ ] YES    Best Family or Patient Contact (if needed):    Additional Information about above appointments (if needed):    1: Please follow up with NSGY within 1-2 weeks of discharge  2:   3:     Other comments or requests:

## 2023-11-15 NOTE — DISCHARGE NOTE PROVIDER - ATTENDING DISCHARGE PHYSICAL EXAMINATION:
Vital Signs Last 24 Hrs  T(C): 36.3 (15 Nov 2023 11:45), Max: 36.8 (14 Nov 2023 16:42)  T(F): 97.3 (15 Nov 2023 11:45), Max: 98.3 (14 Nov 2023 16:42)  HR: 90 (15 Nov 2023 12:50) (69 - 90)  BP: 99/66 (15 Nov 2023 12:50) (98/62 - 136/87)  BP(mean): --  RR: 18 (15 Nov 2023 12:50) (16 - 18)  SpO2: 98% (15 Nov 2023 12:50) (94% - 98%)    Parameters below as of 15 Nov 2023 12:50  Patient On (Oxygen Delivery Method): room air        CONSTITUTIONAL: NAD, well-developed, well-groomed  EYES: PERRLA; conjunctiva and sclera clear  ENMT: Moist oral mucosa, no pharyngeal injection or exudates; normal dentition  NECK: +C-collar in place   RESPIRATORY: Normal respiratory effort; lungs are clear to auscultation bilaterally  CARDIOVASCULAR: Regular rate and rhythm, normal S1 and S2, no murmur/rub/gallop; No lower extremity edema; Peripheral pulses are 2+ bilaterally  ABDOMEN: Nontender to palpation, normoactive bowel sounds, no rebound/guarding; No hepatosplenomegaly  MUSCULOSKELETAL:  Normal gait; no clubbing or cyanosis of digits; no joint swelling or tenderness to palpation  PSYCH: A+O to person, place, and time; affect appropriate  NEUROLOGY: CN 2-12 are intact and symmetric; no gross sensory deficits   SKIN: No rashes; no palpable lesions

## 2023-11-15 NOTE — DISCHARGE NOTE PROVIDER - HOSPITAL COURSE
HPI:  68 year old M with PMH of chronic pain, recent fall (10/13) with posterior C1-C7 decompression/fusion for spine fracture, TBI (recently discharged [place] [facility] [place] 11/3), COPD, Vertebral Artery Dissection, PAT, Recent Fall from Ladder with multiple fractures, s/p hemorrhoidectomy 1 week ago, fell at  Rehab transferred to [facility] for NSG evaluation. Fell last night at  rehab, hit neck and head. Was not wearing collar at the time. CT C spine c/t 10/15 shows: Rt C1 screw haloing at tip and superior migration. Rods in place. RUCHI incr from 3.9 to 7.9mm. According to the patient he was taken to the bathroom by the aides at rehab. After he was was cleaned up, he got up to walk and was not wearing his socks and did not have assistance, he fell and hit his head. He denies any LOC or prodrome. He does not report any fever, cough, chest pain, sputum production, SOB or palpitations. (12 Nov 2023 14:12)    Hospital Course: Pt presenting with fall at  Rehab transferred to [facility] for NSG evaluation. Pt sustained TBI, unstable C1-C7 Decompression/Fusion evaluation of epidural hematoma with cord compression increase in atlantoaxial interval. NSG consulted, no acute intervention-> seen by PM&R recommended  rehab, pain control, c collar. Repeat CT C spine as an outpt. Incidental findings of consolidation on CT with new 6mm pulmonary nodule after discussion with wife about pt's clinical status-> will treat with Ceftriaxone and Azithromycin for 5 days- Procalcitonin negative,  DC Abx. Pt is now medically stable for discharge to [facility].     Important Medication Changes and Reason:    Active or Pending Issues Requiring Follow-up: Out pt follow up with NeuroSx     Advanced Directives:   [ ] Full code  [ ] DNR  [ ] Hospice    Discharge Diagnoses:  Falls   chronic pain    posterior C1-C7 decompression/fusion for spine fracture   TBI    COPD  Vertebral Artery Dissection   PAT   Recent Fall from Ladder with multiple fractures   s/p hemorrhoidectomy       HPI:  68 year old M with PMH of chronic pain, recent fall (10/13) with posterior C1-C7 decompression/fusion for spine fracture, TBI (recently discharged from [facility] [place] 11/3), COPD, Vertebral Artery Dissection, PAT, Recent Fall from Ladder with multiple fractures, s/p hemorrhoidectomy 1 week ago, fell at  Rehab transferred to [facility] for NSG evaluation. Fell last night at  rehab, hit neck and head. Was not wearing collar at the time. CT C spine c/t 10/15 shows: Rt C1 screw haloing at tip and superior migration. Rods in place. RUCHI incr from 3.9 to 7.9mm. According to the patient he was taken to the bathroom by the aides at rehab. After he was was cleaned up, he got up to walk and was not wearing his socks and did not have assistance, he fell and hit his head. He denies any LOC or prodrome. He does not report any fever, cough, chest pain, sputum production, SOB or palpitations. (12 Nov 2023 14:12)    Hospital Course: Pt presenting with fall at  Rehab transferred to [facility] for NSG evaluation. Pt sustained TBI, unstable C1-C7 Decompression/Fusion evaluation of epidural hematoma with cord compression increase in atlantoaxial interval. NSG consulted, no acute intervention-> seen by PM&R recommended  rehab, pain control, c collar. Repeat CT C spine as an outpt. Incidental findings of consolidation on CT with new 6mm pulmonary nodule after discussion with wife about pt's clinical status-> will treat with Ceftriaxone and Azithromycin for 5 days- Procalcitonin negative,  DC Abx. Pt is now medically stable for discharge to [facility].     Important Medication Changes and Reason:      Active or Pending Issues Requiring Follow-up: Out pt follow up with NeuroSx     Advanced Directives:   [ x] Full code  [ ] DNR  [ ] Hospice    Discharge Diagnoses:  Falls   chronic pain    posterior C1-C7 decompression/fusion for spine fracture   TBI    COPD  Vertebral Artery Dissection   PAT   Recent Fall from Ladder with multiple fractures   s/p hemorrhoidectomy

## 2023-11-15 NOTE — DISCHARGE NOTE PROVIDER - NSDCFUSCHEDAPPT_GEN_ALL_CORE_FT
Spike Dawkins  [facility]  [facility] [id number]  Scheduled Appointment: 11/20/2023

## 2023-11-15 NOTE — PROGRESS NOTE ADULT - SUBJECTIVE AND OBJECTIVE BOX
CARDIOLOGY FOLLOW UP - Dr. Zeng  DATE OF SERVICE: 11/15/23    CC  No CV complaints     REVIEW OF SYSTEMS:  CONSTITUTIONAL: No fever, weight loss, or fatigue  RESPIRATORY: No cough, wheezing, chills or hemoptysis; No Shortness of Breath  CARDIOVASCULAR: No chest pain, palpitations, passing out, dizziness, or leg swelling  GASTROINTESTINAL: No abdominal or epigastric pain. No nausea, vomiting, or hematemesis; No diarrhea or constipation. No melena or hematochezia.  VASCULAR: No edema     PHYSICAL EXAM:  T(C): 36.3 (11-15-23 @ 11:45), Max: 36.8 (11-14-23 @ 16:42)  HR: 90 (11-15-23 @ 12:50) (69 - 90)  BP: 99/66 (11-15-23 @ 12:50) (98/62 - 136/87)  RR: 18 (11-15-23 @ 12:50) (16 - 18)  SpO2: 98% (11-15-23 @ 12:50) (94% - 98%)  Wt(kg): --  I&O's Summary    14 Nov 2023 07:01  -  15 Nov 2023 07:00  --------------------------------------------------------  IN: 1020 mL / OUT: 1850 mL / NET: -830 mL        Appearance: Normal, +neck brace  Cardiovascular: Normal S1 S2,RRR, No JVD, No murmurs  Respiratory: Lungs clear to auscultation	  Gastrointestinal:  Soft, Non-tender, + BS	  Extremities: Normal range of motion, No clubbing, cyanosis or edema      Home Medications:  aluminum hydroxide-magnesium hydroxide 200 mg-200 mg/5 mL oral suspension: 30 milliliter(s) orally every 4 hours As needed Dyspepsia (15 Nov 2023 12:18)  aspirin 81 mg oral delayed release tablet: 1 tab(s) orally once a day (15 Nov 2023 12:18)  diazePAM 5 mg oral tablet: 1 tab(s) orally every 8 hours As needed muscle spasm/pain (15 Nov 2023 12:18)  dilTIAZem 120 mg/24 hours oral capsule, extended release: 1 cap(s) orally once a day (15 Nov 2023 12:18)  HYDROmorphone 2 mg oral tablet: 3 tab(s) orally every 6 hours As needed Severe Pain (7 - 10) (15 Nov 2023 12:45)  HYDROmorphone 4 mg oral tablet: 1 tab(s) orally every 4 hours As needed Moderate Pain (4 - 6) (15 Nov 2023 12:45)  melatonin 3 mg oral tablet: 1 tab(s) orally once a day (at bedtime) As needed Insomnia (15 Nov 2023 12:18)  Metamucil 3.4 g/5.2 g oral powder for reconstitution: 3.4 gram(s) orally 3 times a day (12 Nov 2023 12:38)  methocarbamol 500 mg oral tablet: 1 tab(s) orally every 8 hours As needed Muscle Spasm (15 Nov 2023 12:18)  metoprolol tartrate 25 mg oral tablet: 1 tab(s) orally 2 times a day (15 Nov 2023 12:18)  ocular lubricant ophthalmic solution: 1 drop(s) to each affected eye 4 times a day (15 Nov 2023 12:18)  polyethylene glycol 3350 oral powder for reconstitution: 17 gram(s) orally once a day As needed Constipation (15 Nov 2023 12:18)  senna leaf extract oral tablet: 2 tab(s) orally once a day (at bedtime) (15 Nov 2023 12:18)  sodium chloride 1 g oral tablet: 2 tab(s) orally every 8 hours (15 Nov 2023 12:18)  tamsulosin 0.4 mg oral capsule: 2 cap(s) orally once a day (at bedtime) (15 Nov 2023 12:18)      MEDICATIONS  (STANDING):  artificial  tears Solution 1 Drop(s) Both EYES four times a day  aspirin enteric coated 81 milliGRAM(s) Oral daily  diltiazem    milliGRAM(s) Oral daily  enoxaparin Injectable 40 milliGRAM(s) SubCutaneous every 24 hours  metoprolol tartrate 25 milliGRAM(s) Oral two times a day  psyllium Powder 1 Packet(s) Oral daily  senna 2 Tablet(s) Oral at bedtime  sodium chloride 2 Gram(s) Oral every 8 hours  tamsulosin 0.8 milliGRAM(s) Oral at bedtime      TELEMETRY: 	    ECG:  	  RADIOLOGY:   DIAGNOSTIC TESTING:  [ ] Echocardiogram:  [ ]  Catheterization:  [ ] Stress Test:    OTHER: 	    LABS:	 	                      
[facility] Division of Hospital Medicine  Matilda Lion MD  Available via MS Teams  Pager: [phone]    SUBJECTIVE / OVERNIGHT EVENTS: Patient seen and examined at bedside. No acute overnight events. no new events.     ADDITIONAL REVIEW OF SYSTEMS: n/a     MEDICATIONS  (STANDING):  artificial  tears Solution 1 Drop(s) Both EYES four times a day  aspirin enteric coated 81 milliGRAM(s) Oral daily  diltiazem    milliGRAM(s) Oral daily  enoxaparin Injectable 40 milliGRAM(s) SubCutaneous every 24 hours  metoprolol tartrate 25 milliGRAM(s) Oral two times a day  psyllium Powder 1 Packet(s) Oral daily  senna 2 Tablet(s) Oral at bedtime  sodium chloride 2 Gram(s) Oral every 8 hours  tamsulosin 0.8 milliGRAM(s) Oral at bedtime    MEDICATIONS  (PRN):  aluminum hydroxide/magnesium hydroxide/simethicone Suspension 30 milliLiter(s) Oral every 4 hours PRN Dyspepsia  diazepam    Tablet 5 milliGRAM(s) Oral every 8 hours PRN muscle spasm/pain  HYDROmorphone   Tablet 6 milliGRAM(s) Oral every 6 hours PRN Severe Pain (7 - 10)  HYDROmorphone   Tablet 4 milliGRAM(s) Oral every 4 hours PRN Moderate Pain (4 - 6)  melatonin 3 milliGRAM(s) Oral at bedtime PRN Insomnia  methocarbamol 500 milliGRAM(s) Oral every 8 hours PRN Muscle Spasm  ondansetron Injectable 4 milliGRAM(s) IV Push every 8 hours PRN Nausea and/or Vomiting  polyethylene glycol 3350 17 Gram(s) Oral daily PRN Constipation      I&O's Summary    13 Nov 2023 07:01  -  14 Nov 2023 07:00  --------------------------------------------------------  IN: 1040 mL / OUT: 1600 mL / NET: -560 mL        PHYSICAL EXAM:  Vital Signs Last 24 Hrs  T(C): 36.4 (14 Nov 2023 05:09), Max: 36.9 (13 Nov 2023 21:08)  T(F): 97.5 (14 Nov 2023 05:09), Max: 98.4 (13 Nov 2023 21:08)  HR: 77 (14 Nov 2023 05:09) (70 - 81)  BP: 124/76 (14 Nov 2023 05:09) (114/69 - 138/89)  BP(mean): --  RR: 18 (14 Nov 2023 05:09) (18 - 18)  SpO2: 92% (14 Nov 2023 05:09) (92% - 94%)    Parameters below as of 14 Nov 2023 05:09  Patient On (Oxygen Delivery Method): room air      CONSTITUTIONAL: NAD, well-developed, well-groomed  EYES: PERRLA; conjunctiva and sclera clear  ENMT: C-collar in place, Moist oral mucosa, no pharyngeal injection or exudates; normal dentition  NECK: Supple, no palpable masses; no thyromegaly  RESPIRATORY: Normal respiratory effort; lungs are clear to auscultation bilaterally  CARDIOVASCULAR: Regular rate and rhythm, normal S1 and S2, no murmur/rub/gallop; No lower extremity edema; Peripheral pulses are 2+ bilaterally  ABDOMEN: Nontender to palpation, normoactive bowel sounds, no rebound/guarding; No hepatosplenomegaly  MUSCULOSKELETAL:   no clubbing or cyanosis of digits; no joint swelling or tenderness to palpation  PSYCH: A+O to person, place, and time; affect appropriate  NEUROLOGY: CN 2-12 are intact and symmetric; no gross sensory deficits   SKIN: No rashes; no palpable lesions    LABS:                        11.6   9.17  )-----------( 360      ( 13 Nov 2023 07:07 )             36.3     11-13    139  |  101  |  16  ----------------------------<  104<H>  4.2   |  30  |  0.86    Ca    9.4      13 Nov 2023 07:13            Urinalysis Basic - ( 13 Nov 2023 07:13 )    Color: x / Appearance: x / SG: x / pH: x  Gluc: 104 mg/dL / Ketone: x  / Bili: x / Urobili: x   Blood: x / Protein: x / Nitrite: x   Leuk Esterase: x / RBC: x / WBC x   Sq Epi: x / Non Sq Epi: x / Bacteria: x            RADIOLOGY & ADDITIONAL TESTS:  New Results Reviewed Today:   New Imaging Personally Reviewed Today:  New Electrocardiogram Personally Reviewed Today:  Prior or Outpatient Records Reviewed Today:    COMMUNICATION:  Care Discussed with Consultants/Other Providers and Details of Discussion:  Discussions with Patient/Family:  PCP Communication:    
CARDIOLOGY FOLLOW UP - Dr. Zeng  Date of Service: 11/14/2023  CC: no events    Review of Systems:  Constitutional: No fever, weight loss, or fatigue  Respiratory: No cough, wheezing, or hemoptysis, no shortness of breath  Cardiovascular: No chest pain, palpitations, passing out, dizziness, or leg swelling  Gastrointestinal: No abd or epigastric pain. No nausea, vomiting, or hematemesis; no diarrhea or consiptaiton, no melena or hematochezia  Vascular: No edema     TELEMETRY:    PHYSICAL EXAM:  T(C): 36.4 (11-14-23 @ 05:09), Max: 36.9 (11-13-23 @ 21:08)  HR: 77 (11-14-23 @ 05:09) (70 - 81)  BP: 124/76 (11-14-23 @ 05:09) (114/69 - 138/89)  RR: 18 (11-14-23 @ 05:09) (18 - 18)  SpO2: 92% (11-14-23 @ 05:09) (92% - 94%)  Wt(kg): --  I&O's Summary    13 Nov 2023 07:01  -  14 Nov 2023 07:00  --------------------------------------------------------  IN: 1040 mL / OUT: 1600 mL / NET: -560 mL        Appearance: Normal	  Cardiovascular: Normal S1 S2,RRR, No JVD, No murmurs  Respiratory: Lungs clear to auscultation	  Gastrointestinal:  Soft, Non-tender, + BS	  Extremities: Normal range of motion, No clubbing, cyanosis or edema  Vascular: Peripheral pulses palpable 2+ bilaterally       Home Medications:  aspirin 81 mg oral delayed release tablet: 1 tab(s) orally once a day (12 Nov 2023 12:37)  diazePAM 5 mg oral tablet: 1 tab(s) orally every 6 hours As needed muscle spasm/pain (12 Nov 2023 12:37)  Lovenox 40 mg/0.4 mL injectable solution: 40 milligram(s) subcutaneously once a day (12 Nov 2023 12:40)  Metamucil 3.4 g/5.2 g oral powder for reconstitution: 3.4 gram(s) orally 3 times a day (12 Nov 2023 12:38)  methocarbamol 750 mg oral tablet: 1 tab(s) orally 3 times a day (12 Nov 2023 12:42)  metoprolol tartrate 25 mg oral tablet: 1 tab(s) orally 2 times a day (13 Nov 2023 06:20)  ocular lubricant ophthalmic solution: 1 drop(s) to each affected eye 4 times a day (12 Nov 2023 12:42)  senna (sennosides) 8.6 mg oral tablet: 2 tab(s) orally once a day (at bedtime) (13 Nov 2023 06:20)  sodium chloride 1 g oral tablet: 2 tab(s) orally every 8 hours (12 Nov 2023 12:42)  tamsulosin 0.4 mg oral capsule: 1 cap(s) orally 2 times a day (12 Nov 2023 12:42)        MEDICATIONS  (STANDING):  artificial  tears Solution 1 Drop(s) Both EYES four times a day  aspirin enteric coated 81 milliGRAM(s) Oral daily  diltiazem    milliGRAM(s) Oral daily  enoxaparin Injectable 40 milliGRAM(s) SubCutaneous every 24 hours  metoprolol tartrate 25 milliGRAM(s) Oral two times a day  psyllium Powder 1 Packet(s) Oral daily  senna 2 Tablet(s) Oral at bedtime  sodium chloride 2 Gram(s) Oral every 8 hours  tamsulosin 0.8 milliGRAM(s) Oral at bedtime        EKG:  RADIOLOGY:  DIAGNOSTIC TESTING:  [ ] Echocardiogram:  [ ] Catherterization:  [ ] Stress Test:  OTHER:     LABS:	 	                          11.6   9.17  )-----------( 360      ( 13 Nov 2023 07:07 )             36.3     11-13    139  |  101  |  16  ----------------------------<  104<H>  4.2   |  30  |  0.86    Ca    9.4      13 Nov 2023 07:13            CARDIAC MARKERS:                  
[facility] Division of Hospital Medicine  Matilda Lion MD  Available via MS Teams  Pager: [phone]    SUBJECTIVE / OVERNIGHT EVENTS: Patient seen and examined at bedside. No acute overnight events. Pt denies dyspnea, chest pain fevers, chills, cough, HA, dizziness, syncope, chest palpitations, abd pain, n/v/d, urinary or bowel changes, active sites of bleeding or any other symptoms at this time. States he is unable to sleep at night and requires valium.     ADDITIONAL REVIEW OF SYSTEMS: n/a    MEDICATIONS  (STANDING):  artificial  tears Solution 1 Drop(s) Both EYES four times a day  aspirin enteric coated 81 milliGRAM(s) Oral daily  azithromycin  IVPB 500 milliGRAM(s) IV Intermittent every 24 hours  cefTRIAXone   IVPB 1000 milliGRAM(s) IV Intermittent every 24 hours  diltiazem    milliGRAM(s) Oral daily  enoxaparin Injectable 40 milliGRAM(s) SubCutaneous every 24 hours  HYDROmorphone   Tablet 4 milliGRAM(s) Oral once  metoprolol tartrate 25 milliGRAM(s) Oral two times a day  psyllium Powder 1 Packet(s) Oral daily  senna 2 Tablet(s) Oral at bedtime  sodium chloride 2 Gram(s) Oral every 8 hours  tamsulosin 0.8 milliGRAM(s) Oral at bedtime    MEDICATIONS  (PRN):  aluminum hydroxide/magnesium hydroxide/simethicone Suspension 30 milliLiter(s) Oral every 4 hours PRN Dyspepsia  melatonin 3 milliGRAM(s) Oral at bedtime PRN Insomnia  methocarbamol 500 milliGRAM(s) Oral every 8 hours PRN Muscle Spasm  ondansetron Injectable 4 milliGRAM(s) IV Push every 8 hours PRN Nausea and/or Vomiting  oxyCODONE    IR 10 milliGRAM(s) Oral every 4 hours PRN Severe Pain (7 - 10)  oxyCODONE    IR 5 milliGRAM(s) Oral every 4 hours PRN Moderate Pain (4 - 6)      I&O's Summary    12 Nov 2023 07:01  -  13 Nov 2023 07:00  --------------------------------------------------------  IN: 200 mL / OUT: 1250 mL / NET: -1050 mL    13 Nov 2023 07:01  -  13 Nov 2023 16:39  --------------------------------------------------------  IN: 480 mL / OUT: 600 mL / NET: -120 mL        PHYSICAL EXAM:  Vital Signs Last 24 Hrs  T(C): 36.3 (13 Nov 2023 16:26), Max: 36.8 (12 Nov 2023 16:50)  T(F): 97.4 (13 Nov 2023 16:26), Max: 98.3 (12 Nov 2023 16:50)  HR: 70 (13 Nov 2023 16:26) (70 - 95)  BP: 114/69 (13 Nov 2023 16:26) (104/70 - 126/82)  BP(mean): --  RR: 18 (13 Nov 2023 16:26) (16 - 18)  SpO2: 94% (13 Nov 2023 16:26) (93% - 96%)    Parameters below as of 13 Nov 2023 16:26  Patient On (Oxygen Delivery Method): room air      CONSTITUTIONAL: NAD, well-developed, well-groomed  EYES: PERRLA; conjunctiva and sclera clear  ENMT: C-collar in place, Moist oral mucosa, no pharyngeal injection or exudates; normal dentition  NECK: Supple, no palpable masses; no thyromegaly  RESPIRATORY: Normal respiratory effort; lungs are clear to auscultation bilaterally  CARDIOVASCULAR: Regular rate and rhythm, normal S1 and S2, no murmur/rub/gallop; No lower extremity edema; Peripheral pulses are 2+ bilaterally  ABDOMEN: Nontender to palpation, normoactive bowel sounds, no rebound/guarding; No hepatosplenomegaly  MUSCULOSKELETAL:   no clubbing or cyanosis of digits; no joint swelling or tenderness to palpation  PSYCH: A+O to person, place, and time; affect appropriate  NEUROLOGY: CN 2-12 are intact and symmetric; no gross sensory deficits   SKIN: No rashes; no palpable lesions    LABS:                        11.6   9.17  )-----------( 360      ( 13 Nov 2023 07:07 )             36.3     11-13    139  |  101  |  16  ----------------------------<  104<H>  4.2   |  30  |  0.86    Ca    9.4      13 Nov 2023 07:13    TPro  6.1  /  Alb  3.1<L>  /  TBili  0.3  /  DBili  x   /  AST  13  /  ALT  22  /  AlkPhos  117  11-12    PT/INR - ( 12 Nov 2023 10:08 )   PT: 12.3 sec;   INR: 1.18 ratio         PTT - ( 12 Nov 2023 10:08 )  PTT:28.7 sec      Urinalysis Basic - ( 13 Nov 2023 07:13 )    Color: x / Appearance: x / SG: x / pH: x  Gluc: 104 mg/dL / Ketone: x  / Bili: x / Urobili: x   Blood: x / Protein: x / Nitrite: x   Leuk Esterase: x / RBC: x / WBC x   Sq Epi: x / Non Sq Epi: x / Bacteria: x            RADIOLOGY & ADDITIONAL TESTS:  New Results Reviewed Today:   Procalcitonin, Serum: 0.07    New Imaging Personally Reviewed Today:  New Electrocardiogram Personally Reviewed Today:  Prior or Outpatient Records Reviewed Today:    COMMUNICATION:  Care Discussed with Consultants/Other Providers and Details of Discussion:  Discussions with Patient/Family: Spoke to wife and updated on management. Will DC abx, do TOV and pain management. Recommended incentive spirometry.   PCP Communication:

## 2023-11-16 LAB
SURGICAL PATHOLOGY STUDY: SIGNIFICANT CHANGE UP
SURGICAL PATHOLOGY STUDY: SIGNIFICANT CHANGE UP

## 2023-11-20 ENCOUNTER — APPOINTMENT (OUTPATIENT)
Dept: SPINE | Facility: CLINIC | Age: 68
End: 2023-11-20

## 2023-11-24 ENCOUNTER — NON-APPOINTMENT (OUTPATIENT)
Age: 68
End: 2023-11-24

## 2023-11-28 ENCOUNTER — RESULT REVIEW (OUTPATIENT)
Age: 68
End: 2023-11-28

## 2023-12-11 ENCOUNTER — OUTPATIENT (OUTPATIENT)
Dept: OUTPATIENT SERVICES | Facility: HOSPITAL | Age: 68
LOS: 1 days | End: 2023-12-11
Payer: MEDICARE

## 2023-12-11 ENCOUNTER — APPOINTMENT (OUTPATIENT)
Dept: RADIOLOGY | Facility: HOSPITAL | Age: 68
End: 2023-12-11
Payer: MEDICARE

## 2023-12-11 DIAGNOSIS — Z98.1 ARTHRODESIS STATUS: Chronic | ICD-10-CM

## 2023-12-11 DIAGNOSIS — Z98.890 OTHER SPECIFIED POSTPROCEDURAL STATES: Chronic | ICD-10-CM

## 2023-12-11 DIAGNOSIS — Z96.651 PRESENCE OF RIGHT ARTIFICIAL KNEE JOINT: Chronic | ICD-10-CM

## 2023-12-11 DIAGNOSIS — Z98.1 ARTHRODESIS STATUS: ICD-10-CM

## 2023-12-11 PROCEDURE — 72050 X-RAY EXAM NECK SPINE 4/5VWS: CPT | Mod: 26

## 2023-12-11 PROCEDURE — 72050 X-RAY EXAM NECK SPINE 4/5VWS: CPT

## 2023-12-12 ENCOUNTER — APPOINTMENT (OUTPATIENT)
Dept: RADIOLOGY | Facility: HOSPITAL | Age: 68
End: 2023-12-12

## 2023-12-13 ENCOUNTER — APPOINTMENT (OUTPATIENT)
Dept: COLORECTAL SURGERY | Facility: CLINIC | Age: 68
End: 2023-12-13
Payer: MEDICARE

## 2023-12-13 VITALS
OXYGEN SATURATION: 97 % | HEIGHT: 71 IN | BODY MASS INDEX: 19.46 KG/M2 | TEMPERATURE: 97 F | SYSTOLIC BLOOD PRESSURE: 126 MMHG | DIASTOLIC BLOOD PRESSURE: 77 MMHG | RESPIRATION RATE: 16 BRPM | WEIGHT: 139 LBS | HEART RATE: 66 BPM

## 2023-12-13 DIAGNOSIS — Z98.890 OTHER SPECIFIED POSTPROCEDURAL STATES: ICD-10-CM

## 2023-12-13 DIAGNOSIS — Z87.19 OTHER SPECIFIED POSTPROCEDURAL STATES: ICD-10-CM

## 2023-12-13 PROCEDURE — 99024 POSTOP FOLLOW-UP VISIT: CPT

## 2023-12-13 NOTE — HISTORY OF PRESENT ILLNESS
[FreeTextEntry1] : Patient is s/p hemorhoidectomy x3 at [facility] on 11/6/2023 for prolapsing and incarcerated internal hemorrhoids.  He denies any further sensation of tissue prolapse.  Still at rehab.  Reports some continued issues with constipation for which he has regularly been on Senna.

## 2023-12-13 NOTE — PLAN
[TextEntry] : -- Patient has recovered well from his hemorrhoidectomy. -- Advised that to prevent recurrence he needs to avoid constipation.   -- Discussed with patient that he should begin a powder fiber supplement (Metamucil, Benefiber, psyllium, etc).  He was also advised to drink at least 8 glasses of water daily in addition. -- Miralax and stool softener may be added as needed -- Would advise against long-term use of Senna as this stimulant laxative could cause colon to become dependent on laxative for function.  -- Follow up as needed.

## 2023-12-13 NOTE — PHYSICAL EXAM
[Respiratory Effort] : normal respiratory effort [Normal Rate and Rhythm] : normal rate and rhythm [No Edema] : No edema [No Rash or Lesion] : No rash or lesion [Alert] : alert [Oriented to Person] : oriented to person [Oriented to Place] : oriented to place [Oriented to Time] : oriented to time [Calm] : calm [de-identified] : Soft, nondistended [de-identified] : External exam - small circumferential skin tags.  On the R buttock there is a skin tag, possibly related to skin closure.  DORA with palpable surgical scars; anoscopy unremarkable. [de-identified] : Sitting up in wheelchair, no distress [de-identified] : [place]-J collar in place [de-identified] : Moves all extremities

## 2023-12-13 NOTE — HISTORY OF PRESENT ILLNESS
[FreeTextEntry1] : Patient is a 68-year-old male with history of a fall off ladder with multiple unstable cervical fractures s/p C2 partial laminectomy, C3-6 complete laminectomy, evacuation of epidural hematoma and C1-7 posterior fusion on 10/124/23. Patient was discharged to rehab and had another fall while at rehab hitting his neck and head. Per report, patient was not wearing collar at all times. CT cervical spine showed right C1 screw haloing at tip and superior migration. Rods in place. RUCHI increased from 3.9 to 7.9mm.  No neurosurgical intervention was indicated. Follow up CT cervical spine completed on 11/12/23.

## 2023-12-13 NOTE — REASON FOR VISIT
[de-identified] : C2 partial laminectomy, C3-6 complete laminectomy, C1-7 posterior fusion.  [de-identified] : 10/14/23

## 2023-12-13 NOTE — PROCEDURE
[FreeTextEntry1] : After informed consent was obtained, a lubricated lighted anoscope was inserted into the anal canal. The canal was inspected circumferentially up to the level above the dentate line.  The scope was withdrawn. The patient tolerated the procedure well.

## 2023-12-13 NOTE — ASSESSMENT
[FreeTextEntry1] : 69 y/o M s/p a fall off ladder with multiple unstable cervical fractures. Patient underwent C2 partial laminectomy, C3-6 complete laminectomy, evacuation of epidural hematoma and C1-7 posterior fusion on 10/124/23. He had another fall while in inpatient rehab.  Follow up CT cervical spine completed on 11/12/23.   ****INCOMPLETE****

## 2023-12-18 ENCOUNTER — APPOINTMENT (OUTPATIENT)
Dept: SPINE | Facility: CLINIC | Age: 68
End: 2023-12-18

## 2023-12-18 ENCOUNTER — APPOINTMENT (OUTPATIENT)
Dept: NEUROSURGERY | Facility: CLINIC | Age: 68
End: 2023-12-18
Payer: MEDICARE

## 2023-12-18 VITALS
BODY MASS INDEX: 20.3 KG/M2 | DIASTOLIC BLOOD PRESSURE: 65 MMHG | OXYGEN SATURATION: 99 % | HEART RATE: 81 BPM | HEIGHT: 71 IN | TEMPERATURE: 98.1 F | WEIGHT: 145 LBS | SYSTOLIC BLOOD PRESSURE: 110 MMHG

## 2023-12-18 DIAGNOSIS — Z98.890 OTHER SPECIFIED POSTPROCEDURAL STATES: ICD-10-CM

## 2023-12-18 PROCEDURE — 99024 POSTOP FOLLOW-UP VISIT: CPT

## 2023-12-18 NOTE — PHYSICAL EXAM
[General Appearance - Alert] : alert [General Appearance - In No Acute Distress] : in no acute distress [Oriented To Time, Place, And Person] : oriented to person, place, and time [Cranial Nerves Optic (II)] : visual acuity intact bilaterally,  pupils equal round and reactive to light [Cranial Nerves Oculomotor (III)] : extraocular motion intact [Cranial Nerves Facial (VII)] : face symmetrical [Cranial Nerves Trigeminal (V)] : facial sensation intact symmetrically [Cranial Nerves Vestibulocochlear (VIII)] : hearing was intact bilaterally [Cranial Nerves Accessory (XI - Cranial And Spinal)] : head turning and shoulder shrug symmetric [Cranial Nerves Hypoglossal (XII)] : there was no tongue deviation with protrusion [Motor Tone] : muscle tone was normal in all four extremities [3+] : Ankle jerk left 3+ [Hollingsworth] : Hollingsworth's sign was demonstrated [FreeTextEntry5] : On exam moving all extremities strong except weakness in bilateral deltoids 1/5, able to shrug shoulders. [Sclera] : the sclera and conjunctiva were normal [PERRL With Normal Accommodation] : pupils were equal in size, round, reactive to light, with normal accommodation [Extraocular Movements] : extraocular movements were intact [Outer Ear] : the ears and nose were normal in appearance [Hearing Threshold Finger Rub Not [place]] : hearing was normal [Neck Appearance] : the appearance of the neck was normal [] : no respiratory distress [Exaggerated Use Of Accessory Muscles For Inspiration] : no accessory muscle use [Abnormal Walk] : normal gait [Skin Color & Pigmentation] : normal skin color and pigmentation

## 2023-12-18 NOTE — ASSESSMENT
[FreeTextEntry1] : 69 y/o M s/p a fall off ladder with multiple unstable cervical fractures. Patient underwent C2 partial laminectomy, C3-6 complete laminectomy, evacuation of epidural hematoma and C1-7 posterior fusion on 10/14/23.  He is here for his first postop follow up. He is doing great and has been making progress.  He has been wearing his cervical collar. Posterior neck incision has healed well, no signs of infection. Patient is still in rehab and continues physical and occupational therapy. On exam moving all extremities strong except weakness in bilateral deltoids 1/5, able to shrug shoulders. Recent x-ray with intact hardware.  - continue aggressive physical and occupational therapy - CT cervical spine  - f/u with Dr. Kulkarni for imaging review and then Dr. Dawkins in February

## 2023-12-18 NOTE — HISTORY OF PRESENT ILLNESS
[FreeTextEntry1] : Patient is a 68-year-old male with history of a fall off ladder with multiple unstable cervical fractures s/p C2 partial laminectomy, C3-6 complete laminectomy, evacuation of epidural hematoma and C1-7 posterior fusion on 10/124/23. Patient was discharged to rehab and had another fall while at rehab hitting his neck and head. Per report, patient was not wearing collar at all times. CT cervical spine showed right C1 screw haloing at tip and superior migration. Rods in place. RUCHI increased from 3.9 to 7.9mm.  No neurosurgical intervention was indicated.   He is here for his first postop follow up. He is doing great and has been making progress.  He has been wearing his cervical collar. Posterior neck incision has healed well, no signs of infection. Patient is still in rehab and continues physical and occupational therapy. On exam moving all extremities strong except weakness in bilateral deltoids 1/5, able to shrug shoulders. Recent x-ray with intact hardware.

## 2023-12-18 NOTE — REASON FOR VISIT
[de-identified] : C2 partial laminectomy, C3-6 complete laminectomy, C1-7 posterior fusion.  [de-identified] : 10/14/23

## 2024-01-22 ENCOUNTER — APPOINTMENT (OUTPATIENT)
Dept: SPINE | Facility: CLINIC | Age: 69
End: 2024-01-22

## 2024-01-29 ENCOUNTER — OUTPATIENT (OUTPATIENT)
Dept: OUTPATIENT SERVICES | Facility: HOSPITAL | Age: 69
LOS: 1 days | End: 2024-01-29
Payer: MEDICARE

## 2024-01-29 ENCOUNTER — RESULT REVIEW (OUTPATIENT)
Age: 69
End: 2024-01-29

## 2024-01-29 ENCOUNTER — APPOINTMENT (OUTPATIENT)
Dept: CT IMAGING | Facility: CLINIC | Age: 69
End: 2024-01-29
Payer: MEDICARE

## 2024-01-29 DIAGNOSIS — Z96.651 PRESENCE OF RIGHT ARTIFICIAL KNEE JOINT: Chronic | ICD-10-CM

## 2024-01-29 DIAGNOSIS — Z98.890 OTHER SPECIFIED POSTPROCEDURAL STATES: Chronic | ICD-10-CM

## 2024-01-29 DIAGNOSIS — Z98.1 ARTHRODESIS STATUS: ICD-10-CM

## 2024-01-29 DIAGNOSIS — Z98.1 ARTHRODESIS STATUS: Chronic | ICD-10-CM

## 2024-01-29 PROCEDURE — 76376 3D RENDER W/INTRP POSTPROCES: CPT | Mod: 26

## 2024-01-29 PROCEDURE — 72125 CT NECK SPINE W/O DYE: CPT

## 2024-01-29 PROCEDURE — 72125 CT NECK SPINE W/O DYE: CPT | Mod: 26,MH

## 2024-01-29 PROCEDURE — 76376 3D RENDER W/INTRP POSTPROCES: CPT

## 2024-02-06 RX ORDER — METOPROLOL TARTRATE 25 MG/1
25 TABLET, FILM COATED ORAL TWICE DAILY
Qty: 180 | Refills: 1 | Status: ACTIVE | COMMUNITY
Start: 2024-02-06 | End: 1900-01-01

## 2024-02-06 RX ORDER — FINASTERIDE 5 MG/1
5 TABLET, FILM COATED ORAL DAILY
Qty: 90 | Refills: 3 | Status: ACTIVE | COMMUNITY
Start: 2024-02-06 | End: 1900-01-01

## 2024-02-06 RX ORDER — DILTIAZEM HYDROCHLORIDE 120 MG/1
120 CAPSULE, EXTENDED RELEASE ORAL
Qty: 90 | Refills: 3 | Status: ACTIVE | COMMUNITY
Start: 1900-01-01 | End: 1900-01-01

## 2024-02-12 ENCOUNTER — APPOINTMENT (OUTPATIENT)
Dept: SPINE | Facility: CLINIC | Age: 69
End: 2024-02-12
Payer: MEDICARE

## 2024-02-12 ENCOUNTER — RESULT REVIEW (OUTPATIENT)
Age: 69
End: 2024-02-12

## 2024-02-12 VITALS
DIASTOLIC BLOOD PRESSURE: 89 MMHG | SYSTOLIC BLOOD PRESSURE: 148 MMHG | WEIGHT: 156 LBS | HEART RATE: 57 BPM | BODY MASS INDEX: 21.84 KG/M2 | OXYGEN SATURATION: 95 % | HEIGHT: 71 IN

## 2024-02-12 PROCEDURE — 99213 OFFICE O/P EST LOW 20 MIN: CPT

## 2024-02-12 RX ORDER — PYRITHIONE ZINC 1 G/100ML
LOTION/SHAMPOO TOPICAL TWICE DAILY
Refills: 0 | Status: DISCONTINUED | COMMUNITY
End: 2024-02-12

## 2024-02-12 RX ORDER — NORMAL SALT TABLETS 1 G/G
1 TABLET ORAL EVERY 8 HOURS
Refills: 0 | Status: DISCONTINUED | COMMUNITY
End: 2024-02-12

## 2024-02-12 RX ORDER — HYDROMORPHONE HYDROCHLORIDE 2 MG/1
2 TABLET ORAL EVERY 6 HOURS
Refills: 0 | Status: DISCONTINUED | COMMUNITY
End: 2024-02-12

## 2024-02-12 RX ORDER — LIDOCAINE 40 MG/G
4 PATCH TOPICAL
Refills: 0 | Status: DISCONTINUED | COMMUNITY
End: 2024-02-12

## 2024-02-12 RX ORDER — METHOCARBAMOL 750 MG/1
750 TABLET, FILM COATED ORAL 3 TIMES DAILY
Refills: 0 | Status: DISCONTINUED | COMMUNITY
End: 2024-02-12

## 2024-02-12 RX ORDER — MULTIVITAMIN
TABLET ORAL DAILY
Refills: 0 | Status: DISCONTINUED | COMMUNITY
End: 2024-02-12

## 2024-02-12 RX ORDER — DOCUSATE SODIUM 100 MG/1
100 CAPSULE ORAL 3 TIMES DAILY
Refills: 0 | Status: DISCONTINUED | COMMUNITY
End: 2024-02-12

## 2024-02-12 RX ORDER — MAGNESIUM HYDROXIDE/ALUMINUM HYDROXICE/SIMETHICONE 120; 1200; 1200 MG/30ML; MG/30ML; MG/30ML
200-200-20 SUSPENSION ORAL EVERY 4 HOURS
Refills: 0 | Status: DISCONTINUED | COMMUNITY
End: 2024-02-12

## 2024-02-12 RX ORDER — ACETAMINOPHEN 325 MG/1
325 TABLET ORAL EVERY 6 HOURS
Refills: 0 | Status: DISCONTINUED | COMMUNITY
End: 2024-02-12

## 2024-02-12 RX ORDER — GLUCOSAMINE HCL/CHONDROITIN SU 500-400 MG
3 CAPSULE ORAL AT BEDTIME
Refills: 0 | Status: DISCONTINUED | COMMUNITY
End: 2024-02-12

## 2024-02-12 RX ORDER — TAMSULOSIN HYDROCHLORIDE 0.4 MG/1
0.4 CAPSULE ORAL TWICE DAILY
Refills: 0 | Status: DISCONTINUED | COMMUNITY
End: 2024-02-12

## 2024-02-12 NOTE — ASSESSMENT
[FreeTextEntry1] : 68 year old male 4 months s/p C2 partial laminectomy, C3-6 complete laminectomy, evacuation of epidural hematoma and C1-7 posterior fusion by Dr. Spike Dawkins.   Cervical flexion-extension x-ray ordered to be done in one month. He was transitioned from hard cervical collar to soft cervical collar. He will continue with physical therapy for upper extremity strengthening. EMG of upper extremity ordered to r/o cervical radiculopathy.

## 2024-02-12 NOTE — REASON FOR VISIT
[Follow-Up: _____] : a [unfilled] follow-up visit [Spouse] : spouse [FreeTextEntry1] : Mr. Trinidad is a 68-year-old male with history of a fall off ladder with multiple unstable cervical fractures s/p C2 partial laminectomy, C3-6 complete laminectomy, evacuation of epidural hematoma and C1-7 posterior fusion on 10/14/23 by Dr. Spike Dawkins. Patient was discharged to rehab and had another fall while at rehab hitting his neck and head. Per report, patient was not wearing collar at all times.  Today he reports the neck pain more present in the mid afternoon. Denies any numbness or tingling in the extremities. As per patient he developed bilateral deltoid weakness one day post op. He continues to do physical therapy. He is here to review a CT Scan of cervical spine.  There has been significant healing of the C1 fracture though there is still some lucency.  Otherwise the hardware remains unchanged.

## 2024-02-12 NOTE — PHYSICAL EXAM
[General Appearance - Alert] : alert [General Appearance - In No Acute Distress] : in no acute distress [Longitudinal] : longitudinal [Clean] : clean [Dry] : dry [Well-Healed] : well-healed [Intact] : intact [No Drainage] : without drainage [Normal Skin] : normal [FreeTextEntry1] : posterior cervical  [FreeTextEntry6] : Bilateral deltoid weakness 0/5

## 2024-02-16 ENCOUNTER — APPOINTMENT (OUTPATIENT)
Dept: FAMILY MEDICINE | Facility: CLINIC | Age: 69
End: 2024-02-16
Payer: MEDICARE

## 2024-02-16 VITALS
HEART RATE: 76 BPM | HEIGHT: 71 IN | SYSTOLIC BLOOD PRESSURE: 115 MMHG | DIASTOLIC BLOOD PRESSURE: 70 MMHG | BODY MASS INDEX: 21.84 KG/M2 | WEIGHT: 156 LBS | RESPIRATION RATE: 20 BRPM

## 2024-02-16 DIAGNOSIS — E78.5 HYPERLIPIDEMIA, UNSPECIFIED: ICD-10-CM

## 2024-02-16 DIAGNOSIS — R73.01 IMPAIRED FASTING GLUCOSE: ICD-10-CM

## 2024-02-16 PROCEDURE — 36415 COLL VENOUS BLD VENIPUNCTURE: CPT

## 2024-02-16 PROCEDURE — 99214 OFFICE O/P EST MOD 30 MIN: CPT | Mod: 25

## 2024-02-16 RX ORDER — ALBUTEROL SULFATE 90 UG/1
108 (90 BASE) INHALANT RESPIRATORY (INHALATION)
Qty: 1 | Refills: 3 | Status: ACTIVE | COMMUNITY
Start: 1900-01-01 | End: 1900-01-01

## 2024-02-16 NOTE — HISTORY OF PRESENT ILLNESS
[de-identified] : Presents with wife for BP check, labs, and general follow-up.  Reviewed all recent events - continues to wear collar and has limited movement in shoulders; continues in PT. Also now states may have "hernia" in abdomen - "it pops out sometimes."

## 2024-02-16 NOTE — ASSESSMENT
[FreeTextEntry1] : Hemodynamically stable with acceptable BP Lab profiles drawn in office and sent No hernia detected clinically at this time - recommend formal surgical evaluation - gave Dr. Yen's information

## 2024-02-16 NOTE — PHYSICAL EXAM
[No Acute Distress] : no acute distress [No Edema] : there was no peripheral edema [Normal] : soft, non-tender, non-distended, no masses palpated, no HSM and normal bowel sounds [Normal Posterior Cervical Nodes] : no posterior cervical lymphadenopathy [Normal Anterior Cervical Nodes] : no anterior cervical lymphadenopathy [Motor Strength Upper Extremities Bilaterally] : there was weakness in both upper extremities [No Focal Deficits] : no focal deficits [Alert and Oriented x3] : oriented to person, place, and time [de-identified] : collar in place [de-identified] : no hernia detected at this time [de-identified] : marked weakness shoulder girdle

## 2024-02-17 LAB
ALBUMIN SERPL ELPH-MCNC: 4.2 G/DL
ALP BLD-CCNC: 83 U/L
ALT SERPL-CCNC: 8 U/L
ANION GAP SERPL CALC-SCNC: 12 MMOL/L
AST SERPL-CCNC: 13 U/L
BILIRUB SERPL-MCNC: 0.2 MG/DL
BUN SERPL-MCNC: 26 MG/DL
CALCIUM SERPL-MCNC: 9.6 MG/DL
CHLORIDE SERPL-SCNC: 103 MMOL/L
CHOLEST SERPL-MCNC: 163 MG/DL
CO2 SERPL-SCNC: 23 MMOL/L
CREAT SERPL-MCNC: 0.82 MG/DL
EGFR: 94 ML/MIN/1.73M2
ESTIMATED AVERAGE GLUCOSE: 114 MG/DL
FOLATE SERPL-MCNC: 7.5 NG/ML
GLUCOSE SERPL-MCNC: 117 MG/DL
HBA1C MFR BLD HPLC: 5.6 %
HCT VFR BLD CALC: 41.7 %
HDLC SERPL-MCNC: 68 MG/DL
HGB BLD-MCNC: 13.2 G/DL
LDLC SERPL CALC-MCNC: 81 MG/DL
MCHC RBC-ENTMCNC: 28.6 PG
MCHC RBC-ENTMCNC: 31.7 GM/DL
MCV RBC AUTO: 90.5 FL
NONHDLC SERPL-MCNC: 95 MG/DL
PLATELET # BLD AUTO: 152 K/UL
POTASSIUM SERPL-SCNC: 4.7 MMOL/L
PROT SERPL-MCNC: 6.7 G/DL
PSA SERPL-MCNC: 0.91 NG/ML
RBC # BLD: 4.61 M/UL
RBC # FLD: 14.6 %
SODIUM SERPL-SCNC: 139 MMOL/L
TRIGL SERPL-MCNC: 68 MG/DL
VIT B12 SERPL-MCNC: 444 PG/ML
WBC # FLD AUTO: 4.99 K/UL

## 2024-02-19 NOTE — H&P PST ADULT - PSH
19-Feb-2024 18:45
H/O hernia repair  b/l inguinal & abdominal  History of arthroplasty of right knee  5yrs  History of arthroscopy of left shoulder  age 15 & right shoulder 3 yrs

## 2024-03-07 ENCOUNTER — NON-APPOINTMENT (OUTPATIENT)
Age: 69
End: 2024-03-07

## 2024-03-08 ENCOUNTER — APPOINTMENT (OUTPATIENT)
Dept: SURGERY | Facility: CLINIC | Age: 69
End: 2024-03-08
Payer: MEDICARE

## 2024-03-08 VITALS
TEMPERATURE: 96.1 F | BODY MASS INDEX: 21.84 KG/M2 | RESPIRATION RATE: 16 BRPM | HEIGHT: 71 IN | WEIGHT: 156 LBS | OXYGEN SATURATION: 95 % | HEART RATE: 62 BPM

## 2024-03-08 DIAGNOSIS — Z83.3 FAMILY HISTORY OF DIABETES MELLITUS: ICD-10-CM

## 2024-03-08 DIAGNOSIS — S30.0XXA CONTUSION OF LOWER BACK AND PELVIS, INITIAL ENCOUNTER: ICD-10-CM

## 2024-03-08 DIAGNOSIS — K59.09 OTHER CONSTIPATION: ICD-10-CM

## 2024-03-08 DIAGNOSIS — M54.50 LOW BACK PAIN, UNSPECIFIED: ICD-10-CM
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
## 2024-03-08 DIAGNOSIS — Z83.79 FAMILY HISTORY OF OTHER DISEASES OF THE DIGESTIVE SYSTEM: ICD-10-CM

## 2024-03-08 DIAGNOSIS — J44.9 CHRONIC OBSTRUCTIVE PULMONARY DISEASE, UNSPECIFIED: ICD-10-CM

## 2024-03-08 DIAGNOSIS — I10 ESSENTIAL (PRIMARY) HYPERTENSION: ICD-10-CM

## 2024-03-08 DIAGNOSIS — G89.29 LOW BACK PAIN, UNSPECIFIED: ICD-10-CM

## 2024-03-08 DIAGNOSIS — R06.09 OTHER FORMS OF DYSPNEA: ICD-10-CM

## 2024-03-08 PROCEDURE — 99203 OFFICE O/P NEW LOW 30 MIN: CPT

## 2024-03-11 PROBLEM — Z83.3 FAMILY HISTORY OF DIABETES MELLITUS: Status: ACTIVE | Noted: 2018-12-04

## 2024-03-11 PROBLEM — J44.9 COPD (CHRONIC OBSTRUCTIVE PULMONARY DISEASE): Status: ACTIVE | Noted: 2023-02-27

## 2024-03-11 PROBLEM — K59.09 CHRONIC CONSTIPATION: Status: ACTIVE | Noted: 2023-12-13

## 2024-03-11 PROBLEM — Z83.79 FAMILY HISTORY OF COLITIS: Status: ACTIVE | Noted: 2021-12-16

## 2024-03-11 PROBLEM — R06.09 DYSPNEA ON EXERTION: Status: ACTIVE | Noted: 2023-02-16
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
## 2024-03-11 PROBLEM — S30.0XXA CONTUSION OF LOWER BACK: Status: ACTIVE | Noted: 2017-02-21

## 2024-03-11 PROBLEM — I10 BENIGN ESSENTIAL HYPERTENSION: Status: ACTIVE | Noted: 2024-02-06

## 2024-03-11 NOTE — HISTORY OF PRESENT ILLNESS
[de-identified] : This 68 year old man fell five months ago and contused his back and shoulder. Recently, he has noticed a soft , reducible lump in the left flank. He has been eating well and denies any recent change in bowel habits..

## 2024-03-11 NOTE — PHYSICAL EXAM
[Normal Heart Sounds] : normal heart sounds [Normal Breath Sounds] : Normal breath sounds [Normal Rate and Rhythm] : normal rate and rhythm [Abdominal Masses] : No abdominal masses [No Rash or Lesion] : No rash or lesion [Abdomen Tenderness] : ~T ~M No abdominal tenderness [de-identified] : nl [de-identified] : nl [de-identified] : soft, indistinct, reducible soft tissue swelling in the left flank [de-identified] : nl

## 2024-03-11 NOTE — REASON FOR VISIT
[Initial Evaluation] : an initial evaluation [FreeTextEntry1] : possible hernia left side of abdomen

## 2024-03-11 NOTE — ASSESSMENT
[FreeTextEntry1] : Discussion regarding all options and risks As the swelling was not causing ANY problems, I did not recommend any imaging or treatment The patient was instructed to return if the area became symptomatic All lab value and imaging studies reviewed Discussed with Medicine

## 2024-03-12 ENCOUNTER — OUTPATIENT (OUTPATIENT)
Dept: OUTPATIENT SERVICES | Facility: HOSPITAL | Age: 69
LOS: 1 days | End: 2024-03-12
Payer: MEDICARE

## 2024-03-12 ENCOUNTER — APPOINTMENT (OUTPATIENT)
Dept: RADIOLOGY | Facility: HOSPITAL | Age: 69
End: 2024-03-12
Payer: MEDICARE

## 2024-03-12 ENCOUNTER — APPOINTMENT (OUTPATIENT)
Dept: PHYSICAL MEDICINE AND REHAB | Facility: CLINIC | Age: 69
End: 2024-03-12
Payer: MEDICARE

## 2024-03-12 DIAGNOSIS — G62.9 POLYNEUROPATHY, UNSPECIFIED: ICD-10-CM

## 2024-03-12 DIAGNOSIS — M54.12 RADICULOPATHY, CERVICAL REGION: ICD-10-CM

## 2024-03-12 DIAGNOSIS — Z98.890 OTHER SPECIFIED POSTPROCEDURAL STATES: Chronic | ICD-10-CM

## 2024-03-12 DIAGNOSIS — Z98.1 ARTHRODESIS STATUS: Chronic | ICD-10-CM

## 2024-03-12 DIAGNOSIS — Z96.651 PRESENCE OF RIGHT ARTIFICIAL KNEE JOINT: Chronic | ICD-10-CM

## 2024-03-12 DIAGNOSIS — Z98.1 ARTHRODESIS STATUS: ICD-10-CM

## 2024-03-12 PROCEDURE — 95886 MUSC TEST DONE W/N TEST COMP: CPT

## 2024-03-12 PROCEDURE — 72052 X-RAY EXAM NECK SPINE 6/>VWS: CPT

## 2024-03-12 PROCEDURE — 72052 X-RAY EXAM NECK SPINE 6/>VWS: CPT | Mod: 26

## 2024-03-12 PROCEDURE — 99204 OFFICE O/P NEW MOD 45 MIN: CPT

## 2024-03-12 PROCEDURE — 95912 NRV CNDJ TEST 11-12 STUDIES: CPT

## 2024-03-15 ENCOUNTER — RX RENEWAL (OUTPATIENT)
Age: 69
End: 2024-03-15

## 2024-03-15 RX ORDER — TAMSULOSIN HYDROCHLORIDE 0.4 MG/1
0.4 CAPSULE ORAL
Qty: 30 | Refills: 0 | Status: ACTIVE | COMMUNITY
Start: 2024-02-06 | End: 1900-01-01

## 2024-03-15 RX ORDER — HYDROMORPHONE HYDROCHLORIDE 8 MG/1
8 TABLET ORAL
Qty: 30 | Refills: 0 | Status: ACTIVE | COMMUNITY
Start: 2024-03-15 | End: 1900-01-01

## 2024-03-18 ENCOUNTER — APPOINTMENT (OUTPATIENT)
Dept: SPINE | Facility: CLINIC | Age: 69
End: 2024-03-18
Payer: MEDICARE

## 2024-03-18 VITALS
BODY MASS INDEX: 21.84 KG/M2 | SYSTOLIC BLOOD PRESSURE: 152 MMHG | OXYGEN SATURATION: 96 % | WEIGHT: 156 LBS | HEART RATE: 68 BPM | DIASTOLIC BLOOD PRESSURE: 100 MMHG | HEIGHT: 71 IN

## 2024-03-18 DIAGNOSIS — R51.9 HEADACHE, UNSPECIFIED: ICD-10-CM

## 2024-03-18 PROBLEM — G62.9 NEUROPATHY: Status: ACTIVE | Noted: 2024-03-18

## 2024-03-18 PROBLEM — M54.12 CERVICAL RADICULOPATHY: Status: ACTIVE | Noted: 2024-03-18

## 2024-03-18 PROCEDURE — 99213 OFFICE O/P EST LOW 20 MIN: CPT

## 2024-03-18 NOTE — PHYSICAL EXAM
[Motor Strength] : muscle strength was normal in all four extremities [Abnormal Walk] : normal gait [Sensation Tactile Decrease] : light touch was intact

## 2024-03-18 NOTE — ASSESSMENT
[FreeTextEntry1] : Preliminary report  Impression: 1.  	Diffuse sensory neuropathy. 2. 	Mild bilateral CTS 3. 	Mild bilateral radial demyelinating neuropathy 4. 	Denervation potentials in the  right bicep muscles 5.	Diffuse decreased recruitment  Recommendations: Neurology consult  Full report to follow

## 2024-03-18 NOTE — ASSESSMENT
[FreeTextEntry1] : 69 year old male 5 months s/p C2 partial laminectomy, C3-6 complete laminectomy, evacuation of epidural hematoma and C1-7 posterior fusion by Dr. Spike Dawkins. He will continue with physical therapy for the upper extremities. Cervical flexion-extension x-ray ordered to be done in three months. The patient had an EMG of upper extremities which revealed diffuses sensory neuropathy and mild bilateral radical demyelinating neuropathy. He was referred to neurology. He will continue to wear soft cervical collar for another month.

## 2024-03-18 NOTE — REASON FOR VISIT
[Follow-Up: _____] : a [unfilled] follow-up visit [Spouse] : spouse [FreeTextEntry1] : Mr. Trinidad is a 69-year-old male with history of a fall off ladder with multiple unstable cervical fractures s/p C2 partial laminectomy, C3-6 complete laminectomy, evacuation of epidural hematoma and C1-7 posterior fusion on 10/14/23 by Dr. Spike Dawkins. Patient was discharged to rehab and had another fall while at rehab hitting his neck and head. Per report, patient was not wearing collar at all times. As per patient he developed bilateral deltoid weakness one day post op.  CT Scan of cervical spine (1/28/2024) revealed There has been significant healing of the C1 fracture though there is still some lucency. Otherwise the hardware remains unchanged.  He has occasional headaches and neck pain. Denies any numbness or tingling He continues to do physical therapy to strengthen the upper extremities with some improvement. He continues to wear a soft cervical collar. He is here review cervical x-ray.  X-rays are unchanged.

## 2024-03-22 ENCOUNTER — NON-APPOINTMENT (OUTPATIENT)
Age: 69
End: 2024-03-22

## 2024-04-18 ENCOUNTER — APPOINTMENT (OUTPATIENT)
Dept: PHYSICAL MEDICINE AND REHAB | Facility: CLINIC | Age: 69
End: 2024-04-18

## 2024-04-22 ENCOUNTER — APPOINTMENT (OUTPATIENT)
Dept: PHYSICAL MEDICINE AND REHAB | Facility: CLINIC | Age: 69
End: 2024-04-22
Payer: MEDICARE

## 2024-04-22 PROCEDURE — 99203 OFFICE O/P NEW LOW 30 MIN: CPT

## 2024-04-22 PROCEDURE — 99213 OFFICE O/P EST LOW 20 MIN: CPT

## 2024-04-22 NOTE — PHYSICAL EXAM
[Normal] : Normal bowel sounds, soft, non-tender, no hepato-splenomegaly and no abdominal mass palpated [1] : C5 elbow flexion 1/5 [2] : C6 wrist extensors 2/5 [3] : C7  elbow extension 3/5 [4] : C7  elbow extension 4/5 [5] : T1 small finger abduction 5/5 [de-identified] : Normal gait, no joint swelling. No pain with passive ROM of upper extremity joints  [de-identified] : Hollingsworth+ b/l UE; no tone, reflexes wnl, no sensory disturbances  [FreeTextEntry1] : Cervical surgical site c/d/i

## 2024-04-22 NOTE — ASSESSMENT
[FreeTextEntry1] : 69 year old presents today 6 months s/p C2 partial laminectomy, C3-6 complete laminectomy, evacuation of epidural hematoma and C1-7 posterior fusion by Dr. Spike Dawkins. While he has had two EMG studies that reveal varying degrees of peripheral nervous system pathology, exam also raises possibility of central cord syndrome in recovery.   He will continue working with physical therapy and initiate occupational therapy -  prescription provided today, encouraged patient to see therapy team at [facility], and follow up if any issues getting on their schedule.  Answered all questions that patient and his wife had and discussed functional recovery after SCI.  Patient encouraged to follow up in office with further questions.

## 2024-04-22 NOTE — REVIEW OF SYSTEMS
[Fever] : no fever [Chills] : no chills [Fatigue] : no fatigue [Chest Pain] : no chest pain [Palpitations] : no palpitations [Leg Claudication] : no intermittent leg claudication [Shortness Of Breath] : no shortness of breath [Wheezing] : no wheezing [Cough] : no cough [Abdominal Pain] : no abdominal pain [Nausea] : no nausea [Constipation] : no constipation [Dysuria] : no dysuria [Incontinence] : no incontinence [Hesitancy] : no urinary hesitancy [Joint Pain] : no joint pain [Joint Stiffness] : no joint stiffness [Muscle Pain] : no muscle pain [Suicidal] : not suicidal [Insomnia] : no insomnia [Anxiety] : no anxiety [de-identified] : Weakness, clumsiness of RUE>LUE

## 2024-04-22 NOTE — HISTORY OF PRESENT ILLNESS
[FreeTextEntry1] : 69-year-old male with history of a fall off ladder with multiple unstable cervical fractures s/p C2 partial laminectomy, C3-6 complete laminectomy, evacuation of epidural hematoma and C1-7 posterior fusion on 10/14/23 by Dr. Spike Dawkins. Patient was discharged to rehab and had another fall while at [employer] hitting his neck and head. Patient has been home since February and attending outpatient physical therapy.   Today, the patient presents to establish care and for evaluation of his progress. He explains that overall, he has been managing to live his life without tremendous restriction and recently was able to do some light work again as a . However, the patient is unable to utilize his RUE to feed himself consistently, and finds he continues to drop objects and endorses overall clumsiness with the RUE. While the LUE has weakness as well, they do not limit his ADLs. Patient denies any bowel/bladder problems that arose since the surgery.   He has had two EMG studies performed since the surgery; findings from Dr. Ornelas included a diffuse sensory neuropathy, mild bilateral radial demyelinating neuropathy, denervation potentials in the right bicep and diffuse decreased recruitment.

## 2024-04-29 ENCOUNTER — NON-APPOINTMENT (OUTPATIENT)
Age: 69
End: 2024-04-29

## 2024-05-06 ENCOUNTER — NON-APPOINTMENT (OUTPATIENT)
Age: 69
End: 2024-05-06

## 2024-05-06 RX ORDER — METHOCARBAMOL 750 MG/1
750 TABLET, FILM COATED ORAL
Qty: 30 | Refills: 3 | Status: ACTIVE | COMMUNITY
Start: 2024-02-16 | End: 1900-01-01

## 2024-05-08 ENCOUNTER — TRANSCRIPTION ENCOUNTER (OUTPATIENT)
Age: 69
End: 2024-05-08

## 2024-05-09 ENCOUNTER — RESULT REVIEW (OUTPATIENT)
Age: 69
End: 2024-05-09

## 2024-05-11 ENCOUNTER — OUTPATIENT (OUTPATIENT)
Dept: OUTPATIENT SERVICES | Facility: HOSPITAL | Age: 69
LOS: 1 days | End: 2024-05-11
Payer: MEDICARE

## 2024-05-11 ENCOUNTER — APPOINTMENT (OUTPATIENT)
Dept: RADIOLOGY | Facility: CLINIC | Age: 69
End: 2024-05-11
Payer: MEDICARE

## 2024-05-11 DIAGNOSIS — Z98.1 ARTHRODESIS STATUS: ICD-10-CM

## 2024-05-11 DIAGNOSIS — Z98.890 OTHER SPECIFIED POSTPROCEDURAL STATES: Chronic | ICD-10-CM

## 2024-05-11 DIAGNOSIS — Z98.1 ARTHRODESIS STATUS: Chronic | ICD-10-CM

## 2024-05-11 PROCEDURE — 72052 X-RAY EXAM NECK SPINE 6/>VWS: CPT | Mod: 26

## 2024-05-11 PROCEDURE — 72052 X-RAY EXAM NECK SPINE 6/>VWS: CPT

## 2024-05-30 ENCOUNTER — NON-APPOINTMENT (OUTPATIENT)
Age: 69
End: 2024-05-30

## 2024-06-02 ENCOUNTER — NON-APPOINTMENT (OUTPATIENT)
Age: 69
End: 2024-06-02

## 2024-06-03 ENCOUNTER — APPOINTMENT (OUTPATIENT)
Dept: SPINE | Facility: CLINIC | Age: 69
End: 2024-06-03
Payer: MEDICARE

## 2024-06-03 VITALS
SYSTOLIC BLOOD PRESSURE: 125 MMHG | OXYGEN SATURATION: 93 % | WEIGHT: 156 LBS | BODY MASS INDEX: 21.84 KG/M2 | DIASTOLIC BLOOD PRESSURE: 79 MMHG | HEIGHT: 71 IN | HEART RATE: 53 BPM

## 2024-06-03 DIAGNOSIS — Z98.1 ARTHRODESIS STATUS: ICD-10-CM

## 2024-06-03 DIAGNOSIS — S14.129A CENTRAL CORD SYNDROME AT UNSPECIFIED LVL OF CERVICAL SPINAL CORD, INITIAL ENCOUNTER: ICD-10-CM

## 2024-06-03 PROCEDURE — 99213 OFFICE O/P EST LOW 20 MIN: CPT

## 2024-06-03 NOTE — ASSESSMENT
[FreeTextEntry1] : 69 year old male 8 months s/p C2 partial laminectomy, C3-6 complete laminectomy, evacuation of epidural hematoma and C1-7 posterior fusion by Dr. Spike Dawkins. He will continue with physical and occupational therapy for the upper extremities. Cervical flexion-extension x-ray ordered to be done in three months.  He may also benefit from seeing a pain management specialist.

## 2024-06-03 NOTE — HISTORY OF PRESENT ILLNESS
[FreeTextEntry1] : Mr. Trinidad is a 69-year-old male with history of a fall off a ladder with multiple unstable cervical fractures s/p C2 partial laminectomy, C3-6 complete laminectomy, evacuation of epidural hematoma and C1-7 posterior fusion on 10/14/23 by Dr. Spike Dawkins. Patient was discharged to rehab and had another fall while at rehab hitting his neck and head. Per report, patient was not wearing collar at all times. As per patient he developed bilateral deltoid weakness one day post op. CT Scan of cervical spine (1/28/2024) revealed There has been significant healing of the C1 fracture though there is still some lucency. Otherwise the hardware remains unchanged. The patient had an EMG of upper extremities (3/12/2024) which revealed diffuses sensory neuropathy and mild bilateral radical demyelinating neuropathy.  He describes neck pain which is worse during the evening.  Denies any numbness or tingling in upper extremities.   He continues to do physical and occupational therapy to strengthen the upper extremities with some improvement. He continues to wear a soft cervical collar. Cervical X-ray (511/2024) are unchanged.

## 2024-06-06 NOTE — CHART NOTE - NSCHARTNOTEFT_GEN_A_CORE
Patient states he takes dilaudid for pain. ISTOP Reference #[id number] accessed by VIANEY Singleton confirms hydromorphone 4 mg tablet prescription history.     NSGY no objection to dilaudid PO.
Reference #: [id number]  A	Y	O	11/11/2023	11/11/2023	hydromorphone 4 mg tablet	18	3	Familusi, Fiorella	[id number]	[facility]  A	Y	B	11/11/2023	11/11/2023	diazepam 5 mg tablet	20	5	Familusi, Fiorella	[id number]	[place]	[facility]  A	N	B	11/04/2023	11/04/2023	diazepam 10 mg tablet	10	5	Reyes, John A MD	[id number]	[place]	[facility]  A	N	O	11/03/2023	11/03/2023	hydromorphone 4 mg tablet	42	7	Elizabeth Trujillo	[id number]	[place]	[facility]  B	Y	O	10/12/2023	10/28/2023	hydromorphone 4 mg tablet	90	30	Carlos Wharton MD	[id number]	Medicare	[id number]  B	N	O	08/24/2023	08/29/2023	hydromorphone 4 mg tablet	90	30	Carlos Wharton MD	[id number]	Medicare	[id number]  B	N	O	07/05/2023	07/08/2023	hydromorphone 4 mg tablet	90	30	Carlos Wharton MD	[id number]	Medicare	[id number]  B	N	O	05/30/2023	05/31/2023	hydromorphone 4 mg tablet	90	30	Carlos Wharton MD	[id number]	Medicare	[id number]  B	N	O	04/27/2023	04/28/2023	hydromorphone 4 mg tablet	90	30	Carlos Wharton MD	[id number]	Medicare	[id number]  B	N	O	03/27/2023	03/27/2023	hydromorphone 4 mg tablet	90	30	Carlos Wharton MD	[id number]	Medicare	[id number]  B	N	O	02/22/2023	02/23/2023	hydromorphone 4 mg tablet	90	30	Carlos Wharton MD	[id number]	Medicare	[id number]  B	N	O	01/19/2023	01/20/2023	hydromorphone 4 mg tablet	90	30	Carlos Wharton MD	[id number]	Medicare	[id number]  B	N	O	01/09/2023	01/10/2023	hydromorphone 4 mg tablet	21	7	Carlos Wharton MD	[id number]	Medicare	[id number]  B	N	O	11/29/2022	12/04/2022	hydromorphone 4 mg tablet	90	30	Carlos Wharton MD	[id number]	Medicare	[id number]
No

## 2024-06-17 ENCOUNTER — APPOINTMENT (OUTPATIENT)
Dept: PHYSICAL MEDICINE AND REHAB | Facility: CLINIC | Age: 69
End: 2024-06-17

## 2024-06-20 ENCOUNTER — NON-APPOINTMENT (OUTPATIENT)
Age: 69
End: 2024-06-20

## 2024-06-21 ENCOUNTER — APPOINTMENT (OUTPATIENT)
Dept: PHYSICAL MEDICINE AND REHAB | Facility: CLINIC | Age: 69
End: 2024-06-21

## 2024-06-26 ENCOUNTER — NON-APPOINTMENT (OUTPATIENT)
Age: 69
End: 2024-06-26

## 2024-06-26 RX ORDER — HYDROMORPHONE HYDROCHLORIDE 4 MG/1
4 TABLET ORAL TWICE DAILY
Qty: 60 | Refills: 0 | Status: ACTIVE | COMMUNITY
Start: 2020-10-16 | End: 1900-01-01

## 2024-06-26 RX ORDER — DIAZEPAM 5 MG/1
5 TABLET ORAL
Qty: 14 | Refills: 0 | Status: ACTIVE | COMMUNITY
Start: 1900-01-01 | End: 1900-01-01

## 2024-06-26 RX ORDER — TIZANIDINE 4 MG/1
4 TABLET ORAL EVERY 6 HOURS
Qty: 40 | Refills: 3 | Status: ACTIVE | COMMUNITY
Start: 2024-05-30 | End: 1900-01-01

## 2024-07-26 ENCOUNTER — APPOINTMENT (OUTPATIENT)
Dept: RADIOLOGY | Facility: HOSPITAL | Age: 69
End: 2024-07-26
Payer: MEDICARE

## 2024-07-26 PROCEDURE — 72052 X-RAY EXAM NECK SPINE 6/>VWS: CPT | Mod: 26

## 2024-07-29 ENCOUNTER — APPOINTMENT (OUTPATIENT)
Dept: SPINE | Facility: CLINIC | Age: 69
End: 2024-07-29
Payer: MEDICARE

## 2024-07-29 VITALS
SYSTOLIC BLOOD PRESSURE: 141 MMHG | BODY MASS INDEX: 21.84 KG/M2 | DIASTOLIC BLOOD PRESSURE: 86 MMHG | WEIGHT: 156 LBS | HEART RATE: 70 BPM | OXYGEN SATURATION: 93 % | HEIGHT: 71 IN

## 2024-07-29 DIAGNOSIS — Z98.1 ARTHRODESIS STATUS: ICD-10-CM

## 2024-07-29 PROCEDURE — 99213 OFFICE O/P EST LOW 20 MIN: CPT

## 2024-07-29 NOTE — HISTORY OF PRESENT ILLNESS
[FreeTextEntry1] : Mr. Trinidad is a 69-year-old male with history of a fall off a ladder with multiple unstable cervical fractures s/p C2 partial laminectomy, C3-6 complete laminectomy, evacuation of epidural hematoma and C1-7 posterior fusion on 10/14/23 by Dr. Spike Dawkins. Patient was discharged to rehab and had another fall while at rehab hitting his neck and head. Per report, patient was not wearing collar at all times. As per patient he developed bilateral deltoid weakness one day post op. CT Scan of cervical spine (1/28/2024) revealed There has been significant healing of the C1 fracture though there is still some lucency. Otherwise the hardware remains unchanged. The patient had an EMG of upper extremities (3/12/2024) which revealed diffuse sensory neuropathy and mild bilateral radical demyelinating neuropathy. At his appointment on 6/3/24 he describes neck pain which is worse during the evening. Denied any numbness or tingling in upper extremities. He continues to do physical and occupational therapy to strengthen the upper extremities with some improvement. He continues to wear a soft cervical collar. Cervical X-ray (5/11/2024) were unchanged.  He returns today for imaging review on cervical flexion-extension x-rays and having seen pain management. He reports his pain in his neck intensifies throughout the day. He has been taking hydromorphone and valium which helps the pain temporarily. He does physical therapy 7 days a week and swimming. He is also doing acupuncture. Recent x rays show no changes AND no motion through index levels on flex and ext.

## 2024-08-02 ENCOUNTER — APPOINTMENT (OUTPATIENT)
Dept: PHYSICAL MEDICINE AND REHAB | Facility: CLINIC | Age: 69
End: 2024-08-02
Payer: MEDICARE

## 2024-08-02 ENCOUNTER — NON-APPOINTMENT (OUTPATIENT)
Age: 69
End: 2024-08-02

## 2024-08-02 DIAGNOSIS — G89.29 OTHER CHRONIC PAIN: ICD-10-CM

## 2024-08-02 DIAGNOSIS — S14.129A CENTRAL CORD SYNDROME AT UNSPECIFIED LVL OF CERVICAL SPINAL CORD, INITIAL ENCOUNTER: ICD-10-CM

## 2024-08-02 PROCEDURE — 99213 OFFICE O/P EST LOW 20 MIN: CPT

## 2024-08-02 NOTE — PHYSICAL EXAM
[3] : C7  elbow extension 3/5 [4] : C7  elbow extension 4/5 [5] : T1 small finger abduction 5/5 [Normal] : Oriented to person, place, and time, insight and judgement were intact and the affect was normal [2] : C5 elbow flexion 2/5 [de-identified] : Amb w/o AD - wide based, no joint swelling. No pain with passive ROM of upper extremity joints  [de-identified] : Hollingsworth+ b/l UE; MAS 2 spasticity  [FreeTextEntry1] : Cervical surgical site c/d/i

## 2024-08-02 NOTE — HISTORY OF PRESENT ILLNESS
[FreeTextEntry1] : 22 Patient f/u for results of EMG and with multiple questions.  Currently taking: Hydromorphone 4 mg TID-QID, Valium 2.5-5mg QD. Stopped Tizanidine- caused fatigue and had low energy.  Started acupuncture.  Feels like pain is getting worse. Gets PT and does HEP.   On 3/12/24 had EM.  Diffuse sensory neuropathy. 2.  Mild bilateral CTS 3.  Mild bilateral radial demyelinating neuropathy 4.  Denervation potentials in the right bicep muscles 5. Diffuse decreased recruitment   24 69-year-old male with history of a fall off ladder with multiple unstable cervical fractures s/p C2 partial laminectomy, C3-6 complete laminectomy, evacuation of epidural hematoma and C1-7 posterior fusion on 10/14/23 by Dr. Spike Dawkins. Patient was discharged to rehab and had another fall while at [employer] hitting his neck and head. Patient has been home since February and attending outpatient physical therapy.   Today, the patient presents to establish care and for evaluation of his progress. He explains that overall, he has been managing to live his life without tremendous restriction and recently was able to do some light work again as a . However, the patient is unable to utilize his RUE to feed himself consistently, and finds he continues to drop objects and endorses overall clumsiness with the RUE. While the LUE has weakness as well, they do not limit his ADLs. Patient denies any bowel/bladder problems that arose since the surgery.   He has had two EMG studies performed since the surgery; findings from Dr. Ornelas included a diffuse sensory neuropathy, mild bilateral radial demyelinating neuropathy, denervation potentials in the right bicep and diffuse decreased recruitment.

## 2024-08-02 NOTE — ASSESSMENT
[FreeTextEntry1] : - Continue PT/OT - Referral to Chronic Pain Physician (Dr. Petersen) - d/w patient options for function and pain management - can f/u prn.

## 2024-08-02 NOTE — REVIEW OF SYSTEMS
[Fever] : no fever [Chills] : no chills [Fatigue] : no fatigue [Chest Pain] : no chest pain [Palpitations] : no palpitations [Leg Claudication] : no intermittent leg claudication [Shortness Of Breath] : no shortness of breath [Wheezing] : no wheezing [Cough] : no cough [Abdominal Pain] : no abdominal pain [Nausea] : no nausea [Constipation] : no constipation [Dysuria] : no dysuria [Incontinence] : no incontinence [Hesitancy] : no urinary hesitancy [Joint Pain] : no joint pain [Joint Stiffness] : no joint stiffness [Muscle Pain] : no muscle pain [Suicidal] : not suicidal [Insomnia] : no insomnia [Anxiety] : no anxiety [de-identified] : Weakness, clumsiness of RUE>LUE

## 2024-08-09 ENCOUNTER — NON-APPOINTMENT (OUTPATIENT)
Age: 69
End: 2024-08-09

## 2024-08-15 ENCOUNTER — NON-APPOINTMENT (OUTPATIENT)
Age: 69
End: 2024-08-15

## 2024-08-20 ENCOUNTER — LABORATORY RESULT (OUTPATIENT)
Age: 69
End: 2024-08-20

## 2024-08-20 ENCOUNTER — APPOINTMENT (OUTPATIENT)
Dept: PAIN MANAGEMENT | Facility: CLINIC | Age: 69
End: 2024-08-20
Payer: MEDICARE

## 2024-08-20 VITALS
BODY MASS INDEX: 21.84 KG/M2 | HEIGHT: 71 IN | HEART RATE: 61 BPM | SYSTOLIC BLOOD PRESSURE: 154 MMHG | WEIGHT: 156 LBS | DIASTOLIC BLOOD PRESSURE: 92 MMHG

## 2024-08-20 DIAGNOSIS — G89.4 CHRONIC PAIN SYNDROME: ICD-10-CM

## 2024-08-20 PROCEDURE — 99214 OFFICE O/P EST MOD 30 MIN: CPT

## 2024-08-20 RX ORDER — NALOXONE HYDROCHLORIDE NASAL 4 MG/.1ML
4 SPRAY NASAL
Qty: 1 | Refills: 0 | Status: ACTIVE | COMMUNITY
Start: 2024-08-20 | End: 1900-01-01

## 2024-08-20 NOTE — HISTORY OF PRESENT ILLNESS
[FreeTextEntry1] :  Subjective:   - Summary: Mr. Trinidad is a 69-year-old right-handed male presenting for follow-up after cervical spine surgery and management of chronic pain.   - Chief Complaint (CC): Chronic neck and back pain following a fall from a ladder in October 2023, resulting in cervical spine injuries and subsequent surgery.   - History of Present Illness: Mr. Bolivar Trinidad is a 69-year-old right-handed male who fell off a ladder on October 13, 2023, sustaining injuries to his cervical spine from C1 to C7. He underwent surgery on October 14, 2023, involving a C2 partial laminectomy, C3-C6 complete laminectomy, evacuation of an epidural hematoma, and C1-C7 posterior fusion with rods and screws. Postoperatively, he developed bilateral deltoid weakness and was unable to move his arms, which the provider attributed to a possible nerve impingement from the surgical hardware. During his rehabilitation stay, he experienced another fall, hitting his head and neck, which displaced a maura and bent a screw. Since the surgery, Mr. Trinidad has been experiencing constant, severe neck pain radiating to his head, with intermittent episodes of electrical sensations in his legs. The pain typically worsens in the afternoon and can reach a severity of 7-10 on a 10-point scale. He also experiences stiffness and an inability to fully turn his neck. Mr. Trinidad has tried various pain management strategies, including injections and radiofrequency ablation, without significant relief. He is currently taking hydromorphone (4 mg, 2-3 times daily, sometimes up to 5-6 doses) and diazepam (10 mg, cut in half, sometimes taking up to 2 doses) for pain management. He has also undergone physical and occupational therapy but has been told he has reached a plateau in his recovery. Mr. Trinidad has a history of chronic obstructive pulmonary disease (COPD) due to a 50-year smoking history and has undergone surgeries for hemorrhoids, right knee replacement, and bilateral shoulder surgeries. He works in [employer] but has been unable to return to full duty due to his limitations.   - Past Medical History:     - Chronic obstructive pulmonary disease (COPD)     - Hemorrhoid surgery     - Right knee replacement surgery     - Bilateral shoulder surgeries   - Past Surgical History:     - Cervical spine surgery (C2 partial laminectomy, C3-C6 complete laminectomy, evacuation of epidural hematoma, C1-C7 posterior fusion) on October 14, 2023     - Hemorrhoid surgery     - Right knee replacement surgery     - Bilateral shoulder surgeries   - Family History: Not mentioned   - Social History:     - Occupation: Landscaping     - Smoking history: 50-year history of smoking (contributing to COPD)   - Review of Systems:     - General: Weight changes, fatigue, or other general symptoms not mentioned.     - Neurological: Bilateral deltoid weakness, inability to move arms after cervical spine surgery, electrical sensations in legs.     - Musculoskeletal: Chronic neck and back pain, stiffness in neck limiting range of motion, right knee replacement.     - Cardiovascular: No specific cardiovascular symptoms mentioned.     - Respiratory: History of COPD due to smoking.     - Gastrointestinal: Hemorrhoid surgery in the past.     - Genitourinary: No specific genitourinary symptoms mentioned.     - Integumentary: No specific skin or integumentary symptoms mentioned.     - Psychiatric: No specific psychiatric symptoms mentioned.   - Medications:     - Hydromorphone (4 mg, 2-3 times daily, sometimes up to 5-6 doses)     - Diazepam (10 mg, cut in half, sometimes taking up to 2 doses)     - Metoprolol (for heart condition, dosage not specified)   - Allergies: Allergy to all foods mentioned, but no specific reactions described.   Objective:   - Diagnostic Results: No recent diagnostic results mentioned.   - Vital Signs: Not mentioned.   - Physical Examination (PE): A detailed physical examination was not described in the provided conversation.   Assessment and Plan:   - Chronic Neck and Back Pain: Mr. Trinidad is experiencing chronic, severe neck and back pain following cervical spine surgery and a fall. The pain is constant, with intermittent exacerbations, and is limiting his functional abilities and quality of life.     - Therapeutic Interventions: Reevaluate current pain management regimen, considering tapering or discontinuing diazepam due to the risk of respiratory depression when combined with opioids. Explore alternative non-opioid analgesics, such as gabapentin or other anticonvulsants, for neuropathic pain management. Consider referral to a comprehensive pain management program for a multidisciplinary approach.      - Diagnostic Tests: Obtain updated cervical spine imaging (CT or MRI) to assess the position of the surgical hardware and any potential nerve impingement or other complications.      - Referrals: Refer to a physiatrist or physical medicine and rehabilitation specialist for further evaluation and management of functional limitations and potential adaptive equipment needs (e.g., steering wheel knob, mirrors for driving).      - Patient Education: Educate Mr. Trinidad on the risks associated with concomitant use of opioids and benzodiazepines, emphasizing the importance of medication adherence and close monitoring. Discuss realistic expectations for pain management and functional goals.      - Follow-Up: Schedule a follow-up appointment in 4-6 weeks to reassess pain levels, functional status, and response to any changes in the treatment plan.    - Chronic Obstructive Pulmonary Disease (COPD): Mr. Trinidad has a history of COPD related to his long-standing smoking history, which may impact his overall functional capacity and tolerance for certain medications.     - Therapeutic Interventions: Ensure appropriate management of COPD with bronchodilators, inhaled corticosteroids, or other respiratory medications as needed.      - Diagnostic Tests: Consider obtaining pulmonary function tests to assess the current severity of COPD and guide management.      - Referrals: Refer to a pulmonologist if COPD is not well-controlled or for further evaluation and management.      - Patient Education: Reinforce the importance of smoking cessation and provide resources for smoking cessation support, if applicable.      - Follow-Up: Address COPD management during regular follow-up visits.    - Functional Limitations and Occupational Concerns: Mr. 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     - Therapeutic Interventions: Continue with physical and occupational therapy to maximize functional recovery and explore adaptive techniques or equipment to facilitate work-related tasks.      - Diagnostic Tests: No specific diagnostic tests are indicated for this concern.      - Referrals: Consider referral to a  or occupational therapist for a comprehensive evaluation of work-related abilities and potential accommodations.      - Patient Education: Discuss realistic expectations for returning to work and the potential need for job modifications or alternative employment options based on his functional limitations.      - Follow-Up: Regularly reassess functional status and work-related abilities during follow-up visits.

## 2024-08-20 NOTE — HISTORY OF PRESENT ILLNESS
[FreeTextEntry1] :  Subjective:   - Summary: Mr. Trinidad is a 69-year-old right-handed male presenting for follow-up after cervical spine surgery and management of chronic pain.   - Chief Complaint (CC): Chronic neck and back pain following a fall from a ladder in October 2023, resulting in cervical spine injuries and subsequent surgery.   - History of Present Illness: Mr. Bolivar Trinidad is a 69-year-old right-handed male who fell off a ladder on October 13, 2023, sustaining injuries to his cervical spine from C1 to C7. He underwent surgery on October 14, 2023, involving a C2 partial laminectomy, C3-C6 complete laminectomy, evacuation of an epidural hematoma, and C1-C7 posterior fusion with rods and screws. Postoperatively, he developed bilateral deltoid weakness and was unable to move his arms, which the provider attributed to a possible nerve impingement from the surgical hardware. During his rehabilitation stay, he experienced another fall, hitting his head and neck, which displaced a maura and bent a screw. Since the surgery, Mr. Trinidad has been experiencing constant, severe neck pain radiating to his head, with intermittent episodes of electrical sensations in his legs. The pain typically worsens in the afternoon and can reach a severity of 7-10 on a 10-point scale. He also experiences stiffness and an inability to fully turn his neck. Mr. Trinidad has tried various pain management strategies, including injections and radiofrequency ablation, without significant relief. He is currently taking hydromorphone (4 mg, 2-3 times daily, sometimes up to 5-6 doses) and diazepam (10 mg, cut in half, sometimes taking up to 2 doses) for pain management. He has also undergone physical and occupational therapy but has been told he has reached a plateau in his recovery. Mr. Trinidad has a history of chronic obstructive pulmonary disease (COPD) due to a 50-year smoking history and has undergone surgeries for hemorrhoids, right knee replacement, and bilateral shoulder surgeries. He works in [employer] but has been unable to return to full duty due to his limitations.   - Past Medical History:     - Chronic obstructive pulmonary disease (COPD)     - Hemorrhoid surgery     - Right knee replacement surgery     - Bilateral shoulder surgeries   - Past Surgical History:     - Cervical spine surgery (C2 partial laminectomy, C3-C6 complete laminectomy, evacuation of epidural hematoma, C1-C7 posterior fusion) on October 14, 2023     - Hemorrhoid surgery     - Right knee replacement surgery     - Bilateral shoulder surgeries   - Family History: Not mentioned   - Social History:     - Occupation: Landscaping     - Smoking history: 50-year history of smoking (contributing to COPD)   - Review of Systems:     - General: Weight changes, fatigue, or other general symptoms not mentioned.     - Neurological: Bilateral deltoid weakness, inability to move arms after cervical spine surgery, electrical sensations in legs.     - Musculoskeletal: Chronic neck and back pain, stiffness in neck limiting range of motion, right knee replacement.     - Cardiovascular: No specific cardiovascular symptoms mentioned.     - Respiratory: History of COPD due to smoking.     - Gastrointestinal: Hemorrhoid surgery in the past.     - Genitourinary: No specific genitourinary symptoms mentioned.     - Integumentary: No specific skin or integumentary symptoms mentioned.     - Psychiatric: No specific psychiatric symptoms mentioned.   - Medications:     - Hydromorphone (4 mg, 2-3 times daily, sometimes up to 5-6 doses)     - Diazepam (10 mg, cut in half, sometimes taking up to 2 doses)     - Metoprolol (for heart condition, dosage not specified)   - Allergies: Allergy to all foods mentioned, but no specific reactions described.   Objective:   - Diagnostic Results: No recent diagnostic results mentioned.   - Vital Signs: Not mentioned.   - Physical Examination (PE): A detailed physical examination was not described in the provided conversation.   Assessment and Plan:   - Chronic Neck and Back Pain: Mr. Trinidad is experiencing chronic, severe neck and back pain following cervical spine surgery and a fall. The pain is constant, with intermittent exacerbations, and is limiting his functional abilities and quality of life.     - Therapeutic Interventions: Reevaluate current pain management regimen, considering tapering or discontinuing diazepam due to the risk of respiratory depression when combined with opioids. Explore alternative non-opioid analgesics, such as gabapentin or other anticonvulsants, for neuropathic pain management. Consider referral to a comprehensive pain management program for a multidisciplinary approach.      - Diagnostic Tests: Obtain updated cervical spine imaging (CT or MRI) to assess the position of the surgical hardware and any potential nerve impingement or other complications.      - Referrals: Refer to a physiatrist or physical medicine and rehabilitation specialist for further evaluation and management of functional limitations and potential adaptive equipment needs (e.g., steering wheel knob, mirrors for driving).      - Patient Education: Educate Mr. Trinidad on the risks associated with concomitant use of opioids and benzodiazepines, emphasizing the importance of medication adherence and close monitoring. Discuss realistic expectations for pain management and functional goals.      - Follow-Up: Schedule a follow-up appointment in 4-6 weeks to reassess pain levels, functional status, and response to any changes in the treatment plan.    - Chronic Obstructive Pulmonary Disease (COPD): Mr. Trindiad has a history of COPD related to his long-standing smoking history, which may impact his overall functional capacity and tolerance for certain medications.     - Therapeutic Interventions: Ensure appropriate management of COPD with bronchodilators, inhaled corticosteroids, or other respiratory medications as needed.      - Diagnostic Tests: Consider obtaining pulmonary function tests to assess the current severity of COPD and guide management.      - Referrals: Refer to a pulmonologist if COPD is not well-controlled or for further evaluation and management.      - Patient Education: Reinforce the importance of smoking cessation and provide resources for smoking cessation support, if applicable.      - Follow-Up: Address COPD management during regular follow-up visits.    - Functional Limitations and Occupational Concerns: Mr. 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     - Therapeutic Interventions: Continue with physical and occupational therapy to maximize functional recovery and explore adaptive techniques or equipment to facilitate work-related tasks.      - Diagnostic Tests: No specific diagnostic tests are indicated for this concern.      - Referrals: Consider referral to a  or occupational therapist for a comprehensive evaluation of work-related abilities and potential accommodations.      - Patient Education: Discuss realistic expectations for returning to work and the potential need for job modifications or alternative employment options based on his functional limitations.      - Follow-Up: Regularly reassess functional status and work-related abilities during follow-up visits.

## 2024-08-26 LAB
AMPHET UR-MCNC: NEGATIVE NG/ML
BARBITURATES UR-MCNC: NEGATIVE NG/ML
BENZODIAZ UR-MCNC: NORMAL NG/ML
COCAINE METAB.OTHER UR-MCNC: NEGATIVE NG/ML
CREATININE, URINE: 73 MG/DL
FENTANYL, URINE: NEGATIVE NG/ML
METHADONE SCREEN, UR: NEGATIVE NG/ML
OPIATES UR-MCNC: NORMAL NG/ML
OXYCODONE/OXYMORPHONE, URINE: NEGATIVE NG/ML
PCP UR-MCNC: NEGATIVE NG/ML
PH, URINE: 6
THC UR QL: NORMAL NG/ML

## 2024-09-12 ENCOUNTER — NON-APPOINTMENT (OUTPATIENT)
Age: 69
End: 2024-09-12

## 2024-09-13 ENCOUNTER — LABORATORY RESULT (OUTPATIENT)
Age: 69
End: 2024-09-13

## 2024-09-13 ENCOUNTER — APPOINTMENT (OUTPATIENT)
Dept: PAIN MANAGEMENT | Facility: CLINIC | Age: 69
End: 2024-09-13

## 2024-09-13 VITALS
HEART RATE: 59 BPM | DIASTOLIC BLOOD PRESSURE: 86 MMHG | BODY MASS INDEX: 20.72 KG/M2 | SYSTOLIC BLOOD PRESSURE: 154 MMHG | HEIGHT: 71 IN | WEIGHT: 148 LBS

## 2024-09-13 PROCEDURE — 99214 OFFICE O/P EST MOD 30 MIN: CPT | Mod: 25

## 2024-09-13 PROCEDURE — 20552 NJX 1/MLT TRIGGER POINT 1/2: CPT

## 2024-09-15 ENCOUNTER — NON-APPOINTMENT (OUTPATIENT)
Age: 69
End: 2024-09-15

## 2024-09-16 ENCOUNTER — APPOINTMENT (OUTPATIENT)
Dept: PAIN MANAGEMENT | Facility: CLINIC | Age: 69
End: 2024-09-16
Payer: MEDICARE

## 2024-09-16 VITALS
TEMPERATURE: 98.2 F | HEIGHT: 71 IN | HEART RATE: 64 BPM | WEIGHT: 148 LBS | DIASTOLIC BLOOD PRESSURE: 75 MMHG | BODY MASS INDEX: 20.72 KG/M2 | SYSTOLIC BLOOD PRESSURE: 148 MMHG

## 2024-09-16 PROCEDURE — 20553 NJX 1/MLT TRIGGER POINTS 3/>: CPT

## 2024-09-16 PROCEDURE — 99213 OFFICE O/P EST LOW 20 MIN: CPT | Mod: 25

## 2024-09-20 LAB
AMPHET UR-MCNC: NEGATIVE NG/ML
BARBITURATES UR-MCNC: NEGATIVE NG/ML
BENZODIAZ UR-MCNC: NORMAL NG/ML
COCAINE METAB.OTHER UR-MCNC: NEGATIVE NG/ML
CREATININE, URINE: 132.8 MG/DL
FENTANYL, URINE: NEGATIVE NG/ML
METHADONE SCREEN, UR: NEGATIVE NG/ML
OPIATES UR-MCNC: NORMAL NG/ML
OXYCODONE/OXYMORPHONE, URINE: NEGATIVE NG/ML
PCP UR-MCNC: NEGATIVE NG/ML
PH, URINE: 6.2
THC UR QL: NORMAL NG/ML

## 2024-10-14 ENCOUNTER — OUTPATIENT (OUTPATIENT)
Dept: OUTPATIENT SERVICES | Facility: HOSPITAL | Age: 69
LOS: 1 days | End: 2024-10-14
Payer: MEDICARE

## 2024-10-14 ENCOUNTER — APPOINTMENT (OUTPATIENT)
Dept: RADIOLOGY | Facility: HOSPITAL | Age: 69
End: 2024-10-14
Payer: MEDICARE

## 2024-10-14 ENCOUNTER — APPOINTMENT (OUTPATIENT)
Dept: SPINE | Facility: CLINIC | Age: 69
End: 2024-10-14
Payer: MEDICARE

## 2024-10-14 DIAGNOSIS — S14.129A CENTRAL CORD SYNDROME AT UNSPECIFIED LEVEL OF CERVICAL SPINAL CORD, INITIAL ENCOUNTER: ICD-10-CM

## 2024-10-14 DIAGNOSIS — Z98.890 OTHER SPECIFIED POSTPROCEDURAL STATES: Chronic | ICD-10-CM

## 2024-10-14 DIAGNOSIS — Z98.1 ARTHRODESIS STATUS: ICD-10-CM

## 2024-10-14 DIAGNOSIS — Z96.651 PRESENCE OF RIGHT ARTIFICIAL KNEE JOINT: Chronic | ICD-10-CM

## 2024-10-14 DIAGNOSIS — M54.12 RADICULOPATHY, CERVICAL REGION: ICD-10-CM

## 2024-10-14 DIAGNOSIS — Z98.1 ARTHRODESIS STATUS: Chronic | ICD-10-CM

## 2024-10-14 PROCEDURE — 72052 X-RAY EXAM NECK SPINE 6/>VWS: CPT | Mod: 26

## 2024-10-14 PROCEDURE — 99213 OFFICE O/P EST LOW 20 MIN: CPT

## 2024-10-16 ENCOUNTER — APPOINTMENT (OUTPATIENT)
Dept: PSYCHIATRY | Facility: HOSPITAL | Age: 69
End: 2024-10-16

## 2024-10-16 ENCOUNTER — OUTPATIENT (OUTPATIENT)
Dept: OUTPATIENT SERVICES | Facility: HOSPITAL | Age: 69
LOS: 1 days | End: 2024-10-16

## 2024-10-16 DIAGNOSIS — Z98.1 ARTHRODESIS STATUS: Chronic | ICD-10-CM

## 2024-10-16 DIAGNOSIS — Z96.651 PRESENCE OF RIGHT ARTIFICIAL KNEE JOINT: Chronic | ICD-10-CM

## 2024-10-16 DIAGNOSIS — Z98.890 OTHER SPECIFIED POSTPROCEDURAL STATES: Chronic | ICD-10-CM

## 2024-10-16 PROCEDURE — 99204 OFFICE O/P NEW MOD 45 MIN: CPT

## 2024-10-17 ENCOUNTER — APPOINTMENT (OUTPATIENT)
Dept: RADIOLOGY | Facility: HOSPITAL | Age: 69
End: 2024-10-17
Payer: MEDICARE

## 2024-10-17 ENCOUNTER — OUTPATIENT (OUTPATIENT)
Dept: OUTPATIENT SERVICES | Facility: HOSPITAL | Age: 69
LOS: 1 days | End: 2024-10-17
Payer: MEDICARE

## 2024-10-17 ENCOUNTER — NON-APPOINTMENT (OUTPATIENT)
Age: 69
End: 2024-10-17

## 2024-10-17 DIAGNOSIS — Z98.890 OTHER SPECIFIED POSTPROCEDURAL STATES: Chronic | ICD-10-CM

## 2024-10-17 DIAGNOSIS — Z96.651 PRESENCE OF RIGHT ARTIFICIAL KNEE JOINT: Chronic | ICD-10-CM

## 2024-10-17 DIAGNOSIS — Z00.8 ENCOUNTER FOR OTHER GENERAL EXAMINATION: ICD-10-CM

## 2024-10-17 DIAGNOSIS — Z98.1 ARTHRODESIS STATUS: Chronic | ICD-10-CM

## 2024-10-17 PROCEDURE — 73630 X-RAY EXAM OF FOOT: CPT | Mod: 26,LT

## 2024-10-22 ENCOUNTER — APPOINTMENT (OUTPATIENT)
Dept: PAIN MANAGEMENT | Facility: CLINIC | Age: 69
End: 2024-10-22

## 2024-10-23 ENCOUNTER — APPOINTMENT (OUTPATIENT)
Dept: PAIN MANAGEMENT | Facility: CLINIC | Age: 69
End: 2024-10-23

## 2024-10-23 VITALS
HEART RATE: 69 BPM | SYSTOLIC BLOOD PRESSURE: 152 MMHG | HEIGHT: 71 IN | DIASTOLIC BLOOD PRESSURE: 87 MMHG | WEIGHT: 150 LBS | BODY MASS INDEX: 21 KG/M2

## 2024-10-23 PROCEDURE — 20553 NJX 1/MLT TRIGGER POINTS 3/>: CPT

## 2024-10-23 PROCEDURE — 99213 OFFICE O/P EST LOW 20 MIN: CPT | Mod: 25

## 2024-11-01 ENCOUNTER — NON-APPOINTMENT (OUTPATIENT)
Age: 69
End: 2024-11-01

## 2024-11-04 ENCOUNTER — NON-APPOINTMENT (OUTPATIENT)
Age: 69
End: 2024-11-04

## 2024-11-04 DIAGNOSIS — M47.27 OTHER SPONDYLOSIS WITH RADICULOPATHY, LUMBOSACRAL REGION: ICD-10-CM

## 2024-11-05 ENCOUNTER — APPOINTMENT (OUTPATIENT)
Dept: MRI IMAGING | Facility: HOSPITAL | Age: 69
End: 2024-11-05

## 2024-11-09 ENCOUNTER — APPOINTMENT (OUTPATIENT)
Dept: MRI IMAGING | Facility: HOSPITAL | Age: 69
End: 2024-11-09

## 2024-11-09 ENCOUNTER — OUTPATIENT (OUTPATIENT)
Dept: OUTPATIENT SERVICES | Facility: HOSPITAL | Age: 69
LOS: 1 days | End: 2024-11-09
Payer: MEDICARE

## 2024-11-09 DIAGNOSIS — Z98.890 OTHER SPECIFIED POSTPROCEDURAL STATES: Chronic | ICD-10-CM

## 2024-11-09 DIAGNOSIS — M48.07 SPINAL STENOSIS, LUMBOSACRAL REGION: ICD-10-CM

## 2024-11-09 DIAGNOSIS — Z96.651 PRESENCE OF RIGHT ARTIFICIAL KNEE JOINT: Chronic | ICD-10-CM

## 2024-11-09 DIAGNOSIS — M47.27 OTHER SPONDYLOSIS WITH RADICULOPATHY, LUMBOSACRAL REGION: ICD-10-CM

## 2024-11-09 DIAGNOSIS — Z98.1 ARTHRODESIS STATUS: Chronic | ICD-10-CM

## 2024-11-09 PROCEDURE — 72148 MRI LUMBAR SPINE W/O DYE: CPT

## 2024-11-09 PROCEDURE — 72148 MRI LUMBAR SPINE W/O DYE: CPT | Mod: 26,MH

## 2024-11-11 NOTE — DISCHARGE NOTE PROVIDER - DISCHARGE SERVICE FOR PATIENT
CST)       on the discharge service for the patient. I have reviewed and made amendments to the documentation where necessary.

## 2024-11-19 ENCOUNTER — NON-APPOINTMENT (OUTPATIENT)
Age: 69
End: 2024-11-19

## 2024-11-20 ENCOUNTER — APPOINTMENT (OUTPATIENT)
Dept: PSYCHIATRY | Facility: HOSPITAL | Age: 69
End: 2024-11-20

## 2024-11-20 PROCEDURE — 73630 X-RAY EXAM OF FOOT: CPT

## 2024-11-20 PROCEDURE — 99213 OFFICE O/P EST LOW 20 MIN: CPT | Mod: 95

## 2024-11-20 PROCEDURE — 72052 X-RAY EXAM NECK SPINE 6/>VWS: CPT

## 2024-11-22 RX ORDER — HYDROMORPHONE HYDROCHLORIDE 2 MG/1
2 TABLET ORAL TWICE DAILY
Qty: 80 | Refills: 0 | Status: ACTIVE | COMMUNITY
Start: 2024-11-22 | End: 1900-01-01

## 2024-11-25 ENCOUNTER — APPOINTMENT (OUTPATIENT)
Dept: PAIN MANAGEMENT | Facility: CLINIC | Age: 69
End: 2024-11-25

## 2024-11-25 ENCOUNTER — APPOINTMENT (OUTPATIENT)
Dept: FAMILY MEDICINE | Facility: CLINIC | Age: 69
End: 2024-11-25
Payer: MEDICARE

## 2024-11-25 VITALS
DIASTOLIC BLOOD PRESSURE: 85 MMHG | TEMPERATURE: 98.2 F | SYSTOLIC BLOOD PRESSURE: 145 MMHG | RESPIRATION RATE: 16 BRPM | HEIGHT: 71 IN | WEIGHT: 150 LBS | OXYGEN SATURATION: 96 % | HEART RATE: 65 BPM | BODY MASS INDEX: 21 KG/M2

## 2024-11-25 DIAGNOSIS — M79.89 OTHER SPECIFIED SOFT TISSUE DISORDERS: ICD-10-CM

## 2024-11-25 DIAGNOSIS — J44.9 CHRONIC OBSTRUCTIVE PULMONARY DISEASE, UNSPECIFIED: ICD-10-CM

## 2024-11-25 DIAGNOSIS — S14.129A CENTRAL CORD SYNDROME AT UNSPECIFIED LVL OF CERVICAL SPINAL CORD, INITIAL ENCOUNTER: ICD-10-CM

## 2024-11-25 PROCEDURE — 99214 OFFICE O/P EST MOD 30 MIN: CPT

## 2024-11-25 RX ORDER — CEFADROXIL 500 MG/1
500 CAPSULE ORAL TWICE DAILY
Qty: 14 | Refills: 0 | Status: ACTIVE | COMMUNITY
Start: 2024-11-25 | End: 1900-01-01

## 2024-12-03 ENCOUNTER — APPOINTMENT (OUTPATIENT)
Dept: PAIN MANAGEMENT | Facility: CLINIC | Age: 69
End: 2024-12-03

## 2024-12-03 ENCOUNTER — OUTPATIENT (OUTPATIENT)
Dept: OUTPATIENT SERVICES | Facility: HOSPITAL | Age: 69
LOS: 1 days | End: 2024-12-03
Payer: MEDICARE

## 2024-12-03 DIAGNOSIS — Z98.1 ARTHRODESIS STATUS: Chronic | ICD-10-CM

## 2024-12-03 DIAGNOSIS — Z96.651 PRESENCE OF RIGHT ARTIFICIAL KNEE JOINT: Chronic | ICD-10-CM

## 2024-12-03 DIAGNOSIS — Z98.890 OTHER SPECIFIED POSTPROCEDURAL STATES: Chronic | ICD-10-CM

## 2024-12-03 DIAGNOSIS — M54.16 RADICULOPATHY, LUMBAR REGION: ICD-10-CM

## 2024-12-03 PROCEDURE — 62323 NJX INTERLAMINAR LMBR/SAC: CPT

## 2024-12-04 ENCOUNTER — APPOINTMENT (OUTPATIENT)
Dept: PAIN MANAGEMENT | Facility: CLINIC | Age: 69
End: 2024-12-04

## 2024-12-17 ENCOUNTER — NON-APPOINTMENT (OUTPATIENT)
Age: 69
End: 2024-12-17

## 2024-12-23 ENCOUNTER — APPOINTMENT (OUTPATIENT)
Dept: FAMILY MEDICINE | Facility: CLINIC | Age: 69
End: 2024-12-23
Payer: MEDICARE

## 2024-12-23 VITALS
DIASTOLIC BLOOD PRESSURE: 92 MMHG | SYSTOLIC BLOOD PRESSURE: 138 MMHG | HEART RATE: 96 BPM | TEMPERATURE: 97.6 F | HEIGHT: 71 IN | RESPIRATION RATE: 18 BRPM | OXYGEN SATURATION: 97 %

## 2024-12-23 DIAGNOSIS — G89.29 OTHER CHRONIC PAIN: ICD-10-CM

## 2024-12-23 DIAGNOSIS — M25.512 PAIN IN LEFT SHOULDER: ICD-10-CM

## 2024-12-23 DIAGNOSIS — M25.511 PAIN IN RIGHT SHOULDER: ICD-10-CM

## 2024-12-23 DIAGNOSIS — M51.369: ICD-10-CM

## 2024-12-23 DIAGNOSIS — G89.29 PAIN IN RIGHT SHOULDER: ICD-10-CM

## 2024-12-23 DIAGNOSIS — R73.03 PREDIABETES.: ICD-10-CM

## 2024-12-23 DIAGNOSIS — S30.0XXA CONTUSION OF LOWER BACK AND PELVIS, INITIAL ENCOUNTER: ICD-10-CM

## 2024-12-23 PROCEDURE — 99214 OFFICE O/P EST MOD 30 MIN: CPT

## 2024-12-25 NOTE — ED PROVIDER NOTE - PHYSICAL EXAMINATION
None
General:    in pain, uncomfortable, disgruntled  Eyes: PERRL, white sclera  Lungs:     CTA b/l  CVS:     RRR  Abd:     +BS, s/nt/nd, rectal prolapse is large and unable to fully reduce at bedside. Friable tissue. Small bld noted in area.   Ext:   no deformities   Skin: no rash  Neuro: AAOx3, no sensory/motor deficits

## 2024-12-27 ENCOUNTER — RESULT REVIEW (OUTPATIENT)
Age: 69
End: 2024-12-27

## 2024-12-27 ENCOUNTER — APPOINTMENT (OUTPATIENT)
Dept: MRI IMAGING | Facility: CLINIC | Age: 69
End: 2024-12-27

## 2024-12-27 ENCOUNTER — OUTPATIENT (OUTPATIENT)
Dept: OUTPATIENT SERVICES | Facility: HOSPITAL | Age: 69
LOS: 1 days | End: 2024-12-27
Payer: MEDICARE

## 2024-12-27 DIAGNOSIS — Z96.651 PRESENCE OF RIGHT ARTIFICIAL KNEE JOINT: Chronic | ICD-10-CM

## 2024-12-27 DIAGNOSIS — M25.512 PAIN IN LEFT SHOULDER: ICD-10-CM

## 2024-12-27 DIAGNOSIS — Z98.890 OTHER SPECIFIED POSTPROCEDURAL STATES: Chronic | ICD-10-CM

## 2024-12-27 DIAGNOSIS — Z98.1 ARTHRODESIS STATUS: Chronic | ICD-10-CM

## 2024-12-27 PROCEDURE — 73221 MRI JOINT UPR EXTREM W/O DYE: CPT | Mod: 26,LT,MH,77

## 2024-12-27 PROCEDURE — 73221 MRI JOINT UPR EXTREM W/O DYE: CPT

## 2024-12-27 PROCEDURE — 73221 MRI JOINT UPR EXTREM W/O DYE: CPT | Mod: 26,RT,MH

## 2025-01-06 ENCOUNTER — APPOINTMENT (OUTPATIENT)
Dept: ORTHOPEDIC SURGERY | Facility: CLINIC | Age: 70
End: 2025-01-06
Payer: MEDICARE

## 2025-01-06 ENCOUNTER — NON-APPOINTMENT (OUTPATIENT)
Age: 70
End: 2025-01-06

## 2025-01-06 VITALS — WEIGHT: 150 LBS | HEIGHT: 71 IN | BODY MASS INDEX: 21 KG/M2

## 2025-01-06 DIAGNOSIS — M12.811 UNSPECIFIED ROTATOR CUFF TEAR OR RUPTURE OF RIGHT SHOULDER, NOT SPECIFIED AS TRAUMATIC: ICD-10-CM

## 2025-01-06 DIAGNOSIS — M75.101 UNSPECIFIED ROTATOR CUFF TEAR OR RUPTURE OF RIGHT SHOULDER, NOT SPECIFIED AS TRAUMATIC: ICD-10-CM

## 2025-01-06 DIAGNOSIS — M19.212 SECONDARY OSTEOARTHRITIS, LEFT SHOULDER: ICD-10-CM

## 2025-01-06 PROCEDURE — 73030 X-RAY EXAM OF SHOULDER: CPT | Mod: 50

## 2025-01-06 PROCEDURE — 99204 OFFICE O/P NEW MOD 45 MIN: CPT

## 2025-01-15 NOTE — H&P PST ADULT - FALLEN IN THE PAST
Labs reviewed with Dr Mitchell. Hyponatremia with hyperglycemia - Na corrects to 137. Sent to endo to review. Should repeat labs in 2 weeks, order US to be done. Pt made aware via VM of above and that poorly controlled DM is effecting his kidney.   
no

## 2025-01-23 NOTE — H&P ADULT - PROBLEM SELECTOR PLAN 6
Emergency Department LVAD Documentation   The ED nurse is responsible for assessment & documentation of the LVAD when assuming care & every 4 hours. Required documentation includes: Vital signs, LVAD Hum, doppler blood pressure, device parameters (Flow, RPM, Power, Pulsatility Index), driveline and dressing assessment, and back up equipment at bedside. LVAD provider should be notified upon patient arrival to the emergency department.       Date: 1/22/25   Time of assessment:     LVAD Hum auscultated/present over the left upper quadrant: Yes    Height: 1.702 m (5' 7\"), Weight - Scale: 82.5 kg (181 lb 14.1 oz), BP: -- (MAP 78), Pain 0-10: Pain Level: 0;        Doppler Blood Pressure (MAP):     Goal MAP 70-90, unless otherwise noted. Notify provider if outside of defined limits.     LVAD dressing assessment: clean/dry    LVAD driveline intact: Yes    Device Parameters  Flow: 3.6  RPM: 5200  Power: 3.7  Pulsatility Index (PI):2.9    Backup  at bedside: batteries x4, battery clips x2, power module, battery charger, and equipment bag    Contact CVICU/CVSU for LVAD tower       Notify the Ventricular Assist Device (VAD) coordinator for power change of greater than 2 from baseline or PI less than 3.   Please use perfect serve @ \"Heart Failure-[facility]\"   
Emergency Department LVAD Documentation   The ED nurse is responsible for assessment & documentation of the LVAD when assuming care & every 4 hours. Required documentation includes: Vital signs, LVAD Hum, doppler blood pressure, device parameters (Flow, RPM, Power, Pulsatility Index), driveline and dressing assessment, and back up equipment at bedside. LVAD provider should be notified upon patient arrival to the emergency department.       Date: 1/22/25   Time of assessment:     LVAD Hum auscultated/present over the left upper quadrant: Yes    No data recorded       Doppler Blood Pressure (MAP):     Goal MAP 70-90, unless otherwise noted. Notify provider if outside of defined limits.     LVAD dressing assessment: clean/dry    LVAD driveline intact: Yes    Device Parameters  Flow: 3.6  RPM: 5200  Power: 3.7  Pulsatility Index (PI):2.9    Backup  at bedside: back-up , batteries x4, and equipment bag    Contact CVICU/CVSU for LVAD tower       Notify the Ventricular Assist Device (VAD) coordinator for power change of greater than 2 from baseline or PI less than 3.   Please use perfect serve @ \"Heart Failure-[facility]\"    
TRANSFER - OUT REPORT:    Verbal report given to RACHEL Zee on [facility]  being transferred to [facility] for routine progression of patient care       Report consisted of patient's Situation, Background, Assessment and   Recommendations(SBAR).     Information from the following report(s) Nurse Handoff Report, ED Encounter Summary, ED SBAR, Adult Overview, MAR, Recent Results, and Neuro Assessment was reviewed with the receiving nurse.    [place] Fall Assessment:    Presents to emergency department  because of falls (Syncope, seizure, or loss of consciousness): No  Age > 70: No  Altered Mental Status, Intoxication with alcohol or substance confusion (Disorientation, impaired judgment, poor safety awaremess, or inability to follow instructions): No  Impaired Mobility: Ambulates or transfers with assistive devices or assistance; Unable to ambulate or transer.: Yes  Nursing Judgement: Yes          Lines:   Peripheral IV 01/22/25 Distal;Right Forearm (Active)       Peripheral IV 01/22/25 Left Forearm (Active)   Site Assessment Clean, dry & intact 01/22/25 2034   Line Status Normal saline locked;Flushed 01/22/25 2034   Phlebitis Assessment No symptoms 01/22/25 2034   Infiltration Assessment 0 01/22/25 2034   Dressing Status Clean, dry & intact 01/22/25 2034   Dressing Type Transparent 01/22/25 2034        Opportunity for questions and clarification was provided.      Patient transported with:  Monitor, O2 @ 3lpm, and Registered Nurse       
continue tamsulosin  simon placed at   CT findings reviewed - renal calculus noted and enlarged prostate  can start finasteride if TOV fails

## 2025-01-27 ENCOUNTER — NON-APPOINTMENT (OUTPATIENT)
Age: 70
End: 2025-01-27

## 2025-02-28 ENCOUNTER — NON-APPOINTMENT (OUTPATIENT)
Age: 70
End: 2025-02-28

## 2025-02-28 RX ORDER — OXYCODONE HYDROCHLORIDE 15 MG/1
15 TABLET ORAL TWICE DAILY
Qty: 60 | Refills: 0 | Status: ACTIVE | COMMUNITY
Start: 2025-02-28 | End: 1900-01-01

## 2025-03-13 NOTE — PATIENT PROFILE ADULT - NUMBER OF YRS
Vaginal Delivery Note    Ms. Patterson presented to labor and delivery for IOL, however never needed cervical ripening and went into labor on her own.     She progressed spontaneously over the night and shortly after experiencing SROM, was found to be completely dilated. During the second stage of labor she pushed to deliver a baby girl. The baby delivered in a direct OA manner and then restituted in a clockwise manner to face maternal right leg. With gentle downward guidance the anterior, right shoulder was easily delivered. The posterior shoulder quickly followed. She started crying after back stimulation. The umbilical cord was then clamped and cut without difficulty and the baby was brought to the warmer for evaluation as the heart rate dropped below 100 for a few seconds. This resolved spontaneously.      Active management of the third stage of labor included gentle downward traction on the umbilical cord and suprapubic pressure to ensure no uterine inversion. A small gush of blood signaled placental separation and shortly after that the placenta delivered intact without difficulty. At this time pitocin was started. Uterine fundal massage as well as bimanual exam revealed a firm uterus. A fundal sweep performed revealed some blood clots in the uterine cavity that were manually extracted. There were no remaining placental parts or membranes. Good hemostasis was noted.    An examination of the vaginal mucosa and perineum revealed a second degree perineal laceration. This was repaired in the usual running fashion with 3-0 vicryl suture. Hemostasis was noted after repair.  Both Carol and her daughter tolerated the delivery well and were recovering in the delivery room together.     Dr. Armstrong was called in during the delivery to help in the setting of noted short-duration fetal bradycardia after delivery.    Aurora Velasquez MD on 3/13/2025 at 6:27 AM    
40

## 2025-03-21 ENCOUNTER — APPOINTMENT (OUTPATIENT)
Dept: FAMILY MEDICINE | Facility: CLINIC | Age: 70
End: 2025-03-21
Payer: MEDICARE

## 2025-03-21 VITALS
RESPIRATION RATE: 20 BRPM | SYSTOLIC BLOOD PRESSURE: 122 MMHG | HEIGHT: 71 IN | DIASTOLIC BLOOD PRESSURE: 70 MMHG | WEIGHT: 147 LBS | HEART RATE: 68 BPM | BODY MASS INDEX: 20.58 KG/M2

## 2025-03-21 DIAGNOSIS — R73.01 IMPAIRED FASTING GLUCOSE: ICD-10-CM

## 2025-03-21 DIAGNOSIS — Z00.00 ENCOUNTER FOR GENERAL ADULT MEDICAL EXAMINATION W/OUT ABNORMAL FINDINGS: ICD-10-CM

## 2025-03-21 DIAGNOSIS — E78.5 HYPERLIPIDEMIA, UNSPECIFIED: ICD-10-CM

## 2025-03-21 DIAGNOSIS — Z87.891 PERSONAL HISTORY OF NICOTINE DEPENDENCE: ICD-10-CM

## 2025-03-21 DIAGNOSIS — I10 ESSENTIAL (PRIMARY) HYPERTENSION: ICD-10-CM

## 2025-03-21 PROCEDURE — G0439: CPT

## 2025-03-21 PROCEDURE — 36415 COLL VENOUS BLD VENIPUNCTURE: CPT

## 2025-03-21 PROCEDURE — 93000 ELECTROCARDIOGRAM COMPLETE: CPT

## 2025-03-22 LAB
ALBUMIN SERPL ELPH-MCNC: 4.4 G/DL
ALP BLD-CCNC: 65 U/L
ALT SERPL-CCNC: 14 U/L
ANION GAP SERPL CALC-SCNC: 12 MMOL/L
APPEARANCE: CLEAR
AST SERPL-CCNC: 16 U/L
BILIRUB SERPL-MCNC: 0.4 MG/DL
BILIRUBIN URINE: NEGATIVE
BLOOD URINE: NEGATIVE
BUN SERPL-MCNC: 27 MG/DL
CALCIUM SERPL-MCNC: 9.2 MG/DL
CHLORIDE SERPL-SCNC: 107 MMOL/L
CHOLEST SERPL-MCNC: 173 MG/DL
CO2 SERPL-SCNC: 23 MMOL/L
COLOR: YELLOW
CREAT SERPL-MCNC: 0.86 MG/DL
EGFRCR SERPLBLD CKD-EPI 2021: 92 ML/MIN/1.73M2
ESTIMATED AVERAGE GLUCOSE: 123 MG/DL
FOLATE SERPL-MCNC: 9.9 NG/ML
GLUCOSE QUALITATIVE U: NEGATIVE MG/DL
GLUCOSE SERPL-MCNC: 107 MG/DL
HBA1C MFR BLD HPLC: 5.9 %
HCT VFR BLD CALC: 43.1 %
HDLC SERPL-MCNC: 67 MG/DL
HGB BLD-MCNC: 14 G/DL
KETONES URINE: NEGATIVE MG/DL
LDLC SERPL-MCNC: 94 MG/DL
LEUKOCYTE ESTERASE URINE: NEGATIVE
MCHC RBC-ENTMCNC: 30.6 PG
MCHC RBC-ENTMCNC: 32.5 G/DL
MCV RBC AUTO: 94.3 FL
NITRITE URINE: NEGATIVE
NONHDLC SERPL-MCNC: 106 MG/DL
PH URINE: 5.5
PLATELET # BLD AUTO: 279 K/UL
POTASSIUM SERPL-SCNC: 4.5 MMOL/L
PROT SERPL-MCNC: 6.4 G/DL
PROTEIN URINE: NEGATIVE MG/DL
PSA SERPL-MCNC: 2 NG/ML
RBC # BLD: 4.57 M/UL
RBC # FLD: 14 %
SODIUM SERPL-SCNC: 142 MMOL/L
SPECIFIC GRAVITY URINE: 1.02
T4 FREE SERPL-MCNC: 1.5 NG/DL
TRIGL SERPL-MCNC: 62 MG/DL
TSH SERPL-ACNC: 1.19 UIU/ML
UROBILINOGEN URINE: 0.2 MG/DL
VIT B12 SERPL-MCNC: 514 PG/ML
WBC # FLD AUTO: 7.15 K/UL

## 2025-03-26 ENCOUNTER — APPOINTMENT (OUTPATIENT)
Dept: SURGERY | Facility: CLINIC | Age: 70
End: 2025-03-26
Payer: MEDICARE

## 2025-03-26 DIAGNOSIS — K59.09 OTHER CONSTIPATION: ICD-10-CM

## 2025-03-26 DIAGNOSIS — Z87.39 PERSONAL HISTORY OF OTHER DISEASES OF THE MUSCULOSKELETAL SYSTEM AND CONNECTIVE TISSUE: ICD-10-CM

## 2025-03-26 DIAGNOSIS — Z98.890 OTHER SPECIFIED POSTPROCEDURAL STATES: ICD-10-CM

## 2025-03-26 DIAGNOSIS — E78.00 PURE HYPERCHOLESTEROLEMIA, UNSPECIFIED: ICD-10-CM

## 2025-03-26 DIAGNOSIS — S14.129A CENTRAL CORD SYNDROME AT UNSPECIFIED LVL OF CERVICAL SPINAL CORD, INITIAL ENCOUNTER: ICD-10-CM

## 2025-03-26 DIAGNOSIS — Z83.79 FAMILY HISTORY OF OTHER DISEASES OF THE DIGESTIVE SYSTEM: ICD-10-CM

## 2025-03-26 DIAGNOSIS — S05.10XA CONTUSION OF EYEBALL AND ORBITAL TISSUES, UNSPECIFIED EYE, INITIAL ENCOUNTER: ICD-10-CM

## 2025-03-26 DIAGNOSIS — J44.9 CHRONIC OBSTRUCTIVE PULMONARY DISEASE, UNSPECIFIED: ICD-10-CM

## 2025-03-26 DIAGNOSIS — Z12.11 ENCOUNTER FOR SCREENING FOR MALIGNANT NEOPLASM OF COLON: ICD-10-CM

## 2025-03-26 DIAGNOSIS — Z83.3 FAMILY HISTORY OF DIABETES MELLITUS: ICD-10-CM

## 2025-03-26 PROCEDURE — 99213 OFFICE O/P EST LOW 20 MIN: CPT

## 2025-03-27 ENCOUNTER — APPOINTMENT (OUTPATIENT)
Dept: NEUROLOGY | Facility: CLINIC | Age: 70
End: 2025-03-27
Payer: MEDICARE

## 2025-03-27 VITALS
SYSTOLIC BLOOD PRESSURE: 146 MMHG | HEART RATE: 55 BPM | WEIGHT: 147 LBS | BODY MASS INDEX: 20.58 KG/M2 | TEMPERATURE: 97.7 F | DIASTOLIC BLOOD PRESSURE: 77 MMHG | HEIGHT: 71 IN

## 2025-03-27 DIAGNOSIS — S06.9XAA UNSPECIFIED INTRACRANIAL INJURY WITH LOSS OF CONSCIOUSNESS STATUS UNKNOWN, INITIAL ENCOUNTER: ICD-10-CM

## 2025-03-27 DIAGNOSIS — Z81.8 FAMILY HISTORY OF OTHER MENTAL AND BEHAVIORAL DISORDERS: ICD-10-CM

## 2025-03-27 DIAGNOSIS — R41.9 UNSPECIFIED SYMPTOMS AND SIGNS INVOLVING COGNITIVE FUNCTIONS AND AWARENESS: ICD-10-CM

## 2025-03-27 PROCEDURE — 99205 OFFICE O/P NEW HI 60 MIN: CPT

## 2025-03-27 PROCEDURE — G2211 COMPLEX E/M VISIT ADD ON: CPT

## 2025-03-27 RX ORDER — SODIUM PICOSULFATE, MAGNESIUM OXIDE, AND ANHYDROUS CITRIC ACID 12; 3.5; 1 G/175ML; G/175ML; MG/175ML
10-3.5-12 MG-GM LIQUID ORAL
Qty: 1 | Refills: 0 | Status: COMPLETED | COMMUNITY
Start: 2025-03-26 | End: 2025-03-27

## 2025-03-27 RX ORDER — INCLISIRAN 284 MG/1.5ML
284 INJECTION, SOLUTION SUBCUTANEOUS
Refills: 0 | Status: ACTIVE | COMMUNITY

## 2025-03-28 ENCOUNTER — OUTPATIENT (OUTPATIENT)
Dept: OUTPATIENT SERVICES | Facility: HOSPITAL | Age: 70
LOS: 1 days | End: 2025-03-28
Payer: MEDICARE

## 2025-03-28 ENCOUNTER — APPOINTMENT (OUTPATIENT)
Dept: MRI IMAGING | Facility: CLINIC | Age: 70
End: 2025-03-28
Payer: MEDICARE

## 2025-03-28 DIAGNOSIS — Z98.890 OTHER SPECIFIED POSTPROCEDURAL STATES: Chronic | ICD-10-CM

## 2025-03-28 DIAGNOSIS — Z96.651 PRESENCE OF RIGHT ARTIFICIAL KNEE JOINT: Chronic | ICD-10-CM

## 2025-03-28 DIAGNOSIS — R41.9 UNSPECIFIED SYMPTOMS AND SIGNS INVOLVING COGNITIVE FUNCTIONS AND AWARENESS: ICD-10-CM

## 2025-03-28 DIAGNOSIS — S06.9XAA UNSPECIFIED INTRACRANIAL INJURY WITH LOSS OF CONSCIOUSNESS STATUS UNKNOWN, INITIAL ENCOUNTER: ICD-10-CM

## 2025-03-28 DIAGNOSIS — Z98.1 ARTHRODESIS STATUS: Chronic | ICD-10-CM

## 2025-03-28 PROCEDURE — 70551 MRI BRAIN STEM W/O DYE: CPT | Mod: 26

## 2025-03-28 PROCEDURE — 70551 MRI BRAIN STEM W/O DYE: CPT

## 2025-03-31 ENCOUNTER — NON-APPOINTMENT (OUTPATIENT)
Age: 70
End: 2025-03-31

## 2025-04-02 ENCOUNTER — NON-APPOINTMENT (OUTPATIENT)
Age: 70
End: 2025-04-02

## 2025-04-02 RX ORDER — SODIUM PICOSULFATE, MAGNESIUM OXIDE, AND ANHYDROUS CITRIC ACID 12; 3.5; 1 G/175ML; G/175ML; MG/175ML
10-3.5-12 MG-GM LIQUID ORAL
Qty: 1 | Refills: 0 | Status: ACTIVE | COMMUNITY
Start: 2025-04-02 | End: 1900-01-01

## 2025-04-02 NOTE — PATIENT PROFILE ADULT - HAVE YOU BEEN EATING POORLY BECAUSE OF A DECREASED APPETITE?
Delfino Goyal (:  2013) is a 11 y.o. male,Established patient, here for evaluation of the following chief complaint(s):  ADHD (3 month follow up ) and Medication Refill         Assessment & Plan  ADHD (attention deficit hyperactivity disorder), combined type   Chronic, at goal (stable), continue current treatment plan, medication adherence emphasized, and lifestyle modifications recommended    Orders:    Dexmethylphenidate HCl ER 15 MG CP24; Take 15 mg by mouth daily for 30 days. Max Daily Amount: 15 mg    Dexmethylphenidate HCl ER 15 MG CP24; Take 15 mg by mouth daily for 30 days. Max Daily Amount: 15 mg    Dexmethylphenidate HCl ER 15 MG CP24; Take 15 mg by mouth daily for 30 days. Max Daily Amount: 15 mg    I have reviewed the patient’s controlled substance prescription history, as maintained in the [place] prescription monitoring program, so that the prescription(s) for a  controlled substance can be given.  No abnormalities were identified.    Stable on meds.  Good grades. Able to concentrate.      Return in 3 months (on 2025).       Subjective   Is off for spring break today.  Just got out of bed.  Tried to stay up late last night, playing video games.  Feels his Focalin is working well for him.  Is able to maintain focus.  Is lasting thru home work.  Here today with grandmother.  Grandmother states his mom is back in town for a few days and is spending time with hm.  They are going to eat lunch with his brother and sister who are in school today as they live in a different county.  Patient states his grades are good.      ADHD  This is a chronic problem. The problem has been resolved. Pertinent negatives include no abdominal pain, arthralgias, chest pain, congestion, coughing, fatigue, fever, headaches, myalgias, rash or vomiting. Associated symptoms comments: Difficulty sitting still at school, cannot concentrate. . Nothing aggravates the symptoms. Treatments tried: Focalin. The 
No (0)

## 2025-04-04 RX ORDER — SODIUM SULFATE, POTASSIUM SULFATE AND MAGNESIUM SULFATE 1.6; 3.13; 17.5 G/177ML; G/177ML; G/177ML
17.5-3.13-1.6 SOLUTION ORAL
Qty: 1 | Refills: 0 | Status: ACTIVE | COMMUNITY
Start: 2025-04-04 | End: 1900-01-01

## 2025-04-04 RX ORDER — SILDENAFIL CITRATE 100 MG/1
100 TABLET, FILM COATED ORAL
Qty: 10 | Refills: 3 | Status: ACTIVE | COMMUNITY
Start: 2025-04-04

## 2025-04-08 ENCOUNTER — TRANSCRIPTION ENCOUNTER (OUTPATIENT)
Age: 70
End: 2025-04-08

## 2025-04-08 ENCOUNTER — RESULT REVIEW (OUTPATIENT)
Age: 70
End: 2025-04-08

## 2025-04-08 ENCOUNTER — OUTPATIENT (OUTPATIENT)
Dept: OUTPATIENT SERVICES | Facility: HOSPITAL | Age: 70
LOS: 1 days | End: 2025-04-08
Payer: MEDICARE

## 2025-04-08 ENCOUNTER — APPOINTMENT (OUTPATIENT)
Dept: SURGERY | Facility: HOSPITAL | Age: 70
End: 2025-04-08

## 2025-04-08 DIAGNOSIS — Z00.00 ENCOUNTER FOR GENERAL ADULT MEDICAL EXAMINATION WITHOUT ABNORMAL FINDINGS: ICD-10-CM

## 2025-04-08 DIAGNOSIS — Z98.890 OTHER SPECIFIED POSTPROCEDURAL STATES: Chronic | ICD-10-CM

## 2025-04-08 DIAGNOSIS — Z96.651 PRESENCE OF RIGHT ARTIFICIAL KNEE JOINT: Chronic | ICD-10-CM

## 2025-04-08 DIAGNOSIS — Z98.1 ARTHRODESIS STATUS: Chronic | ICD-10-CM

## 2025-04-08 PROCEDURE — 88305 TISSUE EXAM BY PATHOLOGIST: CPT

## 2025-04-08 PROCEDURE — 45385 COLONOSCOPY W/LESION REMOVAL: CPT | Mod: PT

## 2025-04-08 PROCEDURE — 45385 COLONOSCOPY W/LESION REMOVAL: CPT

## 2025-04-08 PROCEDURE — 88305 TISSUE EXAM BY PATHOLOGIST: CPT | Mod: 26

## 2025-04-08 RX ADMIN — Medication 75 MILLILITER(S): at 12:40

## 2025-04-10 LAB — SURGICAL PATHOLOGY STUDY: SIGNIFICANT CHANGE UP

## 2025-04-17 ENCOUNTER — APPOINTMENT (OUTPATIENT)
Dept: FAMILY MEDICINE | Facility: CLINIC | Age: 70
End: 2025-04-17
Payer: MEDICARE

## 2025-04-17 VITALS
SYSTOLIC BLOOD PRESSURE: 124 MMHG | DIASTOLIC BLOOD PRESSURE: 78 MMHG | RESPIRATION RATE: 15 BRPM | HEART RATE: 68 BPM | HEIGHT: 71 IN | BODY MASS INDEX: 21 KG/M2 | WEIGHT: 150 LBS | OXYGEN SATURATION: 94 % | TEMPERATURE: 97.4 F

## 2025-04-17 DIAGNOSIS — M25.561 PAIN IN RIGHT KNEE: ICD-10-CM

## 2025-04-17 PROCEDURE — G2211 COMPLEX E/M VISIT ADD ON: CPT

## 2025-04-17 PROCEDURE — 99215 OFFICE O/P EST HI 40 MIN: CPT

## 2025-04-30 ENCOUNTER — APPOINTMENT (OUTPATIENT)
Dept: ORTHOPEDIC SURGERY | Facility: CLINIC | Age: 70
End: 2025-04-30
Payer: MEDICARE

## 2025-04-30 VITALS — BODY MASS INDEX: 21 KG/M2 | WEIGHT: 150 LBS | HEIGHT: 71 IN

## 2025-04-30 DIAGNOSIS — M25.561 PAIN IN RIGHT KNEE: ICD-10-CM

## 2025-04-30 DIAGNOSIS — M22.2X1 PATELLOFEMORAL DISORDERS, RIGHT KNEE: ICD-10-CM

## 2025-04-30 DIAGNOSIS — M25.552 PAIN IN LEFT HIP: ICD-10-CM

## 2025-04-30 PROCEDURE — 73564 X-RAY EXAM KNEE 4 OR MORE: CPT | Mod: RT

## 2025-04-30 PROCEDURE — 99214 OFFICE O/P EST MOD 30 MIN: CPT

## 2025-04-30 PROCEDURE — 73502 X-RAY EXAM HIP UNI 2-3 VIEWS: CPT | Mod: LT

## 2025-05-02 ENCOUNTER — NON-APPOINTMENT (OUTPATIENT)
Age: 70
End: 2025-05-02

## 2025-05-16 ENCOUNTER — NON-APPOINTMENT (OUTPATIENT)
Age: 70
End: 2025-05-16

## 2025-05-17 ENCOUNTER — EMERGENCY (EMERGENCY)
Facility: HOSPITAL | Age: 70
LOS: 1 days | End: 2025-05-17
Attending: STUDENT IN AN ORGANIZED HEALTH CARE EDUCATION/TRAINING PROGRAM | Admitting: STUDENT IN AN ORGANIZED HEALTH CARE EDUCATION/TRAINING PROGRAM
Payer: MEDICARE

## 2025-05-17 VITALS
SYSTOLIC BLOOD PRESSURE: 136 MMHG | HEART RATE: 66 BPM | DIASTOLIC BLOOD PRESSURE: 77 MMHG | HEIGHT: 71 IN | WEIGHT: 147.93 LBS | TEMPERATURE: 98 F | OXYGEN SATURATION: 98 % | RESPIRATION RATE: 16 BRPM

## 2025-05-17 DIAGNOSIS — Z96.651 PRESENCE OF RIGHT ARTIFICIAL KNEE JOINT: Chronic | ICD-10-CM

## 2025-05-17 DIAGNOSIS — Z98.1 ARTHRODESIS STATUS: Chronic | ICD-10-CM

## 2025-05-17 DIAGNOSIS — Z98.890 OTHER SPECIFIED POSTPROCEDURAL STATES: Chronic | ICD-10-CM

## 2025-05-17 PROCEDURE — 73130 X-RAY EXAM OF HAND: CPT

## 2025-05-17 PROCEDURE — 99284 EMERGENCY DEPT VISIT MOD MDM: CPT

## 2025-05-17 PROCEDURE — 73090 X-RAY EXAM OF FOREARM: CPT

## 2025-05-17 NOTE — ED ADULT TRIAGE NOTE - CHIEF COMPLAINT QUOTE
pt c/o rt hand/wrist/finger swelling and pain. pt s/p fall on thursday. pt seen in UC on friday, splint applied. denies head strike/LOC/blood thinners.

## 2025-05-18 PROCEDURE — 73090 X-RAY EXAM OF FOREARM: CPT | Mod: 26,LT

## 2025-05-18 PROCEDURE — 73130 X-RAY EXAM OF HAND: CPT | Mod: 26,RT

## 2025-05-18 NOTE — ED PROVIDER NOTE - OBJECTIVE STATEMENT
70m pmhx of OA, COPD, insomnia, chronic pain presenting with right hand pain s/p mechanical fall 3-4 days ago. Was hiking and fell backwards, landing on his right hand/wrist to break his fall. No other traumatic injuries. Went to [facility] ED and had negative XRs and sent home with a preformed wrist splint. A/w mild pain to the wrist, a/w mild swelling and ecchymosis to the palmar aspect of the thenar eminence. Denies any chest pain, abdominal pain, shortness of breath, nausea/vomiting, headaches, fevers, chills,  weakness, syncope, hematuria, dysuria, urinary symptoms, subjective neurological deficits, numbness/tingling.

## 2025-05-18 NOTE — ED ADULT NURSE NOTE - NSFALLHARMRISKINTERV_ED_ALL_ED

## 2025-05-18 NOTE — ED PROVIDER NOTE - CARE PROVIDER_API CALL
Won Bowers  Plastic Surgery  [address] [place] [address], [address]  [place] [address]  Phone: [phone]  Fax: [phone]  Follow Up Time:

## 2025-05-18 NOTE — ED PROVIDER NOTE - NSFOLLOWUPINSTRUCTIONS_ED_ALL_ED_FT
Rest, drink plenty of fluids.  Advance activity as tolerated.  Continue all previously prescribed medications as directed.  Follow up with your PMD 2-3 days and bring copies of your results.  Return to the ER for worsening symptoms,     Follow up with hand surgery in 2-5 days for further evaluation of your hand swelling.

## 2025-05-18 NOTE — ED PROVIDER NOTE - PHYSICAL EXAMINATION
VITAL SIGNS: I have reviewed nursing notes and confirm.   GEN: Well-developed; well-nourished; in no acute distress. Speaking full sentences.  SKIN: Warm, pink, no rash, no diaphoresis, no cyanosis, well perfused.   HEAD: Normocephalic; atraumatic.    NECK: Supple; non tender. Full range of motion.   EYES: Pupils 3mm equal, round, reactive to light and accomodation, conjunctiva and sclera clear. Extra-ocular movements intact bilaterally.  MSK: Normal range of motion and movement of all 4 extremities. No apparent joint or muscular pain throughout.    RIGHT HAND: Sensation: SILT in FF/IF dorsal, proximal (radial), SF tip (ulnar), IF volar tip (median). Motor: (+) thumbs UP (radial), OK sign (median), and "V-sign"-with 2nd 3rd fingers ulnar intact. Flexion and extension 1-5 against resistance intact, wrist and finger extension off table (radial), finger [id number] (ulnar), Thumb to pinky (median) intact. Vascular: Capillary refill <2sec in all digits. Compartments soft. (+) mild ecchymosis to the right thenar eminence a/w swelling to the hand and fingers. (+) able to flex and extend wrist. (+) distal pulses 2+. Mild ttp to the distal radius/ulnar aspect. No deformity.  NEURO: Alert & oriented x 3, Grossly unremarkable. Sensory and motor intact throughout. No focal deficits.  Normal speech and coordination.

## 2025-05-18 NOTE — ED ADULT NURSE NOTE - NSSEPSISNEWALTERMENTAL_ED_A_ED
Pt. Was on BiPAP, Moved back to HF NC 60L and 100% FiO2 sat mid 90's. Neb treatment given as scheduled. Will continue to monitor.   No

## 2025-05-18 NOTE — ED PROVIDER NOTE - CLINICAL SUMMARY MEDICAL DECISION MAKING FREE TEXT BOX
Pt w/ no significant PMHx presenting with RIGHT hand pain s/p FOOSH injury 3-4 days ago. Had outpatient negative XRs. Given splint for home. Persistent swelling and ecchymosis but soft compartments. Neuro-vascuarly intact. Exam and history concerning for but is not limited to: musculoskeletal pain vs fracture. There is no evidence of deformity or joint instability. There are no open wounds overlying the affected site.   DDX includes but is not limited to: Rule out associated traumatic injuries, abrasions, lacerations, head trauma, neck trauma, open fractures.  PLAN:   -analgesia, ice packs & re-assess  -X-ray showing again no fx  - f/u pmd and educated RICE of the right hand.

## 2025-05-18 NOTE — ED ADULT NURSE NOTE - OBJECTIVE STATEMENT
Pt presents to the ED with c/o right wrist and hand swelling and discomfort since Thursday. Pt had an accidental fall while tripping over a rock and lost his balance, denies head strike or LOC. Denies blood thinner use. Denies any other complaints. Does not want any medication at this time. Pt went to  for same complaints, and did not see the call back from the Radiologist, unsure of results from getting scanned. Wife at bedside. Pt is A&Ox4, safety maintained.

## 2025-05-22 ENCOUNTER — APPOINTMENT (OUTPATIENT)
Dept: ORTHOPEDIC SURGERY | Facility: CLINIC | Age: 70
End: 2025-05-22

## 2025-05-22 VITALS — HEIGHT: 71 IN | BODY MASS INDEX: 20.72 KG/M2 | WEIGHT: 148 LBS

## 2025-05-22 DIAGNOSIS — M79.641 PAIN IN RIGHT HAND: ICD-10-CM

## 2025-05-22 DIAGNOSIS — M25.531 PAIN IN RIGHT WRIST: ICD-10-CM

## 2025-05-22 PROBLEM — S52.531A CLOSED COLLES' FRACTURE OF RIGHT RADIUS, INITIAL ENCOUNTER: Status: ACTIVE | Noted: 2025-05-22

## 2025-05-22 PROCEDURE — 29075 APPL CST ELBW FNGR SHORT ARM: CPT | Mod: RT

## 2025-05-22 PROCEDURE — 99213 OFFICE O/P EST LOW 20 MIN: CPT | Mod: 25

## 2025-05-22 PROCEDURE — 73110 X-RAY EXAM OF WRIST: CPT | Mod: RT

## 2025-05-24 ENCOUNTER — EMERGENCY (EMERGENCY)
Facility: HOSPITAL | Age: 70
LOS: 1 days | End: 2025-05-24
Attending: STUDENT IN AN ORGANIZED HEALTH CARE EDUCATION/TRAINING PROGRAM | Admitting: STUDENT IN AN ORGANIZED HEALTH CARE EDUCATION/TRAINING PROGRAM
Payer: MEDICARE

## 2025-05-24 VITALS
DIASTOLIC BLOOD PRESSURE: 78 MMHG | SYSTOLIC BLOOD PRESSURE: 163 MMHG | OXYGEN SATURATION: 95 % | HEIGHT: 71 IN | HEART RATE: 68 BPM | RESPIRATION RATE: 18 BRPM | TEMPERATURE: 99 F | WEIGHT: 147.93 LBS

## 2025-05-24 VITALS
SYSTOLIC BLOOD PRESSURE: 152 MMHG | OXYGEN SATURATION: 96 % | DIASTOLIC BLOOD PRESSURE: 74 MMHG | HEART RATE: 64 BPM | RESPIRATION RATE: 18 BRPM

## 2025-05-24 DIAGNOSIS — Z98.1 ARTHRODESIS STATUS: Chronic | ICD-10-CM

## 2025-05-24 DIAGNOSIS — Z96.651 PRESENCE OF RIGHT ARTIFICIAL KNEE JOINT: Chronic | ICD-10-CM

## 2025-05-24 DIAGNOSIS — Z98.890 OTHER SPECIFIED POSTPROCEDURAL STATES: Chronic | ICD-10-CM

## 2025-05-24 PROCEDURE — 73110 X-RAY EXAM OF WRIST: CPT

## 2025-05-24 PROCEDURE — 73110 X-RAY EXAM OF WRIST: CPT | Mod: 26,RT

## 2025-05-24 PROCEDURE — 29125 APPL SHORT ARM SPLINT STATIC: CPT | Mod: RT

## 2025-05-24 PROCEDURE — 99284 EMERGENCY DEPT VISIT MOD MDM: CPT | Mod: 57

## 2025-05-24 PROCEDURE — 25600 CLTX DST RDL FX/EPHYS SEP WO: CPT | Mod: 54,RT

## 2025-05-24 PROCEDURE — 99283 EMERGENCY DEPT VISIT LOW MDM: CPT | Mod: 25

## 2025-05-24 NOTE — ED PROVIDER NOTE - PHYSICAL EXAMINATION
VITAL SIGNS: I have reviewed nursing notes and confirm.   GEN: Well-developed; well-nourished; in no acute distress. Speaking full sentences.  SKIN: Warm, pink, no rash, no diaphoresis, no cyanosis, well perfused.   HEAD: Normocephalic; atraumatic.    NECK: Supple; non tender. Full range of motion.   EYES: Pupils 3mm equal, round, reactive to light and accomodation, conjunctiva and sclera clear.   MSK: Normal range of motion and movement of all 4 extremities.  (+) RIGHT ARM: Distal wrist in cast, cap refill < 2sec, warm distal fingers, able to move all fingers limited secondary to pain. Mild swelling at the distal fingers throughout. No discoloration. Sensation intact throughout.   NEURO: Alert & oriented x 3, Grossly unremarkable. Sensory and motor intact throughout. No focal deficits.  Normal speech and coordination.

## 2025-05-24 NOTE — ED ADULT NURSE NOTE - OBJECTIVE STATEMENT
Pt presents to the ER complaining of swelling and pain to right forearm. Pt had a cast put on two days ago and was told to come to ER due to swelling. A&OX4. Pt denies SOB, chest pain, nausea, vomiting, dizziness or headache.

## 2025-05-24 NOTE — ED PROVIDER NOTE - CLINICAL SUMMARY MEDICAL DECISION MAKING FREE TEXT BOX
70-year-old male with a past medical history of COPD, chronic back pain, hyperlipidemia presenting with right hand pain status post splinting by Dr. Sahil Andersen 2 days ago for a Colles' fracture. Patient has been reporting increased pain at the right wrist since cast was placed. He took oxycodone 15 mg 2 hours prior to arrival.  Patient's family called Dr. Baxter's office and was told to come to the emergency room for further evaluation.  he is reporting moderate pain at the right wrist, associated with increased swelling at his hand and distal fingers. Denies any chest pain, abdominal pain, shortness of breath, nausea/vomiting, headaches, fevers, chills,  weakness, syncope,   urinary symptoms, subjective neurological deficits, numbness/tingling, redness.    The differential diagnosis includes but is not limited to: post-cast swelling, distal radius fracture pain    AP: 70-year-old male with a past medical history of COPD, chronic back pain, hyperlipidemia presenting with right hand pain status post splinting by Dr. Sahil Andersen 2 days ago for a Colles' fracture. Afebrile, well appearing, Removed cast and replaced with sugar tong splint + sling. Likely worsening pain secondary to swelling. Neurovascularly intact after placement of new splint. They have an appt with Dr Baxter in 3 days. Educated on return precautions.

## 2025-05-24 NOTE — ED PROVIDER NOTE - NSFOLLOWUPINSTRUCTIONS_ED_ALL_ED_FT
Rest, drink plenty of fluids.  Advance activity as tolerated.  Continue all previously prescribed medications as directed.  Follow up with your PMD 2-3 days and bring copies of your results.  Return to the ER for worsening symptoms, increased pain, increased swelling, discoloration, fevers, vomiting, numbness/tingling or new concerning symptoms.    Follow up with orthopedics as scheduled for further evaluation of your distal radius fx.     Take acetaminophen 650 mg orally every 6-8 hours for pain control as needed. Please do not exceed 4,000 mg of acetaminophen during a 24 hours period. Acetaminophen can be found in many over-the-counter cold medications as well as opioid medications that may be given for pain.    Take ibuprofen (also known as MOTRIN or ADVIL) 400 mg orally every 6-8 hours for pain control as needed with food to avoid an upset stomach. Ibuprofen can be found in many over-the-counter medications. Please do not take ibuprofen if you have a bleeding disorder, stomach or gastrointestinal ulcer, or liver disease.    If needed, you can alternate these medications so that you can take one medication every 3 hours. For example, at noon take ibuprofen, then at 3PM take acetaminophen, then at 6PM take ibuprofen.

## 2025-05-24 NOTE — ED ADULT TRIAGE NOTE - CHIEF COMPLAINT QUOTE
Pt c/o swelling and pain to right hand/arm. Pt had a cast put on x 2 days ago and was told to come to ER to release the pressure.

## 2025-05-24 NOTE — ED PROVIDER NOTE - OBJECTIVE STATEMENT
70-year-old male with a past medical history of COPD, chronic back pain, hyperlipidemia presenting with right hand pain status post splinting by Dr. Sahil Andersen 2 days ago for a Colles' fracture.  Patient has been reporting increased pain at the right wrist since cast was placed. He took oxycodone 15 mg 2 hours prior to arrival.  Patient's family called Dr. Baxter's office and was told to come to the emergency room for further evaluation.  he is reporting moderate pain at the right wrist, associated with increased swelling at his hand and distal fingers. Denies any chest pain, abdominal pain, shortness of breath, nausea/vomiting, headaches, fevers, chills,  weakness, syncope,   urinary symptoms, subjective neurological deficits, numbness/tingling, redness.

## 2025-05-24 NOTE — ED PROVIDER NOTE - PATIENT PORTAL LINK FT
You can access the FollowMyHealth Patient Portal offered by [facility] by registering at the following website: [email or web address]. By joining [facility] [employer]’s FollowMyHealth portal, you will also be able to view your health information using other applications (apps) compatible with our system.

## 2025-05-27 ENCOUNTER — APPOINTMENT (OUTPATIENT)
Dept: ORTHOPEDIC SURGERY | Facility: CLINIC | Age: 70
End: 2025-05-27
Payer: MEDICARE

## 2025-05-27 VITALS — BODY MASS INDEX: 20.72 KG/M2 | WEIGHT: 148 LBS | HEIGHT: 71 IN

## 2025-05-27 DIAGNOSIS — S52.531A COLLES' FRACTURE OF RIGHT RADIUS, INITIAL ENCOUNTER FOR CLOSED FRACTURE: ICD-10-CM

## 2025-05-27 PROCEDURE — 73110 X-RAY EXAM OF WRIST: CPT | Mod: RT

## 2025-05-27 PROCEDURE — 29125 APPL SHORT ARM SPLINT STATIC: CPT | Mod: RT

## 2025-05-27 PROCEDURE — 99213 OFFICE O/P EST LOW 20 MIN: CPT | Mod: 25

## 2025-05-27 NOTE — CHART NOTE - NSCHARTNOTEFT_GEN_A_CORE
70 y o male presented to the ED on 5/24 with complaint of arm pain/injury as per chart.  Per [facility], the patient attended the recommended orthopaedic follow up appointment on 5/27 with Dr. Baxter.

## 2025-06-09 ENCOUNTER — APPOINTMENT (OUTPATIENT)
Dept: FAMILY MEDICINE | Facility: CLINIC | Age: 70
End: 2025-06-09
Payer: MEDICARE

## 2025-06-09 VITALS
WEIGHT: 148 LBS | SYSTOLIC BLOOD PRESSURE: 130 MMHG | HEART RATE: 75 BPM | BODY MASS INDEX: 20.72 KG/M2 | OXYGEN SATURATION: 97 % | DIASTOLIC BLOOD PRESSURE: 82 MMHG | HEIGHT: 71 IN

## 2025-06-09 PROBLEM — R05.3 PERSISTENT COUGH: Status: ACTIVE | Noted: 2025-06-09

## 2025-06-09 PROBLEM — R09.81 NASAL CONGESTION WITH RHINORRHEA: Status: ACTIVE | Noted: 2025-06-09

## 2025-06-09 PROCEDURE — 99214 OFFICE O/P EST MOD 30 MIN: CPT

## 2025-06-09 RX ORDER — PROMETHAZINE HYDROCHLORIDE AND DEXTROMETHORPHAN HYDROBROMIDE ORAL SOLUTION 15; 6.25 MG/5ML; MG/5ML
6.25-15 SOLUTION ORAL
Qty: 1 | Refills: 1 | Status: ACTIVE | COMMUNITY
Start: 2025-06-09 | End: 1900-01-01

## 2025-06-09 RX ORDER — FLUTICASONE PROPIONATE 50 UG/1
50 SPRAY NASAL DAILY
Qty: 1 | Refills: 2 | Status: ACTIVE | COMMUNITY
Start: 2025-06-09 | End: 1900-01-01

## 2025-06-10 ENCOUNTER — APPOINTMENT (OUTPATIENT)
Dept: NEUROLOGY | Facility: CLINIC | Age: 70
End: 2025-06-10

## 2025-06-10 ENCOUNTER — APPOINTMENT (OUTPATIENT)
Dept: ORTHOPEDIC SURGERY | Facility: CLINIC | Age: 70
End: 2025-06-10
Payer: MEDICARE

## 2025-06-10 VITALS — BODY MASS INDEX: 20.72 KG/M2 | WEIGHT: 148 LBS | HEIGHT: 71 IN

## 2025-06-10 PROBLEM — S52.501A DISTAL RADIUS FRACTURE, RIGHT: Status: ACTIVE | Noted: 2025-06-10

## 2025-06-10 LAB
INFLUENZA A RESULT: NOT DETECTED
INFLUENZA B RESULT: NOT DETECTED
RESP SYN VIRUS RESULT: NOT DETECTED
SARS-COV-2 RESULT: NOT DETECTED

## 2025-06-10 PROCEDURE — 99213 OFFICE O/P EST LOW 20 MIN: CPT

## 2025-06-10 PROCEDURE — 73110 X-RAY EXAM OF WRIST: CPT | Mod: RT

## 2025-06-10 PROCEDURE — 90791 PSYCH DIAGNOSTIC EVALUATION: CPT

## 2025-06-19 ENCOUNTER — APPOINTMENT (OUTPATIENT)
Dept: NEUROLOGY | Facility: CLINIC | Age: 70
End: 2025-06-19

## 2025-06-19 PROCEDURE — 90834 PSYTX W PT 45 MINUTES: CPT

## 2025-06-23 ENCOUNTER — APPOINTMENT (OUTPATIENT)
Dept: FAMILY MEDICINE | Facility: CLINIC | Age: 70
End: 2025-06-23
Payer: MEDICARE

## 2025-06-23 ENCOUNTER — OUTPATIENT (OUTPATIENT)
Dept: OUTPATIENT SERVICES | Facility: HOSPITAL | Age: 70
LOS: 1 days | End: 2025-06-23
Payer: MEDICARE

## 2025-06-23 ENCOUNTER — APPOINTMENT (OUTPATIENT)
Dept: RADIOLOGY | Facility: HOSPITAL | Age: 70
End: 2025-06-23
Payer: MEDICARE

## 2025-06-23 ENCOUNTER — RESULT REVIEW (OUTPATIENT)
Age: 70
End: 2025-06-23

## 2025-06-23 VITALS — SYSTOLIC BLOOD PRESSURE: 130 MMHG | RESPIRATION RATE: 20 BRPM | HEART RATE: 76 BPM | DIASTOLIC BLOOD PRESSURE: 72 MMHG

## 2025-06-23 DIAGNOSIS — Z98.1 ARTHRODESIS STATUS: Chronic | ICD-10-CM

## 2025-06-23 DIAGNOSIS — R07.81 PLEURODYNIA: ICD-10-CM

## 2025-06-23 DIAGNOSIS — Z98.890 OTHER SPECIFIED POSTPROCEDURAL STATES: Chronic | ICD-10-CM

## 2025-06-23 DIAGNOSIS — Z96.651 PRESENCE OF RIGHT ARTIFICIAL KNEE JOINT: Chronic | ICD-10-CM

## 2025-06-23 PROCEDURE — 99213 OFFICE O/P EST LOW 20 MIN: CPT

## 2025-06-23 PROCEDURE — 71100 X-RAY EXAM RIBS UNI 2 VIEWS: CPT | Mod: 26,RT

## 2025-06-23 PROCEDURE — 71046 X-RAY EXAM CHEST 2 VIEWS: CPT

## 2025-06-23 PROCEDURE — 71100 X-RAY EXAM RIBS UNI 2 VIEWS: CPT

## 2025-06-23 PROCEDURE — 71046 X-RAY EXAM CHEST 2 VIEWS: CPT | Mod: 26

## 2025-06-23 RX ORDER — METHYLPREDNISOLONE 4 MG/1
4 TABLET ORAL
Qty: 1 | Refills: 1 | Status: ACTIVE | COMMUNITY
Start: 2025-06-23 | End: 1900-01-01

## 2025-06-23 RX ORDER — MELOXICAM 15 MG/1
15 TABLET ORAL
Qty: 10 | Refills: 1 | Status: ACTIVE | COMMUNITY
Start: 2025-06-23 | End: 1900-01-01

## 2025-06-24 ENCOUNTER — OUTPATIENT (OUTPATIENT)
Dept: OUTPATIENT SERVICES | Facility: HOSPITAL | Age: 70
LOS: 1 days | End: 2025-06-24
Payer: MEDICARE

## 2025-06-24 ENCOUNTER — APPOINTMENT (OUTPATIENT)
Dept: ORTHOPEDIC SURGERY | Facility: CLINIC | Age: 70
End: 2025-06-24

## 2025-06-24 ENCOUNTER — NON-APPOINTMENT (OUTPATIENT)
Age: 70
End: 2025-06-24

## 2025-06-24 ENCOUNTER — APPOINTMENT (OUTPATIENT)
Dept: MRI IMAGING | Facility: HOSPITAL | Age: 70
End: 2025-06-24
Payer: MEDICARE

## 2025-06-24 ENCOUNTER — APPOINTMENT (OUTPATIENT)
Dept: NEUROLOGY | Facility: CLINIC | Age: 70
End: 2025-06-24
Payer: MEDICARE

## 2025-06-24 VITALS
HEIGHT: 71 IN | WEIGHT: 139 LBS | BODY MASS INDEX: 19.46 KG/M2 | TEMPERATURE: 97.7 F | OXYGEN SATURATION: 95 % | SYSTOLIC BLOOD PRESSURE: 121 MMHG | DIASTOLIC BLOOD PRESSURE: 65 MMHG | HEART RATE: 65 BPM

## 2025-06-24 DIAGNOSIS — Z98.1 ARTHRODESIS STATUS: Chronic | ICD-10-CM

## 2025-06-24 DIAGNOSIS — M54.6 PAIN IN THORACIC SPINE: ICD-10-CM

## 2025-06-24 DIAGNOSIS — Z96.651 PRESENCE OF RIGHT ARTIFICIAL KNEE JOINT: Chronic | ICD-10-CM

## 2025-06-24 DIAGNOSIS — Z98.890 OTHER SPECIFIED POSTPROCEDURAL STATES: Chronic | ICD-10-CM

## 2025-06-24 PROCEDURE — 72146 MRI CHEST SPINE W/O DYE: CPT | Mod: 26

## 2025-06-24 PROCEDURE — G2211 COMPLEX E/M VISIT ADD ON: CPT

## 2025-06-24 PROCEDURE — 72146 MRI CHEST SPINE W/O DYE: CPT

## 2025-06-24 PROCEDURE — 99215 OFFICE O/P EST HI 40 MIN: CPT

## 2025-06-25 ENCOUNTER — APPOINTMENT (OUTPATIENT)
Dept: MRI IMAGING | Facility: HOSPITAL | Age: 70
End: 2025-06-25

## 2025-06-25 NOTE — ED ADULT NURSE NOTE - PAIN RATING/NUMBER SCALE (0-10): ACTIVITY
Telemetry Verification   Patient placed on Telemetry Box  Verified with War Room  Box #  [id number]   Monitor Tech  Vivek   Rate  58   Rhythm  SB             10 (severe pain)

## 2025-06-26 ENCOUNTER — APPOINTMENT (OUTPATIENT)
Dept: NEUROLOGY | Facility: CLINIC | Age: 70
End: 2025-06-26

## 2025-06-26 PROCEDURE — 90834 PSYTX W PT 45 MINUTES: CPT

## 2025-06-30 ENCOUNTER — NON-APPOINTMENT (OUTPATIENT)
Age: 70
End: 2025-06-30

## 2025-07-01 ENCOUNTER — APPOINTMENT (OUTPATIENT)
Dept: ORTHOPEDIC SURGERY | Facility: CLINIC | Age: 70
End: 2025-07-01
Payer: MEDICARE

## 2025-07-01 VITALS — WEIGHT: 143 LBS | BODY MASS INDEX: 20.02 KG/M2 | HEIGHT: 71 IN

## 2025-07-01 PROCEDURE — 99204 OFFICE O/P NEW MOD 45 MIN: CPT

## 2025-07-02 RX ORDER — DICLOFENAC SODIUM 50 MG/1
50 TABLET, DELAYED RELEASE ORAL
Qty: 60 | Refills: 0 | Status: ACTIVE | COMMUNITY
Start: 2025-07-02 | End: 1900-01-01

## 2025-07-03 ENCOUNTER — APPOINTMENT (OUTPATIENT)
Dept: NEUROLOGY | Facility: CLINIC | Age: 70
End: 2025-07-03

## 2025-07-03 PROCEDURE — 90834 PSYTX W PT 45 MINUTES: CPT

## 2025-07-10 ENCOUNTER — APPOINTMENT (OUTPATIENT)
Dept: NEUROLOGY | Facility: CLINIC | Age: 70
End: 2025-07-10
Payer: MEDICARE

## 2025-07-10 PROCEDURE — 90834 PSYTX W PT 45 MINUTES: CPT

## 2025-07-17 ENCOUNTER — APPOINTMENT (OUTPATIENT)
Dept: NEUROLOGY | Facility: CLINIC | Age: 70
End: 2025-07-17
Payer: MEDICARE

## 2025-07-17 PROCEDURE — 90834 PSYTX W PT 45 MINUTES: CPT

## 2025-07-28 ENCOUNTER — APPOINTMENT (OUTPATIENT)
Dept: ORTHOPEDIC SURGERY | Facility: CLINIC | Age: 70
End: 2025-07-28
Payer: MEDICARE

## 2025-07-28 DIAGNOSIS — M75.101 UNSPECIFIED ROTATOR CUFF TEAR OR RUPTURE OF RIGHT SHOULDER, NOT SPECIFIED AS TRAUMATIC: ICD-10-CM

## 2025-07-28 DIAGNOSIS — M12.811 UNSPECIFIED ROTATOR CUFF TEAR OR RUPTURE OF RIGHT SHOULDER, NOT SPECIFIED AS TRAUMATIC: ICD-10-CM

## 2025-07-28 PROCEDURE — 99214 OFFICE O/P EST MOD 30 MIN: CPT

## 2025-07-29 ENCOUNTER — RX RENEWAL (OUTPATIENT)
Age: 70
End: 2025-07-29

## 2025-07-31 ENCOUNTER — APPOINTMENT (OUTPATIENT)
Dept: NEUROLOGY | Facility: CLINIC | Age: 70
End: 2025-07-31
Payer: MEDICARE

## 2025-07-31 DIAGNOSIS — R41.9 UNSPECIFIED SYMPTOMS AND SIGNS INVOLVING COGNITIVE FUNCTIONS AND AWARENESS: ICD-10-CM

## 2025-07-31 DIAGNOSIS — F43.21 ADJUSTMENT DISORDER WITH DEPRESSED MOOD: ICD-10-CM

## 2025-07-31 DIAGNOSIS — G89.29 OTHER CHRONIC PAIN: ICD-10-CM

## 2025-07-31 PROCEDURE — 90834 PSYTX W PT 45 MINUTES: CPT

## 2025-08-01 ENCOUNTER — NON-APPOINTMENT (OUTPATIENT)
Age: 70
End: 2025-08-01

## 2025-08-05 ENCOUNTER — APPOINTMENT (OUTPATIENT)
Dept: NEUROLOGY | Facility: CLINIC | Age: 70
End: 2025-08-05
Payer: MEDICARE

## 2025-08-05 PROCEDURE — 90834 PSYTX W PT 45 MINUTES: CPT

## 2025-08-06 ENCOUNTER — APPOINTMENT (OUTPATIENT)
Dept: ORTHOPEDIC SURGERY | Facility: CLINIC | Age: 70
End: 2025-08-06
Payer: MEDICARE

## 2025-08-06 VITALS — BODY MASS INDEX: 20.3 KG/M2 | HEIGHT: 71 IN | WEIGHT: 145 LBS

## 2025-08-06 DIAGNOSIS — M94.0 CHONDROCOSTAL JUNCTION SYNDROME [TIETZE]: ICD-10-CM

## 2025-08-06 PROCEDURE — 99214 OFFICE O/P EST MOD 30 MIN: CPT

## 2025-08-12 ENCOUNTER — APPOINTMENT (OUTPATIENT)
Dept: NEUROLOGY | Facility: CLINIC | Age: 70
End: 2025-08-12

## 2025-08-12 DIAGNOSIS — M94.0 CHONDROCOSTAL JUNCTION SYNDROME [TIETZE]: ICD-10-CM

## 2025-08-12 DIAGNOSIS — G89.29 OTHER CHRONIC PAIN: ICD-10-CM

## 2025-08-12 DIAGNOSIS — F43.21 ADJUSTMENT DISORDER WITH DEPRESSED MOOD: ICD-10-CM

## 2025-08-12 PROCEDURE — 90834 PSYTX W PT 45 MINUTES: CPT

## 2025-08-13 PROBLEM — F43.21 ADJUSTMENT DISORDER WITH DEPRESSED MOOD: Status: ACTIVE | Noted: 2025-07-11

## 2025-08-26 ENCOUNTER — APPOINTMENT (OUTPATIENT)
Dept: NEUROLOGY | Facility: CLINIC | Age: 70
End: 2025-08-26
Payer: MEDICARE

## 2025-08-26 DIAGNOSIS — R41.9 UNSPECIFIED SYMPTOMS AND SIGNS INVOLVING COGNITIVE FUNCTIONS AND AWARENESS: ICD-10-CM

## 2025-08-26 DIAGNOSIS — G89.29 OTHER CHRONIC PAIN: ICD-10-CM

## 2025-08-26 DIAGNOSIS — F43.21 ADJUSTMENT DISORDER WITH DEPRESSED MOOD: ICD-10-CM

## 2025-08-26 DIAGNOSIS — M94.0 CHONDROCOSTAL JUNCTION SYNDROME [TIETZE]: ICD-10-CM

## 2025-08-26 PROCEDURE — 90834 PSYTX W PT 45 MINUTES: CPT

## 2025-08-29 ENCOUNTER — NON-APPOINTMENT (OUTPATIENT)
Age: 70
End: 2025-08-29

## 2025-09-01 ENCOUNTER — NON-APPOINTMENT (OUTPATIENT)
Age: 70
End: 2025-09-01

## 2025-09-09 ENCOUNTER — APPOINTMENT (OUTPATIENT)
Dept: NEUROLOGY | Facility: CLINIC | Age: 70
End: 2025-09-09
Payer: MEDICARE

## 2025-09-09 DIAGNOSIS — F43.21 ADJUSTMENT DISORDER WITH DEPRESSED MOOD: ICD-10-CM

## 2025-09-09 DIAGNOSIS — R41.9 UNSPECIFIED SYMPTOMS AND SIGNS INVOLVING COGNITIVE FUNCTIONS AND AWARENESS: ICD-10-CM

## 2025-09-09 DIAGNOSIS — G89.29 OTHER CHRONIC PAIN: ICD-10-CM

## 2025-09-09 PROCEDURE — 90834 PSYTX W PT 45 MINUTES: CPT

## 2025-09-10 ENCOUNTER — NON-APPOINTMENT (OUTPATIENT)
Age: 70
End: 2025-09-10

## 2025-09-11 ENCOUNTER — APPOINTMENT (OUTPATIENT)
Dept: FAMILY MEDICINE | Facility: CLINIC | Age: 70
End: 2025-09-11
Payer: MEDICARE

## 2025-09-11 VITALS
WEIGHT: 142 LBS | RESPIRATION RATE: 20 BRPM | SYSTOLIC BLOOD PRESSURE: 130 MMHG | HEIGHT: 71 IN | DIASTOLIC BLOOD PRESSURE: 70 MMHG | BODY MASS INDEX: 19.88 KG/M2 | HEART RATE: 68 BPM

## 2025-09-11 DIAGNOSIS — R63.4 ABNORMAL WEIGHT LOSS: ICD-10-CM

## 2025-09-11 DIAGNOSIS — R73.01 IMPAIRED FASTING GLUCOSE: ICD-10-CM

## 2025-09-11 DIAGNOSIS — I10 ESSENTIAL (PRIMARY) HYPERTENSION: ICD-10-CM

## 2025-09-11 DIAGNOSIS — E78.5 HYPERLIPIDEMIA, UNSPECIFIED: ICD-10-CM

## 2025-09-11 PROCEDURE — 36415 COLL VENOUS BLD VENIPUNCTURE: CPT

## 2025-09-11 PROCEDURE — G2211 COMPLEX E/M VISIT ADD ON: CPT

## 2025-09-11 PROCEDURE — 99214 OFFICE O/P EST MOD 30 MIN: CPT

## 2025-09-12 LAB
AFP-TM SERPL-MCNC: 2.4 NG/ML
ALBUMIN SERPL ELPH-MCNC: 4.5 G/DL
ALP BLD-CCNC: 56 U/L
ALT SERPL-CCNC: 15 U/L
ANION GAP SERPL CALC-SCNC: 12 MMOL/L
AST SERPL-CCNC: 15 U/L
BILIRUB SERPL-MCNC: 0.4 MG/DL
BUN SERPL-MCNC: 28 MG/DL
CALCIUM SERPL-MCNC: 9.5 MG/DL
CANCER AG19-9 SERPL-ACNC: <2 U/ML
CEA SERPL-MCNC: 1.9 NG/ML
CHLORIDE SERPL-SCNC: 104 MMOL/L
CHOLEST SERPL-MCNC: 200 MG/DL
CK SERPL-CCNC: 173 U/L
CO2 SERPL-SCNC: 23 MMOL/L
CREAT SERPL-MCNC: 0.85 MG/DL
CRP SERPL-MCNC: <3 MG/L
EGFRCR SERPLBLD CKD-EPI 2021: 92 ML/MIN/1.73M2
ERYTHROCYTE [SEDIMENTATION RATE] IN BLOOD BY WESTERGREN METHOD: 10 MM/HR
ESTIMATED AVERAGE GLUCOSE: 126 MG/DL
FOLATE SERPL-MCNC: 9.6 NG/ML
GLUCOSE SERPL-MCNC: 123 MG/DL
HBA1C MFR BLD HPLC: 6 %
HCT VFR BLD CALC: 41.3 %
HDLC SERPL-MCNC: 70 MG/DL
HGB BLD-MCNC: 13.5 G/DL
LDLC SERPL-MCNC: 117 MG/DL
MCHC RBC-ENTMCNC: 30.1 PG
MCHC RBC-ENTMCNC: 32.7 G/DL
MCV RBC AUTO: 92.2 FL
NONHDLC SERPL-MCNC: 130 MG/DL
PLATELET # BLD AUTO: 239 K/UL
POTASSIUM SERPL-SCNC: 4.1 MMOL/L
PROT SERPL-MCNC: 6.8 G/DL
PSA SERPL-MCNC: 3.14 NG/ML
RBC # BLD: 4.48 M/UL
RBC # FLD: 14.6 %
SODIUM SERPL-SCNC: 140 MMOL/L
T4 FREE SERPL-MCNC: 1.7 NG/DL
TRIGL SERPL-MCNC: 70 MG/DL
TSH SERPL-ACNC: 1.23 UIU/ML
VIT B12 SERPL-MCNC: 429 PG/ML
WBC # FLD AUTO: 6.16 K/UL

## 2025-09-18 ENCOUNTER — APPOINTMENT (OUTPATIENT)
Dept: NEUROLOGY | Facility: CLINIC | Age: 70
End: 2025-09-18
Payer: MEDICARE

## 2025-09-18 DIAGNOSIS — G89.29 OTHER CHRONIC PAIN: ICD-10-CM

## 2025-09-18 DIAGNOSIS — R41.9 UNSPECIFIED SYMPTOMS AND SIGNS INVOLVING COGNITIVE FUNCTIONS AND AWARENESS: ICD-10-CM

## 2025-09-18 DIAGNOSIS — M94.0 CHONDROCOSTAL JUNCTION SYNDROME [TIETZE]: ICD-10-CM

## 2025-09-18 DIAGNOSIS — F43.21 ADJUSTMENT DISORDER WITH DEPRESSED MOOD: ICD-10-CM

## 2025-09-18 PROCEDURE — 90834 PSYTX W PT 45 MINUTES: CPT

## (undated) DEVICE — DRSG GAUZE VASELINE PETROLEUM 3 X 18"

## (undated) DEVICE — GLV 8 ULTRAFREE MAX

## (undated) DEVICE — DRAPE LIGHT HANDLE COVER (GREEN)

## (undated) DEVICE — SPHERE MARKER (5 SPHERES)

## (undated) DEVICE — VENODYNE/SCD SLEEVE CALF MEDIUM

## (undated) DEVICE — WARMING BLANKET UPPER ADULT

## (undated) DEVICE — Device

## (undated) DEVICE — GLV 8.5 PROTEXIS (WHITE)

## (undated) DEVICE — PREP BETADINE KIT

## (undated) DEVICE — POSITIONER FOAM HEAD CRADLE (PINK)

## (undated) DEVICE — GLV 6.5 PROTEXIS (WHITE)

## (undated) DEVICE — GLV 8 PROTEXIS ORTHO (CREAM)

## (undated) DEVICE — SPECIMEN CONTAINER 100ML

## (undated) DEVICE — VISITEC 4X4

## (undated) DEVICE — SOL INJ LR 500ML

## (undated) DEVICE — DRAPE 1/2 SHEET 40X57"

## (undated) DEVICE — SPECIMEN CONTAINER PET

## (undated) DEVICE — PACK IV START WITH CHG

## (undated) DEVICE — GLV 7.5 ULTRAFREE MAX

## (undated) DEVICE — MIDAS REX MR8 METAL CUTTER 3MM X 9CM

## (undated) DEVICE — ELCTR SUBDERMAL CORKSCREW NDL 1.2MM

## (undated) DEVICE — DRAIN RESERVOIR FOR JACKSON PRATT 100CC CARDINAL

## (undated) DEVICE — LAP PAD 18 X 18"

## (undated) DEVICE — MIDAS REX MR8 MATCH HEAD FLUTED LG BORE 3MM X 14CM

## (undated) DEVICE — GLV 8 PROTEXIS (WHITE)

## (undated) DEVICE — SUT CHROMIC 2-0 30" V-20

## (undated) DEVICE — PACK MINOR

## (undated) DEVICE — ELCTR MONOPOLAR STIMULATOR PROBE FLUSH-TIP

## (undated) DEVICE — FOLEY TRAY 16FR LF URINE METER SURESTEP

## (undated) DEVICE — DRAPE BACK TABLE COVER HEAVY DUTY 60"

## (undated) DEVICE — MEDICATION LABELS W MARKER

## (undated) DEVICE — GOWN TRIMAX LG

## (undated) DEVICE — DRAPE C ARM UNIVERSAL

## (undated) DEVICE — SOL IRR POUR NS 0.9% 1500ML

## (undated) DEVICE — SOL IRR POUR H2O 250ML

## (undated) DEVICE — DRAPE 3/4 SHEET W REINFORCEMENT 56X77"

## (undated) DEVICE — GLV 7.5 PROTEXIS (WHITE)

## (undated) DEVICE — DRAIN JACKSON PRATT 7MM FLAT FULL NO TROCAR

## (undated) DEVICE — NDL SPNL WHIT 22GX3.5IN

## (undated) DEVICE — DRAPE MAYO STAND 30"

## (undated) DEVICE — SUT VICRYL 2-0 18" CP-2 UNDYED (POP-OFF)

## (undated) DEVICE — DRAPE TOWEL BLUE 17" X 24"

## (undated) DEVICE — CANISTER SUCTION LID GUARD 3000CC

## (undated) DEVICE — ELCTR BOVIE TIP BLADE INSULATED 2.75" EDGE

## (undated) DEVICE — MIDAS REX LEGEND MATCH HEAD FLUTED LG BORE 3.0MM X 14CM

## (undated) DEVICE — ELCTR BIPOLAR PROBE

## (undated) DEVICE — PACK LUMBAR LAMI

## (undated) DEVICE — POSITIONER FOAM EGG CRATE ULNAR 2PCS (PINK)

## (undated) DEVICE — NDL SPINAL 18G X 3.5" (PINK)

## (undated) DEVICE — SOL IRR POUR NS 0.9% 500ML

## (undated) DEVICE — CATH IV SAFE BC BLU 22GAX1.0"

## (undated) DEVICE — DRSG TELFA 3 X 8

## (undated) DEVICE — CATH IV SAFE 24GX3/4

## (undated) DEVICE — WOUND IRR SURGIPHOR

## (undated) DEVICE — MARKING PEN W RULER

## (undated) DEVICE — GLV 7 PROTEXIS (WHITE)

## (undated) DEVICE — ELCTR BOVIE PENCIL HANDPIECE

## (undated) DEVICE — DRAIN JACKSON PRATT 3 SPRING RESERVOIR W 10FR PVC DRAIN

## (undated) DEVICE — STRYKER BONE MILL BLADE FINE 3.2MM

## (undated) DEVICE — ELCTR SUBDERMAL NDL CLASSIC 1.5M X 59" (6 COLOR)

## (undated) DEVICE — DRSG XEROFORM 1 X 8"

## (undated) DEVICE — STAPLER SKIN VISI-STAT 35 WIDE

## (undated) DEVICE — ELCTR PEDICLE SCREW PROBE 3MM BALL 1.8MM X 100MM

## (undated) DEVICE — ELCTR 4-DISC 20MM 49" (RED, BLUE, GREEN, BLACK)

## (undated) DEVICE — PREP CHLORAPREP HI-LITE ORANGE 26ML

## (undated) DEVICE — ELCTR SUBDERMAL NDL 27G X 1/2" WITH TWISTED PAIR

## (undated) DEVICE — SOLIDIFIER CANN EXPRESS 3K

## (undated) DEVICE — SUT CHROMIC 2-0 60" REEL

## (undated) DEVICE — SUT VICRYL 0 18" OS-6 (POP-OFF)

## (undated) DEVICE — TRAY EPIDURAL SINGLE DOSE

## (undated) DEVICE — VENODYNE/SCD SLEEVE CALF LARGE

## (undated) DEVICE — WARMING BLANKET LOWER ADULT